# Patient Record
Sex: MALE | Race: WHITE | NOT HISPANIC OR LATINO | Employment: PART TIME | URBAN - METROPOLITAN AREA
[De-identification: names, ages, dates, MRNs, and addresses within clinical notes are randomized per-mention and may not be internally consistent; named-entity substitution may affect disease eponyms.]

---

## 2017-01-02 ENCOUNTER — TELEPHONE (OUTPATIENT)
Dept: FAMILY MEDICINE | Facility: CLINIC | Age: 67
End: 2017-01-02

## 2017-01-02 DIAGNOSIS — M10.079 ACUTE IDIOPATHIC GOUT OF FOOT, UNSPECIFIED LATERALITY: Primary | Chronic | ICD-10-CM

## 2017-01-02 NOTE — TELEPHONE ENCOUNTER
Spoke with Prashant and he is having a flare up of gout in his left big toe- achy, pain and swelling. He would like a script called in again, he said the last time he was on methylPREDNISolone (MEDROL DOSEPAK) 4 MG tablet  and that worked for him. Medication pended for your approval and completion.   Nathalia Simms RN

## 2017-01-02 NOTE — TELEPHONE ENCOUNTER
Patient calling states has a history of gout, is having a flare up in his toe, would like a script called in for this declined appt at this time.

## 2017-01-03 RX ORDER — METHYLPREDNISOLONE 4 MG
TABLET, DOSE PACK ORAL
Qty: 21 TABLET | Refills: 0 | Status: SHIPPED | OUTPATIENT
Start: 2017-01-03 | End: 2017-01-16

## 2017-01-03 NOTE — TELEPHONE ENCOUNTER
Spoke with pt and instructed that medication was sent to his preferred [facility] pharmacy. He verbalized understanding and appreciation for the call.   Nathalia Simms RN

## 2017-01-03 NOTE — TELEPHONE ENCOUNTER
Patient calling to check on status of message states is having more pain today would like a call back or script called in as soon as possible.

## 2017-01-16 PROBLEM — M10.9 ACUTE GOUTY ARTHRITIS: Status: ACTIVE | Noted: 2017-01-16

## 2017-03-07 ENCOUNTER — RADIANT APPOINTMENT (OUTPATIENT)
Dept: GENERAL RADIOLOGY | Facility: CLINIC | Age: 67
End: 2017-03-07
Attending: PHYSICIAN ASSISTANT
Payer: MEDICARE

## 2017-03-07 ENCOUNTER — OFFICE VISIT (OUTPATIENT)
Dept: FAMILY MEDICINE | Facility: CLINIC | Age: 67
End: 2017-03-07
Payer: MEDICARE

## 2017-03-07 VITALS
BODY MASS INDEX: 38.92 KG/M2 | WEIGHT: 278 LBS | HEART RATE: 69 BPM | OXYGEN SATURATION: 96 % | TEMPERATURE: 97.6 F | HEIGHT: 71 IN | DIASTOLIC BLOOD PRESSURE: 66 MMHG | SYSTOLIC BLOOD PRESSURE: 117 MMHG

## 2017-03-07 DIAGNOSIS — M54.6 ACUTE MIDLINE THORACIC BACK PAIN: Primary | ICD-10-CM

## 2017-03-07 DIAGNOSIS — R07.1 PAINFUL RESPIRATION: ICD-10-CM

## 2017-03-07 LAB
ALBUMIN UR-MCNC: NEGATIVE MG/DL
APPEARANCE UR: CLEAR
BILIRUB UR QL STRIP: NEGATIVE
COLOR UR AUTO: YELLOW
D DIMER PPP FEU-MCNC: NORMAL UG/ML FEU (ref 0–0.5)
GLUCOSE UR STRIP-MCNC: NEGATIVE MG/DL
HGB UR QL STRIP: NEGATIVE
KETONES UR STRIP-MCNC: NEGATIVE MG/DL
LEUKOCYTE ESTERASE UR QL STRIP: NEGATIVE
NITRATE UR QL: NEGATIVE
PH UR STRIP: 7.5 PH (ref 5–7)
SP GR UR STRIP: 1.02 (ref 1–1.03)
URN SPEC COLLECT METH UR: ABNORMAL
UROBILINOGEN UR STRIP-ACNC: 1 EU/DL (ref 0.2–1)

## 2017-03-07 PROCEDURE — 81003 URINALYSIS AUTO W/O SCOPE: CPT | Performed by: PHYSICIAN ASSISTANT

## 2017-03-07 PROCEDURE — 36415 COLL VENOUS BLD VENIPUNCTURE: CPT | Performed by: PHYSICIAN ASSISTANT

## 2017-03-07 PROCEDURE — 85379 FIBRIN DEGRADATION QUANT: CPT | Performed by: PHYSICIAN ASSISTANT

## 2017-03-07 PROCEDURE — 71020 XR CHEST 2 VW: CPT

## 2017-03-07 PROCEDURE — 99214 OFFICE O/P EST MOD 30 MIN: CPT | Performed by: PHYSICIAN ASSISTANT

## 2017-03-07 RX ORDER — PREDNISONE 20 MG/1
20 TABLET ORAL 2 TIMES DAILY
Qty: 14 TABLET | Refills: 0 | Status: SHIPPED | OUTPATIENT
Start: 2017-03-07 | End: 2017-03-14

## 2017-03-07 NOTE — PROGRESS NOTES
SUBJECTIVE:                                                    Prashant Keyes is a 66 year old male who presents to clinic today for the following health issues:        Back Pain      Duration: 1 week        Specific cause: none    Description:   Location of pain: upper back bilateral  Character of pain: dull ache  Pain radiation:radiates to upper sides  New numbness or weakness in legs, not attributed to pain:  no     Intensity: At its worst 5/10    History:   Pain interferes with job: No  History of back problems: no prior back problems  Any previous MRI or X-rays: Yes- at [place].  Sees a specialist for back pain:  No  Therapies tried without relief: acetaminophen (Tylenol)    Alleviating factors:   Improved by: acetaminophen (Tylenol) and rest      Precipitating factors:  Worsened by: Coughing and breathing    Functional and Psychosocial Screen (Harshil STarT Back):      Not performed today       Accompanying Signs & Symptoms:  Risk of Fracture:  None  Risk of Cauda Equina:  None  Risk of Infection:  None  Risk of Cancer:  None  Risk of Ankylosing Spondylitis:  Onset at age <35, male, AND morning back stiffness.no                    No rash. No fever. Mild fever, now resolved. No cold symptoms. No sore throat. No sob. No calf pain or swelling. Pain with moving around or with extreme deep breathing. No sob. No irritative/obst voiding symptoms.  Some occasional right lateral chest pain. Off and on.   Problem list and histories reviewed & adjusted, as indicated.  Additional history: as documented    Patient Active Problem List   Diagnosis     Hypertension goal BP (blood pressure) < 140/90     Convulsions (H)     GERD     Fibromyalgia syndrome     Hyperlipidemia LDL goal <130     Obesity, Class III, BMI 40-49.9 (morbid obesity) (H)     Eczema     KALEB (obstructive sleep apnea)     Advanced directives, counseling/discussion     Lichen planus     CKD (chronic kidney disease) stage 2, GFR 60-89 ml/min     Gout      Leonardo     Acute gouty arthritis     Past Surgical History   Procedure Laterality Date     Angiogram  2003     Coronary Angiogram- negative     Hc removal testis,simple  1978     Right undecended     Hernia repair, inguinal rt/lt  1963, 1978     Right Hernia     Herniorrhaphy umbilical  4/2013     [place]       Social History   Substance Use Topics     Smoking status: Never Smoker     Smokeless tobacco: Never Used     Alcohol use No      Comment: Quit since 2003.      Family History   Problem Relation Age of Onset     CEREBROVASCULAR DISEASE Mother      60's     C.A.D. Mother      CABG 75     Arthritis Mother      Eye Disorder Mother      HEART DISEASE Mother      Cardiovascular Father      Rheumatic Heart Disease     Hypertension Brother      Lipids Brother      C.A.D. Brother      CABG 46     Arthritis Brother      HEART DISEASE Brother      CABG x5     Obesity Brother      Hypertension Brother      Lipids Brother      Thyroid Disease Brother      Alcohol/Drug Brother      HEART DISEASE Brother      CABG     CANCER Sister      Thyroid CA     Hypertension Sister      Obesity Sister      Thyroid Disease Sister      Hemochromatosis Sister      Depression Daughter      Depression Daughter      Depression Son      DIABETES Son      Depression Son      CANCER Maternal Grandmother      Thyroid CA     CANCER Paternal Grandfather      Throat CA     Thyroid Disease Maternal Grandfather      GASTROINTESTINAL DISEASE Brother      Crohn's Disease-Ostomy     Arrhythmia Sister          Recent Labs   Lab Test  07/19/16   1159  05/02/16   1150  04/08/16   1417 02/11/16 12/19/14   0815   01/10/14   1433  06/03/13   0730   12/30/11   1419   07/20/10   1356   07/15/09   1445   A1C   --    --    --    --    --    --    --    --    --    --    --    --   5.9   --   5.7   LDL  114*   --    --    --    --   81   --   98   --    < >   --    < >   --    < >  133*   HDL  46   --    --    --    --   44   --   40   --    < >   --    --     --    --   61   TRIG  253*   --    --    --    --   195*   --   198*   --    < >   --    --    --    --    --    ALT   --   40  39  35   < >   --    --    --    --    < >   --    < >  37   --    --    CR   --   1.02  0.98   --    < >  1.24   < >  1.27*  1.11   < >   --    < >  1.19   < >   --    GFRESTIMATED   --   73  77   --    < >  59*   < >  57*  67   < >   --    < >  63   < >   --    GFRESTBLACK   --   89  >90   GFR Calc     --    < >  71   < >  69  81   < >   --    < >  76   < >   --    POTASSIUM   --   3.5  3.6   --    < >  3.4   < >  3.9   --    < >   --    < >  3.6   < >   --    TSH   --    --    --    --    --    --    --    --   1.04   --   0.65   < >   --    --   0.93    < > = values in this interval not displayed.      BP Readings from Last 3 Encounters:   03/07/17 117/66   12/15/16 138/76   12/13/16 154/82    Wt Readings from Last 3 Encounters:   03/07/17 278 lb (126.1 kg)   12/15/16 281 lb 6.4 oz (127.6 kg)   12/13/16 281 lb 6.4 oz (127.6 kg)                    Reviewed and updated as needed this visit by clinical staff  Tobacco  Allergies  Meds  Med Hx  Surg Hx  Fam Hx  Soc Hx      Reviewed and updated as needed this visit by Provider         All other systems negative except as outline above  OBJECTIVE:    BACK: thoracolumbar spine area reveals local tenderness.  Painful and reduced LS ROM noted. Straight leg raise is neg at .  DTR's, motor strength and sensation normal, including heel and toe gait.  Perifpheral pulses are palpable.  Hipes and knees have full range of motion without pain.  No abdominal tenderness, mass or organomegaly.  CHEST:chest clear to IPPA, no tachypnea, retractions or cyanosis and S1, S2 normal, no murmur, no gallop, rate regular.  No leg edema  The abdomen is soft without tenderness, guarding, mass, rebound or organomegaly. Bowel sounds are normal. No CVA tenderness or inguinal adenopathy noted.  Eye exam - right eye normal lid, conjunctiva, cornea,  pupil and fundus, left eye normal lid, conjunctiva, cornea, pupil and fundus.  ENT exam reveals - ENT exam normal, no neck nodes or sinus tenderness.        Prashant was seen today for musculoskeletal problem.    Diagnoses and all orders for this visit:    Acute midline thoracic back pain  -     *UA reflex to Microscopic and Culture ([facility] (except [place] and [place])  -     predniSONE (DELTASONE) 20 MG tablet; Take 1 tablet (20 mg) by mouth 2 times daily for 7 days    Painful respiration  -     D dimer quantitative  -     XR Chest 2 Views      work on lifestyle modification  Advised supportive and symptomatic treatment.  Follow up with Provider - if condition persists or worsens.

## 2017-03-07 NOTE — MR AVS SNAPSHOT
"              After Visit Summary   3/7/2017    Prashant Keyes    MRN: [id number]           Patient Information     Date Of Birth          1950        Visit Information        Provider Department      3/7/2017 1:40 PM Matt Swan PA-C [facility]        Today's Diagnoses     Acute midline thoracic back pain    -  1    Painful respiration           Follow-ups after your visit        Who to contact     Normal or non-critical lab and imaging results will be communicated to you by Negoramahart, letter or phone within 4 business days after the clinic has received the results. If you do not hear from us within 7 days, please contact the clinic through Negoramahart or phone. If you have a critical or abnormal lab result, we will notify you by phone as soon as possible.  Submit refill requests through [employer] or call your pharmacy and they will forward the refill request to us. Please allow 3 business days for your refill to be completed.          If you need to speak with a  for additional information , please call: [phone]             Additional Information About Your Visit        [facility] Information     Poetica gives you secure access to your electronic health record. If you see a primary care provider, you can also send messages to your care team and make appointments. If you have questions, please call your primary care clinic.  If you do not have a primary care provider, please call [phone] and they will assist you.        Care EveryWhere ID     This is your Care EveryWhere ID. This could be used by other organizations to access your [facility] medical records  [id number]        Your Vitals Were     Pulse Temperature Height Pulse Oximetry BMI (Body Mass Index)       69 97.6  F (36.4  C) (Oral) 5' 11\" (1.803 m) 96% 38.77 kg/m2        Blood Pressure from Last 3 Encounters:   03/07/17 117/66   12/15/16 138/76   12/13/16 154/82    Weight from Last 3 Encounters:   03/07/17 278 lb " (126.1 kg)   12/15/16 281 lb 6.4 oz (127.6 kg)   12/13/16 281 lb 6.4 oz (127.6 kg)              We Performed the Following     *UA reflex to Microscopic and Culture ([facility] [place] [facility] (except [place] and [place])     D dimer quantitative     XR Chest 2 Views          Today's Medication Changes          These changes are accurate as of: 3/7/17 11:59 PM.  If you have any questions, ask your nurse or doctor.               Start taking these medicines.        Dose/Directions    predniSONE 20 MG tablet   Commonly known as:  DELTASONE   Used for:  Acute midline thoracic back pain   Started by:  Matt Swan PA-C        Dose:  20 mg   Take 1 tablet (20 mg) by mouth 2 times daily for 7 days   Quantity:  14 tablet   Refills:  0         These medicines have changed or have updated prescriptions.        Dose/Directions    aspirin 81 MG tablet   This may have changed:  additional instructions   Used for:  Essential hypertension        Dose:  81 mg   Take 1 tablet (81 mg) by mouth daily   Quantity:  30 tablet   Refills:  0            Where to get your medicines      These medications were sent to [facility] [place]  [place]  [address] [address] [place] [address]     Phone:  [phone]     predniSONE 20 MG tablet                Primary Care Provider Office Phone # Fax #    Matt Swan PA-C [phone]       [facility] [address]  [place] [address]        Thank you!     Thank you for choosing [facility]  for your care. Our goal is always to provide you with excellent care. Hearing back from our patients is one way we can continue to improve our services. Please take a few minutes to complete the written survey that you may receive in the mail after your visit with us. Thank you!             Your Updated Medication List - Protect others around you: Learn how to safely use, store and throw away your medicines at  www.disposemymeds.org.          This list is accurate as of: 3/7/17 11:59 PM.  Always use your most recent med list.                   Brand Name Dispense Instructions for use    ABILIFY 2 MG tablet   Generic drug:  ARIPiprazole      Take 2 mg by mouth daily       allopurinol 100 MG tablet    ZYLOPRIM    90 tablet    Take 1 tablet (100 mg) by mouth daily       amLODIPine 10 MG tablet    NORVASC    90 tablet    Take 1 tablet (10 mg) by mouth daily (with dinner)       aspirin 81 MG tablet     30 tablet    Take 1 tablet (81 mg) by mouth daily       carvedilol 12.5 MG tablet    COREG    180 tablet    Take 1 tablet (12.5 mg) by mouth 2 times daily (with meals)       chlorthalidone 25 MG tablet    HYGROTON    135 tablet    Take 1.5 tablets (37.5 mg) by mouth daily       cyabnocobalamin 2500 MCG sublingual tablet    VITAMIN B-12    90 tablet    Place 2,500 mcg under the tongue daily       divalproex 500 MG EC tablet    DEPAKOTE    180 tablet    Take 1 tab po twice daily. Needs office follow-up before next refill.       levETIRAcetam 1000 MG Tabs    KEPPRA    180 tablet    1 tablet in morning and bed time.       losartan 100 MG tablet    COZAAR    90 tablet    Take 1 tablet (100 mg) by mouth daily       methylPREDNISolone 4 MG tablet    MEDROL DOSEPAK    21 tablet    Follow package instructions       MULTI VITAMIN MENS PO      Take  by mouth.       omeprazole 40 MG capsule    priLOSEC    30 capsule    Take 1 capsule (40 mg) by mouth daily Take 30-60 minutes before a meal.       order for DME      CPAP daily       predniSONE 20 MG tablet    DELTASONE    14 tablet    Take 1 tablet (20 mg) by mouth 2 times daily for 7 days       simvastatin 20 MG tablet    ZOCOR    180 tablet    Take 2 tablets (40 mg) by mouth At Bedtime Current dose 1/10/14       TEMAZEPAM PO      Take 15 mg by mouth At Bedtime       vitamin D 2000 UNITS tablet     90 tablet    Take 2,000 Units by mouth daily

## 2017-03-07 NOTE — NURSING NOTE
"Chief Complaint   Patient presents with     Musculoskeletal Problem     upper back pain       Initial /66 (BP Location: Left arm, Patient Position: Chair, Cuff Size: Adult Large)  Pulse 69  Temp 97.6  F (36.4  C) (Oral)  Ht 5' 11\" (1.803 m)  Wt 278 lb (126.1 kg)  SpO2 96%  BMI 38.77 kg/m2 Estimated body mass index is 38.77 kg/(m^2) as calculated from the following:    Height as of this encounter: 5' 11\" (1.803 m).    Weight as of this encounter: 278 lb (126.1 kg).  Medication Reconciliation: complete   Derick Winters MA      "

## 2017-03-13 DIAGNOSIS — M10.071 ACUTE IDIOPATHIC GOUT OF RIGHT ANKLE: Chronic | ICD-10-CM

## 2017-03-13 NOTE — TELEPHONE ENCOUNTER
Please send new dose, pt stated that dose increased to 2QD.      allopurinol 100mg      Last Written Prescription Date: 07/25/16  Last Fill Quantity: 90,  # refills: 3   Last Office Visit with [facility], [facility] or [facility] prescribing provider: 03/07/17                                             Thank You,  Juju Cunha, Pharmacy Tech  [place]/ [facility]

## 2017-03-14 RX ORDER — ALLOPURINOL 100 MG/1
100 TABLET ORAL DAILY
Qty: 90 TABLET | Refills: 3 | Status: SHIPPED | OUTPATIENT
Start: 2017-03-14 | End: 2017-03-17

## 2017-03-15 ENCOUNTER — TELEPHONE (OUTPATIENT)
Dept: FAMILY MEDICINE | Facility: CLINIC | Age: 67
End: 2017-03-15

## 2017-03-15 DIAGNOSIS — M10.071 ACUTE IDIOPATHIC GOUT OF RIGHT ANKLE: Chronic | ICD-10-CM

## 2017-03-15 NOTE — TELEPHONE ENCOUNTER
Javi Gutierrez    Pt came in today to  his allopurinol with direction of one tablet daily.  He mentioned that you have told him to take 2 tablets daily a few weeks ago.  If the dose has changed can you send a new prescription.  Thanks.    [place]

## 2017-03-17 RX ORDER — ALLOPURINOL 100 MG/1
200 TABLET ORAL DAILY
Qty: 180 TABLET | Refills: 3 | Status: SHIPPED | OUTPATIENT
Start: 2017-03-17 | End: 2017-05-05

## 2017-03-20 ENCOUNTER — MYC MEDICAL ADVICE (OUTPATIENT)
Dept: FAMILY MEDICINE | Facility: CLINIC | Age: 67
End: 2017-03-20

## 2017-04-02 DIAGNOSIS — M10.079 ACUTE IDIOPATHIC GOUT OF FOOT, UNSPECIFIED LATERALITY: Chronic | ICD-10-CM

## 2017-04-03 NOTE — TELEPHONE ENCOUNTER
methylprednisolone      Last Written Prescription Date:  01/03/17  Last Fill Quantity: 21,   # refills: 0  Last Office Visit with [facility] or  [facility] prescribing provider: 03/07/17  Future Office visit:       Routing refill request to provider for review/approval because:  Drug not on the [facility] or [email or web address] refill protocol or controlled substance

## 2017-04-04 RX ORDER — METHYLPREDNISOLONE 4 MG
TABLET, DOSE PACK ORAL
Qty: 21 TABLET | Refills: 0 | Status: SHIPPED | OUTPATIENT
Start: 2017-04-04 | End: 2017-05-05

## 2017-04-10 DIAGNOSIS — E78.00 PURE HYPERCHOLESTEROLEMIA: ICD-10-CM

## 2017-04-10 RX ORDER — SIMVASTATIN 20 MG
TABLET ORAL
Qty: 180 TABLET | Refills: 2 | Status: SHIPPED | OUTPATIENT
Start: 2017-04-10 | End: 2017-05-05

## 2017-04-10 NOTE — TELEPHONE ENCOUNTER
Simvastatin     Last Written Prescription Date: 1/6/17  Last Fill Quantity: 180, # refills: 2  Last Office Visit with [facility], [facility] or [facility] prescribing provider: 3/7/17       Lab Results   Component Value Date    CHOL 211 07/19/2016     Lab Results   Component Value Date    HDL 46 07/19/2016     Lab Results   Component Value Date     07/19/2016     Lab Results   Component Value Date    TRIG 253 07/19/2016     Lab Results   Component Value Date    CHOLHDLRATIO 3.7 12/19/2014

## 2017-04-12 DIAGNOSIS — I10 HYPERTENSION GOAL BP (BLOOD PRESSURE) < 140/90: Chronic | ICD-10-CM

## 2017-04-12 RX ORDER — CARVEDILOL 12.5 MG/1
TABLET ORAL
Qty: 180 TABLET | Refills: 1 | Status: SHIPPED | OUTPATIENT
Start: 2017-04-12 | End: 2017-05-05

## 2017-04-19 DIAGNOSIS — I10 ESSENTIAL HYPERTENSION: ICD-10-CM

## 2017-04-20 RX ORDER — LOSARTAN POTASSIUM 100 MG/1
TABLET ORAL
Qty: 90 TABLET | Refills: 0 | Status: SHIPPED | OUTPATIENT
Start: 2017-04-20 | End: 2017-05-05

## 2017-04-20 NOTE — TELEPHONE ENCOUNTER
Losartan      Last Written Prescription Date: 1/23/17  Last Fill Quantity: 90, # refills: 0  Last Office Visit with [facility], [facility] or [facility] prescribing provider: 3/7/17       Potassium   Date Value Ref Range Status   05/02/2016 3.5 3.4 - 5.3 mmol/L Final     Creatinine   Date Value Ref Range Status   05/02/2016 1.02 0.66 - 1.25 mg/dL Final     BP Readings from Last 3 Encounters:   03/07/17 117/66   12/15/16 138/76   12/13/16 154/82

## 2017-04-29 DIAGNOSIS — I10 HYPERTENSION GOAL BP (BLOOD PRESSURE) < 140/90: ICD-10-CM

## 2017-05-01 NOTE — TELEPHONE ENCOUNTER
AMLODIPINE      Last Written Prescription Date: 1-30-17  Last Fill Quantity: 90, # refills: 0    Last Office Visit with [facility], [facility] or [facility] prescribing provider:  3-7-17   Future Office Visit:        BP Readings from Last 3 Encounters:   03/07/17 117/66   12/15/16 138/76   12/13/16 154/82

## 2017-05-02 ENCOUNTER — ALLIED HEALTH/NURSE VISIT (OUTPATIENT)
Dept: NURSING | Facility: CLINIC | Age: 67
End: 2017-05-02
Payer: MEDICARE

## 2017-05-02 DIAGNOSIS — H61.23 BILATERAL IMPACTED CERUMEN: Primary | ICD-10-CM

## 2017-05-02 PROCEDURE — 99207 ZZC NO CHARGE NURSE ONLY: CPT

## 2017-05-02 RX ORDER — AMLODIPINE BESYLATE 10 MG/1
TABLET ORAL
Qty: 90 TABLET | Refills: 0 | Status: SHIPPED | OUTPATIENT
Start: 2017-05-02 | End: 2017-05-05

## 2017-05-02 NOTE — MR AVS SNAPSHOT
After Visit Summary   5/2/2017    Prashant Keyes    MRN: [id number]           Patient Information     Date Of Birth          1950        Visit Information        Provider Department      5/2/2017 2:30 PM [facility]        Today's Diagnoses     Bilateral impacted cerumen    -  1       Follow-ups after your visit        Who to contact     If you have questions or need follow up information about today's clinic visit or your schedule please contact [facility] directly at [phone].  Normal or non-critical lab and imaging results will be communicated to you by Sofeahart, letter or phone within 4 business days after the clinic has received the results. If you do not hear from us within 7 days, please contact the clinic through Sofeahart or phone. If you have a critical or abnormal lab result, we will notify you by phone as soon as possible.  Submit refill requests through [employer] or call your pharmacy and they will forward the refill request to us. Please allow 3 business days for your refill to be completed.          Additional Information About Your Visit        MyChart Information     [employer] gives you secure access to your electronic health record. If you see a primary care provider, you can also send messages to your care team and make appointments. If you have questions, please call your primary care clinic.  If you do not have a primary care provider, please call [phone] and they will assist you.        Care EveryWhere ID     This is your Care EveryWhere ID. This could be used by other organizations to access your [facility] medical records  [id number]         Blood Pressure from Last 3 Encounters:   03/07/17 117/66   12/15/16 138/76   12/13/16 154/82    Weight from Last 3 Encounters:   03/07/17 278 lb (126.1 kg)   12/15/16 281 lb 6.4 oz (127.6 kg)   12/13/16 281 lb 6.4 oz (127.6 kg)              Today, you had the following     No orders found for  display         Today's Medication Changes          These changes are accurate as of: 5/2/17  2:50 PM.  If you have any questions, ask your nurse or doctor.               These medicines have changed or have updated prescriptions.        Dose/Directions    aspirin 81 MG tablet   This may have changed:  additional instructions   Used for:  Essential hypertension        Dose:  81 mg   Take 1 tablet (81 mg) by mouth daily   Quantity:  30 tablet   Refills:  0                Primary Care Provider Office Phone # Fax #    Matt Swan PA-C [phone]       [facility] [address] [facility] [address]        Thank you!     Thank you for choosing [facility]  for your care. Our goal is always to provide you with excellent care. Hearing back from our patients is one way we can continue to improve our services. Please take a few minutes to complete the written survey that you may receive in the mail after your visit with us. Thank you!             Your Updated Medication List - Protect others around you: Learn how to safely use, store and throw away your medicines at www.disposemymeds.org.          This list is accurate as of: 5/2/17  2:50 PM.  Always use your most recent med list.                   Brand Name Dispense Instructions for use    ABILIFY 2 MG tablet   Generic drug:  ARIPiprazole      Take 2 mg by mouth daily       allopurinol 100 MG tablet    ZYLOPRIM    180 tablet    Take 2 tablets (200 mg) by mouth daily       amLODIPine 10 MG tablet    NORVASC    90 tablet    TAKE ONE TABLET BY MOUTH ONCE DAILY WITH  DINNER       aspirin 81 MG tablet     30 tablet    Take 1 tablet (81 mg) by mouth daily       carvedilol 12.5 MG tablet    COREG    180 tablet    TAKE ONE TABLET BY MOUTH TWICE DAILY WITH MEALS       chlorthalidone 25 MG tablet    HYGROTON    135 tablet    Take 1.5 tablets (37.5 mg) by mouth daily       cyabnocobalamin 2500 MCG sublingual tablet    VITAMIN B-12    90  tablet    Place 2,500 mcg under the tongue daily       divalproex 500 MG EC tablet    DEPAKOTE    180 tablet    Take 1 tab po twice daily. Needs office follow-up before next refill.       levETIRAcetam 1000 MG Tabs    KEPPRA    180 tablet    1 tablet in morning and bed time.       losartan 100 MG tablet    COZAAR    90 tablet    TAKE ONE TABLET BY MOUTH ONCE DAILY       * methylPREDNISolone 4 MG tablet    MEDROL DOSEPAK    21 tablet    Follow package instructions       * methylPREDNISolone 4 MG tablet    MEDROL DOSEPAK    21 tablet    TAKE AS DIRECTED ON INSIDE OF PACKAGE       MULTI VITAMIN MENS PO      Take  by mouth.       omeprazole 40 MG capsule    priLOSEC    30 capsule    Take 1 capsule (40 mg) by mouth daily Take 30-60 minutes before a meal.       order for DME      CPAP daily       simvastatin 20 MG tablet    ZOCOR    180 tablet    TAKE TWO TABLETS BY MOUTH EVERY NIGHT AT BEDTIME       TEMAZEPAM PO      Take 15 mg by mouth At Bedtime       vitamin D 2000 UNITS tablet     90 tablet    Take 2,000 Units by mouth daily       * Notice:  This list has 2 medication(s) that are the same as other medications prescribed for you. Read the directions carefully, and ask your doctor or other care provider to review them with you.

## 2017-05-02 NOTE — NURSING NOTE
Patient presented to clinic for Bilateral ear wash. Both ear(s) were inspected with an otoscope and found to have cerumen in the ear canal(s). The patient has not been using OTC debrox for 0 days prior to today. The patient's ear(s) were washed out with a hydrogen peroxide/water mix. Patient tolerated the procedure well.      Fernanda Ramirez CMA

## 2017-05-02 NOTE — TELEPHONE ENCOUNTER
Medication is being filled for 1 time refill only due to:  Patient needs to be seen because needs annual exam and fasting lipids.   Marjorie Mena RN

## 2017-05-05 ENCOUNTER — OFFICE VISIT (OUTPATIENT)
Dept: FAMILY MEDICINE | Facility: CLINIC | Age: 67
End: 2017-05-05
Payer: MEDICARE

## 2017-05-05 VITALS
BODY MASS INDEX: 37.94 KG/M2 | TEMPERATURE: 98.4 F | OXYGEN SATURATION: 96 % | WEIGHT: 271 LBS | HEART RATE: 74 BPM | SYSTOLIC BLOOD PRESSURE: 126 MMHG | HEIGHT: 71 IN | RESPIRATION RATE: 16 BRPM | DIASTOLIC BLOOD PRESSURE: 73 MMHG

## 2017-05-05 DIAGNOSIS — E78.00 PURE HYPERCHOLESTEROLEMIA: ICD-10-CM

## 2017-05-05 DIAGNOSIS — N18.2 CKD (CHRONIC KIDNEY DISEASE) STAGE 2, GFR 60-89 ML/MIN: Chronic | ICD-10-CM

## 2017-05-05 DIAGNOSIS — M79.7 FIBROMYALGIA SYNDROME: Chronic | ICD-10-CM

## 2017-05-05 DIAGNOSIS — I10 BENIGN ESSENTIAL HYPERTENSION: ICD-10-CM

## 2017-05-05 DIAGNOSIS — E78.5 HYPERLIPIDEMIA LDL GOAL <130: Chronic | ICD-10-CM

## 2017-05-05 DIAGNOSIS — M10.071 ACUTE IDIOPATHIC GOUT OF RIGHT ANKLE: Chronic | ICD-10-CM

## 2017-05-05 DIAGNOSIS — M81.0 AGE-RELATED OSTEOPOROSIS WITHOUT CURRENT PATHOLOGICAL FRACTURE: ICD-10-CM

## 2017-05-05 DIAGNOSIS — I10 ESSENTIAL HYPERTENSION: ICD-10-CM

## 2017-05-05 DIAGNOSIS — K21.9 GASTROESOPHAGEAL REFLUX DISEASE, ESOPHAGITIS PRESENCE NOT SPECIFIED: ICD-10-CM

## 2017-05-05 DIAGNOSIS — I10 HYPERTENSION GOAL BP (BLOOD PRESSURE) < 140/90: Primary | Chronic | ICD-10-CM

## 2017-05-05 DIAGNOSIS — Z12.11 COLON CANCER SCREENING: ICD-10-CM

## 2017-05-05 LAB
ALBUMIN SERPL-MCNC: 3.8 G/DL (ref 3.4–5)
ALP SERPL-CCNC: 60 U/L (ref 40–150)
ALT SERPL W P-5'-P-CCNC: 18 U/L (ref 0–70)
ANION GAP SERPL CALCULATED.3IONS-SCNC: 8 MMOL/L (ref 3–14)
AST SERPL W P-5'-P-CCNC: 8 U/L (ref 0–45)
BILIRUB SERPL-MCNC: 0.4 MG/DL (ref 0.2–1.3)
BUN SERPL-MCNC: 18 MG/DL (ref 7–30)
CALCIUM SERPL-MCNC: 9.6 MG/DL (ref 8.5–10.1)
CHLORIDE SERPL-SCNC: 96 MMOL/L (ref 94–109)
CHOLEST SERPL-MCNC: 159 MG/DL
CO2 SERPL-SCNC: 35 MMOL/L (ref 20–32)
CREAT SERPL-MCNC: 1.28 MG/DL (ref 0.66–1.25)
CREAT UR-MCNC: 34 MG/DL
GFR SERPL CREATININE-BSD FRML MDRD: 56 ML/MIN/1.7M2
GLUCOSE SERPL-MCNC: 95 MG/DL (ref 70–99)
HDLC SERPL-MCNC: 66 MG/DL
LDLC SERPL CALC-MCNC: 63 MG/DL
MICROALBUMIN UR-MCNC: 10 MG/L
MICROALBUMIN/CREAT UR: 30.95 MG/G CR (ref 0–17)
NONHDLC SERPL-MCNC: 93 MG/DL
POTASSIUM SERPL-SCNC: 3.3 MMOL/L (ref 3.4–5.3)
PROT SERPL-MCNC: 7.3 G/DL (ref 6.8–8.8)
SODIUM SERPL-SCNC: 139 MMOL/L (ref 133–144)
TRIGL SERPL-MCNC: 148 MG/DL

## 2017-05-05 PROCEDURE — 82043 UR ALBUMIN QUANTITATIVE: CPT | Performed by: PHYSICIAN ASSISTANT

## 2017-05-05 PROCEDURE — 80053 COMPREHEN METABOLIC PANEL: CPT | Performed by: PHYSICIAN ASSISTANT

## 2017-05-05 PROCEDURE — 36415 COLL VENOUS BLD VENIPUNCTURE: CPT | Performed by: PHYSICIAN ASSISTANT

## 2017-05-05 PROCEDURE — 80061 LIPID PANEL: CPT | Performed by: PHYSICIAN ASSISTANT

## 2017-05-05 PROCEDURE — 99214 OFFICE O/P EST MOD 30 MIN: CPT | Performed by: PHYSICIAN ASSISTANT

## 2017-05-05 RX ORDER — AMLODIPINE BESYLATE 10 MG/1
TABLET ORAL
Qty: 90 TABLET | Refills: 1 | Status: SHIPPED | OUTPATIENT
Start: 2017-05-05 | End: 2017-07-25

## 2017-05-05 RX ORDER — LEVETIRACETAM 1000 MG/1
TABLET ORAL
Qty: 180 TABLET | Refills: 3 | Status: SHIPPED | OUTPATIENT
Start: 2017-05-05 | End: 2018-01-29

## 2017-05-05 RX ORDER — CARVEDILOL 12.5 MG/1
TABLET ORAL
Qty: 180 TABLET | Refills: 1 | Status: SHIPPED | OUTPATIENT
Start: 2017-05-05 | End: 2017-07-25

## 2017-05-05 RX ORDER — DIVALPROEX SODIUM 500 MG/1
TABLET, DELAYED RELEASE ORAL
Qty: 180 TABLET | Refills: 1 | Status: SHIPPED | OUTPATIENT
Start: 2017-05-05 | End: 2017-07-25

## 2017-05-05 RX ORDER — LOSARTAN POTASSIUM 100 MG/1
100 TABLET ORAL DAILY
Qty: 90 TABLET | Refills: 1 | Status: SHIPPED | OUTPATIENT
Start: 2017-05-05 | End: 2017-07-25

## 2017-05-05 RX ORDER — SIMVASTATIN 20 MG
TABLET ORAL
Qty: 180 TABLET | Refills: 2 | Status: SHIPPED | OUTPATIENT
Start: 2017-05-05 | End: 2017-07-25

## 2017-05-05 RX ORDER — OMEPRAZOLE 40 MG/1
40 CAPSULE, DELAYED RELEASE ORAL DAILY
Qty: 30 CAPSULE | Refills: 11 | Status: SHIPPED | OUTPATIENT
Start: 2017-05-05 | End: 2018-01-29

## 2017-05-05 RX ORDER — ALLOPURINOL 100 MG/1
200 TABLET ORAL DAILY
Qty: 180 TABLET | Refills: 3 | Status: SHIPPED | OUTPATIENT
Start: 2017-05-05 | End: 2018-01-29

## 2017-05-05 RX ORDER — CHLORTHALIDONE 25 MG/1
37.5 TABLET ORAL DAILY
Qty: 135 TABLET | Refills: 1 | Status: SHIPPED | OUTPATIENT
Start: 2017-05-05 | End: 2017-07-25

## 2017-05-05 NOTE — PROGRESS NOTES
SUBJECTIVE:                                                    Prashant Keyes is a 66 year old male who presents to clinic today for the following health issues:      Hyperlipidemia Follow-Up      Rate your low fat/cholesterol diet?: good    Taking statin?  Yes, no muscle aches from statin    Other lipid medications/supplements?:  none     Hypertension Follow-up      Outpatient blood pressures are not being checked.    Low Salt Diet: low salt       Amount of exercise or physical activity: None    Problems taking medications regularly: No    Medication side effects: none    Diet: low salt and low fat/cholesterol    Annual wellness visit: all chronic conditions are currently stable.     Blood pressure looks good today.       Problem list and histories reviewed & adjusted, as indicated.  Additional history: as documented        Reviewed and updated as needed this visit by clinical staff  Meds       Reviewed and updated as needed this visit by Provider         All other systems negative except as outline above    OBJECTIVE:  Eye exam - right eye normal lid, conjunctiva, cornea, pupil and fundus, left eye normal lid, conjunctiva, cornea, pupil and fundus.  Thyroid not palpable, not enlarged, no nodules detected.  CHEST:chest clear to IPPA, no tachypnea, retractions or cyanosis and S1, S2 normal, no murmur, no gallop, rate regular.  No edema  The abdomen is soft without tenderness, guarding, mass, rebound or organomegaly. Bowel sounds are normal. No CVA tenderness or inguinal adenopathy noted.    Prashant was seen today for hypertension and lipids.    Diagnoses and all orders for this visit:    Hypertension goal BP (blood pressure) < 140/90  -     amLODIPine (NORVASC) 10 MG tablet; TAKE ONE TABLET BY MOUTH ONCE DAILY WITH  DINNER  -     carvedilol (COREG) 12.5 MG tablet; TAKE ONE TABLET BY MOUTH TWICE DAILY WITH MEALS  -     Comprehensive metabolic panel (BMP + Alb, Alk Phos, ALT, AST, Total. Bili,  TP)    Hyperlipidemia LDL goal <130  -     Lipid panel reflex to direct LDL    Fibromyalgia syndrome    CKD (chronic kidney disease) stage 2, GFR 60-89 ml/min  -     Albumin Random Urine Quantitative    Essential hypertension  -     losartan (COZAAR) 100 MG tablet; Take 1 tablet (100 mg) by mouth daily    Pure Hypercholesterolemia goal ldl <130  -     simvastatin (ZOCOR) 20 MG tablet; TAKE TWO TABLETS BY MOUTH EVERY NIGHT AT BEDTIME    Acute idiopathic gout of right ankle  -     allopurinol (ZYLOPRIM) 100 MG tablet; Take 2 tablets (200 mg) by mouth daily    Spells  -     levETIRAcetam (KEPPRA) 1000 MG TABS; 1 tablet in morning and bed time.  -     divalproex (DEPAKOTE) 500 MG EC tablet; Take 1 tab po twice daily. Needs office follow-up before next refill.    Gastroesophageal reflux disease, esophagitis presence not specified  -     omeprazole (PRILOSEC) 40 MG capsule; Take 1 capsule (40 mg) by mouth daily Take 30-60 minutes before a meal.    Benign essential hypertension  -     chlorthalidone (HYGROTON) 25 MG tablet; Take 1.5 tablets (37.5 mg) by mouth daily    Colon cancer screening  -     GASTROENTEROLOGY ADULT REF PROCEDURE ONLY    Age-related osteoporosis without current pathological fracture  -     DX Hip/Pelvis/Spine; Future    Other orders  -     Cancel: Basic metabolic panel  (Ca, Cl, CO2, Creat, Gluc, K, Na, BUN)      work on lifestyle modification  Recheck in 6 mos.

## 2017-05-05 NOTE — MR AVS SNAPSHOT
After Visit Summary   5/5/2017    Prashant Keyes    MRN: [id number]           Patient Information     Date Of Birth          1950        Visit Information        Provider Department      5/5/2017 7:40 AM Matt Swan PA-C [facility]        Today's Diagnoses     Hypertension goal BP (blood pressure) < 140/90    -  1    Hyperlipidemia LDL goal <130        Fibromyalgia syndrome        CKD (chronic kidney disease) stage 2, GFR 60-89 ml/min        Essential hypertension        Pure Hypercholesterolemia goal ldl <130        Acute idiopathic gout of right ankle        Spells        Gastroesophageal reflux disease, esophagitis presence not specified        Benign essential hypertension        Colon cancer screening        Age-related osteoporosis without current pathological fracture           Follow-ups after your visit        Additional Services     GASTROENTEROLOGY ADULT REF PROCEDURE ONLY       Last Lab Result: Creatinine (mg/dL)       Date                     Value                 05/02/2016               1.02             ----------  Body mass index is 38.06 kg/(m^2).      Patient will be contacted to schedule procedure.     Please be aware that coverage of these services is subject to the terms and limitations of your health insurance plan.  Call member services at your health plan with any benefit or coverage questions.  Any procedures must be performed at a [facility] facility OR coordinated by your clinic's referral office.    Please bring the following with you to your appointment:    (1) Any X-Rays, CTs or MRIs which have been performed.  Contact the facility where they were done to arrange for  prior to your scheduled appointment.    (2) List of current medications   (3) This referral request   (4) Any documents/labs given to you for this referral                  Your next 10 appointments already scheduled     May 09, 2017  2:40 PM CDT   Return Visit with Nico Noyola  "MD Yue   [facility]    [address] [place] [address] [phone]              Future tests that were ordered for you today     Open Future Orders        Priority Expected Expires Ordered    DX Hip/Pelvis/Spine Routine  5/5/2018 5/5/2017            Who to contact     Normal or non-critical lab and imaging results will be communicated to you by AirSense Wirelesshart, letter or phone within 4 business days after the clinic has received the results. If you do not hear from us within 7 days, please contact the clinic through AirSense Wirelesshart or phone. If you have a critical or abnormal lab result, we will notify you by phone as soon as possible.  Submit refill requests through Verold or call your pharmacy and they will forward the refill request to us. Please allow 3 business days for your refill to be completed.          If you need to speak with a  for additional information , please call: [phone]             Additional Information About Your Visit        Verold Information     Verold gives you secure access to your electronic health record. If you see a primary care provider, you can also send messages to your care team and make appointments. If you have questions, please call your primary care clinic.  If you do not have a primary care provider, please call [phone] and they will assist you.        Care EveryWhere ID     This is your Care EveryWhere ID. This could be used by other organizations to access your [facility] medical records  [id number]        Your Vitals Were     Pulse Temperature Respirations Height Pulse Oximetry BMI (Body Mass Index)    74 98.4  F (36.9  C) (Tympanic) 16 5' 10.75\" (1.797 m) 96% 38.06 kg/m2       Blood Pressure from Last 3 Encounters:   05/05/17 126/73   03/07/17 117/66   12/15/16 138/76    Weight from Last 3 Encounters:   05/05/17 271 lb (122.9 kg)   03/07/17 278 lb (126.1 kg)   12/15/16 281 lb 6.4 oz (127.6 kg)    "           We Performed the Following     Albumin Random Urine Quantitative     Comprehensive metabolic panel (BMP + Alb, Alk Phos, ALT, AST, Total. Bili, TP)     GASTROENTEROLOGY ADULT REF PROCEDURE ONLY     Lipid panel reflex to direct LDL          Today's Medication Changes          These changes are accurate as of: 5/5/17  8:14 AM.  If you have any questions, ask your nurse or doctor.               These medicines have changed or have updated prescriptions.        Dose/Directions    amLODIPine 10 MG tablet   Commonly known as:  NORVASC   This may have changed:  See the new instructions.   Used for:  Hypertension goal BP (blood pressure) < 140/90   Changed by:  Matt Swan PA-C        TAKE ONE TABLET BY MOUTH ONCE DAILY WITH  DINNER   Quantity:  90 tablet   Refills:  1       aspirin 81 MG tablet   This may have changed:  additional instructions   Used for:  Essential hypertension        Dose:  81 mg   Take 1 tablet (81 mg) by mouth daily   Quantity:  30 tablet   Refills:  0       carvedilol 12.5 MG tablet   Commonly known as:  COREG   This may have changed:  See the new instructions.   Used for:  Hypertension goal BP (blood pressure) < 140/90   Changed by:  Matt Swan PA-C        TAKE ONE TABLET BY MOUTH TWICE DAILY WITH MEALS   Quantity:  180 tablet   Refills:  1       losartan 100 MG tablet   Commonly known as:  COZAAR   This may have changed:  See the new instructions.   Used for:  Essential hypertension   Changed by:  Matt Swan PA-C        Dose:  100 mg   Take 1 tablet (100 mg) by mouth daily   Quantity:  90 tablet   Refills:  1       simvastatin 20 MG tablet   Commonly known as:  ZOCOR   This may have changed:  See the new instructions.   Used for:  Pure hypercholesterolemia   Changed by:  Matt Swan PA-C        TAKE TWO TABLETS BY MOUTH EVERY NIGHT AT BEDTIME   Quantity:  180 tablet   Refills:  2         Stop taking these medicines if you haven't already. Please contact your  care team if you have questions.     methylPREDNISolone 4 MG tablet   Commonly known as:  MEDROL DOSEPAK   Stopped by:  Matt Swan PA-C                Where to get your medicines      These medications were sent to [id number] - [place] - [address]  [address] [place] [address]     Phone:  [phone]     allopurinol 100 MG tablet    amLODIPine 10 MG tablet    carvedilol 12.5 MG tablet    chlorthalidone 25 MG tablet    divalproex 500 MG EC tablet    levETIRAcetam 1000 MG Tabs    losartan 100 MG tablet    omeprazole 40 MG capsule    simvastatin 20 MG tablet                Primary Care Provider Office Phone # Fax #    Matt Swan PA-C [phone]       [facility] [address] [facility] [address]        Thank you!     Thank you for choosing [facility]  for your care. Our goal is always to provide you with excellent care. Hearing back from our patients is one way we can continue to improve our services. Please take a few minutes to complete the written survey that you may receive in the mail after your visit with us. Thank you!             Your Updated Medication List - Protect others around you: Learn how to safely use, store and throw away your medicines at www.disposemymeds.org.          This list is accurate as of: 5/5/17  8:14 AM.  Always use your most recent med list.                   Brand Name Dispense Instructions for use    ABILIFY 2 MG tablet   Generic drug:  ARIPiprazole      Take 2 mg by mouth daily       allopurinol 100 MG tablet    ZYLOPRIM    180 tablet    Take 2 tablets (200 mg) by mouth daily       amLODIPine 10 MG tablet    NORVASC    90 tablet    TAKE ONE TABLET BY MOUTH ONCE DAILY WITH  DINNER       aspirin 81 MG tablet     30 tablet    Take 1 tablet (81 mg) by mouth daily       carvedilol 12.5 MG tablet    COREG    180 tablet    TAKE ONE TABLET BY MOUTH TWICE DAILY WITH MEALS       chlorthalidone 25  MG tablet    HYGROTON    135 tablet    Take 1.5 tablets (37.5 mg) by mouth daily       cyabnocobalamin 2500 MCG sublingual tablet    VITAMIN B-12    90 tablet    Place 2,500 mcg under the tongue daily       divalproex 500 MG EC tablet    DEPAKOTE    180 tablet    Take 1 tab po twice daily. Needs office follow-up before next refill.       levETIRAcetam 1000 MG Tabs    KEPPRA    180 tablet    1 tablet in morning and bed time.       losartan 100 MG tablet    COZAAR    90 tablet    Take 1 tablet (100 mg) by mouth daily       MULTI VITAMIN MENS PO      Take  by mouth.       omeprazole 40 MG capsule    priLOSEC    30 capsule    Take 1 capsule (40 mg) by mouth daily Take 30-60 minutes before a meal.       order for DME      CPAP daily       simvastatin 20 MG tablet    ZOCOR    180 tablet    TAKE TWO TABLETS BY MOUTH EVERY NIGHT AT BEDTIME       TEMAZEPAM PO      Take 15 mg by mouth At Bedtime       vitamin D 2000 UNITS tablet     90 tablet    Take 2,000 Units by mouth daily

## 2017-05-05 NOTE — LETTER
[facility]  [address]  [place] [address]  [phone]        May 17, 2017      Prashant Keyes  [address]  [place] [address]        Dear Prashant,    Overall your recent lab work came back pretty stable. Your potassium was low normal and your kidney function declined slightly. I want to recheck your kidney function in 1 month via a lab only appt. Your cholesterol numbers looked wonderful and  your liver function was nice and normal.      Results for orders placed or performed in visit on 05/05/17   Lipid panel reflex to direct LDL   Result Value Ref Range    Cholesterol 159 <200 mg/dL    Triglycerides 148 <150 mg/dL    HDL Cholesterol 66 >39 mg/dL    LDL Cholesterol Calculated 63 <100 mg/dL    Non HDL Cholesterol 93 <130 mg/dL   Albumin Random Urine Quantitative   Result Value Ref Range    Creatinine Urine 34 mg/dL    Albumin Urine mg/L 10 mg/L    Albumin Urine mg/g Cr 30.95 (H) 0 - 17 mg/g Cr   Comprehensive metabolic panel (BMP + Alb, Alk Phos, ALT, AST, Total. Bili, TP)   Result Value Ref Range    Sodium 139 133 - 144 mmol/L    Potassium 3.3 (L) 3.4 - 5.3 mmol/L    Chloride 96 94 - 109 mmol/L    Carbon Dioxide 35 (H) 20 - 32 mmol/L    Anion Gap 8 3 - 14 mmol/L    Glucose 95 70 - 99 mg/dL    Urea Nitrogen 18 7 - 30 mg/dL    Creatinine 1.28 (H) 0.66 - 1.25 mg/dL    GFR Estimate 56 (L) >60 mL/min/1.7m2    GFR Estimate If Black 68 >60 mL/min/1.7m2    Calcium 9.6 8.5 - 10.1 mg/dL    Bilirubin Total 0.4 0.2 - 1.3 mg/dL    Albumin 3.8 3.4 - 5.0 g/dL    Protein Total 7.3 6.8 - 8.8 g/dL    Alkaline Phosphatase 60 40 - 150 U/L    ALT 18 0 - 70 U/L    AST 8 0 - 45 U/L           If you have any questions or concerns, please call myself or my nurse at [phone].    Sincerely,    Matt Swan PA-C/alessia

## 2017-05-09 ENCOUNTER — OFFICE VISIT (OUTPATIENT)
Dept: NEUROLOGY | Facility: CLINIC | Age: 67
End: 2017-05-09
Payer: MEDICARE

## 2017-05-09 VITALS
WEIGHT: 270.6 LBS | DIASTOLIC BLOOD PRESSURE: 78 MMHG | HEIGHT: 70 IN | BODY MASS INDEX: 38.74 KG/M2 | SYSTOLIC BLOOD PRESSURE: 143 MMHG | HEART RATE: 71 BPM

## 2017-05-09 DIAGNOSIS — G25.0 ESSENTIAL TREMOR: Primary | ICD-10-CM

## 2017-05-09 PROCEDURE — 99213 OFFICE O/P EST LOW 20 MIN: CPT | Performed by: PSYCHIATRY & NEUROLOGY

## 2017-05-09 NOTE — MR AVS SNAPSHOT
After Visit Summary   5/9/2017    Prashant Keyes    MRN: [id number]           Patient Information     Date Of Birth          1950        Visit Information        Provider Department      5/9/2017 2:40 PM [facility]        Today's Diagnoses     Essential tremor    -  1      Care Instructions    AFTER VISIT SUMMARY (AVS)    No plans to start treatment for tremors. Will monitor.     Diagnostic possibilities reviewed    Preventive Neurology: Encouraged to keep physically and mentally active with particular emphasis on daily stretching exercises, walking, and healthy eating.    To call us for follow-up appointment in next 9 month(s) or earlier if needed.    Thanks               Follow-ups after your visit        Follow-up notes from your care team     Return in about 9 months (around 2/9/2018).      Who to contact     If you have questions or need follow up information about today's clinic visit or your schedule please contact [facility] directly at [phone].  Normal or non-critical lab and imaging results will be communicated to you by NEBOTRADEhart, letter or phone within 4 business days after the clinic has received the results. If you do not hear from us within 7 days, please contact the clinic through NEBOTRADEhart or phone. If you have a critical or abnormal lab result, we will notify you by phone as soon as possible.  Submit refill requests through Splore or call your pharmacy and they will forward the refill request to us. Please allow 3 business days for your refill to be completed.          Additional Information About Your Visit        MyChart Information     Splore gives you secure access to your electronic health record. If you see a primary care provider, you can also send messages to your care team and make appointments. If you have questions, please call your primary care clinic.  If you do not have a primary care provider, please call  "[phone] and they will assist you.        Care EveryWhere ID     This is your Care EveryWhere ID. This could be used by other organizations to access your [facility] medical records  [id number]        Your Vitals Were     Pulse Height BMI (Body Mass Index)             71 1.778 m (5' 10\") 38.83 kg/m2          Blood Pressure from Last 3 Encounters:   05/09/17 143/78   05/05/17 126/73   03/07/17 117/66    Weight from Last 3 Encounters:   05/09/17 122.7 kg (270 lb 9.6 oz)   05/05/17 122.9 kg (271 lb)   03/07/17 126.1 kg (278 lb)              Today, you had the following     No orders found for display         Today's Medication Changes          These changes are accurate as of: 5/9/17  3:18 PM.  If you have any questions, ask your nurse or doctor.               These medicines have changed or have updated prescriptions.        Dose/Directions    aspirin 81 MG tablet   This may have changed:  additional instructions   Used for:  Essential hypertension        Dose:  81 mg   Take 1 tablet (81 mg) by mouth daily   Quantity:  30 tablet   Refills:  0                Primary Care Provider Office Phone # Fax #    Matt Swan PA-C [phone]       [facility] [address] [facility] [address]        Thank you!     Thank you for choosing [facility]  for your care. Our goal is always to provide you with excellent care. Hearing back from our patients is one way we can continue to improve our services. Please take a few minutes to complete the written survey that you may receive in the mail after your visit with us. Thank you!             Your Updated Medication List - Protect others around you: Learn how to safely use, store and throw away your medicines at www.disposemymeds.org.          This list is accurate as of: 5/9/17  3:18 PM.  Always use your most recent med list.                   Brand Name Dispense Instructions for use    ABILIFY 2 MG tablet   Generic drug:  ARIPiprazole     "  Take 2 mg by mouth daily       allopurinol 100 MG tablet    ZYLOPRIM    180 tablet    Take 2 tablets (200 mg) by mouth daily       amLODIPine 10 MG tablet    NORVASC    90 tablet    TAKE ONE TABLET BY MOUTH ONCE DAILY WITH  DINNER       aspirin 81 MG tablet     30 tablet    Take 1 tablet (81 mg) by mouth daily       carvedilol 12.5 MG tablet    COREG    180 tablet    TAKE ONE TABLET BY MOUTH TWICE DAILY WITH MEALS       chlorthalidone 25 MG tablet    HYGROTON    135 tablet    Take 1.5 tablets (37.5 mg) by mouth daily       cyabnocobalamin 2500 MCG sublingual tablet    VITAMIN B-12    90 tablet    Place 2,500 mcg under the tongue daily       divalproex 500 MG EC tablet    DEPAKOTE    180 tablet    Take 1 tab po twice daily. Needs office follow-up before next refill.       levETIRAcetam 1000 MG Tabs    KEPPRA    180 tablet    1 tablet in morning and bed time.       losartan 100 MG tablet    COZAAR    90 tablet    Take 1 tablet (100 mg) by mouth daily       MULTI VITAMIN MENS PO      Take  by mouth.       omeprazole 40 MG capsule    priLOSEC    30 capsule    Take 1 capsule (40 mg) by mouth daily Take 30-60 minutes before a meal.       order for DME      CPAP daily       simvastatin 20 MG tablet    ZOCOR    180 tablet    TAKE TWO TABLETS BY MOUTH EVERY NIGHT AT BEDTIME       TEMAZEPAM PO      Take 15 mg by mouth At Bedtime       vitamin D 2000 UNITS tablet     90 tablet    Take 2,000 Units by mouth daily

## 2017-05-09 NOTE — NURSING NOTE
"Chief Complaint   Patient presents with     RECHECK     medication       Initial /78 (BP Location: Left arm, Patient Position: Chair, Cuff Size: Adult Large)  Pulse 71  Ht 5' 10\" (1.778 m)  Wt 270 lb 9.6 oz (122.7 kg)  BMI 38.83 kg/m2 Estimated body mass index is 38.83 kg/(m^2) as calculated from the following:    Height as of this encounter: 5' 10\" (1.778 m).    Weight as of this encounter: 270 lb 9.6 oz (122.7 kg).  Medication Reconciliation: complete   Kia Gregorio MA      "

## 2017-05-09 NOTE — PATIENT INSTRUCTIONS
AFTER VISIT SUMMARY (AVS)    No plans to start treatment for tremors. Will monitor.     Diagnostic possibilities reviewed    Preventive Neurology: Encouraged to keep physically and mentally active with particular emphasis on daily stretching exercises, walking, and healthy eating.    To call us for follow-up appointment in next 9 month(s) or earlier if needed.    Thanks

## 2017-05-09 NOTE — PROGRESS NOTES
ESTABLISHED PATIENT NEUROLOGY NOTE    LOCATION: [facility]   DATE OF VISIT: 2017  NAME: Mr.William MADELINE Keyes  : 1950 (66 year old)  MR #: [id number]    PRIMARY/REFERRING PROVIDER: Matt Swan PA-C    REASON FOR VISIT: Tremors    HISTORY OF PRESENT ILLNESS: Mr. Keyes is a 66-year-old man with multiple issues.  The following issues reviewed today:  Today's concern is if his tremors are related to his medications. His tremors are evaluated and treated in 2016. It was explained to him that he has essential tremor and this started before he was started on Depakote.   No Family history of tremors.   He relates that hand tremors do not affect any activities of daily living except at a time when he taking out the pill from the bottle.     No spells since his last visit.     CURRENT MEDICATIONS:   Current Outpatient Prescriptions on File Prior to Visit:  amLODIPine (NORVASC) 10 MG tablet TAKE ONE TABLET BY MOUTH ONCE DAILY WITH  DINNER   losartan (COZAAR) 100 MG tablet Take 1 tablet (100 mg) by mouth daily   carvedilol (COREG) 12.5 MG tablet TAKE ONE TABLET BY MOUTH TWICE DAILY WITH MEALS   simvastatin (ZOCOR) 20 MG tablet TAKE TWO TABLETS BY MOUTH EVERY NIGHT AT BEDTIME   allopurinol (ZYLOPRIM) 100 MG tablet Take 2 tablets (200 mg) by mouth daily   levETIRAcetam (KEPPRA) 1000 MG TABS 1 tablet in morning and bed time.   omeprazole (PRILOSEC) 40 MG capsule Take 1 capsule (40 mg) by mouth daily Take 30-60 minutes before a meal.   divalproex (DEPAKOTE) 500 MG EC tablet Take 1 tab po twice daily. Needs office follow-up before next refill.   chlorthalidone (HYGROTON) 25 MG tablet Take 1.5 tablets (37.5 mg) by mouth daily   TEMAZEPAM PO Take 15 mg by mouth At Bedtime   Cholecalciferol (VITAMIN D) 2000 UNITS tablet Take 2,000 Units by mouth daily   Cyanocobalamin (VITAMIN  B-12) 2500 MCG tablet Place 2,500 mcg under the tongue daily   aspirin 81 MG tablet Take  "1 tablet (81 mg) by mouth daily (Patient taking differently: Take 81 mg by mouth daily Taking 325 MG of Aspirin until he finishes current bottle)   ARIPiprazole (ABILIFY) 2 MG tablet Take 2 mg by mouth daily   ORDER FOR DME CPAP daily   Multiple Vitamin (MULTI VITAMIN MENS PO) Take  by mouth.     No current facility-administered medications on file prior to visit.   PAST MEDICAL HISTORY: Past Medical History:   Diagnosis Date     Calculus of kidney ~1975     Carpal tunnel syndrome 11/94     Concussion, unspecified 12/95     Eczema 11/16/2011     Fibromyalgia syndrome ~2000    per pt     Hypertension      Left testicle cyst      Photosensitive contact dermatitis      Prostatitis, unspecified      Umbilical hernia 11/26/2012     Unspecified asthma(493.90)      Past Surgical, Personal & Social history reviewed & documented in the [facility].  GENERAL EXAMINATION:  /78 (BP Location: Left arm, Patient Position: Chair, Cuff Size: Adult Large)  Pulse 71  Ht 1.778 m (5' 10\")  Wt 122.7 kg (270 lb 9.6 oz)  BMI 38.83 kg/m2    IMPRESSION:   Encounter Diagnoses   Name Primary?     Essential tremor Yes     COMMENTS: He is not particularly keen to starting a medication for his tremors as they are not affecting activities of daily living significantly. We will monitor his tremors.     PLANS:   Patient Instructions   AFTER VISIT SUMMARY (AVS)    No plans to start treatment for tremors. Will monitor.     Diagnostic possibilities reviewed    Preventive Neurology: Encouraged to keep physically and mentally active with particular emphasis on daily stretching exercises, walking, and healthy eating.    To call us for follow-up appointment in next 9 month(s) or earlier if needed.    Thanks to Matt Swan PA-C for allowing me to participate in Mr. Keyes's care. Please feel free to call me with any questions or concerns.       iNco Turner MD, MRCPI  Neurologist    Cc: Matt Swan PA-C          "

## 2017-06-02 ENCOUNTER — OFFICE VISIT (OUTPATIENT)
Dept: SLEEP MEDICINE | Facility: CLINIC | Age: 67
End: 2017-06-02
Payer: MEDICARE

## 2017-06-02 VITALS
BODY MASS INDEX: 38.08 KG/M2 | OXYGEN SATURATION: 97 % | HEART RATE: 66 BPM | WEIGHT: 266 LBS | DIASTOLIC BLOOD PRESSURE: 83 MMHG | SYSTOLIC BLOOD PRESSURE: 127 MMHG | HEIGHT: 70 IN

## 2017-06-02 DIAGNOSIS — G47.33 OSA (OBSTRUCTIVE SLEEP APNEA): Primary | ICD-10-CM

## 2017-06-02 PROCEDURE — 99213 OFFICE O/P EST LOW 20 MIN: CPT | Performed by: PHYSICIAN ASSISTANT

## 2017-06-02 NOTE — PATIENT INSTRUCTIONS

## 2017-06-02 NOTE — PROGRESS NOTES
Obstructive Sleep Apnea- PAP Follow-Up Visit:    Chief Complaint   Patient presents with     CPAP Follow Up       Prashant Keyes comes in today for follow-up of their moderate sleep apnea, managed with BPAP.     In review, patient underwent PSG on 1/2/12 and was diagnosed with severe apnea.    RDI: 71.3, REM RDI: 60 AHI: 54.2, Lowest O2: 78 %  Wt during sleep study: 285.3/current weight 293 lbs  BMI: 41.8  CPAP TITRATION: same night 1/2/12  CPAP: HYPOVENTILAION.  BI-LEVELPAP: 15/9 cm H2O. Significant improvement    Overall, the patient rates their experience with PAP as 10 (0 poor, 10 great). The mask is comfortable. The mask is not leaking, 7 nights per week. They are not snoring with the mask on. They are not having gasp arousals.  They are not having significant oral/nasal dryness. The pressure settings are comfortable.     Patient uses nasal pillows.    Bedtime is typically 10pm. Usually it takes about 60 minutes to fall asleep with the mask on. Wake time is typically 9am.  Patient is using PAP therapy 8 hours per night. The patient is usually getting 8 hours of sleep per night.    Patient does feel rested in the morning.    [place] Sleepiness Scale: 2/24      Respironics  BIPAP 15/9 cmH2O download:  30 total days of use. 0 nonuse days.  Average use 11 hours 25 minutes per day.  100% days with >4 hours use.  Large leak 36 sec. per day average.  AHI 2.2.     He reports CPAP is going well. He has no concerns today. Patient congratulated on significant weight loss.     Past medical/surgical history, family history, social history, medications and allergies were reviewed.      Problem List:  Patient Active Problem List    Diagnosis Date Noted     Hypertension goal BP (blood pressure) < 140/90 09/13/2002     Priority: High     Treatment since 1993       Acute gouty arthritis 01/16/2017     Priority: Medium     Spells 10/14/2015     Priority: Medium     Onset age 46. Spells uncertain etiology with left sided  numbness, speech arrest, amnesia, occasional collapse. Vague inconsistent historian. EEG Lt temporal slowing. Neuropsych w nl cognition; some depression. Presented on levetiracetam, previously Rx w VPA, PHT, lacosamide, eslicarbazine. EEG w left temporal slowing. Adding VPA to LEV appeared to stop spells.       Gout 01/11/2013     Priority: Medium     Uses colchicine (colcrys) for recurrent flares       CKD (chronic kidney disease) stage 2, GFR 60-89 ml/min 12/18/2012     Priority: Medium     Lichen planus 08/02/2012     Priority: Medium     KALEB (obstructive sleep apnea) 02/06/2012     Priority: Medium     PSG on 1/2/12, RDI: 71.3, REM RDI: 60 AHI: 54.2, Lowest O2: 78 %. Wt during sleep study: 285.3. BI-LEVELPAP: 15/9 cm H2O.        Obesity, Class III, BMI 40-49.9 (morbid obesity) (H) 11/16/2011     Priority: Medium     Eczema 11/16/2011     Priority: Medium     Hyperlipidemia LDL goal <130 03/15/2011     Priority: Medium     Treatment since 1998       Fibromyalgia syndrome      Priority: Medium     per patient  Scheduled med refill protocol  Last refill:8/23/2010  Last clinic visit:8/31/2010  Controlled substance aggreement on file from this date: 8/31/10  Documentation in problem list:  narcotic agreement not on file   #240 tabs of 500mg tabs methocarbamol filled 8/23/10.  No refills anticipated prior to 8/23/11, and needs discussion in clinic prior to refills.         GERD 07/29/2005     Priority: Medium     Treatment since 1998       Convulsions (H) 01/01/1997     Priority: Medium     Onset 45 yo. 1996 con w PHT GP VPA. Then god con w LEV. Worsening over last year. Outside MRI negative. EEG here w Lt T Slowing. Vague historian, previous psychosis. Refused VEEG on multiple occasions. Minor spells lt facial weakness, stare, speech arrest, sensation =Lt body weakness. Big spells with amnesia, stiffening, vibration, impaired respiration. Contradictory re spell related trauma.   Problem list name updated by automated  "process. Provider to review       Advanced directives, counseling/discussion 04/02/2012     Priority: Low     Patient states has Advance Directive and will bring in a copy to clinic. 4/2/2012             /83  Pulse 66  Ht 1.778 m (5' 10\")  Wt 120.7 kg (266 lb)  SpO2 97%  BMI 38.17 kg/m2        Impression/Plan:    1. Severe Sleep apnea.   Tolerating PAP well. Daytime symptoms are improved.  Continue current bilevel PAP 15/9 cm/H20    Prashant Keyes will follow up in about 1-2 year(s). Sooner if questions/concerns.    Fifteen minutes spent with patient, all of which were spent face-to-face counseling, consulting, coordinating plan of care regarding KALEB.      Stan Huerta PA-C      CC:  Matt Swan    "

## 2017-06-02 NOTE — MR AVS SNAPSHOT
After Visit Summary   6/2/2017    Prashant Keyes    MRN: [id number]           Patient Information     Date Of Birth          1950        Visit Information        Provider Department      6/2/2017 10:00 AM Stan Huerta PA [facility]        Today's Diagnoses     KALEB (obstructive sleep apnea)    -  1      Care Instructions      Your BMI is Body mass index is 38.17 kg/(m^2).  Weight management is a personal decision.  If you are interested in exploring weight loss strategies, the following discussion covers the approaches that may be successful. Body mass index (BMI) is one way to tell whether you are at a healthy weight, overweight, or obese. It measures your weight in relation to your height.  A BMI of 18.5 to 24.9 is in the healthy range. A person with a BMI of 25 to 29.9 is considered overweight, and someone with a BMI of 30 or greater is considered obese. More than two-thirds of American adults are considered overweight or obese.  Being overweight or obese increases the risk for further weight gain. Excess weight may lead to heart disease and diabetes.  Creating and following plans for healthy eating and physical activity may help you improve your health.  Weight control is part of healthy lifestyle and includes exercise, emotional health, and healthy eating habits. Careful eating habits lifelong are the mainstay of weight control. Though there are significant health benefits from weight loss, long-term weight loss with diet alone may be very difficult to achieve- studies show long-term success with dietary management in less than 10% of people. Attaining a healthy weight may be especially difficult to achieve in those with severe obesity. In some cases, medications, devices and surgical management might be considered.  What can you do?  If you are overweight or obese and are interested in methods for weight loss, you should discuss this with your provider.     Consider  reducing daily calorie intake by 500 calories.     Keep a food journal.     Avoiding skipping meals, consider cutting portions instead.    Diet combined with exercise helps maintain muscle while optimizing fat loss. Strength training is particularly important for building and maintaining muscle mass. Exercise helps reduce stress, increase energy, and improves fitness. Increasing exercise without diet control, however, may not burn enough calories to loose weight.       Start walking three days a week 10-20 minutes at a time    Work towards walking thirty minutes five days a week     Eventually, increase the speed of your walking for 1-2 minutes at time    In addition, we recommend that you review healthy lifestyles and methods for weight loss available through the National Institutes of Health patient information sites:  http://win.niddk.nih.gov/publications/index.htm    And look into health and wellness programs that may be available through your health insurance provider, employer, local community center, or leanna club.    Weight management plan: Patient was referred to their PCP to discuss a diet and exercise plan.        Your Body mass index is 38.17 kg/(m^2).  Weight management is a personal decision.  If you are interested in exploring weight loss strategies, the following discussion covers the approaches that may be successful. Body mass index (BMI) is one way to tell whether you are at a healthy weight, overweight, or obese. It measures your weight in relation to your height.  A BMI of 18.5 to 24.9 is in the healthy range. A person with a BMI of 25 to 29.9 is considered overweight, and someone with a BMI of 30 or greater is considered obese. More than two-thirds of American adults are considered overweight or obese.  Being overweight or obese increases the risk for further weight gain. Excess weight may lead to heart disease and diabetes.  Creating and following plans for healthy eating and physical activity  may help you improve your health.  Weight control is part of healthy lifestyle and includes exercise, emotional health, and healthy eating habits. Careful eating habits lifelong are the mainstay of weight control. Though there are significant health benefits from weight loss, long-term weight loss with diet alone may be very difficult to achieve- studies show long-term success with dietary management in less than 10% of people. Attaining a healthy weight may be especially difficult to achieve in those with severe obesity. In some cases, medications, devices and surgical management might be considered.  What can you do?  If you are overweight or obese and are interested in methods for weight loss, you should discuss this with your provider.     Consider reducing daily calorie intake by 500 calories.     Keep a food journal.     Avoiding skipping meals, consider cutting portions instead.    Diet combined with exercise helps maintain muscle while optimizing fat loss. Strength training is particularly important for building and maintaining muscle mass. Exercise helps reduce stress, increase energy, and improves fitness. Increasing exercise without diet control, however, may not burn enough calories to loose weight.       Start walking three days a week 10-20 minutes at a time    Work towards walking thirty minutes five days a week     Eventually, increase the speed of your walking for 1-2 minutes at time    In addition, we recommend that you review healthy lifestyles and methods for weight loss available through the National Institutes of Health patient information sites:  http://win.niddk.nih.gov/publications/index.htm    And look into health and wellness programs that may be available through your health insurance provider, employer, local community center, or leanna club.    Weight management plan: Patient was referred to their PCP to discuss a diet and exercise plan.            Follow-ups after your visit         Follow-up notes from your care team     Return in 2 years (on 6/2/2019) for PAP follow up.      Your next 10 appointments already scheduled     Jun 05, 2017  9:30 AM CDT   DX HIP/PELVIS/SPINE with FKDX1   [facility] [place] [facility] [place]    [address]  [place] [id number] [phone]           Please do not take any of the following 24 hours prior to the day of your exam: vitamins, calcium tablets, antacids.            Jun 06, 2017  8:00 AM CDT   LAB with BE LAB   [facility] [place] ([facility] [place])    [address]  [place] [address] [phone]           Patient must bring picture ID.  Patient should be prepared to give a urine specimen  Please do not eat 10-12 hours before your appointment if you are coming in fasting for labs on lipids, cholesterol, or glucose (sugar).  Pregnant women should follow their Care Team instructions. Water with medications is okay. Do not drink coffee or other fluids.   If you have concerns about taking  your medications, please ask at office or if scheduling via [employer], send a message by clicking on Secure Messaging, Message Your Care Team.            Aug 17, 2017   Procedure with Varun Bond MD   [facility] (--)    [address] [place] [address] [phone]              Who to contact     If you have questions or need follow up information about today's clinic visit or your schedule please contact [facility] directly at [phone].  Normal or non-critical lab and imaging results will be communicated to you by MyChart, letter or phone within 4 business days after the clinic has received the results. If you do not hear from us within 7 days, please contact the clinic through [employer] or phone. If you have a critical or abnormal lab result, we will notify you by phone as soon as possible.  Submit refill requests through [employer] or call your pharmacy  "and they will forward the refill request to us. Please allow 3 business days for your refill to be completed.          Additional Information About Your Visit        [employer] Information     [employer] gives you secure access to your electronic health record. If you see a primary care provider, you can also send messages to your care team and make appointments. If you have questions, please call your primary care clinic.  If you do not have a primary care provider, please call [phone] and they will assist you.        Care EveryWhere ID     This is your Care EveryWhere ID. This could be used by other organizations to access your [facility] medical records  [id number]        Your Vitals Were     Pulse Height Pulse Oximetry BMI (Body Mass Index)          66 1.778 m (5' 10\") 97% 38.17 kg/m2         Blood Pressure from Last 3 Encounters:   06/02/17 127/83   05/09/17 143/78   05/05/17 126/73    Weight from Last 3 Encounters:   06/02/17 120.7 kg (266 lb)   05/09/17 122.7 kg (270 lb 9.6 oz)   05/05/17 122.9 kg (271 lb)              We Performed the Following     [facility]          Today's Medication Changes          These changes are accurate as of: 6/2/17 10:07 AM.  If you have any questions, ask your nurse or doctor.               These medicines have changed or have updated prescriptions.        Dose/Directions    aspirin 81 MG tablet   This may have changed:  additional instructions   Used for:  Essential hypertension        Dose:  81 mg   Take 1 tablet (81 mg) by mouth daily   Quantity:  30 tablet   Refills:  0                Primary Care Provider Office Phone # Fax #    Matt Swan PA-C [phone]       [facility] [place] [address] [place] [address]        Thank you!     Thank you for choosing [facility]  for your care. Our goal is always to provide you with excellent care. Hearing back from our patients is one way we can continue to improve our services. " Please take a few minutes to complete the written survey that you may receive in the mail after your visit with us. Thank you!             Your Updated Medication List - Protect others around you: Learn how to safely use, store and throw away your medicines at www.disposemymeds.org.          This list is accurate as of: 6/2/17 10:07 AM.  Always use your most recent med list.                   Brand Name Dispense Instructions for use    ABILIFY 2 MG tablet   Generic drug:  ARIPiprazole      Take 2 mg by mouth daily       allopurinol 100 MG tablet    ZYLOPRIM    180 tablet    Take 2 tablets (200 mg) by mouth daily       amLODIPine 10 MG tablet    NORVASC    90 tablet    TAKE ONE TABLET BY MOUTH ONCE DAILY WITH  DINNER       aspirin 81 MG tablet     30 tablet    Take 1 tablet (81 mg) by mouth daily       carvedilol 12.5 MG tablet    COREG    180 tablet    TAKE ONE TABLET BY MOUTH TWICE DAILY WITH MEALS       chlorthalidone 25 MG tablet    HYGROTON    135 tablet    Take 1.5 tablets (37.5 mg) by mouth daily       cyabnocobalamin 2500 MCG sublingual tablet    VITAMIN B-12    90 tablet    Place 2,500 mcg under the tongue daily       divalproex 500 MG EC tablet    DEPAKOTE    180 tablet    Take 1 tab po twice daily. Needs office follow-up before next refill.       levETIRAcetam 1000 MG Tabs    KEPPRA    180 tablet    1 tablet in morning and bed time.       losartan 100 MG tablet    COZAAR    90 tablet    Take 1 tablet (100 mg) by mouth daily       MULTI VITAMIN MENS PO      Take  by mouth.       omeprazole 40 MG capsule    priLOSEC    30 capsule    Take 1 capsule (40 mg) by mouth daily Take 30-60 minutes before a meal.       order for DME      CPAP daily       simvastatin 20 MG tablet    ZOCOR    180 tablet    TAKE TWO TABLETS BY MOUTH EVERY NIGHT AT BEDTIME       TEMAZEPAM PO      Take 15 mg by mouth At Bedtime       vitamin D 2000 UNITS tablet     90 tablet    Take 2,000 Units by mouth daily

## 2017-06-05 ENCOUNTER — RADIANT APPOINTMENT (OUTPATIENT)
Dept: BONE DENSITY | Facility: CLINIC | Age: 67
End: 2017-06-05
Attending: PHYSICIAN ASSISTANT
Payer: MEDICARE

## 2017-06-05 DIAGNOSIS — M81.0 AGE-RELATED OSTEOPOROSIS WITHOUT CURRENT PATHOLOGICAL FRACTURE: ICD-10-CM

## 2017-06-05 PROCEDURE — 77080 DXA BONE DENSITY AXIAL: CPT | Performed by: INTERNAL MEDICINE

## 2017-06-06 DIAGNOSIS — I10 HYPERTENSION GOAL BP (BLOOD PRESSURE) < 140/90: Chronic | ICD-10-CM

## 2017-06-06 LAB
ANION GAP SERPL CALCULATED.3IONS-SCNC: 14 MMOL/L (ref 3–14)
BUN SERPL-MCNC: 14 MG/DL (ref 7–30)
CALCIUM SERPL-MCNC: 9.6 MG/DL (ref 8.5–10.1)
CHLORIDE SERPL-SCNC: 94 MMOL/L (ref 94–109)
CO2 SERPL-SCNC: 30 MMOL/L (ref 20–32)
CREAT SERPL-MCNC: 0.96 MG/DL (ref 0.66–1.25)
GFR SERPL CREATININE-BSD FRML MDRD: 78 ML/MIN/1.7M2
GLUCOSE SERPL-MCNC: 133 MG/DL (ref 70–99)
POTASSIUM SERPL-SCNC: 2.9 MMOL/L (ref 3.4–5.3)
SODIUM SERPL-SCNC: 138 MMOL/L (ref 133–144)

## 2017-06-06 PROCEDURE — 80048 BASIC METABOLIC PNL TOTAL CA: CPT | Performed by: PHYSICIAN ASSISTANT

## 2017-06-06 PROCEDURE — 36415 COLL VENOUS BLD VENIPUNCTURE: CPT | Performed by: PHYSICIAN ASSISTANT

## 2017-06-12 ENCOUNTER — OFFICE VISIT (OUTPATIENT)
Dept: FAMILY MEDICINE | Facility: CLINIC | Age: 67
End: 2017-06-12
Payer: MEDICARE

## 2017-06-12 VITALS
HEART RATE: 70 BPM | SYSTOLIC BLOOD PRESSURE: 126 MMHG | BODY MASS INDEX: 37.94 KG/M2 | RESPIRATION RATE: 18 BRPM | HEIGHT: 70 IN | WEIGHT: 265 LBS | TEMPERATURE: 98 F | DIASTOLIC BLOOD PRESSURE: 76 MMHG | OXYGEN SATURATION: 95 %

## 2017-06-12 DIAGNOSIS — E87.6 HYPOKALEMIA: ICD-10-CM

## 2017-06-12 DIAGNOSIS — L30.9 ECZEMA, UNSPECIFIED TYPE: Primary | ICD-10-CM

## 2017-06-12 PROCEDURE — 99212 OFFICE O/P EST SF 10 MIN: CPT | Performed by: PHYSICIAN ASSISTANT

## 2017-06-12 RX ORDER — POTASSIUM CHLORIDE 1500 MG/1
20 TABLET, EXTENDED RELEASE ORAL 2 TIMES DAILY
Qty: 60 TABLET | Refills: 1 | Status: SHIPPED | OUTPATIENT
Start: 2017-06-12 | End: 2017-07-25

## 2017-06-12 RX ORDER — TRIAMCINOLONE ACETONIDE 1 MG/G
CREAM TOPICAL
Qty: 80 G | Refills: 0 | Status: SHIPPED | OUTPATIENT
Start: 2017-06-12 | End: 2018-08-16

## 2017-06-12 NOTE — PROGRESS NOTES
SUBJECTIVE:                                                    Prashant Keyes is a 66 year old male who presents to clinic today for the following health issues:      Rash     Onset: chronic    Description:   Location: left foot and right forearm  Character: round, flakey  Itching (Pruritis): YES    Progression of Symptoms:  worsening    Accompanying Signs & Symptoms:  Fever: no   Body aches or joint pain: no   Sore throat symptoms: no   Recent cold symptoms: no    History:   Previous similar rash: YES    Precipitating factors:   Exposure to similar rash: no   New exposures: None   Recent travel: no     Alleviating factors:       Therapies Tried and outcome:           Problem list and histories reviewed & adjusted, as indicated.  Additional history: as documented        Reviewed and updated as needed this visit by clinical staff  Tobacco  Allergies  Meds  Med Hx  Surg Hx  Fam Hx  Soc Hx      Reviewed and updated as needed this visit by Provider         All other systems negative except as outline above  OBJECTIVE:  Small 5ghr5hi red scaly patch dorsum of left foot.    Prashant was seen today for derm problem.    Diagnoses and all orders for this visit:    Eczema, unspecified type  -     triamcinolone (KENALOG) 0.1 % cream; Apply sparingly to affected area three times daily as needed      Advised supportive and symptomatic treatment.  Follow up with Provider - if condition persists or worsens.

## 2017-06-12 NOTE — MR AVS SNAPSHOT
After Visit Summary   6/12/2017    Prashant Keyes    MRN: [id number]           Patient Information     Date Of Birth          1950        Visit Information        Provider Department      6/12/2017 9:40 AM Matt Swan PA-C [facility] [place]        Today's Diagnoses     Eczema, unspecified type    -  1    Hypokalemia           Follow-ups after your visit        Your next 10 appointments already scheduled     Aug 17, 2017   Procedure with Varun Bond MD   [facility] (--)    [address] [place] [address] [phone]              Future tests that were ordered for you today     Open Future Orders        Priority Expected Expires Ordered    Potassium Routine  6/12/2018 6/12/2017            Who to contact     Normal or non-critical lab and imaging results will be communicated to you by [email or web address], letter or phone within 4 business days after the clinic has received the results. If you do not hear from us within 7 days, please contact the clinic through MyChart or phone. If you have a critical or abnormal lab result, we will notify you by phone as soon as possible.  Submit refill requests through tripJane or call your pharmacy and they will forward the refill request to us. Please allow 3 business days for your refill to be completed.          If you need to speak with a  for additional information , please call: [phone]             Additional Information About Your Visit        [email or web address] Information     tripJane gives you secure access to your electronic health record. If you see a primary care provider, you can also send messages to your care team and make appointments. If you have questions, please call your primary care clinic.  If you do not have a primary care provider, please call [phone] and they will assist you.        Care EveryWhere ID     This is your Care EveryWhere ID. This could be used by other organizations  "to access your [facility] medical records  [id number]        Your Vitals Were     Pulse Temperature Respirations Height Pulse Oximetry BMI (Body Mass Index)    70 98  F (36.7  C) (Oral) 18 5' 10\" (1.778 m) 95% 38.02 kg/m2       Blood Pressure from Last 3 Encounters:   06/12/17 126/76   06/02/17 127/83   05/09/17 143/78    Weight from Last 3 Encounters:   06/12/17 265 lb (120.2 kg)   06/02/17 266 lb (120.7 kg)   05/09/17 270 lb 9.6 oz (122.7 kg)                 Today's Medication Changes          These changes are accurate as of: 6/12/17 10:11 AM.  If you have any questions, ask your nurse or doctor.               Start taking these medicines.        Dose/Directions    potassium chloride SA 20 MEQ CR tablet   Commonly known as:  potassium chloride   Used for:  Hypokalemia   Started by:  Matt Swan PA-C        Dose:  20 mEq   Take 1 tablet (20 mEq) by mouth 2 times daily   Quantity:  60 tablet   Refills:  1       triamcinolone 0.1 % cream   Commonly known as:  KENALOG   Used for:  Eczema, unspecified type   Started by:  Matt Swan PA-C        Apply sparingly to affected area three times daily as needed   Quantity:  80 g   Refills:  0         These medicines have changed or have updated prescriptions.        Dose/Directions    aspirin 81 MG tablet   This may have changed:  additional instructions   Used for:  Essential hypertension        Dose:  81 mg   Take 1 tablet (81 mg) by mouth daily   Quantity:  30 tablet   Refills:  0            Where to get your medicines      These medications were sent to [facility] [place] [place] [address] [address] [address]     Phone:  [phone]     potassium chloride SA 20 MEQ CR tablet    triamcinolone 0.1 % cream                Primary Care Provider Office Phone # Fax #    Matt Swan PA-C [phone]       [facility] [place] [address]  [place] [address]        Thank you!     Thank you for " choosing [facility]  for your care. Our goal is always to provide you with excellent care. Hearing back from our patients is one way we can continue to improve our services. Please take a few minutes to complete the written survey that you may receive in the mail after your visit with us. Thank you!             Your Updated Medication List - Protect others around you: Learn how to safely use, store and throw away your medicines at www.disposemymeds.org.          This list is accurate as of: 6/12/17 10:11 AM.  Always use your most recent med list.                   Brand Name Dispense Instructions for use    ABILIFY 2 MG tablet   Generic drug:  ARIPiprazole      Take 2 mg by mouth daily       allopurinol 100 MG tablet    ZYLOPRIM    180 tablet    Take 2 tablets (200 mg) by mouth daily       amLODIPine 10 MG tablet    NORVASC    90 tablet    TAKE ONE TABLET BY MOUTH ONCE DAILY WITH  DINNER       aspirin 81 MG tablet     30 tablet    Take 1 tablet (81 mg) by mouth daily       carvedilol 12.5 MG tablet    COREG    180 tablet    TAKE ONE TABLET BY MOUTH TWICE DAILY WITH MEALS       chlorthalidone 25 MG tablet    HYGROTON    135 tablet    Take 1.5 tablets (37.5 mg) by mouth daily       cyabnocobalamin 2500 MCG sublingual tablet    VITAMIN B-12    90 tablet    Place 2,500 mcg under the tongue daily       divalproex 500 MG EC tablet    DEPAKOTE    180 tablet    Take 1 tab po twice daily. Needs office follow-up before next refill.       levETIRAcetam 1000 MG Tabs    KEPPRA    180 tablet    1 tablet in morning and bed time.       losartan 100 MG tablet    COZAAR    90 tablet    Take 1 tablet (100 mg) by mouth daily       MULTI VITAMIN MENS PO      Take  by mouth.       omeprazole 40 MG capsule    priLOSEC    30 capsule    Take 1 capsule (40 mg) by mouth daily Take 30-60 minutes before a meal.       order for DME      CPAP daily       potassium chloride SA 20 MEQ CR tablet    potassium chloride    60 tablet     Take 1 tablet (20 mEq) by mouth 2 times daily       simvastatin 20 MG tablet    ZOCOR    180 tablet    TAKE TWO TABLETS BY MOUTH EVERY NIGHT AT BEDTIME       TEMAZEPAM PO      Take 15 mg by mouth At Bedtime       triamcinolone 0.1 % cream    KENALOG    80 g    Apply sparingly to affected area three times daily as needed       vitamin D 2000 UNITS tablet     90 tablet    Take 2,000 Units by mouth daily

## 2017-06-21 DIAGNOSIS — E87.6 HYPOKALEMIA: ICD-10-CM

## 2017-06-21 LAB — POTASSIUM SERPL-SCNC: 3.4 MMOL/L (ref 3.4–5.3)

## 2017-06-21 PROCEDURE — 84132 ASSAY OF SERUM POTASSIUM: CPT | Performed by: PHYSICIAN ASSISTANT

## 2017-06-21 PROCEDURE — 36415 COLL VENOUS BLD VENIPUNCTURE: CPT | Performed by: PHYSICIAN ASSISTANT

## 2017-06-24 ENCOUNTER — HEALTH MAINTENANCE LETTER (OUTPATIENT)
Age: 67
End: 2017-06-24

## 2017-07-21 PROBLEM — M10.079 ACUTE IDIOPATHIC GOUT OF FOOT, UNSPECIFIED LATERALITY: Chronic | Status: ACTIVE | Noted: 2017-07-21

## 2017-07-25 ENCOUNTER — OFFICE VISIT (OUTPATIENT)
Dept: FAMILY MEDICINE | Facility: CLINIC | Age: 67
End: 2017-07-25
Payer: MEDICARE

## 2017-07-25 VITALS
WEIGHT: 256 LBS | HEART RATE: 73 BPM | RESPIRATION RATE: 16 BRPM | SYSTOLIC BLOOD PRESSURE: 130 MMHG | DIASTOLIC BLOOD PRESSURE: 77 MMHG | TEMPERATURE: 98 F | OXYGEN SATURATION: 97 % | HEIGHT: 70 IN | BODY MASS INDEX: 36.65 KG/M2

## 2017-07-25 DIAGNOSIS — E78.00 PURE HYPERCHOLESTEROLEMIA: ICD-10-CM

## 2017-07-25 DIAGNOSIS — Z00.00 MEDICARE ANNUAL WELLNESS VISIT, SUBSEQUENT: Primary | ICD-10-CM

## 2017-07-25 DIAGNOSIS — I10 BENIGN ESSENTIAL HYPERTENSION: ICD-10-CM

## 2017-07-25 DIAGNOSIS — I10 ESSENTIAL HYPERTENSION: ICD-10-CM

## 2017-07-25 DIAGNOSIS — I10 HYPERTENSION GOAL BP (BLOOD PRESSURE) < 140/90: Chronic | ICD-10-CM

## 2017-07-25 DIAGNOSIS — E87.6 HYPOKALEMIA: ICD-10-CM

## 2017-07-25 LAB
ANION GAP SERPL CALCULATED.3IONS-SCNC: 8 MMOL/L (ref 3–14)
BUN SERPL-MCNC: 19 MG/DL (ref 7–30)
CALCIUM SERPL-MCNC: 9.4 MG/DL (ref 8.5–10.1)
CHLORIDE SERPL-SCNC: 97 MMOL/L (ref 94–109)
CO2 SERPL-SCNC: 32 MMOL/L (ref 20–32)
CREAT SERPL-MCNC: 1 MG/DL (ref 0.66–1.25)
GFR SERPL CREATININE-BSD FRML MDRD: 75 ML/MIN/1.7M2
GLUCOSE SERPL-MCNC: 101 MG/DL (ref 70–99)
POTASSIUM SERPL-SCNC: 3.2 MMOL/L (ref 3.4–5.3)
SODIUM SERPL-SCNC: 137 MMOL/L (ref 133–144)

## 2017-07-25 PROCEDURE — 80048 BASIC METABOLIC PNL TOTAL CA: CPT | Performed by: PHYSICIAN ASSISTANT

## 2017-07-25 PROCEDURE — 36415 COLL VENOUS BLD VENIPUNCTURE: CPT | Performed by: PHYSICIAN ASSISTANT

## 2017-07-25 PROCEDURE — G0439 PPPS, SUBSEQ VISIT: HCPCS | Performed by: PHYSICIAN ASSISTANT

## 2017-07-25 RX ORDER — CHLORTHALIDONE 25 MG/1
37.5 TABLET ORAL DAILY
Qty: 135 TABLET | Refills: 1 | Status: SHIPPED | OUTPATIENT
Start: 2017-07-25 | End: 2018-01-29

## 2017-07-25 RX ORDER — DIVALPROEX SODIUM 500 MG/1
500 TABLET, DELAYED RELEASE ORAL 2 TIMES DAILY
Qty: 180 TABLET | Refills: 1 | Status: SHIPPED | OUTPATIENT
Start: 2017-07-25 | End: 2017-10-03

## 2017-07-25 RX ORDER — AMLODIPINE BESYLATE 10 MG/1
TABLET ORAL
Qty: 90 TABLET | Refills: 1 | Status: SHIPPED | OUTPATIENT
Start: 2017-07-25 | End: 2018-01-29

## 2017-07-25 RX ORDER — POTASSIUM CHLORIDE 1500 MG/1
20 TABLET, EXTENDED RELEASE ORAL 2 TIMES DAILY
Qty: 180 TABLET | Refills: 1 | Status: SHIPPED | OUTPATIENT
Start: 2017-07-25 | End: 2017-08-07

## 2017-07-25 RX ORDER — LOSARTAN POTASSIUM 100 MG/1
100 TABLET ORAL DAILY
Qty: 90 TABLET | Refills: 1 | Status: SHIPPED | OUTPATIENT
Start: 2017-07-25 | End: 2018-01-29

## 2017-07-25 RX ORDER — SIMVASTATIN 20 MG
TABLET ORAL
Qty: 180 TABLET | Refills: 1 | Status: SHIPPED | OUTPATIENT
Start: 2017-07-25 | End: 2018-01-29

## 2017-07-25 RX ORDER — CARVEDILOL 12.5 MG/1
TABLET ORAL
Qty: 180 TABLET | Refills: 1 | Status: SHIPPED | OUTPATIENT
Start: 2017-07-25 | End: 2018-01-29

## 2017-07-25 NOTE — PROGRESS NOTES
SUBJECTIVE:   Prashant Keyes is a 66 year old male who presents for Preventive Visit.      Are you in the first 12 months of your Medicare Part B coverage?  No    Healthy Habits:    Do you get at least three servings of calcium containing foods daily (dairy, green leafy vegetables, etc.)? yes    Amount of exercise or daily activities, outside of work: 0 day(s) per week    Problems taking medications regularly No    Medication side effects: No    Have you had an eye exam in the past two years? yes    Do you see a dentist twice per year? yes    Do you have sleep apnea, excessive snoring or daytime drowsiness?yes    COGNITIVE SCREEN  1) Repeat 3 items (Banana, Sunrise, Chair)    2) Clock draw: NORMAL  3) 3 item recall: Recalls 3 objects  Results: 3 items recalled: COGNITIVE IMPAIRMENT LESS LIKELY    Mini-CogTM Copyright S Huyen. Licensed by the author for use in [facility]; reprinted with permission (lachelle@[facility]). All rights reserved.                    Reviewed and updated as needed this visit by clinical staffTobacco  Allergies  Meds         Reviewed and updated as needed this visit by Provider        Social History   Substance Use Topics     Smoking status: Never Smoker     Smokeless tobacco: Never Used     Alcohol use No      Comment: Quit since 2003.        The patient does not drink >3 drinks per day nor >7 drinks per week.    Today's PHQ-2 Score:   PHQ-2 ( 1999 Pfizer) 5/5/2017 5/2/2016   Q1: Little interest or pleasure in doing things 0 0   Q2: Feeling down, depressed or hopeless 0 0   PHQ-2 Score 0 0         Do you feel safe in your environment - Yes    Do you have a Health Care Directive?: Yes: Advance Directive has been received and scanned.    Current providers sharing in care for this patient include: Patient Care Team:  Matt Swan PA-C as PCP - General (Physician Assistant)  Dimas Qureshi MD as MD (Neurology)      Hearing impairment: no    Ability to successfully  "perform activities of daily living: Yes, no assistance needed     Fall risk:         Home safety:  none identified      The following health maintenance items are reviewed in Epic and correct as of today:Health Maintenance   Topic Date Due     MEDICARE ANNUAL WELLNESS VISIT  12/16/1968     COLONOSCOPY Q5 YR  03/19/2017     ADVANCE DIRECTIVE PLANNING Q5 YRS  04/02/2017     INFLUENZA VACCINE (SYSTEM ASSIGNED)  09/01/2017     FALL RISK ASSESSMENT  05/05/2018     LIPID MONITORING Q1 YEAR  05/05/2018     MICROALBUMIN Q1 YEAR  05/05/2018     BMP Q1 YR  06/06/2018     DEXA Q5 YR  06/05/2022     TETANUS IMMUNIZATION (SYSTEM ASSIGNED)  08/04/2025     MIGRAINE ACTION PLAN  Completed     PNEUMOCOCCAL  Completed     AORTIC ANEURYSM SCREENING (SYSTEM ASSIGNED)  Completed     HEPATITIS C SCREENING  Completed     BP Readings from Last 3 Encounters:   07/25/17 130/77   06/12/17 126/76   06/02/17 127/83    Wt Readings from Last 3 Encounters:   07/25/17 256 lb (116.1 kg)   06/12/17 265 lb (120.2 kg)   06/02/17 266 lb (120.7 kg)                          ROS:  Constitutional, HEENT, cardiovascular, pulmonary, GI, , musculoskeletal, neuro, skin, endocrine and psych systems are negative, except as otherwise noted.      OBJECTIVE:   /77  Pulse 73  Temp 98  F (36.7  C) (Oral)  Resp 16  Ht 5' 10\" (1.778 m)  Wt 256 lb (116.1 kg)  SpO2 97%  BMI 36.73 kg/m2 Estimated body mass index is 36.73 kg/(m^2) as calculated from the following:    Height as of this encounter: 5' 10\" (1.778 m).    Weight as of this encounter: 256 lb (116.1 kg).  EXAM:   GENERAL: healthy, alert and no distress  EYES: Eyes grossly normal to inspection, PERRL and conjunctivae and sclerae normal  HENT: ear canals and TM's normal, nose and mouth without ulcers or lesions  NECK: no adenopathy, no asymmetry, masses, or scars and thyroid normal to palpation  RESP: lungs clear to auscultation - no rales, rhonchi or wheezes  CV: regular rate and rhythm, normal S1 S2, " "no S3 or S4, no murmur, click or rub, no peripheral edema and peripheral pulses strong  ABDOMEN: soft, nontender, no hepatosplenomegaly, no masses and bowel sounds normal  MS: no gross musculoskeletal defects noted, no edema  SKIN: no suspicious lesions or rashes  NEURO: Normal strength and tone, mentation intact and speech normal  PSYCH: mentation appears normal, affect normal/bright    ASSESSMENT / PLAN:       ICD-10-CM    1. Medicare annual wellness visit, subsequent Z00.00    2. Hypokalemia E87.6 potassium chloride SA (POTASSIUM CHLORIDE) 20 MEQ CR tablet   3. Hypertension goal BP (blood pressure) < 140/90 I10 Basic metabolic panel  (Ca, Cl, CO2, Creat, Gluc, K, Na, BUN)     amLODIPine (NORVASC) 10 MG tablet     carvedilol (COREG) 12.5 MG tablet     divalproex (DEPAKOTE) 500 MG EC tablet   4. Essential hypertension I10 losartan (COZAAR) 100 MG tablet   5. Pure Hypercholesterolemia goal ldl <130 E78.00 simvastatin (ZOCOR) 20 MG tablet   6. Benign essential hypertension I10 chlorthalidone (HYGROTON) 25 MG tablet       End of Life Planning:  Patient currently has an advanced directive: Yes.  Practitioner is supportive of decision.    COUNSELING:  Reviewed preventive health counseling, as reflected in patient instructions       Regular exercise       Healthy diet/nutrition        Estimated body mass index is 36.73 kg/(m^2) as calculated from the following:    Height as of this encounter: 5' 10\" (1.778 m).    Weight as of this encounter: 256 lb (116.1 kg).  Weight management plan: Discussed healthy diet and exercise guidelines and patient will follow up in 12 months in clinic to re-evaluate.   reports that he has never smoked. He has never used smokeless tobacco.        Appropriate preventive services were discussed with this patient, including applicable screening as appropriate for cardiovascular disease, diabetes, osteopenia/osteoporosis, and glaucoma.  As appropriate for age/gender, discussed screening for " colorectal cancer, prostate cancer, breast cancer, and cervical cancer. Checklist reviewing preventive services available has been given to the patient.    Reviewed patients plan of care and provided an AVS.  requirement. This Care Plan has been established and reviewed with the Patient.    Counseling Resources:  ATP IV Guidelines  Pooled Cohorts Equation Calculator  Breast Cancer Risk Calculator  FRAX Risk Assessment  ICSI Preventive Guidelines  Dietary Guidelines for Americans, 2010  USDA's MyPlate  ASA Prophylaxis  Lung CA Screening    Matt Swan PA-C  [facility]

## 2017-07-25 NOTE — MR AVS SNAPSHOT
After Visit Summary   7/25/2017    Prashant Keyes    MRN: [id number]           Patient Information     Date Of Birth          1950        Visit Information        Provider Department      7/25/2017 8:20 AM Matt Swan PA-C [facility]        Today's Diagnoses     Medicare annual wellness visit, subsequent    -  1    Hypokalemia        Hypertension goal BP (blood pressure) < 140/90        Essential hypertension        Pure Hypercholesterolemia goal ldl <130        Benign essential hypertension          Care Instructions      Preventive Health Recommendations:       Male Ages 65 and over    Yearly exam:             See your health care provider every year in order to  o   Review health changes.   o   Discuss preventive care.    o   Review your medicines if your doctor has prescribed any.    Talk with your health care provider about whether you should have a test to screen for prostate cancer (PSA).    Every 3 years, have a diabetes test (fasting glucose). If you are at risk for diabetes, you should have this test more often.    Every 5 years, have a cholesterol test. Have this test more often if you are at risk for high cholesterol or heart disease.     Every 10 years, have a colonoscopy. Or, have a yearly FIT test (stool test). These exams will check for colon cancer.    Talk to with your health care provider about screening for Abdominal Aortic Aneurysm if you have a family history of AAA or have a history of smoking.  Shots:     Get a flu shot each year.     Get a tetanus shot every 10 years.     Talk to your doctor about your pneumonia vaccines. There are now two you should receive - Pneumovax (PPSV 23) and Prevnar (PCV 13).    Talk to your doctor about a shingles vaccine.     Talk to your doctor about the hepatitis B vaccine.  Nutrition:     Eat at least 5 servings of fruits and vegetables each day.     Eat whole-grain bread, whole-wheat pasta and brown rice instead of  white grains and rice.     Talk to your doctor about Calcium and Vitamin D.   Lifestyle    Exercise for at least 150 minutes a week (30 minutes a day, 5 days a week). This will help you control your weight and prevent disease.     Limit alcohol to one drink per day.     No smoking.     Wear sunscreen to prevent skin cancer.     See your dentist every six months for an exam and cleaning.     See your eye doctor every 1 to 2 years to screen for conditions such as glaucoma, macular degeneration and cataracts.          Follow-ups after your visit        Your next 10 appointments already scheduled     Aug 17, 2017   Procedure with Varun Bond MD   [facility] [place] (--)    [address] [place]  [facility] [address] [phone]              Who to contact     Normal or non-critical lab and imaging results will be communicated to you by MyChart, letter or phone within 4 business days after the clinic has received the results. If you do not hear from us within 7 days, please contact the clinic through MyChart or phone. If you have a critical or abnormal lab result, we will notify you by phone as soon as possible.  Submit refill requests through GIGA TRONICS or call your pharmacy and they will forward the refill request to us. Please allow 3 business days for your refill to be completed.          If you need to speak with a  for additional information , please call: [phone]             Additional Information About Your Visit        GIG[employer] TRONICS Information     G[employer]ON[employer] gives you secure access to your electronic health record. If you see a primary care provider, you can also send messages to your care team and make appointments. If you have questions, please call your primary care clinic.  If you do not have a primary care provider, please call [phone] and they will assist you.        Care EveryWhere ID     This is your Care EveryWhere ID. This could be used by other organizations to  "access your [facility] medical records  [id number]        Your Vitals Were     Pulse Temperature Respirations Height Pulse Oximetry BMI (Body Mass Index)    73 98  F (36.7  C) (Oral) 16 5' 10\" (1.778 m) 97% 36.73 kg/m2       Blood Pressure from Last 3 Encounters:   07/25/17 130/77   06/12/17 126/76   06/02/17 127/83    Weight from Last 3 Encounters:   07/25/17 256 lb (116.1 kg)   06/12/17 265 lb (120.2 kg)   06/02/17 266 lb (120.7 kg)              We Performed the Following     Basic metabolic panel  (Ca, Cl, CO2, Creat, Gluc, K, Na, BUN)          Today's Medication Changes          These changes are accurate as of: 7/25/17 11:59 PM.  If you have any questions, ask your nurse or doctor.               These medicines have changed or have updated prescriptions.        Dose/Directions    aspirin 81 MG tablet   This may have changed:  additional instructions   Used for:  Essential hypertension        Dose:  81 mg   Take 1 tablet (81 mg) by mouth daily   Quantity:  30 tablet   Refills:  0       divalproex 500 MG EC tablet   Commonly known as:  DEPAKOTE   This may have changed:    - how much to take  - how to take this  - when to take this  - additional instructions   Used for:  Hypertension goal BP (blood pressure) < 140/90   Changed by:  Matt Swan PA-C        Dose:  500 mg   Take 1 tablet (500 mg) by mouth 2 times daily   Quantity:  180 tablet   Refills:  1         Stop taking these medicines if you haven't already. Please contact your care team if you have questions.     methylPREDNISolone 4 MG tablet   Commonly known as:  MEDROL DOSEPAK   Stopped by:  Matt Swan PA-C                Where to get your medicines      These medications were sent to [facility] [place] - [address] [facility]  [address] [facility] [place] [address]     Phone:  [phone]     amLODIPine 10 MG tablet    carvedilol 12.5 MG tablet    chlorthalidone 25 MG tablet    divalproex 500 MG EC tablet    losartan " 100 MG tablet    potassium chloride SA 20 MEQ CR tablet    simvastatin 20 MG tablet                Primary Care Provider Office Phone # Fax #    Matt Cecy Swan PA-C [phone]       [facility] [address] [facility] [address]        Equal Access to Services     PRAVIN ARRINGTON : Hadii aad ku hadasho Soomaali, waaxda luqadaha, qaybta kaalmada adeegyada, waxay idiin hayaan adeeg kharash la'aan ah. So [facility] [phone].    ATENCIÓN: Si habla español, tiene a velasquez disposición servicios gratuitos de asistencia lingüística. Trisha al [phone].    We comply with applicable federal civil rights laws and [place] laws. We do not discriminate on the basis of race, color, national origin, age, disability sex, sexual orientation or gender identity.            Thank you!     Thank you for choosing [facility]  for your care. Our goal is always to provide you with excellent care. Hearing back from our patients is one way we can continue to improve our services. Please take a few minutes to complete the written survey that you may receive in the mail after your visit with us. Thank you!             Your Updated Medication List - Protect others around you: Learn how to safely use, store and throw away your medicines at www.disposemymeds.org.          This list is accurate as of: 7/25/17 11:59 PM.  Always use your most recent med list.                   Brand Name Dispense Instructions for use Diagnosis    ABILIFY 2 MG tablet   Generic drug:  ARIPiprazole      Take 2 mg by mouth daily        allopurinol 100 MG tablet    ZYLOPRIM    180 tablet    Take 2 tablets (200 mg) by mouth daily    Acute idiopathic gout of right ankle       amLODIPine 10 MG tablet    NORVASC    90 tablet    TAKE ONE TABLET BY MOUTH ONCE DAILY WITH  DINNER    Hypertension goal BP (blood pressure) < 140/90       aspirin 81 MG tablet     30 tablet    Take 1 tablet (81 mg) by mouth daily    Essential hypertension        carvedilol 12.5 MG tablet    COREG    180 tablet    TAKE ONE TABLET BY MOUTH TWICE DAILY WITH MEALS    Hypertension goal BP (blood pressure) < 140/90       chlorthalidone 25 MG tablet    HYGROTON    135 tablet    Take 1.5 tablets (37.5 mg) by mouth daily    Benign essential hypertension       cyabnocobalamin 2500 MCG sublingual tablet    VITAMIN B-12    90 tablet    Place 2,500 mcg under the tongue daily    Vitamin B 12 deficiency       divalproex 500 MG EC tablet    DEPAKOTE    180 tablet    Take 1 tablet (500 mg) by mouth 2 times daily    Hypertension goal BP (blood pressure) < 140/90       levETIRAcetam 1000 MG Tabs    KEPPRA    180 tablet    1 tablet in morning and bed time.    Spells       losartan 100 MG tablet    COZAAR    90 tablet    Take 1 tablet (100 mg) by mouth daily    Essential hypertension       MULTI VITAMIN MENS PO      Take  by mouth.        omeprazole 40 MG capsule    priLOSEC    30 capsule    Take 1 capsule (40 mg) by mouth daily Take 30-60 minutes before a meal.    Gastroesophageal reflux disease, esophagitis presence not specified       order for DME      CPAP daily        potassium chloride SA 20 MEQ CR tablet    potassium chloride    180 tablet    Take 1 tablet (20 mEq) by mouth 2 times daily    Hypokalemia       simvastatin 20 MG tablet    ZOCOR    180 tablet    TAKE TWO TABLETS BY MOUTH EVERY NIGHT AT BEDTIME    Pure hypercholesterolemia       TEMAZEPAM PO      Take 15 mg by mouth At Bedtime        triamcinolone 0.1 % cream    KENALOG    80 g    Apply sparingly to affected area three times daily as needed    Eczema, unspecified type       vitamin D 2000 UNITS tablet     90 tablet    Take 2,000 Units by mouth daily    Vitamin D deficiency

## 2017-08-07 RX ORDER — POTASSIUM CHLORIDE 1500 MG/1
20 TABLET, EXTENDED RELEASE ORAL 3 TIMES DAILY
Qty: 270 TABLET | Refills: 1 | Status: SHIPPED | OUTPATIENT
Start: 2017-08-07 | End: 2018-01-29

## 2017-08-17 ENCOUNTER — HOSPITAL ENCOUNTER (OUTPATIENT)
Facility: AMBULATORY SURGERY CENTER | Age: 67
Discharge: HOME | End: 2017-08-17
Attending: SURGERY | Admitting: SURGERY
Payer: MEDICARE

## 2017-08-17 ENCOUNTER — SURGERY (OUTPATIENT)
Age: 67
End: 2017-08-17
Payer: MEDICARE

## 2017-08-17 VITALS
DIASTOLIC BLOOD PRESSURE: 68 MMHG | TEMPERATURE: 97.1 F | OXYGEN SATURATION: 95 % | SYSTOLIC BLOOD PRESSURE: 116 MMHG | RESPIRATION RATE: 18 BRPM

## 2017-08-17 LAB — COLONOSCOPY: NORMAL

## 2017-08-17 PROCEDURE — 99153 MOD SED SAME PHYS/QHP EA: CPT | Mod: PT | Performed by: SURGERY

## 2017-08-17 PROCEDURE — 45385 COLONOSCOPY W/LESION REMOVAL: CPT | Mod: PT | Performed by: SURGERY

## 2017-08-17 PROCEDURE — G8907 PT DOC NO EVENTS ON DISCHARG: HCPCS

## 2017-08-17 PROCEDURE — 45385 COLONOSCOPY W/LESION REMOVAL: CPT | Mod: PT

## 2017-08-17 PROCEDURE — G0500 MOD SEDAT ENDO SERVICE >5YRS: HCPCS | Performed by: SURGERY

## 2017-08-17 PROCEDURE — G8918 PT W/O PREOP ORDER IV AB PRO: HCPCS

## 2017-08-17 RX ORDER — LIDOCAINE 40 MG/G
CREAM TOPICAL
Status: DISCONTINUED | OUTPATIENT
Start: 2017-08-17 | End: 2017-08-18 | Stop reason: HOSPADM

## 2017-08-17 RX ORDER — DIPHENHYDRAMINE HYDROCHLORIDE 50 MG/ML
INJECTION INTRAMUSCULAR; INTRAVENOUS PRN
Status: DISCONTINUED | OUTPATIENT
Start: 2017-08-17 | End: 2017-08-17 | Stop reason: HOSPADM

## 2017-08-17 RX ORDER — FENTANYL CITRATE 50 UG/ML
INJECTION, SOLUTION INTRAMUSCULAR; INTRAVENOUS PRN
Status: DISCONTINUED | OUTPATIENT
Start: 2017-08-17 | End: 2017-08-17 | Stop reason: HOSPADM

## 2017-08-17 RX ADMIN — FENTANYL CITRATE 50 MCG: 50 INJECTION, SOLUTION INTRAMUSCULAR; INTRAVENOUS at 13:51

## 2017-08-17 RX ADMIN — FENTANYL CITRATE 50 MCG: 50 INJECTION, SOLUTION INTRAMUSCULAR; INTRAVENOUS at 13:49

## 2017-08-17 RX ADMIN — FENTANYL CITRATE 50 MCG: 50 INJECTION, SOLUTION INTRAMUSCULAR; INTRAVENOUS at 13:57

## 2017-08-17 RX ADMIN — DIPHENHYDRAMINE HYDROCHLORIDE 12.5 MG: 50 INJECTION INTRAMUSCULAR; INTRAVENOUS at 13:50

## 2017-08-17 NOTE — DISCHARGE INSTRUCTIONS
Prep for colonoscopy for difficult to get prepped patients     Do same as instructions on hand out but take 1 bottle of magnesium citrate 3 days before the colonoscopy.  Then 1 day before taking the prep take another bottle of magnesium citrate.  You must stay on clear liquids the whole time.  Then take your regular colon prep.     I would recommend that you take magnesium citrate one bottle and drink it cold.  Take 1/2 the bottle and then 3 hours later drink the rest.  Drink plenty of water or juice with it as it will steal fluids from you.    There was a polyp that we could not retrieve.  I could not find it after removing it.  And due to the poor prep and not knowing what the biopsy would show as it was the larger of the polyps we need to redo your scope in a year.  But we need you to be cleaned out better.  So please do the above before taking the prep.  You may want to do the suprep as this may be easier for you to tolerate for the regular colon prep.

## 2017-08-17 NOTE — LETTER
[facility]           [address] [place] [address]           Tel [phone]  Prashant Keyes  [address]  [place] [address]      August 21, 2017    Dear Prashant,  This letter is to inform you of the results of your pathology report on your colonoscopy.  If you have questions please feel free to call my assistant  At [phone] .    Your pathology report was:  Showed an Adenomatous polyp. This is a benign (not cancerous) growth; however these can become cancer over time. This polyp is usually removed completely at the time of the biopsy. Because it is an Adenomatous polyp you do have a slight higher risk for colon cancer. This is why you will need a repeat colonoscopy in approximately 1 year.  And remember to start the prep earlier like we talked about with magnesium citrate.      If you do have further questions please don t hesitate to call my assistant at  .  We do not have someone answering the phone all the time at my assistants number so if leave a message may take a day or so to get back to you.  So if more urgent then call the below number.    To make an appointment call [phone]: .   Sincerely,   Varun Bond M.D.  ___

## 2017-08-21 LAB — COPATH REPORT: NORMAL

## 2017-10-02 ENCOUNTER — PRE VISIT (OUTPATIENT)
Dept: CARDIOLOGY | Facility: CLINIC | Age: 67
End: 2017-10-02

## 2017-10-02 ENCOUNTER — TELEPHONE (OUTPATIENT)
Dept: NEUROLOGY | Facility: CLINIC | Age: 67
End: 2017-10-02

## 2017-10-02 DIAGNOSIS — R56.9 CONVULSIONS, UNSPECIFIED CONVULSION TYPE (H): Primary | Chronic | ICD-10-CM

## 2017-10-02 NOTE — TELEPHONE ENCOUNTER
Former Dr. Izquierdo pt. Yearly follow up for HTN, exercise and healthy lifestyle.     Chart prep complete. All requested testing/labs completed.  Shivani Islas CMA.

## 2017-10-02 NOTE — TELEPHONE ENCOUNTER
Patient cannot get in to see you until 10-24 and would like a refill on his Depakote until then.    Thank you.

## 2017-10-02 NOTE — TELEPHONE ENCOUNTER
Called and spoke to pharmacy and they state they never got the original Rx Depakote that was sent on 7/25/17 for 180 tablets, 1 refill.  Therefore patient did not  this medication.    Please advise.     Katie Harrison RN

## 2017-10-02 NOTE — TELEPHONE ENCOUNTER
Please look into his prescriptions. He was prescribed  180 tablets of DEPAKOTE on 7.25.2017 with 1 refill. Based on this he does nor refill.     Please look into it and let us know today.  Thanks    FYI: Matt Swan PA-C

## 2017-10-03 RX ORDER — DIVALPROEX SODIUM 500 MG/1
500 TABLET, DELAYED RELEASE ORAL 2 TIMES DAILY
Qty: 180 TABLET | Refills: 0 | Status: SHIPPED | OUTPATIENT
Start: 2017-10-03 | End: 2017-10-24

## 2017-10-04 ENCOUNTER — OFFICE VISIT (OUTPATIENT)
Dept: CARDIOLOGY | Facility: CLINIC | Age: 67
End: 2017-10-04
Payer: MEDICARE

## 2017-10-04 VITALS — OXYGEN SATURATION: 100 % | HEART RATE: 67 BPM | DIASTOLIC BLOOD PRESSURE: 83 MMHG | SYSTOLIC BLOOD PRESSURE: 147 MMHG

## 2017-10-04 DIAGNOSIS — Z23 NEED FOR PROPHYLACTIC VACCINATION AND INOCULATION AGAINST INFLUENZA: Primary | ICD-10-CM

## 2017-10-04 PROCEDURE — 99214 OFFICE O/P EST MOD 30 MIN: CPT | Mod: 25 | Performed by: INTERNAL MEDICINE

## 2017-10-04 PROCEDURE — G0008 ADMIN INFLUENZA VIRUS VAC: HCPCS | Performed by: INTERNAL MEDICINE

## 2017-10-04 PROCEDURE — 90662 IIV NO PRSV INCREASED AG IM: CPT | Performed by: INTERNAL MEDICINE

## 2017-10-04 ASSESSMENT — PAIN SCALES - GENERAL: PAINLEVEL: NO PAIN (0)

## 2017-10-04 NOTE — PATIENT INSTRUCTIONS
1.  Dr. Mejia Can would like for you to come back for a heart check up in 2 years (October 2019). We will call you to schedule this appointment as time draws closer to the date.    2. High dose flu shot given today. Right arm.        [facility] Cardiology - [place] Location    If you have any questions regarding to your visit please contact your care team:     Cardiology  Telephone Number   Gladys Abiodun,  Randi Fernandez  Cardiology RN's.    Deb Islas CMA [phone]    After hours: [phone].  [phone]   For scheduling appts:     [phone] or  [phone]    After hours: [phone]   For the Device Clinic (Pacemakers and ICD's)  RN's :  Lesley Steiner   During business hours: [phone]  After business hours:  [phone]- select option 4.      If you need a medication refill please contact your pharmacy.  Please allow 3 business days for your refill to be completed.    As always, Thank you for trusting us with your health care needs!  _____________________________________________________________________

## 2017-10-04 NOTE — LETTER
10/4/2017      RE: Prashant Keyes  [address]  [place] [address]       Dear Colleague,    Thank you for the opportunity to participate in the care of your patient, Prashant Keyes, at the [facility] HEART AT [facility]. Please see a copy of my visit note below.    Chief complaint: Annual-follow up in cardiology clinic    HPI: Mr. Prashant Keyes is a 66 year old  male who presents today with pmh of seizures, HT, HL, KALEB on BIPAP, and morbid obesity. I am seeing him for the first time in my clinic.      He had issues in controlling his BP in the past. But by losing weight (lost 50 pounds, intentional) and medication adjustment his BP is under control now and he feels very well. He is walking every other day for 20 min.     The patient's risk factor profile is: (+) HTN, (-) diabetes, (+) hyperlipidemia, (-) tobacco use, (-) family Hx CAD. The patient denies a history of chest discomfort, dyspnea, PND, orthopnea, pedal edema, palpitations, lightheadedness, and syncope. His complete ROS is negative as well.       Medications, personal, family, and social history reviewed with patient and revised.    PAST MEDICAL HISTORY:  Past Medical History:   Diagnosis Date     Calculus of kidney ~1975     Carpal tunnel syndrome 11/94     Concussion, unspecified 12/95     Eczema 11/16/2011     Epileptic seizure (H) 1995    diagnosed 1995, controlled with Depakote and Keppra     Fibromyalgia syndrome ~2000    per pt     Hypertension      Left testicle cyst      Photosensitive contact dermatitis      Prostatitis, unspecified      Seizures (H)     Diagnosed 1995, controlled with Depakote and Keppra     Umbilical hernia 11/26/2012     Unspecified asthma(493.90)        CURRENT MEDICATIONS:  -ASA 81 mg  -Chlorthalidone 25 mg 1.5 tablet qd  -Amlodipine 10 mg  -Losartan 100 mg  -Carvedilol 12.5 mg bid  -Simvastatin 40 mg qd  -KCL 20 MEQ  "tid      PAST SURGICAL HISTORY:  Past Surgical History:   Procedure Laterality Date     ANGIOGRAM  2003    Coronary Angiogram- negative     COLONOSCOPY WITH CO2 INSUFFLATION N/A 8/17/2017    Procedure: COLONOSCOPY WITH CO2 INSUFFLATION;  COLON SCREEN/ GEE;  Surgeon: Varun Bond MD;  Location: [facility] OR     HC REMOVAL TESTIS,SIMPLE  1978    Right undecended     HERNIA REPAIR, INGUINAL RT/LT  1963, 1978    Right Hernia     HERNIORRHAPHY UMBILICAL  4/2013    [place]       ALLERGIES:     Allergies   Allergen Reactions     Accupril [Ace Inhibitors] Difficulty breathing     accupril causes SOB     Aptiom [Eslicarbazepine] Difficulty breathing     Atorvastatin Calcium      Myalgias from Lipitor     Contrast Dye Hives     Coreg [Carvedilol] Nausea and Fatigue     Depakote [Valproic Acid]      tremor     Lactose GI Disturbance     Lisinopril Difficulty breathing     SOB     Nitroglycerin      Headache     Paroxetine Hives     Tetracycline [Tetracyclines]      \"splitting headaches\"     Vimpat [Lacosamide] Difficulty breathing     Zoloft Rash     Zolpidem      Adhesive Tape Rash     Without itching- EKG pads       FAMILY HISTORY:  Family History   Problem Relation Age of Onset     CEREBROVASCULAR DISEASE Mother      60's     C.A.D. Mother      CABG 75     Arthritis Mother      Eye Disorder Mother      HEART DISEASE Mother      Cardiovascular Father      Rheumatic Heart Disease     Hypertension Brother      Lipids Brother      C.A.D. Brother      CABG 46     Arthritis Brother      HEART DISEASE Brother      CABG x5     Obesity Brother      Hypertension Brother      Lipids Brother      Thyroid Disease Brother      Alcohol/Drug Brother      HEART DISEASE Brother      CABG     CANCER Sister      Thyroid CA     Hypertension Sister      Obesity Sister      Thyroid Disease Sister      Hemochromatosis Sister      Depression Daughter      Depression Daughter      Depression Son      DIABETES Son      Depression Son      " GASTROINTESTINAL DISEASE Brother      Crohn's Disease-Ostomy     Arrhythmia Sister      CANCER Maternal Grandmother      Thyroid CA     CANCER Paternal Grandfather      Throat CA     Thyroid Disease Maternal Grandfather          SOCIAL HISTORY:  Social History   Substance Use Topics     Smoking status: Never Smoker     Smokeless tobacco: Never Used     Alcohol use No      Comment: Quit since 2003.        ROS:   Constitutional: No fever, chills, or sweats.    ENT: No visual disturbance, ear ache, epistaxis, sore throat.   Cardiovascular: As per HPI.   Respiratory: No cough, hemoptysis.    GI: No nausea, vomiting, hematemesis, melena, or hematochezia.   : No hematuria.   Integument: Negative.   Psychiatric: Negative.   Hematologic:  Easy bruising, no easy bleeding.  Neuro: Negative.   Endocrinology: No significant heat or cold intolerance   Musculoskeletal: No myalgia.    Exam:  /83 (BP Location: Right arm, Patient Position: Chair, Cuff Size: Adult Regular)  Pulse 67  SpO2 100%  GENERAL APPEARANCE: healthy, alert and no distress  HEENT: no icterus, no central cyanosis  LYMPH/NECK: no adenopathy, no asymmetry, JVP not elevated, no carotid bruits.  RESPIRATORY: lungs clear to auscultation - no rales, rhonchi or wheezes, no use of accessory muscles, no retractions, respirations are unlabored, normal respiratory rate  CARDIOVASCULAR: regular rhythm, normal S1, S2, no S3 or S4 and no murmur, click or rub, precordium quiet with normal PMI.  GI: soft, non tender  EXTREMITIES: peripheral pulses normal, no edema  NEURO: alert and oriented to person/place/time, normal speech,and affect  VASC: Radial, dorsalis pedis and posterior tibialis pulses are normal in volumes and symmetric bilaterally.   SKIN: no ecchymoses, no rashes     I have reviewed the labs and personally reviewed the imaging results from [facility] below and made my comment in the assessment and plan.    Labs:  CBC RESULTS:   Lab Results   Component Value Date     WBC 8.5 2015    RBC 4.27 (L) 2015    HGB 13.1 (L) 2015    HCT 39.1 (L) 2015    MCV 92 2015    MCH 30.7 2015    MCHC 33.5 2015    RDW 17.5 (H) 2015     (H) 2015       BMP RESULTS:  Lab Results   Component Value Date     2017    POTASSIUM 3.2 (L) 2017    CHLORIDE 97 2017    CO2 32 2017    ANIONGAP 8 2017     (H) 2017    BUN 19 2017    CR 1.00 2017    GFRESTIMATED 75 2017    GFRESTBLACK >90   GFR Calc   2017    SANGEETHA 9.4 2017      Lipid panel (12753):  LDL:63 mg/dl, HDL:66 mg/dl, T mg/dl  Procedures:  Echocardiogram (3/9/2015):  1.Normal biventricular systolic function. LVEF estimate 60-65%.   2. No significant valvular abnormalities.    3. Normal IVC with preserved respiratory   variability.    EKG dated 3/2/2015: Bradycardia 58 bpm, otherwise normal ECG      Assessment and Plan:    is a 66 year old gentleman with pmh of HT, HL, KALEB on BIPAP, and morbid obesity. He recently lost 50 pounds (intentional) and he feels very well. No symptoms.    1. Hypertension/Hyperlipidemia: Under control. Continue current medications  --ASA 81 mg  -Chlorthalidone 25 mg 1.5 tablet qd  -Amlodipine 10 mg  -Losartan 100 mg  -Carvedilol 12.5 mg bid  -Simvastatin 40 mg qd  -KCL 20 meq tid    2. KALEB: on CPAP     Overall he is doing very well. I will see him in clinic in 2 years time.    It was my absolute pleasure to meet  the office today. Please donot hesitate to contact me if you have any questions or concerns.    Jackie DOBBS MD  [facility] Division of Cardiology  Pager [phone]      Injectable Influenza Immunization Documentation    1.  Is the person to be vaccinated sick today?   No    2. Does the person to be vaccinated have an allergy to a component   of the vaccine?   No    3. Has the person to be vaccinated ever had a serious reaction   to  influenza vaccine in the past?   No    4. Has the person to be vaccinated ever had Guillain-Barré syndrome?   No    Patient was verbally asked questions prior to flu shot today.  Shivani Islas CMA.               Please do not hesitate to contact me if you have any questions/concerns.     Sincerely,     Jackie Quintanilla MD

## 2017-10-04 NOTE — MR AVS SNAPSHOT
After Visit Summary   10/4/2017    Prashant Keyes    MRN: [id number]           Patient Information     Date Of Birth          1950        Visit Information        Provider Department      10/4/2017 2:30 PM Jackie Quintanilla MD [facility] HEART AT [facility]        Today's Diagnoses     Need for prophylactic vaccination and inoculation against influenza    -  1      Care Instructions    1.  Dr. Jackie Quintanilla would like for you to come back for a heart check up in 2 years (October 2019). We will call you to schedule this appointment as time draws closer to the date.    2. High dose flu shot given today. Right arm.        [facility] Cardiology - [place] Location    If you have any questions regarding to your visit please contact your care team:     Cardiology  Telephone Number   Randi Edward  Cardiology RN's.    Deb Islas CMA [phone]    After hours: [phone].  [phone]   For scheduling appts:     [phone] or  [phone]    After hours: [phone]   For the Device Clinic (Pacemakers and ICD's)  RN's :  Lesley Steiner   During business hours: [phone]  After business hours:  [phone]- select option 4.      If you need a medication refill please contact your pharmacy.  Please allow 3 business days for your refill to be completed.    As always, Thank you for trusting us with your health care needs!  _____________________________________________________________________              Follow-ups after your visit        Your next 10 appointments already scheduled     Oct 24, 2017 10:20 AM CDT   Return Visit with Nico Turner MD   [facility] [place] ([facility] [place])    [address]  [place] [address] [phone]              Who to contact     If you have questions or need follow up information about today's clinic visit or your schedule please contact [facility] HEART AT  [facility] directly at [phone].  Normal or non-critical lab and imaging results will be communicated to you by MyChart, letter or phone within 4 business days after the clinic has received the results. If you do not hear from us within 7 days, please contact the clinic through [employer] or phone. If you have a critical or abnormal lab result, we will notify you by phone as soon as possible.  Submit refill requests through [employer] or call your pharmacy and they will forward the refill request to us. Please allow 3 business days for your refill to be completed.          Additional Information About Your Visit        [facility] Information     [employer] gives you secure access to your electronic health record. If you see a primary care provider, you can also send messages to your care team and make appointments. If you have questions, please call your primary care clinic.  If you do not have a primary care provider, please call [phone] and they will assist you.        Care EveryWhere ID     This is your Care EveryWhere ID. This could be used by other organizations to access your [place] medical records  [id number]        Your Vitals Were     Pulse Pulse Oximetry                67 100%           Blood Pressure from Last 3 Encounters:   10/04/17 147/83   08/17/17 116/68   07/25/17 130/77    Weight from Last 3 Encounters:   07/25/17 116.1 kg (256 lb)   06/12/17 120.2 kg (265 lb)   06/02/17 120.7 kg (266 lb)              We Performed the Following     ADMIN INFLUENZA (For MEDICARE Patients ONLY) []     FLU VACCINE, INCREASED ANTIGEN, PRESV FREE, AGE 65+ [12411]     Vaccine Administration, Initial [86857]          Today's Medication Changes          These changes are accurate as of: 10/4/17  3:16 PM.  If you have any questions, ask your nurse or doctor.               These medicines have changed or have updated prescriptions.        Dose/Directions    aspirin 81 MG tablet   This may have changed:  additional  instructions   Used for:  Essential hypertension        Dose:  81 mg   Take 1 tablet (81 mg) by mouth daily   Quantity:  30 tablet   Refills:  0                Primary Care Provider Office Phone # Fax #    Matt Swan PA-C [phone]       [address] [place] [address]        Equal Access to Services     [facility]: Hadii aad ku hadasho Soomaali, waaxda luqadaha, qaybta kaalmada adeegyada, waxay idiin hayaan adeeg kharash la'aan . So [facility] [phone].    ATENCIÓN: Si habla español, tiene a velasquez disposición servicios gratuitos de asistencia lingüística. [facility] [phone].    We comply with applicable federal civil rights laws and [place] laws. We do not discriminate on the basis of race, color, national origin, age, disability, sex, sexual orientation, or gender identity.            Thank you!     Thank you for choosing [facility] PHYSICIANS HEART AT [facility]  for your care. Our goal is always to provide you with excellent care. Hearing back from our patients is one way we can continue to improve our services. Please take a few minutes to complete the written survey that you may receive in the mail after your visit with us. Thank you!             Your Updated Medication List - Protect others around you: Learn how to safely use, store and throw away your medicines at www.disposemymeds.org.          This list is accurate as of: 10/4/17  3:16 PM.  Always use your most recent med list.                   Brand Name Dispense Instructions for use Diagnosis    ABILIFY 2 MG tablet   Generic drug:  ARIPiprazole      Take 2 mg by mouth daily        allopurinol 100 MG tablet    ZYLOPRIM    180 tablet    Take 2 tablets (200 mg) by mouth daily    Acute idiopathic gout of right ankle       amLODIPine 10 MG tablet    NORVASC    90 tablet    TAKE ONE TABLET BY MOUTH ONCE DAILY WITH  DINNER    Hypertension goal BP (blood pressure) < 140/90       aspirin 81 MG tablet     30  tablet    Take 1 tablet (81 mg) by mouth daily    Essential hypertension       carvedilol 12.5 MG tablet    COREG    180 tablet    TAKE ONE TABLET BY MOUTH TWICE DAILY WITH MEALS    Hypertension goal BP (blood pressure) < 140/90       chlorthalidone 25 MG tablet    HYGROTON    135 tablet    Take 1.5 tablets (37.5 mg) by mouth daily    Benign essential hypertension       cyabnocobalamin 2500 MCG sublingual tablet    VITAMIN B-12    90 tablet    Place 2,500 mcg under the tongue daily    Vitamin B 12 deficiency       divalproex 500 MG EC tablet    DEPAKOTE    180 tablet    Take 1 tablet (500 mg) by mouth 2 times daily    Convulsions, unspecified convulsion type (H)       levETIRAcetam 1000 MG Tabs    KEPPRA    180 tablet    1 tablet in morning and bed time.    Spells       losartan 100 MG tablet    COZAAR    90 tablet    Take 1 tablet (100 mg) by mouth daily    Essential hypertension       MULTI VITAMIN MENS PO      Take  by mouth.        omeprazole 40 MG capsule    priLOSEC    30 capsule    Take 1 capsule (40 mg) by mouth daily Take 30-60 minutes before a meal.    Gastroesophageal reflux disease, esophagitis presence not specified       order for DME      CPAP daily        potassium chloride SA 20 MEQ CR tablet    KLOR-CON    270 tablet    Take 1 tablet (20 mEq) by mouth 3 times daily    Hypokalemia       simvastatin 20 MG tablet    ZOCOR    180 tablet    TAKE TWO TABLETS BY MOUTH EVERY NIGHT AT BEDTIME    Pure hypercholesterolemia       TEMAZEPAM PO      Take 15 mg by mouth At Bedtime        triamcinolone 0.1 % cream    KENALOG    80 g    Apply sparingly to affected area three times daily as needed    Eczema, unspecified type       vitamin D 2000 UNITS tablet     90 tablet    Take 2,000 Units by mouth daily    Vitamin D deficiency

## 2017-10-04 NOTE — PROGRESS NOTES
Chief complaint: Annual-follow up in cardiology clinic    HPI: Mr. Prashant Keyes is a 66 year old  male who presents today with pmh of seizures, HT, HL, KALEB on BIPAP, and morbid obesity. I am seeing him for the first time in my clinic.      He had issues in controlling his BP in the past. But by losing weight (lost 50 pounds, intentional) and medication adjustment his BP is under control now and he feels very well. He is walking every other day for 20 min.     The patient's risk factor profile is: (+) HTN, (-) diabetes, (+) hyperlipidemia, (-) tobacco use, (-) family Hx CAD. The patient denies a history of chest discomfort, dyspnea, PND, orthopnea, pedal edema, palpitations, lightheadedness, and syncope. His complete ROS is negative as well.       Medications, personal, family, and social history reviewed with patient and revised.    PAST MEDICAL HISTORY:  Past Medical History:   Diagnosis Date     Calculus of kidney ~1975     Carpal tunnel syndrome 11/94     Concussion, unspecified 12/95     Eczema 11/16/2011     Epileptic seizure (H) 1995    diagnosed 1995, controlled with Depakote and Keppra     Fibromyalgia syndrome ~2000    per pt     Hypertension      Left testicle cyst      Photosensitive contact dermatitis      Prostatitis, unspecified      Seizures (H)     Diagnosed 1995, controlled with Depakote and Keppra     Umbilical hernia 11/26/2012     Unspecified asthma(493.90)        CURRENT MEDICATIONS:  -ASA 81 mg  -Chlorthalidone 25 mg 1.5 tablet qd  -Amlodipine 10 mg  -Losartan 100 mg  -Carvedilol 12.5 mg bid  -Simvastatin 40 mg qd  -KCL 20 MEQ tid      PAST SURGICAL HISTORY:  Past Surgical History:   Procedure Laterality Date     ANGIOGRAM  2003    Coronary Angiogram- negative     COLONOSCOPY WITH CO2 INSUFFLATION N/A 8/17/2017    Procedure: COLONOSCOPY WITH CO2 INSUFFLATION;  COLON SCREEN/ FLYNN;  Surgeon: Varun Bond MD;  Location: [facility] OR     HC REMOVAL TESTIS,SIMPLE  1978    Right undecended  "    HERNIA REPAIR, INGUINAL RT/LT  1963, 1978    Right Hernia     HERNIORRHAPHY UMBILICAL  4/2013    [place]       ALLERGIES:     Allergies   Allergen Reactions     Accupril [Ace Inhibitors] Difficulty breathing     accupril causes SOB     Aptiom [Eslicarbazepine] Difficulty breathing     Atorvastatin Calcium      Myalgias from Lipitor     Contrast Dye Hives     Coreg [Carvedilol] Nausea and Fatigue     Depakote [Valproic Acid]      tremor     Lactose GI Disturbance     Lisinopril Difficulty breathing     SOB     Nitroglycerin      Headache     Paroxetine Hives     Tetracycline [Tetracyclines]      \"splitting headaches\"     Vimpat [Lacosamide] Difficulty breathing     Zoloft Rash     Zolpidem      Adhesive Tape Rash     Without itching- EKG pads       FAMILY HISTORY:  Family History   Problem Relation Age of Onset     CEREBROVASCULAR DISEASE Mother      60's     C.A.D. Mother      CABG 75     Arthritis Mother      Eye Disorder Mother      HEART DISEASE Mother      Cardiovascular Father      Rheumatic Heart Disease     Hypertension Brother      Lipids Brother      C.A.D. Brother      CABG 46     Arthritis Brother      HEART DISEASE Brother      CABG x5     Obesity Brother      Hypertension Brother      Lipids Brother      Thyroid Disease Brother      Alcohol/Drug Brother      HEART DISEASE Brother      CABG     CANCER Sister      Thyroid CA     Hypertension Sister      Obesity Sister      Thyroid Disease Sister      Hemochromatosis Sister      Depression Daughter      Depression Daughter      Depression Son      DIABETES Son      Depression Son      GASTROINTESTINAL DISEASE Brother      Crohn's Disease-Ostomy     Arrhythmia Sister      CANCER Maternal Grandmother      Thyroid CA     CANCER Paternal Grandfather      Throat CA     Thyroid Disease Maternal Grandfather          SOCIAL HISTORY:  Social History   Substance Use Topics     Smoking status: Never Smoker     Smokeless tobacco: Never Used     Alcohol use No "      Comment: Quit since 2003.        ROS:   Constitutional: No fever, chills, or sweats.    ENT: No visual disturbance, ear ache, epistaxis, sore throat.   Cardiovascular: As per HPI.   Respiratory: No cough, hemoptysis.    GI: No nausea, vomiting, hematemesis, melena, or hematochezia.   : No hematuria.   Integument: Negative.   Psychiatric: Negative.   Hematologic:  Easy bruising, no easy bleeding.  Neuro: Negative.   Endocrinology: No significant heat or cold intolerance   Musculoskeletal: No myalgia.    Exam:  /83 (BP Location: Right arm, Patient Position: Chair, Cuff Size: Adult Regular)  Pulse 67  SpO2 100%  GENERAL APPEARANCE: healthy, alert and no distress  HEENT: no icterus, no central cyanosis  LYMPH/NECK: no adenopathy, no asymmetry, JVP not elevated, no carotid bruits.  RESPIRATORY: lungs clear to auscultation - no rales, rhonchi or wheezes, no use of accessory muscles, no retractions, respirations are unlabored, normal respiratory rate  CARDIOVASCULAR: regular rhythm, normal S1, S2, no S3 or S4 and no murmur, click or rub, precordium quiet with normal PMI.  GI: soft, non tender  EXTREMITIES: peripheral pulses normal, no edema  NEURO: alert and oriented to person/place/time, normal speech,and affect  VASC: Radial, dorsalis pedis and posterior tibialis pulses are normal in volumes and symmetric bilaterally.   SKIN: no ecchymoses, no rashes     I have reviewed the labs and personally reviewed the imaging results from [facility] below and made my comment in the assessment and plan.    Labs:  CBC RESULTS:   Lab Results   Component Value Date    WBC 8.5 12/18/2015    RBC 4.27 (L) 12/18/2015    HGB 13.1 (L) 12/18/2015    HCT 39.1 (L) 12/18/2015    MCV 92 12/18/2015    MCH 30.7 12/18/2015    MCHC 33.5 12/18/2015    RDW 17.5 (H) 12/18/2015     (H) 12/18/2015       BMP RESULTS:  Lab Results   Component Value Date     07/25/2017    POTASSIUM 3.2 (L) 07/25/2017    CHLORIDE 97 07/25/2017    CO2 32  2017    ANIONGAP 8 2017     (H) 2017    BUN 19 2017    CR 1.00 2017    GFRESTIMATED 75 2017    GFRESTBLACK >90   GFR Calc   2017    SANGEETHA 9.4 2017      Lipid panel (33238):  LDL:63 mg/dl, HDL:66 mg/dl, T mg/dl  Procedures:  Echocardiogram (3/9/2015):  1.Normal biventricular systolic function. LVEF estimate 60-65%.   2. No significant valvular abnormalities.    3. Normal IVC with preserved respiratory   variability.    EKG dated 3/2/2015: Bradycardia 58 bpm, otherwise normal ECG      Assessment and Plan:    is a 66 year old gentleman with pmh of HT, HL, KALEB on BIPAP, and morbid obesity. He recently lost 50 pounds (intentional) and he feels very well. No symptoms.    1. Hypertension/Hyperlipidemia: Under control. Continue current medications  --ASA 81 mg  -Chlorthalidone 25 mg 1.5 tablet qd  -Amlodipine 10 mg  -Losartan 100 mg  -Carvedilol 12.5 mg bid  -Simvastatin 40 mg qd  -KCL 20 meq tid    2. KALEB: on CPAP     Overall he is doing very well. I will see him in clinic in 2 years time.    It was my absolute pleasure to meet  the office today. Please donot hesitate to contact me if you have any questions or concerns.    Jackie DOBBS MD  [facility] Division of Cardiology  Pager [phone]

## 2017-10-04 NOTE — PROGRESS NOTES
Injectable Influenza Immunization Documentation    1.  Is the person to be vaccinated sick today?   No    2. Does the person to be vaccinated have an allergy to a component   of the vaccine?   No    3. Has the person to be vaccinated ever had a serious reaction   to influenza vaccine in the past?   No    4. Has the person to be vaccinated ever had Guillain-Barré syndrome?   No    Patient was verbally asked questions prior to flu shot today.  Shivani Islas CMA.

## 2017-10-04 NOTE — NURSING NOTE
"Chief Complaint   Patient presents with     RECHECK     Former Dr. Izquierdo pt. Yearly follow up for HTN, exercise and healthy lifestyle. Feeling well, heart wise. States will get a some rare lightheaded with standing . Has lost 50 pounds in the last year has been walking every other day.       Initial /83 (BP Location: Right arm, Patient Position: Chair, Cuff Size: Adult Regular)  Pulse 67  SpO2 100% Estimated body mass index is 36.73 kg/(m^2) as calculated from the following:    Height as of 7/25/17: 5' 10\" (1.778 m).    Weight as of 7/25/17: 256 lb (116.1 kg)..  BP completed using cuff size: regular    Shivani Islas CMA      "

## 2017-10-24 ENCOUNTER — OFFICE VISIT (OUTPATIENT)
Dept: NEUROLOGY | Facility: CLINIC | Age: 67
End: 2017-10-24
Payer: MEDICARE

## 2017-10-24 VITALS
DIASTOLIC BLOOD PRESSURE: 79 MMHG | HEIGHT: 70 IN | HEART RATE: 69 BPM | BODY MASS INDEX: 34.93 KG/M2 | WEIGHT: 244 LBS | SYSTOLIC BLOOD PRESSURE: 137 MMHG

## 2017-10-24 DIAGNOSIS — G25.0 ESSENTIAL TREMOR: ICD-10-CM

## 2017-10-24 DIAGNOSIS — Z87.898 H/O IDIOPATHIC SEIZURE: ICD-10-CM

## 2017-10-24 DIAGNOSIS — G20.C PARKINSONISM, UNSPECIFIED PARKINSONISM TYPE (H): Primary | ICD-10-CM

## 2017-10-24 PROCEDURE — 99215 OFFICE O/P EST HI 40 MIN: CPT | Performed by: PSYCHIATRY & NEUROLOGY

## 2017-10-24 RX ORDER — DIVALPROEX SODIUM 500 MG/1
500 TABLET, DELAYED RELEASE ORAL 2 TIMES DAILY
Qty: 180 TABLET | Refills: 1 | Status: SHIPPED | OUTPATIENT
Start: 2017-10-24 | End: 2018-01-29

## 2017-10-24 RX ORDER — CARBIDOPA AND LEVODOPA 25; 100 MG/1; MG/1
TABLET ORAL
Qty: 135 TABLET | Refills: 1 | Status: SHIPPED | OUTPATIENT
Start: 2017-10-24 | End: 2017-11-28 | Stop reason: DRUGHIGH

## 2017-10-24 ASSESSMENT — PAIN SCALES - GENERAL: PAINLEVEL: NO PAIN (0)

## 2017-10-24 NOTE — MR AVS SNAPSHOT
After Visit Summary   10/24/2017    Prashant Keyes    MRN: [id number]           Patient Information     Date Of Birth          1950        Visit Information        Provider Department      10/24/2017 10:20 AM Nico Turner MD [facility]        Today's Diagnoses     Parkinsonism, unspecified Parkinsonism type (H)    -  1    Essential tremor        H/O idiopathic seizure          Care Instructions    AFTER VISIT SUMMARY (AVS)  Orders Placed This Encounter   Procedures     DERMATOLOGY REFERRAL     Orders Placed This Encounter   Medications     carbidopa-levodopa (SINEMET)  MG per tablet     Sig: Week 1: 1/2 evening. Week 2: 1/2 two times/day. Week 3 and afterwards: 1/2 three times/day.     Dispense:  135 tablet     Refill:  1     Needs office visit before next refill.     divalproex (DEPAKOTE) 500 MG EC tablet     Sig: Take 1 tablet (500 mg) by mouth 2 times daily     Dispense:  180 tablet     Refill:  1           Diagnostic possibilities reviewed and reasons for work-up explained    Preventive Neurology: Encouraged to keep physically and mentally active with particular emphasis on daily stretching exercises, walking, and healthy eating.     Educational material on Parkinson's disease and essential tremor provided to him    Not keen for [facility] therapy at present.     To call us for follow-up appointment in next 6 week(s) or earlier if needed.                      Follow-ups after your visit        Additional Services     DERMATOLOGY REFERRAL       REASON: Request for skin cancer screening in view of him being started on Sinemet. He has combination of Parkinson's Disease and essential tremor.     Your provider has referred you to: [facility]: [facility] [phone]   [email or web address]    Please be aware that coverage of these services is subject to the terms and limitations of your  health insurance plan.  Call member services at your health plan with any benefit or coverage questions.      Please bring the following with you to your appointment:    (1) Any X-Rays, CTs or MRIs which have been performed.  Contact the facility where they were done to arrange for  prior to your scheduled appointment.    (2) List of current medications  (3) This referral request   (4) Any documents/labs given to you for this referral                  Follow-up notes from your care team     Return in about 6 weeks (around 12/5/2017) for Follow-up, Parkinson's disease follow-up.      Who to contact     If you have questions or need follow up information about today's clinic visit or your schedule please contact [facility] directly at [phone].  Normal or non-critical lab and imaging results will be communicated to you by MyChart, letter or phone within 4 business days after the clinic has received the results. If you do not hear from us within 7 days, please contact the clinic through [facility] or phone. If you have a critical or abnormal lab result, we will notify you by phone as soon as possible.  Submit refill requests through [facility] or call your pharmacy and they will forward the refill request to us. Please allow 3 business days for your refill to be completed.          Additional Information About Your Visit        [facility] Information     [employer] gives you secure access to your electronic health record. If you see a primary care provider, you can also send messages to your care team and make appointments. If you have questions, please call your primary care clinic.  If you do not have a primary care provider, please call [phone] and they will assist you.        Care EveryWhere ID     This is your Care EveryWhere ID. This could be used by other organizations to access your [place] medical records  [id number]        Your Vitals Were     Pulse Height BMI (Body Mass Index)              "69 1.778 m (5' 10\") 35.01 kg/m2          Blood Pressure from Last 3 Encounters:   10/24/17 137/79   10/04/17 147/83   08/17/17 116/68    Weight from Last 3 Encounters:   10/24/17 110.7 kg (244 lb)   07/25/17 116.1 kg (256 lb)   06/12/17 120.2 kg (265 lb)              We Performed the Following     DERMATOLOGY REFERRAL          Today's Medication Changes          These changes are accurate as of: 10/24/17 12:04 PM.  If you have any questions, ask your nurse or doctor.               Start taking these medicines.        Dose/Directions    carbidopa-levodopa  MG per tablet   Commonly known as:  SINEMET   Used for:  Parkinsonism, unspecified Parkinsonism type (H)   Started by:  Nico Turner MD        Week 1: 1/2 evening. Week 2: 1/2 two times/day. Week 3 and afterwards: 1/2 three times/day.   Quantity:  135 tablet   Refills:  1            Where to get your medicines      These medications were sent to [facility] [address] [place] [address]  [address] [facility] [address]     Phone:  [phone]     carbidopa-levodopa  MG per tablet    divalproex 500 MG EC tablet                Primary Care Provider Office Phone # Fax #    Matt Swan PA-C [phone] [address] [facility] [address]        Equal Access to Services     [facility] MURPHY AH: Hadii herlinda lee hadasho Soomaali, waaxda luqadaha, qaybta kaalmada adeegyada, waxsantosh jones hartmann. So [facility] [phone].    ATENCIÓN: Si habla español, tiene a velasquez disposición servicios gratuitos de asistencia lingüística. Trisha al [phone].    We comply with applicable federal civil rights laws and [place] laws. We do not discriminate on the basis of race, color, national origin, age, disability, sex, sexual orientation, or gender identity.            Thank you!     Thank you for choosing [facility]  for your care. Our goal is always to provide you with excellent " care. Hearing back from our patients is one way we can continue to improve our services. Please take a few minutes to complete the written survey that you may receive in the mail after your visit with us. Thank you!             Your Updated Medication List - Protect others around you: Learn how to safely use, store and throw away your medicines at www.disposemymeds.org.          This list is accurate as of: 10/24/17 12:04 PM.  Always use your most recent med list.                   Brand Name Dispense Instructions for use Diagnosis    ABILIFY 2 MG tablet   Generic drug:  ARIPiprazole      Take 2 mg by mouth daily        allopurinol 100 MG tablet    ZYLOPRIM    180 tablet    Take 2 tablets (200 mg) by mouth daily    Acute idiopathic gout of right ankle       amLODIPine 10 MG tablet    NORVASC    90 tablet    TAKE ONE TABLET BY MOUTH ONCE DAILY WITH  DINNER    Hypertension goal BP (blood pressure) < 140/90       aspirin 81 MG tablet     30 tablet    Take 1 tablet (81 mg) by mouth daily    Essential hypertension       carbidopa-levodopa  MG per tablet    SINEMET    135 tablet    Week 1: 1/2 evening. Week 2: 1/2 two times/day. Week 3 and afterwards: 1/2 three times/day.    Parkinsonism, unspecified Parkinsonism type (H)       carvedilol 12.5 MG tablet    COREG    180 tablet    TAKE ONE TABLET BY MOUTH TWICE DAILY WITH MEALS    Hypertension goal BP (blood pressure) < 140/90       chlorthalidone 25 MG tablet    HYGROTON    135 tablet    Take 1.5 tablets (37.5 mg) by mouth daily    Benign essential hypertension       cyabnocobalamin 2500 MCG sublingual tablet    VITAMIN B-12    90 tablet    Place 2,500 mcg under the tongue daily    Vitamin B 12 deficiency       divalproex 500 MG EC tablet    DEPAKOTE    180 tablet    Take 1 tablet (500 mg) by mouth 2 times daily    H/O idiopathic seizure       levETIRAcetam 1000 MG Tabs    KEPPRA    180 tablet    1 tablet in morning and bed time.    Spells       losartan 100 MG  tablet    COZAAR    90 tablet    Take 1 tablet (100 mg) by mouth daily    Essential hypertension       MULTI VITAMIN MENS PO      Take  by mouth.        omeprazole 40 MG capsule    priLOSEC    30 capsule    Take 1 capsule (40 mg) by mouth daily Take 30-60 minutes before a meal.    Gastroesophageal reflux disease, esophagitis presence not specified       order for DME      CPAP daily        potassium chloride SA 20 MEQ CR tablet    KLOR-CON    270 tablet    Take 1 tablet (20 mEq) by mouth 3 times daily    Hypokalemia       simvastatin 20 MG tablet    ZOCOR    180 tablet    TAKE TWO TABLETS BY MOUTH EVERY NIGHT AT BEDTIME    Pure hypercholesterolemia       TEMAZEPAM PO      Take 15 mg by mouth At Bedtime        triamcinolone 0.1 % cream    KENALOG    80 g    Apply sparingly to affected area three times daily as needed    Eczema, unspecified type       vitamin D 2000 UNITS tablet     90 tablet    Take 2,000 Units by mouth daily    Vitamin D deficiency

## 2017-10-24 NOTE — PATIENT INSTRUCTIONS
AFTER VISIT SUMMARY (AVS)  Orders Placed This Encounter   Procedures     DERMATOLOGY REFERRAL     Orders Placed This Encounter   Medications     carbidopa-levodopa (SINEMET)  MG per tablet     Sig: Week 1: 1/2 evening. Week 2: 1/2 two times/day. Week 3 and afterwards: 1/2 three times/day.     Dispense:  135 tablet     Refill:  1     Needs office visit before next refill.     divalproex (DEPAKOTE) 500 MG EC tablet     Sig: Take 1 tablet (500 mg) by mouth 2 times daily     Dispense:  180 tablet     Refill:  1           Diagnostic possibilities reviewed and reasons for work-up explained    Preventive Neurology: Encouraged to keep physically and mentally active with particular emphasis on daily stretching exercises, walking, and healthy eating.     Educational material on Parkinson's disease and essential tremor provided to him    Not keen for [facility] therapy at present.     To call us for follow-up appointment in next 6 week(s) or earlier if needed.

## 2017-10-24 NOTE — NURSING NOTE
"Chief Complaint   Patient presents with     RECHECK     medication check       Initial /79 (BP Location: Left arm, Patient Position: Chair, Cuff Size: Adult Large)  Pulse 69  Ht 1.778 m (5' 10\")  Wt 110.7 kg (244 lb)  BMI 35.01 kg/m2 Estimated body mass index is 35.01 kg/(m^2) as calculated from the following:    Height as of this encounter: 1.778 m (5' 10\").    Weight as of this encounter: 110.7 kg (244 lb).  Medication Reconciliation: complete   Nicolette Osorio MA        "

## 2017-10-25 DIAGNOSIS — I10 HYPERTENSION GOAL BP (BLOOD PRESSURE) < 140/90: Chronic | ICD-10-CM

## 2017-10-25 RX ORDER — AMLODIPINE BESYLATE 10 MG/1
TABLET ORAL
Qty: 90 TABLET | Refills: 1 | Status: SHIPPED | OUTPATIENT
Start: 2017-10-25 | End: 2017-11-28

## 2017-10-25 NOTE — TELEPHONE ENCOUNTER
Prescription approved per [facility] Refill Protocol.  Serenity Sher RN

## 2017-10-29 NOTE — PROGRESS NOTES
"                           ESTABLISHED PATIENT NEUROLOGY NOTE    LOCATION: [facility]  DATE OF VISIT: 10/24/2017    NAME: Mr.William MADELINE Keyes  : 1950 (66 year old)  MR #: [id number]    PRIMARY/REFERRING PROVIDER: Matt Swan PA-C    REASON FOR VISIT: Increased hand tremors    HISTORY OF PRESENT ILLNESS: 66-year-old man with hand tremors for more than 2 years.  He relates that hand tremors are present especially at night.  He'll also adds that there is difficulty writing a check.  No increase in tremors with brushing teeth.  Some difficulty with buttoning.  Using knife, fork and spoon is not a problem in the morning but noticeable during dinnertime.  He does not use shoes with laces and therefore would not be able to indicate difficulties if any. No problem with the dressing.    He has been using Depakote for suspected seizures.  Reviewing his old notes in indicates that he had tremors before he was started on Depakote.  Previously it was felt that his tremors are most likely essential tremor and also possible the Depakote may have increased tremors because this is one of the side effect of Depakote. He has been taking Abilify.    FAMILY HISTORY: No family history of tremors or Parkinson disease.    Allergies   Allergen Reactions     Accupril [Ace Inhibitors] Difficulty breathing     accupril causes SOB     Aptiom [Eslicarbazepine] Difficulty breathing     Atorvastatin Calcium      Myalgias from Lipitor     Contrast Dye Hives     Coreg [Carvedilol] Nausea and Fatigue     Depakote [Valproic Acid]      He denies being allergic to Depakote. Tremors can be side effect.        Nico Turner MD, MR[facility]  Neurologist       Lactose GI Disturbance     Lisinopril Difficulty breathing     SOB     Nitroglycerin      Headache     Paroxetine Hives     Tetracycline [Tetracyclines]      \"splitting headaches\"     Vimpat [Lacosamide] Difficulty breathing     Zoloft Rash     Zolpidem      Adhesive Tape Rash     Without " itching- EKG pads     Current Outpatient Prescriptions   Medication Sig     carbidopa-levodopa (SINEMET)  MG per tablet Week 1: 1/2 evening. Week 2: 1/2 two times/day. Week 3 and afterwards: 1/2 three times/day.     divalproex (DEPAKOTE) 500 MG EC tablet Take 1 tablet (500 mg) by mouth 2 times daily     potassium chloride SA (POTASSIUM CHLORIDE) 20 MEQ CR tablet Take 1 tablet (20 mEq) by mouth 3 times daily     amLODIPine (NORVASC) 10 MG tablet TAKE ONE TABLET BY MOUTH ONCE DAILY WITH  DINNER     losartan (COZAAR) 100 MG tablet Take 1 tablet (100 mg) by mouth daily     carvedilol (COREG) 12.5 MG tablet TAKE ONE TABLET BY MOUTH TWICE DAILY WITH MEALS     simvastatin (ZOCOR) 20 MG tablet TAKE TWO TABLETS BY MOUTH EVERY NIGHT AT BEDTIME     chlorthalidone (HYGROTON) 25 MG tablet Take 1.5 tablets (37.5 mg) by mouth daily     triamcinolone (KENALOG) 0.1 % cream Apply sparingly to affected area three times daily as needed     allopurinol (ZYLOPRIM) 100 MG tablet Take 2 tablets (200 mg) by mouth daily     levETIRAcetam (KEPPRA) 1000 MG TABS 1 tablet in morning and bed time.     omeprazole (PRILOSEC) 40 MG capsule Take 1 capsule (40 mg) by mouth daily Take 30-60 minutes before a meal.     TEMAZEPAM PO Take 15 mg by mouth At Bedtime     Cholecalciferol (VITAMIN D) 2000 UNITS tablet Take 2,000 Units by mouth daily     Cyanocobalamin (VITAMIN  B-12) 2500 MCG tablet Place 2,500 mcg under the tongue daily     ARIPiprazole (ABILIFY) 2 MG tablet Take 2 mg by mouth daily     ORDER FOR DME CPAP daily     Multiple Vitamin (MULTI VITAMIN MENS PO) Take  by mouth.     amLODIPine (NORVASC) 10 MG tablet TAKE ONE TABLET BY MOUTH ONCE DAILY WITH  DINNER.     aspirin 81 MG tablet Take 1 tablet (81 mg) by mouth daily (Patient not taking: Reported on 10/24/2017)     No current facility-administered medications for this visit.        Current Outpatient Prescriptions on File Prior to Visit:  potassium chloride SA (POTASSIUM CHLORIDE) 20  MEQ CR tablet Take 1 tablet (20 mEq) by mouth 3 times daily   amLODIPine (NORVASC) 10 MG tablet TAKE ONE TABLET BY MOUTH ONCE DAILY WITH  DINNER   losartan (COZAAR) 100 MG tablet Take 1 tablet (100 mg) by mouth daily   carvedilol (COREG) 12.5 MG tablet TAKE ONE TABLET BY MOUTH TWICE DAILY WITH MEALS   simvastatin (ZOCOR) 20 MG tablet TAKE TWO TABLETS BY MOUTH EVERY NIGHT AT BEDTIME   chlorthalidone (HYGROTON) 25 MG tablet Take 1.5 tablets (37.5 mg) by mouth daily   triamcinolone (KENALOG) 0.1 % cream Apply sparingly to affected area three times daily as needed   allopurinol (ZYLOPRIM) 100 MG tablet Take 2 tablets (200 mg) by mouth daily   levETIRAcetam (KEPPRA) 1000 MG TABS 1 tablet in morning and bed time.   omeprazole (PRILOSEC) 40 MG capsule Take 1 capsule (40 mg) by mouth daily Take 30-60 minutes before a meal.   TEMAZEPAM PO Take 15 mg by mouth At Bedtime   Cholecalciferol (VITAMIN D) 2000 UNITS tablet Take 2,000 Units by mouth daily   Cyanocobalamin (VITAMIN  B-12) 2500 MCG tablet Place 2,500 mcg under the tongue daily   ARIPiprazole (ABILIFY) 2 MG tablet Take 2 mg by mouth daily   ORDER FOR DME CPAP daily   Multiple Vitamin (MULTI VITAMIN MENS PO) Take  by mouth.   aspirin 81 MG tablet Take 1 tablet (81 mg) by mouth daily (Patient not taking: Reported on 10/24/2017)     No current facility-administered medications on file prior to visit.   Past Medical History:   Diagnosis Date     Calculus of kidney ~1975     Carpal tunnel syndrome 11/94     Concussion, unspecified 12/95     Eczema 11/16/2011     Epileptic seizure (H) 1995    diagnosed 1995, controlled with Depakote and Keppra     Fibromyalgia syndrome ~2000    per pt     Hypertension      Left testicle cyst      Photosensitive contact dermatitis      Prostatitis, unspecified      Seizures (H)     Diagnosed 1995, controlled with Depakote and Keppra     Umbilical hernia 11/26/2012     Unspecified asthma(493.90)      Past Surgical History:   Procedure  "Laterality Date     ANGIOGRAM  2003    Coronary Angiogram- negative     COLONOSCOPY WITH CO2 INSUFFLATION N/A 8/17/2017    Procedure: COLONOSCOPY WITH CO2 INSUFFLATION;  COLON SCREEN/ GEE;  Surgeon: Varun Bond MD;  Location: [facility] OR     HC REMOVAL TESTIS,SIMPLE  1978    Right undecended     HERNIA REPAIR, INGUINAL RT/LT  1963, 1978    Right Hernia     HERNIORRHAPHY UMBILICAL  4/2013    [place]     PERSONAL & SOCIAL HISTORY Reviewed and documented in [facility]    GENERAL EXAMINATION: /79 (BP Location: Left arm, Patient Position: Chair, Cuff Size: Adult Large)  Pulse 69  Ht 1.778 m (5' 10\")  Wt 110.7 kg (244 lb)  BMI 35.01 kg/m2    Limited Neurological Examination:  (Movement disorder examination):  Normal facial expression  No definite resting hand tremors  Tremors particularly affecting the left and while arms are stretched.  Finger tapping slower, more with the left than with the right.  Tremor assessment form scanned in Epic  Drawing of a spiral shows that he has more tremors on the left side than on the right.  Could not drawcompletely straight line with either hand. Handwriting shows that the letters are smaller in size.  Further review indicates that even in the previous tremor assessment he had smaller size letters and noted to have tremulousness while drawing the wavy lines.  Heel tapping fair bilaterally  Cogwheel rigidity at left wrist   Gait: Normal size steps while walking, noted to have decreased left arm swing.    IMPRESSION:   Encounter Diagnoses   Name Primary?     Parkinsonism, unspecified Parkinsonism type (H) Yes     Essential tremor      H/O idiopathic seizure      COMMENTS: My current feeling is that apart from essential tremor he has parkinsonian features.  As you know minority of patients can have features in combination for these 2 conditions.  It would be reasonable to start him on a trial dose of regular Sinemet 25/100, in smaller increments.  Dermatology referral " arranged because of the risk of melanoma in individuals with Parkinson disease and while on Sinemet.  Apart from Depakote he is on Abilify also for more than one year.  As you know that Abilify is also known to have tremors and extrapyramidal side effects.  I do not think we should interfere with any of the medications prescribed by mental health's specialist as is doing well from that point of view.     PLANS:   Patient Instructions     AFTER VISIT SUMMARY (AVS)  Orders Placed This Encounter   Procedures     DERMATOLOGY REFERRAL     Orders Placed This Encounter   Medications     carbidopa-levodopa (SINEMET)  MG per tablet     Sig: Week 1: 1/2 evening. Week 2: 1/2 two times/day. Week 3 and afterwards: 1/2 three times/day.     Dispense:  135 tablet     Refill:  1     Needs office visit before next refill.     divalproex (DEPAKOTE) 500 MG EC tablet     Sig: Take 1 tablet (500 mg) by mouth 2 times daily     Dispense:  180 tablet     Refill:  1     Diagnostic possibilities reviewed and reasons for work-up explained    Preventive Neurology: Encouraged to keep physically and mentally active with particular emphasis on daily stretching exercises, walking, and healthy eating.     Educational material on Parkinson's disease and essential tremor provided to him    Not keen for [facility] at present.     To call us for follow-up appointment in next 6 week(s) or earlier if needed.    Thanks to Matt Swan PA-C for allowing me to participate in Mr. Keyes's care. Please feel free to call me with any questions or concerns.     Total Time: Time with patient 40 minutes, greater than 50% of which was counseling and coordination of care.  *Chart documentation was completed in part with Dragon voice-recognition software. Even though reviewed, some grammatical, spelling, and word errors may remain.         Nico Turner MD, MRCPI  Neurologist    Cc: Matt Swan PA-C

## 2017-11-21 DIAGNOSIS — I10 ESSENTIAL HYPERTENSION: Primary | ICD-10-CM

## 2017-11-21 NOTE — TELEPHONE ENCOUNTER
Pt called into clinic regarding his lasix prescription. States he last had it refilled over a year ago and when he went to have it refilled at his pharmacy, they said they do not have an order.    Reviewed chart and found that PCP d/c'ed it for no particular reason back in 2015. Pt recently saw Dr. Quintanilla so reviewed chart and pt's request with her.     Per Dr. Quintanilla, pt needs a BMP prior to her giving a refill as his potassium runs on the low end. Pt verbalized understanding of request and scheduled for lab draw on 11-28 at 3:45 in [place].     Will f/u with pt after results are posted to see if refill can be given.    Randi Fernandez RN

## 2017-11-28 ENCOUNTER — OFFICE VISIT (OUTPATIENT)
Dept: NEUROLOGY | Facility: CLINIC | Age: 67
End: 2017-11-28
Payer: MEDICARE

## 2017-11-28 VITALS
SYSTOLIC BLOOD PRESSURE: 134 MMHG | HEIGHT: 70 IN | HEART RATE: 88 BPM | BODY MASS INDEX: 34.84 KG/M2 | WEIGHT: 243.4 LBS | DIASTOLIC BLOOD PRESSURE: 74 MMHG

## 2017-11-28 DIAGNOSIS — I10 ESSENTIAL HYPERTENSION: ICD-10-CM

## 2017-11-28 DIAGNOSIS — G20.C PARKINSONISM, UNSPECIFIED PARKINSONISM TYPE (H): Primary | ICD-10-CM

## 2017-11-28 DIAGNOSIS — G25.0 ESSENTIAL TREMOR: ICD-10-CM

## 2017-11-28 LAB
ANION GAP SERPL CALCULATED.3IONS-SCNC: 6 MMOL/L (ref 3–14)
BUN SERPL-MCNC: 16 MG/DL (ref 7–30)
CALCIUM SERPL-MCNC: 9.3 MG/DL (ref 8.5–10.1)
CHLORIDE SERPL-SCNC: 99 MMOL/L (ref 94–109)
CO2 SERPL-SCNC: 33 MMOL/L (ref 20–32)
CREAT SERPL-MCNC: 1.08 MG/DL (ref 0.66–1.25)
GFR SERPL CREATININE-BSD FRML MDRD: 68 ML/MIN/1.7M2
GLUCOSE SERPL-MCNC: 84 MG/DL (ref 70–99)
POTASSIUM SERPL-SCNC: 3.2 MMOL/L (ref 3.4–5.3)
SODIUM SERPL-SCNC: 138 MMOL/L (ref 133–144)

## 2017-11-28 PROCEDURE — 99213 OFFICE O/P EST LOW 20 MIN: CPT | Performed by: PSYCHIATRY & NEUROLOGY

## 2017-11-28 PROCEDURE — 80048 BASIC METABOLIC PNL TOTAL CA: CPT | Performed by: INTERNAL MEDICINE

## 2017-11-28 PROCEDURE — 36415 COLL VENOUS BLD VENIPUNCTURE: CPT | Performed by: INTERNAL MEDICINE

## 2017-11-28 RX ORDER — CARBIDOPA AND LEVODOPA 25; 100 MG/1; MG/1
1 TABLET ORAL 3 TIMES DAILY
Qty: 270 TABLET | Refills: 1 | Status: SHIPPED | OUTPATIENT
Start: 2017-12-27 | End: 2018-01-29

## 2017-11-28 NOTE — PATIENT INSTRUCTIONS
AFTER VISIT SUMMARY (AVS)    Signed Prescriptions:                        Disp   Refills    carbidopa-levodopa (SINEMET)  MG per*270 ta*1        Sig: Take 1 tablet by mouth 3 times daily  Authorizing Provider: MICHELLE WANG    Reminded to see skin specialist for cancer screening    Diagnostic possibilities reviewed and reasons for work-up explained    Preventive Neurology: Encouraged to keep physically and mentally active with particular emphasis on daily stretching exercises, walking, and healthy eating.    Explained to *him that I am leaving [facility] and therefore advised to coordinate neurological care with Matt Swan PA-C. Reluctant to see  [facility] movement disorder specialist at present.

## 2017-11-28 NOTE — LETTER
"    2017         RE: Prashant Keyes  [address]  [place] [address]        Dear Colleague,    Thank you for referring your patient, Prashant Keyes, to the [facility]. Please see a copy of my visit note below.                               ESTABLISHED PATIENT NEUROLOGY NOTE    LOCATION: [facility]  DATE OF VISIT: 2017    NAME: Mr.William MADELINE Keyes  : 1950 (66 year old)  MR #: [id number]    PRIMARY/REFERRING PROVIDER: Matt Swan PA-C    REASON FOR VISIT: Sinemet medication review    HISTORY OF PRESENT ILLNESS: 66-year-old man with the Parkinsonism and essential tremor. Last time he was started on regular Sinemet 25/100 to a target dose of one half tablet 3 times a day. He relates that this small dose has not made any impact on his tremors. No side effects reported with Sinemet. He has been compliant with medication.  He has not seen skin specialist for cancer screening as recommended previously.    Allergies   Allergen Reactions     Accupril [Ace Inhibitors] Difficulty breathing     accupril causes SOB     Aptiom [Eslicarbazepine] Difficulty breathing     Atorvastatin Calcium      Myalgias from Lipitor     Contrast Dye Hives     Coreg [Carvedilol] Nausea and Fatigue     Depakote [Valproic Acid]      He denies being allergic to Depakote. Tremors can be side effect.        Nico Turner MD, [facility]  Neurologist       Lactose GI Disturbance     Lisinopril Difficulty breathing     SOB     Nitroglycerin      Headache     Paroxetine Hives     Tetracycline [Tetracyclines]      \"splitting headaches\"     Vimpat [Lacosamide] Difficulty breathing     Zoloft Rash     Zolpidem      Adhesive Tape Rash     Without itching- EKG pads     Current Outpatient Prescriptions   Medication Sig     [START ON 2017] carbidopa-levodopa (SINEMET)  MG per tablet Take 1 tablet by mouth 3 times daily     divalproex (DEPAKOTE) 500 MG EC tablet Take 1 tablet (500 mg) by mouth 2 times " daily     potassium chloride SA (POTASSIUM CHLORIDE) 20 MEQ CR tablet Take 1 tablet (20 mEq) by mouth 3 times daily     amLODIPine (NORVASC) 10 MG tablet TAKE ONE TABLET BY MOUTH ONCE DAILY WITH  DINNER     losartan (COZAAR) 100 MG tablet Take 1 tablet (100 mg) by mouth daily     carvedilol (COREG) 12.5 MG tablet TAKE ONE TABLET BY MOUTH TWICE DAILY WITH MEALS     simvastatin (ZOCOR) 20 MG tablet TAKE TWO TABLETS BY MOUTH EVERY NIGHT AT BEDTIME     chlorthalidone (HYGROTON) 25 MG tablet Take 1.5 tablets (37.5 mg) by mouth daily     triamcinolone (KENALOG) 0.1 % cream Apply sparingly to affected area three times daily as needed     allopurinol (ZYLOPRIM) 100 MG tablet Take 2 tablets (200 mg) by mouth daily     levETIRAcetam (KEPPRA) 1000 MG TABS 1 tablet in morning and bed time.     omeprazole (PRILOSEC) 40 MG capsule Take 1 capsule (40 mg) by mouth daily Take 30-60 minutes before a meal.     TEMAZEPAM PO Take 15 mg by mouth At Bedtime     Cyanocobalamin (VITAMIN  B-12) 2500 MCG tablet Place 2,500 mcg under the tongue daily     aspirin 81 MG tablet Take 1 tablet (81 mg) by mouth daily     ARIPiprazole (ABILIFY) 2 MG tablet Take 2 mg by mouth daily     ORDER FOR DME CPAP daily     Multiple Vitamin (MULTI VITAMIN MENS PO) Take  by mouth.     [DISCONTINUED] amLODIPine (NORVASC) 10 MG tablet TAKE ONE TABLET BY MOUTH ONCE DAILY WITH  DINNER.     [DISCONTINUED] carbidopa-levodopa (SINEMET)  MG per tablet Week 1: 1/2 evening. Week 2: 1/2 two times/day. Week 3 and afterwards: 1/2 three times/day.     Cholecalciferol (VITAMIN D) 2000 UNITS tablet Take 2,000 Units by mouth daily     No current facility-administered medications for this visit.        Current Outpatient Prescriptions on File Prior to Visit:  divalproex (DEPAKOTE) 500 MG EC tablet Take 1 tablet (500 mg) by mouth 2 times daily   potassium chloride SA (POTASSIUM CHLORIDE) 20 MEQ CR tablet Take 1 tablet (20 mEq) by mouth 3 times daily   amLODIPine (NORVASC) 10  MG tablet TAKE ONE TABLET BY MOUTH ONCE DAILY WITH  DINNER   losartan (COZAAR) 100 MG tablet Take 1 tablet (100 mg) by mouth daily   carvedilol (COREG) 12.5 MG tablet TAKE ONE TABLET BY MOUTH TWICE DAILY WITH MEALS   simvastatin (ZOCOR) 20 MG tablet TAKE TWO TABLETS BY MOUTH EVERY NIGHT AT BEDTIME   chlorthalidone (HYGROTON) 25 MG tablet Take 1.5 tablets (37.5 mg) by mouth daily   triamcinolone (KENALOG) 0.1 % cream Apply sparingly to affected area three times daily as needed   allopurinol (ZYLOPRIM) 100 MG tablet Take 2 tablets (200 mg) by mouth daily   levETIRAcetam (KEPPRA) 1000 MG TABS 1 tablet in morning and bed time.   omeprazole (PRILOSEC) 40 MG capsule Take 1 capsule (40 mg) by mouth daily Take 30-60 minutes before a meal.   TEMAZEPAM PO Take 15 mg by mouth At Bedtime   Cyanocobalamin (VITAMIN  B-12) 2500 MCG tablet Place 2,500 mcg under the tongue daily   aspirin 81 MG tablet Take 1 tablet (81 mg) by mouth daily   ARIPiprazole (ABILIFY) 2 MG tablet Take 2 mg by mouth daily   ORDER FOR DME CPAP daily   Multiple Vitamin (MULTI VITAMIN MENS PO) Take  by mouth.   [DISCONTINUED] amLODIPine (NORVASC) 10 MG tablet TAKE ONE TABLET BY MOUTH ONCE DAILY WITH  DINNER.   [DISCONTINUED] carbidopa-levodopa (SINEMET)  MG per tablet Week 1: 1/2 evening. Week 2: 1/2 two times/day. Week 3 and afterwards: 1/2 three times/day.   Cholecalciferol (VITAMIN D) 2000 UNITS tablet Take 2,000 Units by mouth daily     No current facility-administered medications on file prior to visit.   Past Medical History:   Diagnosis Date     Calculus of kidney ~1975     Carpal tunnel syndrome 11/94     Concussion, unspecified 12/95     Eczema 11/16/2011     Epileptic seizure (H) 1995    diagnosed 1995, controlled with Depakote and Keppra     Fibromyalgia syndrome ~2000    per pt     Hypertension      Left testicle cyst      Photosensitive contact dermatitis      Prostatitis, unspecified      Seizures (H)     Diagnosed 1995, controlled with  "Depakote and Keppra     Umbilical hernia 11/26/2012     Unspecified asthma(493.90)      Past Surgical History:   Procedure Laterality Date     ANGIOGRAM  2003    Coronary Angiogram- negative     COLONOSCOPY WITH CO2 INSUFFLATION N/A 8/17/2017    Procedure: COLONOSCOPY WITH CO2 INSUFFLATION;  COLON SCREEN/ GEE;  Surgeon: Varun Bond MD;  Location: [facility] OR     HC REMOVAL TESTIS,SIMPLE  1978    Right undecended     HERNIA REPAIR, INGUINAL RT/LT  1963, 1978    Right Hernia     HERNIORRHAPHY UMBILICAL  4/2013    [place]     PERSONAL & SOCIAL HISTORY Reviewed and documented in [facility]    GENERAL EXAMINATION: /74 (BP Location: Left arm, Patient Position: Chair, Cuff Size: Adult Large)  Pulse 88  Ht 1.778 m (5' 10\")  Wt 110.4 kg (243 lb 6.4 oz)  BMI 34.92 kg/m2    IMPRESSION:   Encounter Diagnoses   Name Primary?     Parkinsonism, unspecified Parkinsonism type (H) Yes     Essential tremor      COMMENTS: Hopefully he will benefit with increased dose of Sinemet. One could increase his Sinemet up to 600 mg per day in smaller increments if needed.  PLANS:   Patient Instructions   AFTER VISIT SUMMARY (AVS)    Signed Prescriptions:                        Disp   Refills    carbidopa-levodopa (SINEMET)  MG per*270 ta*1        Sig: Take 1 tablet by mouth 3 times daily  Authorizing Provider: MICHELLE WANG    Reminded to see skin specialist for cancer screening    Diagnostic possibilities reviewed and reasons for work-up explained    Preventive Neurology: Encouraged to keep physically and mentally active with particular emphasis on daily stretching exercises, walking, and healthy eating.    Explained to *him that I am leaving [facility] and therefore advised to coordinate neurological care with Matt Swan PA-C. Reluctant to see  [facility] movement disorder specialist at present.       Thanks to Matt Swan PA-C for allowing me to participate in Mr. Keyes's care. Please feel free to call " me with any questions or concerns.     *Chart documentation was completed in part with Dragon voice-recognition software. Even though reviewed, some grammatical, spelling, and word errors may remain.     Nico Turenr MD, [facility]  Neurologist    Cc: Matt Swan PA-C          Again, thank you for allowing me to participate in the care of your patient.        Sincerely,        Nico Turner MD

## 2017-11-28 NOTE — MR AVS SNAPSHOT
After Visit Summary   11/28/2017    Prashant Keyes    MRN: [id number]           Patient Information     Date Of Birth          1950        Visit Information        Provider Department      11/28/2017 4:00 PM Nico Wang MD [facility]        Today's Diagnoses     Parkinsonism, unspecified Parkinsonism type (H)    -  1    Essential tremor          Care Instructions    AFTER VISIT SUMMARY (AVS)    Signed Prescriptions:                        Disp   Refills    carbidopa-levodopa (SINEMET)  MG per*270 ta*1        Sig: Take 1 tablet by mouth 3 times daily  Authorizing Provider: NICO WANG    Reminded to see skin specialist for cancer screening    Diagnostic possibilities reviewed and reasons for work-up explained    Preventive Neurology: Encouraged to keep physically and mentally active with particular emphasis on daily stretching exercises, walking, and healthy eating.    Explained to *him that I am leaving [facility] and therefore advised to coordinate neurological care with Matt Swan PA-C. Reluctant to see  [facility] movement disorder specialist at present.                             Follow-ups after your visit        Follow-up notes from your care team     Return for Mhqcie-ak-AYL.      Who to contact     If you have questions or need follow up information about today's clinic visit or your schedule please contact [facility] directly at [phone].  Normal or non-critical lab and imaging results will be communicated to you by MyChart, letter or phone within 4 business days after the clinic has received the results. If you do not hear from us within 7 days, please contact the clinic through MyChart or phone. If you have a critical or abnormal lab result, we will notify you by phone as soon as possible.  Submit refill requests through [employer] or call your pharmacy and they will forward the refill request to us. Please allow 3 business days  "for your refill to be completed.          Additional Information About Your Visit        MyChart Information     [employer] gives you secure access to your electronic health record. If you see a primary care provider, you can also send messages to your care team and make appointments. If you have questions, please call your primary care clinic.  If you do not have a primary care provider, please call [phone] and they will assist you.        Care EveryWhere ID     This is your Care EveryWhere ID. This could be used by other organizations to access your [place] medical records  [id number]        Your Vitals Were     Pulse Height BMI (Body Mass Index)             88 1.778 m (5' 10\") 34.92 kg/m2          Blood Pressure from Last 3 Encounters:   11/28/17 134/74   10/24/17 137/79   10/04/17 147/83    Weight from Last 3 Encounters:   11/28/17 110.4 kg (243 lb 6.4 oz)   10/24/17 110.7 kg (244 lb)   07/25/17 116.1 kg (256 lb)              Today, you had the following     No orders found for display         Today's Medication Changes          These changes are accurate as of: 11/28/17  5:21 PM.  If you have any questions, ask your nurse or doctor.               These medicines have changed or have updated prescriptions.        Dose/Directions    carbidopa-levodopa  MG per tablet   Commonly known as:  SINEMET   This may have changed:    - how much to take  - how to take this  - when to take this  - additional instructions   Used for:  Parkinsonism, unspecified Parkinsonism type (H)   Changed by:  Nico Turner MD        Dose:  1 tablet   Start taking on:  12/27/2017   Take 1 tablet by mouth 3 times daily   Quantity:  270 tablet   Refills:  1            Where to get your medicines      These medications were sent to [facility]  [place] - [address]  [address] [address]     Phone:  [phone]     carbidopa-levodopa  MG per tablet                " Primary Care Provider Office Phone # Fax #    Matt Swan PA-C [phone]       [address] [facility] [address]        Equal Access to Services     NETTIE ARRINGTON : Hadii aad ku hadasiao Soomaali, waaxda luqadaha, qaybta kaalmada adeegyada, waxsantosh idiin aiden alverto jang laClementsilvestre hartmann. So [facility] [phone].    ATENCIÓN: Si habla español, tiene a velasquez disposición servicios gratuitos de asistencia lingüística. [facility] [phone].    We comply with applicable federal civil rights laws and [place] laws. We do not discriminate on the basis of race, color, national origin, age, disability, sex, sexual orientation, or gender identity.            Thank you!     Thank you for choosing [facility]  for your care. Our goal is always to provide you with excellent care. Hearing back from our patients is one way we can continue to improve our services. Please take a few minutes to complete the written survey that you may receive in the mail after your visit with us. Thank you!             Your Updated Medication List - Protect others around you: Learn how to safely use, store and throw away your medicines at www.disposemymeds.org.          This list is accurate as of: 11/28/17  5:21 PM.  Always use your most recent med list.                   Brand Name Dispense Instructions for use Diagnosis    ABILIFY 2 MG tablet   Generic drug:  ARIPiprazole      Take 2 mg by mouth daily        allopurinol 100 MG tablet    ZYLOPRIM    180 tablet    Take 2 tablets (200 mg) by mouth daily    Acute idiopathic gout of right ankle       amLODIPine 10 MG tablet    NORVASC    90 tablet    TAKE ONE TABLET BY MOUTH ONCE DAILY WITH  DINNER    Hypertension goal BP (blood pressure) < 140/90       aspirin 81 MG tablet     30 tablet    Take 1 tablet (81 mg) by mouth daily    Essential hypertension       carbidopa-levodopa  MG per tablet   Start taking on:  12/27/2017    SINEMET    270 tablet    Take 1 tablet by  mouth 3 times daily    Parkinsonism, unspecified Parkinsonism type (H)       carvedilol 12.5 MG tablet    COREG    180 tablet    TAKE ONE TABLET BY MOUTH TWICE DAILY WITH MEALS    Hypertension goal BP (blood pressure) < 140/90       chlorthalidone 25 MG tablet    HYGROTON    135 tablet    Take 1.5 tablets (37.5 mg) by mouth daily    Benign essential hypertension       cyabnocobalamin 2500 MCG sublingual tablet    VITAMIN B-12    90 tablet    Place 2,500 mcg under the tongue daily    Vitamin B 12 deficiency       divalproex 500 MG EC tablet    DEPAKOTE    180 tablet    Take 1 tablet (500 mg) by mouth 2 times daily    H/O idiopathic seizure       levETIRAcetam 1000 MG Tabs    KEPPRA    180 tablet    1 tablet in morning and bed time.    Spells       losartan 100 MG tablet    COZAAR    90 tablet    Take 1 tablet (100 mg) by mouth daily    Essential hypertension       MULTI VITAMIN MENS PO      Take  by mouth.        omeprazole 40 MG capsule    priLOSEC    30 capsule    Take 1 capsule (40 mg) by mouth daily Take 30-60 minutes before a meal.    Gastroesophageal reflux disease, esophagitis presence not specified       order for DME      CPAP daily        potassium chloride SA 20 MEQ CR tablet    KLOR-CON    270 tablet    Take 1 tablet (20 mEq) by mouth 3 times daily    Hypokalemia       simvastatin 20 MG tablet    ZOCOR    180 tablet    TAKE TWO TABLETS BY MOUTH EVERY NIGHT AT BEDTIME    Pure hypercholesterolemia       TEMAZEPAM PO      Take 15 mg by mouth At Bedtime        triamcinolone 0.1 % cream    KENALOG    80 g    Apply sparingly to affected area three times daily as needed    Eczema, unspecified type       vitamin D 2000 UNITS tablet     90 tablet    Take 2,000 Units by mouth daily    Vitamin D deficiency

## 2017-11-28 NOTE — NURSING NOTE
"Chief Complaint   Patient presents with     RECHECK     Medication       Initial /74 (BP Location: Left arm, Patient Position: Chair, Cuff Size: Adult Large)  Pulse 88  Ht 1.778 m (5' 10\")  Wt 110.4 kg (243 lb 6.4 oz)  BMI 34.92 kg/m2 Estimated body mass index is 34.92 kg/(m^2) as calculated from the following:    Height as of this encounter: 1.778 m (5' 10\").    Weight as of this encounter: 110.4 kg (243 lb 6.4 oz).  Medication Reconciliation: complete   Kia Gregorio MA      "

## 2017-11-29 NOTE — PROGRESS NOTES
"                           ESTABLISHED PATIENT NEUROLOGY NOTE    LOCATION: [facility]  DATE OF VISIT: 2017    NAME: Mr.William MADELINE Keyes  : 1950 (66 year old)  MR #: [id number]    PRIMARY/REFERRING PROVIDER: Matt Swan PA-C    REASON FOR VISIT: Sinemet medication review    HISTORY OF PRESENT ILLNESS: 66-year-old man with the Parkinsonism and essential tremor. Last time he was started on regular Sinemet 25/100 to a target dose of one half tablet 3 times a day. He relates that this small dose has not made any impact on his tremors. No side effects reported with Sinemet. He has been compliant with medication.  He has not seen skin specialist for cancer screening as recommended previously.    Allergies   Allergen Reactions     Accupril [Ace Inhibitors] Difficulty breathing     accupril causes SOB     Aptiom [Eslicarbazepine] Difficulty breathing     Atorvastatin Calcium      Myalgias from Lipitor     Contrast Dye Hives     Coreg [Carvedilol] Nausea and Fatigue     Depakote [Valproic Acid]      He denies being allergic to Depakote. Tremors can be side effect.        Nico Turner MD, MRCPI  Neurologist       Lactose GI Disturbance     Lisinopril Difficulty breathing     SOB     Nitroglycerin      Headache     Paroxetine Hives     Tetracycline [Tetracyclines]      \"splitting headaches\"     Vimpat [Lacosamide] Difficulty breathing     Zoloft Rash     Zolpidem      Adhesive Tape Rash     Without itching- EKG pads     Current Outpatient Prescriptions   Medication Sig     [START ON 2017] carbidopa-levodopa (SINEMET)  MG per tablet Take 1 tablet by mouth 3 times daily     divalproex (DEPAKOTE) 500 MG EC tablet Take 1 tablet (500 mg) by mouth 2 times daily     potassium chloride SA (POTASSIUM CHLORIDE) 20 MEQ CR tablet Take 1 tablet (20 mEq) by mouth 3 times daily     amLODIPine (NORVASC) 10 MG tablet TAKE ONE TABLET BY MOUTH ONCE DAILY WITH  DINNER     losartan (COZAAR) 100 MG tablet Take 1 " tablet (100 mg) by mouth daily     carvedilol (COREG) 12.5 MG tablet TAKE ONE TABLET BY MOUTH TWICE DAILY WITH MEALS     simvastatin (ZOCOR) 20 MG tablet TAKE TWO TABLETS BY MOUTH EVERY NIGHT AT BEDTIME     chlorthalidone (HYGROTON) 25 MG tablet Take 1.5 tablets (37.5 mg) by mouth daily     triamcinolone (KENALOG) 0.1 % cream Apply sparingly to affected area three times daily as needed     allopurinol (ZYLOPRIM) 100 MG tablet Take 2 tablets (200 mg) by mouth daily     levETIRAcetam (KEPPRA) 1000 MG TABS 1 tablet in morning and bed time.     omeprazole (PRILOSEC) 40 MG capsule Take 1 capsule (40 mg) by mouth daily Take 30-60 minutes before a meal.     TEMAZEPAM PO Take 15 mg by mouth At Bedtime     Cyanocobalamin (VITAMIN  B-12) 2500 MCG tablet Place 2,500 mcg under the tongue daily     aspirin 81 MG tablet Take 1 tablet (81 mg) by mouth daily     ARIPiprazole (ABILIFY) 2 MG tablet Take 2 mg by mouth daily     ORDER FOR DME CPAP daily     Multiple Vitamin (MULTI VITAMIN MENS PO) Take  by mouth.     [DISCONTINUED] amLODIPine (NORVASC) 10 MG tablet TAKE ONE TABLET BY MOUTH ONCE DAILY WITH  DINNER.     [DISCONTINUED] carbidopa-levodopa (SINEMET)  MG per tablet Week 1: 1/2 evening. Week 2: 1/2 two times/day. Week 3 and afterwards: 1/2 three times/day.     Cholecalciferol (VITAMIN D) 2000 UNITS tablet Take 2,000 Units by mouth daily     No current facility-administered medications for this visit.        Current Outpatient Prescriptions on File Prior to Visit:  divalproex (DEPAKOTE) 500 MG EC tablet Take 1 tablet (500 mg) by mouth 2 times daily   potassium chloride SA (POTASSIUM CHLORIDE) 20 MEQ CR tablet Take 1 tablet (20 mEq) by mouth 3 times daily   amLODIPine (NORVASC) 10 MG tablet TAKE ONE TABLET BY MOUTH ONCE DAILY WITH  DINNER   losartan (COZAAR) 100 MG tablet Take 1 tablet (100 mg) by mouth daily   carvedilol (COREG) 12.5 MG tablet TAKE ONE TABLET BY MOUTH TWICE DAILY WITH MEALS   simvastatin (ZOCOR) 20 MG  tablet TAKE TWO TABLETS BY MOUTH EVERY NIGHT AT BEDTIME   chlorthalidone (HYGROTON) 25 MG tablet Take 1.5 tablets (37.5 mg) by mouth daily   triamcinolone (KENALOG) 0.1 % cream Apply sparingly to affected area three times daily as needed   allopurinol (ZYLOPRIM) 100 MG tablet Take 2 tablets (200 mg) by mouth daily   levETIRAcetam (KEPPRA) 1000 MG TABS 1 tablet in morning and bed time.   omeprazole (PRILOSEC) 40 MG capsule Take 1 capsule (40 mg) by mouth daily Take 30-60 minutes before a meal.   TEMAZEPAM PO Take 15 mg by mouth At Bedtime   Cyanocobalamin (VITAMIN  B-12) 2500 MCG tablet Place 2,500 mcg under the tongue daily   aspirin 81 MG tablet Take 1 tablet (81 mg) by mouth daily   ARIPiprazole (ABILIFY) 2 MG tablet Take 2 mg by mouth daily   ORDER FOR DME CPAP daily   Multiple Vitamin (MULTI VITAMIN MENS PO) Take  by mouth.   [DISCONTINUED] amLODIPine (NORVASC) 10 MG tablet TAKE ONE TABLET BY MOUTH ONCE DAILY WITH  DINNER.   [DISCONTINUED] carbidopa-levodopa (SINEMET)  MG per tablet Week 1: 1/2 evening. Week 2: 1/2 two times/day. Week 3 and afterwards: 1/2 three times/day.   Cholecalciferol (VITAMIN D) 2000 UNITS tablet Take 2,000 Units by mouth daily     No current facility-administered medications on file prior to visit.   Past Medical History:   Diagnosis Date     Calculus of kidney ~1975     Carpal tunnel syndrome 11/94     Concussion, unspecified 12/95     Eczema 11/16/2011     Epileptic seizure (H) 1995    diagnosed 1995, controlled with Depakote and Keppra     Fibromyalgia syndrome ~2000    per pt     Hypertension      Left testicle cyst      Photosensitive contact dermatitis      Prostatitis, unspecified      Seizures (H)     Diagnosed 1995, controlled with Depakote and Keppra     Umbilical hernia 11/26/2012     Unspecified asthma(493.90)      Past Surgical History:   Procedure Laterality Date     ANGIOGRAM  2003    Coronary Angiogram- negative     COLONOSCOPY WITH CO2 INSUFFLATION N/A 8/17/2017     "Procedure: COLONOSCOPY WITH CO2 INSUFFLATION;  COLON SCREEN/ GEE;  Surgeon: Varun Bond MD;  Location: [facility] OR     HC REMOVAL TESTIS,SIMPLE  1978    Right undecended     HERNIA REPAIR, INGUINAL RT/LT  1963, 1978    Right Hernia     HERNIORRHAPHY UMBILICAL  4/2013    [place]     PERSONAL & SOCIAL HISTORY Reviewed and documented in [facility]    GENERAL EXAMINATION: /74 (BP Location: Left arm, Patient Position: Chair, Cuff Size: Adult Large)  Pulse 88  Ht 1.778 m (5' 10\")  Wt 110.4 kg (243 lb 6.4 oz)  BMI 34.92 kg/m2    IMPRESSION:   Encounter Diagnoses   Name Primary?     Parkinsonism, unspecified Parkinsonism type (H) Yes     Essential tremor      COMMENTS: Hopefully he will benefit with increased dose of Sinemet. One could increase his Sinemet up to 600 mg per day in smaller increments if needed.  PLANS:   Patient Instructions   AFTER VISIT SUMMARY (AVS)    Signed Prescriptions:                        Disp   Refills    carbidopa-levodopa (SINEMET)  MG per*270 ta*1        Sig: Take 1 tablet by mouth 3 times daily  Authorizing Provider: NICO WANG    Reminded to see skin specialist for cancer screening    Diagnostic possibilities reviewed and reasons for work-up explained    Preventive Neurology: Encouraged to keep physically and mentally active with particular emphasis on daily stretching exercises, walking, and healthy eating.    Explained to *him that I am leaving [facility] and therefore advised to coordinate neurological care with Matt Swan PA-C. Reluctant to see  [facility] movement disorder specialist at present.       Thanks to Matt Swan PA-C for allowing me to participate in Mr. Keyes's care. Please feel free to call me with any questions or concerns.     *Chart documentation was completed in part with Dragon voice-recognition software. Even though reviewed, some grammatical, spelling, and word errors may remain.     Nico Wang MD, " MRCPI  Neurologist    Cc: Matt Swan PA-C

## 2017-11-30 DIAGNOSIS — I10 HYPERTENSION GOAL BP (BLOOD PRESSURE) < 140/90: Primary | ICD-10-CM

## 2017-11-30 RX ORDER — FUROSEMIDE 20 MG
40 TABLET ORAL PRN
Qty: 30 TABLET | Refills: 3 | Status: SHIPPED | OUTPATIENT
Start: 2017-11-30 | End: 2018-01-29

## 2017-11-30 NOTE — PATIENT INSTRUCTIONS
Spoke to pt about Dr. Quintanilla's new order to decrease Chlorithaldone to 1 tablet per day and take BP's for 2 weeks and then touch base with clinic to see how things are doing. Pt verbalized understanding of new orders. Lab draw for K needed in 1 month as well. Pt wants to wait until 2 weeks from now to schedule this due to not being by his calendar currently.     Randi Fernandez RN

## 2017-12-14 ENCOUNTER — TELEPHONE (OUTPATIENT)
Dept: CARDIOLOGY | Facility: CLINIC | Age: 67
End: 2017-12-14

## 2017-12-21 DIAGNOSIS — I10 HYPERTENSION GOAL BP (BLOOD PRESSURE) < 140/90: ICD-10-CM

## 2017-12-21 LAB — POTASSIUM SERPL-SCNC: 3.9 MMOL/L (ref 3.4–5.3)

## 2017-12-21 PROCEDURE — 36415 COLL VENOUS BLD VENIPUNCTURE: CPT | Performed by: INTERNAL MEDICINE

## 2017-12-21 PROCEDURE — 84132 ASSAY OF SERUM POTASSIUM: CPT | Performed by: INTERNAL MEDICINE

## 2017-12-24 DIAGNOSIS — E78.00 PURE HYPERCHOLESTEROLEMIA: ICD-10-CM

## 2017-12-26 RX ORDER — SIMVASTATIN 20 MG
TABLET ORAL
Qty: 180 TABLET | Refills: 1 | Status: SHIPPED | OUTPATIENT
Start: 2017-12-26 | End: 2018-01-29

## 2018-01-07 DIAGNOSIS — I10 HYPERTENSION GOAL BP (BLOOD PRESSURE) < 140/90: Chronic | ICD-10-CM

## 2018-01-08 NOTE — TELEPHONE ENCOUNTER
Last Written Prescription Date:  10-11-17  Last Fill Quantity: 180,  # refills: 1   Last Office Visit with G, P or [facility] prescribing provider:  7-25-17   Future Office Visit:

## 2018-01-09 RX ORDER — CARVEDILOL 12.5 MG/1
TABLET ORAL
Qty: 180 TABLET | Refills: 1 | Status: SHIPPED | OUTPATIENT
Start: 2018-01-09 | End: 2018-01-29

## 2018-01-09 NOTE — TELEPHONE ENCOUNTER
Prescription approved per [facility] Refill Protocol.  Serenity Sher RN

## 2018-01-18 DIAGNOSIS — I10 ESSENTIAL HYPERTENSION: ICD-10-CM

## 2018-01-19 RX ORDER — LOSARTAN POTASSIUM 100 MG/1
TABLET ORAL
Qty: 90 TABLET | Refills: 1 | Status: SHIPPED | OUTPATIENT
Start: 2018-01-19 | End: 2018-01-29

## 2018-01-19 NOTE — TELEPHONE ENCOUNTER
"Requested Prescriptions   Pending Prescriptions Disp Refills     losartan (COZAAR) 100 MG tablet [Pharmacy Med Name: LOSARTAN 100MG   TAB] 90 tablet 1    Last Written Prescription Date:  10/21/17  Last Fill Quantity: 90,  # refills: 1   Last Office Visit:  07/25/17  Future Office Visit: none    Sig: TAKE ONE TABLET BY MOUTH ONCE DAILY    Angiotensin-II Receptors Passed    1/18/2018  8:26 PM       Passed - Blood pressure under 140/90 in past 12 months.    BP Readings from Last 3 Encounters:   11/28/17 134/74   10/24/17 137/79   10/04/17 147/83                Passed - Recent or future visit with authorizing provider's specialty    Patient had office visit in the last year or has a visit in the next 30 days with authorizing provider.  See \"Patient Info\" tab in inbasket, or \"Choose Columns\" in Meds & Orders section of the refill encounter.            Passed - Patient is age 18 or older       Passed - Normal serum creatinine on file in past 12 months    Recent Labs   Lab Test  11/28/17   1545   CR  1.08            Passed - Normal serum potassium on file in past 12 months    Recent Labs   Lab Test  12/21/17   0725   POTASSIUM  3.9                      "

## 2018-01-29 ENCOUNTER — OFFICE VISIT (OUTPATIENT)
Dept: FAMILY MEDICINE | Facility: CLINIC | Age: 68
End: 2018-01-29
Payer: MEDICARE

## 2018-01-29 VITALS
OXYGEN SATURATION: 97 % | SYSTOLIC BLOOD PRESSURE: 118 MMHG | DIASTOLIC BLOOD PRESSURE: 64 MMHG | TEMPERATURE: 97.9 F | RESPIRATION RATE: 16 BRPM | HEIGHT: 70 IN | WEIGHT: 221 LBS | BODY MASS INDEX: 31.64 KG/M2 | HEART RATE: 64 BPM

## 2018-01-29 DIAGNOSIS — K21.9 GASTROESOPHAGEAL REFLUX DISEASE, ESOPHAGITIS PRESENCE NOT SPECIFIED: ICD-10-CM

## 2018-01-29 DIAGNOSIS — E66.811 CLASS 1 OBESITY WITH SERIOUS COMORBIDITY AND BODY MASS INDEX (BMI) OF 31.0 TO 31.9 IN ADULT, UNSPECIFIED OBESITY TYPE: ICD-10-CM

## 2018-01-29 DIAGNOSIS — E78.00 PURE HYPERCHOLESTEROLEMIA: ICD-10-CM

## 2018-01-29 DIAGNOSIS — G20.C PARKINSONISM, UNSPECIFIED PARKINSONISM TYPE (H): ICD-10-CM

## 2018-01-29 DIAGNOSIS — Z87.898 H/O IDIOPATHIC SEIZURE: ICD-10-CM

## 2018-01-29 DIAGNOSIS — M10.071 ACUTE IDIOPATHIC GOUT OF RIGHT ANKLE: Chronic | ICD-10-CM

## 2018-01-29 DIAGNOSIS — Z12.5 SCREENING FOR PROSTATE CANCER: ICD-10-CM

## 2018-01-29 DIAGNOSIS — I10 BENIGN ESSENTIAL HYPERTENSION: ICD-10-CM

## 2018-01-29 DIAGNOSIS — E87.6 HYPOKALEMIA: ICD-10-CM

## 2018-01-29 DIAGNOSIS — G40.A09 NONINTRACTABLE ABSENCE EPILEPSY WITHOUT STATUS EPILEPTICUS (H): Primary | ICD-10-CM

## 2018-01-29 LAB
ALBUMIN SERPL-MCNC: 3.8 G/DL (ref 3.4–5)
ALP SERPL-CCNC: 79 U/L (ref 40–150)
ALT SERPL W P-5'-P-CCNC: 9 U/L (ref 0–70)
ANION GAP SERPL CALCULATED.3IONS-SCNC: 7 MMOL/L (ref 3–14)
AST SERPL W P-5'-P-CCNC: 10 U/L (ref 0–45)
BILIRUB SERPL-MCNC: 0.3 MG/DL (ref 0.2–1.3)
BUN SERPL-MCNC: 30 MG/DL (ref 7–30)
CALCIUM SERPL-MCNC: 9.5 MG/DL (ref 8.5–10.1)
CHLORIDE SERPL-SCNC: 98 MMOL/L (ref 94–109)
CO2 SERPL-SCNC: 33 MMOL/L (ref 20–32)
CREAT SERPL-MCNC: 1.2 MG/DL (ref 0.66–1.25)
GFR SERPL CREATININE-BSD FRML MDRD: 60 ML/MIN/1.7M2
GLUCOSE SERPL-MCNC: 105 MG/DL (ref 70–99)
POTASSIUM SERPL-SCNC: 3.4 MMOL/L (ref 3.4–5.3)
PROT SERPL-MCNC: 7.1 G/DL (ref 6.8–8.8)
PSA SERPL-ACNC: 0.66 UG/L (ref 0–4)
SODIUM SERPL-SCNC: 138 MMOL/L (ref 133–144)
TSH SERPL DL<=0.005 MIU/L-ACNC: 0.52 MU/L (ref 0.4–4)

## 2018-01-29 PROCEDURE — 84443 ASSAY THYROID STIM HORMONE: CPT | Performed by: PHYSICIAN ASSISTANT

## 2018-01-29 PROCEDURE — 80053 COMPREHEN METABOLIC PANEL: CPT | Performed by: PHYSICIAN ASSISTANT

## 2018-01-29 PROCEDURE — G0103 PSA SCREENING: HCPCS | Performed by: PHYSICIAN ASSISTANT

## 2018-01-29 PROCEDURE — 36415 COLL VENOUS BLD VENIPUNCTURE: CPT | Performed by: PHYSICIAN ASSISTANT

## 2018-01-29 PROCEDURE — 99214 OFFICE O/P EST MOD 30 MIN: CPT | Performed by: PHYSICIAN ASSISTANT

## 2018-01-29 RX ORDER — LOSARTAN POTASSIUM 100 MG/1
100 TABLET ORAL DAILY
Qty: 90 TABLET | Refills: 1 | Status: SHIPPED | OUTPATIENT
Start: 2018-01-29 | End: 2018-02-14

## 2018-01-29 RX ORDER — FUROSEMIDE 20 MG
40 TABLET ORAL PRN
Qty: 30 TABLET | Refills: 3 | Status: SHIPPED | OUTPATIENT
Start: 2018-01-29 | End: 2019-07-14

## 2018-01-29 RX ORDER — POTASSIUM CHLORIDE 1500 MG/1
20 TABLET, EXTENDED RELEASE ORAL 3 TIMES DAILY
Qty: 270 TABLET | Refills: 1 | Status: SHIPPED | OUTPATIENT
Start: 2018-01-29 | End: 2018-08-26

## 2018-01-29 RX ORDER — CARBIDOPA AND LEVODOPA 25; 100 MG/1; MG/1
1 TABLET ORAL 3 TIMES DAILY
Qty: 270 TABLET | Refills: 1 | Status: SHIPPED | OUTPATIENT
Start: 2018-01-29 | End: 2018-11-02

## 2018-01-29 RX ORDER — LEVETIRACETAM 1000 MG/1
TABLET ORAL
Qty: 180 TABLET | Refills: 3 | Status: SHIPPED | OUTPATIENT
Start: 2018-01-29 | End: 2018-11-02

## 2018-01-29 RX ORDER — CARVEDILOL 12.5 MG/1
TABLET ORAL
Qty: 180 TABLET | Refills: 1 | Status: SHIPPED | OUTPATIENT
Start: 2018-01-29 | End: 2018-11-02

## 2018-01-29 RX ORDER — SIMVASTATIN 20 MG
TABLET ORAL
Qty: 180 TABLET | Refills: 1 | Status: SHIPPED | OUTPATIENT
Start: 2018-01-29 | End: 2018-09-21

## 2018-01-29 RX ORDER — DIVALPROEX SODIUM 500 MG/1
500 TABLET, DELAYED RELEASE ORAL 2 TIMES DAILY
Qty: 180 TABLET | Refills: 1 | Status: SHIPPED | OUTPATIENT
Start: 2018-01-29 | End: 2018-11-02

## 2018-01-29 RX ORDER — AMLODIPINE BESYLATE 10 MG/1
TABLET ORAL
Qty: 90 TABLET | Refills: 1 | Status: SHIPPED | OUTPATIENT
Start: 2018-01-29 | End: 2018-10-21

## 2018-01-29 RX ORDER — ALLOPURINOL 100 MG/1
200 TABLET ORAL DAILY
Qty: 180 TABLET | Refills: 3 | Status: SHIPPED | OUTPATIENT
Start: 2018-01-29 | End: 2018-08-16

## 2018-01-29 RX ORDER — OMEPRAZOLE 40 MG/1
40 CAPSULE, DELAYED RELEASE ORAL DAILY
Qty: 30 CAPSULE | Refills: 11 | Status: SHIPPED | OUTPATIENT
Start: 2018-01-29 | End: 2019-02-14

## 2018-01-29 RX ORDER — CHLORTHALIDONE 25 MG/1
25 TABLET ORAL DAILY
Qty: 90 TABLET | Refills: 1 | Status: SHIPPED | OUTPATIENT
Start: 2018-01-29 | End: 2018-08-19

## 2018-01-29 RX ORDER — LOSARTAN POTASSIUM 100 MG/1
100 TABLET ORAL DAILY
Qty: 90 TABLET | Refills: 1 | Status: SHIPPED | OUTPATIENT
Start: 2018-01-29 | End: 2018-11-02

## 2018-01-29 NOTE — MR AVS SNAPSHOT
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)    -  1    Benign essential hypertension        Pure Hypercholesterolemia goal ldl <130        Parkinsonism, unspecified Parkinsonism type (H)        H/O idiopathic seizure        Hypokalemia        Acute idiopathic gout of right ankle        Gastroesophageal reflux disease, esophagitis presence not specified        Screening for prostate cancer        Class 1 obesity with serious comorbidity and body mass index (BMI) of 31.0 to 31.9 in adult, unspecified obesity type           Follow-ups after your visit        Your next 10 appointments already scheduled     Feb 14, 2018  9:30 AM CST   [facility] Visit with Bisi Croft [facility]   [facility]    [address] [address]   [phone]              Who to contact     Normal or non-critical lab and imaging results will be communicated to you by [employer], letter or phone within 4 business days after the clinic has received the results. If you do not hear from us within 7 days, please contact the clinic through [employer] or phone. If you have a critical or abnormal lab result, we will notify you by phone as soon as possible.  Submit refill requests through [employer] or call your pharmacy and they will forward the refill request to us. Please allow 3 business days for your refill to be completed.          If you need to speak with a  for additional information , please call: [phone]             Additional Information About Your Visit        [employer] Information     [employer] gives you secure access to your electronic health record. If you see  "a primary care provider, you can also send messages to your care team and make appointments. If you have questions, please call your primary care clinic.  If you do not have a primary care provider, please call [phone] and they will assist you.        Care EveryWhere ID     This is your Care EveryWhere ID. This could be used by other organizations to access your [place] medical records  [id number]        Your Vitals Were     Pulse Temperature Respirations Height Pulse Oximetry BMI (Body Mass Index)    64 97.9  F (36.6  C) (Tympanic) 16 5' 10\" (1.778 m) 97% 31.71 kg/m2       Blood Pressure from Last 3 Encounters:   01/29/18 118/64   11/28/17 134/74   10/24/17 137/79    Weight from Last 3 Encounters:   01/29/18 221 lb (100.2 kg)   11/28/17 243 lb 6.4 oz (110.4 kg)   10/24/17 244 lb (110.7 kg)              We Performed the Following     Comprehensive metabolic panel     PSA, screen     TSH with free T4 reflex          Today's Medication Changes          These changes are accurate as of 1/29/18  4:35 PM.  If you have any questions, ask your nurse or doctor.               These medicines have changed or have updated prescriptions.        Dose/Directions    carvedilol 12.5 MG tablet   Commonly known as:  COREG   This may have changed:  See the new instructions.   Used for:  Benign essential hypertension   Changed by:  Matt Swna PA-C        TAKE ONE TABLET BY MOUTH TWICE DAILY WITH MEALS   Quantity:  180 tablet   Refills:  1       chlorthalidone 25 MG tablet   Commonly known as:  HYGROTON   This may have changed:  Another medication with the same name was removed. Continue taking this medication, and follow the directions you see here.   Used for:  Benign essential hypertension   Changed by:  Matt Swan PA-C        Dose:  25 mg   Take 1 tablet (25 mg) by mouth daily   Quantity:  90 tablet   Refills:  1       * losartan 100 MG tablet   Commonly known as:  COZAAR   This may have changed:  See the new " instructions.   Used for:  Benign essential hypertension   Changed by:  Matt Swan PA-C        Dose:  100 mg   Take 1 tablet (100 mg) by mouth daily   Quantity:  90 tablet   Refills:  1       * losartan 100 MG tablet   Commonly known as:  COZAAR   This may have changed:  Another medication with the same name was changed. Make sure you understand how and when to take each.   Used for:  Benign essential hypertension   Changed by:  Matt Swan PA-C        Dose:  100 mg   Take 1 tablet (100 mg) by mouth daily   Quantity:  90 tablet   Refills:  1       simvastatin 20 MG tablet   Commonly known as:  ZOCOR   This may have changed:  See the new instructions.   Used for:  Pure hypercholesterolemia   Changed by:  Matt Swan PA-C        TAKE TWO TABLETS BY MOUTH EVERY NIGHT AT BEDTIME   Quantity:  180 tablet   Refills:  1       * Notice:  This list has 2 medication(s) that are the same as other medications prescribed for you. Read the directions carefully, and ask your doctor or other care provider to review them with you.         Where to get your medicines      These medications were sent to [facility] [address] [place] [address] [address] [facility]  [address] [address]     Phone:  [phone]     allopurinol 100 MG tablet    amLODIPine 10 MG tablet    carbidopa-levodopa  MG per tablet    carvedilol 12.5 MG tablet    chlorthalidone 25 MG tablet    divalproex sodium delayed-release 500 MG DR tablet    furosemide 20 MG tablet    levETIRAcetam 1000 MG Tabs    losartan 100 MG tablet    losartan 100 MG tablet    omeprazole 40 MG capsule    potassium chloride SA 20 MEQ CR tablet    simvastatin 20 MG tablet                Primary Care Provider Office Phone # Fax #    Matt Swan PA-C [phone]       [address] [facility] [address]        Equal Access to Services     NETTIE ARRINGTON AH: Jenna Kumar, flory escoto, merary agudelo  rowan lujanfreda khadijahpopeye garciaaan ah. So [facility] [phone].    ATENCIÓN: Si ferdinand jacob, tiene a velasquez disposición servicios gratuitos de asistencia lingüística. Trisha al [phone].    We comply with applicable federal civil rights laws and [place] laws. We do not discriminate on the basis of race, color, national origin, age, disability, sex, sexual orientation, or gender identity.            Thank you!     Thank you for choosing [facility]  for your care. Our goal is always to provide you with excellent care. Hearing back from our patients is one way we can continue to improve our services. Please take a few minutes to complete the written survey that you may receive in the mail after your visit with us. Thank you!             Your Updated Medication List - Protect others around you: Learn how to safely use, store and throw away your medicines at www.disposemymeds.org.          This list is accurate as of 1/29/18  4:35 PM.  Always use your most recent med list.                   Brand Name Dispense Instructions for use Diagnosis    ABILIFY 2 MG tablet   Generic drug:  ARIPiprazole      Take 2 mg by mouth daily        allopurinol 100 MG tablet    ZYLOPRIM    180 tablet    Take 2 tablets (200 mg) by mouth daily    Acute idiopathic gout of right ankle       amLODIPine 10 MG tablet    NORVASC    90 tablet    TAKE ONE TABLET BY MOUTH ONCE DAILY WITH  DINNER    Benign essential hypertension       aspirin 81 MG tablet     30 tablet    Take 1 tablet (81 mg) by mouth daily    Essential hypertension       carbidopa-levodopa  MG per tablet    SINEMET    270 tablet    Take 1 tablet by mouth 3 times daily    Parkinsonism, unspecified Parkinsonism type (H)       carvedilol 12.5 MG tablet    COREG    180 tablet    TAKE ONE TABLET BY MOUTH TWICE DAILY WITH MEALS    Benign essential hypertension       chlorthalidone 25 MG tablet    HYGROTON    90 tablet    Take 1 tablet (25 mg) by mouth daily     Benign essential hypertension       cyabnocobalamin 2500 MCG sublingual tablet    VITAMIN B-12    90 tablet    Place 2,500 mcg under the tongue daily    Vitamin B 12 deficiency       divalproex sodium delayed-release 500 MG DR tablet    DEPAKOTE    180 tablet    Take 1 tablet (500 mg) by mouth 2 times daily    H/O idiopathic seizure       furosemide 20 MG tablet    LASIX    30 tablet    Take 2 tablets (40 mg) by mouth as needed    Benign essential hypertension       levETIRAcetam 1000 MG Tabs    KEPPRA    180 tablet    1 tablet in morning and bed time.    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        omeprazole 40 MG capsule    priLOSEC    30 capsule    Take 1 capsule (40 mg) by mouth daily Take 30-60 minutes before a meal.    Gastroesophageal reflux disease, esophagitis presence not specified       order for DME      CPAP daily        potassium chloride SA 20 MEQ CR tablet    KLOR-CON    270 tablet    Take 1 tablet (20 mEq) by mouth 3 times daily    Hypokalemia       simvastatin 20 MG tablet    ZOCOR    180 tablet    TAKE TWO TABLETS BY MOUTH EVERY NIGHT AT BEDTIME    Pure hypercholesterolemia       TEMAZEPAM PO      Take 15 mg by mouth At Bedtime        triamcinolone 0.1 % cream    KENALOG    80 g    Apply sparingly to affected area three times daily as needed    Eczema, unspecified type       vitamin D 2000 UNITS tablet     90 tablet    Take 2,000 Units by mouth daily    Vitamin D deficiency       * Notice:  This list has 2 medication(s) that are the same as other medications prescribed for you. Read the directions carefully, and ask your doctor or other care provider to review them with  you.

## 2018-01-29 NOTE — PROGRESS NOTES
SUBJECTIVE:   Prashant Keyes is a 67 year old male who presents to clinic today for the following health issues:      Hyperlipidemia Follow-Up      Rate your low fat/cholesterol diet?: good    Taking statin?  Yes, no muscle aches from statin    Other lipid medications/supplements?:  none    Hypertension Follow-up      Outpatient blood pressures are not being checked.    Low Salt Diet: low salt      Amount of exercise or physical activity: 4-5 days/week for an average of 15-30 minutes    Problems taking medications regularly: No    Medication side effects: none    Diet: low salt and low fat/cholesterol    Recheck of obesity..  Loosing weight voluntarily with diet and increased physical activity.    Problem list and histories reviewed & adjusted, as indicated.  Additional history: as documented    BP Readings from Last 3 Encounters:   01/29/18 113/68   11/28/17 134/74   10/24/17 137/79    Wt Readings from Last 3 Encounters:   01/29/18 221 lb (100.2 kg)   11/28/17 243 lb 6.4 oz (110.4 kg)   10/24/17 244 lb (110.7 kg)                    Reviewed and updated as needed this visit by clinical staff  Tobacco  Allergies  Meds       Reviewed and updated as needed this visit by Provider         All other systems negative except as outline above  OBJECTIVE:  Eye exam - right eye normal lid, conjunctiva, cornea, pupil and fundus, left eye normal lid, conjunctiva, cornea, pupil and fundus.  ENT exam reveals - ENT exam normal, no neck nodes or sinus tenderness.  Thyroid not palpable, not enlarged, no nodules detected.  CHEST:chest clear to IPPA, no tachypnea, retractions or cyanosis and S1, S2 normal, no murmur, no gallop, rate regular.  Prashant was seen today for hypertension.    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    Benign essential hypertension  -     losartan (COZAAR) 100  MG tablet; Take 1 tablet (100 mg) by mouth daily  -     chlorthalidone (HYGROTON) 25 MG tablet; Take 1 tablet (25 mg) by mouth daily  -     carvedilol (COREG) 12.5 MG tablet; TAKE ONE TABLET BY MOUTH TWICE DAILY WITH MEALS  -     furosemide (LASIX) 20 MG tablet; Take 2 tablets (40 mg) by mouth as needed  -     amLODIPine (NORVASC) 10 MG tablet; TAKE ONE TABLET BY MOUTH ONCE DAILY WITH  DINNER  -     losartan (COZAAR) 100 MG tablet; Take 1 tablet (100 mg) by mouth daily  -     Comprehensive metabolic panel    Pure Hypercholesterolemia goal ldl <130  -     simvastatin (ZOCOR) 20 MG tablet; TAKE TWO TABLETS BY MOUTH EVERY NIGHT AT BEDTIME  -     Comprehensive metabolic panel    Parkinsonism, unspecified Parkinsonism type (H)  -     carbidopa-levodopa (SINEMET)  MG per tablet; Take 1 tablet by mouth 3 times daily    H/O idiopathic seizure  -     divalproex sodium delayed-release (DEPAKOTE) 500 MG DR tablet; Take 1 tablet (500 mg) by mouth 2 times daily    Hypokalemia  -     potassium chloride SA (KLOR-CON) 20 MEQ CR tablet; Take 1 tablet (20 mEq) by mouth 3 times daily    Acute idiopathic gout of right ankle  -     allopurinol (ZYLOPRIM) 100 MG tablet; Take 2 tablets (200 mg) by mouth daily    Gastroesophageal reflux disease, esophagitis presence not specified  -     omeprazole (PRILOSEC) 40 MG capsule; Take 1 capsule (40 mg) by mouth daily Take 30-60 minutes before a meal.    Screening for prostate cancer  -     PSA, screen    Class 1 obesity with serious comorbidity and body mass index (BMI) of 31.0 to 31.9 in adult, unspecified obesity type      work on lifestyle modification  Recheck in 6 mos

## 2018-01-29 NOTE — LETTER
February 20, 2018      Prashant Keyes  [address]   [place] [address]      Prashant,     Your recent PSA and thyroid function came back normal. Also, your kidney function remains nice and stable.       Resulted Orders   PSA, screen   Result Value Ref Range    PSA 0.66 0 - 4 ug/L      Comment:      Assay Method:  Chemiluminescence using Siemens Vista analyzer   Comprehensive metabolic panel   Result Value Ref Range    Sodium 138 133 - 144 mmol/L    Potassium 3.4 3.4 - 5.3 mmol/L    Chloride 98 94 - 109 mmol/L    Carbon Dioxide 33 (H) 20 - 32 mmol/L    Anion Gap 7 3 - 14 mmol/L    Glucose 105 (H) 70 - 99 mg/dL      Comment:      Non Fasting    Urea Nitrogen 30 7 - 30 mg/dL    Creatinine 1.20 0.66 - 1.25 mg/dL    GFR Estimate 60 (L) >60 mL/min/1.7m2      Comment:      Non  GFR Calc    GFR Estimate If Black 73 >60 mL/min/1.7m2      Comment:       GFR Calc    Calcium 9.5 8.5 - 10.1 mg/dL    Bilirubin Total 0.3 0.2 - 1.3 mg/dL    Albumin 3.8 3.4 - 5.0 g/dL    Protein Total 7.1 6.8 - 8.8 g/dL    Alkaline Phosphatase 79 40 - 150 U/L    ALT 9 0 - 70 U/L    AST 10 0 - 45 U/L   TSH with free T4 reflex   Result Value Ref Range    TSH 0.52 0.40 - 4.00 mU/L       If you have any questions or concerns, please call the clinic at the number listed above.       Sincerely,    Matt Swan PA-C/alessia

## 2018-02-14 ENCOUNTER — OFFICE VISIT (OUTPATIENT)
Dept: NEUROLOGY | Facility: CLINIC | Age: 68
End: 2018-02-14
Payer: MEDICARE

## 2018-02-14 VITALS
WEIGHT: 223.2 LBS | TEMPERATURE: 97.6 F | HEART RATE: 65 BPM | RESPIRATION RATE: 12 BRPM | SYSTOLIC BLOOD PRESSURE: 129 MMHG | DIASTOLIC BLOOD PRESSURE: 63 MMHG | BODY MASS INDEX: 32.03 KG/M2

## 2018-02-14 DIAGNOSIS — G40.909 SEIZURE DISORDER (H): Primary | ICD-10-CM

## 2018-02-14 DIAGNOSIS — Z79.899 ENCOUNTER FOR LONG-TERM (CURRENT) USE OF MEDICATIONS: ICD-10-CM

## 2018-02-14 DIAGNOSIS — R25.1 TREMOR: ICD-10-CM

## 2018-02-14 LAB
ERYTHROCYTE [DISTWIDTH] IN BLOOD BY AUTOMATED COUNT: 13.4 % (ref 10–15)
HCT VFR BLD AUTO: 35 % (ref 40–53)
HGB BLD-MCNC: 11.8 G/DL (ref 13.3–17.7)
MCH RBC QN AUTO: 32.1 PG (ref 26.5–33)
MCHC RBC AUTO-ENTMCNC: 33.7 G/DL (ref 31.5–36.5)
MCV RBC AUTO: 95 FL (ref 78–100)
PLATELET # BLD AUTO: 206 10E9/L (ref 150–450)
RBC # BLD AUTO: 3.68 10E12/L (ref 4.4–5.9)
VALPROATE SERPL-MCNC: 33 MG/L (ref 50–100)
WBC # BLD AUTO: 9 10E9/L (ref 4–11)

## 2018-02-14 PROCEDURE — 99215 OFFICE O/P EST HI 40 MIN: CPT | Performed by: PSYCHIATRY & NEUROLOGY

## 2018-02-14 PROCEDURE — 85027 COMPLETE CBC AUTOMATED: CPT | Performed by: PSYCHIATRY & NEUROLOGY

## 2018-02-14 PROCEDURE — 80164 ASSAY DIPROPYLACETIC ACD TOT: CPT | Performed by: PSYCHIATRY & NEUROLOGY

## 2018-02-14 PROCEDURE — 36415 COLL VENOUS BLD VENIPUNCTURE: CPT | Performed by: PSYCHIATRY & NEUROLOGY
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
## 2018-02-14 NOTE — MR AVS SNAPSHOT
After Visit Summary   2/14/2018    Prashant Keyes    MRN: [id number]           Patient Information     Date Of Birth          1950        Visit Information        Provider Department      2/14/2018 9:30 AM Bisi Croft MD [facility]        Care Instructions    Plan:    Labs today: medication levels and CBC. We will notify you of the results.   Continue the Keppra 1000mg twice daily.  Continue the Depakote 500mg twice daily.   Continue the Sinemet 25/100mg, three times daily.   I agree that potentially a change in your psychiatric medications could be helpful with the tremor.   Follow-up in 6 months, or sooner if new concerns arise.           Follow-ups after your visit        Who to contact     If you have questions or need follow up information about today's clinic visit or your schedule please contact [facility] directly at [phone].  Normal or non-critical lab and imaging results will be communicated to you by MyChart, letter or phone within 4 business days after the clinic has received the results. If you do not hear from us within 7 days, please contact the clinic through [employer] or phone. If you have a critical or abnormal lab result, we will notify you by phone as soon as possible.  Submit refill requests through [employer] or call your pharmacy and they will forward the refill request to us. Please allow 3 business days for your refill to be completed.          Additional Information About Your Visit        MyChart Information     [employer] gives you secure access to your electronic health record. If you see a primary care provider, you can also send messages to your care team and make appointments. If you have questions, please call your primary care clinic.  If you do not have a primary care provider, please call [phone] and they will assist you.        Care EveryWhere ID     This is your Care EveryWhere ID. This could be used by other  organizations to access your [facility] medical records  [id number]        Your Vitals Were     Pulse Temperature Respirations BMI (Body Mass Index)          65 97.6  F (36.4  C) (Oral) 12 32.03 kg/m2         Blood Pressure from Last 3 Encounters:   02/14/18 129/63   01/29/18 118/64   11/28/17 134/74    Weight from Last 3 Encounters:   02/14/18 101.2 kg (223 lb 3.2 oz)   01/29/18 100.2 kg (221 lb)   11/28/17 110.4 kg (243 lb 6.4 oz)              Today, you had the following     No orders found for display         Today's Medication Changes          These changes are accurate as of 2/14/18 10:16 AM.  If you have any questions, ask your nurse or doctor.               These medicines have changed or have updated prescriptions.        Dose/Directions    losartan 100 MG tablet   Commonly known as:  COZAAR   This may have changed:  Another medication with the same name was removed. Continue taking this medication, and follow the directions you see here.   Used for:  Benign essential hypertension   Changed by:  Bisi Croft MD        Dose:  100 mg   Take 1 tablet (100 mg) by mouth daily   Quantity:  90 tablet   Refills:  1                Primary Care Provider Office Phone # Fax #    Matt Swan PA-C [phone] [id number]  [place] [address]        Equal Access to Services     [facility]: Hadii herlinda ku hadasho Soomaali, waaxda luqadaha, qaybta kaalmada adeotfda, rowan hartmann. So [facility] [phone].    ATENCIÓN: Si habla español, tiene a velasquez disposición servicios gratuitos de asistencia lingüística. Trisha al [phone].    We comply with applicable federal civil rights laws and [place] laws. We do not discriminate on the basis of race, color, national origin, age, disability, sex, sexual orientation, or gender identity.            Thank you!     Thank you for choosing [facility]  for your care. Our goal is always to provide you with  excellent care. Hearing back from our patients is one way we can continue to improve our services. Please take a few minutes to complete the written survey that you may receive in the mail after your visit with us. Thank you!             Your Updated Medication List - Protect others around you: Learn how to safely use, store and throw away your medicines at www.disposemymeds.org.          This list is accurate as of 2/14/18 10:16 AM.  Always use your most recent med list.                   Brand Name Dispense Instructions for use Diagnosis    ABILIFY 2 MG tablet   Generic drug:  ARIPiprazole      Take 2 mg by mouth daily        allopurinol 100 MG tablet    ZYLOPRIM    180 tablet    Take 2 tablets (200 mg) by mouth daily    Acute idiopathic gout of right ankle       amLODIPine 10 MG tablet    NORVASC    90 tablet    TAKE ONE TABLET BY MOUTH ONCE DAILY WITH  DINNER    Benign essential hypertension       aspirin 81 MG tablet     30 tablet    Take 1 tablet (81 mg) by mouth daily    Essential hypertension       carbidopa-levodopa  MG per tablet    SINEMET    270 tablet    Take 1 tablet by mouth 3 times daily    Parkinsonism, unspecified Parkinsonism type (H)       carvedilol 12.5 MG tablet    COREG    180 tablet    TAKE ONE TABLET BY MOUTH TWICE DAILY WITH MEALS    Benign essential hypertension       chlorthalidone 25 MG tablet    HYGROTON    90 tablet    Take 1 tablet (25 mg) by mouth daily    Benign essential hypertension       cyanocobalamin 2500 MCG sublingual tablet    VITAMIN B-12    90 tablet    Place 2,500 mcg under the tongue daily    Vitamin B 12 deficiency       divalproex sodium delayed-release 500 MG DR tablet    DEPAKOTE    180 tablet    Take 1 tablet (500 mg) by mouth 2 times daily    H/O idiopathic seizure       furosemide 20 MG tablet    LASIX    30 tablet    Take 2 tablets (40 mg) by mouth as needed    Benign essential hypertension       levETIRAcetam 1000 MG Tabs    KEPPRA    180 tablet    1  tablet in morning and bed time.    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        omeprazole 40 MG capsule    priLOSEC    30 capsule    Take 1 capsule (40 mg) by mouth daily Take 30-60 minutes before a meal.    Gastroesophageal reflux disease, esophagitis presence not specified       order for DME      CPAP daily        potassium chloride SA 20 MEQ CR tablet    KLOR-CON    270 tablet    Take 1 tablet (20 mEq) by mouth 3 times daily    Hypokalemia       simvastatin 20 MG tablet    ZOCOR    180 tablet    TAKE TWO TABLETS BY MOUTH EVERY NIGHT AT BEDTIME    Pure hypercholesterolemia       TEMAZEPAM PO      Take 15 mg by mouth At Bedtime        triamcinolone 0.1 % cream    KENALOG    80 g    Apply sparingly to affected area three times daily as needed    Eczema, unspecified type       vitamin D 2000 UNITS tablet     90 tablet    Take 2,000 Units by mouth daily    Vitamin D deficiency
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
## 2018-02-14 NOTE — NURSING NOTE
"Chief Complaint   Patient presents with     New Patient     Parkinson's and tremor.  Previously followed by Dr. Turner.       Initial /63 (BP Location: Right arm, Patient Position: Chair, Cuff Size: Adult Regular)  Pulse 65  Temp 97.6  F (36.4  C) (Oral)  Resp 12  Wt 101.2 kg (223 lb 3.2 oz)  BMI 32.03 kg/m2 Estimated body mass index is 32.03 kg/(m^2) as calculated from the following:    Height as of 1/29/18: 1.778 m (5' 10\").    Weight as of this encounter: 101.2 kg (223 lb 3.2 oz).  Medication Reconciliation: complete    Patient prefers to be contacted: Grover Casarezay to leave detailed message on voicemail: n/a  Is there anyone with whom we can share your information? yes  If yes, have patient sign Authorization to Discuss.    Yohana Myers NR-CMA    "

## 2018-02-15 LAB — LEVETIRACETAM SERPL-MCNC: 34 UG/ML (ref 12–46)

## 2018-02-16 NOTE — PROGRESS NOTES
Please advise Prashant Keyes,  1950, that his lab results reveal worsening of his anemia. I recommend he check in with his primary care provider about this.the Keppra level is therapeutic. The Depakote level is a little low, but he tends to run low and with his tremor, I think we should continue his current dose for now.   [phone] (home)     Thank you,  Bisi Croft

## 2018-02-20 ENCOUNTER — OFFICE VISIT (OUTPATIENT)
Dept: FAMILY MEDICINE | Facility: CLINIC | Age: 68
End: 2018-02-20
Payer: MEDICARE

## 2018-02-20 VITALS
WEIGHT: 224 LBS | SYSTOLIC BLOOD PRESSURE: 126 MMHG | BODY MASS INDEX: 32.14 KG/M2 | OXYGEN SATURATION: 96 % | HEART RATE: 66 BPM | DIASTOLIC BLOOD PRESSURE: 73 MMHG

## 2018-02-20 DIAGNOSIS — D64.9 ANEMIA, UNSPECIFIED TYPE: Primary | ICD-10-CM

## 2018-02-20 LAB
ERYTHROCYTE [DISTWIDTH] IN BLOOD BY AUTOMATED COUNT: 13.5 % (ref 10–15)
FERRITIN SERPL-MCNC: 24 NG/ML (ref 26–388)
FOLATE SERPL-MCNC: 29.1 NG/ML
HCT VFR BLD AUTO: 36.5 % (ref 40–53)
HGB BLD-MCNC: 12.2 G/DL (ref 13.3–17.7)
IRON SATN MFR SERPL: 15 % (ref 15–46)
IRON SERPL-MCNC: 52 UG/DL (ref 35–180)
MCH RBC QN AUTO: 31.6 PG (ref 26.5–33)
MCHC RBC AUTO-ENTMCNC: 33.4 G/DL (ref 31.5–36.5)
MCV RBC AUTO: 95 FL (ref 78–100)
PLATELET # BLD AUTO: 221 10E9/L (ref 150–450)
RBC # BLD AUTO: 3.86 10E12/L (ref 4.4–5.9)
TIBC SERPL-MCNC: 346 UG/DL (ref 240–430)
VIT B12 SERPL-MCNC: 2464 PG/ML (ref 193–986)
WBC # BLD AUTO: 9 10E9/L (ref 4–11)

## 2018-02-20 PROCEDURE — 82728 ASSAY OF FERRITIN: CPT | Performed by: PHYSICIAN ASSISTANT

## 2018-02-20 PROCEDURE — 85027 COMPLETE CBC AUTOMATED: CPT | Performed by: PHYSICIAN ASSISTANT

## 2018-02-20 PROCEDURE — 82746 ASSAY OF FOLIC ACID SERUM: CPT | Performed by: PHYSICIAN ASSISTANT

## 2018-02-20 PROCEDURE — 83550 IRON BINDING TEST: CPT | Performed by: PHYSICIAN ASSISTANT

## 2018-02-20 PROCEDURE — 99214 OFFICE O/P EST MOD 30 MIN: CPT | Performed by: PHYSICIAN ASSISTANT

## 2018-02-20 PROCEDURE — 82607 VITAMIN B-12: CPT | Performed by: PHYSICIAN ASSISTANT

## 2018-02-20 PROCEDURE — 83540 ASSAY OF IRON: CPT | Performed by: PHYSICIAN ASSISTANT

## 2018-02-20 PROCEDURE — 36415 COLL VENOUS BLD VENIPUNCTURE: CPT | Performed by: PHYSICIAN ASSISTANT

## 2018-02-20 NOTE — PROGRESS NOTES
SUBJECTIVE:   Prashant Keyes is a 67 year old male who presents to clinic today for the following health issues:      Anemia follow up-discuss cbc results today  He denies dizziness or palpitations. No fatigue. No blood in his stools or urine. No abd pain. Diet stable and fairly balanced.     Recent colonoscopy was reviewed.       Problem list and histories reviewed & adjusted, as indicated.  Additional history: as documented    BP Readings from Last 3 Encounters:   02/20/18 126/73   02/14/18 129/63   01/29/18 118/64    Wt Readings from Last 3 Encounters:   02/20/18 224 lb (101.6 kg)   02/14/18 223 lb 3.2 oz (101.2 kg)   01/29/18 221 lb (100.2 kg)                    Reviewed and updated as needed this visit by clinical staff  Tobacco  Allergies  Meds       Reviewed and updated as needed this visit by Provider         All other systems negative except as outline above  OBJECTIVE:  Eye exam - right eye normal lid, conjunctiva, cornea, pupil and fundus, left eye normal lid, conjunctiva, cornea, pupil and fundus.  Thyroid not palpable, not enlarged, no nodules detected.  CHEST:chest clear to IPPA, no tachypnea, retractions or cyanosis and S1, S2 normal, no murmur, no gallop, rate regular.  The abdomen is soft without tenderness, guarding, mass, rebound or organomegaly. Bowel sounds are normal. No CVA tenderness or inguinal adenopathy noted.    Prashant was seen today for anemia.    Diagnoses and all orders for this visit:    Anemia, unspecified type  -     CBC with platelets  -     Iron and iron binding capacity  -     Ferritin  -     Vitamin B12  -     Folate        Advised supportive and symptomatic treatment.  Follow up with Provider - if condition persists or worsens.   work on lifestyle modification

## 2018-02-20 NOTE — MR AVS SNAPSHOT
After Visit Summary   2/20/2018    Prashant Keyes    MRN: [id number]           Patient Information     Date Of Birth          1950        Visit Information        Provider Department      2/20/2018 3:00 PM Matt Swan PA-C [facility]        Today's Diagnoses     Anemia, unspecified type    -  1       Follow-ups after your visit        Who to contact     Normal or non-critical lab and imaging results will be communicated to you by [employer], letter or phone within 4 business days after the clinic has received the results. If you do not hear from us within 7 days, please contact the clinic through [employer] or phone. If you have a critical or abnormal lab result, we will notify you by phone as soon as possible.  Submit refill requests through [employer] or call your pharmacy and they will forward the refill request to us. Please allow 3 business days for your refill to be completed.          If you need to speak with a  for additional information , please call: [phone]             Additional Information About Your Visit        [employer] Information     [facility] [employer] gives you secure access to your electronic health record. If you see a primary care provider, you can also send messages to your care team and make appointments. If you have questions, please call your primary care clinic.  If you do not have a primary care provider, please call [phone] and they will assist you.        Care EveryWhere ID     This is your Care EveryWhere ID. This could be used by other organizations to access your [place] medical records  [id number]        Your Vitals Were     Pulse Pulse Oximetry BMI (Body Mass Index)             66 96% 32.14 kg/m2          Blood Pressure from Last 3 Encounters:   02/20/18 126/73   02/14/18 129/63   01/29/18 118/64    Weight from Last 3 Encounters:   02/20/18 224 lb (101.6 kg)   02/14/18 223 lb 3.2 oz (101.2 kg)   01/29/18 221 lb (100.2 kg)               We Performed the Following     CBC with platelets     Ferritin     Folate     Iron and iron binding capacity     Vitamin B12        Primary Care Provider Office Phone # Fax #    Matt Swan PA-C [phone]       [address] [place] [address]        Equal Access to Services     [facility]: Hadii aad ku hadasho Soomaali, waaxda luqadaha, qaybta kaalmada adeegyada, waxay idiin hayaan adeeg khpopeye la'nahumn . So [facility] [phone].    ATENCIÓN: Si habla español, tiene a velasquez disposición servicios gratuitos de asistencia lingüística. Trisha al [phone].    We comply with applicable federal civil rights laws and [place] laws. We do not discriminate on the basis of race, color, national origin, age, disability, sex, sexual orientation, or gender identity.            Thank you!     Thank you for choosing [facility]  for your care. Our goal is always to provide you with excellent care. Hearing back from our patients is one way we can continue to improve our services. Please take a few minutes to complete the written survey that you may receive in the mail after your visit with us. Thank you!             Your Updated Medication List - Protect others around you: Learn how to safely use, store and throw away your medicines at www.disposemymeds.org.          This list is accurate as of 2/20/18  3:51 PM.  Always use your most recent med list.                   Brand Name Dispense Instructions for use Diagnosis    ABILIFY 2 MG tablet   Generic drug:  ARIPiprazole      Take 2 mg by mouth daily        allopurinol 100 MG tablet    ZYLOPRIM    180 tablet    Take 2 tablets (200 mg) by mouth daily    Acute idiopathic gout of right ankle       amLODIPine 10 MG tablet    NORVASC    90 tablet    TAKE ONE TABLET BY MOUTH ONCE DAILY WITH  DINNER    Benign essential hypertension       aspirin 81 MG tablet     30 tablet    Take 1 tablet (81 mg) by mouth daily    Essential hypertension        carbidopa-levodopa  MG per tablet    SINEMET    270 tablet    Take 1 tablet by mouth 3 times daily    Parkinsonism, unspecified Parkinsonism type (H)       carvedilol 12.5 MG tablet    COREG    180 tablet    TAKE ONE TABLET BY MOUTH TWICE DAILY WITH MEALS    Benign essential hypertension       chlorthalidone 25 MG tablet    HYGROTON    90 tablet    Take 1 tablet (25 mg) by mouth daily    Benign essential hypertension       cyanocobalamin 2500 MCG sublingual tablet    VITAMIN B-12    90 tablet    Place 2,500 mcg under the tongue daily    Vitamin B 12 deficiency       divalproex sodium delayed-release 500 MG DR tablet    DEPAKOTE    180 tablet    Take 1 tablet (500 mg) by mouth 2 times daily    H/O idiopathic seizure       furosemide 20 MG tablet    LASIX    30 tablet    Take 2 tablets (40 mg) by mouth as needed    Benign essential hypertension       levETIRAcetam 1000 MG Tabs    KEPPRA    180 tablet    1 tablet in morning and bed time.    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        omeprazole 40 MG capsule    priLOSEC    30 capsule    Take 1 capsule (40 mg) by mouth daily Take 30-60 minutes before a meal.    Gastroesophageal reflux disease, esophagitis presence not specified       order for DME      CPAP daily        potassium chloride SA 20 MEQ CR tablet    KLOR-CON    270 tablet    Take 1 tablet (20 mEq) by mouth 3 times daily    Hypokalemia       simvastatin 20 MG tablet    ZOCOR    180 tablet    TAKE TWO TABLETS BY MOUTH EVERY NIGHT AT BEDTIME    Pure hypercholesterolemia       TEMAZEPAM PO      Take 15 mg by mouth At Bedtime        triamcinolone 0.1 % cream    KENALOG    80 g    Apply sparingly to affected area three times daily as needed    Eczema, unspecified type       vitamin D 2000 UNITS tablet     90  tablet    Take 2,000 Units by mouth daily    Vitamin D deficiency

## 2018-03-04 RX ORDER — FERROUS SULFATE 325(65) MG
325 TABLET ORAL 2 TIMES DAILY
Qty: 60 TABLET | Refills: 2 | Status: SHIPPED | OUTPATIENT
Start: 2018-03-04

## 2018-03-31 ENCOUNTER — OFFICE VISIT (OUTPATIENT)
Dept: URGENT CARE | Facility: URGENT CARE | Age: 68
End: 2018-03-31
Payer: MEDICARE

## 2018-03-31 VITALS
BODY MASS INDEX: 32.14 KG/M2 | RESPIRATION RATE: 16 BRPM | HEART RATE: 74 BPM | SYSTOLIC BLOOD PRESSURE: 123 MMHG | OXYGEN SATURATION: 100 % | WEIGHT: 224 LBS | DIASTOLIC BLOOD PRESSURE: 61 MMHG | TEMPERATURE: 97.8 F

## 2018-03-31 DIAGNOSIS — J06.9 VIRAL URI: ICD-10-CM

## 2018-03-31 DIAGNOSIS — R07.0 THROAT PAIN: Primary | ICD-10-CM

## 2018-03-31 LAB
DEPRECATED S PYO AG THROAT QL EIA: NORMAL
SPECIMEN SOURCE: NORMAL

## 2018-03-31 PROCEDURE — 87880 STREP A ASSAY W/OPTIC: CPT | Performed by: NURSE PRACTITIONER

## 2018-03-31 PROCEDURE — 99213 OFFICE O/P EST LOW 20 MIN: CPT | Performed by: NURSE PRACTITIONER

## 2018-03-31 PROCEDURE — 87081 CULTURE SCREEN ONLY: CPT | Performed by: NURSE PRACTITIONER

## 2018-03-31 RX ORDER — FLUTICASONE PROPIONATE 50 MCG
1-2 SPRAY, SUSPENSION (ML) NASAL DAILY
Qty: 1 BOTTLE | Refills: 0 | Status: SHIPPED | OUTPATIENT
Start: 2018-03-31 | End: 2018-04-07

## 2018-03-31 ASSESSMENT — ENCOUNTER SYMPTOMS
RHINORRHEA: 0
DIARRHEA: 0
SORE THROAT: 1
DIAPHORESIS: 0
VOMITING: 0
SHORTNESS OF BREATH: 0
CHILLS: 0
COUGH: 1
NAUSEA: 0
FEVER: 0

## 2018-03-31 NOTE — MR AVS SNAPSHOT
After Visit Summary   3/31/2018    Prashant Keyes    MRN: [id number]           Patient Information     Date Of Birth          1950        Visit Information        Provider Department      3/31/2018 9:15 AM Loren Dobbins NP [facility]        Today's Diagnoses     Throat pain    -  1    Viral URI          Care Instructions      When You Have a Sore Throat    A sore throat can be painful. There are many reasons why you may have a sore throat. Your healthcare provider will work with you to find the cause of your sore throat. He or she will also find the best treatment for you.  What causes a sore throat?  Sore throats can be caused or worsened by:    Cold or flu viruses    Bacteria    Irritants such as tobacco smoke or air pollution    Acid reflux  A healthy throat  The tonsils are on the sides of the throat near the base of the tongue. They collect viruses and bacteria and help fight infection. The throat (pharynx) is the passage for air. Mucus from the nasal cavity also moves down the passage.  An inflamed throat  The tonsils and pharynx can become inflamed due to a cold or flu virus. Postnasal drip (excess mucus draining from the nasal cavity) can irritate the throat. It can also make the throat or tonsils more likely to be infected by bacteria. Severe, untreated tonsillitis in children or adults can cause a pocket of pus (abscess) to form near the tonsil.  Your evaluation  A medical evaluation can help find the cause of your sore throat. It can also help your healthcare provider choose the best treatment for you. The evaluation may include a health history, physical exam, and diagnostic tests.  Health history  Your healthcare provider may ask you the following:    How long has the sore throat lasted and how have you been treating it?    Do you have any other symptoms, such as body aches, fever, or cough?    Does your sore throat recur? If so, how often? How many days of  "school or work have you missed because of a sore throat?    Do you have trouble eating or swallowing?    Have you been told that you snore or have other sleep problems?    Do you have bad breath?    Do you cough up bad-tasting mucus?  Physical exam  During the exam, your healthcare provider checks your ears, nose, and throat for problems. He or she also checks for swelling in the neck, and may listen to your chest.  Possible tests  Other tests your healthcare provider may perform include:    A throat swab to check for bacteria such as streptococcus (the bacteria that causes strep throat)    A blood test to check for mononucleosis (a viral infection)    A chest X-ray to rule out pneumonia, especially if you have a cough  Treating a sore throat  Treatment depends on many factors. What is the likely cause? Is the problem recent? Does it keep coming back? In many cases, the best thing to do is to treat the symptoms, rest, and let the problem heal itself. Antibiotics may help clear up some bacterial infections. For cases of severe or recurring tonsillitis, the tonsils may need to be removed.  Relieving your symptoms    Don t smoke, and avoid secondhand smoke.    For children, try throat sprays or Popsicles. Adults and older children may try lozenges.    Drink warm liquids to soothe the throat and help thin mucus. Avoid alcohol, spicy foods, and acidic drinks such as orange juice. These can irritate the throat.    Gargle with warm saltwater (1 teaspoon of salt to 8 ounces of warm water).    Use a humidifier to keep air moist and relieve throat dryness.    Try over-the-counter pain relievers such as acetaminophen or ibuprofen. Use as directed, and don t exceed the recommended dose. Don t give aspirin to children.   Are antibiotics needed?  If your sore throat is due to a bacterial infection, antibiotics may speed healing and prevent complications. Although group A streptococcus (\"strep throat\" or GAS) is the major " treatable infection for a sore throat, GAS causes only 5% to 15% of sore throats in adults who seek medical care. Most sore throats are caused by cold or flu viruses. And antibiotics don t treat viral illness. In fact, using antibiotics when they re not needed may produce bacteria that are harder to kill. Your healthcare provider will prescribe antibiotics only if he or she thinks they are likely to help.  If antibiotics are prescribed  Take the medicine exactly as directed. Be sure to finish your prescription even if you re feeling better. And be sure to ask your healthcare provider or pharmacist what side effects are common and what to do about them.  Is surgery needed?  In some cases, tonsils need to be removed. This is often done as outpatient (same-day) surgery. Your healthcare provider may advise removing the tonsils in cases of:    Several severe bouts of tonsillitis in a year.  Severe  episodes include those that lead to missed days of school or work, or that need to be treated with antibiotics.    Tonsillitis that causes breathing problems during sleep    Tonsillitis caused by food particles collecting in pouches in the tonsils (cryptic tonsillitis)  Call your healthcare provider if any of the following occur:    Symptoms worsen, or new symptoms develop.    Swollen tonsils make breathing difficult.    The pain is severe enough to keep you from drinking liquids.    A skin rash, hives, or wheezing develops. Any of these could signal an allergic reaction to antibiotics.    Symptoms don t improve within a week.    Symptoms don t improve within 2 to 3 days of starting antibiotics.   Date Last Reviewed: 10/1/2016    0104-4702 [employer]. [address] [place] [address], [place] [address]. All rights reserved. This information is not intended as a substitute for professional medical care. Always follow your healthcare professional's instructions.                Follow-ups after your visit        Who to  contact     If you have questions or need follow up information about today's clinic visit or your schedule please contact [facility] directly at [phone].  Normal or non-critical lab and imaging results will be communicated to you by Satmexhart, letter or phone within 4 business days after the clinic has received the results. If you do not hear from us within 7 days, please contact the clinic through Satmexhart or phone. If you have a critical or abnormal lab result, we will notify you by phone as soon as possible.  Submit refill requests through PaletteApp or call your pharmacy and they will forward the refill request to us. Please allow 3 business days for your refill to be completed.          Additional Information About Your Visit        PaletteApp Information     [employer]App gives you secure access to your electronic health record. If you see a primary care provider, you can also send messages to your care team and make appointments. If you have questions, please call your primary care clinic.  If you do not have a primary care provider, please call [phone] and they will assist you.        Care EveryWhere ID     This is your Care EveryWhere ID. This could be used by other organizations to access your [place] medical records  [id number]        Your Vitals Were     Pulse Temperature Respirations Pulse Oximetry BMI (Body Mass Index)       74 97.8  F (36.6  C) 16 100% 32.14 kg/m2        Blood Pressure from Last 3 Encounters:   03/31/18 123/61   02/20/18 126/73   02/14/18 129/63    Weight from Last 3 Encounters:   03/31/18 224 lb (101.6 kg)   02/20/18 224 lb (101.6 kg)   02/14/18 223 lb 3.2 oz (101.2 kg)              We Performed the Following     Beta strep group A culture     Strep, Rapid Screen          Today's Medication Changes          These changes are accurate as of 3/31/18  9:55 AM.  If you have any questions, ask your nurse or doctor.               Start taking these medicines.         Dose/Directions    fluticasone 50 MCG/ACT spray   Commonly known as:  FLONASE   Used for:  Viral URI        Dose:  1-2 spray   Spray 1-2 sprays into both nostrils daily for 7 days   Quantity:  1 Bottle   Refills:  0            Where to get your medicines      These [place] [facility] [place] [address]  [address], [facility] [address]     Phone:  [phone]     fluticasone 50 MCG/ACT spray                Primary Care Provider Office Phone # Fax #    Matt Swan PA-C [phone]       [address] [place] [address]        Equal Access to Services     [facility] MURPHY : Hadii aad ku hadasho Soomaali, waaxda luqadaha, qaybta kaalmada adeegyada, waxay idiin haysilvestre gaines . So [facility] [phone].    ATENCIÓN: Si habla español, tiene a velasquez disposición servicios gratuitos de asistencia lingüística. [facility] [phone].    We comply with applicable federal civil rights laws and [place] laws. We do not discriminate on the basis of race, color, national origin, age, disability, sex, sexual orientation, or gender identity.            Thank you!     Thank you for choosing [facility]  for your care. Our goal is always to provide you with excellent care. Hearing back from our patients is one way we can continue to improve our services. Please take a few minutes to complete the written survey that you may receive in the mail after your visit with us. Thank you!             Your Updated Medication List - Protect others around you: Learn how to safely use, store and throw away your medicines at www.disposemymeds.org.          This list is accurate as of 3/31/18  9:55 AM.  Always use your most recent med list.                   Brand Name Dispense Instructions for use Diagnosis    ABILIFY 2 MG tablet   Generic drug:  ARIPiprazole      Take 2 mg by mouth daily        allopurinol 100 MG tablet    ZYLOPRIM     180 tablet    Take 2 tablets (200 mg) by mouth daily    Acute idiopathic gout of right ankle       amLODIPine 10 MG tablet    NORVASC    90 tablet    TAKE ONE TABLET BY MOUTH ONCE DAILY WITH  DINNER    Benign essential hypertension       aspirin 81 MG tablet     30 tablet    Take 1 tablet (81 mg) by mouth daily    Essential hypertension       carbidopa-levodopa  MG per tablet    SINEMET    270 tablet    Take 1 tablet by mouth 3 times daily    Parkinsonism, unspecified Parkinsonism type (H)       carvedilol 12.5 MG tablet    COREG    180 tablet    TAKE ONE TABLET BY MOUTH TWICE DAILY WITH MEALS    Benign essential hypertension       chlorthalidone 25 MG tablet    HYGROTON    90 tablet    Take 1 tablet (25 mg) by mouth daily    Benign essential hypertension       cyanocobalamin 2500 MCG sublingual tablet    VITAMIN B-12    90 tablet    Place 2,500 mcg under the tongue daily    Vitamin B 12 deficiency       divalproex sodium delayed-release 500 MG DR tablet    DEPAKOTE    180 tablet    Take 1 tablet (500 mg) by mouth 2 times daily    H/O idiopathic seizure       ferrous sulfate 325 (65 FE) MG tablet    IRON    60 tablet    Take 1 tablet (325 mg) by mouth 2 times daily    Anemia, unspecified type       fluticasone 50 MCG/ACT spray    FLONASE    1 Bottle    Spray 1-2 sprays into both nostrils daily for 7 days    Viral URI       furosemide 20 MG tablet    LASIX    30 tablet    Take 2 tablets (40 mg) by mouth as needed    Benign essential hypertension       levETIRAcetam 1000 MG Tabs    KEPPRA    180 tablet    1 tablet in morning and bed time.    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        omeprazole 40 MG capsule    priLOSEC    30 capsule    Take 1 capsule (40 mg) by mouth daily Take 30-60 minutes before a meal.     Gastroesophageal reflux disease, esophagitis presence not specified       order for DME      CPAP daily        potassium chloride SA 20 MEQ CR tablet    KLOR-CON    270 tablet    Take 1 tablet (20 mEq) by mouth 3 times daily    Hypokalemia       simvastatin 20 MG tablet    ZOCOR    180 tablet    TAKE TWO TABLETS BY MOUTH EVERY NIGHT AT BEDTIME    Pure hypercholesterolemia       TEMAZEPAM PO      Take 15 mg by mouth At Bedtime        triamcinolone 0.1 % cream    KENALOG    80 g    Apply sparingly to affected area three times daily as needed    Eczema, unspecified type       vitamin D 2000 UNITS tablet     90 tablet    Take 2,000 Units by mouth daily    Vitamin D deficiency

## 2018-03-31 NOTE — PATIENT INSTRUCTIONS
When You Have a Sore Throat    A sore throat can be painful. There are many reasons why you may have a sore throat. Your healthcare provider will work with you to find the cause of your sore throat. He or she will also find the best treatment for you.  What causes a sore throat?  Sore throats can be caused or worsened by:    Cold or flu viruses    Bacteria    Irritants such as tobacco smoke or air pollution    Acid reflux  A healthy throat  The tonsils are on the sides of the throat near the base of the tongue. They collect viruses and bacteria and help fight infection. The throat (pharynx) is the passage for air. Mucus from the nasal cavity also moves down the passage.  An inflamed throat  The tonsils and pharynx can become inflamed due to a cold or flu virus. Postnasal drip (excess mucus draining from the nasal cavity) can irritate the throat. It can also make the throat or tonsils more likely to be infected by bacteria. Severe, untreated tonsillitis in children or adults can cause a pocket of pus (abscess) to form near the tonsil.  Your evaluation  A medical evaluation can help find the cause of your sore throat. It can also help your healthcare provider choose the best treatment for you. The evaluation may include a health history, physical exam, and diagnostic tests.  Health history  Your healthcare provider may ask you the following:    How long has the sore throat lasted and how have you been treating it?    Do you have any other symptoms, such as body aches, fever, or cough?    Does your sore throat recur? If so, how often? How many days of school or work have you missed because of a sore throat?    Do you have trouble eating or swallowing?    Have you been told that you snore or have other sleep problems?    Do you have bad breath?    Do you cough up bad-tasting mucus?  Physical exam  During the exam, your healthcare provider checks your ears, nose, and throat for problems. He or she also checks for  "swelling in the neck, and may listen to your chest.  Possible tests  Other tests your healthcare provider may perform include:    A throat swab to check for bacteria such as streptococcus (the bacteria that causes strep throat)    A blood test to check for mononucleosis (a viral infection)    A chest X-ray to rule out pneumonia, especially if you have a cough  Treating a sore throat  Treatment depends on many factors. What is the likely cause? Is the problem recent? Does it keep coming back? In many cases, the best thing to do is to treat the symptoms, rest, and let the problem heal itself. Antibiotics may help clear up some bacterial infections. For cases of severe or recurring tonsillitis, the tonsils may need to be removed.  Relieving your symptoms    Don t smoke, and avoid secondhand smoke.    For children, try throat sprays or Popsicles. Adults and older children may try lozenges.    Drink warm liquids to soothe the throat and help thin mucus. Avoid alcohol, spicy foods, and acidic drinks such as orange juice. These can irritate the throat.    Gargle with warm saltwater (1 teaspoon of salt to 8 ounces of warm water).    Use a humidifier to keep air moist and relieve throat dryness.    Try over-the-counter pain relievers such as acetaminophen or ibuprofen. Use as directed, and don t exceed the recommended dose. Don t give aspirin to children.   Are antibiotics needed?  If your sore throat is due to a bacterial infection, antibiotics may speed healing and prevent complications. Although group A streptococcus (\"strep throat\" or GAS) is the major treatable infection for a sore throat, GAS causes only 5% to 15% of sore throats in adults who seek medical care. Most sore throats are caused by cold or flu viruses. And antibiotics don t treat viral illness. In fact, using antibiotics when they re not needed may produce bacteria that are harder to kill. Your healthcare provider will prescribe antibiotics only if he or " she thinks they are likely to help.  If antibiotics are prescribed  Take the medicine exactly as directed. Be sure to finish your prescription even if you re feeling better. And be sure to ask your healthcare provider or pharmacist what side effects are common and what to do about them.  Is surgery needed?  In some cases, tonsils need to be removed. This is often done as outpatient (same-day) surgery. Your healthcare provider may advise removing the tonsils in cases of:    Several severe bouts of tonsillitis in a year.  Severe  episodes include those that lead to missed days of school or work, or that need to be treated with antibiotics.    Tonsillitis that causes breathing problems during sleep    Tonsillitis caused by food particles collecting in pouches in the tonsils (cryptic tonsillitis)  Call your healthcare provider if any of the following occur:    Symptoms worsen, or new symptoms develop.    Swollen tonsils make breathing difficult.    The pain is severe enough to keep you from drinking liquids.    A skin rash, hives, or wheezing develops. Any of these could signal an allergic reaction to antibiotics.    Symptoms don t improve within a week.    Symptoms don t improve within 2 to 3 days of starting antibiotics.   Date Last Reviewed: 10/1/2016    2430-1413 [employer]. [address] [place] [address], [place] [address]. All rights reserved. This information is not intended as a substitute for professional medical care. Always follow your healthcare professional's instructions.

## 2018-03-31 NOTE — PROGRESS NOTES
SUBJECTIVE:   Prashant Keyes is a 67 year old male presenting with a chief complaint of   Chief Complaint   Patient presents with     Pharyngitis     Fever       He is an established patient of [facility].    Onset of symptoms was 3 day(s) ago.  Course of illness is worsening.    Severity moderate  Current and Associated symptoms: runny nose, cough - non-productive and sore throat  Treatment measures tried include None tried.  Predisposing factors include None.      Review of Systems   Constitutional: Negative for chills, diaphoresis and fever.   HENT: Positive for congestion and sore throat. Negative for ear pain and rhinorrhea.    Respiratory: Positive for cough. Negative for shortness of breath.    Gastrointestinal: Negative for diarrhea, nausea and vomiting.       Past Medical History:   Diagnosis Date     Calculus of kidney ~1975     Carpal tunnel syndrome 11/94     Concussion, unspecified 12/95     Eczema 11/16/2011     Epileptic seizure (H) 1995    diagnosed 1995, controlled with Depakote and Keppra     Fibromyalgia syndrome ~2000    per pt     Hypertension      Left testicle cyst      Photosensitive contact dermatitis      Prostatitis, unspecified      Seizures (H)     Diagnosed 1995, controlled with Depakote and Keppra     Umbilical hernia 11/26/2012     Unspecified asthma(493.90)      Family History   Problem Relation Age of Onset     CEREBROVASCULAR DISEASE Mother      60's     C.A.D. Mother      CABG 75     Arthritis Mother      Eye Disorder Mother      HEART DISEASE Mother      Cardiovascular Father      Rheumatic Heart Disease     Hypertension Brother      Lipids Brother      C.A.D. Brother      CABG 46     Arthritis Brother      HEART DISEASE Brother      CABG x5     Obesity Brother      Hypertension Brother      Lipids Brother      Thyroid Disease Brother      Alcohol/Drug Brother      HEART DISEASE Brother      CABG     CANCER Sister      Thyroid CA     Hypertension Sister      Obesity Sister       Thyroid Disease Sister      Hemochromatosis Sister      Depression Daughter      Depression Daughter      Depression Son      DIABETES Son      Depression Son      GASTROINTESTINAL DISEASE Brother      Crohn's Disease-Ostomy     Arrhythmia Sister      CANCER Maternal Grandmother      Thyroid CA     CANCER Paternal Grandfather      Throat CA     Thyroid Disease Maternal Grandfather      Current Outpatient Prescriptions   Medication Sig Dispense Refill     fluticasone (FLONASE) 50 MCG/ACT spray Spray 1-2 sprays into both nostrils daily for 7 days 1 Bottle 0     ferrous sulfate (IRON) 325 (65 FE) MG tablet Take 1 tablet (325 mg) by mouth 2 times daily 60 tablet 2     losartan (COZAAR) 100 MG tablet Take 1 tablet (100 mg) by mouth daily 90 tablet 1     chlorthalidone (HYGROTON) 25 MG tablet Take 1 tablet (25 mg) by mouth daily 90 tablet 1     carvedilol (COREG) 12.5 MG tablet TAKE ONE TABLET BY MOUTH TWICE DAILY WITH MEALS 180 tablet 1     simvastatin (ZOCOR) 20 MG tablet TAKE TWO TABLETS BY MOUTH EVERY NIGHT AT BEDTIME 180 tablet 1     furosemide (LASIX) 20 MG tablet Take 2 tablets (40 mg) by mouth as needed 30 tablet 3     carbidopa-levodopa (SINEMET)  MG per tablet Take 1 tablet by mouth 3 times daily 270 tablet 1     divalproex sodium delayed-release (DEPAKOTE) 500 MG DR tablet Take 1 tablet (500 mg) by mouth 2 times daily 180 tablet 1     potassium chloride SA (KLOR-CON) 20 MEQ CR tablet Take 1 tablet (20 mEq) by mouth 3 times daily 270 tablet 1     amLODIPine (NORVASC) 10 MG tablet TAKE ONE TABLET BY MOUTH ONCE DAILY WITH  DINNER 90 tablet 1     allopurinol (ZYLOPRIM) 100 MG tablet Take 2 tablets (200 mg) by mouth daily 180 tablet 3     levETIRAcetam (KEPPRA) 1000 MG TABS 1 tablet in morning and bed time. 180 tablet 3     omeprazole (PRILOSEC) 40 MG capsule Take 1 capsule (40 mg) by mouth daily Take 30-60 minutes before a meal. 30 capsule 11     triamcinolone (KENALOG) 0.1 % cream Apply sparingly to affected  area three times daily as needed 80 g 0     TEMAZEPAM PO Take 15 mg by mouth At Bedtime       Cholecalciferol (VITAMIN D) 2000 UNITS tablet Take 2,000 Units by mouth daily 90 tablet 3     Cyanocobalamin (VITAMIN  B-12) 2500 MCG tablet Place 2,500 mcg under the tongue daily 90 tablet 3     aspirin 81 MG tablet Take 1 tablet (81 mg) by mouth daily 30 tablet      ARIPiprazole (ABILIFY) 2 MG tablet Take 2 mg by mouth daily       ORDER FOR DME CPAP daily       Multiple Vitamin (MULTI VITAMIN MENS PO) Take  by mouth.       Social History   Substance Use Topics     Smoking status: Never Smoker     Smokeless tobacco: Never Used     Alcohol use No      Comment: Quit since 2003.        OBJECTIVE  /61  Pulse 74  Temp 97.8  F (36.6  C)  Resp 16  Wt 224 lb (101.6 kg)  SpO2 100%  BMI 32.14 kg/m2    Physical Exam   Constitutional: He appears well-developed and well-nourished. No distress.   HENT:   Head: Normocephalic and atraumatic.   Right Ear: Tympanic membrane and external ear normal.   Left Ear: Tympanic membrane and external ear normal.   Nose: Mucosal edema and rhinorrhea present.   Mouth/Throat: Posterior oropharyngeal erythema present. Tonsils are 0 on the right. Tonsils are 0 on the left.   Eyes: EOM are normal. Pupils are equal, round, and reactive to light.   Neck: Normal range of motion. Neck supple.   Pulmonary/Chest: Effort normal and breath sounds normal. No respiratory distress.   Lymphadenopathy:     He has no cervical adenopathy.   Neurological: He is alert. No cranial nerve deficit.   Skin: Skin is warm and dry. He is not diaphoretic.   Psychiatric: He has a normal mood and affect.   Nursing note and vitals reviewed.      Labs:  Results for orders placed or performed in visit on 03/31/18 (from the past 24 hour(s))   Strep, Rapid Screen   Result Value Ref Range    Specimen Description Throat     Rapid Strep A Screen       NEGATIVE: No Group A streptococcal antigen detected by immunoassay, await  culture report.         ASSESSMENT:      ICD-10-CM    1. Throat pain R07.0 Strep, Rapid Screen     Beta strep group A culture   2. Viral URI J06.9 fluticasone (FLONASE) 50 MCG/ACT spray    B97.89         PLAN:  I discussed lab results with the patient.  Likely a viral upper respiratory infection causing sore throat. Will wait for throat cultures.   Advised to push fluids, saline gargles, rest, symptomatic treatment as needed.       Patient Instructions       When You Have a Sore Throat    A sore throat can be painful. There are many reasons why you may have a sore throat. Your healthcare provider will work with you to find the cause of your sore throat. He or she will also find the best treatment for you.  What causes a sore throat?  Sore throats can be caused or worsened by:    Cold or flu viruses    Bacteria    Irritants such as tobacco smoke or air pollution    Acid reflux  A healthy throat  The tonsils are on the sides of the throat near the base of the tongue. They collect viruses and bacteria and help fight infection. The throat (pharynx) is the passage for air. Mucus from the nasal cavity also moves down the passage.  An inflamed throat  The tonsils and pharynx can become inflamed due to a cold or flu virus. Postnasal drip (excess mucus draining from the nasal cavity) can irritate the throat. It can also make the throat or tonsils more likely to be infected by bacteria. Severe, untreated tonsillitis in children or adults can cause a pocket of pus (abscess) to form near the tonsil.  Your evaluation  A medical evaluation can help find the cause of your sore throat. It can also help your healthcare provider choose the best treatment for you. The evaluation may include a health history, physical exam, and diagnostic tests.  Health history  Your healthcare provider may ask you the following:    How long has the sore throat lasted and how have you been treating it?    Do you have any other symptoms, such as body  aches, fever, or cough?    Does your sore throat recur? If so, how often? How many days of school or work have you missed because of a sore throat?    Do you have trouble eating or swallowing?    Have you been told that you snore or have other sleep problems?    Do you have bad breath?    Do you cough up bad-tasting mucus?  Physical exam  During the exam, your healthcare provider checks your ears, nose, and throat for problems. He or she also checks for swelling in the neck, and may listen to your chest.  Possible tests  Other tests your healthcare provider may perform include:    A throat swab to check for bacteria such as streptococcus (the bacteria that causes strep throat)    A blood test to check for mononucleosis (a viral infection)    A chest X-ray to rule out pneumonia, especially if you have a cough  Treating a sore throat  Treatment depends on many factors. What is the likely cause? Is the problem recent? Does it keep coming back? In many cases, the best thing to do is to treat the symptoms, rest, and let the problem heal itself. Antibiotics may help clear up some bacterial infections. For cases of severe or recurring tonsillitis, the tonsils may need to be removed.  Relieving your symptoms    Don t smoke, and avoid secondhand smoke.    For children, try throat sprays or Popsicles. Adults and older children may try lozenges.    Drink warm liquids to soothe the throat and help thin mucus. Avoid alcohol, spicy foods, and acidic drinks such as orange juice. These can irritate the throat.    Gargle with warm saltwater (1 teaspoon of salt to 8 ounces of warm water).    Use a humidifier to keep air moist and relieve throat dryness.    Try over-the-counter pain relievers such as acetaminophen or ibuprofen. Use as directed, and don t exceed the recommended dose. Don t give aspirin to children.   Are antibiotics needed?  If your sore throat is due to a bacterial infection, antibiotics may speed healing and prevent  "complications. Although group A streptococcus (\"strep throat\" or GAS) is the major treatable infection for a sore throat, GAS causes only 5% to 15% of sore throats in adults who seek medical care. Most sore throats are caused by cold or flu viruses. And antibiotics don t treat viral illness. In fact, using antibiotics when they re not needed may produce bacteria that are harder to kill. Your healthcare provider will prescribe antibiotics only if he or she thinks they are likely to help.  If antibiotics are prescribed  Take the medicine exactly as directed. Be sure to finish your prescription even if you re feeling better. And be sure to ask your healthcare provider or pharmacist what side effects are common and what to do about them.  Is surgery needed?  In some cases, tonsils need to be removed. This is often done as outpatient (same-day) surgery. Your healthcare provider may advise removing the tonsils in cases of:    Several severe bouts of tonsillitis in a year.  Severe  episodes include those that lead to missed days of school or work, or that need to be treated with antibiotics.    Tonsillitis that causes breathing problems during sleep    Tonsillitis caused by food particles collecting in pouches in the tonsils (cryptic tonsillitis)  Call your healthcare provider if any of the following occur:    Symptoms worsen, or new symptoms develop.    Swollen tonsils make breathing difficult.    The pain is severe enough to keep you from drinking liquids.    A skin rash, hives, or wheezing develops. Any of these could signal an allergic reaction to antibiotics.    Symptoms don t improve within a week.    Symptoms don t improve within 2 to 3 days of starting antibiotics.   Date Last Reviewed: 10/1/2016    8412-9427 [employer]. [address] [place] [address], [place] [address]. All rights reserved. This information is not intended as a substitute for professional medical care. Always follow your healthcare " professional's instructions.

## 2018-04-01 LAB
BACTERIA SPEC CULT: NORMAL
SPECIMEN SOURCE: NORMAL

## 2018-05-15 ENCOUNTER — OFFICE VISIT (OUTPATIENT)
Dept: FAMILY MEDICINE | Facility: CLINIC | Age: 68
End: 2018-05-15
Payer: MEDICARE

## 2018-05-15 ENCOUNTER — RADIANT APPOINTMENT (OUTPATIENT)
Dept: GENERAL RADIOLOGY | Facility: CLINIC | Age: 68
End: 2018-05-15
Attending: PHYSICIAN ASSISTANT
Payer: MEDICARE

## 2018-05-15 VITALS
SYSTOLIC BLOOD PRESSURE: 130 MMHG | BODY MASS INDEX: 31.85 KG/M2 | OXYGEN SATURATION: 99 % | RESPIRATION RATE: 18 BRPM | HEART RATE: 66 BPM | DIASTOLIC BLOOD PRESSURE: 73 MMHG | WEIGHT: 222 LBS | TEMPERATURE: 98 F

## 2018-05-15 DIAGNOSIS — M54.6 THORACOLUMBAR BACK PAIN: Primary | ICD-10-CM

## 2018-05-15 DIAGNOSIS — M54.50 THORACOLUMBAR BACK PAIN: Primary | ICD-10-CM

## 2018-05-15 DIAGNOSIS — M54.50 THORACOLUMBAR BACK PAIN: ICD-10-CM

## 2018-05-15 DIAGNOSIS — M54.6 THORACOLUMBAR BACK PAIN: ICD-10-CM

## 2018-05-15 PROCEDURE — 72100 X-RAY EXAM L-S SPINE 2/3 VWS: CPT | Mod: FY

## 2018-05-15 PROCEDURE — 99213 OFFICE O/P EST LOW 20 MIN: CPT | Performed by: PHYSICIAN ASSISTANT

## 2018-05-15 RX ORDER — DICLOFENAC SODIUM 75 MG/1
75 TABLET, DELAYED RELEASE ORAL 2 TIMES DAILY PRN
Qty: 30 TABLET | Refills: 1 | Status: SHIPPED | OUTPATIENT
Start: 2018-05-15 | End: 2018-08-16

## 2018-05-15 RX ORDER — METHOCARBAMOL 500 MG/1
500-1000 TABLET, FILM COATED ORAL
Qty: 30 TABLET | Refills: 0 | Status: SHIPPED | OUTPATIENT
Start: 2018-05-15 | End: 2018-08-16

## 2018-05-15 NOTE — PROGRESS NOTES
SUBJECTIVE:   Prashant Keyes is a 67 year old male who presents to clinic today for the following health issues:      Back Pain       Duration: 1 week        Specific cause: pushing line of carts    Description:   Location of pain: low back bilateral and upper back bilateral  Character of pain: sharp  Pain radiation:none  New numbness or weakness in legs, not attributed to pain:  no     Intensity: moderate    History:   Pain interferes with job: YES  History of back problems: no prior back problems  Any previous MRI or X-rays: None  Sees a specialist for back pain:  No  Therapies tried without relief: none    Alleviating factors:   Improved by: tylenol      Precipitating factors:  Worsened by: Lifting, Bending and Standing    Functional and Psychosocial Screen (Harshil STarT Back):      Not performed today          Accompanying Signs & Symptoms:  Risk of Fracture:  None  Risk of Cauda Equina:  None  Risk of Infection:  None  Risk of Cancer:  None  Risk of Ankylosing Spondylitis:  Onset at age <35, male, AND morning back stiffness. no                      Problem list and histories reviewed & adjusted, as indicated.  Additional history: as documented    BP Readings from Last 3 Encounters:   05/15/18 130/73   03/31/18 123/61   02/20/18 126/73    Wt Readings from Last 3 Encounters:   05/15/18 222 lb (100.7 kg)   03/31/18 224 lb (101.6 kg)   02/20/18 224 lb (101.6 kg)                    Reviewed and updated as needed this visit by clinical staff  Tobacco       Reviewed and updated as needed this visit by Provider         All other systems negative except as outline above  OBJECTIVE:  BACK: Lumbosacral spine area reveals local tenderness.  Painful and reduced LS ROM noted. Straight leg raise is neg .  DTR's, motor strength and sensation normal, including heel and toe gait.  Perifpheral pulses are palpable.  Hipes and knees have full range of motion without pain.  No abdominal tenderness, mass or  organomegaly.  CHEST:chest clear to IPPA, no tachypnea, retractions or cyanosis and S1, S2 normal, no murmur, no gallop, rate regular.  No rash  Upper and lower extremity strength rom and dtr's normal     Prashant was seen today for back pain.    Diagnoses and all orders for this visit:    Thoracolumbar back pain  -     XR Lumbar Spine 2/3 Views; Future  -     diclofenac (VOLTAREN) 75 MG EC tablet; Take 1 tablet (75 mg) by mouth 2 times daily as needed for moderate pain  -     methocarbamol (ROBAXIN) 500 MG tablet; Take 1-2 tablets (500-1,000 mg) by mouth nightly as needed for muscle spasms      Advised supportive and symptomatic treatment.  Follow up with Provider - if condition persists or worsens.

## 2018-05-15 NOTE — MR AVS SNAPSHOT
After Visit Summary   5/15/2018    Prashant Keyes    MRN: [id number]           Patient Information     Date Of Birth          1950        Visit Information        Provider Department      5/15/2018 11:00 AM Matt Swan PA-C [facility]        Today's Diagnoses     Thoracolumbar back pain    -  1       Follow-ups after your visit        Who to contact     Normal or non-critical lab and imaging results will be communicated to you by [employer], letter or phone within 4 business days after the clinic has received the results. If you do not hear from us within 7 days, please contact the clinic through [employer] or phone. If you have a critical or abnormal lab result, we will notify you by phone as soon as possible.  Submit refill requests through [employer] or call your pharmacy and they will forward the refill request to us. Please allow 3 business days for your refill to be completed.          If you need to speak with a  for additional information , please call: [phone]             Additional Information About Your Visit        [employer] Information     [employer] gives you secure access to your electronic health record. If you see a primary care provider, you can also send messages to your care team and make appointments. If you have questions, please call your primary care clinic.  If you do not have a primary care provider, please call [phone] and they will assist you.        Care EveryWhere ID     This is your Care EveryWhere ID. This could be used by other organizations to access your [place] medical records  [id number]        Your Vitals Were     Pulse Temperature Respirations Pulse Oximetry BMI (Body Mass Index)       66 98  F (36.7  C) (Tympanic) 18 99% 31.85 kg/m2        Blood Pressure from Last 3 Encounters:   05/15/18 130/73   03/31/18 123/61   02/20/18 126/73    Weight from Last 3 Encounters:   05/15/18 222 lb (100.7 kg)   03/31/18 224 lb (101.6  kg)   02/20/18 224 lb (101.6 kg)                 Today's Medication Changes          These changes are accurate as of 5/15/18 12:03 PM.  If you have any questions, ask your nurse or doctor.               Start taking these medicines.        Dose/Directions    diclofenac 75 MG EC tablet   Commonly known as:  VOLTAREN   Used for:  Thoracolumbar back pain   Started by:  Matt Swan PA-C        Dose:  75 mg   Take 1 tablet (75 mg) by mouth 2 times daily as needed for moderate pain   Quantity:  30 tablet   Refills:  1       methocarbamol 500 MG tablet   Commonly known as:  ROBAXIN   Used for:  Thoracolumbar back pain   Started by:  Matt Swan PA-C        Dose:  500-1000 mg   Take 1-2 tablets (500-1,000 mg) by mouth nightly as needed for muscle spasms   Quantity:  30 tablet   Refills:  0            Where to get your medicines      These medications were sent to [facility] - [address] [address] [address]     Phone:  [phone]     diclofenac 75 MG EC tablet    methocarbamol 500 MG tablet                Primary Care Provider Office Phone # Fax #    Matt Swan PA-C [phone]       [address]  [place] [address]        Equal Access to Services     [facility] AH: Hadii aad ku hadasho Soomaali, waaxda luqadaha, qaybta kaalmada adeegyada, waxay idiin hayaan adefreda kharabjorn gaines . So [facility] [phone].    ATENCIÓN: Si habla español, tiene a velasquez disposición servicios gratuitos de asistencia lingüística. Ariannaame al [phone].    We comply with applicable federal civil rights laws and [place] laws. We do not discriminate on the basis of race, color, national origin, age, disability, sex, sexual orientation, or gender identity.            Thank you!     Thank you for choosing [facility]  for your care. Our goal is always to provide you with excellent care. Hearing back from our patients is one way we can  continue to improve our services. Please take a few minutes to complete the written survey that you may receive in the mail after your visit with us. Thank you!             Your Updated Medication List - Protect others around you: Learn how to safely use, store and throw away your medicines at www.disposemymeds.org.          This list is accurate as of 5/15/18 12:03 PM.  Always use your most recent med list.                   Brand Name Dispense Instructions for use Diagnosis    ABILIFY 2 MG tablet   Generic drug:  ARIPiprazole      Take 2 mg by mouth daily        allopurinol 100 MG tablet    ZYLOPRIM    180 tablet    Take 2 tablets (200 mg) by mouth daily    Acute idiopathic gout of right ankle       amLODIPine 10 MG tablet    NORVASC    90 tablet    TAKE ONE TABLET BY MOUTH ONCE DAILY WITH  DINNER    Benign essential hypertension       aspirin 81 MG tablet     30 tablet    Take 1 tablet (81 mg) by mouth daily    Essential hypertension       carbidopa-levodopa  MG per tablet    SINEMET    270 tablet    Take 1 tablet by mouth 3 times daily    Parkinsonism, unspecified Parkinsonism type (H)       carvedilol 12.5 MG tablet    COREG    180 tablet    TAKE ONE TABLET BY MOUTH TWICE DAILY WITH MEALS    Benign essential hypertension       chlorthalidone 25 MG tablet    HYGROTON    90 tablet    Take 1 tablet (25 mg) by mouth daily    Benign essential hypertension       cyanocobalamin 2500 MCG sublingual tablet    VITAMIN B-12    90 tablet    Place 2,500 mcg under the tongue daily    Vitamin B 12 deficiency       diclofenac 75 MG EC tablet    VOLTAREN    30 tablet    Take 1 tablet (75 mg) by mouth 2 times daily as needed for moderate pain    Thoracolumbar back pain       divalproex sodium delayed-release 500 MG DR tablet    DEPAKOTE    180 tablet    Take 1 tablet (500 mg) by mouth 2 times daily    H/O idiopathic seizure       ferrous sulfate 325 (65 Fe) MG tablet    IRON    60 tablet    Take 1 tablet (325 mg) by mouth  2 times daily    Anemia, unspecified type       furosemide 20 MG tablet    LASIX    30 tablet    Take 2 tablets (40 mg) by mouth as needed    Benign essential hypertension       levETIRAcetam 1000 MG Tabs    KEPPRA    180 tablet    1 tablet in morning and bed time.    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        omeprazole 40 MG capsule    priLOSEC    30 capsule    Take 1 capsule (40 mg) by mouth daily Take 30-60 minutes before a meal.    Gastroesophageal reflux disease, esophagitis presence not specified       order for DME      CPAP daily        potassium chloride SA 20 MEQ CR tablet    KLOR-CON    270 tablet    Take 1 tablet (20 mEq) by mouth 3 times daily    Hypokalemia       simvastatin 20 MG tablet    ZOCOR    180 tablet    TAKE TWO TABLETS BY MOUTH EVERY NIGHT AT BEDTIME    Pure hypercholesterolemia       TEMAZEPAM PO      Take 15 mg by mouth At Bedtime        triamcinolone 0.1 % cream    KENALOG    80 g    Apply sparingly to affected area three times daily as needed    Eczema, unspecified type       vitamin D 2000 units tablet     90 tablet    Take 2,000 Units by mouth daily    Vitamin D deficiency

## 2018-07-17 DIAGNOSIS — M10.071 ACUTE IDIOPATHIC GOUT OF RIGHT ANKLE: Chronic | ICD-10-CM

## 2018-07-17 NOTE — TELEPHONE ENCOUNTER
"Requested Prescriptions   Pending Prescriptions Disp Refills     allopurinol (ZYLOPRIM) 100 MG tablet [Pharmacy Med Name: ALLOPURINOL 100MG TAB] 180 tablet 3    Last Written Prescription Date:  4/20/18  Last Fill Quantity: 180,  # refills: 3   Last office visit: 5/15/2018 with prescribing provider:  5/15/18 TOR Swan   Future Office Visit:   Sig: TAKE TWO TABLETS BY MOUTH ONCE DAILY    Gout Agents Protocol Failed    7/17/2018  6:02 PM       Failed - Has Uric Acid on file in past 12 months and value is less than 6    Recent Labs   Lab Test  07/19/16   1159   URIC  9.0*     If level is 6mg/dL or greater, ok to refill one time and refer to provider.          Passed - CBC on file in past 12 months    Recent Labs   Lab Test  02/20/18   1602   WBC  9.0   RBC  3.86*   HGB  12.2*   HCT  36.5*   PLT  221       For [facility] ONLY: OOGH856 = WBC, PPXZ133 = RBC         Passed - ALT on file in past 12 months    Recent Labs   Lab Test  01/29/18   1643   ALT  9            Passed - Recent (12 mo) or future (30 days) visit within the authorizing provider's specialty    Patient had office visit in the last 12 months or has a visit in the next 30 days with authorizing provider or within the authorizing provider's specialty.  See \"Patient Info\" tab in inbasket, or \"Choose Columns\" in Meds & Orders section of the refill encounter.           Passed - Patient is age 18 or older       Passed - Normal serum creatinine on file in the past 12 months    Recent Labs   Lab Test  01/29/18   1643   CR  1.20             "

## 2018-07-18 RX ORDER — ALLOPURINOL 100 MG/1
TABLET ORAL
Qty: 180 TABLET | Refills: 3 | Status: SHIPPED | OUTPATIENT
Start: 2018-07-18 | End: 2019-07-01

## 2018-07-19 DIAGNOSIS — G47.33 OSA (OBSTRUCTIVE SLEEP APNEA): Primary | ICD-10-CM

## 2018-08-13 ENCOUNTER — TELEPHONE (OUTPATIENT)
Dept: FAMILY MEDICINE | Facility: CLINIC | Age: 68
End: 2018-08-13

## 2018-08-13 NOTE — TELEPHONE ENCOUNTER
Patient states he would like to make an appointment for an ear wash.  Please call to schedule.    Thank you.

## 2018-08-16 ENCOUNTER — OFFICE VISIT (OUTPATIENT)
Dept: FAMILY MEDICINE | Facility: CLINIC | Age: 68
End: 2018-08-16
Payer: MEDICARE

## 2018-08-16 VITALS
SYSTOLIC BLOOD PRESSURE: 134 MMHG | TEMPERATURE: 97 F | HEART RATE: 75 BPM | DIASTOLIC BLOOD PRESSURE: 74 MMHG | BODY MASS INDEX: 32.35 KG/M2 | HEIGHT: 70 IN | WEIGHT: 226 LBS

## 2018-08-16 DIAGNOSIS — I10 HYPERTENSION GOAL BP (BLOOD PRESSURE) < 140/90: Chronic | ICD-10-CM

## 2018-08-16 DIAGNOSIS — H61.23 BILATERAL IMPACTED CERUMEN: Primary | ICD-10-CM

## 2018-08-16 DIAGNOSIS — E78.5 HYPERLIPIDEMIA LDL GOAL <130: Chronic | ICD-10-CM

## 2018-08-16 LAB
CREAT UR-MCNC: 92 MG/DL
MICROALBUMIN UR-MCNC: <5 MG/L
MICROALBUMIN/CREAT UR: NORMAL MG/G CR (ref 0–17)

## 2018-08-16 PROCEDURE — 99213 OFFICE O/P EST LOW 20 MIN: CPT | Performed by: PHYSICIAN ASSISTANT

## 2018-08-16 PROCEDURE — 82043 UR ALBUMIN QUANTITATIVE: CPT | Performed by: PHYSICIAN ASSISTANT

## 2018-08-16 NOTE — MR AVS SNAPSHOT
"              After Visit Summary   8/16/2018    Prashant Keyes    MRN: [id number]           Patient Information     Date Of Birth          1950        Visit Information        Provider Department      8/16/2018 7:20 AM Matt Swan PA-C [facility]        Today's Diagnoses     Bilateral impacted cerumen    -  1    Hypertension goal BP (blood pressure) < 140/90        Hyperlipidemia LDL goal <130           Follow-ups after your visit        Who to contact     Normal or non-critical lab and imaging results will be communicated to you by Lalinahart, letter or phone within 4 business days after the clinic has received the results. If you do not hear from us within 7 days, please contact the clinic through [employer] or phone. If you have a critical or abnormal lab result, we will notify you by phone as soon as possible.  Submit refill requests through Brickflow or call your pharmacy and they will forward the refill request to us. Please allow 3 business days for your refill to be completed.          If you need to speak with a  for additional information , please call: [phone]             Additional Information About Your Visit        [email or web address] Information     Brickflow gives you secure access to your electronic health record. If you see a primary care provider, you can also send messages to your care team and make appointments. If you have questions, please call your primary care clinic.  If you do not have a primary care provider, please call [phone] and they will assist you.        Care EveryWhere ID     This is your Care EveryWhere ID. This could be used by other organizations to access your [place] medical records  [id number]        Your Vitals Were     Pulse Temperature Height BMI (Body Mass Index)          75 97  F (36.1  C) (Tympanic) 5' 10\" (1.778 m) 32.43 kg/m2         Blood Pressure from Last 3 Encounters:   08/16/18 134/74   05/15/18 130/73   03/31/18 123/61    " Weight from Last 3 Encounters:   08/16/18 226 lb (102.5 kg)   05/15/18 222 lb (100.7 kg)   03/31/18 224 lb (101.6 kg)              We Performed the Following     Albumin Random Urine Quantitative with Creat Ratio     Lipid panel reflex to direct LDL Fasting        Primary Care Provider Office Phone # Fax #    Matt Swan PA-C [phone]       [address] [facility] [address]        Equal Access to Services     PRAVIN ARRINGTON : Hadii aad ku hadasho Soomaali, waaxda luqadaha, qaybta kaalmada adeegyada, waxay idiin hayaan adeeg kharash lagaldino . So [facility] [phone].    ATENCIÓN: Si habla español, tiene a velasquez disposición servicios gratuitos de asistencia lingüística. [facility] [phone].    We comply with applicable federal civil rights laws and [place] laws. We do not discriminate on the basis of race, color, national origin, age, disability, sex, sexual orientation, or gender identity.            Thank you!     Thank you for choosing [facility]  for your care. Our goal is always to provide you with excellent care. Hearing back from our patients is one way we can continue to improve our services. Please take a few minutes to complete the written survey that you may receive in the mail after your visit with us. Thank you!             Your Updated Medication List - Protect others around you: Learn how to safely use, store and throw away your medicines at www.disposemymeds.org.          This list is accurate as of 8/16/18  8:17 AM.  Always use your most recent med list.                   Brand Name Dispense Instructions for use Diagnosis    ABILIFY 2 MG tablet   Generic drug:  ARIPiprazole      Take 2 mg by mouth daily        allopurinol 100 MG tablet    ZYLOPRIM    180 tablet    TAKE TWO TABLETS BY MOUTH ONCE DAILY    Acute idiopathic gout of right ankle       amLODIPine 10 MG tablet    NORVASC    90 tablet    TAKE ONE TABLET BY MOUTH ONCE DAILY WITH  DINNER    Benign essential  hypertension       aspirin 81 MG tablet     30 tablet    Take 1 tablet (81 mg) by mouth daily    Essential hypertension       carbidopa-levodopa  MG per tablet    SINEMET    270 tablet    Take 1 tablet by mouth 3 times daily    Parkinsonism, unspecified Parkinsonism type (H)       carvedilol 12.5 MG tablet    COREG    180 tablet    TAKE ONE TABLET BY MOUTH TWICE DAILY WITH MEALS    Benign essential hypertension       chlorthalidone 25 MG tablet    HYGROTON    90 tablet    Take 1 tablet (25 mg) by mouth daily    Benign essential hypertension       cyanocobalamin 2500 MCG sublingual tablet    VITAMIN B-12    90 tablet    Place 2,500 mcg under the tongue daily    Vitamin B 12 deficiency       divalproex sodium delayed-release 500 MG DR tablet    DEPAKOTE    180 tablet    Take 1 tablet (500 mg) by mouth 2 times daily    H/O idiopathic seizure       ferrous sulfate 325 (65 Fe) MG tablet    IRON    60 tablet    Take 1 tablet (325 mg) by mouth 2 times daily    Anemia, unspecified type       furosemide 20 MG tablet    LASIX    30 tablet    Take 2 tablets (40 mg) by mouth as needed    Benign essential hypertension       levETIRAcetam 1000 MG Tabs    KEPPRA    180 tablet    1 tablet in morning and bed time.    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        omeprazole 40 MG capsule    priLOSEC    30 capsule    Take 1 capsule (40 mg) by mouth daily Take 30-60 minutes before a meal.    Gastroesophageal reflux disease, esophagitis presence not specified       order for DME      CPAP daily        potassium chloride SA 20 MEQ CR tablet    KLOR-CON    270 tablet    Take 1 tablet (20 mEq) by mouth 3 times daily    Hypokalemia       simvastatin 20 MG tablet    ZOCOR    180 tablet    TAKE TWO TABLETS BY MOUTH EVERY NIGHT AT BEDTIME     Pure hypercholesterolemia       TEMAZEPAM PO      Take 15 mg by mouth At Bedtime        vitamin D 2000 units tablet     90 tablet    Take 2,000 Units by mouth daily    Vitamin D deficiency

## 2018-08-19 DIAGNOSIS — I10 BENIGN ESSENTIAL HYPERTENSION: ICD-10-CM

## 2018-08-20 NOTE — TELEPHONE ENCOUNTER
"Requested Prescriptions   Pending Prescriptions Disp Refills     chlorthalidone (HYGROTON) 25 MG tablet [Pharmacy Med Name: CHLORTHALIDONE 25MG    TAB] 90 tablet 1    Last Written Prescription Date:  05/23/18  Last Fill Quantity: 90,  # refills: 1   Last office visit: 8/16/2018 with prescribing provider:  THONG Swan   Future Office Visit:     Sig: TAKE ONE TABLET BY MOUTH ONCE DAILY    Diuretics (Including Combos) Protocol Passed    8/19/2018  5:30 PM       Passed - Blood pressure under 140/90 in past 12 months    BP Readings from Last 3 Encounters:   08/16/18 134/74   05/15/18 130/73   03/31/18 123/61                Passed - Recent (12 mo) or future (30 days) visit within the authorizing provider's specialty    Patient had office visit in the last 12 months or has a visit in the next 30 days with authorizing provider or within the authorizing provider's specialty.  See \"Patient Info\" tab in inbasket, or \"Choose Columns\" in Meds & Orders section of the refill encounter.           Passed - Patient is age 18 or older       Passed - Normal serum creatinine on file in past 12 months    Recent Labs   Lab Test  01/29/18   1643   CR  1.20             Passed - Normal serum potassium on file in past 12 months    Recent Labs   Lab Test  01/29/18   1643   POTASSIUM  3.4                   Passed - Normal serum sodium on file in past 12 months    Recent Labs   Lab Test  01/29/18   1643   NA  138                "

## 2018-08-20 NOTE — TELEPHONE ENCOUNTER
Routing refill request to provider for review/approval because:  Patient was seen 8/16/18. Will have provider address

## 2018-08-21 RX ORDER — CHLORTHALIDONE 25 MG/1
TABLET ORAL
Qty: 90 TABLET | Refills: 1 | Status: SHIPPED | OUTPATIENT
Start: 2018-08-21 | End: 2018-11-02

## 2018-08-26 DIAGNOSIS — E87.6 HYPOKALEMIA: ICD-10-CM

## 2018-08-27 RX ORDER — POTASSIUM CHLORIDE 1500 MG/1
TABLET, EXTENDED RELEASE ORAL
Qty: 270 TABLET | Refills: 0 | Status: SHIPPED | OUTPATIENT
Start: 2018-08-27 | End: 2018-11-02

## 2018-08-27 NOTE — TELEPHONE ENCOUNTER
Prescription approved per [facility].  Marjorie Mena RN

## 2018-08-27 NOTE — TELEPHONE ENCOUNTER
"Requested Prescriptions   Pending Prescriptions Disp Refills     KLOR-CON 20 MEQ CR tablet [Pharmacy Med Name: KLOR-CON M20 ER 20MEQ TAB] 270 tablet 1    Last Written Prescription Date:  05/25/18  Last Fill Quantity: 270,  # refills: 1   Last office visit: 8/16/2018 with prescribing provider:  THONG Swan Future Office Visit:     Sig: TAKE ONE TABLET BY MOUTH THREE TIMES DAILY    Potassium Supplements Protocol Passed    8/26/2018 10:49 AM       Passed - Recent (12 mo) or future (30 days) visit within the authorizing provider's specialty    Patient had office visit in the last 12 months or has a visit in the next 30 days with authorizing provider or within the authorizing provider's specialty.  See \"Patient Info\" tab in inbasket, or \"Choose Columns\" in Meds & Orders section of the refill encounter.           Passed - Patient is age 18 or older       Passed - Normal serum potassium in past 12 months    Recent Labs   Lab Test  01/29/18   1643   POTASSIUM  3.4                      "

## 2018-09-10 DIAGNOSIS — E78.5 HYPERLIPIDEMIA LDL GOAL <130: Chronic | ICD-10-CM

## 2018-09-10 LAB
CHOLEST SERPL-MCNC: 156 MG/DL
HDLC SERPL-MCNC: 72 MG/DL
LDLC SERPL CALC-MCNC: 65 MG/DL
NONHDLC SERPL-MCNC: 84 MG/DL
TRIGL SERPL-MCNC: 93 MG/DL

## 2018-09-10 PROCEDURE — 80061 LIPID PANEL: CPT | Performed by: PHYSICIAN ASSISTANT

## 2018-09-10 PROCEDURE — 36415 COLL VENOUS BLD VENIPUNCTURE: CPT | Performed by: PHYSICIAN ASSISTANT

## 2018-09-21 DIAGNOSIS — E78.00 PURE HYPERCHOLESTEROLEMIA: ICD-10-CM

## 2018-09-24 NOTE — TELEPHONE ENCOUNTER
"Requested Prescriptions   Pending Prescriptions Disp Refills     simvastatin (ZOCOR) 20 MG tablet [Pharmacy Med Name: SIMVASTATIN 20MG  TAB] 180 tablet 1     Sig: TAKE TWO TABLETS BY MOUTH EVERY NIGHT AT BEDTIME    Statins Protocol Passed    9/21/2018  6:04 PM       Passed - LDL on file in past 12 months    Recent Labs   Lab Test  09/10/18   0759   LDL  65            Passed - No abnormal creatine kinase in past 12 months    No lab results found.            Passed - Recent (12 mo) or future (30 days) visit within the authorizing provider's specialty    Patient had office visit in the last 12 months or has a visit in the next 30 days with authorizing provider or within the authorizing provider's specialty.  See \"Patient Info\" tab in inbasket, or \"Choose Columns\" in Meds & Orders section of the refill encounter.           Passed - Patient is age 18 or older        Last Written Prescription Date:  1/29/18  Last Fill Quantity: 180,  # refills: 1   Last office visit: 8/16/2018 with prescribing provider:  Sosa   Future Office Visit:      "

## 2018-09-25 RX ORDER — SIMVASTATIN 20 MG
TABLET ORAL
Qty: 180 TABLET | Refills: 1 | Status: SHIPPED | OUTPATIENT
Start: 2018-09-25 | End: 2019-03-14

## 2018-09-30 DIAGNOSIS — M10.079 ACUTE IDIOPATHIC GOUT OF FOOT, UNSPECIFIED LATERALITY: Chronic | ICD-10-CM

## 2018-10-01 NOTE — TELEPHONE ENCOUNTER
Requested Prescriptions   Pending Prescriptions Disp Refills     methylPREDNISolone (MEDROL DOSEPAK) 4 MG tablet [Pharmacy Med Name: METHYLPREDNISOLONE 4MG TBPK]  Last Written Prescription Date:  7/21/17  Last Fill Quantity: 21,  # refills: 0   Last office visit: 8/16/2018 with prescribing provider:  Sosa   Future Office Visit:     21 tablet 0     Sig: FOLLOW PACKAGE INSTRUCTIONS    There is no refill protocol information for this order

## 2018-10-01 NOTE — TELEPHONE ENCOUNTER
Routing refill request to provider for review/approval because:  Drug not active on patient's medication list    Mague Barron RN, BSN, PHN

## 2018-10-02 RX ORDER — METHYLPREDNISOLONE 4 MG
TABLET, DOSE PACK ORAL
Qty: 21 TABLET | Refills: 0 | Status: SHIPPED | OUTPATIENT
Start: 2018-10-02 | End: 2018-11-02

## 2018-10-04 DIAGNOSIS — M10.9 ACUTE GOUTY ARTHRITIS: Primary | ICD-10-CM

## 2018-10-04 NOTE — TELEPHONE ENCOUNTER
[place]      Last Written Prescription Date:  10/17/13  Last Fill Quantity: 30,   # refills: 0  Last Office Visit: 08/16/18  Future Office visit:    Next 5 appointments (look out 90 days)     Dec 18, 2018  8:45 AM CST   [facility] Bisi Temple MD   [facility]    [address] [address]   [phone]                   Routing refill request to provider for review/approval because:  Drug not active on patient's medication list  Medication is reported/historical    Discontinued due to side effects

## 2018-10-04 NOTE — TELEPHONE ENCOUNTER
Routing refill request to provider for review/approval because:  Labs not current:  Uric Acid  Medication is reported/historical  Medication was discontinued due to side effects.    Diann Barry RN on 10/4/2018 at 4:58 PM

## 2018-10-05 ENCOUNTER — TRANSFERRED RECORDS (OUTPATIENT)
Dept: HEALTH INFORMATION MANAGEMENT | Facility: CLINIC | Age: 68
End: 2018-10-05

## 2018-10-05 RX ORDER — COLCHICINE 0.6 MG/1
0.6 TABLET ORAL DAILY PRN
Qty: 30 TABLET | Refills: 0 | Status: SHIPPED | OUTPATIENT
Start: 2018-10-05 | End: 2022-01-19

## 2018-10-05 NOTE — TELEPHONE ENCOUNTER
Hard copy of script for Colcyrs 0.6 mg tablet faxed to [facility] [place]

## 2018-10-09 RX ORDER — COLCHICINE 0.6 MG/1
0.6 TABLET ORAL DAILY PRN
Qty: 30 TABLET | Refills: 0 | Status: CANCELLED | OUTPATIENT
Start: 2018-10-09

## 2018-10-10 ENCOUNTER — OFFICE VISIT (OUTPATIENT)
Dept: FAMILY MEDICINE | Facility: CLINIC | Age: 68
End: 2018-10-10
Payer: OTHER MISCELLANEOUS

## 2018-10-10 VITALS
HEART RATE: 76 BPM | WEIGHT: 231 LBS | DIASTOLIC BLOOD PRESSURE: 72 MMHG | SYSTOLIC BLOOD PRESSURE: 133 MMHG | BODY MASS INDEX: 33.07 KG/M2 | OXYGEN SATURATION: 98 % | HEIGHT: 70 IN

## 2018-10-10 DIAGNOSIS — S01.01XD LACERATION OF SCALP, SUBSEQUENT ENCOUNTER: ICD-10-CM

## 2018-10-10 DIAGNOSIS — Z48.02 ENCOUNTER FOR STAPLE REMOVAL: Primary | ICD-10-CM

## 2018-10-10 PROCEDURE — 99213 OFFICE O/P EST LOW 20 MIN: CPT | Performed by: PHYSICIAN ASSISTANT

## 2018-10-10 NOTE — MR AVS SNAPSHOT
After Visit Summary   10/10/2018    Prashant Keyes    MRN: [id number]           Patient Information     Date Of Birth          1950        Visit Information        Provider Department      10/10/2018 9:20 AM Mabel Todd PA-C [facility]        Today's Diagnoses     Encounter for staple removal    -  1    Laceration of scalp, subsequent encounter           Follow-ups after your visit        Your next 10 appointments already scheduled     Dec 18, 2018  8:45 AM CST   [facility]   [facility]    [address] [address]   [phone]              Who to contact     Normal or non-critical lab and imaging results will be communicated to you by [employer]hart, letter or phone within 4 business days after the clinic has received the results. If you do not hear from us within 7 days, please contact the clinic through [employer] or phone. If you have a critical or abnormal lab result, we will notify you by phone as soon as possible.  Submit refill requests through Rekoo or call your pharmacy and they will forward the refill request to us. Please allow 3 business days for your refill to be completed.          If you need to speak with a  for additional information , please call: [phone]             Additional Information About Your Visit        Mine[employer]ofMe Information     Rekoo gives you secure access to your electronic health record. If you see a primary care provider, you can also send messages to your care team and make appointments. If you have questions, please call your primary care clinic.  If you do not have a primary care provider, please call [phone] and they will assist you.        Care EveryWhere ID     This is your Care EveryWhere ID. This could be used by other organizations to access your [place] medical records  [id number]        Your Vitals Were   "   Pulse Height Pulse Oximetry BMI (Body Mass Index)          76 5' 10\" (1.778 m) 98% 33.15 kg/m2         Blood Pressure from Last 3 Encounters:   10/10/18 133/72   08/16/18 134/74   05/15/18 130/73    Weight from Last 3 Encounters:   10/10/18 231 lb (104.8 kg)   08/16/18 226 lb (102.5 kg)   05/15/18 222 lb (100.7 kg)              Today, you had the following     No orders found for display       Primary Care Provider Office Phone # Fax #    Matt Swan PA-C [phone]       [address] [place] [address]        Equal Access to Services     [facility]: Hadii aad ku hadasho Soomaali, waaxda luqadaha, qaybta kaalmada adeegyada, waxay idiin haynahumn alverto gaines . So [facility] [phone].    ATENCIÓN: Si habla español, tiene a velasquez disposición servicios gratuitos de asistencia lingüística. [facility] [phone].    We comply with applicable federal civil rights laws and [place] laws. We do not discriminate on the basis of race, color, national origin, age, disability, sex, sexual orientation, or gender identity.            Thank you!     Thank you for choosing [facility]  for your care. Our goal is always to provide you with excellent care. Hearing back from our patients is one way we can continue to improve our services. Please take a few minutes to complete the written survey that you may receive in the mail after your visit with us. Thank you!             Your Updated Medication List - Protect others around you: Learn how to safely use, store and throw away your medicines at www.disposemymeds.org.          This list is accurate as of 10/10/18  9:55 AM.  Always use your most recent med list.                   Brand Name Dispense Instructions for use Diagnosis    ABILIFY 2 MG tablet   Generic drug:  ARIPiprazole      Take 2 mg by mouth daily        allopurinol 100 MG tablet    ZYLOPRIM    180 tablet    TAKE TWO TABLETS BY MOUTH ONCE DAILY    Acute idiopathic gout of " right ankle       amLODIPine 10 MG tablet    NORVASC    90 tablet    TAKE ONE TABLET BY MOUTH ONCE DAILY WITH  DINNER    Benign essential hypertension       aspirin 81 MG tablet     30 tablet    Take 1 tablet (81 mg) by mouth daily    Essential hypertension       carbidopa-levodopa  MG per tablet    SINEMET    270 tablet    Take 1 tablet by mouth 3 times daily    Parkinsonism, unspecified Parkinsonism type (H)       carvedilol 12.5 MG tablet    COREG    180 tablet    TAKE ONE TABLET BY MOUTH TWICE DAILY WITH MEALS    Benign essential hypertension       chlorthalidone 25 MG tablet    HYGROTON    90 tablet    TAKE ONE TABLET BY MOUTH ONCE DAILY    Benign essential hypertension       colchicine 0.6 MG tablet    COLCYRS    30 tablet    Take 1 tablet (0.6 mg) by mouth daily as needed Take 2 tabs on first day.  Take at first sign of gout flair.  Take for max of 1 week per flair    Acute gouty arthritis       cyanocobalamin 2500 MCG sublingual tablet    VITAMIN B-12    90 tablet    Place 2,500 mcg under the tongue daily    Vitamin B 12 deficiency       divalproex sodium delayed-release 500 MG DR tablet    DEPAKOTE    180 tablet    Take 1 tablet (500 mg) by mouth 2 times daily    H/O idiopathic seizure       ferrous sulfate 325 (65 Fe) MG tablet    IRON    60 tablet    Take 1 tablet (325 mg) by mouth 2 times daily    Anemia, unspecified type       furosemide 20 MG tablet    LASIX    30 tablet    Take 2 tablets (40 mg) by mouth as needed    Benign essential hypertension       KLOR-CON 20 MEQ CR tablet   Generic drug:  potassium chloride SA     270 tablet    TAKE ONE TABLET BY MOUTH THREE TIMES DAILY    Hypokalemia       levETIRAcetam 1000 MG Tabs    KEPPRA    180 tablet    1 tablet in morning and bed time.    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        omeprazole 40 MG capsule    priLOSEC    30 capsule    Take 1 capsule (40 mg) by mouth daily Take 30-60 minutes before a meal.    Gastroesophageal reflux disease, esophagitis presence not specified       order for DME      CPAP daily        simvastatin 20 MG tablet    ZOCOR    180 tablet    TAKE TWO TABLETS BY MOUTH EVERY NIGHT AT BEDTIME    Pure hypercholesterolemia       TEMAZEPAM PO      Take 15 mg by mouth At Bedtime        vitamin D 2000 units tablet     90 tablet    Take 2,000 Units by mouth daily    Vitamin D deficiency

## 2018-10-10 NOTE — PROGRESS NOTES
SUBJECTIVE:   Prashant Keyes is a 67 year old male who presents to clinic today for the following health issues:    Staple Removal--placed at [facility] on 10/5/18  PROCEDURE: Laceration Repair  LACERATION: A subcutaneous minimally contaminated 4.5 cm and 5 cm laceration that meet in the middle of wound.  LOCATION: Occipital scalp  FUNCTION: Distally sensation and circulation are intact.  ANESTHESIA: Local using Lidocaine 1% with epinephrine, total of 7 mLs  PREPARATION: Scrubbing with normal saline  DEBRIDEMENT: wound explored, no foreign body found, no debridement  STAPLES: Staples x18 to be removed in 4-6 days      Problem list and histories reviewed & adjusted, as indicated.  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)     Class 1 obesity with serious comorbidity and body mass index (BMI) of 31.0 to 31.9 in adult, unspecified obesity type     Past Surgical History:   Procedure Laterality Date     ANGIOGRAM  2003    Coronary Angiogram- negative     COLONOSCOPY WITH CO2 INSUFFLATION N/A 8/17/2017    Procedure: COLONOSCOPY WITH CO2 INSUFFLATION;  COLON SCREEN/ GEE;  Surgeon: Varun Bond MD;  Location: [facility] OR     HC REMOVAL TESTIS,SIMPLE  1978    Right undecended     HERNIA REPAIR, INGUINAL RT/LT  1963, 1978    Right Hernia     HERNIORRHAPHY UMBILICAL  4/2013    [facility]       Social History   Substance Use Topics     Smoking status: Never Smoker     Smokeless tobacco: Never Used     Alcohol use No      Comment: Quit since  "2003.      Family History   Problem Relation Age of Onset     Cerebrovascular Disease Mother      60's     C.A.D. Mother      CABG 75     Arthritis Mother      Eye Disorder Mother      HEART DISEASE Mother      Cardiovascular Father      Rheumatic Heart Disease     Hypertension Brother      Lipids Brother      C.A.D. Brother      CABG 46     Arthritis Brother      HEART DISEASE Brother      CABG x5     Obesity Brother      Hypertension Brother      Lipids Brother      Thyroid Disease Brother      Alcohol/Drug Brother      HEART DISEASE Brother      CABG     Cancer Sister      Thyroid CA     Hypertension Sister      Obesity Sister      Thyroid Disease Sister      Hemochromatosis Sister      Depression Daughter      Depression Daughter      Depression Son      Diabetes Son      Depression Son      GASTROINTESTINAL DISEASE Brother      Crohn's Disease-Ostomy     Arrhythmia Sister      Cancer Maternal Grandmother      Thyroid CA     Cancer Paternal Grandfather      Throat CA     Thyroid Disease Maternal Grandfather            Reviewed and updated as needed this visit by clinical staff  Tobacco  Allergies  Meds  Med Hx  Surg Hx  Fam Hx  Soc Hx      Reviewed and updated as needed this visit by Provider         ROS:  Constitutional, skin systems are negative, except as otherwise noted.    OBJECTIVE:                                                    /72  Pulse 76  Ht 5' 10\" (1.778 m)  Wt 231 lb (104.8 kg)  SpO2 98%  BMI 33.15 kg/m2  Body mass index is 33.15 kg/(m^2).  GENERAL APPEARANCE: healthy, alert and no distress  SKIN: 2 lacerations that meet in the middle of occiput, 18 stapes present  PSYCH: mentation appears normal and affect normal/bright       ASSESSMENT:                                                      1. Encounter for staple removal    2. Laceration of scalp, subsequent encounter         PLAN:                                                    Wound edges cleaned with alcohol. 18 staples " removed. Wound bandaged with Bacitracin. Wound care discussed.     The patient was in agreement with the plan today and had no questions or concerns prior to leaving the clinic.     Mabel Todd PA-C  [facility]

## 2018-10-21 DIAGNOSIS — I10 BENIGN ESSENTIAL HYPERTENSION: ICD-10-CM

## 2018-10-22 RX ORDER — AMLODIPINE BESYLATE 10 MG/1
10 TABLET ORAL
Qty: 90 TABLET | Refills: 1 | Status: SHIPPED | OUTPATIENT
Start: 2018-10-22 | End: 2018-11-02

## 2018-10-22 NOTE — TELEPHONE ENCOUNTER
"Requested Prescriptions   Pending Prescriptions Disp Refills     amLODIPine (NORVASC) 10 MG tablet [Pharmacy Med Name: AMLODIPINE 10MG TAB] 90 tablet 1    Last Written Prescription Date:  7-21-18  Last Fill Quantity: 90,  # refills: 1   Last office visit: 10/10/2018 with prescribing provider:  10-10-18   Future Office Visit:   Next 5 appointments (look out 90 days)     Dec 18, 2018  8:45 AM CST   [facility] Visit with Bisi Temple MD   [facility]    [address] [address]   [phone]                Sig: TAKE ONE TABLET BY MOUTH ONCE DAILY WITH SUPPER    Calcium Channel Blockers Protocol  Passed    10/21/2018  4:40 PM       Passed - Blood pressure under 140/90 in past 12 months    BP Readings from Last 3 Encounters:   10/10/18 133/72   08/16/18 134/74   05/15/18 130/73                Passed - Recent (12 mo) or future (30 days) visit within the authorizing provider's specialty    Patient had office visit in the last 12 months or has a visit in the next 30 days with authorizing provider or within the authorizing provider's specialty.  See \"Patient Info\" tab in inbasket, or \"Choose Columns\" in Meds & Orders section of the refill encounter.             Passed - Patient is age 18 or older       Passed - Normal serum creatinine on file in past 12 months    Recent Labs   Lab Test  01/29/18   1643   CR  1.20             "

## 2018-10-22 NOTE — TELEPHONE ENCOUNTER
Prescription approved per [facility] Refill Protocol.  Diann Barry RN on 10/22/2018 at 6:40 PM

## 2018-11-01 DIAGNOSIS — R56.9 CONVULSIONS, UNSPECIFIED CONVULSION TYPE (H): Chronic | ICD-10-CM

## 2018-11-02 ENCOUNTER — OFFICE VISIT (OUTPATIENT)
Dept: FAMILY MEDICINE | Facility: CLINIC | Age: 68
End: 2018-11-02
Payer: MEDICARE

## 2018-11-02 VITALS
WEIGHT: 232 LBS | BODY MASS INDEX: 33.21 KG/M2 | TEMPERATURE: 97.8 F | HEIGHT: 70 IN | SYSTOLIC BLOOD PRESSURE: 114 MMHG | OXYGEN SATURATION: 98 % | RESPIRATION RATE: 16 BRPM | HEART RATE: 65 BPM | DIASTOLIC BLOOD PRESSURE: 69 MMHG

## 2018-11-02 DIAGNOSIS — Z87.898 H/O IDIOPATHIC SEIZURE: ICD-10-CM

## 2018-11-02 DIAGNOSIS — G40.A09 NONINTRACTABLE ABSENCE EPILEPSY WITHOUT STATUS EPILEPTICUS (H): ICD-10-CM

## 2018-11-02 DIAGNOSIS — E87.6 HYPOKALEMIA: ICD-10-CM

## 2018-11-02 DIAGNOSIS — I10 BENIGN ESSENTIAL HYPERTENSION: ICD-10-CM

## 2018-11-02 DIAGNOSIS — G20.C PARKINSONISM, UNSPECIFIED PARKINSONISM TYPE (H): ICD-10-CM

## 2018-11-02 PROCEDURE — 99213 OFFICE O/P EST LOW 20 MIN: CPT | Performed by: PHYSICIAN ASSISTANT

## 2018-11-02 RX ORDER — POTASSIUM CHLORIDE 1500 MG/1
20 TABLET, EXTENDED RELEASE ORAL 3 TIMES DAILY
Qty: 270 TABLET | Refills: 1 | Status: SHIPPED | OUTPATIENT
Start: 2018-11-02 | End: 2019-04-12

## 2018-11-02 RX ORDER — AMLODIPINE BESYLATE 10 MG/1
10 TABLET ORAL
Qty: 90 TABLET | Refills: 1 | Status: SHIPPED | OUTPATIENT
Start: 2018-11-02 | End: 2019-02-11

## 2018-11-02 RX ORDER — LEVETIRACETAM 1000 MG/1
TABLET ORAL
Qty: 180 TABLET | Refills: 3 | Status: SHIPPED | OUTPATIENT
Start: 2018-11-02 | End: 2019-12-25

## 2018-11-02 RX ORDER — CHLORTHALIDONE 25 MG/1
25 TABLET ORAL DAILY
Qty: 90 TABLET | Refills: 1 | Status: SHIPPED | OUTPATIENT
Start: 2018-11-02 | End: 2019-02-11

## 2018-11-02 RX ORDER — CARBIDOPA AND LEVODOPA 25; 100 MG/1; MG/1
1 TABLET ORAL 3 TIMES DAILY
Qty: 270 TABLET | Refills: 1 | Status: SHIPPED | OUTPATIENT
Start: 2018-11-02 | End: 2019-02-11

## 2018-11-02 RX ORDER — LOSARTAN POTASSIUM 100 MG/1
100 TABLET ORAL DAILY
Qty: 90 TABLET | Refills: 1 | Status: SHIPPED | OUTPATIENT
Start: 2018-11-02 | End: 2019-06-07

## 2018-11-02 RX ORDER — CARVEDILOL 12.5 MG/1
TABLET ORAL
Qty: 180 TABLET | Refills: 1 | Status: SHIPPED | OUTPATIENT
Start: 2018-11-02 | End: 2019-02-11

## 2018-11-02 RX ORDER — DIVALPROEX SODIUM 500 MG/1
500 TABLET, DELAYED RELEASE ORAL 2 TIMES DAILY
Qty: 180 TABLET | Refills: 1 | Status: SHIPPED | OUTPATIENT
Start: 2018-11-02 | End: 2018-12-18

## 2018-11-02 NOTE — PROGRESS NOTES
SUBJECTIVE:   Prashant Keyes is a 67 year old male who presents to clinic today for the following health issues:      Hypertension Follow-up      Outpatient blood pressures are not being checked.    Low Salt Diet: not monitoring salt      Amount of exercise or physical activity: None outside of work    Problems taking medications regularly: No    Medication side effects: none    Diet:low calorie    Overall, he's been feeling great.    Problem list and histories reviewed & adjusted, as indicated.  Additional history: as documented    BP Readings from Last 3 Encounters:   11/02/18 114/69   10/10/18 133/72   08/16/18 134/74    Wt Readings from Last 3 Encounters:   11/02/18 232 lb (105.2 kg)   10/10/18 231 lb (104.8 kg)   08/16/18 226 lb (102.5 kg)                    Reviewed and updated as needed this visit by clinical staff  Tobacco  Allergies  Meds       Reviewed and updated as needed this visit by Provider         All other systems negative except as outline above  OBJECTIVE:  Eye exam - right eye normal lid, conjunctiva, cornea, pupil and fundus, left eye normal lid, conjunctiva, cornea, pupil and fundus.  Thyroid not palpable, not enlarged, no nodules detected.  CHEST:chest clear to IPPA, no tachypnea, retractions or cyanosis and S1, S2 normal, no murmur, no gallop, rate regular.  No edema      Prashant was seen today for hypertension.    Diagnoses and all orders for this visit:    Benign essential hypertension  -     amLODIPine (NORVASC) 10 MG tablet; Take 1 tablet (10 mg) by mouth daily (with dinner)  -     carvedilol (COREG) 12.5 MG tablet; TAKE ONE TABLET BY MOUTH TWICE DAILY WITH MEALS  -     chlorthalidone (HYGROTON) 25 MG tablet; Take 1 tablet (25 mg) by mouth daily  -     losartan (COZAAR) 100 MG tablet; Take 1 tablet (100 mg) by mouth daily    Parkinsonism, unspecified Parkinsonism type (H)  -     carbidopa-levodopa (SINEMET)  MG per tablet; Take 1 tablet by mouth 3 times daily    H/O  idiopathic seizure  -     divalproex sodium delayed-release (DEPAKOTE) 500 MG DR tablet; Take 1 tablet (500 mg) by mouth 2 times daily    Hypokalemia  -     potassium chloride SA (KLOR-CON) 20 MEQ CR tablet; Take 1 tablet (20 mEq) by mouth 3 times daily    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      work on lifestyle modification  Recheck in 6 mos

## 2018-11-02 NOTE — MR AVS SNAPSHOT
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 If you do not hear from us within 7 days, please contact the clinic through restOpolishart or phone. If you have a critical or abnormal lab result, we will notify you by phone as soon as possible.  Submit refill requests through [email or web address] or call your pharmacy and they will forward the refill request to us. Please allow 3 business days for your refill to be completed.          If you need to speak with a  for additional information , please call: [phone]             Additional Information About Your Visit        [employer] Information     [email or web address] gives you secure access to your electronic health record. If you see a primary care provider, you can also send messages to your care team and make appointments. If you have questions, please call your primary care clinic.  If you do not have a primary care provider, please call [phone] and they will assist you.        Care EveryWhere ID     This is your Care EveryWhere ID.  "This could be used by other organizations to access your [facility] medical records  [id number]        Your Vitals Were     Pulse Temperature Respirations Height Pulse Oximetry BMI (Body Mass Index)    65 97.8  F (36.6  C) (Oral) 16 5' 10\" (1.778 m) 98% 33.29 kg/m2       Blood Pressure from Last 3 Encounters:   11/02/18 114/69   10/10/18 133/72   08/16/18 134/74    Weight from Last 3 Encounters:   11/02/18 232 lb (105.2 kg)   10/10/18 231 lb (104.8 kg)   08/16/18 226 lb (102.5 kg)              Today, you had the following     No orders found for display         Today's Medication Changes          These changes are accurate as of 11/2/18 11:29 AM.  If you have any questions, ask your nurse or doctor.               These medicines have changed or have updated prescriptions.        Dose/Directions    amLODIPine 10 MG tablet   Commonly known as:  NORVASC   This may have changed:  additional instructions   Used for:  Benign essential hypertension   Changed by:  Matt Swan PA-C        Dose:  10 mg   Take 1 tablet (10 mg) by mouth daily (with dinner)   Quantity:  90 tablet   Refills:  1       chlorthalidone 25 MG tablet   Commonly known as:  HYGROTON   This may have changed:  See the new instructions.   Used for:  Benign essential hypertension   Changed by:  Matt Swan PA-C        Dose:  25 mg   Take 1 tablet (25 mg) by mouth daily   Quantity:  90 tablet   Refills:  1       potassium chloride SA 20 MEQ CR tablet   Commonly known as:  KLOR-CON   This may have changed:  See the new instructions.   Used for:  Hypokalemia   Changed by:  Matt Swan PA-C        Dose:  20 mEq   Take 1 tablet (20 mEq) by mouth 3 times daily   Quantity:  270 tablet   Refills:  1         Stop taking these medicines if you haven't already. Please contact your care team if you have questions.     methylPREDNISolone 4 MG tablet   Commonly known as:  MEDROL DOSEPAK   Stopped by:  Matt Swan PA-C                Where to " get your medicines      These medications were sent to [facility] - [place], [place] - [address]  [address], [place] [address]     Phone:  [phone]     amLODIPine 10 MG tablet    carbidopa-levodopa  MG per tablet    carvedilol 12.5 MG tablet    chlorthalidone 25 MG tablet    divalproex sodium delayed-release 500 MG DR tablet    levETIRAcetam 1000 MG Tabs    losartan 100 MG tablet    potassium chloride SA 20 MEQ CR tablet                Primary Care Provider Office Phone # Fax #    Matt Swan PA-C [phone]       [address] [facility] [address]        Equal Access to Services     [facility] Hospital[facility] : Hadii aad ku hadasho Soomaali, waaxda luqadaha, qaybta kaalmada adeegyada, waxay idiin haynahumn alverto gaines . So [facility] [phone].    ATENCIÓN: Si habla español, tiene a velasquez disposición servicios gratuitos de asistencia lingüística. [facility] [phone].    We comply with applicable federal civil rights laws and [place] laws. We do not discriminate on the basis of race, color, national origin, age, disability, sex, sexual orientation, or gender identity.            Thank you!     Thank you for choosing [facility]  for your care. Our goal is always to provide you with excellent care. Hearing back from our patients is one way we can continue to improve our services. Please take a few minutes to complete the written survey that you may receive in the mail after your visit with us. Thank you!             Your Updated Medication List - Protect others around you: Learn how to safely use, store and throw away your medicines at www.disposemymeds.org.          This list is accurate as of 11/2/18 11:29 AM.  Always use your most recent med list.                   Brand Name Dispense Instructions for use Diagnosis    ABILIFY 2 MG tablet   Generic drug:  ARIPiprazole      Take 2 mg by mouth daily        allopurinol 100 MG tablet    ZYLOPRIM     180 tablet    TAKE TWO TABLETS BY MOUTH ONCE DAILY    Acute idiopathic gout of right ankle       amLODIPine 10 MG tablet    NORVASC    90 tablet    Take 1 tablet (10 mg) by mouth daily (with dinner)    Benign essential hypertension       aspirin 81 MG tablet     30 tablet    Take 1 tablet (81 mg) by mouth daily    Essential hypertension       carbidopa-levodopa  MG per tablet    SINEMET    270 tablet    Take 1 tablet by mouth 3 times daily    Parkinsonism, unspecified Parkinsonism type (H)       carvedilol 12.5 MG tablet    COREG    180 tablet    TAKE ONE TABLET BY MOUTH TWICE DAILY WITH MEALS    Benign essential hypertension       chlorthalidone 25 MG tablet    HYGROTON    90 tablet    Take 1 tablet (25 mg) by mouth daily    Benign essential hypertension       colchicine 0.6 MG tablet    COLCYRS    30 tablet    Take 1 tablet (0.6 mg) by mouth daily as needed Take 2 tabs on first day.  Take at first sign of gout flair.  Take for max of 1 week per flair    Acute gouty arthritis       cyanocobalamin 2500 MCG sublingual tablet    VITAMIN B-12    90 tablet    Place 2,500 mcg under the tongue daily    Vitamin B 12 deficiency       divalproex sodium delayed-release 500 MG DR tablet    DEPAKOTE    180 tablet    Take 1 tablet (500 mg) by mouth 2 times daily    H/O idiopathic seizure       ferrous sulfate 325 (65 Fe) MG tablet    IRON    60 tablet    Take 1 tablet (325 mg) by mouth 2 times daily    Anemia, unspecified type       furosemide 20 MG tablet    LASIX    30 tablet    Take 2 tablets (40 mg) by mouth as needed    Benign essential hypertension       levETIRAcetam 1000 MG Tabs    KEPPRA    180 tablet    1 tablet in morning and bed time.    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        omeprazole 40 MG capsule     priLOSEC    30 capsule    Take 1 capsule (40 mg) by mouth daily Take 30-60 minutes before a meal.    Gastroesophageal reflux disease, esophagitis presence not specified       order for DME      CPAP daily        potassium chloride SA 20 MEQ CR tablet    KLOR-CON    270 tablet    Take 1 tablet (20 mEq) by mouth 3 times daily    Hypokalemia       simvastatin 20 MG tablet    ZOCOR    180 tablet    TAKE TWO TABLETS BY MOUTH EVERY NIGHT AT BEDTIME    Pure hypercholesterolemia       TEMAZEPAM PO      Take 15 mg by mouth At Bedtime        vitamin D 2000 units tablet     90 tablet    Take 2,000 Units by mouth daily    Vitamin D deficiency

## 2018-11-02 NOTE — TELEPHONE ENCOUNTER
"Requested Prescriptions   Pending Prescriptions Disp Refills     divalproex sodium delayed-release (DEPAKOTE) 500 MG DR tablet [Pharmacy Med Name: DIVALPROEX EC 500MG  TAB] 180 tablet 0    Last Written Prescription Date:  8/4/18  Last Fill Quantity: 180,  # refills: 0   Last office visit: 2/14/2018 with prescribing provider:  10/10/18 Knaeble   Future Office Visit:   Next 5 appointments (look out 90 days)     Nov 02, 2018 11:00 AM CDT   Office Visit with Matt Swan PA-C   [facility]    [address]  [place] [address]   [phone]            Dec 18, 2018  8:45 AM CST   Return Visit with Bisi Temple MD   [facility]    [address] [address]   [phone]                Sig: TAKE ONE TABLET BY MOUTH TWICE DAILY    Anti-Seizure Meds Protocol  Failed    11/1/2018  6:00 PM       Failed - Review Authorizing provider's last note.     Refer to last progress notes: confirm request is for original authorizing provider (cannot be through other providers).         Passed - Recent (12 mo) or future (30 days) visit within the authorizing provider's specialty    Patient had office visit in the last 12 months or has a visit in the next 30 days with authorizing provider or within the authorizing provider's specialty.  See \"Patient Info\" tab in inbasket, or \"Choose Columns\" in Meds & Orders section of the refill encounter.             Passed - Normal CBC on file in past 26 months    Recent Labs   Lab Test  02/20/18   1602   WBC  9.0   RBC  3.86*   HGB  12.2*   HCT  36.5*   PLT  221                Passed - Normal ALT or AST on file in past 26 months    Recent Labs   Lab Test  01/29/18   1643   ALT  9     Recent Labs   Lab Test  01/29/18   1643   AST  10            Passed - Normal platelet count on file in past 26 months    Recent Labs   Lab Test  02/20/18   1602   PLT  221              " Passed - Depakote level within therapeutic range in past 26 months    Lab Results   Component Value Date    WILFREDO 33 02/14/2018     Depakote level must be checked 2-4 weeks after dosage change.

## 2018-11-05 RX ORDER — DIVALPROEX SODIUM 500 MG/1
TABLET, DELAYED RELEASE ORAL
Qty: 180 TABLET | Refills: 0 | Status: SHIPPED | OUTPATIENT
Start: 2018-11-05 | End: 2018-12-18

## 2018-12-18 ENCOUNTER — OFFICE VISIT (OUTPATIENT)
Dept: NEUROLOGY | Facility: CLINIC | Age: 68
End: 2018-12-18
Payer: MEDICARE

## 2018-12-18 VITALS
TEMPERATURE: 96.9 F | BODY MASS INDEX: 33.66 KG/M2 | WEIGHT: 234.6 LBS | HEART RATE: 63 BPM | DIASTOLIC BLOOD PRESSURE: 60 MMHG | SYSTOLIC BLOOD PRESSURE: 115 MMHG | RESPIRATION RATE: 12 BRPM

## 2018-12-18 DIAGNOSIS — G40.909 SEIZURE DISORDER (H): Primary | ICD-10-CM

## 2018-12-18 DIAGNOSIS — R56.9 CONVULSIONS, UNSPECIFIED CONVULSION TYPE (H): Chronic | ICD-10-CM

## 2018-12-18 DIAGNOSIS — R25.1 TREMOR: ICD-10-CM

## 2018-12-18 DIAGNOSIS — Z79.899 ENCOUNTER FOR LONG-TERM (CURRENT) USE OF MEDICATIONS: ICD-10-CM

## 2018-12-18 LAB
ALBUMIN SERPL-MCNC: 4 G/DL (ref 3.4–5)
ALP SERPL-CCNC: 66 U/L (ref 40–150)
ALT SERPL W P-5'-P-CCNC: 17 U/L (ref 0–70)
ANION GAP SERPL CALCULATED.3IONS-SCNC: 3 MMOL/L (ref 3–14)
AST SERPL W P-5'-P-CCNC: 12 U/L (ref 0–45)
BILIRUB SERPL-MCNC: 0.4 MG/DL (ref 0.2–1.3)
BUN SERPL-MCNC: 21 MG/DL (ref 7–30)
CALCIUM SERPL-MCNC: 9.2 MG/DL (ref 8.5–10.1)
CHLORIDE SERPL-SCNC: 103 MMOL/L (ref 94–109)
CO2 SERPL-SCNC: 34 MMOL/L (ref 20–32)
CREAT SERPL-MCNC: 1.03 MG/DL (ref 0.66–1.25)
ERYTHROCYTE [DISTWIDTH] IN BLOOD BY AUTOMATED COUNT: 12.5 % (ref 10–15)
GFR SERPL CREATININE-BSD FRML MDRD: 72 ML/MIN/1.7M2
GLUCOSE SERPL-MCNC: 92 MG/DL (ref 70–99)
HCT VFR BLD AUTO: 36.8 % (ref 40–53)
HGB BLD-MCNC: 12.2 G/DL (ref 13.3–17.7)
MCH RBC QN AUTO: 32.2 PG (ref 26.5–33)
MCHC RBC AUTO-ENTMCNC: 33.2 G/DL (ref 31.5–36.5)
MCV RBC AUTO: 97 FL (ref 78–100)
PLATELET # BLD AUTO: 230 10E9/L (ref 150–450)
POTASSIUM SERPL-SCNC: 3.7 MMOL/L (ref 3.4–5.3)
PROT SERPL-MCNC: 7.2 G/DL (ref 6.8–8.8)
RBC # BLD AUTO: 3.79 10E12/L (ref 4.4–5.9)
SODIUM SERPL-SCNC: 140 MMOL/L (ref 133–144)
T4 FREE SERPL-MCNC: 0.95 NG/DL (ref 0.76–1.46)
TSH SERPL DL<=0.005 MIU/L-ACNC: 0.29 MU/L (ref 0.4–4)
VALPROATE SERPL-MCNC: 39 MG/L (ref 50–100)
WBC # BLD AUTO: 8.1 10E9/L (ref 4–11)

## 2018-12-18 PROCEDURE — 80053 COMPREHEN METABOLIC PANEL: CPT | Performed by: PSYCHIATRY & NEUROLOGY

## 2018-12-18 PROCEDURE — 80164 ASSAY DIPROPYLACETIC ACD TOT: CPT | Performed by: PSYCHIATRY & NEUROLOGY

## 2018-12-18 PROCEDURE — 84443 ASSAY THYROID STIM HORMONE: CPT | Performed by: PSYCHIATRY & NEUROLOGY

## 2018-12-18 PROCEDURE — 36415 COLL VENOUS BLD VENIPUNCTURE: CPT | Performed by: PSYCHIATRY & NEUROLOGY

## 2018-12-18 PROCEDURE — 99215 OFFICE O/P EST HI 40 MIN: CPT | Performed by: PSYCHIATRY & NEUROLOGY

## 2018-12-18 PROCEDURE — 85027 COMPLETE CBC AUTOMATED: CPT | Performed by: PSYCHIATRY & NEUROLOGY

## 2018-12-18 PROCEDURE — 84439 ASSAY OF FREE THYROXINE: CPT | Performed by: PSYCHIATRY & NEUROLOGY

## 2018-12-18 RX ORDER — DIVALPROEX SODIUM 500 MG/1
500 TABLET, DELAYED RELEASE ORAL 2 TIMES DAILY
Qty: 180 TABLET | Refills: 1 | Status: SHIPPED | OUTPATIENT
Start: 2018-12-18 | End: 2019-04-30

## 2018-12-18 NOTE — NURSING NOTE
Patient prefers to be contacted: Mariela Casarezay to leave detailed message on voicemail: n/a     Yohana CALDERONA

## 2018-12-18 NOTE — PROGRESS NOTES
ESTABLISHED PATIENT NEUROLOGY NOTE    DATE OF VISIT: 12/18/2018  MRN: [id number]  PATIENT NAME: Prashant Keyes  YOB: 1950    Chief Complaint   Patient presents with     RECHECK     Seizure and tremor     SUBJECTIVE:                                                      HISTORY OF PRESENT ILLNESS:  Prashant is here for follow up regarding Parkinsonism/tremor and seizures disorder. I met the patient for the same about 10 months ago. He was previously followed by Dr. Turner. 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 He has had spells of Left face/arm weakness with amnesia which resolved with the addition of Depakote to his regimen. He was previously having GTCs and tried many AEDs through multiple neurologists. When I met the patient he was on Keppra (1000mg BID) and the Depakote (500mg BID).     He reported the most recent seizure being years prior to our meeting in February. Both medications had been long-standing for several years. Other known AEDs tried: Dilantin, Vimpat. There is additional history of KALEB, HLD, HTN, CKD and mood disorder. His Psychiatrist had been concerned about Abilify playing a role in his tremor, for which he was on Sinemet through Dr. Turner. Current dose is on 25/100mg - 1 tab TID. I did not see any definitive signs of Parkinson's on my prior exam, but he had been on the Sinemet at the time. We did not change any of his medications then. Depakote level was a little low at 33 in 2.2018. Keppra level was therapeutic at 34. We did not make any dose changes given the clinical stability.     Today the patient tells me that he has not had any seizures since his prior visit. No LOC or new abnormal movements. He did have a minor head injury which required stiches. He says he has not had any problems with the Depakote or Keppra. No side effects.     He is having some breakthrough tremor, he mainly notices this when tired  in the evening. His partner says that he sees it a little more during the day in general now too. He takes the Sinemet at 8am/12noon/8pm. He has not noticed any definite wearing off of the medication otherwise.    No changes in gait or balance. He denies stiffness. He does have some foot pain.     Sleep is really good. He does use his CPAP. Appetite is good. He has not noticed any problems with taste/smell. He says that he went from 5mg to 2mg of the Abilify. This is managed by Dr. Posadas. The patient endorse ood mood and says he would like to be off of the Abilify altogether.     CURRENT MEDICATIONS:     Current Outpatient Medications on File Prior to Visit:  allopurinol (ZYLOPRIM) 100 MG tablet TAKE TWO TABLETS BY MOUTH ONCE DAILY   amLODIPine (NORVASC) 10 MG tablet Take 1 tablet (10 mg) by mouth daily (with dinner)   ARIPiprazole (ABILIFY) 2 MG tablet Take 2 mg by mouth daily   aspirin 81 MG tablet Take 1 tablet (81 mg) by mouth daily   carbidopa-levodopa (SINEMET)  MG per tablet Take 1 tablet by mouth 3 times daily   carvedilol (COREG) 12.5 MG tablet TAKE ONE TABLET BY MOUTH TWICE DAILY WITH MEALS   chlorthalidone (HYGROTON) 25 MG tablet Take 1 tablet (25 mg) by mouth daily   Cholecalciferol (VITAMIN D) 2000 UNITS tablet Take 2,000 Units by mouth daily   colchicine (COLCYRS) 0.6 MG tablet Take 1 tablet (0.6 mg) by mouth daily as needed Take 2 tabs on first day.  Take at first sign of gout flair.  Take for max of 1 week per flair   Cyanocobalamin (VITAMIN  B-12) 2500 MCG tablet Place 2,500 mcg under the tongue daily   ferrous sulfate (IRON) 325 (65 FE) MG tablet Take 1 tablet (325 mg) by mouth 2 times daily   furosemide (LASIX) 20 MG tablet Take 2 tablets (40 mg) by mouth as needed   levETIRAcetam (KEPPRA) 1000 MG TABS 1 tablet in morning and bed time.   losartan (COZAAR) 100 MG tablet Take 1 tablet (100 mg) by mouth daily   Multiple Vitamin (MULTI VITAMIN MENS PO) Take  by mouth.   omeprazole (PRILOSEC) 40  MG capsule Take 1 capsule (40 mg) by mouth daily Take 30-60 minutes before a meal.   potassium chloride SA (KLOR-CON) 20 MEQ CR tablet Take 1 tablet (20 mEq) by mouth 3 times daily   simvastatin (ZOCOR) 20 MG tablet TAKE TWO TABLETS BY MOUTH EVERY NIGHT AT BEDTIME   TEMAZEPAM PO Take 15 mg by mouth At Bedtime   ORDER FOR DME CPAP daily   [DISCONTINUED] divalproex sodium delayed-release (DEPAKOTE) 500 MG DR tablet TAKE ONE TABLET BY MOUTH TWICE DAILY   [DISCONTINUED] divalproex sodium delayed-release (DEPAKOTE) 500 MG DR tablet Take 1 tablet (500 mg) by mouth 2 times daily     No current facility-administered medications on file prior to visit.     RECENT DIAGNOSTIC STUDIES:   Labs:   Results for orders placed or performed in visit on 09/10/18   Lipid panel reflex to direct LDL Fasting   Result Value Ref Range    Cholesterol 156 <200 mg/dL    Triglycerides 93 <150 mg/dL    HDL Cholesterol 72 >39 mg/dL    LDL Cholesterol Calculated 65 <100 mg/dL    Non HDL Cholesterol 84 <130 mg/dL       REVIEW OF SYSTEMS:                                                      10-point review of systems is negative except as mentioned above in HPI.     EXAM:                                                      Physical Exam:   Vitals: /60 (BP Location: Right arm, Patient Position: Sitting, Cuff Size: Adult Large)   Pulse 63   Temp 96.9  F (36.1  C) (Tympanic)   Resp 12   Wt 106.4 kg (234 lb 9.6 oz)   BMI 33.66 kg/m    BMI= Body mass index is 33.66 kg/m .  GENERAL: NAD.   Neurologic:  MENTAL STATUS: Alert, attentive. Speech is fluent. Normal comprehension. Normal concentration. Adequate fund of knowledge. MoCA: 4/5 (missed one word on recall).   CRANIAL NERVES: Discs flat. Visual fields intact to confrontation. Pupils equally, round and reactive to light. Facial sensation and movement normal. EOM full. Exophthalmos. Hearing intact to conversations with hearing aids in place. Trapezius strength intact. Palate moves  symmetrically. Tongue midline.  MOTOR: Slight weakness (effort? - again) with elbow extension. Otherwise strength is 5/5 in proximal and distal muscle groups of upper and lower extremities. Tone and bulk normal.   DTRs: Intact and symmetric. Unable to elicit ankle jerks. Babinski down-going bilaterally.   SENSATION: Normal light touch and pinprick (except decreased in Rt plantar). Intact proprioception. Vibration: Decreased at both ankles.  COORDINATION: Normal finger nose finger. Normal hand opening/closing speed and amplitude. Knee heel shin normal.  STATION AND GAIT: Romberg negative. Good postural reflexes. Tandem- patient refuses. Gait appears normal except for perhaps decreased arm swing.   CV: RRR. S1, S2.   NECK: No bruits.  Minimal high frequency postural and action tremor in the hands. Minimal tremor at rest.   Right hand-dominant.     ASSESSMENT and PLAN:                                                      Assessment and Plan:    ICD-10-CM    1. Seizure disorder (H) G40.909 CBC with platelets     Comprehensive metabolic panel     Valproic Acid level     Keppra (Levetiracetam) Level   2. Encounter for long-term (current) use of medications Z79.899 CBC with platelets     Comprehensive metabolic panel     Valproic Acid level     Keppra (Levetiracetam) Level   3. Tremor R25.1 TSH with free T4 reflex   4. Convulsions, unspecified convulsion type (H) R56.9 divalproex sodium delayed-release (DEPAKOTE) 500 MG DR tablet        Mr. Keyes is a pleasant 69 yo man with history of seizures following a head injury and tremor. The latter has been felt to be Parkinsonian by his previous neurologist. The patient does seem to note some benefit from the levodopa, so we will try increasing the dose some and see. The seizure continue to be well-controlled on the Keppra and Depakote. We will check the basic labs today and plan to continue the current doses. I am fine if the Valproic acid continue to run a little low as long  as Bill is clinically stable, given the tremor issue. We will follow-up again in 6 months. The patient understands and agrees with the plan.     Patient Instructions:  -- Labs today, for medication monitoring. We will notify you of the results.  -- Continue the current doses of Keppra (1000mg twice daily) and Depakote (500mg twice daily).  -- For tremor: Increase the noontime Sinemet (carbidopa/levodopa) to 2 tabs. Continue the 1 tab in the morning and evening. Let us know if this is not helpful for the tremor - we can adjust further.   -- Return to neurology in 6 months, or sooner if concerns arise.     Total Time: 40 minutes were spent with the patient. More than 50% of the time spent on counseling (as described above in Assessment and Plan/Instructions) /coordinating the care.    Bisi Temple MD  Neurology

## 2018-12-18 NOTE — LETTER
12/18/2018         RE: Prashant Keyes  [address]  [place] [address]        Dear Colleague,    Thank you for referring your patient, Prashant Keyes, to the [facility]. Please see a copy of my visit note below.    ESTABLISHED PATIENT NEUROLOGY NOTE    DATE OF VISIT: 12/18/2018  MRN: [id number]  PATIENT NAME: Prashant Keyes  YOB: 1950    Chief Complaint   Patient presents with     RECHECK     Seizure and tremor     SUBJECTIVE:                                                      HISTORY OF PRESENT ILLNESS:  Prashant is here for follow up regarding Parkinsonism/tremor and seizures disorder. I met the patient for the same about 10 months ago. He was previously followed by Dr. Turner. 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 He has had spells of Left face/arm weakness with amnesia which resolved with the addition of Depakote to his regimen. He was previously having GTCs and tried many AEDs through multiple neurologists. When I met the patient he was on Keppra (1000mg BID) and the Depakote (500mg BID).     He reported the most recent seizure being years prior to our meeting in February. Both medications had been long-standing for several years. Other known AEDs tried: Dilantin, Vimpat. There is additional history of KALEB, HLD, HTN, CKD and mood disorder. His Psychiatrist had been concerned about Abilify playing a role in his tremor, for which he was on Sinemet through Dr. Turner. Current dose is on 25/100mg - 1 tab TID. I did not see any definitive signs of Parkinson's on my prior exam, but he had been on the Sinemet at the time. We did not change any of his medications then. Depakote level was a little low at 33 in 2.2018. Keppra level was therapeutic at 34. We did not make any dose changes given the clinical stability.     Today the patient tells me that he has not had any seizures since his prior visit.  No LOC or new abnormal movements. He did have a minor head injury which required stiches. He says he has not had any problems with the Depakote or Keppra. No side effects.     He is having some breakthrough tremor, he mainly notices this when tired in the evening. His partner says that he sees it a little more during the day in general now too. He takes the Sinemet at 8am/12noon/8pm. He has not noticed any definite wearing off of the medication otherwise.    No changes in gait or balance. He denies stiffness. He does have some foot pain.     Sleep is really good. He does use his CPAP. Appetite is good. He has not noticed any problems with taste/smell. He says that he went from 5mg to 2mg of the Abilify. This is managed by Dr. Posadas. The patient endorse sgood mood and says he would like to be off of the Abilify altogether.     CURRENT MEDICATIONS:     Current Outpatient Medications on File Prior to Visit:  allopurinol (ZYLOPRIM) 100 MG tablet TAKE TWO TABLETS BY MOUTH ONCE DAILY   amLODIPine (NORVASC) 10 MG tablet Take 1 tablet (10 mg) by mouth daily (with dinner)   ARIPiprazole (ABILIFY) 2 MG tablet Take 2 mg by mouth daily   aspirin 81 MG tablet Take 1 tablet (81 mg) by mouth daily   carbidopa-levodopa (SINEMET)  MG per tablet Take 1 tablet by mouth 3 times daily   carvedilol (COREG) 12.5 MG tablet TAKE ONE TABLET BY MOUTH TWICE DAILY WITH MEALS   chlorthalidone (HYGROTON) 25 MG tablet Take 1 tablet (25 mg) by mouth daily   Cholecalciferol (VITAMIN D) 2000 UNITS tablet Take 2,000 Units by mouth daily   colchicine (COLCYRS) 0.6 MG tablet Take 1 tablet (0.6 mg) by mouth daily as needed Take 2 tabs on first day.  Take at first sign of gout flair.  Take for max of 1 week per flair   Cyanocobalamin (VITAMIN  B-12) 2500 MCG tablet Place 2,500 mcg under the tongue daily   ferrous sulfate (IRON) 325 (65 FE) MG tablet Take 1 tablet (325 mg) by mouth 2 times daily   furosemide (LASIX) 20 MG tablet Take 2 tablets (40 mg)  by mouth as needed   levETIRAcetam (KEPPRA) 1000 MG TABS 1 tablet in morning and bed time.   losartan (COZAAR) 100 MG tablet Take 1 tablet (100 mg) by mouth daily   Multiple Vitamin (MULTI VITAMIN MENS PO) Take  by mouth.   omeprazole (PRILOSEC) 40 MG capsule Take 1 capsule (40 mg) by mouth daily Take 30-60 minutes before a meal.   potassium chloride SA (KLOR-CON) 20 MEQ CR tablet Take 1 tablet (20 mEq) by mouth 3 times daily   simvastatin (ZOCOR) 20 MG tablet TAKE TWO TABLETS BY MOUTH EVERY NIGHT AT BEDTIME   TEMAZEPAM PO Take 15 mg by mouth At Bedtime   ORDER FOR DME CPAP daily   [DISCONTINUED] divalproex sodium delayed-release (DEPAKOTE) 500 MG DR tablet TAKE ONE TABLET BY MOUTH TWICE DAILY   [DISCONTINUED] divalproex sodium delayed-release (DEPAKOTE) 500 MG DR tablet Take 1 tablet (500 mg) by mouth 2 times daily     No current facility-administered medications on file prior to visit.     RECENT DIAGNOSTIC STUDIES:   Labs:   Results for orders placed or performed in visit on 09/10/18   Lipid panel reflex to direct LDL Fasting   Result Value Ref Range    Cholesterol 156 <200 mg/dL    Triglycerides 93 <150 mg/dL    HDL Cholesterol 72 >39 mg/dL    LDL Cholesterol Calculated 65 <100 mg/dL    Non HDL Cholesterol 84 <130 mg/dL       REVIEW OF SYSTEMS:                                                      10-point review of systems is negative except as mentioned above in HPI.     EXAM:                                                      Physical Exam:   Vitals: /60 (BP Location: Right arm, Patient Position: Sitting, Cuff Size: Adult Large)   Pulse 63   Temp 96.9  F (36.1  C) (Tympanic)   Resp 12   Wt 106.4 kg (234 lb 9.6 oz)   BMI 33.66 kg/m     BMI= Body mass index is 33.66 kg/m .  GENERAL: NAD.   Neurologic:  MENTAL STATUS: Alert, attentive. Speech is fluent. Normal comprehension. Normal concentration. Adequate fund of knowledge. MoCA: 4/5 (missed one word on recall).   CRANIAL NERVES: Discs flat. Visual  guerrero intact to confrontation. Pupils equally, round and reactive to light. Facial sensation and movement normal. EOM full. Exophthalmos. Hearing intact to conversations with hearing aids in place. Trapezius strength intact. Palate moves symmetrically. Tongue midline.  MOTOR: Slight weakness (effort? - again) with elbow extension. Otherwise strength is 5/5 in proximal and distal muscle groups of upper and lower extremities. Tone and bulk normal.   DTRs: Intact and symmetric. Unable to elicit ankle jerks. Babinski down-going bilaterally.   SENSATION: Normal light touch and pinprick (except decreased in Rt plantar). Intact proprioception. Vibration: Decreased at both ankles.  COORDINATION: Normal finger nose finger. Normal hand opening/closing speed and amplitude. Knee heel shin normal.  STATION AND GAIT: Romberg negative.  Good postural reflexes. Tandem- patient refuses. Gait appears normal except for perhaps decreased arm swing.   CV: RRR. S1, S2.   NECK: No bruits.  Minimal high frequency postural and action tremor in the hands. Minimal tremor at rest.   Right hand-dominant.     ASSESSMENT and PLAN:                                                      Assessment and Plan:    ICD-10-CM    1. Seizure disorder (H) G40.909 CBC with platelets     Comprehensive metabolic panel     Valproic Acid level     Keppra (Levetiracetam) Level   2. Encounter for long-term (current) use of medications Z79.899 CBC with platelets     Comprehensive metabolic panel     Valproic Acid level     Keppra (Levetiracetam) Level   3. Tremor R25.1 TSH with free T4 reflex   4. Convulsions, unspecified convulsion type (H) R56.9 divalproex sodium delayed-release (DEPAKOTE) 500 MG DR tablet        Mr. Keyes is a pleasant 69 yo man with history of seizures following a head injury and tremor. The latter has been felt to be Parkinsonian by his previous neurologist. The patient does seem to note some benefit from the levodopa, so we will try  increasing the dose some and see. The seizure continue to be well-controlled on the Keppra and Depakote. We will check the basic labs today and plan to continue the current doses. I am fine if the Valproic acid continue to run a little low as long as Bill is clinically stable, given the tremor issue. We will follow-up again in 6 months. The patient understands and agrees with the plan.     Patient Instructions:  -- Labs today, for medication monitoring. We will notify you of the results.  -- Continue the current doses of Keppra (1000mg twice daily) and Depakote (500mg twice daily).  -- For tremor: Increase the noontime Sinemet (carbidopa/levodopa) to 2 tabs. Continue the 1 tab in the morning and evening. Let us know if this is not helpful for the tremor - we can adjust further.   -- Return to neurology in 6 months, or sooner if concerns arise.     Total Time: 40 minutes were spent with the patient. More than 50% of the time spent on counseling (as described above in Assessment and Plan/Instructions) /coordinating the care.    Bisi Temple MD  Neurology                    Again, thank you for allowing me to participate in the care of your patient.        Sincerely,        Bisi Temple MD

## 2018-12-18 NOTE — PATIENT INSTRUCTIONS
Plan:    -- Labs today, for medication monitoring. We will notify you of the results.  -- Continue the current doses of Keppra (1000mg twice daily) and Depakote (500mg twice daily).  -- For tremor: Increase the noontime Sinemet (carbidopa/levodopa) to 2 tabs. Continue the 1 tab in the morning and evening. Let us know if this is not helpful for the tremor - we can adjust further.   -- Return to neurology in 6 months, or sooner if concerns arise.

## 2018-12-19 LAB — LEVETIRACETAM SERPL-MCNC: 28 UG/ML (ref 12–46)

## 2019-01-01 NOTE — RESULT ENCOUNTER NOTE
Please advise Prashant Keyes,  1950, that his lab results are stable. The one thyroid function marker (TSH) was a little abnormal but this is usually not significant in the setting of the normal T4.  [phone] (home)   Bisi Temple

## 2019-01-31 DIAGNOSIS — M10.079 ACUTE IDIOPATHIC GOUT OF FOOT, UNSPECIFIED LATERALITY: Chronic | ICD-10-CM

## 2019-02-01 RX ORDER — METHYLPREDNISOLONE 4 MG
TABLET, DOSE PACK ORAL
Qty: 21 TABLET | Refills: 0 | Status: SHIPPED | OUTPATIENT
Start: 2019-02-01 | End: 2019-02-11

## 2019-02-01 NOTE — TELEPHONE ENCOUNTER
Requested Prescriptions   Pending Prescriptions Disp Refills     methylPREDNISolone (MEDROL DOSEPAK) 4 MG tablet therapy pack [Pharmacy Med Name: METHYLPREDNISOLONE 4MG TBPK]  Last Written Prescription Date:  10/2/18  Last Fill Quantity: 21,  # refills: 0   Last office visit: 11/2/2018 with prescribing provider:  THONG Swan   Future Office Visit:     21 tablet 0     Sig: FOLLOW PACKAGE INSTRUCTIONS    There is no refill protocol information for this order

## 2019-02-01 NOTE — TELEPHONE ENCOUNTER
Spoke with patient and he reports gout flare starting last night in foot.  He is taking his colcyrs and he had an old medrol dose pack and took the day 1 tabs. (6) (this is helping)  Since this is an old  pack he is asking to refill a new Medrol dose pack to pharmacy.  Pended for approval.  Serenity Sher RN

## 2019-02-11 ENCOUNTER — OFFICE VISIT (OUTPATIENT)
Dept: FAMILY MEDICINE | Facility: CLINIC | Age: 69
End: 2019-02-11
Payer: MEDICARE

## 2019-02-11 VITALS
BODY MASS INDEX: 34.07 KG/M2 | SYSTOLIC BLOOD PRESSURE: 120 MMHG | HEIGHT: 70 IN | RESPIRATION RATE: 16 BRPM | TEMPERATURE: 98.3 F | HEART RATE: 75 BPM | WEIGHT: 238 LBS | OXYGEN SATURATION: 96 % | DIASTOLIC BLOOD PRESSURE: 69 MMHG

## 2019-02-11 DIAGNOSIS — G20.C PARKINSONISM, UNSPECIFIED PARKINSONISM TYPE (H): ICD-10-CM

## 2019-02-11 DIAGNOSIS — I10 BENIGN ESSENTIAL HYPERTENSION: ICD-10-CM

## 2019-02-11 PROCEDURE — 99213 OFFICE O/P EST LOW 20 MIN: CPT | Performed by: PHYSICIAN ASSISTANT

## 2019-02-11 RX ORDER — AMLODIPINE BESYLATE 10 MG/1
10 TABLET ORAL
Qty: 90 TABLET | Refills: 1 | Status: SHIPPED | OUTPATIENT
Start: 2019-02-11 | End: 2019-07-01

## 2019-02-11 RX ORDER — CARBIDOPA AND LEVODOPA 25; 100 MG/1; MG/1
1 TABLET ORAL 4 TIMES DAILY
Qty: 270 TABLET | Refills: 1 | Status: SHIPPED | OUTPATIENT
Start: 2019-02-11 | End: 2019-07-01

## 2019-02-11 RX ORDER — CARVEDILOL 12.5 MG/1
TABLET ORAL
Qty: 180 TABLET | Refills: 1 | Status: SHIPPED | OUTPATIENT
Start: 2019-02-11 | End: 2019-07-01

## 2019-02-11 RX ORDER — CHLORTHALIDONE 25 MG/1
25 TABLET ORAL DAILY
Qty: 90 TABLET | Refills: 1 | Status: SHIPPED | OUTPATIENT
Start: 2019-02-11 | End: 2019-07-01

## 2019-02-11 ASSESSMENT — MIFFLIN-ST. JEOR: SCORE: 1855.81

## 2019-02-11 NOTE — PROGRESS NOTES
SUBJECTIVE:   Prashant Keyes is a 68 year old male who presents to clinic today for the following health issues:      Hypertension Follow-up      Outpatient blood pressures are not being checked.    Low Salt Diet: not monitoring salt      Amount of exercise or physical activity: None    Problems taking medications regularly: No    Medication side effects: none    Diet: regular (no restrictions)    No ha's/chest pain/sob/palps. Rare light headedness.         Problem list and histories reviewed & adjusted, as indicated.  Additional history: as documented    BP Readings from Last 3 Encounters:   02/11/19 120/69   12/18/18 115/60   11/02/18 114/69    Wt Readings from Last 3 Encounters:   02/11/19 108 kg (238 lb)   12/18/18 106.4 kg (234 lb 9.6 oz)   11/02/18 105.2 kg (232 lb)                    Reviewed and updated as needed this visit by clinical staff  Tobacco  Allergies  Meds       Reviewed and updated as needed this visit by Provider         All other systems negative except as outline above  OBJECTIVE:  Eye exam - right eye normal lid, conjunctiva, cornea, pupil and fundus, left eye normal lid, conjunctiva, cornea, pupil and fundus.  Thyroid not palpable, not enlarged, no nodules detected.  CHEST:chest clear to IPPA, no tachypnea, retractions or cyanosis and S1, S2 normal, no murmur, no gallop, rate regular.  Minimal leg edema    Prashant was seen today for hypertension.    Diagnoses and all orders for this visit:    Benign essential hypertension  -     amLODIPine (NORVASC) 10 MG tablet; Take 1 tablet (10 mg) by mouth daily (with dinner)  -     chlorthalidone (HYGROTON) 25 MG tablet; Take 1 tablet (25 mg) by mouth daily  -     carvedilol (COREG) 12.5 MG tablet; TAKE ONE TABLET BY MOUTH TWICE DAILY WITH MEALS    Parkinsonism, unspecified Parkinsonism type (H)  -     carbidopa-levodopa (SINEMET)  MG tablet; Take 1 tablet by mouth 4 times daily      work on lifestyle modification  Recheck in 6 mos.

## 2019-02-14 DIAGNOSIS — K21.9 GASTROESOPHAGEAL REFLUX DISEASE, ESOPHAGITIS PRESENCE NOT SPECIFIED: ICD-10-CM

## 2019-02-15 RX ORDER — OMEPRAZOLE 40 MG/1
CAPSULE, DELAYED RELEASE ORAL
Qty: 30 CAPSULE | Refills: 5 | Status: SHIPPED | OUTPATIENT
Start: 2019-02-15 | End: 2019-07-01

## 2019-02-15 NOTE — TELEPHONE ENCOUNTER
Prescription approved per [facility] Refill Protocol.  Diann Barry, RN, BSN

## 2019-02-15 NOTE — TELEPHONE ENCOUNTER
"Requested Prescriptions   Pending Prescriptions Disp Refills     omeprazole (PRILOSEC) 40 MG DR capsule [Pharmacy Med Name: OMEPRAZOLE DR 40MG  CAP] 30 capsule 11    Last Written Prescription Date:  01/16/19  Last Fill Quantity: 30,  # refills: 11   Last office visit: 2/11/2019 with prescribing provider:  THONG Swan Future Office Visit:     Sig: TAKE ONE CAPSULE BY MOUTH ONCE DAILY (TAKE  30  TO  60  MINUTES  BEFORE  A  MEAL)    PPI Protocol Passed - 2/14/2019  5:39 PM       Passed - Not on Clopidogrel (unless Pantoprazole ordered)       Passed - No diagnosis of osteoporosis on record       Passed - Recent (12 mo) or future (30 days) visit within the authorizing provider's specialty    Patient had office visit in the last 12 months or has a visit in the next 30 days with authorizing provider or within the authorizing provider's specialty.  See \"Patient Info\" tab in inbasket, or \"Choose Columns\" in Meds & Orders section of the refill encounter.             Passed - Medication is active on med list       Passed - Patient is age 18 or older          "

## 2019-03-18 ENCOUNTER — E-VISIT (OUTPATIENT)
Dept: FAMILY MEDICINE | Facility: CLINIC | Age: 69
End: 2019-03-18
Payer: MEDICARE

## 2019-03-18 DIAGNOSIS — Z53.9 ERRONEOUS ENCOUNTER--DISREGARD: Primary | ICD-10-CM

## 2019-05-20 ENCOUNTER — TELEPHONE (OUTPATIENT)
Dept: FAMILY MEDICINE | Facility: CLINIC | Age: 69
End: 2019-05-20

## 2019-05-20 NOTE — TELEPHONE ENCOUNTER
NEED FOR CLARIFICATION, CARB/LEVO 25-100MG TAB  TAKE 1 TABLET BY MOUTH 4 TIMES DAILY.  AKI -[phone]

## 2019-05-20 NOTE — TELEPHONE ENCOUNTER
Pt called would like to know status of med refill: carbidopa-levodopa (SINEMET)  MG tablet.  Please call pt with status.   Caller informed that calls received after 3pm may not be returned same day.  Thank you!

## 2019-06-14 DIAGNOSIS — E78.00 PURE HYPERCHOLESTEROLEMIA: ICD-10-CM

## 2019-06-14 NOTE — TELEPHONE ENCOUNTER
Routing refill request to provider for review/approval because:  Drug interaction warning- allergy

## 2019-06-14 NOTE — TELEPHONE ENCOUNTER
"Requested Prescriptions   Pending Prescriptions Disp Refills     simvastatin (ZOCOR) 20 MG tablet [Pharmacy Med Name: SIMVASTATIN 20MG  TAB] 180 tablet 0     Sig: TAKE 2 TABLETS BY MOUTH IN THE EVENING AT BEDTIME  Last Written Prescription Date:  3/15/19  Last Fill Quantity: 180,  # refills: 0   Last office visit: 2/11/2019 with prescribing provider:  TOR Swan   Future Office Visit:         Statins Protocol Passed - 6/14/2019 10:57 AM        Passed - LDL on file in past 12 months     Recent Labs   Lab Test 09/10/18  0759   LDL 65             Passed - No abnormal creatine kinase in past 12 months     No lab results found.             Passed - Recent (12 mo) or future (30 days) visit within the authorizing provider's specialty     Patient had office visit in the last 12 months or has a visit in the next 30 days with authorizing provider or within the authorizing provider's specialty.  See \"Patient Info\" tab in inbasket, or \"Choose Columns\" in Meds & Orders section of the refill encounter.              Passed - Medication is active on med list        Passed - Patient is age 18 or older        "

## 2019-06-15 RX ORDER — SIMVASTATIN 20 MG
TABLET ORAL
Qty: 180 TABLET | Refills: 0 | Status: SHIPPED | OUTPATIENT
Start: 2019-06-15 | End: 2019-07-01

## 2019-06-21 ENCOUNTER — DOCUMENTATION ONLY (OUTPATIENT)
Dept: FAMILY MEDICINE | Facility: CLINIC | Age: 69
End: 2019-06-21

## 2019-06-21 DIAGNOSIS — I10 BENIGN ESSENTIAL HYPERTENSION: Primary | ICD-10-CM

## 2019-06-21 DIAGNOSIS — E78.5 HYPERLIPIDEMIA LDL GOAL <130: ICD-10-CM

## 2019-06-25 DIAGNOSIS — E78.5 HYPERLIPIDEMIA LDL GOAL <130: ICD-10-CM

## 2019-06-25 DIAGNOSIS — I10 BENIGN ESSENTIAL HYPERTENSION: ICD-10-CM

## 2019-06-25 LAB
CHOLEST SERPL-MCNC: 159 MG/DL
CREAT UR-MCNC: 63 MG/DL
HDLC SERPL-MCNC: 70 MG/DL
LDLC SERPL CALC-MCNC: 69 MG/DL
MICROALBUMIN UR-MCNC: <5 MG/L
MICROALBUMIN/CREAT UR: NORMAL MG/G CR (ref 0–17)
NONHDLC SERPL-MCNC: 89 MG/DL
TRIGL SERPL-MCNC: 100 MG/DL

## 2019-06-25 PROCEDURE — 36415 COLL VENOUS BLD VENIPUNCTURE: CPT | Performed by: PHYSICIAN ASSISTANT

## 2019-06-25 PROCEDURE — 80061 LIPID PANEL: CPT | Performed by: PHYSICIAN ASSISTANT

## 2019-06-25 PROCEDURE — 82043 UR ALBUMIN QUANTITATIVE: CPT | Performed by: PHYSICIAN ASSISTANT

## 2019-07-01 ENCOUNTER — OFFICE VISIT (OUTPATIENT)
Dept: FAMILY MEDICINE | Facility: CLINIC | Age: 69
End: 2019-07-01
Payer: MEDICARE

## 2019-07-01 VITALS
SYSTOLIC BLOOD PRESSURE: 135 MMHG | HEIGHT: 70 IN | WEIGHT: 244 LBS | BODY MASS INDEX: 34.93 KG/M2 | HEART RATE: 70 BPM | DIASTOLIC BLOOD PRESSURE: 67 MMHG | OXYGEN SATURATION: 99 % | RESPIRATION RATE: 16 BRPM

## 2019-07-01 DIAGNOSIS — M25.562 ACUTE PAIN OF LEFT KNEE: ICD-10-CM

## 2019-07-01 DIAGNOSIS — E87.6 HYPOKALEMIA: ICD-10-CM

## 2019-07-01 DIAGNOSIS — I10 BENIGN ESSENTIAL HYPERTENSION: ICD-10-CM

## 2019-07-01 DIAGNOSIS — Z00.00 MEDICARE ANNUAL WELLNESS VISIT, SUBSEQUENT: Primary | ICD-10-CM

## 2019-07-01 DIAGNOSIS — K21.9 GASTROESOPHAGEAL REFLUX DISEASE, ESOPHAGITIS PRESENCE NOT SPECIFIED: ICD-10-CM

## 2019-07-01 DIAGNOSIS — E66.01 MORBID OBESITY (H): ICD-10-CM

## 2019-07-01 DIAGNOSIS — M10.071 ACUTE IDIOPATHIC GOUT OF RIGHT ANKLE: Chronic | ICD-10-CM

## 2019-07-01 DIAGNOSIS — E78.00 PURE HYPERCHOLESTEROLEMIA: ICD-10-CM

## 2019-07-01 PROCEDURE — G0439 PPPS, SUBSEQ VISIT: HCPCS | Performed by: PHYSICIAN ASSISTANT

## 2019-07-01 PROCEDURE — 99213 OFFICE O/P EST LOW 20 MIN: CPT | Mod: 25 | Performed by: PHYSICIAN ASSISTANT

## 2019-07-01 RX ORDER — POTASSIUM CHLORIDE 1500 MG/1
TABLET, EXTENDED RELEASE ORAL
Qty: 270 TABLET | Refills: 1 | Status: SHIPPED | OUTPATIENT
Start: 2019-07-01 | End: 2020-06-09

## 2019-07-01 RX ORDER — ALLOPURINOL 100 MG/1
TABLET ORAL
Qty: 180 TABLET | Refills: 3 | Status: SHIPPED | OUTPATIENT
Start: 2019-07-01 | End: 2020-07-10

## 2019-07-01 RX ORDER — CHLORTHALIDONE 25 MG/1
25 TABLET ORAL DAILY
Qty: 90 TABLET | Refills: 1 | Status: SHIPPED | OUTPATIENT
Start: 2019-07-01 | End: 2020-05-05

## 2019-07-01 RX ORDER — LOSARTAN POTASSIUM 100 MG/1
100 TABLET ORAL DAILY
Qty: 90 TABLET | Refills: 1 | Status: SHIPPED | OUTPATIENT
Start: 2019-07-01 | End: 2020-03-04

## 2019-07-01 RX ORDER — CARVEDILOL 12.5 MG/1
TABLET ORAL
Qty: 180 TABLET | Refills: 1 | Status: SHIPPED | OUTPATIENT
Start: 2019-07-01 | End: 2020-06-29

## 2019-07-01 RX ORDER — AMLODIPINE BESYLATE 10 MG/1
10 TABLET ORAL
Qty: 90 TABLET | Refills: 1 | Status: SHIPPED | OUTPATIENT
Start: 2019-07-01 | End: 2020-07-19

## 2019-07-01 RX ORDER — OMEPRAZOLE 40 MG/1
CAPSULE, DELAYED RELEASE ORAL
Qty: 30 CAPSULE | Refills: 6 | Status: SHIPPED | OUTPATIENT
Start: 2019-07-01 | End: 2020-03-18

## 2019-07-01 RX ORDER — SIMVASTATIN 20 MG
TABLET ORAL
Qty: 180 TABLET | Refills: 1 | Status: SHIPPED | OUTPATIENT
Start: 2019-07-01 | End: 2020-03-19

## 2019-07-01 ASSESSMENT — MIFFLIN-ST. JEOR: SCORE: 1883.03

## 2019-07-01 NOTE — PROGRESS NOTES
Subjective     Prashant Keyes is a 68 year old male who presents to clinic today for the following health issues:    HPI   Hyperlipidemia Follow-Up      Are you having any of the following symptoms? (Select all that apply)  No complaints of shortness of breath, chest pain or pressure.  No increased sweating or nausea with activity.  No left-sided neck or arm pain.  No complaints of pain in calves when walking 1-2 blocks.    Are you regularly taking any medication or supplement to lower your cholesterol?   Yes- simvastatin    Are you having muscle aches or other side effects that you think could be caused by your cholesterol lowering medication?  No      Hypertension Follow-up      Do you check your blood pressure regularly outside of the clinic? No     Are you following a low salt diet? No    Are your blood pressures ever more than 140 on the top number (systolic) OR more   than 90 on the bottom number (diastolic), for example 140/90? No    Amount of exercise or physical activity: 1 day/week for an average of 30-45 minutes    Problems taking medications regularly: No    Medication side effects: none    Diet: regular (no restrictions)      Gout/ single inflamed joint   Onset: chronic    Description:   Location: big toe - bilateral  Joint Swelling: no   Redness: no   Pain: no     Intensity: stable currently    Progression of Symptoms:  improving    Accompanying Signs & Symptoms:  Fevers: no     History:   Trauma to the area: no   Previous history of gout: YES   Recent illness:  no     Precipitating factors:   Diet-rich in purine: red meat  Alcohol use: no   Diuretic use: no     Alleviating factors:      Therapies Tried and outcome:  allopurinol      Left knee pain again. No swelling. Worse the past week.  No locking or catching. Morning stiffness.     Allergies   Allergen Reactions     Accupril [Ace Inhibitors] Difficulty breathing     accupril causes SOB     Aptiom [Eslicarbazepine] Difficulty breathing      "Atorvastatin Calcium      Myalgias from Lipitor     Contrast Dye Hives     Coreg [Carvedilol] Nausea and Fatigue     Depakote [Valproic Acid]      He denies being allergic to Depakote. Tremors can be side effect.        Nico Turner MD, [facility]  Neurologist       Lactose GI Disturbance     Lisinopril Difficulty breathing     SOB     Nitroglycerin      Headache     Paroxetine Hives     Tetracycline [Tetracyclines]      \"splitting headaches\"     Vimpat [Lacosamide] Difficulty breathing     Zoloft Rash     Zolpidem      Adhesive Tape Rash     Without itching- EKG pads     Recent Labs   Lab Test 06/25/19  0936 12/18/18  0926 09/10/18  0759 01/29/18  1643  05/05/17  0825   LDL 69  --  65  --   --  63   HDL 70  --  72  --   --  66   TRIG 100  --  93  --   --  148   ALT  --  17  --  9  --  18   CR  --  1.03  --  1.20   < > 1.28*   GFRESTIMATED  --  72  --  60*   < > 56*   GFRESTBLACK  --  87  --  73   < > 68   POTASSIUM  --  3.7  --  3.4   < > 3.3*   TSH  --  0.29*  --  0.52  --   --     < > = values in this interval not displayed.      BP Readings from Last 3 Encounters:   07/01/19 135/67   02/11/19 120/69   12/18/18 115/60    Wt Readings from Last 3 Encounters:   07/01/19 110.7 kg (244 lb)   02/11/19 108 kg (238 lb)   12/18/18 106.4 kg (234 lb 9.6 oz)                      Reviewed and updated as needed this visit by Provider         Review of Systems   ROS COMP: Constitutional, HEENT, cardiovascular, pulmonary, GI, , musculoskeletal, neuro, skin, endocrine and psych systems are negative, except as otherwise noted.      Objective    /67   Pulse 70   Resp 16   Ht 1.778 m (5' 10\")   Wt 110.7 kg (244 lb)   SpO2 99%   BMI 35.01 kg/m    Body mass index is 35.01 kg/m .  Physical Exam     Eye exam - right eye normal lid, conjunctiva, cornea, pupil and fundus, left eye normal lid, conjunctiva, cornea, pupil and fundus.  Thyroid not palpable, not enlarged, no nodules detected.  CHEST:chest clear to IPPA, no tachypnea, " retractions or cyanosis and S1, S2 normal, no murmur, no gallop, rate regular.  Mild leg edema. Pulses stable  KNEE: The injured knee reveals antalgic gait, soft tissue tenderness over medial joint line. No effusion. , negative drawer sign, collateral ligaments intact, negative Kenji sign.

## 2019-07-03 ENCOUNTER — TELEPHONE (OUTPATIENT)
Dept: FAMILY MEDICINE | Facility: CLINIC | Age: 69
End: 2019-07-03

## 2019-07-03 NOTE — TELEPHONE ENCOUNTER
Patient went to work last night and knee really started to bug him- would like to look into cortisone shot.   RN advised PCP out of office.    RN will send request to PCP to advise on cortisone injection/scheduling.     Mague Barron RN, BSN, PHN

## 2019-07-03 NOTE — TELEPHONE ENCOUNTER
Patient is calling stating knees are still in pain and would like to discuss cortisone shots. Please call to advise. Thank you.

## 2019-07-05 NOTE — TELEPHONE ENCOUNTER
Spoke with patient, he went to ER on 7/3/19 and received cortisone injection is left knee. He has scheduled follow up appt with Matt Swan for 7/18/19

## 2019-07-14 DIAGNOSIS — I10 BENIGN ESSENTIAL HYPERTENSION: ICD-10-CM

## 2019-07-15 NOTE — TELEPHONE ENCOUNTER
"Requested Prescriptions   Pending Prescriptions Disp Refills     furosemide (LASIX) 20 MG tablet [Pharmacy Med Name: FUROSEMIDE 20MG     TAB]  Last Written Prescription Date:  01/24/19  Last Fill Quantity: 30,  # refills: 3   Last office visit: 7/1/2019 with prescribing provider:  THONG swan   Future Office Visit:   Next 5 appointments (look out 90 days)    Jul 18, 2019 11:20 AM CDT  Office Visit with Matt Swan PA-C  [facility] ([facility] [address] [facility] [address]  [phone]          30 tablet 3     Sig: TAKE TWO TABLETS BY MOUTH AS NEEDED       Diuretics (Including Combos) Protocol Passed - 7/14/2019  4:32 PM        Passed - Blood pressure under 140/90 in past 12 months     BP Readings from Last 3 Encounters:   07/01/19 135/67   02/11/19 120/69   12/18/18 115/60                 Passed - Recent (12 mo) or future (30 days) visit within the authorizing provider's specialty     Patient had office visit in the last 12 months or has a visit in the next 30 days with authorizing provider or within the authorizing provider's specialty.  See \"Patient Info\" tab in inbasket, or \"Choose Columns\" in Meds & Orders section of the refill encounter.              Passed - Medication is active on med list        Passed - Patient is age 18 or older        Passed - Normal serum creatinine on file in past 12 months     Recent Labs   Lab Test 12/18/18  0926   CR 1.03              Passed - Normal serum potassium on file in past 12 months     Recent Labs   Lab Test 12/18/18  0926   POTASSIUM 3.7                    Passed - Normal serum sodium on file in past 12 months     Recent Labs   Lab Test 12/18/18  0926                   "

## 2019-07-16 RX ORDER — FUROSEMIDE 20 MG
TABLET ORAL
Qty: 30 TABLET | Refills: 3 | Status: SHIPPED | OUTPATIENT
Start: 2019-07-16 | End: 2019-11-05

## 2019-07-16 NOTE — TELEPHONE ENCOUNTER
Prescription approved per [facility] Refill Protocol.    Mague Barron RN, BSN, PHN

## 2019-07-18 ENCOUNTER — OFFICE VISIT (OUTPATIENT)
Dept: FAMILY MEDICINE | Facility: CLINIC | Age: 69
End: 2019-07-18
Payer: MEDICARE

## 2019-07-18 VITALS
HEART RATE: 75 BPM | RESPIRATION RATE: 18 BRPM | HEIGHT: 70 IN | BODY MASS INDEX: 34.93 KG/M2 | TEMPERATURE: 98 F | DIASTOLIC BLOOD PRESSURE: 70 MMHG | SYSTOLIC BLOOD PRESSURE: 114 MMHG | OXYGEN SATURATION: 98 % | WEIGHT: 244 LBS

## 2019-07-18 DIAGNOSIS — M25.562 ACUTE PAIN OF LEFT KNEE: Primary | ICD-10-CM

## 2019-07-18 PROCEDURE — 99213 OFFICE O/P EST LOW 20 MIN: CPT | Performed by: PHYSICIAN ASSISTANT

## 2019-07-18 ASSESSMENT — MIFFLIN-ST. JEOR: SCORE: 1883.03

## 2019-07-18 NOTE — PROGRESS NOTES
"Subjective     Prashant Keyes is a 68 year old male who presents to clinic today for the following health issues:    HPI   Musculoskeletal problem/pain--had cortisone injection July 3 helped for about 2 days.      Duration: ongoing    Description  Location: left knee    Intensity:  moderate    Accompanying signs and symptoms: none    History  Previous similar problem: YES  Previous evaluation:  x-ray    Precipitating or alleviating factors:  Trauma or overuse: no   Aggravating factors include: sitting, walking, climbing stairs and exercise    Therapies tried and outcome: rest/inactivity and injection with no relief        Allergies   Allergen Reactions     Accupril [Ace Inhibitors] Difficulty breathing     accupril causes SOB     Aptiom [Eslicarbazepine] Difficulty breathing     Atorvastatin Calcium      Myalgias from Lipitor     Contrast Dye Hives     Coreg [Carvedilol] Nausea and Fatigue     Depakote [Valproic Acid]      He denies being allergic to Depakote. Tremors can be side effect.        Nico Turner MD, MRCPI  Neurologist       Lactose GI Disturbance     Lisinopril Difficulty breathing     SOB     Nitroglycerin      Headache     Paroxetine Hives     Tetracycline [Tetracyclines]      \"splitting headaches\"     Vimpat [Lacosamide] Difficulty breathing     Zoloft Rash     Zolpidem      Adhesive Tape Rash     Without itching- EKG pads     BP Readings from Last 3 Encounters:   07/18/19 114/70   07/01/19 135/67   02/11/19 120/69    Wt Readings from Last 3 Encounters:   07/18/19 110.7 kg (244 lb)   07/01/19 110.7 kg (244 lb)   02/11/19 108 kg (238 lb)                      Reviewed and updated as needed this visit by Provider         Review of Systems   ROS COMP: Constitutional, HEENT, cardiovascular, pulmonary, GI, , musculoskeletal, neuro, skin, endocrine and psych systems are negative, except as otherwise noted.      Objective    /70   Pulse 75   Temp 98  F (36.7  C) (Oral)   Resp 18   Ht 1.778 m " "(5' 10\")   Wt 110.7 kg (244 lb)   SpO2 98%   BMI 35.01 kg/m    Body mass index is 35.01 kg/m .  Physical Exam     KNEE: The injured knee reveals antalgic gait, soft tissue tenderness over medial joint line , mild effusion, reduced range of motion, negative drawer sign, collateral ligaments intact, positive Kenji sign, normal ipsilateral hip exam, normal ipsilateral foot and ankle exam, normal contralateral knee exam.       Prashant was seen today for knee pain.    Diagnoses and all orders for this visit:    Acute pain of left knee  -     MR Knee Left w/o Contrast; Future      Advised supportive and symptomatic treatment.  Follow up with Provider - if condition persists or worsens.       "

## 2019-07-22 ENCOUNTER — ANCILLARY PROCEDURE (OUTPATIENT)
Dept: MRI IMAGING | Facility: CLINIC | Age: 69
End: 2019-07-22
Attending: PHYSICIAN ASSISTANT
Payer: MEDICARE

## 2019-07-22 DIAGNOSIS — M25.562 ACUTE PAIN OF LEFT KNEE: ICD-10-CM

## 2019-07-22 PROCEDURE — 73721 MRI JNT OF LWR EXTRE W/O DYE: CPT | Mod: TC

## 2019-07-24 ENCOUNTER — TELEPHONE (OUTPATIENT)
Dept: FAMILY MEDICINE | Facility: CLINIC | Age: 69
End: 2019-07-24

## 2019-07-24 DIAGNOSIS — M25.562 ACUTE PAIN OF LEFT KNEE: Primary | ICD-10-CM

## 2019-07-24 DIAGNOSIS — S83.249D TEAR OF MEDIAL MENISCUS OF KNEE, CURRENT, UNSPECIFIED LATERALITY, UNSPECIFIED TEAR TYPE, SUBSEQUENT ENCOUNTER: ICD-10-CM

## 2019-07-24 NOTE — TELEPHONE ENCOUNTER
Patient states he would like to know the results of his MRI today or he will end up back in the ER.  Please call.    Thank you.

## 2019-07-25 NOTE — TELEPHONE ENCOUNTER
His mri revealed a small medial meniscus tear and some mild degenerative changes. i'd recommend he come in for a cortisone shot initially.

## 2019-07-25 NOTE — TELEPHONE ENCOUNTER
Spoke with patient, he did receive an injection at the ER on 07/03/19:   Procedure: Knee joint injection  Indication: pain  Procedure: Skin cleansed with chloraprep. Using sterile procedure 3cc Bupivicaine, 40mg triamcinolone injected to left knee. Procedure tolerated well.     Patient states the injection from 07/03/19 only lasted a couple of days, he is asking what can be done for the pain? OTC or Rx -pharmacy pended if needed.

## 2019-07-29 ENCOUNTER — ANCILLARY PROCEDURE (OUTPATIENT)
Dept: GENERAL RADIOLOGY | Facility: CLINIC | Age: 69
End: 2019-07-29
Attending: PHYSICIAN ASSISTANT
Payer: MEDICARE

## 2019-07-29 ENCOUNTER — OFFICE VISIT (OUTPATIENT)
Dept: ORTHOPEDICS | Facility: CLINIC | Age: 69
End: 2019-07-29
Payer: MEDICARE

## 2019-07-29 VITALS
DIASTOLIC BLOOD PRESSURE: 76 MMHG | RESPIRATION RATE: 16 BRPM | WEIGHT: 250.4 LBS | HEIGHT: 70 IN | SYSTOLIC BLOOD PRESSURE: 148 MMHG | BODY MASS INDEX: 35.85 KG/M2

## 2019-07-29 DIAGNOSIS — M25.562 CHRONIC PAIN OF LEFT KNEE: Primary | ICD-10-CM

## 2019-07-29 DIAGNOSIS — G89.29 CHRONIC PAIN OF LEFT KNEE: Primary | ICD-10-CM

## 2019-07-29 DIAGNOSIS — S83.232A COMPLEX TEAR OF MEDIAL MENISCUS OF LEFT KNEE, UNSPECIFIED WHETHER OLD OR CURRENT TEAR, INITIAL ENCOUNTER: ICD-10-CM

## 2019-07-29 DIAGNOSIS — M25.562 ACUTE PAIN OF LEFT KNEE: ICD-10-CM

## 2019-07-29 DIAGNOSIS — M94.262 CHONDROMALACIA OF LEFT KNEE: ICD-10-CM

## 2019-07-29 PROCEDURE — 73562 X-RAY EXAM OF KNEE 3: CPT | Mod: LT

## 2019-07-29 PROCEDURE — 99203 OFFICE O/P NEW LOW 30 MIN: CPT | Performed by: ORTHOPAEDIC SURGERY

## 2019-07-29 ASSESSMENT — PAIN SCALES - GENERAL: PAINLEVEL: NO PAIN (1)

## 2019-07-29 ASSESSMENT — MIFFLIN-ST. JEOR: SCORE: 1912.06

## 2019-07-29 NOTE — PROGRESS NOTES
CHIEF COMPLAINT:   Chief Complaint   Patient presents with     Left Knee - Pain     Let knee pain. Onset: 7/1/19 with NKI. Pain is mainly superomedial. Had a cortisone injection at [facility]. Lasted for 2 days. He's had prior injections that have helped much better. He takes ASA and aspercreme patches.      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    .    Prashant Keyes is seen today in the [facility] for evaluation of left knee pain at the request of Matt Swan    HISTORY OF PRESENT ILLNESS    Prashant Keyes is a 68 year old male seen for evaluation of ongoing left knee pain with no known injury.   Pain has been increased for almost 4 weeks, 7/1/2019. Locates pain front/inner aspect of the knee. Had injection with cortisone at [facility] on 7/3/2019, lasted about 2 days. He's had previous injections in the past that have helped longer. He's had pain in left knee for a number of years. Lately the knee pain has improved, 0.5-1/10. Was 8/10 pain when he went to the emergency room. He had injection about 2 years ago with good relief until recently.    Denies low back or hip pain. Denies numbness and tingling.    Present symptoms: pain medially , pain dull/achy , mild pain, mild swelling.    Pain severity: 1/10  Frequency of symptoms: frequently  Exacerbating Factors: weight bearing, lifting leg in/out of car/tub, too low of toilet seats getting up  Relieving Factors: aspirin 4x/day  Night Pain: Yes  Pain while at rest: Yes   Numbness or tingling: No   Patient has tried:     NSAIDS: Yes      Physical Therapy: No      Activity modification: No      Bracing: No      Injections: Yes 7/3/2019 [facility], otherwise previous injections with Matt Swan, last was about 2 years ago     Ice: No      Assistive device:  No     Other: occasional acetaminophen. Lidocaine patch      Other PMH:  has a past medical history of Calculus of kidney (~1975), Carpal tunnel  syndrome (11/94), Concussion, unspecified (12/95), Eczema (11/16/2011), Epileptic seizure (H) (1995), Fibromyalgia syndrome (~2000), Hypertension, Left testicle cyst, Photosensitive contact dermatitis, Prostatitis, unspecified, Seizures (H), Umbilical hernia (11/26/2012), and Unspecified asthma(493.90). He also has no past medical history of Acne vulgaris, Actinic keratosis, Basal cell carcinoma, Heart valve disorder, Malignant melanoma nos, Pacemaker, Skin cancer, Squamous cell carcinoma, Type II or unspecified type diabetes mellitus without mention of complication, not stated as uncontrolled, or Urticaria.  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    Medications:   Current Outpatient Medications:      allopurinol (ZYLOPRIM) 100 MG tablet, TAKE TWO TABLETS BY MOUTH ONCE DAILY, Disp: 180 tablet, Rfl: 3     amLODIPine (NORVASC) 10 MG tablet, Take 1 tablet (10 mg) by mouth daily (with dinner), Disp: 90 tablet, Rfl: 1     ARIPiprazole (ABILIFY) 2 MG tablet, Take 2 mg by mouth daily, Disp: , Rfl:      aspirin 81 MG tablet, Take 1 tablet (81 mg) by mouth daily, Disp: 30  tablet, Rfl:      carbidopa-levodopa (SINEMET)  MG tablet, Take 1 tablet by mouth 4 times daily, Disp: 360 tablet, Rfl: 3     carvedilol (COREG) 12.5 MG tablet, TAKE ONE TABLET BY MOUTH TWICE DAILY WITH MEALS, Disp: 180 tablet, Rfl: 1     chlorthalidone (HYGROTON) 25 MG tablet, Take 1 tablet (25 mg) by mouth daily, Disp: 90 tablet, Rfl: 1     Cholecalciferol (VITAMIN D) 2000 UNITS tablet, Take 2,000 Units by mouth daily, Disp: 90 tablet, Rfl: 3     colchicine (COLCYRS) 0.6 MG tablet, Take 1 tablet (0.6 mg) by mouth daily as needed Take 2 tabs on first day.  Take at first sign of gout flair.  Take for max of 1 week per flair, Disp: 30 tablet, Rfl: 0     Cyanocobalamin (VITAMIN  B-12) 2500 MCG tablet, Place 2,500 mcg under the tongue daily, Disp: 90 tablet, Rfl: 3     divalproex sodium delayed-release (DEPAKOTE) 500 MG DR tablet, Take 1 tablet (500 mg) by mouth 2 times daily NEED APPT BEFORE FURTHER REFILLS, Disp: 180 tablet, Rfl: 0     ferrous sulfate (IRON) 325 (65 FE) MG tablet, Take 1 tablet (325 mg) by mouth 2 times daily, Disp: 60 tablet, Rfl: 2     furosemide (LASIX) 20 MG tablet, TAKE TWO TABLETS BY MOUTH AS NEEDED, Disp: 30 tablet, Rfl: 3     levETIRAcetam (KEPPRA) 1000 MG TABS, 1 tablet in morning and bed time., Disp: 180 tablet, Rfl: 3     losartan (COZAAR) 100 MG tablet, Take 1 tablet (100 mg) by mouth daily, Disp: 90 tablet, Rfl: 1     Multiple Vitamin (MULTI VITAMIN MENS PO), Take  by mouth., Disp: , Rfl:      omeprazole (PRILOSEC) 40 MG DR capsule, TAKE ONE CAPSULE BY MOUTH ONCE DAILY (TAKE  30  TO  60  MINUTES  BEFORE  A  MEAL), Disp: 30 capsule, Rfl: 6     ORDER FOR DME, CPAP daily, Disp: , Rfl:      potassium chloride ER (KLOR-CON) 20 MEQ CR tablet, TAKE 1 TABLET BY MOUTH THREE TIMES DAILY, Disp: 270 tablet, Rfl: 1     simvastatin (ZOCOR) 20 MG tablet, TAKE 2 TABLETS BY MOUTH IN THE EVENING AT BEDTIME, Disp: 180 tablet, Rfl: 1     TEMAZEPAM PO, Take 15 mg by mouth At Bedtime, Disp: , Rfl:  "    Allergies:   Allergies   Allergen Reactions     Accupril [Ace Inhibitors] Difficulty breathing     accupril causes SOB     Aptiom [Eslicarbazepine] Difficulty breathing     Atorvastatin Calcium      Myalgias from Lipitor     Contrast Dye Hives     Coreg [Carvedilol] Nausea and Fatigue     Depakote [Valproic Acid]      He denies being allergic to Depakote. Tremors can be side effect.        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   reports that he has never smoked. He has never used smokeless tobacco. He reports that he does not drink alcohol or use drugs.    Family Hx: family history includes Alcohol/Drug in his brother; Arrhythmia in his sister; Arthritis in his brother and mother; C.A.D. in his brother and mother; Cancer in his maternal grandmother, paternal grandfather, and sister; Cardiovascular in his father; Cerebrovascular Disease in his mother; Depression in his daughter, daughter, son, and son; Diabetes in his son; Eye Disorder in his mother; Gastrointestinal Disease in his brother; Heart Disease in his brother, brother, and mother; Hemochromatosis in his sister; Hypertension in his brother, brother, and sister; Lipids in his brother and brother; Obesity in his brother and sister; Thyroid Disease in his brother, maternal grandfather, and sister.    REVIEW OF SYSTEMS: 10 point ROS neg other than the symptoms noted above in the HPI and PMH. Notables include  CONSTITUTIONAL:NEGATIVE for fever, chills, change in weight  INTEGUMENTARY/SKIN: NEGATIVE for worrisome rashes, moles or lesions  MUSCULOSKELETAL:See HPI above  NEURO: NEGATIVE " "for weakness, dizziness or paresthesias    PHYSICAL EXAM:  /76   Resp 16   Ht 1.778 m (5' 10\")   Wt 113.6 kg (250 lb 6.4 oz)   BMI 35.93 kg/m     GENERAL APPEARANCE: healthy, alert, no distress  SKIN: no suspicious lesions or rashes  NEURO: Normal strength and tone, mentation intact and speech normal  PSYCH:  mentation appears normal and affect normal, not anxious  RESPIRATORY: No increased work of breathing.  HANDS: no clubbing, nail pitting  LYMPH: no palpable popliteal lymphadenopathy.    BILATERAL LOWER EXTREMITIES:  Gait: slight favors left.  Alignment: varus  No gross deformities or masses.  Bilateral Quad atrophy, strength normal.  Intact sensation deep peroneal nerve, superficial peroneal nerve, med/lat tibial nerve, sural nerve, saphenous nerve  Intact EHL, EDL, TA, FHL, GS, quadriceps hamstrings and hip flexors  Toes warm and well perfused, brisk capillary refill. Palpable 2+ dp pulses.  Bilateral calf soft and nttp or squeeze.  DTRs: achilles 2+, patella 2+.  Edema: 1+  Bilateral pes planus, left more than right.   Poor toenail/foot hygiene.    LEFT KNEE EXAM:    Skin: intact, no ecchymosis or erythema  Squat: 100 %, not limited by pain.     ROM: full extension to 115 flexion, limited byanterior discomfort.  Tight hamstrings on straight leg raise.  Effusion: small-moderate   Tender: medial > lateral joint line, pes  McMurrays: negative    MCL: stable, and non-painful at both 0 and 30 degrees knee flexion  Varus stress: stable, and non-painful at both 0 and 30 degrees knee flexion  Lachmans: neg, firm endpoint  Posterior Drawer stable  Patellofemoral joint:                Apprehension: negative              Crepitations: mild   Grind: positive.    RIGHT KNEE EXAM:    Skin: intact, no ecchymosis or erythema  Squat: 100 %, not limited by pain.     ROM: full extension to 125+ flexion  Tight hamstrings on straight leg raise.  Effusion: none  Tender: NTTP med/lat joint line, anterior or posterior " knee  McMurrays: negative    MCL: stable, and non-painful at both 0 and 30 degrees knee flexion  Varus stress: stable, and non-painful at both 0 and 30 degrees knee flexion  Lachmans: neg, firm endpoint  Posterior Drawer stable  Patellofemoral joint:                Apprehension: negative              Crepitations: mild   Grind: negative.    X-RAY:  3 views left knee from 7/29/2019 were reviewed in clinic today. On my review, no obvious fractures or dislocations. Mild medial narrowing.    MRI:  MRI left knee from 7/22/2019 was reviewed in clinic today.     1. Small tear along the free edge of the junction of the posterior  horn and body of the medial meniscus.  2. Increased signal throughout the anterior cruciate ligament.  However, no definite tear is currently seen. This most likely  represents an old healed injury. A more recent sprain is considered  unlikely given the absence of secondary findings of a recent injury.  3. Mild chondromalacia of the medial compartment.             ASSESSMENT/PLAN: Prashant Keyes is a 68 year old male with chronic left knee pain, complex medial meniscus tear, mild chondromalacia.     * discussed injury with patient, what appears to be a medial meniscal tear on MRI, as well as some underlying arthritic changes, which is consistent with symptoms and physical examination findings.     * Discussed treatment options including nonoperative treatment with continued rest, ice, elevation, activity modification, NSAIDS and Physical Therapy, bracing and potential injections versus surgical treatment with arthroscopy and meniscal repair versus debridement, possible chondral debridement. Risks and benefits of each discussed in detail.  * in the setting of underlying chondrosis, predictability of arthroscopy is uncertain, unless mechanical symptoms present due to the meniscus tear.    * surgical risks discussed: bleeding, infection, pain, scar, damage to adjacent structures (nerve, vessels,  cartilage), stiffness, post-traumatic arthritis, failure to relieve symptoms, recurrence of symptoms, blood clots (DVT), pulmonary emolism, risks of anesthesia and death. This surgery is not intended nor expected to alleviate arthritic pain symptoms, nor will it treat or correct underlying arthritic changes. Arthritis and symptoms related to arthritis could worsen with arthroscopy and meniscal and/or chondral debridement. Patient understands.    * understanding the risks , patient elects to monitor at this time as he's feeling better.  * return to clinic as needed.    * all questions addressed and answered prior to discharge from clinic today.  * patient to call if any questions or concerns in the meantime.      Bill to follow up with Primary Care provider regarding elevated blood pressure.      Damon Rosas M.D., M.S.  Dept. of Orthopaedic Surgery  [facility]

## 2019-07-29 NOTE — LETTER
7/29/2019         RE: Prashant Keyes  [address]  [place] [address]        Dear Colleague,    Thank you for referring your patient, Prashant Keyes, to the [facility]. Please see a copy of my visit note below.    CHIEF COMPLAINT:   Chief Complaint   Patient presents with     Left Knee - Pain     Let knee pain. Onset: 7/1/19 with NKI. Pain is mainly superomedial. Had a cortisone injection at [facility]. Lasted for 2 days. He's had prior injections that have helped much better. He takes ASA and aspercreme patches.      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    .    Prashant Keyes is seen today in the [facility] for evaluation of left knee pain at the request of Matt Swan    HISTORY OF PRESENT ILLNESS    Prashant Keyes is a 68 year old male seen for evaluation of ongoing left knee pain with no known injury.   Pain has been increased for almost 4 weeks, 7/1/2019. Locates pain front/inner aspect of the knee. Had injection with cortisone at [facility] on 7/3/2019, lasted about 2 days. He's had previous injections in the past that have helped longer. He's had pain in left knee for a number of years. Lately the knee pain has improved, 0.5-1/10. Was 8/10 pain when he went to the emergency room. He had injection about 2 years ago with good relief until recently.    Denies low back or hip pain. Denies numbness and tingling.    Present symptoms: pain medially , pain dull/achy , mild pain, mild swelling.    Pain severity: 1/10  Frequency of symptoms: frequently  Exacerbating Factors: weight bearing, lifting leg in/out of car/tub, too low of toilet seats getting up  Relieving Factors: aspirin 4x/day  Night Pain: Yes  Pain while at rest: Yes   Numbness or tingling: No   Patient has tried:     NSAIDS: Yes      Physical Therapy: No      Activity modification: No      Bracing: No      Injections: Yes 7/3/2019 [facility], otherwise previous injections with Matt Swan, last was about 2 years ago     Ice: No      Assistive device:  No     Other: occasional acetaminophen. Lidocaine patch      Other PMH:  has a past medical history of Calculus of kidney (~1975), Carpal tunnel syndrome (11/94), Concussion, unspecified (12/95), Eczema (11/16/2011), Epileptic seizure (H) (1995), Fibromyalgia syndrome (~2000), Hypertension, Left testicle cyst, Photosensitive contact dermatitis, Prostatitis, unspecified, Seizures (H), Umbilical hernia (11/26/2012), and Unspecified asthma(493.90). He also has no past medical history of Acne vulgaris, Actinic keratosis, Basal cell carcinoma, Heart valve disorder, Malignant melanoma nos, Pacemaker, Skin cancer, Squamous cell carcinoma, Type II or unspecified type diabetes mellitus without mention of complication, not stated as uncontrolled, or Urticaria.  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    Medications:   Current Outpatient Medications:      allopurinol (ZYLOPRIM) 100 MG tablet, TAKE TWO TABLETS BY MOUTH ONCE DAILY, Disp: 180 tablet, Rfl: 3      amLODIPine (NORVASC) 10 MG tablet, Take 1 tablet (10 mg) by mouth daily (with dinner), Disp: 90 tablet, Rfl: 1     ARIPiprazole (ABILIFY) 2 MG tablet, Take 2 mg by mouth daily, Disp: , Rfl:      aspirin 81 MG tablet, Take 1 tablet (81 mg) by mouth daily, Disp: 30 tablet, Rfl:      carbidopa-levodopa (SINEMET)  MG tablet, Take 1 tablet by mouth 4 times daily, Disp: 360 tablet, Rfl: 3     carvedilol (COREG) 12.5 MG tablet, TAKE ONE TABLET BY MOUTH TWICE DAILY WITH MEALS, Disp: 180 tablet, Rfl: 1     chlorthalidone (HYGROTON) 25 MG tablet, Take 1 tablet (25 mg) by mouth daily, Disp: 90 tablet, Rfl: 1     Cholecalciferol (VITAMIN D) 2000 UNITS tablet, Take 2,000 Units by mouth daily, Disp: 90 tablet, Rfl: 3     colchicine (COLCYRS) 0.6 MG tablet, Take 1 tablet (0.6 mg) by mouth daily as needed Take 2 tabs on first day.  Take at first sign of gout flair.  Take for max of 1 week per flair, Disp: 30 tablet, Rfl: 0     Cyanocobalamin (VITAMIN  B-12) 2500 MCG tablet, Place 2,500 mcg under the tongue daily, Disp: 90 tablet, Rfl: 3     divalproex sodium delayed-release (DEPAKOTE) 500 MG DR tablet, Take 1 tablet (500 mg) by mouth 2 times daily NEED APPT BEFORE FURTHER REFILLS, Disp: 180 tablet, Rfl: 0     ferrous sulfate (IRON) 325 (65 FE) MG tablet, Take 1 tablet (325 mg) by mouth 2 times daily, Disp: 60 tablet, Rfl: 2     furosemide (LASIX) 20 MG tablet, TAKE TWO TABLETS BY MOUTH AS NEEDED, Disp: 30 tablet, Rfl: 3     levETIRAcetam (KEPPRA) 1000 MG TABS, 1 tablet in morning and bed time., Disp: 180 tablet, Rfl: 3     losartan (COZAAR) 100 MG tablet, Take 1 tablet (100 mg) by mouth daily, Disp: 90 tablet, Rfl: 1     Multiple Vitamin (MULTI VITAMIN MENS PO), Take  by mouth., Disp: , Rfl:      omeprazole (PRILOSEC) 40 MG DR capsule, TAKE ONE CAPSULE BY MOUTH ONCE DAILY (TAKE  30  TO  60  MINUTES  BEFORE  A  MEAL), Disp: 30 capsule, Rfl: 6     ORDER FOR DME, CPAP daily, Disp: , Rfl:      potassium chloride ER (KLOR-CON) 20  "MEQ CR tablet, TAKE 1 TABLET BY MOUTH THREE TIMES DAILY, Disp: 270 tablet, Rfl: 1     simvastatin (ZOCOR) 20 MG tablet, TAKE 2 TABLETS BY MOUTH IN THE EVENING AT BEDTIME, Disp: 180 tablet, Rfl: 1     TEMAZEPAM PO, Take 15 mg by mouth At Bedtime, Disp: , Rfl:     Allergies:   Allergies   Allergen Reactions     Accupril [Ace Inhibitors] Difficulty breathing     accupril causes SOB     Aptiom [Eslicarbazepine] Difficulty breathing     Atorvastatin Calcium      Myalgias from Lipitor     Contrast Dye Hives     Coreg [Carvedilol] Nausea and Fatigue     Depakote [Valproic Acid]      He denies being allergic to Depakote. Tremors can be side effect.        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   reports that he has never smoked. He has never used smokeless tobacco. He reports that he does not drink alcohol or use drugs.    Family Hx: family history includes Alcohol/Drug in his brother; Arrhythmia in his sister; Arthritis in his brother and mother; C.A.D. in his brother and mother; Cancer in his maternal grandmother, paternal grandfather, and sister; Cardiovascular in his father; Cerebrovascular Disease in his mother; Depression in his daughter, daughter, son, and son; Diabetes in his son; Eye Disorder in his mother; Gastrointestinal Disease in his brother; Heart Disease in his brother, brother, and mother; Hemochromatosis in his sister; Hypertension in his brother, brother, and sister; Lipids in his brother and brother; Obesity in his brother and sister; Thyroid Disease in his brother, maternal grandfather, and sister.    REVIEW OF SYSTEMS: 10 point " "ROS neg other than the symptoms noted above in the HPI and PMH. Notables include  CONSTITUTIONAL:NEGATIVE for fever, chills, change in weight  INTEGUMENTARY/SKIN: NEGATIVE for worrisome rashes, moles or lesions  MUSCULOSKELETAL:See HPI above  NEURO: NEGATIVE for weakness, dizziness or paresthesias    PHYSICAL EXAM:  /76   Resp 16   Ht 1.778 m (5' 10\")   Wt 113.6 kg (250 lb 6.4 oz)   BMI 35.93 kg/m      GENERAL APPEARANCE: healthy, alert, no distress  SKIN: no suspicious lesions or rashes  NEURO: Normal strength and tone, mentation intact and speech normal  PSYCH:  mentation appears normal and affect normal, not anxious  RESPIRATORY: No increased work of breathing.  HANDS: no clubbing, nail pitting  LYMPH: no palpable popliteal lymphadenopathy.    BILATERAL LOWER EXTREMITIES:  Gait: slight favors left.  Alignment: varus  No gross deformities or masses.  Bilateral Quad atrophy, strength normal.  Intact sensation deep peroneal nerve, superficial peroneal nerve, med/lat tibial nerve, sural nerve, saphenous nerve  Intact EHL, EDL, TA, FHL, GS, quadriceps hamstrings and hip flexors  Toes warm and well perfused, brisk capillary refill. Palpable 2+ dp pulses.  Bilateral calf soft and nttp or squeeze.  DTRs: achilles 2+, patella 2+.  Edema: 1+  Bilateral pes planus, left more than right.   Poor toenail/foot hygiene.    LEFT KNEE EXAM:    Skin: intact, no ecchymosis or erythema  Squat: 100 %, not limited by pain.     ROM: full extension to 115 flexion, limited byanterior discomfort.  Tight hamstrings on straight leg raise.  Effusion: small-moderate   Tender: medial > lateral joint line, pes  McMurrays: negative    MCL: stable, and non-painful at both 0 and 30 degrees knee flexion  Varus stress: stable, and non-painful at both 0 and 30 degrees knee flexion  Lachmans: neg, firm endpoint  Posterior Drawer stable  Patellofemoral joint:                Apprehension: negative              Crepitations: mild   Grind: " positive.    RIGHT KNEE EXAM:    Skin: intact, no ecchymosis or erythema  Squat: 100 %, not limited by pain.     ROM: full extension to 125+ flexion  Tight hamstrings on straight leg raise.  Effusion: none  Tender: NTTP med/lat joint line, anterior or posterior knee  McMurrays: negative    MCL: stable, and non-painful at both 0 and 30 degrees knee flexion  Varus stress: stable, and non-painful at both 0 and 30 degrees knee flexion  Lachmans: neg, firm endpoint  Posterior Drawer stable  Patellofemoral joint:                Apprehension: negative              Crepitations: mild   Grind: negative.    X-RAY:  3 views left knee from 7/29/2019 were reviewed in clinic today. On my review, no obvious fractures or dislocations. Mild medial narrowing.    MRI:  MRI left knee from 7/22/2019 was reviewed in clinic today.     1. Small tear along the free edge of the junction of the posterior  horn and body of the medial meniscus.  2. Increased signal throughout the anterior cruciate ligament.  However, no definite tear is currently seen. This most likely  represents an old healed injury. A more recent sprain is considered  unlikely given the absence of secondary findings of a recent injury.  3. Mild chondromalacia of the medial compartment.             ASSESSMENT/PLAN: Prashant Keyes is a 68 year old male with chronic left knee pain, complex medial meniscus tear, mild chondromalacia.     * discussed injury with patient, what appears to be a medial meniscal tear on MRI, as well as some underlying arthritic changes, which is consistent with symptoms and physical examination findings.     * Discussed treatment options including nonoperative treatment with continued rest, ice, elevation, activity modification, NSAIDS and Physical Therapy, bracing and potential injections versus surgical treatment with arthroscopy and meniscal repair versus debridement, possible chondral debridement. Risks and benefits of each discussed in  detail.  * in the setting of underlying chondrosis, predictability of arthroscopy is uncertain, unless mechanical symptoms present due to the meniscus tear.    * surgical risks discussed: bleeding, infection, pain, scar, damage to adjacent structures (nerve, vessels, cartilage), stiffness, post-traumatic arthritis, failure to relieve symptoms, recurrence of symptoms, blood clots (DVT), pulmonary emolism, risks of anesthesia and death. This surgery is not intended nor expected to alleviate arthritic pain symptoms, nor will it treat or correct underlying arthritic changes. Arthritis and symptoms related to arthritis could worsen with arthroscopy and meniscal and/or chondral debridement. Patient understands.    * understanding the risks , patient elects to monitor at this time as he's feeling better.  * return to clinic as needed.    * all questions addressed and answered prior to discharge from clinic today.  * patient to call if any questions or concerns in the meantime.      Bill to follow up with Primary Care provider regarding elevated blood pressure.      Damon Rosas M.D., M.S.  Dept. of Orthopaedic Surgery  [facility]        Again, thank you for allowing me to participate in the care of your patient.        Sincerely,        Damon Rosas MD

## 2019-08-05 ENCOUNTER — OFFICE VISIT (OUTPATIENT)
Dept: ORTHOPEDICS | Facility: CLINIC | Age: 69
End: 2019-08-05
Payer: MEDICARE

## 2019-08-05 VITALS
SYSTOLIC BLOOD PRESSURE: 129 MMHG | DIASTOLIC BLOOD PRESSURE: 76 MMHG | HEART RATE: 72 BPM | WEIGHT: 250 LBS | HEIGHT: 70 IN | BODY MASS INDEX: 35.79 KG/M2

## 2019-08-05 DIAGNOSIS — M23.204 OLD COMPLEX TEAR OF MEDIAL MENISCUS OF LEFT KNEE: Primary | ICD-10-CM

## 2019-08-05 DIAGNOSIS — M94.262 CHONDROMALACIA, KNEE, LEFT: ICD-10-CM

## 2019-08-05 DIAGNOSIS — M25.562 CHRONIC PAIN OF LEFT KNEE: ICD-10-CM

## 2019-08-05 DIAGNOSIS — G89.29 CHRONIC PAIN OF LEFT KNEE: ICD-10-CM

## 2019-08-05 PROCEDURE — 99213 OFFICE O/P EST LOW 20 MIN: CPT | Performed by: ORTHOPAEDIC SURGERY

## 2019-08-05 ASSESSMENT — PAIN SCALES - GENERAL: PAINLEVEL: MILD PAIN (2)

## 2019-08-05 ASSESSMENT — MIFFLIN-ST. JEOR: SCORE: 1910.24

## 2019-08-05 NOTE — LETTER
8/5/2019         RE: Prashant Keyes  [address]  [place] [address]        Dear Colleague,    Thank you for referring your patient, Prashant eKyes, to the [facility] [place]. Please see a copy of my visit note below.    CHIEF COMPLAINT:   Chief Complaint   Patient presents with     Left Knee - Pain     Onset: 7/1/19, 1 mon s/p. Patient notes his knee has not gotten any better. He is on his feet all night long. If anything his knee is getting worse. He would like to try an injection today.    .    HISTORY OF PRESENT ILLNESS    Prashant Keyes is a 68 year old male seen for followup evaluation of ongoing left knee pain with no known injury.   Pain has been increased for almost 5 weeks, 7/1/2019. Locates pain front/inner aspect of the knee. Had injection with cortisone at [facility] on 7/3/2019 (Kenalog 40mg), lasted about 2 days. He's had previous injections in the past that have helped longer. He's had pain in left knee for a number of years. Was seen last week and at that time his pain had improved, rating it 0.5-1/10. Was 8/10 pain when he went to the emergency room. He had injection about 2 years ago with good relief until recently. He returns today to discuss drainage. Not getting any better, if anything worse. Pain 2/10. He's on his feet all night. He wants to try another injection today.    Denies low back or hip pain. Denies numbness and tingling.    Present symptoms: pain medially , pain dull/achy , mild pain, mild swelling.    Pain severity: 2/10  Frequency of symptoms: frequently  Exacerbating Factors: weight bearing, lifting leg in/out of car/tub, too low of toilet seats getting up  Relieving Factors: aspirin 4x/day  Night Pain: Yes  Pain while at rest: Yes   Numbness or tingling: No   Patient has tried:     NSAIDS: Yes , aspirin q6h     Physical Therapy: No      Activity modification: No      Bracing: yes, knee sleeve, not helping much     Injections: Yes  7/3/2019 [facility], otherwise previous injections with Mattdidier Lopezon, last was about 2 years ago     Ice: No      Assistive device:  No     Other: occasional acetaminophen. Lidocaine patch      Other PMH:  has a past medical history of Calculus of kidney (~1975), Carpal tunnel syndrome (11/94), Concussion, unspecified (12/95), Eczema (11/16/2011), Epileptic seizure (H) (1995), Fibromyalgia syndrome (~2000), Hypertension, Left testicle cyst, Photosensitive contact dermatitis, Prostatitis, unspecified, Seizures (H), Umbilical hernia (11/26/2012), and Unspecified asthma(493.90). He also has no past medical history of Acne vulgaris, Actinic keratosis, Basal cell carcinoma, Heart valve disorder, Malignant melanoma nos, Pacemaker, Skin cancer, Squamous cell carcinoma, Type II or unspecified type diabetes mellitus without mention of complication, not stated as uncontrolled, or Urticaria.  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    Medications:   Current Outpatient Medications:      allopurinol (ZYLOPRIM) 100 MG tablet, TAKE TWO TABLETS BY MOUTH ONCE DAILY, Disp: 180  tablet, Rfl: 3     amLODIPine (NORVASC) 10 MG tablet, Take 1 tablet (10 mg) by mouth daily (with dinner), Disp: 90 tablet, Rfl: 1     ARIPiprazole (ABILIFY) 2 MG tablet, Take 2 mg by mouth daily, Disp: , Rfl:      aspirin 81 MG tablet, Take 1 tablet (81 mg) by mouth daily, Disp: 30 tablet, Rfl:      carbidopa-levodopa (SINEMET)  MG tablet, Take 1 tablet by mouth 4 times daily, Disp: 360 tablet, Rfl: 3     carvedilol (COREG) 12.5 MG tablet, TAKE ONE TABLET BY MOUTH TWICE DAILY WITH MEALS, Disp: 180 tablet, Rfl: 1     chlorthalidone (HYGROTON) 25 MG tablet, Take 1 tablet (25 mg) by mouth daily, Disp: 90 tablet, Rfl: 1     Cholecalciferol (VITAMIN D) 2000 UNITS tablet, Take 2,000 Units by mouth daily, Disp: 90 tablet, Rfl: 3     colchicine (COLCYRS) 0.6 MG tablet, Take 1 tablet (0.6 mg) by mouth daily as needed Take 2 tabs on first day.  Take at first sign of gout flair.  Take for max of 1 week per flair, Disp: 30 tablet, Rfl: 0     Cyanocobalamin (VITAMIN  B-12) 2500 MCG tablet, Place 2,500 mcg under the tongue daily, Disp: 90 tablet, Rfl: 3     divalproex sodium delayed-release (DEPAKOTE) 500 MG DR tablet, Take 1 tablet (500 mg) by mouth 2 times daily NEED APPT BEFORE FURTHER REFILLS, Disp: 180 tablet, Rfl: 0     ferrous sulfate (IRON) 325 (65 FE) MG tablet, Take 1 tablet (325 mg) by mouth 2 times daily, Disp: 60 tablet, Rfl: 2     furosemide (LASIX) 20 MG tablet, TAKE TWO TABLETS BY MOUTH AS NEEDED, Disp: 30 tablet, Rfl: 3     levETIRAcetam (KEPPRA) 1000 MG TABS, 1 tablet in morning and bed time., Disp: 180 tablet, Rfl: 3     losartan (COZAAR) 100 MG tablet, Take 1 tablet (100 mg) by mouth daily, Disp: 90 tablet, Rfl: 1     Multiple Vitamin (MULTI VITAMIN MENS PO), Take  by mouth., Disp: , Rfl:      omeprazole (PRILOSEC) 40 MG DR capsule, TAKE ONE CAPSULE BY MOUTH ONCE DAILY (TAKE  30  TO  60  MINUTES  BEFORE  A  MEAL), Disp: 30 capsule, Rfl: 6     ORDER FOR DME, CPAP daily, Disp: , Rfl:      potassium  "chloride ER (KLOR-CON) 20 MEQ CR tablet, TAKE 1 TABLET BY MOUTH THREE TIMES DAILY, Disp: 270 tablet, Rfl: 1     simvastatin (ZOCOR) 20 MG tablet, TAKE 2 TABLETS BY MOUTH IN THE EVENING AT BEDTIME, Disp: 180 tablet, Rfl: 1     TEMAZEPAM PO, Take 15 mg by mouth At Bedtime, Disp: , Rfl:     Allergies:   Allergies   Allergen Reactions     Accupril [Ace Inhibitors] Difficulty breathing     accupril causes SOB     Aptiom [Eslicarbazepine] Difficulty breathing     Atorvastatin Calcium      Myalgias from Lipitor     Contrast Dye Hives     Coreg [Carvedilol] Nausea and Fatigue     Depakote [Valproic Acid]      He denies being allergic to Depakote. Tremors can be side effect.        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   reports that he has never smoked. He has never used smokeless tobacco. He reports that he does not drink alcohol or use drugs.    Family Hx: family history includes Alcohol/Drug in his brother; Arrhythmia in his sister; Arthritis in his brother and mother; C.A.D. in his brother and mother; Cancer in his maternal grandmother, paternal grandfather, and sister; Cardiovascular in his father; Cerebrovascular Disease in his mother; Depression in his daughter, daughter, son, and son; Diabetes in his son; Eye Disorder in his mother; Gastrointestinal Disease in his brother; Heart Disease in his brother, brother, and mother; Hemochromatosis in his sister; Hypertension in his brother, brother, and sister; Lipids in his brother and brother; Obesity in his brother and sister; Thyroid Disease in his brother, maternal grandfather, and " "sister.    REVIEW OF SYSTEMS:  CONSTITUTIONAL:NEGATIVE for fever, chills, change in weight  INTEGUMENTARY/SKIN: NEGATIVE for worrisome rashes, moles or lesions  MUSCULOSKELETAL:See HPI above  NEURO: NEGATIVE for weakness, dizziness or paresthesias    PHYSICAL EXAM:  /76   Pulse 72   Ht 1.778 m (5' 10\")   Wt 113.4 kg (250 lb)   BMI 35.87 kg/m      GENERAL APPEARANCE: healthy, alert, no distress  SKIN: no suspicious lesions or rashes  NEURO: Normal strength and tone, mentation intact and speech normal  PSYCH:  mentation appears normal and affect normal, not anxious  RESPIRATORY: No increased work of breathing.      BILATERAL LOWER EXTREMITIES:  Gait: slight favors left.  Alignment: varus  No gross deformities or masses.  Bilateral Quad atrophy, strength normal.  Intact sensation deep peroneal nerve, superficial peroneal nerve, med/lat tibial nerve, sural nerve, saphenous nerve  Intact EHL, EDL, TA, FHL, GS, quadriceps hamstrings and hip flexors  Toes warm and well perfused, brisk capillary refill. Palpable 2+ dp pulses.  Bilateral calf soft and nttp or squeeze.  Edema: 1+  Bilateral pes planus, left more than right.   Poor toenail/foot hygiene.    LEFT KNEE EXAM:    Skin: intact, no ecchymosis or erythema  Squat: 100 %, not limited by pain.     ROM: full extension to 115 flexion, limited by anterior discomfort.  Tight hamstrings on straight leg raise.  Effusion: small  Tender: medial > lateral joint line, pes  McMurrays: negative    MCL: stable, and non-painful at both 0 and 30 degrees knee flexion  Varus stress: stable, and non-painful at both 0 and 30 degrees knee flexion  Lachmans: neg, firm endpoint  Posterior Drawer stable  Patellofemoral joint:                Apprehension: negative              Crepitations: mild   Grind: positive.    RIGHT KNEE EXAM:    Skin: intact, no ecchymosis or erythema  Squat: 100 %, not limited by pain.     ROM: full extension to 125+ flexion  Tight hamstrings on straight leg " raise.  Effusion: none  Tender: NTTP med/lat joint line, anterior or posterior knee  McMurrays: negative    MCL: stable, and non-painful at both 0 and 30 degrees knee flexion  Varus stress: stable, and non-painful at both 0 and 30 degrees knee flexion  Lachmans: neg, firm endpoint  Posterior Drawer stable  Patellofemoral joint:                Apprehension: negative              Crepitations: mild   Grind: negative.    X-RAY:no new images today   3 views left knee from 7/29/2019 were again reviewed in clinic today. On my review, no obvious fractures or dislocations. Mild medial narrowing.    MRI:  MRI left knee from 7/22/2019:    1. Small tear along the free edge of the junction of the posterior  horn and body of the medial meniscus.  2. Increased signal throughout the anterior cruciate ligament.  However, no definite tear is currently seen. This most likely  represents an old healed injury. A more recent sprain is considered  unlikely given the absence of secondary findings of a recent injury.  3. Mild chondromalacia of the medial compartment.           ASSESSMENT/PLAN: Prashant Keyes is a 68 year old male with chronic left knee pain, complex medial meniscus tear, mild chondromalacia.     * again discussed findings with patient, what appears to be a medial meniscal tear on MRI, as well as some underlying arthritic changes and swelling, which is consistent with symptoms and physical examination findings.   * advised that cannot be injecting with cortisone every month. Discussed he should look into taking care of the underlying medial meniscus problem with arthroscopy to hopefully try and alleviate his symptoms more long-term.    * Discussed treatment options including nonoperative treatment with continued rest, ice, elevation, activity modification, NSAIDS and Physical Therapy, bracing and potential injections (3 months at least between injections) versus surgical treatment with arthroscopy and meniscal repair  versus debridement, possible chondral debridement. Risks and benefits of each discussed in detail.  * in the setting of underlying chondrosis, predictability of arthroscopy is uncertain, unless mechanical symptoms present due to the meniscus tear.    * surgical risks discussed: bleeding, infection, pain, scar, damage to adjacent structures (nerve, vessels, cartilage), stiffness, post-traumatic arthritis, failure to relieve symptoms, recurrence of symptoms, blood clots (DVT), pulmonary emolism, risks of anesthesia and death. This surgery is not intended nor expected to alleviate arthritic pain symptoms, nor will it treat or correct underlying arthritic changes. Arthritis and symptoms related to arthritis could worsen with arthroscopy and meniscal and/or chondral debridement. Patient understands.    * understanding the risks , patient would like to talk to his employer to see what options he has for time off work if he'd like to have surgery.    * all questions addressed and answered prior to discharge from clinic today.  * patient to call if any questions or concerns in the meantime.  * he will let us know if he wants to pursue surgery. If so, plan would be left knee arthroscopy with meniscal/chondral debridement. Outpatient.   * he would need preop H+P from primary care provider prior to surgery.  * he'd be seen back 2 weeks postoperative for wound check, suture removal.  * advised taking it easy for the first few weeks postoperative, not on his feet 8h/day at work.        Damon Rosas M.D., M.S.  Dept. of Orthopaedic Surgery  [facility]        Again, thank you for allowing me to participate in the care of your patient.        Sincerely,        Damon Rosas MD

## 2019-08-05 NOTE — PROGRESS NOTES
CHIEF COMPLAINT:   Chief Complaint   Patient presents with     Left Knee - Pain     Onset: 7/1/19, 1 mon s/p. Patient notes his knee has not gotten any better. He is on his feet all night long. If anything his knee is getting worse. He would like to try an injection today.    .    HISTORY OF PRESENT ILLNESS    Prashant Keyes is a 68 year old male seen for followup evaluation of ongoing left knee pain with no known injury.   Pain has been increased for almost 5 weeks, 7/1/2019. Locates pain front/inner aspect of the knee. Had injection with cortisone at [facility] on 7/3/2019 (Kenalog 40mg), lasted about 2 days. He's had previous injections in the past that have helped longer. He's had pain in left knee for a number of years. Was seen last week and at that time his pain had improved, rating it 0.5-1/10. Was 8/10 pain when he went to the emergency room. He had injection about 2 years ago with good relief until recently. He returns today to discuss drainage. Not getting any better, if anything worse. Pain 2/10. He's on his feet all night. He wants to try another injection today.    Denies low back or hip pain. Denies numbness and tingling.    Present symptoms: pain medially , pain dull/achy , mild pain, mild swelling.    Pain severity: 2/10  Frequency of symptoms: frequently  Exacerbating Factors: weight bearing, lifting leg in/out of car/tub, too low of toilet seats getting up  Relieving Factors: aspirin 4x/day  Night Pain: Yes  Pain while at rest: Yes   Numbness or tingling: No   Patient has tried:     NSAIDS: Yes , aspirin q6h     Physical Therapy: No      Activity modification: No      Bracing: yes, knee sleeve, not helping much     Injections: Yes 7/3/2019 [facility], otherwise previous injections with Matt Lopezon, last was about 2 years ago     Ice: No      Assistive device:  No     Other: occasional acetaminophen. Lidocaine patch      Other PMH:  has a past medical history of Calculus of kidney (~1975),  Carpal tunnel syndrome (11/94), Concussion, unspecified (12/95), Eczema (11/16/2011), Epileptic seizure (H) (1995), Fibromyalgia syndrome (~2000), Hypertension, Left testicle cyst, Photosensitive contact dermatitis, Prostatitis, unspecified, Seizures (H), Umbilical hernia (11/26/2012), and Unspecified asthma(493.90). He also has no past medical history of Acne vulgaris, Actinic keratosis, Basal cell carcinoma, Heart valve disorder, Malignant melanoma nos, Pacemaker, Skin cancer, Squamous cell carcinoma, Type II or unspecified type diabetes mellitus without mention of complication, not stated as uncontrolled, or Urticaria.  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    Medications:   Current Outpatient Medications:      allopurinol (ZYLOPRIM) 100 MG tablet, TAKE TWO TABLETS BY MOUTH ONCE DAILY, Disp: 180 tablet, Rfl: 3     amLODIPine (NORVASC) 10 MG tablet, Take 1 tablet (10 mg) by mouth daily (with dinner), Disp: 90 tablet, Rfl: 1     ARIPiprazole (ABILIFY) 2 MG tablet, Take 2 mg by mouth daily, Disp: , Rfl:      aspirin 81 MG tablet, Take 1 tablet (81 mg) by mouth  daily, Disp: 30 tablet, Rfl:      carbidopa-levodopa (SINEMET)  MG tablet, Take 1 tablet by mouth 4 times daily, Disp: 360 tablet, Rfl: 3     carvedilol (COREG) 12.5 MG tablet, TAKE ONE TABLET BY MOUTH TWICE DAILY WITH MEALS, Disp: 180 tablet, Rfl: 1     chlorthalidone (HYGROTON) 25 MG tablet, Take 1 tablet (25 mg) by mouth daily, Disp: 90 tablet, Rfl: 1     Cholecalciferol (VITAMIN D) 2000 UNITS tablet, Take 2,000 Units by mouth daily, Disp: 90 tablet, Rfl: 3     colchicine (COLCYRS) 0.6 MG tablet, Take 1 tablet (0.6 mg) by mouth daily as needed Take 2 tabs on first day.  Take at first sign of gout flair.  Take for max of 1 week per flair, Disp: 30 tablet, Rfl: 0     Cyanocobalamin (VITAMIN  B-12) 2500 MCG tablet, Place 2,500 mcg under the tongue daily, Disp: 90 tablet, Rfl: 3     divalproex sodium delayed-release (DEPAKOTE) 500 MG DR tablet, Take 1 tablet (500 mg) by mouth 2 times daily NEED APPT BEFORE FURTHER REFILLS, Disp: 180 tablet, Rfl: 0     ferrous sulfate (IRON) 325 (65 FE) MG tablet, Take 1 tablet (325 mg) by mouth 2 times daily, Disp: 60 tablet, Rfl: 2     furosemide (LASIX) 20 MG tablet, TAKE TWO TABLETS BY MOUTH AS NEEDED, Disp: 30 tablet, Rfl: 3     levETIRAcetam (KEPPRA) 1000 MG TABS, 1 tablet in morning and bed time., Disp: 180 tablet, Rfl: 3     losartan (COZAAR) 100 MG tablet, Take 1 tablet (100 mg) by mouth daily, Disp: 90 tablet, Rfl: 1     Multiple Vitamin (MULTI VITAMIN MENS PO), Take  by mouth., Disp: , Rfl:      omeprazole (PRILOSEC) 40 MG DR capsule, TAKE ONE CAPSULE BY MOUTH ONCE DAILY (TAKE  30  TO  60  MINUTES  BEFORE  A  MEAL), Disp: 30 capsule, Rfl: 6     ORDER FOR DME, CPAP daily, Disp: , Rfl:      potassium chloride ER (KLOR-CON) 20 MEQ CR tablet, TAKE 1 TABLET BY MOUTH THREE TIMES DAILY, Disp: 270 tablet, Rfl: 1     simvastatin (ZOCOR) 20 MG tablet, TAKE 2 TABLETS BY MOUTH IN THE EVENING AT BEDTIME, Disp: 180 tablet, Rfl: 1     TEMAZEPAM PO, Take 15 mg by mouth At Bedtime,  "Disp: , Rfl:     Allergies:   Allergies   Allergen Reactions     Accupril [Ace Inhibitors] Difficulty breathing     accupril causes SOB     Aptiom [Eslicarbazepine] Difficulty breathing     Atorvastatin Calcium      Myalgias from Lipitor     Contrast Dye Hives     Coreg [Carvedilol] Nausea and Fatigue     Depakote [Valproic Acid]      He denies being allergic to Depakote. Tremors can be side effect.        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   reports that he has never smoked. He has never used smokeless tobacco. He reports that he does not drink alcohol or use drugs.    Family Hx: family history includes Alcohol/Drug in his brother; Arrhythmia in his sister; Arthritis in his brother and mother; C.A.D. in his brother and mother; Cancer in his maternal grandmother, paternal grandfather, and sister; Cardiovascular in his father; Cerebrovascular Disease in his mother; Depression in his daughter, daughter, son, and son; Diabetes in his son; Eye Disorder in his mother; Gastrointestinal Disease in his brother; Heart Disease in his brother, brother, and mother; Hemochromatosis in his sister; Hypertension in his brother, brother, and sister; Lipids in his brother and brother; Obesity in his brother and sister; Thyroid Disease in his brother, maternal grandfather, and sister.    REVIEW OF SYSTEMS:  CONSTITUTIONAL:NEGATIVE for fever, chills, change in weight  INTEGUMENTARY/SKIN: NEGATIVE for worrisome rashes, moles or lesions  MUSCULOSKELETAL:See HPI above  NEURO: NEGATIVE for weakness, dizziness or paresthesias    PHYSICAL EXAM:  /76   Pulse " "72   Ht 1.778 m (5' 10\")   Wt 113.4 kg (250 lb)   BMI 35.87 kg/m     GENERAL APPEARANCE: healthy, alert, no distress  SKIN: no suspicious lesions or rashes  NEURO: Normal strength and tone, mentation intact and speech normal  PSYCH:  mentation appears normal and affect normal, not anxious  RESPIRATORY: No increased work of breathing.      BILATERAL LOWER EXTREMITIES:  Gait: slight favors left.  Alignment: varus  No gross deformities or masses.  Bilateral Quad atrophy, strength normal.  Intact sensation deep peroneal nerve, superficial peroneal nerve, med/lat tibial nerve, sural nerve, saphenous nerve  Intact EHL, EDL, TA, FHL, GS, quadriceps hamstrings and hip flexors  Toes warm and well perfused, brisk capillary refill. Palpable 2+ dp pulses.  Bilateral calf soft and nttp or squeeze.  Edema: 1+  Bilateral pes planus, left more than right.   Poor toenail/foot hygiene.    LEFT KNEE EXAM:    Skin: intact, no ecchymosis or erythema  Squat: 100 %, not limited by pain.     ROM: full extension to 115 flexion, limited by anterior discomfort.  Tight hamstrings on straight leg raise.  Effusion: small  Tender: medial > lateral joint line, pes  McMurrays: negative    MCL: stable, and non-painful at both 0 and 30 degrees knee flexion  Varus stress: stable, and non-painful at both 0 and 30 degrees knee flexion  Lachmans: neg, firm endpoint  Posterior Drawer stable  Patellofemoral joint:                Apprehension: negative              Crepitations: mild   Grind: positive.    RIGHT KNEE EXAM:    Skin: intact, no ecchymosis or erythema  Squat: 100 %, not limited by pain.     ROM: full extension to 125+ flexion  Tight hamstrings on straight leg raise.  Effusion: none  Tender: NTTP med/lat joint line, anterior or posterior knee  McMurrays: negative    MCL: stable, and non-painful at both 0 and 30 degrees knee flexion  Varus stress: stable, and non-painful at both 0 and 30 degrees knee flexion  Lachmans: neg, firm " endpoint  Posterior Drawer stable  Patellofemoral joint:                Apprehension: negative              Crepitations: mild   Grind: negative.    X-RAY:no new images today   3 views left knee from 7/29/2019 were again reviewed in clinic today. On my review, no obvious fractures or dislocations. Mild medial narrowing.    MRI:  MRI left knee from 7/22/2019:    1. Small tear along the free edge of the junction of the posterior  horn and body of the medial meniscus.  2. Increased signal throughout the anterior cruciate ligament.  However, no definite tear is currently seen. This most likely  represents an old healed injury. A more recent sprain is considered  unlikely given the absence of secondary findings of a recent injury.  3. Mild chondromalacia of the medial compartment.           ASSESSMENT/PLAN: Prashant Keyes is a 68 year old male with chronic left knee pain, complex medial meniscus tear, mild chondromalacia.     * again discussed findings with patient, what appears to be a medial meniscal tear on MRI, as well as some underlying arthritic changes and swelling, which is consistent with symptoms and physical examination findings.   * advised that cannot be injecting with cortisone every month. Discussed he should look into taking care of the underlying medial meniscus problem with arthroscopy to hopefully try and alleviate his symptoms more long-term.    * Discussed treatment options including nonoperative treatment with continued rest, ice, elevation, activity modification, NSAIDS and Physical Therapy, bracing and potential injections (3 months at least between injections) versus surgical treatment with arthroscopy and meniscal repair versus debridement, possible chondral debridement. Risks and benefits of each discussed in detail.  * in the setting of underlying chondrosis, predictability of arthroscopy is uncertain, unless mechanical symptoms present due to the meniscus tear.    * surgical risks  discussed: bleeding, infection, pain, scar, damage to adjacent structures (nerve, vessels, cartilage), stiffness, post-traumatic arthritis, failure to relieve symptoms, recurrence of symptoms, blood clots (DVT), pulmonary emolism, risks of anesthesia and death. This surgery is not intended nor expected to alleviate arthritic pain symptoms, nor will it treat or correct underlying arthritic changes. Arthritis and symptoms related to arthritis could worsen with arthroscopy and meniscal and/or chondral debridement. Patient understands.    * understanding the risks , patient would like to talk to his employer to see what options he has for time off work if he'd like to have surgery.    * all questions addressed and answered prior to discharge from clinic today.  * patient to call if any questions or concerns in the meantime.  * he will let us know if he wants to pursue surgery. If so, plan would be left knee arthroscopy with meniscal/chondral debridement. Outpatient.   * he would need preop H+P from primary care provider prior to surgery.  * he'd be seen back 2 weeks postoperative for wound check, suture removal.  * advised taking it easy for the first few weeks postoperative, not on his feet 8h/day at work.        Damon Rosas M.D., M.S.  Dept. of Orthopaedic Surgery  [facility]

## 2019-08-07 ENCOUNTER — TELEPHONE (OUTPATIENT)
Dept: ORTHOPEDICS | Facility: CLINIC | Age: 69
End: 2019-08-07

## 2019-08-07 NOTE — TELEPHONE ENCOUNTER
Reason for call: Estimated return to work date needed after the knee surgery.  Patient called regarding (reason for call): needs for employer  Additional comments: Needs for NATHEN from work, please call     Phone number to reach patient:  Home number on file [phone] (home)    Best Time:  any    Can we leave a detailed message on this number?  YES

## 2019-08-07 NOTE — TELEPHONE ENCOUNTER
Called and spoke to patient answering his questions to his satisfaction. He thanked me for calling.  Nancy Mcintyre Certified Medical Assistant

## 2019-08-07 NOTE — TELEPHONE ENCOUNTER
Reason for Call:  Other call back    Detailed comments: Patient is calling to schedule surgery orders are needed. Thank you     Phone Number Patient can be reached at: Home number on file [phone] (home)    Best Time: any    Can we leave a detailed message on this number? YES    Call taken on 8/7/2019 at 3:27 PM by Liza Landa

## 2019-08-08 ENCOUNTER — TELEPHONE (OUTPATIENT)
Dept: ORTHOPEDICS | Facility: CLINIC | Age: 69
End: 2019-08-08

## 2019-08-08 NOTE — TELEPHONE ENCOUNTER
Type of surgery: left knee arthroscopy with meniscal and chondral debridement CPT code 13216  Old complex tear of medial meniscus of left knee [M23.204]  - Primary       Chronic pain of left knee [M25.562, G89.29]       Location of surgery: [facility]  Date and time of surgery: 9-5-19  Surgeon: Dr. Rosas  Pre-Op Appt Date: 8-26-19  Post-Op Appt Date: 9-19-19   Packet sent out: Yes  Pre-cert/Authorization completed, No prior auth per Medicare, [employer] follows Medicare guidelines.    [employer] beneficiaries have Medicare as their primary insurance and are required to follow all Medicare rules.    Date: 08/08/2019    Insurance valid 09/03/2019

## 2019-08-23 ENCOUNTER — TELEPHONE (OUTPATIENT)
Dept: ORTHOPEDICS | Facility: CLINIC | Age: 69
End: 2019-08-23

## 2019-08-23 NOTE — TELEPHONE ENCOUNTER
Patient calling. He dropped of STD forms to [employer] on 8-15-19. Please call and advise status of forms.

## 2019-08-26 NOTE — TELEPHONE ENCOUNTER
Patient LVM stating he had a missed call from [employer] and is requesting a call back to discuss the paperwork that is being filled out.    Please call # [phone]        LEONA Stroud)

## 2019-08-26 NOTE — TELEPHONE ENCOUNTER
I spoke to Bill. 8/25/19 was the date that Elena gave him as his official first day off. I let him know I would get the forms completed and faxed out today. He appreciated the call.    Mc Suarez PA-C, [facility]ATOR[facility] (Ortho)  Supervising Physician: Damno Rosas M.D., M.S.  Dept. of Orthopaedic Surgery  [facility]

## 2019-08-26 NOTE — TELEPHONE ENCOUNTER
I left a  for Bill. I received his forms that he dropped off on 8/20/19. Upon filling them our I noticed that he put 8/25/19 as a start date. I was curious as to where that came from. Surgery is scheduled for 9/5/19.    Mc Suarez PA-C, CAQ (Ortho)  Supervising Physician: Damon Rosas M.D., M.S.  Dept. of Orthopaedic Surgery  [facility]

## 2019-08-27 ENCOUNTER — OFFICE VISIT (OUTPATIENT)
Dept: FAMILY MEDICINE | Facility: CLINIC | Age: 69
End: 2019-08-27
Payer: MEDICARE

## 2019-08-27 VITALS
WEIGHT: 249 LBS | BODY MASS INDEX: 35.65 KG/M2 | SYSTOLIC BLOOD PRESSURE: 129 MMHG | RESPIRATION RATE: 18 BRPM | DIASTOLIC BLOOD PRESSURE: 74 MMHG | HEART RATE: 65 BPM | TEMPERATURE: 98 F | OXYGEN SATURATION: 98 % | HEIGHT: 70 IN

## 2019-08-27 DIAGNOSIS — Z01.818 PREOP GENERAL PHYSICAL EXAM: Primary | ICD-10-CM

## 2019-08-27 DIAGNOSIS — I10 HYPERTENSION GOAL BP (BLOOD PRESSURE) < 140/90: Chronic | ICD-10-CM

## 2019-08-27 DIAGNOSIS — S83.242D TEAR OF MEDIAL MENISCUS OF LEFT KNEE, CURRENT, UNSPECIFIED TEAR TYPE, SUBSEQUENT ENCOUNTER: ICD-10-CM

## 2019-08-27 LAB
ANION GAP SERPL CALCULATED.3IONS-SCNC: 10 MMOL/L (ref 3–14)
BASOPHILS # BLD AUTO: 0 10E9/L (ref 0–0.2)
BASOPHILS NFR BLD AUTO: 0.4 %
BUN SERPL-MCNC: 23 MG/DL (ref 7–30)
CALCIUM SERPL-MCNC: 9.2 MG/DL (ref 8.5–10.1)
CHLORIDE SERPL-SCNC: 107 MMOL/L (ref 94–109)
CO2 SERPL-SCNC: 29 MMOL/L (ref 20–32)
CREAT SERPL-MCNC: 1.32 MG/DL (ref 0.66–1.25)
DIFFERENTIAL METHOD BLD: ABNORMAL
EOSINOPHIL # BLD AUTO: 0.2 10E9/L (ref 0–0.7)
EOSINOPHIL NFR BLD AUTO: 2.5 %
ERYTHROCYTE [DISTWIDTH] IN BLOOD BY AUTOMATED COUNT: 14.4 % (ref 10–15)
GFR SERPL CREATININE-BSD FRML MDRD: 55 ML/MIN/{1.73_M2}
GLUCOSE SERPL-MCNC: 86 MG/DL (ref 70–99)
HCT VFR BLD AUTO: 39.1 % (ref 40–53)
HGB BLD-MCNC: 12.8 G/DL (ref 13.3–17.7)
LYMPHOCYTES # BLD AUTO: 2.5 10E9/L (ref 0.8–5.3)
LYMPHOCYTES NFR BLD AUTO: 34.1 %
MCH RBC QN AUTO: 32.7 PG (ref 26.5–33)
MCHC RBC AUTO-ENTMCNC: 32.7 G/DL (ref 31.5–36.5)
MCV RBC AUTO: 100 FL (ref 78–100)
MONOCYTES # BLD AUTO: 0.8 10E9/L (ref 0–1.3)
MONOCYTES NFR BLD AUTO: 11 %
NEUTROPHILS # BLD AUTO: 3.8 10E9/L (ref 1.6–8.3)
NEUTROPHILS NFR BLD AUTO: 52 %
PLATELET # BLD AUTO: 178 10E9/L (ref 150–450)
POTASSIUM SERPL-SCNC: 3.6 MMOL/L (ref 3.4–5.3)
RBC # BLD AUTO: 3.91 10E12/L (ref 4.4–5.9)
SODIUM SERPL-SCNC: 146 MMOL/L (ref 133–144)
WBC # BLD AUTO: 7.2 10E9/L (ref 4–11)

## 2019-08-27 PROCEDURE — 99214 OFFICE O/P EST MOD 30 MIN: CPT | Performed by: PHYSICIAN ASSISTANT

## 2019-08-27 PROCEDURE — 80048 BASIC METABOLIC PNL TOTAL CA: CPT | Performed by: PHYSICIAN ASSISTANT

## 2019-08-27 PROCEDURE — 93000 ELECTROCARDIOGRAM COMPLETE: CPT | Performed by: PHYSICIAN ASSISTANT

## 2019-08-27 PROCEDURE — 36415 COLL VENOUS BLD VENIPUNCTURE: CPT | Performed by: PHYSICIAN ASSISTANT

## 2019-08-27 PROCEDURE — 85025 COMPLETE CBC W/AUTO DIFF WBC: CPT | Performed by: PHYSICIAN ASSISTANT

## 2019-08-27 ASSESSMENT — MIFFLIN-ST. JEOR: SCORE: 1905.71

## 2019-08-27 NOTE — H&P (VIEW-ONLY)
[facility]  [address]  [place] [address]  [phone]  Dept: [phone]    PRE-OP EVALUATION:  Today's date: 2019    Prashant Keyes (: 1950) presents for pre-operative evaluation assessment as requested by Dr. Rosas.  He requires evaluation and anesthesia risk assessment prior to undergoing surgery/procedure for treatment of left knee .    Proposed Surgery/ Procedure: LEFT KNEE ARTHROSCOPY WITH MENISCAL AND CHONDRAL DEBRIDEMENT  Date of Surgery/ Procedure: 19  Time of Surgery/ Procedure: 1015am  Hospital/Surgical Facility: [facility]    Fax number for surgical facility:   Primary Physician: Matt Swan  Type of Anesthesia Anticipated: to be determined    Patient has a Health Care Directive or Living Will:  YES     1. NO - Do you have a history of heart attack, stroke, stent, bypass or surgery on an artery in the head, neck, heart or legs?  2. NO - Do you ever have any pain or discomfort in your chest?  3. NO - Do you have a history of  Heart Failure?  4. NO - Are you troubled by shortness of breath when: walking on the level, up a slight hill or at night?  5. NO - Do you currently have a cold, bronchitis or other respiratory infection?  6. NO - Do you have a cough, shortness of breath or wheezing?  7. NO - Do you sometimes get pains in the calves of your legs when you walk?  8. NO - Do you or anyone in your family have previous history of blood clots?  9. NO - Do you or does anyone in your family have a serious bleeding problem such as prolonged bleeding following surgeries or cuts?  10. NO - Have you ever had problems with anemia or been told to take iron pills?  11. NO - Have you had any abnormal blood loss such as black, tarry or bloody stools, or abnormal vaginal bleeding?  12. NO - Have you ever had a blood transfusion?  13. NO - Have you or any of your relatives ever had problems with anesthesia?  14. YES - Do you have sleep apnea, excessive  snoring or daytime drowsiness?  15. NO - Do you have any prosthetic heart valves?  16. NO - Do you have prosthetic joints?  17. NO - Is there any chance that you may be pregnant?      HPI:     HPI related to upcoming procedure: left knee medial meniscus tear.      See problem list for active medical problems.  Problems all longstanding and stable, except as noted/documented.  See ROS for pertinent symptoms related to these conditions.      MEDICAL HISTORY:     Patient Active Problem List    Diagnosis Date Noted     Hypertension goal BP (blood pressure) < 140/90 09/13/2002     Priority: High     Treatment since 1993       Obesity (BMI 35.0-39.9) with comorbidity (H) 07/01/2019     Priority: Medium     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 Spells uncertain etiology with left sided numbness, speech arrest, amnesia, occasional collapse. Vague inconsistent historian. EEG Lt temporal slowing. Neuropsych w nl cognition; some depression. Presented on levetiracetam, previously Rx w VPA, PHT, lacosamide, eslicarbazine. EEG w left temporal slowing. Adding VPA to LEV appeared to stop spells.       CKD (chronic kidney disease) stage 2, GFR 60-89 ml/min 12/18/2012     Priority: Medium     Lichen planus 08/02/2012     Priority: Medium     KALEB (obstructive sleep apnea) 02/06/2012     Priority: Medium     PSG on 1/2/12, RDI: 71.3, REM RDI: 60 AHI: 54.2, Lowest O2: 78 %. Wt during sleep study: 285.3. BI-LEVELPAP: 15/9 cm H2O.        Eczema 11/16/2011     Priority: Medium     Hyperlipidemia LDL goal <130 03/15/2011      Priority: Medium     Treatment since 1998       Fibromyalgia syndrome      Priority: Medium     per patient  Scheduled med refill protocol  Last refill:8/23/2010  Last clinic visit:8/31/2010  Controlled substance aggreement on file from this date: 8/31/10  Documentation in problem list:  narcotic agreement not on file   #240 tabs of 500mg tabs methocarbamol filled 8/23/10.  No refills anticipated prior to 8/23/11, and needs discussion in clinic prior to refills.         GERD 07/29/2005     Priority: Medium     Treatment since 1998       Advanced directives, counseling/discussion 04/02/2012     Priority: Low     Patient states has Advance Directive and will bring in a copy to clinic. 4/2/2012           Past Medical History:   Diagnosis Date     Calculus of kidney ~1975     Carpal tunnel syndrome 11/94     Concussion, unspecified 12/95     Eczema 11/16/2011     Epileptic seizure (H) 1995    diagnosed 1995, controlled with Depakote and Keppra     Fibromyalgia syndrome ~2000    per pt     Hypertension      Left testicle cyst      Photosensitive contact dermatitis      Prostatitis, unspecified      Seizures (H)     Diagnosed 1995, controlled with Depakote and Keppra     Umbilical hernia 11/26/2012     Unspecified asthma(493.90)      Past Surgical History:   Procedure Laterality Date     ANGIOGRAM  2003    Coronary Angiogram- negative     COLONOSCOPY WITH CO2 INSUFFLATION N/A 8/17/2017    Procedure: COLONOSCOPY WITH CO2 INSUFFLATION;  COLON SCREEN/ FLYNN;  Surgeon: Varun Bond MD;  Location: [facility] OR     HC REMOVAL TESTIS,SIMPLE  1978    Right undecended     HERNIA REPAIR, INGUINAL RT/LT  1963, 1978    Right Hernia     HERNIORRHAPHY UMBILICAL  4/2013    [place]     Current Outpatient Medications   Medication Sig Dispense Refill     allopurinol (ZYLOPRIM) 100 MG tablet TAKE TWO TABLETS BY MOUTH ONCE DAILY 180 tablet 3     amLODIPine (NORVASC) 10 MG tablet Take 1 tablet (10 mg) by mouth daily (with dinner) 90  tablet 1     ARIPiprazole (ABILIFY) 2 MG tablet Take 2 mg by mouth daily       aspirin 81 MG tablet Take 1 tablet (81 mg) by mouth daily 30 tablet      carbidopa-levodopa (SINEMET)  MG tablet Take 1 tablet by mouth 4 times daily 360 tablet 3     carvedilol (COREG) 12.5 MG tablet TAKE ONE TABLET BY MOUTH TWICE DAILY WITH MEALS 180 tablet 1     chlorthalidone (HYGROTON) 25 MG tablet Take 1 tablet (25 mg) by mouth daily 90 tablet 1     Cholecalciferol (VITAMIN D) 2000 UNITS tablet Take 2,000 Units by mouth daily 90 tablet 3     colchicine (COLCYRS) 0.6 MG tablet Take 1 tablet (0.6 mg) by mouth daily as needed Take 2 tabs on first day.  Take at first sign of gout flair.  Take for max of 1 week per flair 30 tablet 0     Cyanocobalamin (VITAMIN  B-12) 2500 MCG tablet Place 2,500 mcg under the tongue daily 90 tablet 3     divalproex sodium delayed-release (DEPAKOTE) 500 MG DR tablet Take 1 tablet (500 mg) by mouth 2 times daily NEED APPT BEFORE FURTHER REFILLS 180 tablet 0     ferrous sulfate (IRON) 325 (65 FE) MG tablet Take 1 tablet (325 mg) by mouth 2 times daily 60 tablet 2     furosemide (LASIX) 20 MG tablet TAKE TWO TABLETS BY MOUTH AS NEEDED 30 tablet 3     levETIRAcetam (KEPPRA) 1000 MG TABS 1 tablet in morning and bed time. 180 tablet 3     losartan (COZAAR) 100 MG tablet Take 1 tablet (100 mg) by mouth daily 90 tablet 1     Multiple Vitamin (MULTI VITAMIN MENS PO) Take  by mouth.       omeprazole (PRILOSEC) 40 MG DR capsule TAKE ONE CAPSULE BY MOUTH ONCE DAILY (TAKE  30  TO  60  MINUTES  BEFORE  A  MEAL) 30 capsule 6     ORDER FOR DME CPAP daily       potassium chloride ER (KLOR-CON) 20 MEQ CR tablet TAKE 1 TABLET BY MOUTH THREE TIMES DAILY 270 tablet 1     simvastatin (ZOCOR) 20 MG tablet TAKE 2 TABLETS BY MOUTH IN THE EVENING AT BEDTIME 180 tablet 1     TEMAZEPAM PO Take 15 mg by mouth At Bedtime       UNABLE TO FIND daily MEDICATION NAME: sawpalmetto - 2 capsules once a day       OTC products: None,  "except as noted above    Allergies   Allergen Reactions     Accupril [Ace Inhibitors] Difficulty breathing     accupril causes SOB     Aptiom [Eslicarbazepine] Difficulty breathing     Atorvastatin Calcium      Myalgias from Lipitor     Contrast Dye Hives     Coreg [Carvedilol] Nausea and Fatigue     Depakote [Valproic Acid]      He denies being allergic to Depakote. Tremors can be side effect.        Nico Turner MD, [facility]  Neurologist       Lactose GI Disturbance     Lisinopril Difficulty breathing     SOB     Nitroglycerin      Headache     Paroxetine Hives     Tetracycline [Tetracyclines]      \"splitting headaches\"     Vimpat [Lacosamide] Difficulty breathing     Zoloft Rash     Zolpidem      Adhesive Tape Rash     Without itching- EKG pads      Latex Allergy: NO    Social History     Tobacco Use     Smoking status: Never Smoker     Smokeless tobacco: Never Used   Substance Use Topics     Alcohol use: No     Alcohol/week: 0.0 oz     Comment: Quit since 2003.      History   Drug Use No       REVIEW OF SYSTEMS:   Constitutional, neuro, ENT, endocrine, pulmonary, cardiac, gastrointestinal, genitourinary, musculoskeletal, integument and psychiatric systems are negative, except as otherwise noted.    EXAM:   /74   Pulse 65   Temp 98  F (36.7  C)   Resp 18   Ht 1.778 m (5' 10\")   Wt 112.9 kg (249 lb)   SpO2 98%   BMI 35.73 kg/m      GENERAL APPEARANCE: healthy, alert and no distress     EYES: EOMI,  PERRL     HENT: ear canals and TM's normal and nose and mouth without ulcers or lesions     NECK: no adenopathy, no asymmetry, masses, or scars and thyroid normal to palpation     RESP: lungs clear to auscultation - no rales, rhonchi or wheezes     CV: regular rates and rhythm, normal S1 S2, no S3 or S4 and no murmur, click or rub     ABDOMEN:  soft, nontender, no HSM or masses and bowel sounds normal     SKIN: no suspicious lesions or rashes     NEURO: Normal strength and tone, sensory exam grossly normal, " mentation intact and speech normal     PSYCH: mentation appears normal. and affect normal/bright     LYMPHATICS: No cervical adenopathy    DIAGNOSTICS:   EKG: sinus bradycardia, normal axis, normal intervals, no acute ST/T changes c/w ischemia, no LVH by voltage criteria, unchanged from previous tracings    Recent Labs   Lab Test 12/18/18  0926 02/20/18  1602  01/29/18  1643   HGB 12.2* 12.2*   < >  --     221   < >  --      --   --  138   POTASSIUM 3.7  --   --  3.4   CR 1.03  --   --  1.20    < > = values in this interval not displayed.        IMPRESSION:       The proposed surgical procedure is considered INTERMEDIATE risk.    REVISED CARDIAC RISK INDEX  The patient has the following serious cardiovascular risks for perioperative complications such as (MI, PE, VFib and 3  AV Block):  No serious cardiac risks  INTERPRETATION: 2 risks: Class III (moderate risk - 6.6% complication rate)    The patient has the following additional risks for perioperative complications:  The 10-year ASCVD risk score (Louisvillelyndsey TALBOT Jr., et al., 2013) is: 13.8%    Values used to calculate the score:      Age: 68 years      Sex: Male      Is Non- : No      Diabetic: No      Tobacco smoker: No      Systolic Blood Pressure: 129 mmHg      Is BP treated: Yes      HDL Cholesterol: 70 mg/dL      Total Cholesterol: 159 mg/dL      ICD-10-CM    1. Preop general physical exam Z01.818 EKG 12-lead complete w/read - Clinics     CBC with platelets and differential   2. Hypertension goal BP (blood pressure) < 140/90 I10 EKG 12-lead complete w/read - Clinics     Basic metabolic panel  (Ca, Cl, CO2, Creat, Gluc, K, Na, BUN)       RECOMMENDATIONS:     Hold aspirin/nsaids 7 days prior to surgery    --Patient is to take all scheduled medications on the day of surgery EXCEPT for modifications listed below:  Hold you lasix and chlorthalidone on the morning of your surgery    APPROVAL GIVEN to proceed with proposed procedure,  without further diagnostic evaluation       Signed Electronically by: Matt Swan PA-C    Copy of this evaluation report is provided to requesting physician.    [place] Preop Guidelines    Revised Cardiac Risk Index

## 2019-08-27 NOTE — PATIENT INSTRUCTIONS
Before Your Surgery      Call your surgeon if there is any change in your health. This includes signs of a cold or flu (such as a sore throat, runny nose, cough, rash or fever).    Do not smoke, drink alcohol or take over the counter medicine (unless your surgeon or primary care doctor tells you to) for the 24 hours before and after surgery.    If you take prescribed drugs: Follow your doctor s orders about which medicines to take and which to stop until after surgery.    Eating and drinking prior to surgery: follow the instructions from your surgeon    Take a shower or bath the night before surgery. Use the soap your surgeon gave you to gently clean your skin. If you do not have soap from your surgeon, use your regular soap. Do not shave or scrub the surgery site.  Wear clean pajamas and have clean sheets on your bed.         Hold aspirin/nsaids 7 days prior to surgery    --Patient is to take all scheduled medications on the day of surgery EXCEPT for modifications listed below:  Hold you lasix and chlorthalidone on the morning of your surgery

## 2019-08-27 NOTE — PROGRESS NOTES
[facility]  [id number] [address] [facility]  [place] [address]  [phone]  Dept: [phone]    PRE-OP EVALUATION:  Today's date: 2019    Prashant Keyes (: 1950) presents for pre-operative evaluation assessment as requested by Dr. Rosas.  He requires evaluation and anesthesia risk assessment prior to undergoing surgery/procedure for treatment of left knee .    Proposed Surgery/ Procedure: LEFT KNEE ARTHROSCOPY WITH MENISCAL AND CHONDRAL DEBRIDEMENT  Date of Surgery/ Procedure: 19  Time of Surgery/ Procedure: 1015am  Hospital/Surgical Facility: [place]    Fax number for surgical facility:   Primary Physician: Matt Swan  Type of Anesthesia Anticipated: to be determined    Patient has a Health Care Directive or Living Will:  YES     1. NO - Do you have a history of heart attack, stroke, stent, bypass or surgery on an artery in the head, neck, heart or legs?  2. NO - Do you ever have any pain or discomfort in your chest?  3. NO - Do you have a history of  Heart Failure?  4. NO - Are you troubled by shortness of breath when: walking on the level, up a slight hill or at night?  5. NO - Do you currently have a cold, bronchitis or other respiratory infection?  6. NO - Do you have a cough, shortness of breath or wheezing?  7. NO - Do you sometimes get pains in the calves of your legs when you walk?  8. NO - Do you or anyone in your family have previous history of blood clots?  9. NO - Do you or does anyone in your family have a serious bleeding problem such as prolonged bleeding following surgeries or cuts?  10. NO - Have you ever had problems with anemia or been told to take iron pills?  11. NO - Have you had any abnormal blood loss such as black, tarry or bloody stools, or abnormal vaginal bleeding?  12. NO - Have you ever had a blood transfusion?  13. NO - Have you or any of your relatives ever had problems with anesthesia?  14. YES - Do you have sleep apnea, excessive  snoring or daytime drowsiness?  15. NO - Do you have any prosthetic heart valves?  16. NO - Do you have prosthetic joints?  17. NO - Is there any chance that you may be pregnant?      HPI:     HPI related to upcoming procedure: left knee medial meniscus tear.      See problem list for active medical problems.  Problems all longstanding and stable, except as noted/documented.  See ROS for pertinent symptoms related to these conditions.      MEDICAL HISTORY:     Patient Active Problem List    Diagnosis Date Noted     Hypertension goal BP (blood pressure) < 140/90 09/13/2002     Priority: High     Treatment since 1993       Obesity (BMI 35.0-39.9) with comorbidity (H) 07/01/2019     Priority: Medium     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 Spells uncertain etiology with left sided numbness, speech arrest, amnesia, occasional collapse. Vague inconsistent historian. EEG Lt temporal slowing. Neuropsych w nl cognition; some depression. Presented on levetiracetam, previously Rx w VPA, PHT, lacosamide, eslicarbazine. EEG w left temporal slowing. Adding VPA to LEV appeared to stop spells.       CKD (chronic kidney disease) stage 2, GFR 60-89 ml/min 12/18/2012     Priority: Medium     Lichen planus 08/02/2012     Priority: Medium     KALEB (obstructive sleep apnea) 02/06/2012     Priority: Medium     PSG on 1/2/12, RDI: 71.3, REM RDI: 60 AHI: 54.2, Lowest O2: 78 %. Wt during sleep study: 285.3. BI-LEVELPAP: 15/9 cm H2O.        Eczema 11/16/2011     Priority: Medium     Hyperlipidemia LDL goal <130 03/15/2011      Priority: Medium     Treatment since 1998       Fibromyalgia syndrome      Priority: Medium     per patient  Scheduled med refill protocol  Last refill:8/23/2010  Last clinic visit:8/31/2010  Controlled substance aggreement on file from this date: 8/31/10  Documentation in problem list:  narcotic agreement not on file   #240 tabs of 500mg tabs methocarbamol filled 8/23/10.  No refills anticipated prior to 8/23/11, and needs discussion in clinic prior to refills.         GERD 07/29/2005     Priority: Medium     Treatment since 1998       Advanced directives, counseling/discussion 04/02/2012     Priority: Low     Patient states has Advance Directive and will bring in a copy to clinic. 4/2/2012           Past Medical History:   Diagnosis Date     Calculus of kidney ~1975     Carpal tunnel syndrome 11/94     Concussion, unspecified 12/95     Eczema 11/16/2011     Epileptic seizure (H) 1995    diagnosed 1995, controlled with Depakote and Keppra     Fibromyalgia syndrome ~2000    per pt     Hypertension      Left testicle cyst      Photosensitive contact dermatitis      Prostatitis, unspecified      Seizures (H)     Diagnosed 1995, controlled with Depakote and Keppra     Umbilical hernia 11/26/2012     Unspecified asthma(493.90)      Past Surgical History:   Procedure Laterality Date     ANGIOGRAM  2003    Coronary Angiogram- negative     COLONOSCOPY WITH CO2 INSUFFLATION N/A 8/17/2017    Procedure: COLONOSCOPY WITH CO2 INSUFFLATION;  COLON SCREEN/ FLYNN;  Surgeon: Varun Bond MD;  Location: [facility] OR     HC REMOVAL TESTIS,SIMPLE  1978    Right undecended     HERNIA REPAIR, INGUINAL RT/LT  1963, 1978    Right Hernia     HERNIORRHAPHY UMBILICAL  4/2013    [place]     Current Outpatient Medications   Medication Sig Dispense Refill     allopurinol (ZYLOPRIM) 100 MG tablet TAKE TWO TABLETS BY MOUTH ONCE DAILY 180 tablet 3     amLODIPine (NORVASC) 10 MG tablet Take 1 tablet (10 mg) by mouth daily (with dinner) 90  tablet 1     ARIPiprazole (ABILIFY) 2 MG tablet Take 2 mg by mouth daily       aspirin 81 MG tablet Take 1 tablet (81 mg) by mouth daily 30 tablet      carbidopa-levodopa (SINEMET)  MG tablet Take 1 tablet by mouth 4 times daily 360 tablet 3     carvedilol (COREG) 12.5 MG tablet TAKE ONE TABLET BY MOUTH TWICE DAILY WITH MEALS 180 tablet 1     chlorthalidone (HYGROTON) 25 MG tablet Take 1 tablet (25 mg) by mouth daily 90 tablet 1     Cholecalciferol (VITAMIN D) 2000 UNITS tablet Take 2,000 Units by mouth daily 90 tablet 3     colchicine (COLCYRS) 0.6 MG tablet Take 1 tablet (0.6 mg) by mouth daily as needed Take 2 tabs on first day.  Take at first sign of gout flair.  Take for max of 1 week per flair 30 tablet 0     Cyanocobalamin (VITAMIN  B-12) 2500 MCG tablet Place 2,500 mcg under the tongue daily 90 tablet 3     divalproex sodium delayed-release (DEPAKOTE) 500 MG DR tablet Take 1 tablet (500 mg) by mouth 2 times daily NEED APPT BEFORE FURTHER REFILLS 180 tablet 0     ferrous sulfate (IRON) 325 (65 FE) MG tablet Take 1 tablet (325 mg) by mouth 2 times daily 60 tablet 2     furosemide (LASIX) 20 MG tablet TAKE TWO TABLETS BY MOUTH AS NEEDED 30 tablet 3     levETIRAcetam (KEPPRA) 1000 MG TABS 1 tablet in morning and bed time. 180 tablet 3     losartan (COZAAR) 100 MG tablet Take 1 tablet (100 mg) by mouth daily 90 tablet 1     Multiple Vitamin (MULTI VITAMIN MENS PO) Take  by mouth.       omeprazole (PRILOSEC) 40 MG DR capsule TAKE ONE CAPSULE BY MOUTH ONCE DAILY (TAKE  30  TO  60  MINUTES  BEFORE  A  MEAL) 30 capsule 6     ORDER FOR DME CPAP daily       potassium chloride ER (KLOR-CON) 20 MEQ CR tablet TAKE 1 TABLET BY MOUTH THREE TIMES DAILY 270 tablet 1     simvastatin (ZOCOR) 20 MG tablet TAKE 2 TABLETS BY MOUTH IN THE EVENING AT BEDTIME 180 tablet 1     TEMAZEPAM PO Take 15 mg by mouth At Bedtime       UNABLE TO FIND daily MEDICATION NAME: sawpalmetto - 2 capsules once a day       OTC products: None,  "except as noted above    Allergies   Allergen Reactions     Accupril [Ace Inhibitors] Difficulty breathing     accupril causes SOB     Aptiom [Eslicarbazepine] Difficulty breathing     Atorvastatin Calcium      Myalgias from Lipitor     Contrast Dye Hives     Coreg [Carvedilol] Nausea and Fatigue     Depakote [Valproic Acid]      He denies being allergic to Depakote. Tremors can be side effect.        Nico Turner MD, [facility]  Neurologist       Lactose GI Disturbance     Lisinopril Difficulty breathing     SOB     Nitroglycerin      Headache     Paroxetine Hives     Tetracycline [Tetracyclines]      \"splitting headaches\"     Vimpat [Lacosamide] Difficulty breathing     Zoloft Rash     Zolpidem      Adhesive Tape Rash     Without itching- EKG pads      Latex Allergy: NO    Social History     Tobacco Use     Smoking status: Never Smoker     Smokeless tobacco: Never Used   Substance Use Topics     Alcohol use: No     Alcohol/week: 0.0 oz     Comment: Quit since 2003.      History   Drug Use No       REVIEW OF SYSTEMS:   Constitutional, neuro, ENT, endocrine, pulmonary, cardiac, gastrointestinal, genitourinary, musculoskeletal, integument and psychiatric systems are negative, except as otherwise noted.    EXAM:   /74   Pulse 65   Temp 98  F (36.7  C)   Resp 18   Ht 1.778 m (5' 10\")   Wt 112.9 kg (249 lb)   SpO2 98%   BMI 35.73 kg/m      GENERAL APPEARANCE: healthy, alert and no distress     EYES: EOMI,  PERRL     HENT: ear canals and TM's normal and nose and mouth without ulcers or lesions     NECK: no adenopathy, no asymmetry, masses, or scars and thyroid normal to palpation     RESP: lungs clear to auscultation - no rales, rhonchi or wheezes     CV: regular rates and rhythm, normal S1 S2, no S3 or S4 and no murmur, click or rub     ABDOMEN:  soft, nontender, no HSM or masses and bowel sounds normal     SKIN: no suspicious lesions or rashes     NEURO: Normal strength and tone, sensory exam grossly normal, " mentation intact and speech normal     PSYCH: mentation appears normal. and affect normal/bright     LYMPHATICS: No cervical adenopathy    DIAGNOSTICS:   EKG: sinus bradycardia, normal axis, normal intervals, no acute ST/T changes c/w ischemia, no LVH by voltage criteria, unchanged from previous tracings    Recent Labs   Lab Test 12/18/18  0926 02/20/18  1602  01/29/18  1643   HGB 12.2* 12.2*   < >  --     221   < >  --      --   --  138   POTASSIUM 3.7  --   --  3.4   CR 1.03  --   --  1.20    < > = values in this interval not displayed.        IMPRESSION:       The proposed surgical procedure is considered INTERMEDIATE risk.    REVISED CARDIAC RISK INDEX  The patient has the following serious cardiovascular risks for perioperative complications such as (MI, PE, VFib and 3  AV Block):  No serious cardiac risks  INTERPRETATION: 2 risks: Class III (moderate risk - 6.6% complication rate)    The patient has the following additional risks for perioperative complications:  The 10-year ASCVD risk score (Westsidelyndsey TALBOT Jr., et al., 2013) is: 13.8%    Values used to calculate the score:      Age: 68 years      Sex: Male      Is Non- : No      Diabetic: No      Tobacco smoker: No      Systolic Blood Pressure: 129 mmHg      Is BP treated: Yes      HDL Cholesterol: 70 mg/dL      Total Cholesterol: 159 mg/dL      ICD-10-CM    1. Preop general physical exam Z01.818 EKG 12-lead complete w/read - Clinics     CBC with platelets and differential   2. Hypertension goal BP (blood pressure) < 140/90 I10 EKG 12-lead complete w/read - Clinics     Basic metabolic panel  (Ca, Cl, CO2, Creat, Gluc, K, Na, BUN)       RECOMMENDATIONS:     Hold aspirin/nsaids 7 days prior to surgery    --Patient is to take all scheduled medications on the day of surgery EXCEPT for modifications listed below:  Hold you lasix and chlorthalidone on the morning of your surgery    APPROVAL GIVEN to proceed with proposed procedure,  without further diagnostic evaluation       Signed Electronically by: Matt Swan PA-C    Copy of this evaluation report is provided to requesting physician.    [facility] Preop Guidelines    Revised Cardiac Risk Index

## 2019-09-03 RX ORDER — METHYLPREDNISOLONE 4 MG
4 TABLET, DOSE PACK ORAL SEE ADMIN INSTRUCTIONS
COMMUNITY
End: 2019-10-10

## 2019-09-04 ENCOUNTER — ANESTHESIA EVENT (OUTPATIENT)
Dept: SURGERY | Facility: AMBULATORY SURGERY CENTER | Age: 69
End: 2019-09-04

## 2019-09-05 ENCOUNTER — ANESTHESIA (OUTPATIENT)
Dept: SURGERY | Facility: AMBULATORY SURGERY CENTER | Age: 69
End: 2019-09-05
Payer: MEDICARE

## 2019-09-05 ENCOUNTER — HOSPITAL ENCOUNTER (OUTPATIENT)
Facility: AMBULATORY SURGERY CENTER | Age: 69
Discharge: HOME | End: 2019-09-05
Attending: ORTHOPAEDIC SURGERY | Admitting: ORTHOPAEDIC SURGERY
Payer: MEDICARE

## 2019-09-05 VITALS
RESPIRATION RATE: 16 BRPM | OXYGEN SATURATION: 100 % | HEART RATE: 76 BPM | SYSTOLIC BLOOD PRESSURE: 129 MMHG | TEMPERATURE: 97.5 F | DIASTOLIC BLOOD PRESSURE: 57 MMHG

## 2019-09-05 DIAGNOSIS — M23.204 OLD COMPLEX TEAR OF MEDIAL MENISCUS OF LEFT KNEE: Primary | ICD-10-CM

## 2019-09-05 PROCEDURE — 29881 ARTHRS KNE SRG MNISECTMY M/L: CPT | Mod: LT

## 2019-09-05 PROCEDURE — G8907 PT DOC NO EVENTS ON DISCHARG: HCPCS

## 2019-09-05 PROCEDURE — 29881 ARTHRS KNE SRG MNISECTMY M/L: CPT | Mod: LT | Performed by: ORTHOPAEDIC SURGERY

## 2019-09-05 PROCEDURE — G8916 PT W IV AB GIVEN ON TIME: HCPCS

## 2019-09-05 RX ORDER — AMOXICILLIN 250 MG
1-2 CAPSULE ORAL 2 TIMES DAILY
Qty: 30 TABLET | Refills: 0 | Status: SHIPPED | OUTPATIENT
Start: 2019-09-05 | End: 2020-01-07

## 2019-09-05 RX ORDER — DEXAMETHASONE SODIUM PHOSPHATE 4 MG/ML
4 INJECTION, SOLUTION INTRA-ARTICULAR; INTRALESIONAL; INTRAMUSCULAR; INTRAVENOUS; SOFT TISSUE EVERY 10 MIN PRN
Status: DISCONTINUED | OUTPATIENT
Start: 2019-09-05 | End: 2019-09-06 | Stop reason: HOSPADM

## 2019-09-05 RX ORDER — ASPIRIN 325 MG
325 TABLET ORAL DAILY
Qty: 14 TABLET | Refills: 0 | Status: SHIPPED | OUTPATIENT
Start: 2019-09-05 | End: 2019-10-10

## 2019-09-05 RX ORDER — PHYSOSTIGMINE SALICYLATE 1 MG/ML
1.2 INJECTION INTRAVENOUS
Status: DISCONTINUED | OUTPATIENT
Start: 2019-09-05 | End: 2019-09-06 | Stop reason: HOSPADM

## 2019-09-05 RX ORDER — SODIUM CHLORIDE, SODIUM LACTATE, POTASSIUM CHLORIDE, CALCIUM CHLORIDE 600; 310; 30; 20 MG/100ML; MG/100ML; MG/100ML; MG/100ML
INJECTION, SOLUTION INTRAVENOUS CONTINUOUS
Status: DISCONTINUED | OUTPATIENT
Start: 2019-09-05 | End: 2019-09-06 | Stop reason: HOSPADM

## 2019-09-05 RX ORDER — LIDOCAINE HYDROCHLORIDE 20 MG/ML
INJECTION, SOLUTION INFILTRATION; PERINEURAL PRN
Status: DISCONTINUED | OUTPATIENT
Start: 2019-09-05 | End: 2019-09-05

## 2019-09-05 RX ORDER — FENTANYL CITRATE 50 UG/ML
25-50 INJECTION, SOLUTION INTRAMUSCULAR; INTRAVENOUS
Status: DISCONTINUED | OUTPATIENT
Start: 2019-09-05 | End: 2019-09-06 | Stop reason: HOSPADM

## 2019-09-05 RX ORDER — NALOXONE HYDROCHLORIDE 0.4 MG/ML
.1-.4 INJECTION, SOLUTION INTRAMUSCULAR; INTRAVENOUS; SUBCUTANEOUS
Status: DISCONTINUED | OUTPATIENT
Start: 2019-09-05 | End: 2019-09-06 | Stop reason: HOSPADM

## 2019-09-05 RX ORDER — GLYCOPYRROLATE 0.2 MG/ML
INJECTION, SOLUTION INTRAMUSCULAR; INTRAVENOUS PRN
Status: DISCONTINUED | OUTPATIENT
Start: 2019-09-05 | End: 2019-09-05

## 2019-09-05 RX ORDER — PROPOFOL 10 MG/ML
INJECTION, EMULSION INTRAVENOUS PRN
Status: DISCONTINUED | OUTPATIENT
Start: 2019-09-05 | End: 2019-09-05

## 2019-09-05 RX ORDER — LIDOCAINE 40 MG/G
CREAM TOPICAL
Status: DISCONTINUED | OUTPATIENT
Start: 2019-09-05 | End: 2019-09-06 | Stop reason: HOSPADM

## 2019-09-05 RX ORDER — CEFAZOLIN SODIUM 2 G/100ML
2 INJECTION, SOLUTION INTRAVENOUS
Status: COMPLETED | OUTPATIENT
Start: 2019-09-05 | End: 2019-09-05

## 2019-09-05 RX ORDER — HYDROXYZINE HYDROCHLORIDE 10 MG/1
10 TABLET, FILM COATED ORAL
Status: DISCONTINUED | OUTPATIENT
Start: 2019-09-05 | End: 2019-09-06 | Stop reason: HOSPADM

## 2019-09-05 RX ORDER — ONDANSETRON 2 MG/ML
4 INJECTION INTRAMUSCULAR; INTRAVENOUS EVERY 30 MIN PRN
Status: DISCONTINUED | OUTPATIENT
Start: 2019-09-05 | End: 2019-09-06 | Stop reason: HOSPADM

## 2019-09-05 RX ORDER — ACETAMINOPHEN 325 MG/1
975 TABLET ORAL ONCE
Status: COMPLETED | OUTPATIENT
Start: 2019-09-05 | End: 2019-09-05

## 2019-09-05 RX ORDER — HYDROCODONE BITARTRATE AND ACETAMINOPHEN 5; 325 MG/1; MG/1
1-2 TABLET ORAL EVERY 6 HOURS PRN
Qty: 12 TABLET | Refills: 0 | Status: SHIPPED | OUTPATIENT
Start: 2019-09-05 | End: 2019-10-10

## 2019-09-05 RX ORDER — METOPROLOL TARTRATE 1 MG/ML
1-2 INJECTION, SOLUTION INTRAVENOUS EVERY 5 MIN PRN
Status: DISCONTINUED | OUTPATIENT
Start: 2019-09-05 | End: 2019-09-06 | Stop reason: HOSPADM

## 2019-09-05 RX ORDER — GABAPENTIN 300 MG/1
300 CAPSULE ORAL ONCE
Status: COMPLETED | OUTPATIENT
Start: 2019-09-05 | End: 2019-09-05

## 2019-09-05 RX ORDER — ONDANSETRON 4 MG/1
4 TABLET, ORALLY DISINTEGRATING ORAL EVERY 30 MIN PRN
Status: DISCONTINUED | OUTPATIENT
Start: 2019-09-05 | End: 2019-09-06 | Stop reason: HOSPADM

## 2019-09-05 RX ORDER — CEFAZOLIN SODIUM 1 G/3ML
1 INJECTION, POWDER, FOR SOLUTION INTRAMUSCULAR; INTRAVENOUS SEE ADMIN INSTRUCTIONS
Status: DISCONTINUED | OUTPATIENT
Start: 2019-09-05 | End: 2019-09-06 | Stop reason: HOSPADM

## 2019-09-05 RX ORDER — METHOCARBAMOL 750 MG/1
750 TABLET, FILM COATED ORAL
Status: DISCONTINUED | OUTPATIENT
Start: 2019-09-05 | End: 2019-09-06 | Stop reason: HOSPADM

## 2019-09-05 RX ORDER — BUPIVACAINE HYDROCHLORIDE 2.5 MG/ML
INJECTION, SOLUTION INFILTRATION; PERINEURAL PRN
Status: DISCONTINUED | OUTPATIENT
Start: 2019-09-05 | End: 2019-09-05 | Stop reason: HOSPADM

## 2019-09-05 RX ORDER — ALBUTEROL SULFATE 0.83 MG/ML
2.5 SOLUTION RESPIRATORY (INHALATION) EVERY 4 HOURS PRN
Status: DISCONTINUED | OUTPATIENT
Start: 2019-09-05 | End: 2019-09-06 | Stop reason: HOSPADM

## 2019-09-05 RX ORDER — HYDRALAZINE HYDROCHLORIDE 20 MG/ML
2.5-5 INJECTION INTRAMUSCULAR; INTRAVENOUS EVERY 10 MIN PRN
Status: DISCONTINUED | OUTPATIENT
Start: 2019-09-05 | End: 2019-09-06 | Stop reason: HOSPADM

## 2019-09-05 RX ORDER — ONDANSETRON 4 MG/1
4 TABLET, ORALLY DISINTEGRATING ORAL
Status: DISCONTINUED | OUTPATIENT
Start: 2019-09-05 | End: 2019-09-06 | Stop reason: HOSPADM

## 2019-09-05 RX ORDER — HYDROMORPHONE HYDROCHLORIDE 1 MG/ML
.3-.5 INJECTION, SOLUTION INTRAMUSCULAR; INTRAVENOUS; SUBCUTANEOUS EVERY 10 MIN PRN
Status: DISCONTINUED | OUTPATIENT
Start: 2019-09-05 | End: 2019-09-06 | Stop reason: HOSPADM

## 2019-09-05 RX ORDER — ONDANSETRON 2 MG/ML
INJECTION INTRAMUSCULAR; INTRAVENOUS PRN
Status: DISCONTINUED | OUTPATIENT
Start: 2019-09-05 | End: 2019-09-05

## 2019-09-05 RX ORDER — OXYCODONE HYDROCHLORIDE 5 MG/1
10 TABLET ORAL EVERY 4 HOURS PRN
Status: DISCONTINUED | OUTPATIENT
Start: 2019-09-05 | End: 2019-09-06 | Stop reason: HOSPADM

## 2019-09-05 RX ORDER — MEPERIDINE HYDROCHLORIDE 25 MG/ML
12.5 INJECTION INTRAMUSCULAR; INTRAVENOUS; SUBCUTANEOUS
Status: DISCONTINUED | OUTPATIENT
Start: 2019-09-05 | End: 2019-09-06 | Stop reason: HOSPADM

## 2019-09-05 RX ORDER — HYDROCODONE BITARTRATE AND ACETAMINOPHEN 5; 325 MG/1; MG/1
1 TABLET ORAL
Status: DISCONTINUED | OUTPATIENT
Start: 2019-09-05 | End: 2019-09-06 | Stop reason: HOSPADM

## 2019-09-05 RX ADMIN — ONDANSETRON 4 MG: 2 INJECTION INTRAMUSCULAR; INTRAVENOUS at 10:05

## 2019-09-05 RX ADMIN — Medication 1 MG: at 10:06

## 2019-09-05 RX ADMIN — PROPOFOL 200 MG: 10 INJECTION, EMULSION INTRAVENOUS at 09:48

## 2019-09-05 RX ADMIN — SODIUM CHLORIDE, SODIUM LACTATE, POTASSIUM CHLORIDE, CALCIUM CHLORIDE: 600; 310; 30; 20 INJECTION, SOLUTION INTRAVENOUS at 08:59

## 2019-09-05 RX ADMIN — GABAPENTIN 300 MG: 300 CAPSULE ORAL at 08:54

## 2019-09-05 RX ADMIN — CEFAZOLIN SODIUM 2 G: 2 INJECTION, SOLUTION INTRAVENOUS at 09:53

## 2019-09-05 RX ADMIN — ACETAMINOPHEN 975 MG: 325 TABLET ORAL at 08:54

## 2019-09-05 RX ADMIN — GLYCOPYRROLATE 0.2 MG: 0.2 INJECTION, SOLUTION INTRAMUSCULAR; INTRAVENOUS at 10:12

## 2019-09-05 RX ADMIN — Medication 1 MG: at 09:55

## 2019-09-05 RX ADMIN — LIDOCAINE HYDROCHLORIDE 80 MG: 20 INJECTION, SOLUTION INFILTRATION; PERINEURAL at 09:48

## 2019-09-05 ASSESSMENT — ENCOUNTER SYMPTOMS: SEIZURES: 1

## 2019-09-05 NOTE — ANESTHESIA CARE TRANSFER NOTE
Patient: Prashant Keyes    Procedure(s):  LEFT KNEE ARTHROSCOPY WITH MENISCAL AND CHONDRAL DEBRIDEMENT    Diagnosis: Left complex medial meniscus tear  Diagnosis Additional Information: No value filed.    Anesthesia Type:   General     Note:  Airway :Nasal Cannula  Patient transferred to:PACU  Handoff Report: Identifed the Patient, Identified the Reponsible Provider, Reviewed the pertinent medical history, Discussed the surgical course, Reviewed Intra-OP anesthesia mangement and issues during anesthesia, Set expectations for post-procedure period and Allowed opportunity for questions and acknowledgement of understanding      Vitals: (Last set prior to Anesthesia Care Transfer)    CRNA VITALS  9/5/2019 1002 - 9/5/2019 1050      9/5/2019             Pulse:  80    SpO2:  99 %                Electronically Signed By: GENESIS Thomas CRNA  September 5, 2019  10:50 AM

## 2019-09-05 NOTE — INTERVAL H&P NOTE
The History and Physical on patient's chart was personally reviewed today with the patient. there have been no interval changes in patient's history since H+P performed.    History:  Prashant Keyes is a 68 year old male seen for followup evaluation of ongoing left knee pain with no known injury.   Pain has been increased since 7/1/2019. Locates pain front/inner aspect of the knee. Had injection with cortisone at [facility] on 7/3/2019 (Kenalog 40mg), lasted about 2 days. He's had previous injections in the past that have helped longer. He's had pain in left knee for a number of years. Was 8/10 pain when he went to the emergency room. He had injection about 2 years ago with good relief until recently.  Not getting any better, if anything worse. He's on his feet all night for work.     Denies low back or hip pain. Denies numbness and tingling.    X-RAY:no new images today   3 views left knee from 7/29/2019 : no obvious fractures or dislocations. Mild medial narrowing.     MRI:  MRI left knee from 7/22/2019:    1. Small tear along the free edge of the junction of the posterior  horn and body of the medial meniscus.  2. Increased signal throughout the anterior cruciate ligament.  However, no definite tear is currently seen. This most likely  represents an old healed injury. A more recent sprain is considered  unlikely given the absence of secondary findings of a recent injury.  3. Mild chondromalacia of the medial compartment.              ASSESSMENT/PLAN: Prashant Keyes is a 68 year old male with chronic left knee pain, complex medial meniscus tear, mild chondromalacia.      * again discussed findings with patient, what appears to be a medial meniscal tear on MRI, as well as some underlying arthritic changes and swelling, which is consistent with symptoms and physical examination findings.   * advised that cannot be injecting with cortisone every month. Discussed he should look into taking care of the  underlying medial meniscus problem with arthroscopy to hopefully try and alleviate his symptoms more long-term.     * Discussed treatment options including nonoperative treatment with continued rest, ice, elevation, activity modification, NSAIDS and Physical Therapy, bracing and potential injections (3 months at least between injections) versus surgical treatment with arthroscopy and meniscal repair versus debridement, possible chondral debridement. Risks and benefits of each discussed in detail.  * in the setting of underlying chondrosis, predictability of arthroscopy is uncertain, unless mechanical symptoms present due to the meniscus tear.     * surgical risks discussed: bleeding, infection, pain, scar, damage to adjacent structures (nerve, vessels, cartilage), stiffness, post-traumatic arthritis, failure to relieve symptoms, recurrence of symptoms, blood clots (DVT), pulmonary emolism, risks of anesthesia and death. This surgery is not intended nor expected to alleviate arthritic pain symptoms, nor will it treat or correct underlying arthritic changes. Arthritis and symptoms related to arthritis could worsen with arthroscopy and meniscal and/or chondral debridement. Patient understands.       * understanding the risks , patient would like to proceed with surgery.    Plan: left knee arthroscopy, meniscal and chondral debridement. Outpatient.      Risks and perceived benefits of surgery again discussed with patient. Patient's questions addressed and answered. Written informed consent obtained and reviewed. Surgical site marked with indelible marker with patient's participation after confirming site with patient.      Damon Rosas M.D., M.S.  Dept. of Orthopaedic Surgery  [facility]

## 2019-09-05 NOTE — ANESTHESIA POSTPROCEDURE EVALUATION
Anesthesia POST Procedure Evaluation    Patient: Prashant Keyes   MRN:     [id number] Gender:   male   Age:    68 year old :      1950        Preoperative Diagnosis: Left complex medial meniscus tear   Procedure(s):  LEFT KNEE ARTHROSCOPY WITH MENISCAL AND CHONDRAL DEBRIDEMENT   Postop Comments: No value filed.       Anesthesia Type:  Not documented  General    Reportable Event: NO     PAIN: Uncomplicated   Sign Out status: Comfortable, Well controlled pain     PONV: No PONV   Sign Out status:  No Nausea or Vomiting     Neuro/Psych: Uneventful perioperative course   Sign Out Status: Preoperative baseline; Age appropriate mentation     Airway/Resp.: Uneventful perioperative course   Sign Out Status: Non labored breathing, age appropriate RR; Resp. Status within EXPECTED Parameters     CV: Uneventful perioperative course   Sign Out status: Appropriate BP and perfusion indices; Appropriate HR/Rhythm     Disposition:   Sign Out in:  PACU  Disposition:  Phase II; Home  Recovery Course: Uneventful  Follow-Up: Not required           Last Anesthesia Record Vitals:  CRNA VITALS  2019 1002 - 2019 1102      2019             Pulse:  80    SpO2:  99 %          Last PACU Vitals:  Vitals Value Taken Time   /58 2019 10:50 AM   Temp 36.2  C (97.2  F) 2019 10:34 AM   Pulse 70 2019 10:50 AM   Resp 16 2019 10:50 AM   SpO2 97 % 2019 10:50 AM   Temp src     NIBP     Pulse 80 2019 10:32 AM   SpO2 99 % 2019 10:32 AM   Resp     Temp     Ht Rate     Temp 2           Electronically Signed By: Vic Simpson MD, 2019, 3:54 PM

## 2019-09-05 NOTE — BRIEF OP NOTE
POST OPERATIVE NOTE-IMMEDIATE :    Date of surgery: 9/5/2019    Preoperative Diagnosis:  Left complex medial meniscus tear    Postoperative Diagnosis:  Left knee medial meniscus tear    Procedures:  Procedure(s):  LEFT KNEE ARTHROSCOPY WITH MENISCAL AND CHONDRAL DEBRIDEMENT  Plica excision    Prosthetic Devices: See Op Note    Surgeon(s) and Assistants (if any):  Attending Surgeon: Damon Rosas MD, MS  Assistant: Mc Suarez PA-C    Anesthesia:  General    Antibiotics: 2g Ancef    IV Fluids: 400cc LR    UOP: 0, no ferreira    Drains: none    Specimens: none    Complications: None apparent.    Tourniquet Time: 14 minutes @ 250mmHg    Findings/Conclusions: Radial-flap type tear of medial meniscus. Medial femoral condyle chondrosis. See Op Note for further detail.    Estimated Blood Loss: 1ml    Post Op Plan:  *Rest   *Ice   *Elevation   *Weight bearing as tolerated, crutches as needed   *oral pain medications  *Daily asa x2 weeks  *Home exercise program   *Return to clinic 2 weeks for wound check, suture removal, sooner if needed      Mc Suarez PA-C, CAQ (Ortho)  Supervising Physician: Damon Rosas M.D., M.S.  Dept. of Orthopaedic Surgery  [facility]

## 2019-09-05 NOTE — PROGRESS NOTES
O2 sats drop down to 90% when falling asleep--uses CPAP at home.  Dr Simpson updated and OK to discharge . PT instructed to use CPAP at home when napping today.  Instructed to deep breath and cough

## 2019-09-05 NOTE — DISCHARGE INSTRUCTIONS
1. Name: Prashant Keyes MRN #: [id number]  2. Date: 9/5/2019  Procedure: left knee arthroscopy, meniscal and chondral debridement.  3. Discharge to home when stable, tolerating clear liquids, and patient has urinated  4. Call for follow-up appointment, [phone], with Dr. Rosas in:  2 weeks.   WOUND CARE    The bandage may be slightly bloody. This is normal.  5. Ice:  Keep an ice bag on your knee for 20 minutes at a time.  6. Keep incisions clean and dry following surgery for:  72 hours   7. Change all bandages in:  72 hours       8. If bandages are changed before follow-up, cover all incisions with fresh bandages or bandaids.  9. O.K. to shower (may get incision wet) in:  72 hours  10. No tub baths, swimming pools, hot tubs, etc. for a minimum of 2 weeks following surgery  ACTIVITY  11. Keep leg elevated on a pillow placed under ankle. Do not keep pillow under your knee.  12. Weight-bearing ([place]):  Weight-bear as tolerated       May discontinue crutches in 2-3 days if able to walk without a limp.  13. Bracing: no brace needed.  14. Range of motion limits: no limit. Work on regaining full range of motion.  15. Exercises:  Perform exercises 3 times a day for a minimum of 25 reps each time (start today or tomorrow):             Quadriceps sets  Calf Pumps Straight leg raises  Heels Slides   16. Start Physical Therapy: will discuss upon return to clinic.  17. OK to drive:  Not for 24 hours    When going back to driving, be sure to test braking/acceleration maneuvers in an empty parking lot before entry into any traffic areas.      ABSOLUTELY NO DRIVING WHILE TAKING NARCOTICS!    DISCHARGE MEDICATIONS:   Aspirin 325 mg, 1 tablet, take once a day for 14 days then stop (to prevent blood clots) (over the counter)  Norco (5/325), 1 to 2 tablets, take every 6 hours as needed for pain  Other: stool softeners while on pain medications  Ok to take over the counter medications such as ibuprofen, acetaminophen as an  alternative.    Strong pain medication has been prescribed. Use as directed. Do not combine with alcohol. Be careful as you walk or climb stairs.   DIET:  If no nausea, clear liquids should be taken initially.  Then progress to solid foods when clear liquids are tolerated.   RESPONSE TO SURGERY: It is normal to have pain and swelling in your knee after surgery. It may take 4 weeks or longer for the swelling to go away. It is also common to notice some bruising around the knee, thigh, and calf as the swelling resolves.  EMERGENCY: Call or return for any fevers (temperature greater than 101.5   or sustained fevers greater than 100.5   that haven t resolved within 3 to 4 days following surgery) or chills, increasing pain, swelling, redness, calf pain, drainage (especially if yellow, green, or foul smelling), excessive bleeding), chest pain, shortness of breath:  Phone #: [phone]; If emergency, go to local ER or dial 911.    Damon Rosas M.D., M.S.  Dept. of Orthopaedic Surgery  [facility]    9/5/2019        KNEE SURGERY - HOME EXERCISE PROGRAM    All exercises to be performed at least 3 times per day.     Quad Sets    Sit with leg extended    Tighten quad muscles in front of leg, trying to  push back of knee downward    Hold exercise for 10 seconds    Rest 10 seconds between reps    Perform 1 set of 20 reps, 3 times a day     Heel Slides     Lie on back with legs straight    Slide heel to buttocks     Return to start position    Repeat with other leg    Perform 1 rep every 4 seconds    Perform 3 sets of 20 reps, 3 times a day    Rest 1 minute between sets     Ankle Pumps     Lie on back with foot elevated on pillow    Move foot up and down, pumping ankle    Perform 3 sets of 20 reps, 3 times a day    Perform 1 rep every 4 seconds    Rest 1 minute between sets     Straight Leg Raise    Lie on back with uninvolved knee bent    Raise straight leg to thigh level of bend leg    Return to starting  position    Perform 3 sets of 20 reps, 3 times a day    Perform 1 rep every 4 seconds    Rest 1 minute between sets              [facility]  Same-Day Surgery   Adult Discharge Orders & Instructions   For 24 hours after surgery  1. Get plenty of rest.  A responsible adult must stay with you for at least 24 hours after you leave the hospital.   2. Do not drive or use heavy equipment.  If you have weakness or tingling, don't drive or use heavy equipment until this feeling goes away.  3. Do not drink alcohol.  4. Avoid strenuous or risky activities.  Ask for help when climbing stairs.   5. You may feel lightheaded.  IF so, sit for a few minutes before standing.  Have someone help you get up.   6. If you have nausea (feel sick to your stomach): Drink only clear liquids such as apple juice, ginger ale, broth or 7-Up.  Rest may also help.  Be sure to drink enough fluids.  Move to a regular diet as you feel able.  7. You may have a slight fever. Call the doctor if your fever is over 100 F (37.7 C) (taken under the tongue) or lasts longer than 24 hours.  8. You may have a dry mouth, a sore throat, muscle aches or trouble sleeping.  These should go away after 24 hours.  9. Do not make important or legal decisions.   Call your doctor for any of the followin.  Signs of infection (fever, growing tenderness at the surgery site, a large amount of drainage or bleeding, severe pain, foul-smelling drainage, redness, swelling).    2. It has been over 8 to 10 hours since surgery and you are still not able to urinate (pass water).    3.  Headache for over 24 hours.    4.  Numbness, tingling or weakness the day after surgery (if you had spinal anesthesia).  To contact a doctor, call ___________________________     To contact Dr Rosas call:  [phone]    Managing Your Pain   Pain management is an important part of your care. When you are in pain or uncomfortable, it can affect the way you feel both  physically and emotionally.   The longer pain goes untreated, the harder it is to relieve. Effective pain management can break the pain cycle.   When you take care of your pain before it becomes a problem you will:     Heal faster     Be more comfortable when walking and doing breathing exercises     Regain your strength faster     Other Ways to Manage Pain   There are many ways besides medication to treat your pain. Ask your nurse or doctor for more information about:     Relaxation techniques     Guided imagery     Breathing exercises     Hot or cold packs     Massage     Changing position (elevation or support)     Using pillows or splints to protect incisions when coughing, laughing, etc.     Music     The goal is for you to be able to complete activities such as turning in bed, walking and doing deep breathing exercises with only mild to moderate pain.   Possible Side Effects of Pain Medications     Constipation     Sleepiness     Dry mouth     Nausea and/or vomiting   It is important for you to let your nurse or doctor know if you have any of these side effects.     What You Can Do to Help with the Side Effects     Drink as much fluid as possible     Eat foods high in fiber (beans, lentils, fruits)     Ask for medication if you continue to have problems with constipation     Suck on sugarless hard candy, or ice     Take pain medications with food     Peppermint can be helpful to decrease nausea     Managing Your Pain at Home   Your doctor may give you a prescription for pain medicine to take at home. Most pain medications to be taken at home are in pill form.   Your nurse will review the instructions for taking your pain medications. When taken by mouth, medication can take up to 30 minutes to be effective. Remember to take pain medication when your pain first begins      Remember, same day surgery does not mean same day recovery.  Healing is a gradual process.  It is normal to be impatient and feel  discouraged while waiting for swelling, bruising, discomfort and numbness to diminish.  Allow yourself to be a patient!  Extra rest, a nutritious diet, and avoidance of stress are important aids to recovery.      ankle pump exercises: This particular exercise is important because it helps decrease the swelling in the knee and lower leg.  It s also very important in helping you avoid developing blood clots in your lower leg(s) after surgery.   To do an ankle pump you point and flex your foot back and forth.  You should do 10 repetitions several times during the day. You really can t over do these.      Deep breathing and coughing:  It's important to learn deep breathing and coughing exercises as these will help to lower your risk of lung complications after your surgery.  Breathing deeply:  Moves air down to the bottom areas of the lungs   Opens air passages and moves mucous out (coughing is also easier)   Helps the blood and oxygen supply to your lungs, boosting circulation   Lowers the risk of lung complications such as pneumonia and infections  Breathe in deeply and slowly through your nose, expanding your lower rib cage, and letting your abdomen move forward. Hold for a count of 3 to 5. Breathe out slowly and completely.  Don't force your breath out. On the third breath, cough deeply from the lungs, not the throat.  Rest and repeat every hour while you are awake.      Tylenol was given at 9:00 AM

## 2019-09-05 NOTE — ANESTHESIA PREPROCEDURE EVALUATION
Anesthesia Pre-Procedure Evaluation    Patient: Prashant Keyes   MRN:     [id number] Gender:   male   Age:    68 year old :      1950        Preoperative Diagnosis: Left complex medial meniscus tear   Procedure(s):  LEFT KNEE ARTHROSCOPY WITH MENISCAL AND CHONDRAL DEBRIDEMENT     Past Medical History:   Diagnosis Date     Calculus of kidney ~     Carpal tunnel syndrome      Concussion, unspecified      Eczema 2011     Epileptic seizure (H)     diagnosed , controlled with Depakote and Keppra     Fibromyalgia syndrome ~2000    per pt     Hypertension      Left testicle cyst      Photosensitive contact dermatitis      Prostatitis, unspecified      Seizures (H)     Diagnosed , controlled with Depakote and Keppra     Umbilical hernia 2012     Unspecified asthma(493.90)       Past Surgical History:   Procedure Laterality Date     ANGIOGRAM      Coronary Angiogram- negative     COLONOSCOPY WITH CO2 INSUFFLATION N/A 2017    Procedure: COLONOSCOPY WITH CO2 INSUFFLATION;  COLON SCREEN/ FLYNN;  Surgeon: Varun Bond MD;  Location: [facility] OR     HC REMOVAL TESTIS,SIMPLE      Right undecended     HERNIA REPAIR, INGUINAL RT/LT  1978    Right Hernia     HERNIORRHAPHY UMBILICAL  2013    [place]          Anesthesia Evaluation     .             ROS/MED HX    ENT/Pulmonary:  - neg pulmonary ROS   (+)sleep apnea, asthma , . .    Neurologic:  - neg neurologic ROS   (+)seizures last seizure: 5 years ago features: petite mal,     Cardiovascular:  - neg cardiovascular ROS   (+) hypertension----. : . . . :. .       METS/Exercise Tolerance:     Hematologic:  - neg hematologic  ROS       Musculoskeletal:  - neg musculoskeletal ROS       GI/Hepatic:  - neg GI/hepatic ROS   (+) GERD       Renal/Genitourinary:  - ROS Renal section negative       Endo:  - neg endo ROS   (+) Obesity, .      Psychiatric:  - neg psychiatric ROS       Infectious Disease:  - neg  infectious disease ROS       Malignancy:      - no malignancy   Other:    (+) H/O Chronic Pain,  - neg other ROS                     PHYSICAL EXAM:   Mental Status/Neuro: A/A/O   Airway: Facies: Feasible  Mallampati: I  Mouth/Opening: Full  TM distance: > 6 cm  Neck ROM: Full   Respiratory: Auscultation: CTAB     Resp. Rate: Normal     Resp. Effort: Normal      CV: Rhythm: Regular  Rate: Age appropriate  Heart: Normal Sounds  Edema: None   Comments:      Dental: Normal Dentition                LABS:  CBC:   Lab Results   Component Value Date    WBC 7.2 08/27/2019    WBC 8.1 12/18/2018    HGB 12.8 (L) 08/27/2019    HGB 12.2 (L) 12/18/2018    HCT 39.1 (L) 08/27/2019    HCT 36.8 (L) 12/18/2018     08/27/2019     12/18/2018     BMP:   Lab Results   Component Value Date     (H) 08/27/2019     12/18/2018    POTASSIUM 3.6 08/27/2019    POTASSIUM 3.7 12/18/2018    CHLORIDE 107 08/27/2019    CHLORIDE 103 12/18/2018    CO2 29 08/27/2019    CO2 34 (H) 12/18/2018    BUN 23 08/27/2019    BUN 21 12/18/2018    CR 1.32 (H) 08/27/2019    CR 1.03 12/18/2018    GLC 86 08/27/2019    GLC 92 12/18/2018     COAGS: No results found for: PTT, INR, FIBR  POC: No results found for: BGM, HCG, HCGS  OTHER:   Lab Results   Component Value Date    A1C 5.9 07/20/2010    SANGEETHA 9.2 08/27/2019    PHOS 2.5 07/27/2010    MAG 2.0 03/31/2008    ALBUMIN 4.0 12/18/2018    PROTTOTAL 7.2 12/18/2018    ALT 17 12/18/2018    AST 12 12/18/2018    ALKPHOS 66 12/18/2018    BILITOTAL 0.4 12/18/2018    LIPASE 119 05/02/2016    AMYLASE 57 02/03/2003    TSH 0.29 (L) 12/18/2018    T4 0.95 12/18/2018    SED 16 03/02/2015        Preop Vitals    BP Readings from Last 3 Encounters:   09/05/19 (!) 155/64   08/27/19 129/74   08/05/19 129/76    Pulse Readings from Last 3 Encounters:   08/27/19 65   08/05/19 72   07/18/19 75      Resp Readings from Last 3 Encounters:   09/05/19 18   08/27/19 18   07/29/19 16    SpO2 Readings from Last 3 Encounters:  "  09/05/19 100%   08/27/19 98%   07/18/19 98%      Temp Readings from Last 1 Encounters:   09/05/19 36.1  C (97  F) (Temporal)    Ht Readings from Last 1 Encounters:   08/27/19 1.778 m (5' 10\")      Wt Readings from Last 1 Encounters:   08/27/19 112.9 kg (249 lb)    Estimated body mass index is 35.73 kg/m  as calculated from the following:    Height as of 8/27/19: 1.778 m (5' 10\").    Weight as of 8/27/19: 112.9 kg (249 lb).     LDA:  Peripheral IV 09/05/19 Left Hand (Active)   Site Assessment WDL 9/5/2019  8:56 AM   Line Status Infusing 9/5/2019  8:56 AM   Phlebitis Scale 0-->no symptoms 9/5/2019  8:56 AM   Dressing Intervention New dressing  9/5/2019  8:56 AM   Number of days: 0       Airway - Adult/Peds laryngeal mask airway (Active)   Number of days: 0        Assessment:   ASA SCORE: 3    H&P: History and physical reviewed and following examination; no interval change.   Smoking Status:  Non-Smoker/Unknown   NPO Status: NPO Appropriate     Plan:   Anes. Type:  General   Pre-Medication: None   Induction:  IV (Standard)   Airway: LMA   Access/Monitoring: PIV   Maintenance: Balanced     Postop Plan:   Postop Pain: Opioids  Postop Sedation/Airway: Not planned  Disposition: Outpatient     PONV Management:   Adult Risk Factors:, Non-Smoker, Postop Opioids   Prevention: Ondansetron, Propofol     CONSENT: Direct conversation   Plan and risks discussed with: Patient   Blood Products: Consent Deferred (Minimal Blood Loss)                   Vic Simpson MD  "

## 2019-09-06 ENCOUNTER — TELEPHONE (OUTPATIENT)
Dept: ORTHOPEDICS | Facility: CLINIC | Age: 69
End: 2019-09-06

## 2019-09-11 ENCOUNTER — DOCUMENTATION ONLY (OUTPATIENT)
Dept: OTHER | Facility: CLINIC | Age: 69
End: 2019-09-11

## 2019-09-16 ENCOUNTER — DOCUMENTATION ONLY (OUTPATIENT)
Dept: SLEEP MEDICINE | Facility: CLINIC | Age: 69
End: 2019-09-16

## 2019-09-16 NOTE — PROGRESS NOTES
PATIENT STATE THAT HIS MACHINE IS GOING BAD. NOT SURE IF WE CAN FIX HIS MACHINE AND WOULD LIKE A CALL.  CALLED PT BACK TO DISCUSS HIS MACHINE. ASKED IF PT IS USING ANOTHER DME COMPANY AS WE HAVE NOT GIVEN HIM ANYTHING SINCE 2014. PT STATED HE HAS BEEN USING AN ONLINE COMPANY FROM [place]. I TOLD HIM WE CAN HELP HIM BUT WOULD NEED TO [place]. PT STATED HE HAS AN APPOINTMENT TO SEE FOSTER VIDALES IN OCT. PT ALSO STATED HE HAD HIS PSG DONE AT  SLEEP YEARS AGO. TOLD HIM I WILL GO IN AND FIND THE PSG WHILE WE WAIT FOR HIM TO SEE THE DR. ONCE HE IS TRANSFERRED TO [place] HE IS ELIGIBLE FOR A NEW MACHINE AND WE CAN DO THAT SETUP. PT THANKED ME.

## 2019-09-19 ENCOUNTER — OFFICE VISIT (OUTPATIENT)
Dept: ORTHOPEDICS | Facility: CLINIC | Age: 69
End: 2019-09-19
Payer: MEDICARE

## 2019-09-19 VITALS
RESPIRATION RATE: 16 BRPM | HEIGHT: 70 IN | BODY MASS INDEX: 35.5 KG/M2 | DIASTOLIC BLOOD PRESSURE: 77 MMHG | WEIGHT: 248 LBS | SYSTOLIC BLOOD PRESSURE: 149 MMHG

## 2019-09-19 DIAGNOSIS — Z98.890 S/P ARTHROSCOPY OF LEFT KNEE: Primary | ICD-10-CM

## 2019-09-19 PROCEDURE — 99024 POSTOP FOLLOW-UP VISIT: CPT | Performed by: ORTHOPAEDIC SURGERY

## 2019-09-19 ASSESSMENT — PAIN SCALES - GENERAL: PAINLEVEL: NO PAIN (1)

## 2019-09-19 ASSESSMENT — MIFFLIN-ST. JEOR: SCORE: 1901.17

## 2019-09-19 NOTE — PROGRESS NOTES
Chief Complaint   Patient presents with     Left Knee - Surgical Followup     Left knee arthroscopy, partial medial meniscus debridement. DOS: 9/5/19. 2 weeks.       SURGERY:  ( [facility] [place] )  1.  Left knee arthroscopic partial medial meniscectomy.   2.  Left knee arthroscopic shaving chondroplasty of the medial femoral condyle.   3.  Left knee arthroscopic medial plica resection.   DATE OF SURGERY: 9/5/2019.      HISTORY OF PRESENT ILLNESS:  Prashant Keyes is a 68 year old male seen for postoperative evaluation of a left knee arthroscopy and medial meniscus debridement for medial meniscus tear. Surgery occurred 2 weeks ago. Returns today stating doing well. Pain has been improving. No problems with the surgical wounds. Denies fevers chills or night sweats. No associated numbness or tingling. Has been doing home exercise program  since surgery as recommended. Taking aspirin daily. Pain 1/10. Feels much better than prior to surgery.    OR FINDINGS:  A small effusion.  Mild suprapatellar synovitis.  Grade 2 and 3 chondrosis of the patellofemoral joint.  Small medial plica.  Intact ACL.  Lateral compartment with free edge fraying of the meniscus without full tear.  Grade 2 chondrosis.  Medial compartment with a radial tear at the posterior horn-body junction.  Some grade 2 and 3 delamination of the medial femoral condyle.  Grade 1 and 2 chondrosis of the medial tibia.     Past Medical History:   Diagnosis Date     Calculus of kidney ~1975     Carpal tunnel syndrome 11/94     Concussion, unspecified 12/95     Eczema 11/16/2011     Epileptic seizure (H) 1995    diagnosed 1995, controlled with Depakote and Keppra     Fibromyalgia syndrome ~2000    per pt     Hypertension      Left testicle cyst      Photosensitive contact dermatitis      Prostatitis, unspecified      Seizures (H)     Diagnosed 1995, controlled with Depakote and Keppra     Umbilical hernia 11/26/2012     Unspecified  asthma(493.90)        Past Surgical History:   Procedure Laterality Date     ANGIOGRAM  2003    Coronary Angiogram- negative     ARTHROSCOPY KNEE Left 9/5/2019    Procedure: LEFT KNEE ARTHROSCOPY WITH MENISCAL AND CHONDRAL DEBRIDEMENT;  Surgeon: Damon Rosas MD;  Location: [facility] OR     COLONOSCOPY WITH CO2 INSUFFLATION N/A 8/17/2017    Procedure: COLONOSCOPY WITH CO2 INSUFFLATION;  COLON SCREEN/ FLYNN;  Surgeon: Varun Bond MD;  Location: [facility] OR     HC REMOVAL TESTIS,SIMPLE  1978    Right undecended     HERNIA REPAIR, INGUINAL RT/LT  1963, 1978    Right Hernia     HERNIORRHAPHY UMBILICAL  4/2013    [place]       Medications:   Current Outpatient Medications:      allopurinol (ZYLOPRIM) 100 MG tablet, TAKE TWO TABLETS BY MOUTH ONCE DAILY, Disp: 180 tablet, Rfl: 3     amLODIPine (NORVASC) 10 MG tablet, Take 1 tablet (10 mg) by mouth daily (with dinner), Disp: 90 tablet, Rfl: 1     ARIPiprazole (ABILIFY) 2 MG tablet, Take 2 mg by mouth daily, Disp: , Rfl:      aspirin (ASA) 325 MG tablet, Take 1 tablet (325 mg) by mouth daily for 14 days, Disp: 14 tablet, Rfl: 0     aspirin 81 MG tablet, Take 1 tablet (81 mg) by mouth daily, Disp: 30 tablet, Rfl:      carbidopa-levodopa (SINEMET)  MG tablet, Take 1 tablet by mouth 4 times daily, Disp: 360 tablet, Rfl: 3     carvedilol (COREG) 12.5 MG tablet, TAKE ONE TABLET BY MOUTH TWICE DAILY WITH MEALS, Disp: 180 tablet, Rfl: 1     chlorthalidone (HYGROTON) 25 MG tablet, Take 1 tablet (25 mg) by mouth daily, Disp: 90 tablet, Rfl: 1     Cholecalciferol (VITAMIN D) 2000 UNITS tablet, Take 2,000 Units by mouth daily, Disp: 90 tablet, Rfl: 3     colchicine (COLCYRS) 0.6 MG tablet, Take 1 tablet (0.6 mg) by mouth daily as needed Take 2 tabs on first day.  Take at first sign of gout flair.  Take for max of 1 week per flair, Disp: 30 tablet, Rfl: 0     Cyanocobalamin (VITAMIN  B-12) 2500 MCG tablet, Place 2,500 mcg under the tongue daily, Disp: 90 tablet, Rfl: 3      divalproex sodium delayed-release (DEPAKOTE) 500 MG DR tablet, Take 1 tablet (500 mg) by mouth 2 times daily NEED APPT BEFORE FURTHER REFILLS, Disp: 180 tablet, Rfl: 0     ferrous sulfate (IRON) 325 (65 FE) MG tablet, Take 1 tablet (325 mg) by mouth 2 times daily, Disp: 60 tablet, Rfl: 2     furosemide (LASIX) 20 MG tablet, TAKE TWO TABLETS BY MOUTH AS NEEDED, Disp: 30 tablet, Rfl: 3     HYDROcodone-acetaminophen (NORCO) 5-325 MG tablet, Take 1-2 tablets by mouth every 6 hours as needed for moderate to severe pain, Disp: 12 tablet, Rfl: 0     levETIRAcetam (KEPPRA) 1000 MG TABS, 1 tablet in morning and bed time., Disp: 180 tablet, Rfl: 3     losartan (COZAAR) 100 MG tablet, Take 1 tablet (100 mg) by mouth daily, Disp: 90 tablet, Rfl: 1     methylPREDNISolone (MEDROL DOSEPAK) 4 MG tablet therapy pack, Take 4 mg by mouth See Admin Instructions Follow Package Directions, Disp: , Rfl:      Multiple Vitamin (MULTI VITAMIN MENS PO), Take  by mouth., Disp: , Rfl:      omeprazole (PRILOSEC) 40 MG DR capsule, TAKE ONE CAPSULE BY MOUTH ONCE DAILY (TAKE  30  TO  60  MINUTES  BEFORE  A  MEAL), Disp: 30 capsule, Rfl: 6     ORDER FOR DME, CPAP daily, Disp: , Rfl:      potassium chloride ER (KLOR-CON) 20 MEQ CR tablet, TAKE 1 TABLET BY MOUTH THREE TIMES DAILY, Disp: 270 tablet, Rfl: 1     senna-docusate (SENOKOT-S/PERICOLACE) 8.6-50 MG tablet, Take 1-2 tablets by mouth 2 times daily, Disp: 30 tablet, Rfl: 0     simvastatin (ZOCOR) 20 MG tablet, TAKE 2 TABLETS BY MOUTH IN THE EVENING AT BEDTIME, Disp: 180 tablet, Rfl: 1     TEMAZEPAM PO, Take 15 mg by mouth At Bedtime, Disp: , Rfl:      UNABLE TO FIND, daily MEDICATION NAME: sawpalmetto - 2 capsules once a day, Disp: , Rfl:     Allergies:   Allergies   Allergen Reactions     Accupril [Ace Inhibitors] Difficulty breathing     accupril causes SOB     Aptiom [Eslicarbazepine] Difficulty breathing     Atorvastatin Calcium      Myalgias from Lipitor     Contrast Dye Hives     Coreg  "[Carvedilol] Nausea and Fatigue     Lactose GI Disturbance     Lisinopril Difficulty breathing     SOB     Nitroglycerin      Headache     Paroxetine Hives     Tetracycline [Tetracyclines]      \"splitting headaches\"     Vimpat [Lacosamide] Difficulty breathing     Zoloft Rash     Zolpidem      Adhesive Tape Rash     Without itching- EKG pads       REVIEW OF SYSTEMS:   CONSTITUTIONAL:NEGATIVE for fever, chills, night sweats  INTEGUMENTARY/SKIN: NEGATIVE for worrisome wound problems or redness.  MUSCULOSKELETAL:See HPI above  NEURO: NEGATIVE for weakness, dizziness or paresthesias    PHYSICAL EXAM:  BP (!) 149/77   Resp 16   Ht 1.778 m (5' 10\")   Wt 112.5 kg (248 lb)   BMI 35.58 kg/m     GENERAL APPEARANCE: healthy, alert, no distress  SKIN: no suspicious lesions or rashes  NEURO: Normal strength and tone, mentation intact and speech normal  PSYCH:  mentation appears normal and affect normal, not anxious  RESPIRATORY: No increased work of breathing.    left  LOWER EXTREMITY:  Gait: slight favors the left.  No Quad atrophy, strength normal.  Intact sensation deep peroneal nerve, superficial peroneal nerve, med/lat tibial nerve, sural nerve, saphenous nerve  Intact EHL, EDL, TA, FHL, GS, quadriceps hamstrings and hip flexors  Toes warm and well perfused, brisk capillary refill. Palpable 2+ dp pulses.  calf soft and nttp or squeeze.  Edema: trace  Varicose veins    left  KNEE EXAM:    Skin: intact, no ecchymosis or erythema  Incisions: skin edges well approximated, sutures in place. Dry. No erythema.  ROM: full extension to 120 flexion  Effusion: small  Tender: incision, medial joint line          X-RAY: none indicated.      Impression: Prashant Keyes is a 68 year old male 2 weeks status post left knee arthroscopy and medial meniscus debridement, doing well.       Plan: routine postoperative knee arthroscopy  * suture removal  * arthroscopy images reviewed with patient.    * WB status: weight bearing as tolerated . " Cautioned not to overdo it too quickly. Increased activities too soon will lead to more swelling of the knee and leg, more pain, slower recovery. Expect 6-8 weeks.    * Rest  * Activity modification - avoid activities that aggravate symptoms.  * NSAIDS - regular use for inflammation, with food, as long as no contra-indications. Please discuss with pcp if needed.  * Ice twice daily to three times daily as needed.  * Compression wrap as needed.  * Elevation of extremity to reduce swelling as needed.  * discussed Physical Therapy, patient declines. He will work on his own and let us know if he needs assistance.  * Tylenol as needed for pain  * return to clinic as needed.      * We did also discuss that based on the amount of arthritic changes seen at the time of surgery, may have continued knee pain due to the arthritis. Once recovered from the knee arthroscopy, and arthritic symptoms persist, consider full treatment of arthritis starting with Physical Therapy and injections, NSAIDS, activity modification, bracing.    Bill to follow up with Primary Care provider regarding elevated blood pressure.      Damon Rosas M.D., M.S.  Dept. of Orthopaedic Surgery  [facility]

## 2019-09-19 NOTE — LETTER
9/19/2019         RE: Prashant Keyes  [address]  [place] [address]        Dear Colleague,    Thank you for referring your patient, Prashant Keyes, to the [facility]. Please see a copy of my visit note below.    Chief Complaint   Patient presents with     Left Knee - Surgical Followup     Left knee arthroscopy, partial medial meniscus debridement. DOS: 9/5/19. 2 weeks.       SURGERY:  ( [facility] [place] )  1.  Left knee arthroscopic partial medial meniscectomy.   2.  Left knee arthroscopic shaving chondroplasty of the medial femoral condyle.   3.  Left knee arthroscopic medial plica resection.   DATE OF SURGERY: 9/5/2019.      HISTORY OF PRESENT ILLNESS:  Prashant Keyes is a 68 year old male seen for postoperative evaluation of a left knee arthroscopy and medial meniscus debridement for medial meniscus tear. Surgery occurred 2 weeks ago. Returns today stating doing well. Pain has been improving. No problems with the surgical wounds. Denies fevers chills or night sweats. No associated numbness or tingling. Has been doing home exercise program  since surgery as recommended. Taking aspirin daily. Pain 1/10. Feels much better than prior to surgery.    OR FINDINGS:  A small effusion.  Mild suprapatellar synovitis.  Grade 2 and 3 chondrosis of the patellofemoral joint.  Small medial plica.  Intact ACL.  Lateral compartment with free edge fraying of the meniscus without full tear.  Grade 2 chondrosis.  Medial compartment with a radial tear at the posterior horn-body junction.  Some grade 2 and 3 delamination of the medial femoral condyle.  Grade 1 and 2 chondrosis of the medial tibia.     Past Medical History:   Diagnosis Date     Calculus of kidney ~1975     Carpal tunnel syndrome 11/94     Concussion, unspecified 12/95     Eczema 11/16/2011     Epileptic seizure (H) 1995    diagnosed 1995, controlled with Depakote and Keppra      Fibromyalgia syndrome ~2000    per pt     Hypertension      Left testicle cyst      Photosensitive contact dermatitis      Prostatitis, unspecified      Seizures (H)     Diagnosed 1995, controlled with Depakote and Keppra     Umbilical hernia 11/26/2012     Unspecified asthma(493.90)        Past Surgical History:   Procedure Laterality Date     ANGIOGRAM  2003    Coronary Angiogram- negative     ARTHROSCOPY KNEE Left 9/5/2019    Procedure: LEFT KNEE ARTHROSCOPY WITH MENISCAL AND CHONDRAL DEBRIDEMENT;  Surgeon: Damon Rosas MD;  Location: [facility] OR     COLONOSCOPY WITH CO2 INSUFFLATION N/A 8/17/2017    Procedure: COLONOSCOPY WITH CO2 INSUFFLATION;  COLON SCREEN/ FLYNN;  Surgeon: Varun Bond MD;  Location: [facility] OR     HC REMOVAL TESTIS,SIMPLE  1978    Right undecended     HERNIA REPAIR, INGUINAL RT/LT  1963, 1978    Right Hernia     HERNIORRHAPHY UMBILICAL  4/2013    [place]       Medications:   Current Outpatient Medications:      allopurinol (ZYLOPRIM) 100 MG tablet, TAKE TWO TABLETS BY MOUTH ONCE DAILY, Disp: 180 tablet, Rfl: 3     amLODIPine (NORVASC) 10 MG tablet, Take 1 tablet (10 mg) by mouth daily (with dinner), Disp: 90 tablet, Rfl: 1     ARIPiprazole (ABILIFY) 2 MG tablet, Take 2 mg by mouth daily, Disp: , Rfl:      aspirin (ASA) 325 MG tablet, Take 1 tablet (325 mg) by mouth daily for 14 days, Disp: 14 tablet, Rfl: 0     aspirin 81 MG tablet, Take 1 tablet (81 mg) by mouth daily, Disp: 30 tablet, Rfl:      carbidopa-levodopa (SINEMET)  MG tablet, Take 1 tablet by mouth 4 times daily, Disp: 360 tablet, Rfl: 3     carvedilol (COREG) 12.5 MG tablet, TAKE ONE TABLET BY MOUTH TWICE DAILY WITH MEALS, Disp: 180 tablet, Rfl: 1     chlorthalidone (HYGROTON) 25 MG tablet, Take 1 tablet (25 mg) by mouth daily, Disp: 90 tablet, Rfl: 1     Cholecalciferol (VITAMIN D) 2000 UNITS tablet, Take 2,000 Units by mouth daily, Disp: 90 tablet, Rfl: 3     colchicine (COLCYRS) 0.6 MG tablet, Take 1 tablet  (0.6 mg) by mouth daily as needed Take 2 tabs on first day.  Take at first sign of gout flair.  Take for max of 1 week per flair, Disp: 30 tablet, Rfl: 0     Cyanocobalamin (VITAMIN  B-12) 2500 MCG tablet, Place 2,500 mcg under the tongue daily, Disp: 90 tablet, Rfl: 3     divalproex sodium delayed-release (DEPAKOTE) 500 MG DR tablet, Take 1 tablet (500 mg) by mouth 2 times daily NEED APPT BEFORE FURTHER REFILLS, Disp: 180 tablet, Rfl: 0     ferrous sulfate (IRON) 325 (65 FE) MG tablet, Take 1 tablet (325 mg) by mouth 2 times daily, Disp: 60 tablet, Rfl: 2     furosemide (LASIX) 20 MG tablet, TAKE TWO TABLETS BY MOUTH AS NEEDED, Disp: 30 tablet, Rfl: 3     HYDROcodone-acetaminophen (NORCO) 5-325 MG tablet, Take 1-2 tablets by mouth every 6 hours as needed for moderate to severe pain, Disp: 12 tablet, Rfl: 0     levETIRAcetam (KEPPRA) 1000 MG TABS, 1 tablet in morning and bed time., Disp: 180 tablet, Rfl: 3     losartan (COZAAR) 100 MG tablet, Take 1 tablet (100 mg) by mouth daily, Disp: 90 tablet, Rfl: 1     methylPREDNISolone (MEDROL DOSEPAK) 4 MG tablet therapy pack, Take 4 mg by mouth See Admin Instructions Follow Package Directions, Disp: , Rfl:      Multiple Vitamin (MULTI VITAMIN MENS PO), Take  by mouth., Disp: , Rfl:      omeprazole (PRILOSEC) 40 MG DR capsule, TAKE ONE CAPSULE BY MOUTH ONCE DAILY (TAKE  30  TO  60  MINUTES  BEFORE  A  MEAL), Disp: 30 capsule, Rfl: 6     ORDER FOR DME, CPAP daily, Disp: , Rfl:      potassium chloride ER (KLOR-CON) 20 MEQ CR tablet, TAKE 1 TABLET BY MOUTH THREE TIMES DAILY, Disp: 270 tablet, Rfl: 1     senna-docusate (SENOKOT-S/PERICOLACE) 8.6-50 MG tablet, Take 1-2 tablets by mouth 2 times daily, Disp: 30 tablet, Rfl: 0     simvastatin (ZOCOR) 20 MG tablet, TAKE 2 TABLETS BY MOUTH IN THE EVENING AT BEDTIME, Disp: 180 tablet, Rfl: 1     TEMAZEPAM PO, Take 15 mg by mouth At Bedtime, Disp: , Rfl:      UNABLE TO FIND, daily MEDICATION NAME: sawpalmetto - 2 capsules once a day,  "Disp: , Rfl:     Allergies:   Allergies   Allergen Reactions     Accupril [Ace Inhibitors] Difficulty breathing     accupril causes SOB     Aptiom [Eslicarbazepine] Difficulty breathing     Atorvastatin Calcium      Myalgias from Lipitor     Contrast Dye Hives     Coreg [Carvedilol] Nausea and Fatigue     Lactose GI Disturbance     Lisinopril Difficulty breathing     SOB     Nitroglycerin      Headache     Paroxetine Hives     Tetracycline [Tetracyclines]      \"splitting headaches\"     Vimpat [Lacosamide] Difficulty breathing     Zoloft Rash     Zolpidem      Adhesive Tape Rash     Without itching- EKG pads       REVIEW OF SYSTEMS:   CONSTITUTIONAL:NEGATIVE for fever, chills, night sweats  INTEGUMENTARY/SKIN: NEGATIVE for worrisome wound problems or redness.  MUSCULOSKELETAL:See HPI above  NEURO: NEGATIVE for weakness, dizziness or paresthesias    PHYSICAL EXAM:  BP (!) 149/77   Resp 16   Ht 1.778 m (5' 10\")   Wt 112.5 kg (248 lb)   BMI 35.58 kg/m      GENERAL APPEARANCE: healthy, alert, no distress  SKIN: no suspicious lesions or rashes  NEURO: Normal strength and tone, mentation intact and speech normal  PSYCH:  mentation appears normal and affect normal, not anxious  RESPIRATORY: No increased work of breathing.    left  LOWER EXTREMITY:  Gait: slight favors the left.  No Quad atrophy, strength normal.  Intact sensation deep peroneal nerve, superficial peroneal nerve, med/lat tibial nerve, sural nerve, saphenous nerve  Intact EHL, EDL, TA, FHL, GS, quadriceps hamstrings and hip flexors  Toes warm and well perfused, brisk capillary refill. Palpable 2+ dp pulses.  calf soft and nttp or squeeze.  Edema: trace  Varicose veins    left  KNEE EXAM:    Skin: intact, no ecchymosis or erythema  Incisions: skin edges well approximated, sutures in place. Dry. No erythema.  ROM: full extension to 120 flexion  Effusion: small  Tender: incision, medial joint line          X-RAY: none indicated.      Impression: Prashant CORREA" Wali is a 68 year old male 2 weeks status post left knee arthroscopy and medial meniscus debridement, doing well.       Plan: routine postoperative knee arthroscopy  * suture removal  * arthroscopy images reviewed with patient.    * WB status: weight bearing as tolerated . Cautioned not to overdo it too quickly. Increased activities too soon will lead to more swelling of the knee and leg, more pain, slower recovery. Expect 6-8 weeks.    * Rest  * Activity modification - avoid activities that aggravate symptoms.  * NSAIDS - regular use for inflammation, with food, as long as no contra-indications. Please discuss with pcp if needed.  * Ice twice daily to three times daily as needed.  * Compression wrap as needed.  * Elevation of extremity to reduce swelling as needed.  * discussed Physical Therapy, patient declines. He will work on his own and let us know if he needs assistance.  * Tylenol as needed for pain  * return to clinic as needed.      * We did also discuss that based on the amount of arthritic changes seen at the time of surgery, may have continued knee pain due to the arthritis. Once recovered from the knee arthroscopy, and arthritic symptoms persist, consider full treatment of arthritis starting with Physical Therapy and injections, NSAIDS, activity modification, bracing.    Bill to follow up with Primary Care provider regarding elevated blood pressure.      Damon Rosas M.D., M.S.  Dept. of Orthopaedic Surgery  [facility]    Again, thank you for allowing me to participate in the care of your patient.        Sincerely,        Damon Rosas MD

## 2019-09-30 ENCOUNTER — OFFICE VISIT (OUTPATIENT)
Dept: SLEEP MEDICINE | Facility: CLINIC | Age: 69
End: 2019-09-30
Payer: MEDICARE

## 2019-09-30 VITALS
OXYGEN SATURATION: 96 % | HEIGHT: 70 IN | BODY MASS INDEX: 35.5 KG/M2 | WEIGHT: 248 LBS | HEART RATE: 69 BPM | DIASTOLIC BLOOD PRESSURE: 89 MMHG | SYSTOLIC BLOOD PRESSURE: 156 MMHG

## 2019-09-30 DIAGNOSIS — G47.33 OSA (OBSTRUCTIVE SLEEP APNEA): Primary | ICD-10-CM

## 2019-09-30 PROCEDURE — 99213 OFFICE O/P EST LOW 20 MIN: CPT | Performed by: PHYSICIAN ASSISTANT

## 2019-09-30 ASSESSMENT — MIFFLIN-ST. JEOR: SCORE: 1901.17

## 2019-09-30 NOTE — PATIENT INSTRUCTIONS
Your BMI is Body mass index is 35.58 kg/m .  Weight management is a personal decision.  If you are interested in exploring weight loss strategies, the following discussion covers the approaches that may be successful. Body mass index (BMI) is one way to tell whether you are at a healthy weight, overweight, or obese. It measures your weight in relation to your height.  A BMI of 18.5 to 24.9 is in the healthy range. A person with a BMI of 25 to 29.9 is considered overweight, and someone with a BMI of 30 or greater is considered obese. More than two-thirds of American adults are considered overweight or obese.  Being overweight or obese increases the risk for further weight gain. Excess weight may lead to heart disease and diabetes.  Creating and following plans for healthy eating and physical activity may help you improve your health.  Weight control is part of healthy lifestyle and includes exercise, emotional health, and healthy eating habits. Careful eating habits lifelong are the mainstay of weight control. Though there are significant health benefits from weight loss, long-term weight loss with diet alone may be very difficult to achieve- studies show long-term success with dietary management in less than 10% of people. Attaining a healthy weight may be especially difficult to achieve in those with severe obesity. In some cases, medications, devices and surgical management might be considered.  What can you do?  If you are overweight or obese and are interested in methods for weight loss, you should discuss this with your provider.     Consider reducing daily calorie intake by 500 calories.     Keep a food journal.     Avoiding skipping meals, consider cutting portions instead.    Diet combined with exercise helps maintain muscle while optimizing fat loss. Strength training is particularly important for building and maintaining muscle mass. Exercise helps reduce stress, increase energy, and improves fitness.  Increasing exercise without diet control, however, may not burn enough calories to loose weight.       Start walking three days a week 10-20 minutes at a time    Work towards walking thirty minutes five days a week     Eventually, increase the speed of your walking for 1-2 minutes at time    In addition, we recommend that you review healthy lifestyles and methods for weight loss available through the National [employer] of [employer] patient information sites:  http://win.niddk.nih.gov/publications/index.htm    And look into health and wellness programs that may be available through your health insurance provider, employer, local community center, or [employer]anna [employer].    Weight management plan: Patient was referred to their PCP to discuss a diet and exercise plan.

## 2019-09-30 NOTE — NURSING NOTE
"Chief Complaint   Patient presents with     CPAP Follow Up     needs a new machine       Initial BP (!) 156/85   Pulse 69   Ht 1.778 m (5' 10\")   Wt 112.5 kg (248 lb)   SpO2 96%   BMI 35.58 kg/m   Estimated body mass index is 35.58 kg/m  as calculated from the following:    Height as of this encounter: 1.778 m (5' 10\").    Weight as of this encounter: 112.5 kg (248 lb).    Medication Reconciliation: complete      "

## 2019-09-30 NOTE — PROGRESS NOTES
Obstructive Sleep Apnea - PAP Follow-Up Visit:    Chief Complaint   Patient presents with     CPAP Follow Up     needs a new machine       Prashant Keyes comes in today for follow-up of their severe sleep apnea, managed with BPAP.     He wants to obtain a new bilevel. His is about 7 years old. The humidifier does not work and makes loud noise.    In review, patient underwent PSG on 1/2/12 and was diagnosed with severe apnea.    RDI: 71.3, REM RDI: 60 AHI: 54.2, Lowest O2: 78 %  Wt during sleep study: 285.3/current weight 293 lbs  BMI: 41.8  CPAP TITRATION: same night 1/2/12  CPAP: HYPOVENTILAION.  BI-LEVELPAP: 15/9 cm H2O. Significant improvement    Overall, he rates the experience with PAP as 10 (0 poor, 10 great). The mask is comfortable.  The mask is not leaking .  He is not snoring with the mask on. He is not having gasp arousals.  He is not having significant oral/nasal dryness. The pressure is comfortable.     His PAP interface is Nasal Pillows.    Bedtime is typically 2200. Usually it takes about 30 min minutes to fall asleep with the mask on. Wake time is typically 0800.  Patient is using PAP therapy 8 hours per night. The patient is usually getting 8 hours of sleep per night.    He does feel rested in the morning.    Total score - Hardy: 3 (9/30/2019  1:00 PM)      JOCELYN Total Score: 1    Respironics  Bilevel PAP 15/9 cmH2O 30 day usage data:    100% of days with > 4 hours of use. 0/30 days with no use.   Average use 12 hours 3 minutes per day.   AHI 2.5 events per hour.    Past medical/surgical history, family history, social history, medications and allergies were reviewed.      Problem List:  Patient Active Problem List    Diagnosis Date Noted     Hypertension goal BP (blood pressure) < 140/90 09/13/2002     Priority: High     Treatment since 1993       Obesity (BMI 35.0-39.9) with comorbidity (H) 07/01/2019     Priority: Medium     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)  "01/29/2018     Priority: Medium     Class 1 obesity with serious comorbidity and body mass index (BMI) of 31.0 to 31.9 in adult, unspecified obesity type 01/29/2018     Priority: Medium     Acute idiopathic gout of foot, unspecified laterality 07/21/2017     Priority: Medium     Acute gouty arthritis 01/16/2017     Priority: Medium     Spells 10/14/2015     Priority: Medium     Onset age 46. Spells uncertain etiology with left sided numbness, speech arrest, amnesia, occasional collapse. Vague inconsistent historian. EEG Lt temporal slowing. Neuropsych w nl cognition; some depression. Presented on levetiracetam, previously Rx w VPA, PHT, lacosamide, eslicarbazine. EEG w left temporal slowing. Adding VPA to LEV appeared to stop spells.       CKD (chronic kidney disease) stage 2, GFR 60-89 ml/min 12/18/2012     Priority: Medium     Lichen planus 08/02/2012     Priority: Medium     KALEB (obstructive sleep apnea) 02/06/2012     Priority: Medium     PSG on 1/2/12, RDI: 71.3, REM RDI: 60 AHI: 54.2, Lowest O2: 78 %. Wt during sleep study: 285.3. BI-LEVELPAP: 15/9 cm H2O.        Eczema 11/16/2011     Priority: Medium     Hyperlipidemia LDL goal <130 03/15/2011     Priority: Medium     Treatment since 1998       Fibromyalgia syndrome      Priority: Medium     per patient  Scheduled med refill protocol  Last refill:8/23/2010  Last clinic visit:8/31/2010  Controlled substance aggreement on file from this date: 8/31/10  Documentation in problem list:  narcotic agreement not on file   #240 tabs of 500mg tabs methocarbamol filled 8/23/10.  No refills anticipated prior to 8/23/11, and needs discussion in clinic prior to refills.         GERD 07/29/2005     Priority: Medium     Treatment since 1998          BP (!) 156/85   Pulse 69   Ht 1.778 m (5' 10\")   Wt 112.5 kg (248 lb)   SpO2 96%   BMI 35.58 kg/m      Impression/Plan:    Severe obstructive Sleep apnea-  Excellent PAP compliance and AHI appears well controlled on bilevel " PAP 15/9 cm/H20.  He needs a replacement machine. Order placed for comprehensive DME.    Bill to follow up with Primary Care provider regarding elevated blood pressure.    Prashant Keyes will follow up in about 6 week(s).     Twenty minutes spent with patient, all of which were spent face-to-face counseling, consulting, coordinating plan of care regarding KALEB.      Stan Huerta PA-C

## 2019-10-07 ENCOUNTER — TELEPHONE (OUTPATIENT)
Dept: SLEEP MEDICINE | Facility: CLINIC | Age: 69
End: 2019-10-07

## 2019-10-07 NOTE — TELEPHONE ENCOUNTER
Patient called [facility] to schedule his Bipap replacement appointment. I printed and reviewed patients paperwork from [facility] and completed his transfer to [facility] for his Bipap care. He is scheduled for a replacement  at [facility] on 10/11/19 at 8:30 am with Jessie MCNEIL

## 2019-10-10 ENCOUNTER — OFFICE VISIT (OUTPATIENT)
Dept: FAMILY MEDICINE | Facility: CLINIC | Age: 69
End: 2019-10-10
Payer: MEDICARE

## 2019-10-10 VITALS
TEMPERATURE: 97 F | OXYGEN SATURATION: 96 % | SYSTOLIC BLOOD PRESSURE: 152 MMHG | BODY MASS INDEX: 35.5 KG/M2 | HEIGHT: 70 IN | HEART RATE: 74 BPM | DIASTOLIC BLOOD PRESSURE: 68 MMHG | WEIGHT: 248 LBS | RESPIRATION RATE: 16 BRPM

## 2019-10-10 DIAGNOSIS — Z12.5 SCREENING FOR PROSTATE CANCER: ICD-10-CM

## 2019-10-10 DIAGNOSIS — N32.81 OVERACTIVE BLADDER: ICD-10-CM

## 2019-10-10 DIAGNOSIS — R35.0 URINARY FREQUENCY: ICD-10-CM

## 2019-10-10 DIAGNOSIS — I10 HYPERTENSION GOAL BP (BLOOD PRESSURE) < 140/90: Primary | ICD-10-CM

## 2019-10-10 LAB
ALBUMIN UR-MCNC: NEGATIVE MG/DL
ANION GAP SERPL CALCULATED.3IONS-SCNC: 7 MMOL/L (ref 3–14)
APPEARANCE UR: CLEAR
BILIRUB UR QL STRIP: NEGATIVE
BUN SERPL-MCNC: 24 MG/DL (ref 7–30)
CALCIUM SERPL-MCNC: 9.7 MG/DL (ref 8.5–10.1)
CHLORIDE SERPL-SCNC: 98 MMOL/L (ref 94–109)
CO2 SERPL-SCNC: 31 MMOL/L (ref 20–32)
COLOR UR AUTO: YELLOW
CREAT SERPL-MCNC: 0.98 MG/DL (ref 0.66–1.25)
GFR SERPL CREATININE-BSD FRML MDRD: 79 ML/MIN/{1.73_M2}
GLUCOSE SERPL-MCNC: 95 MG/DL (ref 70–99)
GLUCOSE UR STRIP-MCNC: NEGATIVE MG/DL
HGB UR QL STRIP: NEGATIVE
KETONES UR STRIP-MCNC: NEGATIVE MG/DL
LEUKOCYTE ESTERASE UR QL STRIP: NEGATIVE
NITRATE UR QL: NEGATIVE
PH UR STRIP: 7 PH (ref 5–7)
POTASSIUM SERPL-SCNC: 3.6 MMOL/L (ref 3.4–5.3)
SODIUM SERPL-SCNC: 136 MMOL/L (ref 133–144)
SOURCE: NORMAL
SP GR UR STRIP: 1.02 (ref 1–1.03)
UROBILINOGEN UR STRIP-ACNC: 0.2 EU/DL (ref 0.2–1)

## 2019-10-10 PROCEDURE — 99214 OFFICE O/P EST MOD 30 MIN: CPT | Performed by: PHYSICIAN ASSISTANT

## 2019-10-10 PROCEDURE — 80048 BASIC METABOLIC PNL TOTAL CA: CPT | Performed by: PHYSICIAN ASSISTANT

## 2019-10-10 PROCEDURE — 36415 COLL VENOUS BLD VENIPUNCTURE: CPT | Performed by: PHYSICIAN ASSISTANT

## 2019-10-10 PROCEDURE — G0103 PSA SCREENING: HCPCS | Performed by: PHYSICIAN ASSISTANT

## 2019-10-10 PROCEDURE — 81003 URINALYSIS AUTO W/O SCOPE: CPT | Performed by: PHYSICIAN ASSISTANT

## 2019-10-10 RX ORDER — OXYBUTYNIN CHLORIDE 5 MG/1
5 TABLET ORAL 2 TIMES DAILY
Qty: 60 TABLET | Refills: 1 | Status: SHIPPED | OUTPATIENT
Start: 2019-10-10 | End: 2019-12-05

## 2019-10-10 RX ORDER — HYDRALAZINE HYDROCHLORIDE 25 MG/1
25 TABLET, FILM COATED ORAL 2 TIMES DAILY
Qty: 60 TABLET | Refills: 1 | Status: SHIPPED | OUTPATIENT
Start: 2019-10-10 | End: 2019-12-05

## 2019-10-10 ASSESSMENT — MIFFLIN-ST. JEOR: SCORE: 1901.17

## 2019-10-10 NOTE — PROGRESS NOTES
Subjective     Prashant Keyes is a 68 year old male who presents to clinic today for the following health issues:    HPI   Genitourinary symptoms      Duration: ongoing    Description:  frequency, retention and incontinence    Intensity:  moderate    Accompanying signs and symptoms (fever/discharge/nausea/vomiting/back or abdominal pain):  None    History (frequent UTI's/kidney stones/prostate problems): prostate problems  Sexually active: YES    Precipitating or alleviating factors: None    Therapies tried and outcome: saw palmetto   Outcome: no improvement    Postvoid dribbling. Urinary frequency. No dysuria. Nocturia x 1. No edema.. no urinary hesitancy. Violent urges to go. Suspect overactive bladder.     Current Outpatient Medications   Medication Sig Dispense Refill     allopurinol (ZYLOPRIM) 100 MG tablet TAKE TWO TABLETS BY MOUTH ONCE DAILY 180 tablet 3     amLODIPine (NORVASC) 10 MG tablet Take 1 tablet (10 mg) by mouth daily (with dinner) 90 tablet 1     ARIPiprazole (ABILIFY) 2 MG tablet Take 2 mg by mouth daily       aspirin 81 MG tablet Take 1 tablet (81 mg) by mouth daily 30 tablet      carbidopa-levodopa (SINEMET)  MG tablet Take 1 tablet by mouth 4 times daily 360 tablet 3     carvedilol (COREG) 12.5 MG tablet TAKE ONE TABLET BY MOUTH TWICE DAILY WITH MEALS 180 tablet 1     chlorthalidone (HYGROTON) 25 MG tablet Take 1 tablet (25 mg) by mouth daily 90 tablet 1     Cholecalciferol (VITAMIN D) 2000 UNITS tablet Take 2,000 Units by mouth daily 90 tablet 3     colchicine (COLCYRS) 0.6 MG tablet Take 1 tablet (0.6 mg) by mouth daily as needed Take 2 tabs on first day.  Take at first sign of gout flair.  Take for max of 1 week per flair 30 tablet 0     Cyanocobalamin (VITAMIN  B-12) 2500 MCG tablet Place 2,500 mcg under the tongue daily 90 tablet 3     divalproex sodium delayed-release (DEPAKOTE) 500 MG DR tablet Take 1 tablet (500 mg) by mouth 2 times daily NEED APPT BEFORE FURTHER REFILLS 180  "tablet 0     ferrous sulfate (IRON) 325 (65 FE) MG tablet Take 1 tablet (325 mg) by mouth 2 times daily 60 tablet 2     furosemide (LASIX) 20 MG tablet TAKE TWO TABLETS BY MOUTH AS NEEDED 30 tablet 3     hydrALAZINE (APRESOLINE) 25 MG tablet Take 1 tablet (25 mg) by mouth 2 times daily 60 tablet 1     levETIRAcetam (KEPPRA) 1000 MG TABS 1 tablet in morning and bed time. 180 tablet 3     losartan (COZAAR) 100 MG tablet Take 1 tablet (100 mg) by mouth daily 90 tablet 1     Multiple Vitamin (MULTI VITAMIN MENS PO) Take  by mouth.       omeprazole (PRILOSEC) 40 MG DR capsule TAKE ONE CAPSULE BY MOUTH ONCE DAILY (TAKE  30  TO  60  MINUTES  BEFORE  A  MEAL) 30 capsule 6     ORDER FOR DME CPAP daily       oxybutynin (DITROPAN) 5 MG tablet Take 1 tablet (5 mg) by mouth 2 times daily 60 tablet 1     potassium chloride ER (KLOR-CON) 20 MEQ CR tablet TAKE 1 TABLET BY MOUTH THREE TIMES DAILY 270 tablet 1     senna-docusate (SENOKOT-S/PERICOLACE) 8.6-50 MG tablet Take 1-2 tablets by mouth 2 times daily 30 tablet 0     simvastatin (ZOCOR) 20 MG tablet TAKE 2 TABLETS BY MOUTH IN THE EVENING AT BEDTIME 180 tablet 1     TEMAZEPAM PO Take 15 mg by mouth At Bedtime       UNABLE TO FIND daily MEDICATION NAME: sawpalmetto - 2 capsules once a day       Allergies   Allergen Reactions     Accupril [Ace Inhibitors] Difficulty breathing     accupril causes SOB     Aptiom [Eslicarbazepine] Difficulty breathing     Atorvastatin Calcium      Myalgias from Lipitor     Contrast Dye Hives     Coreg [Carvedilol] Nausea and Fatigue     Lactose GI Disturbance     Lisinopril Difficulty breathing     SOB     Nitroglycerin      Headache     Paroxetine Hives     Tetracycline [Tetracyclines]      \"splitting headaches\"     Vimpat [Lacosamide] Difficulty breathing     Zoloft Rash     Zolpidem      Adhesive Tape Rash     Without itching- EKG pads     BP Readings from Last 3 Encounters:   10/10/19 (!) 152/68   09/30/19 (!) 156/89   09/19/19 (!) 149/77    Wt " "Readings from Last 3 Encounters:   10/10/19 112.5 kg (248 lb)   09/30/19 112.5 kg (248 lb)   09/19/19 112.5 kg (248 lb)                      Reviewed and updated as needed this visit by Provider         Review of Systems   ROS COMP: Constitutional, HEENT, cardiovascular, pulmonary, GI, , musculoskeletal, neuro, skin, endocrine and psych systems are negative, except as otherwise noted.      Objective    BP (!) 152/68   Pulse 74   Temp 97  F (36.1  C) (Tympanic)   Resp 16   Ht 1.778 m (5' 10\")   Wt 112.5 kg (248 lb)   SpO2 96%   BMI 35.58 kg/m    Body mass index is 35.58 kg/m .  Physical Exam   Eye exam - right eye normal lid, conjunctiva, cornea, pupil and fundus, left eye normal lid, conjunctiva, cornea, pupil and fundus.  Thyroid not palpable, not enlarged, no nodules detected.  CHEST:chest clear to IPPA, no tachypnea, retractions or cyanosis and S1, S2 normal, no murmur, no gallop, rate regular.  The abdomen is soft without tenderness, guarding, mass, rebound or organomegaly. Bowel sounds are normal. No CVA tenderness or inguinal adenopathy noted.    Prashant was seen today for prostate problem.    Diagnoses and all orders for this visit:    Hypertension goal BP (blood pressure) < 140/90  -     Basic metabolic panel  (Ca, Cl, CO2, Creat, Gluc, K, Na, BUN)  -     hydrALAZINE (APRESOLINE) 25 MG tablet; Take 1 tablet (25 mg) by mouth 2 times daily    Urinary frequency  -     *UA reflex to Microscopic and Culture ([place] [facility] (except [place] and [place])  -     UROLOGY ADULT REFERRAL    Screening for prostate cancer  -     PSA, screen    Overactive bladder  -     oxybutynin (DITROPAN) 5 MG tablet; Take 1 tablet (5 mg) by mouth 2 times daily    Advised supportive and symptomatic treatment.  Follow up with Provider - if condition persists or worsens.   work on lifestyle modification  Recheck blood pressure with cardiology in 1 mos       "

## 2019-10-11 ENCOUNTER — DOCUMENTATION ONLY (OUTPATIENT)
Dept: SLEEP MEDICINE | Facility: CLINIC | Age: 69
End: 2019-10-11
Payer: MEDICARE

## 2019-10-11 LAB — PSA SERPL-ACNC: 0.89 UG/L (ref 0–4)

## 2019-10-14 ENCOUNTER — DOCUMENTATION ONLY (OUTPATIENT)
Dept: SLEEP MEDICINE | Facility: CLINIC | Age: 69
End: 2019-10-14

## 2019-10-14 NOTE — PROGRESS NOTES
3 DAY STM VISIT    Diagnostic AHI: 54.2 PSG    Patient contacted for 3 day STM visit  Subjective measures:  Pt states that things are going ok.  He is using non-heated tubing because he wasn't eligible for new tubing yet and his tubing is slipping out.  He will be getting new tubing in November.    Replacement device: Yes     Device type: Bilevel  PAP settings from order:: 15/9 cm  H20  Mask type:    Nasal Pillows     Device settings from machine: 15/9cm  H20  Assessment: Nightly usage over four hours.  Action plan: Pt to have f/u 14 day STM visit.  Patient has a follow up visit scheduled:   yes within 31-90 days of set up.    Total time spent on remote patient monitoring data analysis and patient contact today:   13 minutes

## 2019-10-17 NOTE — PROGRESS NOTES
Patient was offered choice of vendor and chose [facility].  Prashant Keyes was set up with a replacement device at [place] on October 11, 2019 Patient received a Resmed AirCurve 10 Bilevel. Pressures were set at IPAP 15, EPAP 9 cm H2O.   Patient s ramp is 5 cm H2O for Off and FLEX/EPR is EPR, 2.  Patient is not eligible for mask, tubing or filters at this time.  Patient received pillow mask and all supplies from prior DME provider on 08/20/19.  Patient received heated humidifier.  Patient is not enrolled in the STM Program and does need to meet compliance. Patient has a follow up on 11/25/19 with LUANA Phillips.    Jessie Atkins

## 2019-10-28 ENCOUNTER — DOCUMENTATION ONLY (OUTPATIENT)
Dept: SLEEP MEDICINE | Facility: CLINIC | Age: 69
End: 2019-10-28

## 2019-10-29 NOTE — PROGRESS NOTES
14  DAY STM VISIT    Diagnostic AHI: 54.2 PSG    Subjective measures:   Pt states things are going well and has no issues or complaints.  Pt is benefiting from therapy.    Assessment: Pt meeting objective benchmarks.  Patient meeting subjective benchmarks.     Action plan: pt to have 30 day STM visit.      Device type: Bilevel    PAP settings: 15/9 cm  H20     Mask type:  Nasal Pillows    Objective measures: 14 day rolling measures      Compliance  92 %      Leak  32.23 lpm  last  upload      AHI 3.86   last  upload      Average number of minutes 557      Objective measure goal  Compliance   Goal >70%  Leak   Goal < 24 lpm  AHI  Goal < 5  Usage  Goal >240      Total time spent on remote patient monitoring data analysis and patient contact today:   13 minutes

## 2019-11-05 ENCOUNTER — OFFICE VISIT (OUTPATIENT)
Dept: CARDIOLOGY | Facility: CLINIC | Age: 69
End: 2019-11-05
Payer: MEDICARE

## 2019-11-05 VITALS
DIASTOLIC BLOOD PRESSURE: 75 MMHG | OXYGEN SATURATION: 95 % | SYSTOLIC BLOOD PRESSURE: 139 MMHG | WEIGHT: 251 LBS | BODY MASS INDEX: 36.01 KG/M2 | HEART RATE: 67 BPM

## 2019-11-05 DIAGNOSIS — Z86.69 HISTORY OF PARKINSON'S DISEASE: ICD-10-CM

## 2019-11-05 DIAGNOSIS — I10 ESSENTIAL HYPERTENSION: Primary | ICD-10-CM

## 2019-11-05 DIAGNOSIS — G47.33 OBSTRUCTIVE SLEEP APNEA SYNDROME: ICD-10-CM

## 2019-11-05 DIAGNOSIS — E66.01 MORBID OBESITY (H): ICD-10-CM

## 2019-11-05 PROCEDURE — 99214 OFFICE O/P EST MOD 30 MIN: CPT | Performed by: INTERNAL MEDICINE

## 2019-11-05 NOTE — LETTER
11/5/2019      RE: Prashant Keyes  [address]  [place] [address]       Dear Colleague,    Thank you for the opportunity to participate in the care of your patient, Prashant Keyes, at the [facility] AT [facility] at [facility]. Please see a copy of my visit note below.    Chief complaint: Annual-follow up in cardiology clinic    HPI: Mr. Prashant Keyes is a 66 year old  male who presents today with pmh of seizures, HT, HL, KALEB on BIPAP, and morbid obesity. I am seeing him for the first time in my clinic.      He had issues in controlling his BP in the past. But by losing weight (lost 50 pounds, intentional) and medication adjustment his BP is under control now and he feels very well. He is walking every other day for 20 min.     The patient's risk factor profile is: (+) HTN, (-) diabetes, (+) hyperlipidemia, (-) tobacco use, (-) family Hx CAD. The patient denies a history of chest discomfort, dyspnea, PND, orthopnea, pedal edema, palpitations, lightheadedness, and syncope. His complete ROS is negative as well.       Medications, personal, family, and social history reviewed with patient and revised.    Interval history 11/5/2019:  I have seen patient 2 years ago for hypertension.  Over the last 2 years the patient reports developing Parkinson's disease and he is on carbidopa levodopa now.  He underwent left knee surgery 2 months ago and he reports more swelling on the left side than the right side.  He works as a  at [employer] at night.  He is on his feet for 8 hours making thousands of steps every day.  He has no cardiac symptoms like chest pain, shortness of breath, palpitations dizziness or syncope. Patient was started on furosemide for lower extremity edema as needed.  However recently he started taking 2 tablets a day.  Patient has seen his PCP 2 weeks ago and he was recommended to start hydralazine 25 mg twice daily.   "He is on multiple other blood pressure medications.    PAST MEDICAL HISTORY:  Past Medical History:   Diagnosis Date     Calculus of kidney ~1975     Carpal tunnel syndrome 11/94     Concussion, unspecified 12/95     Eczema 11/16/2011     Epileptic seizure (H) 1995    diagnosed 1995, controlled with Depakote and Keppra     Fibromyalgia syndrome ~2000    per pt     Hypertension      Left testicle cyst      Photosensitive contact dermatitis      Prostatitis, unspecified      Seizures (H)     Diagnosed 1995, controlled with Depakote and Keppra     Umbilical hernia 11/26/2012     Unspecified asthma(493.90)        CURRENT MEDICATIONS:  -ASA 81 mg  -Chlorthalidone 25 mg 1.5 tablet qd  -Amlodipine 10 mg  -Losartan 100 mg  -Carvedilol 12.5 mg bid  -Simvastatin 40 mg qd  -KCL 20 MEQ tid      PAST SURGICAL HISTORY:  Past Surgical History:   Procedure Laterality Date     ANGIOGRAM  2003    Coronary Angiogram- negative     ARTHROSCOPY KNEE Left 9/5/2019    Procedure: LEFT KNEE ARTHROSCOPY WITH MENISCAL AND CHONDRAL DEBRIDEMENT;  Surgeon: Damon Rosas MD;  Location: [facility] OR     COLONOSCOPY WITH CO2 INSUFFLATION N/A 8/17/2017    Procedure: COLONOSCOPY WITH CO2 INSUFFLATION;  COLON SCREEN/ FLYNN;  Surgeon: Varun Bond MD;  Location: [facility] OR     HC REMOVAL TESTIS,SIMPLE  1978    Right undecended     HERNIA REPAIR, INGUINAL RT/LT  1963, 1978    Right Hernia     HERNIORRHAPHY UMBILICAL  4/2013    [place]       ALLERGIES:     Allergies   Allergen Reactions     Accupril [Ace Inhibitors] Difficulty breathing     accupril causes SOB     Aptiom [Eslicarbazepine] Difficulty breathing     Atorvastatin Calcium      Myalgias from Lipitor     Contrast Dye Hives     Coreg [Carvedilol] Nausea and Fatigue     Lactose GI Disturbance     Lisinopril Difficulty breathing     SOB     Nitroglycerin      Headache     Paroxetine Hives     Tetracycline [Tetracyclines]      \"splitting headaches\"     Vimpat [Lacosamide] Difficulty " breathing     Zoloft Rash     Zolpidem      Adhesive Tape Rash     Without itching- EKG pads       FAMILY HISTORY:  Family History   Problem Relation Age of Onset     Cerebrovascular Disease Mother         60's     C.A.D. Mother         CABG 75     Arthritis Mother      Eye Disorder Mother      Heart Disease Mother      Cardiovascular Father         Rheumatic Heart Disease     Hypertension Brother      Lipids Brother      C.A.D. Brother         CABG 46     Arthritis Brother      Heart Disease Brother         CABG x5     Obesity Brother      Hypertension Brother      Lipids Brother      Thyroid Disease Brother      Alcohol/Drug Brother      Heart Disease Brother         CABG     Cancer Sister         Thyroid CA     Hypertension Sister      Obesity Sister      Thyroid Disease Sister      Hemochromatosis Sister      Depression Daughter      Depression Daughter      Depression Son      Diabetes Son      Depression Son      Gastrointestinal Disease Brother         Crohn's Disease-Ostomy     Arrhythmia Sister      Cancer Maternal Grandmother         Thyroid CA     Cancer Paternal Grandfather         Throat CA     Thyroid Disease Maternal Grandfather          SOCIAL HISTORY:  Social History     Tobacco Use     Smoking status: Never Smoker     Smokeless tobacco: Never Used   Substance Use Topics     Alcohol use: No     Alcohol/week: 0.0 standard drinks     Comment: Quit since 2003.      Drug use: No       ROS:   Constitutional: No fever, chills, or sweats.    ENT: No visual disturbance, ear ache, epistaxis, sore throat.   Cardiovascular: As per HPI.   Respiratory: No cough, hemoptysis.    GI: No nausea, vomiting, hematemesis, melena, or hematochezia.   : No hematuria.   Integument: Negative.   Hematologic: No easy bruising, no easy bleeding.  Neuro: Left hand resting tremor+  Musculoskeletal: No myalgia.    Exam:  /75 (BP Location: Right arm, Patient Position: Sitting, Cuff Size: Adult Large)   Pulse 67   Wt 113.9  kg (251 lb)   SpO2 95%   BMI 36.01 kg/m     GENERAL APPEARANCE: healthy, alert and no distress  HEENT: no icterus, no central cyanosis  LYMPH/NECK: no adenopathy, no asymmetry, JVP not elevated, no carotid bruits.  RESPIRATORY: lungs clear to auscultation - no rales, rhonchi or wheezes, no use of accessory muscles, no retractions, respirations are unlabored, normal respiratory rate  CARDIOVASCULAR: regular rhythm, normal S1, S2, no S3 or S4 and no murmur, click or rub, precordium quiet with normal PMI.  GI: soft, non tender  EXTREMITIES: Left leg 2+, right leg 1+ edema  NEURO: alert, normal speech,and affect   SKIN: no ecchymoses, no rashes     I have reviewed the labs and made my comment in the assessment and plan.    Labs:  CBC RESULTS:   Lab Results   Component Value Date    WBC 7.2 2019    RBC 3.91 (L) 2019    HGB 12.8 (L) 2019    HCT 39.1 (L) 2019     2019    MCH 32.7 2019    MCHC 32.7 2019    RDW 14.4 2019     2019       BMP RESULTS:  Lab Results   Component Value Date     10/10/2019    POTASSIUM 3.6 10/10/2019    CHLORIDE 98 10/10/2019    CO2 31 10/10/2019    ANIONGAP 7 10/10/2019    GLC 95 10/10/2019    BUN 24 10/10/2019    CR 0.98 10/10/2019    GFRESTIMATED 79 10/10/2019    GFRESTBLACK >90 10/10/2019    SANGEETHA 9.7 10/10/2019      Lipid panel (5/15562):  LDL:63 mg/dl, HDL:66 mg/dl, T mg/dl  Procedures:  Echocardiogram (3/9/2015):  1.Normal biventricular systolic function. LVEF estimate 60-65%.   2. No significant valvular abnormalities.    3. Normal IVC with preserved respiratory   variability.    EKG dated 3/2/2015: Bradycardia 58 bpm, otherwise normal ECG    Assessment and Plan:    is a 66 year old gentleman with pmh of HT, HL, KALEB on BIPAP, seizure and morbid obesity.  Over the last 2 years he has been diagnosed with Parkinson's disease.  Patient is currently asymptomatic from cardiac standpoint.    1. Hypertension:  Patient is on multiple antihypertensive medications including chlorthalidone 25, losartan 100 mg, amlodipine 10 mg and carvedilol 12.5 mg twice daily.  Despite multiple medications blood pressure was still high and he was started on hydralazine 25 mg twice daily few weeks ago.  Today in clinic blood pressure is lower than his previous clinic visits.  Recommended home monitoring and to discuss with PCP in a month.    2. KALEB: on CPAP.    3.  Lower extremity edema: Unlikely cardiac since the patient does not have any cardiac symptoms like dyspnea on exertion, PND orthopnea. No JVD on exam. It appears dependent edema likely exacerbated by amlodipine. Left leg is more edematous than right leg, this is likely postsurgical.  Recommend to discontinue furosemide.  I will recheck his BMP in 2 weeks since he is on potassium replacement as well.    Medication change today: Discontinue furosemide.  BMP in 2 weeks.      Return to clinic as needed.    It was my absolute pleasure to meet  the office today. Please donot hesitate to contact me if you have any questions or concerns.    Jackie DOBBS MD  [facility] Division of Cardiology  Pager [phone]

## 2019-11-05 NOTE — PATIENT INSTRUCTIONS
Thank you for coming to the [facility] @ [facility]; please note the following instructions:    1. MEDICATION CHANGES TODAY:      * STOP Lasix  2. You have been scheduled for a lab appointment (non-fasting) in two weeks ; please see following pages for appointment detail information.    3.Dr. Jackie Quintanilla is recommending to see you on an as needed basis regarding your heart; please follow up with your primary care provider.  It was a pleasuring seeing you today at the [facility] Care @ the [facility].                        If you have any questions regarding your visit please contact your care team:     Cardiology  Telephone Number   Gladys LENNON, RN  Payton LOPEZ,RN  Mary PERRIN, VIRAJ OROSCO, MA  Santiago VACA, MALCOLMN   [phone]   (select option 1)    *After hours: [phone]     For scheduling appts:     [phone] or    [phone] (select option 1)    *After hours: [phone]     For the Device Clinic (Pacemakers and ICD's)  RN's :  Lesley Steiner   During business hours: [phone]    *After business hours:  [phone] (select option 4)      Normal test result notifications will be released via [employer] or mailed within 7 business days.  All other test results, will be communicated via telephone once reviewed by your cardiologist.    If you need a medication refill please contact your pharmacy.  Please allow 3 business days for your refill to be completed.    As always, thank you for trusting us with your health care needs!

## 2019-11-05 NOTE — PROGRESS NOTES
Chief complaint: Annual-follow up in cardiology clinic    HPI: Mr. Prashant Keyes is a 66 year old  male who presents today with pmh of seizures, HT, HL, KALEB on BIPAP, and morbid obesity. I am seeing him for the first time in my clinic.      He had issues in controlling his BP in the past. But by losing weight (lost 50 pounds, intentional) and medication adjustment his BP is under control now and he feels very well. He is walking every other day for 20 min.     The patient's risk factor profile is: (+) HTN, (-) diabetes, (+) hyperlipidemia, (-) tobacco use, (-) family Hx CAD. The patient denies a history of chest discomfort, dyspnea, PND, orthopnea, pedal edema, palpitations, lightheadedness, and syncope. His complete ROS is negative as well.       Medications, personal, family, and social history reviewed with patient and revised.    Interval history 11/5/2019:  I have seen patient 2 years ago for hypertension.  Over the last 2 years the patient reports developing Parkinson's disease and he is on carbidopa levodopa now.  He underwent left knee surgery 2 months ago and he reports more swelling on the left side than the right side.  He works as a  at [employer] at night.  He is on his feet for 8 hours making thousands of steps every day.  He has no cardiac symptoms like chest pain, shortness of breath, palpitations dizziness or syncope. Patient was started on furosemide for lower extremity edema as needed.  However recently he started taking 2 tablets a day.  Patient has seen his PCP 2 weeks ago and he was recommended to start hydralazine 25 mg twice daily.  He is on multiple other blood pressure medications.    PAST MEDICAL HISTORY:  Past Medical History:   Diagnosis Date     Calculus of kidney ~1975     Carpal tunnel syndrome 11/94     Concussion, unspecified 12/95     Eczema 11/16/2011     Epileptic seizure (H) 1995    diagnosed 1995, controlled with Depakote and Keppra     Fibromyalgia syndrome ~2000    per  "pt     Hypertension      Left testicle cyst      Photosensitive contact dermatitis      Prostatitis, unspecified      Seizures (H)     Diagnosed 1995, controlled with Depakote and Keppra     Umbilical hernia 11/26/2012     Unspecified asthma(493.90)        CURRENT MEDICATIONS:  -ASA 81 mg  -Chlorthalidone 25 mg 1.5 tablet qd  -Amlodipine 10 mg  -Losartan 100 mg  -Carvedilol 12.5 mg bid  -Simvastatin 40 mg qd  -KCL 20 MEQ tid      PAST SURGICAL HISTORY:  Past Surgical History:   Procedure Laterality Date     ANGIOGRAM  2003    Coronary Angiogram- negative     ARTHROSCOPY KNEE Left 9/5/2019    Procedure: LEFT KNEE ARTHROSCOPY WITH MENISCAL AND CHONDRAL DEBRIDEMENT;  Surgeon: Damon Rosas MD;  Location: [facility] OR     COLONOSCOPY WITH CO2 INSUFFLATION N/A 8/17/2017    Procedure: COLONOSCOPY WITH CO2 INSUFFLATION;  COLON SCREEN/ FLYNN;  Surgeon: Varun Bond MD;  Location: [facility] OR     HC REMOVAL TESTIS,SIMPLE  1978    Right undecended     HERNIA REPAIR, INGUINAL RT/LT  1963, 1978    Right Hernia     HERNIORRHAPHY UMBILICAL  4/2013    [place]       ALLERGIES:     Allergies   Allergen Reactions     Accupril [Ace Inhibitors] Difficulty breathing     accupril causes SOB     Aptiom [Eslicarbazepine] Difficulty breathing     Atorvastatin Calcium      Myalgias from Lipitor     Contrast Dye Hives     Coreg [Carvedilol] Nausea and Fatigue     Lactose GI Disturbance     Lisinopril Difficulty breathing     SOB     Nitroglycerin      Headache     Paroxetine Hives     Tetracycline [Tetracyclines]      \"splitting headaches\"     Vimpat [Lacosamide] Difficulty breathing     Zoloft Rash     Zolpidem      Adhesive Tape Rash     Without itching- EKG pads       FAMILY HISTORY:  Family History   Problem Relation Age of Onset     Cerebrovascular Disease Mother         60's     C.A.D. Mother         CABG 75     Arthritis Mother      Eye Disorder Mother      Heart Disease Mother      Cardiovascular Father         Rheumatic Heart " Disease     Hypertension Brother      Lipids Brother      C.A.D. Brother         CABG 46     Arthritis Brother      Heart Disease Brother         CABG x5     Obesity Brother      Hypertension Brother      Lipids Brother      Thyroid Disease Brother      Alcohol/Drug Brother      Heart Disease Brother         CABG     Cancer Sister         Thyroid CA     Hypertension Sister      Obesity Sister      Thyroid Disease Sister      Hemochromatosis Sister      Depression Daughter      Depression Daughter      Depression Son      Diabetes Son      Depression Son      Gastrointestinal Disease Brother         Crohn's Disease-Ostomy     Arrhythmia Sister      Cancer Maternal Grandmother         Thyroid CA     Cancer Paternal Grandfather         Throat CA     Thyroid Disease Maternal Grandfather          SOCIAL HISTORY:  Social History     Tobacco Use     Smoking status: Never Smoker     Smokeless tobacco: Never Used   Substance Use Topics     Alcohol use: No     Alcohol/week: 0.0 standard drinks     Comment: Quit since 2003.      Drug use: No       ROS:   Constitutional: No fever, chills, or sweats.    ENT: No visual disturbance, ear ache, epistaxis, sore throat.   Cardiovascular: As per HPI.   Respiratory: No cough, hemoptysis.    GI: No nausea, vomiting, hematemesis, melena, or hematochezia.   : No hematuria.   Integument: Negative.   Hematologic: No easy bruising, no easy bleeding.  Neuro: Left hand resting tremor+  Musculoskeletal: No myalgia.    Exam:  /75 (BP Location: Right arm, Patient Position: Sitting, Cuff Size: Adult Large)   Pulse 67   Wt 113.9 kg (251 lb)   SpO2 95%   BMI 36.01 kg/m    GENERAL APPEARANCE: healthy, alert and no distress  HEENT: no icterus, no central cyanosis  LYMPH/NECK: no adenopathy, no asymmetry, JVP not elevated, no carotid bruits.  RESPIRATORY: lungs clear to auscultation - no rales, rhonchi or wheezes, no use of accessory muscles, no retractions, respirations are unlabored,  normal respiratory rate  CARDIOVASCULAR: regular rhythm, normal S1, S2, no S3 or S4 and no murmur, click or rub, precordium quiet with normal PMI.  GI: soft, non tender  EXTREMITIES: Left leg 2+, right leg 1+ edema  NEURO: alert, normal speech,and affect   SKIN: no ecchymoses, no rashes     I have reviewed the labs and made my comment in the assessment and plan.    Labs:  CBC RESULTS:   Lab Results   Component Value Date    WBC 7.2 2019    RBC 3.91 (L) 2019    HGB 12.8 (L) 2019    HCT 39.1 (L) 2019     2019    MCH 32.7 2019    MCHC 32.7 2019    RDW 14.4 2019     2019       BMP RESULTS:  Lab Results   Component Value Date     10/10/2019    POTASSIUM 3.6 10/10/2019    CHLORIDE 98 10/10/2019    CO2 31 10/10/2019    ANIONGAP 7 10/10/2019    GLC 95 10/10/2019    BUN 24 10/10/2019    CR 0.98 10/10/2019    GFRESTIMATED 79 10/10/2019    GFRESTBLACK >90 10/10/2019    SANGEETHA 9.7 10/10/2019      Lipid panel (5/32337):  LDL:63 mg/dl, HDL:66 mg/dl, T mg/dl  Procedures:  Echocardiogram (3/9/2015):  1.Normal biventricular systolic function. LVEF estimate 60-65%.   2. No significant valvular abnormalities.    3. Normal IVC with preserved respiratory   variability.    EKG dated 3/2/2015: Bradycardia 58 bpm, otherwise normal ECG    Assessment and Plan:    is a 66 year old gentleman with pmh of HT, HL, KALEB on BIPAP, seizure and morbid obesity.  Over the last 2 years he has been diagnosed with Parkinson's disease.  Patient is currently asymptomatic from cardiac standpoint.    1. Hypertension: Patient is on multiple antihypertensive medications including chlorthalidone 25, losartan 100 mg, amlodipine 10 mg and carvedilol 12.5 mg twice daily.  Despite multiple medications blood pressure was still high and he was started on hydralazine 25 mg twice daily few weeks ago.  Today in clinic blood pressure is lower than his previous clinic visits.  Recommended  home monitoring and to discuss with PCP in a month.    2. KALEB: on CPAP.    3.  Lower extremity edema: Unlikely cardiac since the patient does not have any cardiac symptoms like dyspnea on exertion, PND orthopnea. No JVD on exam. It appears dependent edema likely exacerbated by amlodipine. Left leg is more edematous than right leg, this is likely postsurgical.  Recommend to discontinue furosemide.  I will recheck his BMP in 2 weeks since he is on potassium replacement as well.    Medication change today: Discontinue furosemide.  BMP in 2 weeks.      Return to clinic as needed.    It was my absolute pleasure to meet  the office today. Please donot hesitate to contact me if you have any questions or concerns.    Jackie DOBBS MD  [facility] Division of Cardiology  Pager [phone]

## 2019-11-05 NOTE — NURSING NOTE
"Chief Complaint   Patient presents with     Hypertension     2 year follow up for Essential hypertension . -  Per patient no heart symptom concern, and l. knee to ankle edema  since knee surgery then lasix started        Initial /75 (BP Location: Right arm, Patient Position: Sitting, Cuff Size: Adult Large)   Pulse 67   Wt 113.9 kg (251 lb)   SpO2 95%   BMI 36.01 kg/m   Estimated body mass index is 36.01 kg/m  as calculated from the following:    Height as of 10/10/19: 1.778 m (5' 10\").    Weight as of this encounter: 113.9 kg (251 lb)..  BP completed using cuff size: large    Santiago Flor L.P.N.    "

## 2019-11-11 ENCOUNTER — DOCUMENTATION ONLY (OUTPATIENT)
Dept: SLEEP MEDICINE | Facility: CLINIC | Age: 69
End: 2019-11-11
Payer: MEDICARE

## 2019-11-11 NOTE — PROGRESS NOTES
30 DAY STM VISIT    Diagnostic AHI: 54.2 PSG    Subjective measures:   Pt states things are going well and has no issues or complaints.  Pt is benefiting from therapy.    Assessment: Pt meeting objective benchmarks.  Patient meeting subjective benchmarks.   Action plan: pt to have 6 month STM visit  Patient has scheduled a follow up visit with LUANA Phillips on 11/25/19.   Device type: Bilevel  PAP settings: 15/9 cm  H20   Mask type:  Nasal Pillows  Objective measures: 14 day rolling measures      Compliance  92 %      Leak  4 lpm  last  upload      AHI 4.15   last  upload      Average number of minutes 440      Objective measure goal  Compliance   Goal >70%  Leak   Goal < 24 lpm  AHI  Goal < 5  Usage  Goal >240      Total time spent on remote patient monitoring data analysis and patient contact today:   12 minutes      Total contact/remote patient monitoring for last 30 days:   38 min

## 2019-11-19 DIAGNOSIS — I10 ESSENTIAL HYPERTENSION: ICD-10-CM

## 2019-11-19 LAB
ANION GAP SERPL CALCULATED.3IONS-SCNC: 6 MMOL/L (ref 3–14)
BUN SERPL-MCNC: 10 MG/DL (ref 7–30)
CALCIUM SERPL-MCNC: 9.4 MG/DL (ref 8.5–10.1)
CHLORIDE SERPL-SCNC: 99 MMOL/L (ref 94–109)
CO2 SERPL-SCNC: 31 MMOL/L (ref 20–32)
CREAT SERPL-MCNC: 1.01 MG/DL (ref 0.66–1.25)
GFR SERPL CREATININE-BSD FRML MDRD: 76 ML/MIN/{1.73_M2}
GLUCOSE SERPL-MCNC: 104 MG/DL (ref 70–99)
POTASSIUM SERPL-SCNC: 4.2 MMOL/L (ref 3.4–5.3)
SODIUM SERPL-SCNC: 136 MMOL/L (ref 133–144)

## 2019-11-19 PROCEDURE — 80048 BASIC METABOLIC PNL TOTAL CA: CPT | Performed by: INTERNAL MEDICINE

## 2019-11-19 PROCEDURE — 36415 COLL VENOUS BLD VENIPUNCTURE: CPT | Performed by: INTERNAL MEDICINE

## 2019-11-24 DIAGNOSIS — I10 BENIGN ESSENTIAL HYPERTENSION: ICD-10-CM

## 2019-11-25 NOTE — TELEPHONE ENCOUNTER
"Requested Prescriptions   Pending Prescriptions Disp Refills     furosemide (LASIX) 20 MG tablet [Pharmacy Med Name: FUROSEMIDE 20MG     TAB]  Last Written Prescription Date:  10/24/19  Last Fill Quantity: 30,  # refills: 3   Last office visit: 10/10/2019 with prescribing provider:  THONG Swan   Future Office Visit:   Next 5 appointments (look out 90 days)    Jan 07, 2020  8:00 AM CST  Return Visit with Bisi Temple MD  [facility] [address] [address]  [phone]          30 tablet 3     Sig: TAKE 2 TABLETS BY MOUTH AS NEEDED       Diuretics (Including Combos) Protocol Failed - 11/24/2019  4:58 PM        Failed - Medication is active on med list        Passed - Blood pressure under 140/90 in past 12 months     BP Readings from Last 3 Encounters:   11/05/19 139/75   10/10/19 (!) 152/68   09/30/19 (!) 156/89                 Passed - Recent (12 mo) or future (30 days) visit within the authorizing provider's specialty     Patient has had an office visit with the authorizing provider or a provider within the authorizing providers department within the previous 12 mos or has a future within next 30 days. See \"Patient Info\" tab in inbasket, or \"Choose Columns\" in Meds & Orders section of the refill encounter.              Passed - Patient is age 18 or older        Passed - Normal serum creatinine on file in past 12 months     Recent Labs   Lab Test 11/19/19  1100   CR 1.01              Passed - Normal serum potassium on file in past 12 months     Recent Labs   Lab Test 11/19/19  1100   POTASSIUM 4.2                    Passed - Normal serum sodium on file in past 12 months     Recent Labs   Lab Test 11/19/19  1100                   "

## 2019-11-26 NOTE — TELEPHONE ENCOUNTER
Routing refill request to provider for review/approval because:  Drug not active on patient's medication list?

## 2019-11-29 RX ORDER — FUROSEMIDE 20 MG
TABLET ORAL
Qty: 60 TABLET | Refills: 2 | Status: SHIPPED | OUTPATIENT
Start: 2019-11-29 | End: 2020-03-13

## 2019-12-05 DIAGNOSIS — N32.81 OVERACTIVE BLADDER: ICD-10-CM

## 2019-12-05 DIAGNOSIS — I10 HYPERTENSION GOAL BP (BLOOD PRESSURE) < 140/90: ICD-10-CM

## 2019-12-05 NOTE — TELEPHONE ENCOUNTER
"Requested Prescriptions   Pending Prescriptions Disp Refills     oxybutynin (DITROPAN) 5 MG tablet [Pharmacy Med Name: OXYBUTYNIN 5MG TAB] 60 tablet 1     Sig: TAKE 1 TABLET BY MOUTH TWICE DAILY   Last Written Prescription Date:  11-6-19  Last Fill Quantity: 60,  # refills: 1   Last office visit: 10/10/2019 with prescribing provider:  10-10-19   Future Office Visit:   Next 5 appointments (look out 90 days)    Jan 07, 2020  8:00 AM CST  [facility] Visit with Bisi [facility] MD  [facility] [address] [address]  [phone]           Muscarinic Antagonists (Urinary Incontinence Agents) Passed - 12/5/2019  6:39 AM        Passed - Recent (12 mo) or future (30 days) visit within the authorizing provider's specialty     Patient has had an office visit with the authorizing provider or a provider within the authorizing providers department within the previous 12 mos or has a future within next 30 days. See \"Patient Info\" tab in inbasket, or \"Choose Columns\" in Meds & Orders section of the refill encounter.              Passed - Medication is Oxybutynin and patient is 5 years of age or older        Passed - Patient does not have a diagnosis of glaucoma on the problem list     If glaucoma diagnosis is new, refer refill to physician.          Passed - Medication is active on med list        Passed - Patient is 18 years of age or older        hydrALAZINE (APRESOLINE) 25 MG tablet [Pharmacy Med Name: HydrALAZINE 25MG    TAB] 60 tablet 1     Sig: TAKE 1 TABLET BY MOUTH TWICE DAILY   Last Written Prescription Date:  11-6-19  Last Fill Quantity: 60,  # refills: 1   Last office visit: 10/10/2019 with prescribing provider:  10-10-19   Future Office Visit:   Next 5 appointments (look out 90 days)    Jan 07, 2020  8:00 AM CST  Return Visit with Bisi Temple MD  [facility] ([facility]) [address]" "[place] [address]  [phone]           Vasodilators Passed - 12/5/2019  6:39 AM        Passed - Most recent BP less than 140/90 on record     BP Readings from Last 3 Encounters:   11/05/19 139/75   10/10/19 (!) 152/68   09/30/19 (!) 156/89                 Passed - Most recent encounter is not a hospital encounter. Patient has recent (12 mos) or future (1 mos) visit with authorizing provider's specialty     Patient's most recent encounter is NOT a hospital encounter and has had an office visit in the last 12 months or has a visit in the next 30 days with authorizing provider or within the authorizing provider's specialty.      See \"Patient Info\" tab in inbasket, or \"Choose Columns\" in Meds & Orders section of the refill encounter.      If most recent encounter is a hospital encounter AND the patient does NOT have an appointment scheduled with the authorizing provider or authorizing provider's specialty within the next 30 days, forward refill to authorizing provider for medication review.          Passed - Medication is active on med list        Passed - Patient is of age 18 years or older        "

## 2019-12-06 RX ORDER — HYDRALAZINE HYDROCHLORIDE 25 MG/1
TABLET, FILM COATED ORAL
Qty: 60 TABLET | Refills: 2 | Status: SHIPPED | OUTPATIENT
Start: 2019-12-06 | End: 2020-03-04

## 2019-12-06 RX ORDER — OXYBUTYNIN CHLORIDE 5 MG/1
TABLET ORAL
Qty: 60 TABLET | Refills: 3 | Status: SHIPPED | OUTPATIENT
Start: 2019-12-06 | End: 2020-04-05

## 2019-12-23 DIAGNOSIS — G40.A09 NONINTRACTABLE ABSENCE EPILEPSY WITHOUT STATUS EPILEPTICUS (H): ICD-10-CM

## 2019-12-23 NOTE — TELEPHONE ENCOUNTER
"Requested Prescriptions   Pending Prescriptions Disp Refills     levETIRAcetam (KEPPRA) 1000 MG tablet [Pharmacy Med Name: levETIRAcetam 1000 MG Oral Tablet]  0     Sig: TAKE 1 TABLET BY MOUTH TWICE DAILY IN THE MORNING AND AT BEDTIME  Last Written Prescription Date:  12/23/16  Last Fill Quantity: 120,  # refills: 0   Last office visit: 10/10/2019 with prescribing provider:  TOR Swan   Future Office Visit:   Next 5 appointments (look out 90 days)    Jan 07, 2020  8:00 AM CST  Return Visit with Bisi Temple MD  [facility] [address] [address]  [phone]              Anti-Seizure Meds Protocol  Failed - 12/23/2019  1:46 PM        Failed - Review Authorizing provider's last note.      Refer to last progress notes: confirm request is for original authorizing provider (cannot be through other providers).          Failed - Normal CBC on file in past 26 months     Recent Labs   Lab Test 08/27/19  1106   WBC 7.2   RBC 3.91*   HGB 12.8*   HCT 39.1*                    Passed - Recent (12 mo) or future (30 days) visit within the authorizing provider's specialty     Patient has had an office visit with the authorizing provider or a provider within the authorizing providers department within the previous 12 mos or has a future within next 30 days. See \"Patient Info\" tab in inbasket, or \"Choose Columns\" in Meds & Orders section of the refill encounter.              Passed - Normal serum creatinine on file in past 26 months     Recent Labs   Lab Test 11/19/19  1100   CR 1.01             Passed - Normal ALT or AST on file in past 26 months     Recent Labs   Lab Test 12/18/18  0926   ALT 17     Recent Labs   Lab Test 12/18/18  0926   AST 12             Passed - Normal platelet count on file in past 26 months     Recent Labs   Lab Test 08/27/19  1106                  Passed - Medication is active on med list        "

## 2019-12-25 RX ORDER — LEVETIRACETAM 1000 MG/1
TABLET ORAL
Qty: 180 TABLET | Refills: 1 | Status: SHIPPED | OUTPATIENT
Start: 2019-12-25 | End: 2020-01-07

## 2020-01-07 ENCOUNTER — OFFICE VISIT (OUTPATIENT)
Dept: NEUROLOGY | Facility: CLINIC | Age: 70
End: 2020-01-07
Payer: MEDICARE

## 2020-01-07 VITALS
WEIGHT: 255 LBS | TEMPERATURE: 97.1 F | RESPIRATION RATE: 12 BRPM | DIASTOLIC BLOOD PRESSURE: 57 MMHG | BODY MASS INDEX: 36.59 KG/M2 | HEART RATE: 68 BPM | SYSTOLIC BLOOD PRESSURE: 101 MMHG

## 2020-01-07 DIAGNOSIS — G40.A09 NONINTRACTABLE ABSENCE EPILEPSY WITHOUT STATUS EPILEPTICUS (H): ICD-10-CM

## 2020-01-07 DIAGNOSIS — Z79.899 ENCOUNTER FOR LONG-TERM (CURRENT) USE OF MEDICATIONS: ICD-10-CM

## 2020-01-07 DIAGNOSIS — G20.C PARKINSONISM, UNSPECIFIED PARKINSONISM TYPE (H): Primary | ICD-10-CM

## 2020-01-07 DIAGNOSIS — R56.9 CONVULSIONS, UNSPECIFIED CONVULSION TYPE (H): Chronic | ICD-10-CM

## 2020-01-07 LAB — VALPROATE SERPL-MCNC: 24 MG/L (ref 50–100)

## 2020-01-07 PROCEDURE — 36415 COLL VENOUS BLD VENIPUNCTURE: CPT | Performed by: PSYCHIATRY & NEUROLOGY

## 2020-01-07 PROCEDURE — 99214 OFFICE O/P EST MOD 30 MIN: CPT | Performed by: PSYCHIATRY & NEUROLOGY

## 2020-01-07 PROCEDURE — 80164 ASSAY DIPROPYLACETIC ACD TOT: CPT | Performed by: PSYCHIATRY & NEUROLOGY

## 2020-01-07 RX ORDER — LEVETIRACETAM 1000 MG/1
TABLET ORAL
Qty: 180 TABLET | Refills: 2 | Status: SHIPPED | OUTPATIENT
Start: 2020-01-07 | End: 2020-07-07

## 2020-01-07 RX ORDER — CARBIDOPA AND LEVODOPA 25; 100 MG/1; MG/1
TABLET ORAL
Qty: 450 TABLET | Refills: 2 | Status: SHIPPED | OUTPATIENT
Start: 2020-01-07 | End: 2020-07-07

## 2020-01-07 RX ORDER — TRAZODONE HYDROCHLORIDE 100 MG/1
100 TABLET ORAL AT BEDTIME
COMMUNITY

## 2020-01-07 RX ORDER — DIVALPROEX SODIUM 500 MG/1
500 TABLET, DELAYED RELEASE ORAL 2 TIMES DAILY
Qty: 180 TABLET | Refills: 2 | Status: SHIPPED | OUTPATIENT
Start: 2020-01-07 | End: 2020-07-07

## 2020-01-07 NOTE — LETTER
1/7/2020         RE: Prashant Keyes  [address]  [place] [address]        Dear Colleague,    Thank you for referring your patient, Prashant Keyes, to the [facility]. Please see a copy of my visit note below.    ESTABLISHED PATIENT NEUROLOGY NOTE    DATE OF VISIT: 1/7/2020  MRN: [id number]  PATIENT NAME: Prashant Keyes  YOB: 1950    Chief Complaint   Patient presents with     RECHECK     Tremor and seizure     SUBJECTIVE:                                                      HISTORY OF PRESENT ILLNESS:  Prashant is here for follow up regarding Parkinsonism/tremor and seizure disorder. His seizures have been thought to be related to a closed head injury. electroencephalogram showed a Left temporal focus. He has spells of Left-face/arm weakness and amnesia which resolved with the addition of Depakote to his regimen. He has had previous GTCs as well. He has followed with several Neurologist, with our first encounter being in early 2018. When I met the patient, he was on Keppra 1000mg BID and Depakote 500mg BID. He reported his most recent seizure being years prior. He was tolerating the medications well when we met again in 12.2018. He is on Sinemet for tremor and reported a little bit of breakthrough tremor when he was tired in the evenings and more during the day recently. We decided to increase the Sinemet to 2 tabs (25/100mg) at noontime and continue the 1 tab in the morning and evening. Depakote level was a little low at 39 after that visit. Keppra was therapeutic. We did not change the AED dosing, due to clinical stability.     Today the patient tells me that he has some breakthrough tremor after work in the evening. He has noticed this when trying to prepare food. He takes his Sinemet at 6am, noon and at 6pm. He has not noticed any definite wearing off and he is not sure how long it takes to kick in. His work schedule has changed in that he is working later into  the evenings now. He did notice improvement of the daytime tremor with the dose change we made after his previous visit.    He feels that he is also having some trouble with his memory. He forgot the password to his checking account. He remembered it later. He has short term and long term memory problems that have been present for a long time. He says his partner notices this more than he does The memory is not getting in the way of his work or home life. He does not think additional testing is needed at this time. He is on oxybutynin now for OAB.     He has not had any seizures in the interim. No new head injuries or episodes of LOC. He denies side effects from the AEDs.    Sleep is good, restful with his CPAP. He has had some Left knee pain, and underwent surgery about 4 months ago. Otherwise, no gait changes. Appetite, taste and smell are endorsed as good. He notices some numbness in the the thighs. No major changes in weight recently.     CURRENT MEDICATIONS:   allopurinol (ZYLOPRIM) 100 MG tablet, TAKE TWO TABLETS BY MOUTH ONCE DAILY  amLODIPine (NORVASC) 10 MG tablet, Take 1 tablet (10 mg) by mouth daily (with dinner)  ARIPiprazole (ABILIFY) 2 MG tablet, Take 2 mg by mouth daily  aspirin 81 MG tablet, Take 1 tablet (81 mg) by mouth daily  carvedilol (COREG) 12.5 MG tablet, TAKE ONE TABLET BY MOUTH TWICE DAILY WITH MEALS  chlorthalidone (HYGROTON) 25 MG tablet, Take 1 tablet (25 mg) by mouth daily  Cholecalciferol (VITAMIN D) 2000 UNITS tablet, Take 2,000 Units by mouth daily  Cyanocobalamin (VITAMIN  B-12) 2500 MCG tablet, Place 2,500 mcg under the tongue daily  ferrous sulfate (IRON) 325 (65 FE) MG tablet, Take 1 tablet (325 mg) by mouth 2 times daily  furosemide (LASIX) 20 MG tablet, TAKE 2 TABLETS BY MOUTH AS NEEDED (Patient taking differently: Take 40 mg by mouth daily )  hydrALAZINE (APRESOLINE) 25 MG tablet, TAKE 1 TABLET BY MOUTH TWICE DAILY  losartan (COZAAR) 100 MG tablet, Take 1 tablet (100 mg) by  mouth daily  Multiple Vitamin (MULTI VITAMIN MENS PO), Take  by mouth.  omeprazole (PRILOSEC) 40 MG DR capsule, TAKE ONE CAPSULE BY MOUTH ONCE DAILY (TAKE  30  TO  60  MINUTES  BEFORE  A  MEAL)  oxybutynin (DITROPAN) 5 MG tablet, TAKE 1 TABLET BY MOUTH TWICE DAILY  potassium chloride ER (KLOR-CON) 20 MEQ CR tablet, TAKE 1 TABLET BY MOUTH THREE TIMES DAILY  simvastatin (ZOCOR) 20 MG tablet, TAKE 2 TABLETS BY MOUTH IN THE EVENING AT BEDTIME  traZODone (DESYREL) 100 MG tablet, Take 100 mg by mouth At Bedtime  UNABLE TO FIND, daily MEDICATION NAME: sawpalmetto - 2 capsules once a day  colchicine (COLCYRS) 0.6 MG tablet, Take 1 tablet (0.6 mg) by mouth daily as needed Take 2 tabs on first day.  Take at first sign of gout flair.  Take for max of 1 week per flair (Patient not taking: Reported on 11/5/2019)  ORDER FOR DME, CPAP daily    No current facility-administered medications on file prior to visit.       RECENT DIAGNOSTIC STUDIES:   Labs: No results found for any visits on 01/07/20.  Recent CBC and BMP unremarkable (10.2019)    REVIEW OF SYSTEMS:                                                      10-point review of systems is negative except as mentioned above in HPI.     EXAM:                                                      Physical Exam:   Vitals: /57 (BP Location: Right arm, Patient Position: Sitting, Cuff Size: Adult Large)   Pulse 68   Temp 97.1  F (36.2  C) (Tympanic)   Resp 12   Wt 115.7 kg (255 lb)   BMI 36.59 kg/m     BMI= Body mass index is 36.59 kg/m .  GENERAL: NAD.   CV: RRR. S1, S2. Distant heart sounds.  NECK: No bruits.  Neurologic:  MENTAL STATUS: Alert, attentive. Speech is fluent. Normal comprehension. Normal concentration. Adequate fund of knowledge. MoCA: 4/5 (missed one word on recall).   CRANIAL NERVES: Discs flat. Visual fields intact to confrontation. Pupils equally, round and reactive to light. Facial sensation and movement normal. EOM full. Exophthalmos. Hearing intact to  conversations with hearing aids in place. Trapezius strength intact. Palate moves symmetrically. Tongue midline.  MOTOR: Strength is 5/5 in proximal and distal muscle groups of upper and lower extremities. Tone and bulk normal.   DTRs: Intact and symmetric in UEs. Right patella 1-, cannot elicit Left. Unable to elicit ankle jerks.   SENSATION: Normal light touch throughout. Vibration: Decreased at both ankles.  COORDINATION: Normal finger nose finger. Normal hand opening/closing speed and amplitude. STATION AND GAIT: Romberg negative. Good postural reflexes. Gait appears normal except for perhaps decreased arm swing.   Minimal high frequency postural tremor in the hands.  No tremor at rest on today's exam.   Right hand-dominant.     ASSESSMENT and PLAN:                                                      Assessment and Plan:    ICD-10-CM    1. Parkinsonism, unspecified Parkinsonism type (H) G20 carbidopa-levodopa (SINEMET)  MG tablet   2. Convulsions, unspecified convulsion type (H) R56.9 divalproex sodium delayed-release (DEPAKOTE) 500 MG DR tablet     Valproic acid     Keppra (Levetiracetam) Level   3. 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 Encounter for long-term (current) use of medications Z79.899 Valproic acid     Keppra (Levetiracetam) Level        Mr. Keyes is a pleasant 70 yo man here for follow-up regarding Parkinsonism and post-traumatic seizures here for follow-up. Seizures continue to be well-controlled on the Keppra and Depakote and he denies side effects with these medications. We will recheck levels today.     He continues to find the Sinemet beneficial for his tremor so we will try an increase in the evening dose for his breakthrough tremor that occurs later in the night. I would like to see Demian back in about 6 months. He understands and agrees with the plan. I  offered to do additional memory screening for him in the future if he becomes interested.     The numbness he describes is consistent with meralgia paresthetica. He was advised to avoid tight clothing, weight gain.     Patient Instructions:  -- Labs today: Keppra and Depakote levels. We will notify you of the results.  -- Continue the Depakote 500mg twice daily and Keppra 1000mg twice daily.   -- Increase the evening dose of carbidopa/levodopa to 2 tablets. Continue 1 tablet in the morning and 2 tablets at noontime.   -- Let us know how the tremor responds after a few weeks on the higher evening dose. We may need to adjust further for effectiveness.   -- Return to Neurology in 6 months.     Total Time: 25 minutes were spent with the patient. More than 50% of the time spent on counseling (as described above in Assessment and Plan/Instructions) /coordinating the care.    Bisi Temple MD  Neurology                    Again, thank you for allowing me to participate in the care of your patient.        Sincerely,        Bisi Temple MD

## 2020-01-07 NOTE — PATIENT INSTRUCTIONS
Plan:    -- Labs today: Keppra and Depakote levels. We will notify you of the results.  -- Continue the Depakote 500mg twice daily and Keppra 1000mg twice daily.   -- Increase the evening dose of carbidopa/levodopa to 2 tablets. Continue 1 tablet in the morning and 2 tablets at noontime.   -- Let us know how the tremor responds after a few weeks on the higher evening dose. We may need to adjust further for effectiveness.   -- Return to Neurology in 6 months.

## 2020-01-07 NOTE — NURSING NOTE
"Initial /57 (BP Location: Right arm, Patient Position: Sitting, Cuff Size: Adult Large)   Pulse 68   Temp 97.1  F (36.2  C) (Tympanic)   Resp 12   Wt 115.7 kg (255 lb)   BMI 36.59 kg/m   Estimated body mass index is 36.59 kg/m  as calculated from the following:    Height as of 10/10/19: 1.778 m (5' 10\").    Weight as of this encounter: 115.7 kg (255 lb). .    For communications regarding this visit patient prefers to be contacted by: Grover Barcenas to leave detailed message on voicemail:   n/a    Yohana GUERRERO      "

## 2020-01-07 NOTE — PROGRESS NOTES
ESTABLISHED PATIENT NEUROLOGY NOTE    DATE OF VISIT: 1/7/2020  MRN: [id number]  PATIENT NAME: Prashant Keyes  YOB: 1950    Chief Complaint   Patient presents with     RECHECK     Tremor and seizure     SUBJECTIVE:                                                      HISTORY OF PRESENT ILLNESS:  Prashant is here for follow up regarding Parkinsonism/tremor and seizure disorder. His seizures have been thought to be related to a closed head injury. electroencephalogram showed a Left temporal focus. He has spells of Left-face/arm weakness and amnesia which resolved with the addition of Depakote to his regimen. He has had previous GTCs as well. He has followed with several Neurologist, with our first encounter being in early 2018. When I met the patient, he was on Keppra 1000mg BID and Depakote 500mg BID. He reported his most recent seizure being years prior. He was tolerating the medications well when we met again in 12.2018. He is on Sinemet for tremor and reported a little bit of breakthrough tremor when he was tired in the evenings and more during the day recently. We decided to increase the Sinemet to 2 tabs (25/100mg) at noontime and continue the 1 tab in the morning and evening. Depakote level was a little low at 39 after that visit. Keppra was therapeutic. We did not change the AED dosing, due to clinical stability.     Today the patient tells me that he has some breakthrough tremor after work in the evening. He has noticed this when trying to prepare food. He takes his Sinemet at 6am, noon and at 6pm. He has not noticed any definite wearing off and he is not sure how long it takes to kick in. His work schedule has changed in that he is working later into the evenings now. He did notice improvement of the daytime tremor with the dose change we made after his previous visit.    He feels that he is also having some trouble with his memory. He forgot the password to his checking account. He  remembered it later. He has short term and long term memory problems that have been present for a long time. He says his partner notices this more than he does The memory is not getting in the way of his work or home life. He does not think additional testing is needed at this time. He is on oxybutynin now for OAB.     He has not had any seizures in the interim. No new head injuries or episodes of LOC. He denies side effects from the AEDs.    Sleep is good, restful with his CPAP. He has had some Left knee pain, and underwent surgery about 4 months ago. Otherwise, no gait changes. Appetite, taste and smell are endorsed as good. He notices some numbness in the the thighs. No major changes in weight recently.     CURRENT MEDICATIONS:   allopurinol (ZYLOPRIM) 100 MG tablet, TAKE TWO TABLETS BY MOUTH ONCE DAILY  amLODIPine (NORVASC) 10 MG tablet, Take 1 tablet (10 mg) by mouth daily (with dinner)  ARIPiprazole (ABILIFY) 2 MG tablet, Take 2 mg by mouth daily  aspirin 81 MG tablet, Take 1 tablet (81 mg) by mouth daily  carvedilol (COREG) 12.5 MG tablet, TAKE ONE TABLET BY MOUTH TWICE DAILY WITH MEALS  chlorthalidone (HYGROTON) 25 MG tablet, Take 1 tablet (25 mg) by mouth daily  Cholecalciferol (VITAMIN D) 2000 UNITS tablet, Take 2,000 Units by mouth daily  Cyanocobalamin (VITAMIN  B-12) 2500 MCG tablet, Place 2,500 mcg under the tongue daily  ferrous sulfate (IRON) 325 (65 FE) MG tablet, Take 1 tablet (325 mg) by mouth 2 times daily  furosemide (LASIX) 20 MG tablet, TAKE 2 TABLETS BY MOUTH AS NEEDED (Patient taking differently: Take 40 mg by mouth daily )  hydrALAZINE (APRESOLINE) 25 MG tablet, TAKE 1 TABLET BY MOUTH TWICE DAILY  losartan (COZAAR) 100 MG tablet, Take 1 tablet (100 mg) by mouth daily  Multiple Vitamin (MULTI VITAMIN MENS PO), Take  by mouth.  omeprazole (PRILOSEC) 40 MG DR capsule, TAKE ONE CAPSULE BY MOUTH ONCE DAILY (TAKE  30  TO  60  MINUTES  BEFORE  A  MEAL)  oxybutynin (DITROPAN) 5 MG tablet, TAKE 1  TABLET BY MOUTH TWICE DAILY  potassium chloride ER (KLOR-CON) 20 MEQ CR tablet, TAKE 1 TABLET BY MOUTH THREE TIMES DAILY  simvastatin (ZOCOR) 20 MG tablet, TAKE 2 TABLETS BY MOUTH IN THE EVENING AT BEDTIME  traZODone (DESYREL) 100 MG tablet, Take 100 mg by mouth At Bedtime  UNABLE TO FIND, daily MEDICATION NAME: sawpalmetto - 2 capsules once a day  colchicine (COLCYRS) 0.6 MG tablet, Take 1 tablet (0.6 mg) by mouth daily as needed Take 2 tabs on first day.  Take at first sign of gout flair.  Take for max of 1 week per flair (Patient not taking: Reported on 11/5/2019)  ORDER FOR DME, CPAP daily    No current facility-administered medications on file prior to visit.       RECENT DIAGNOSTIC STUDIES:   Labs: No results found for any visits on 01/07/20.  Recent CBC and BMP unremarkable (10.2019)    REVIEW OF SYSTEMS:                                                      10-point review of systems is negative except as mentioned above in HPI.     EXAM:                                                      Physical Exam:   Vitals: /57 (BP Location: Right arm, Patient Position: Sitting, Cuff Size: Adult Large)   Pulse 68   Temp 97.1  F (36.2  C) (Tympanic)   Resp 12   Wt 115.7 kg (255 lb)   BMI 36.59 kg/m    BMI= Body mass index is 36.59 kg/m .  GENERAL: NAD.   CV: RRR. S1, S2. Distant heart sounds.  NECK: No bruits.  Neurologic:  MENTAL STATUS: Alert, attentive. Speech is fluent. Normal comprehension. Normal concentration. Adequate fund of knowledge. MoCA: 4/5 (missed one word on recall).   CRANIAL NERVES: Discs flat. Visual fields intact to confrontation. Pupils equally, round and reactive to light. Facial sensation and movement normal. EOM full. Exophthalmos. Hearing intact to conversations with hearing aids in place. Trapezius strength intact. Palate moves symmetrically. Tongue midline.  MOTOR: Strength is 5/5 in proximal and distal muscle groups of upper and lower extremities. Tone and bulk normal.   DTRs:  Intact and symmetric in UEs. Right patella 1-, cannot elicit Left. Unable to elicit ankle jerks.   SENSATION: Normal light touch throughout. Vibration: Decreased at both ankles.  COORDINATION: Normal finger nose finger. Normal hand opening/closing speed and amplitude. STATION AND GAIT: Romberg negative. Good postural reflexes. Gait appears normal except for perhaps decreased arm swing.   Minimal high frequency postural tremor in the hands. No tremor at rest on today's exam.   Right hand-dominant.     ASSESSMENT and PLAN:                                                      Assessment and Plan:    ICD-10-CM    1. Parkinsonism, unspecified Parkinsonism type (H) G20 carbidopa-levodopa (SINEMET)  MG tablet   2. Convulsions, unspecified convulsion type (H) R56.9 divalproex sodium delayed-release (DEPAKOTE) 500 MG DR tablet     Valproic acid     Keppra (Levetiracetam) Level   3. 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 Encounter for long-term (current) use of medications Z79.899 Valproic acid     Keppra (Levetiracetam) Level        Mr. Keyes is a pleasant 70 yo man here for follow-up regarding Parkinsonism and post-traumatic seizures here for follow-up. Seizures continue to be well-controlled on the Keppra and Depakote and he denies side effects with these medications. We will recheck levels today.     He continues to find the Sinemet beneficial for his tremor so we will try an increase in the evening dose for his breakthrough tremor that occurs later in the night. I would like to see Demian back in about 6 months. He understands and agrees with the plan. I offered to do additional memory screening for him in the future if he becomes interested.     The numbness he describes is consistent with meralgia paresthetica. He was advised to avoid tight clothing, weight gain.     Patient  Instructions:  -- Labs today: Keppra and Depakote levels. We will notify you of the results.  -- Continue the Depakote 500mg twice daily and Keppra 1000mg twice daily.   -- Increase the evening dose of carbidopa/levodopa to 2 tablets. Continue 1 tablet in the morning and 2 tablets at noontime.   -- Let us know how the tremor responds after a few weeks on the higher evening dose. We may need to adjust further for effectiveness.   -- Return to Neurology in 6 months.     Total Time: 25 minutes were spent with the patient. More than 50% of the time spent on counseling (as described above in Assessment and Plan/Instructions) /coordinating the care.    Bisi Temple MD  Neurology

## 2020-01-08 LAB — LEVETIRACETAM SERPL-MCNC: 40 UG/ML (ref 12–46)

## 2020-01-08 NOTE — RESULT ENCOUNTER NOTE
Please advise Prashant Keyes,  1950, that his Keppra level is in therapeutic range. The Depakote level is low again, but given that he has been stable and seizure free, I do not think we need to change the dosing at this time.   [phone] (home)   Bisi Temple MD

## 2020-01-15 ENCOUNTER — ANCILLARY PROCEDURE (OUTPATIENT)
Dept: GENERAL RADIOLOGY | Facility: CLINIC | Age: 70
End: 2020-01-15
Attending: PHYSICIAN ASSISTANT
Payer: MEDICARE

## 2020-01-15 ENCOUNTER — OFFICE VISIT (OUTPATIENT)
Dept: FAMILY MEDICINE | Facility: CLINIC | Age: 70
End: 2020-01-15
Payer: MEDICARE

## 2020-01-15 VITALS
TEMPERATURE: 96.9 F | SYSTOLIC BLOOD PRESSURE: 137 MMHG | HEART RATE: 81 BPM | OXYGEN SATURATION: 99 % | HEIGHT: 70 IN | RESPIRATION RATE: 20 BRPM | WEIGHT: 256.6 LBS | BODY MASS INDEX: 36.73 KG/M2 | DIASTOLIC BLOOD PRESSURE: 76 MMHG

## 2020-01-15 DIAGNOSIS — G57.13 MERALGIA PARESTHETICA OF BOTH LOWER EXTREMITIES: ICD-10-CM

## 2020-01-15 DIAGNOSIS — R07.0 THROAT PAIN: Primary | ICD-10-CM

## 2020-01-15 DIAGNOSIS — R13.12 OROPHARYNGEAL DYSPHAGIA: ICD-10-CM

## 2020-01-15 LAB
BASOPHILS # BLD AUTO: 0 10E9/L (ref 0–0.2)
BASOPHILS NFR BLD AUTO: 0.3 %
DEPRECATED S PYO AG THROAT QL EIA: NORMAL
DIFFERENTIAL METHOD BLD: ABNORMAL
EOSINOPHIL # BLD AUTO: 0.2 10E9/L (ref 0–0.7)
EOSINOPHIL NFR BLD AUTO: 2.7 %
ERYTHROCYTE [DISTWIDTH] IN BLOOD BY AUTOMATED COUNT: 12.5 % (ref 10–15)
HCT VFR BLD AUTO: 35 % (ref 40–53)
HGB BLD-MCNC: 11.8 G/DL (ref 13.3–17.7)
LYMPHOCYTES # BLD AUTO: 2.1 10E9/L (ref 0.8–5.3)
LYMPHOCYTES NFR BLD AUTO: 26.9 %
MCH RBC QN AUTO: 32.8 PG (ref 26.5–33)
MCHC RBC AUTO-ENTMCNC: 33.7 G/DL (ref 31.5–36.5)
MCV RBC AUTO: 97 FL (ref 78–100)
MONOCYTES # BLD AUTO: 0.8 10E9/L (ref 0–1.3)
MONOCYTES NFR BLD AUTO: 9.7 %
NEUTROPHILS # BLD AUTO: 4.7 10E9/L (ref 1.6–8.3)
NEUTROPHILS NFR BLD AUTO: 60.4 %
PLATELET # BLD AUTO: 265 10E9/L (ref 150–450)
RBC # BLD AUTO: 3.6 10E12/L (ref 4.4–5.9)
SPECIMEN SOURCE: NORMAL
VIT B12 SERPL-MCNC: >2000 PG/ML (ref 193–986)
WBC # BLD AUTO: 7.7 10E9/L (ref 4–11)

## 2020-01-15 PROCEDURE — 82607 VITAMIN B-12: CPT | Performed by: PHYSICIAN ASSISTANT

## 2020-01-15 PROCEDURE — 87880 STREP A ASSAY W/OPTIC: CPT | Performed by: PHYSICIAN ASSISTANT

## 2020-01-15 PROCEDURE — 85025 COMPLETE CBC W/AUTO DIFF WBC: CPT | Performed by: PHYSICIAN ASSISTANT

## 2020-01-15 PROCEDURE — 36415 COLL VENOUS BLD VENIPUNCTURE: CPT | Performed by: PHYSICIAN ASSISTANT

## 2020-01-15 PROCEDURE — 72100 X-RAY EXAM L-S SPINE 2/3 VWS: CPT

## 2020-01-15 PROCEDURE — 87081 CULTURE SCREEN ONLY: CPT | Performed by: PHYSICIAN ASSISTANT

## 2020-01-15 PROCEDURE — 99214 OFFICE O/P EST MOD 30 MIN: CPT | Performed by: PHYSICIAN ASSISTANT

## 2020-01-15 RX ORDER — PREDNISONE 20 MG/1
20 TABLET ORAL 2 TIMES DAILY
Qty: 14 TABLET | Refills: 0 | Status: SHIPPED | OUTPATIENT
Start: 2020-01-15 | End: 2020-01-22

## 2020-01-15 RX ORDER — GABAPENTIN 100 MG/1
100 CAPSULE ORAL 3 TIMES DAILY
Qty: 90 CAPSULE | Refills: 5 | Status: SHIPPED | OUTPATIENT
Start: 2020-01-15 | End: 2020-07-14

## 2020-01-15 ASSESSMENT — MIFFLIN-ST. JEOR: SCORE: 1935.18

## 2020-01-15 NOTE — PROGRESS NOTES
SUBJECTIVE:  Prashant Keeys is a 69 year old male who presents with the following concerns;              Symptoms: cc Present Absent Comment   Fever/Chills   x 4 wks ago   Fatigue   x    Muscle Aches   x    Eye Irritation   x    Sneezing   x    Nasal Riki/Drg  x     Sinus Pressure/Pain   x    Loss of smell  x     Dental pain   x    Sore Throat  x     Swollen Glands  x     Ear Pain/Fullness  x  Wax build-up   Cough   x 4 weeks ago   Wheeze   x 4 weeks ago   Chest Pain   x 4 weeks ago   Shortness of breath   x 4 weeks ago   Rash   x    Other         Symptom duration:  flu - 4 weeks ago, sore throat ongoing   Sympom severity:  moderate   Treatments tried:  Asprin / Tylenol   Contacts:  works at [employer] - exposed to the public   Sore throat . No fever. Food and liquids feel like they are getting stuck on the left side of his throat. No gerd symptoms   Subjective     Prashant Keyes is a 69 year old male who presents to clinic today for the following health issues:    HPI     Joint or Musculoskeletal Pain- burning in bilateral thighs  Duration of complaint: x 1 month   no tool pouches. No groin or hip pain or noc sweats. No fevers.  Anterior thigh paresthesias.   Left evolved 1 mos ago, the right within the past week. No rash. No profound low back pain. No irritative/obst voiding symptoms.     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)     Class 1 obesity with serious comorbidity and body mass index (BMI) of 31.0 to 31.9 in adult, unspecified obesity type     Obesity (BMI 35.0-39.9) with comorbidity (H)     Past Surgical  History:   Procedure Laterality Date     ANGIOGRAM  2003    Coronary Angiogram- negative     ARTHROSCOPY KNEE Left 9/5/2019    Procedure: LEFT KNEE ARTHROSCOPY WITH MENISCAL AND CHONDRAL DEBRIDEMENT;  Surgeon: Damon Rosas MD;  Location: [facility]     COLONOSCOPY WITH CO2 INSUFFLATION N/A 8/17/2017    Procedure: COLONOSCOPY WITH CO2 INSUFFLATION;  COLON SCREEN/ FLYNN;  Surgeon: Varun Bond MD;  Location: [facility] OR     HC REMOVAL TESTIS,SIMPLE  1978    Right undecended     HERNIA REPAIR, INGUINAL RT/LT  1963, 1978    Right Hernia     HERNIORRHAPHY UMBILICAL  4/2013    [place]       Social History     Tobacco Use     Smoking status: Never Smoker     Smokeless tobacco: Never Used   Substance Use Topics     Alcohol use: No     Alcohol/week: 0.0 standard drinks     Comment: Quit since 2003.      Family History   Problem Relation Age of Onset     Cerebrovascular Disease Mother         60's     C.A.D. Mother         CABG 75     Arthritis Mother      Eye Disorder Mother      Heart Disease Mother      Cardiovascular Father         Rheumatic Heart Disease     Hypertension Brother      Lipids Brother      C.A.D. Brother         CABG 46     Arthritis Brother      Heart Disease Brother         CABG x5     Obesity Brother      Hypertension Brother      Lipids Brother      Thyroid Disease Brother      Alcohol/Drug Brother      Heart Disease Brother         CABG     Cancer Sister         Thyroid CA     Hypertension Sister      Obesity Sister      Thyroid Disease Sister      Hemochromatosis Sister      Depression Daughter      Depression Daughter      Depression Son      Diabetes Son      Depression Son      Gastrointestinal Disease Brother         Crohn's Disease-Ostomy     Arrhythmia Sister      Cancer Maternal Grandmother         Thyroid CA     Cancer Paternal Grandfather         Throat CA     Thyroid Disease Maternal Grandfather          Current Outpatient Medications   Medication Sig Dispense Refill      allopurinol (ZYLOPRIM) 100 MG tablet TAKE TWO TABLETS BY MOUTH ONCE DAILY 180 tablet 3     amLODIPine (NORVASC) 10 MG tablet Take 1 tablet (10 mg) by mouth daily (with dinner) 90 tablet 1     ARIPiprazole (ABILIFY) 2 MG tablet Take 2 mg by mouth daily       aspirin 81 MG tablet Take 1 tablet (81 mg) by mouth daily 30 tablet      carbidopa-levodopa (SINEMET)  MG tablet 1 tab in the morning, 2 tabs at noontime and 2 tabs at 6pm 450 tablet 2     carvedilol (COREG) 12.5 MG tablet TAKE ONE TABLET BY MOUTH TWICE DAILY WITH MEALS 180 tablet 1     chlorthalidone (HYGROTON) 25 MG tablet Take 1 tablet (25 mg) by mouth daily 90 tablet 1     Cholecalciferol (VITAMIN D) 2000 UNITS tablet Take 2,000 Units by mouth daily 90 tablet 3     Cyanocobalamin (VITAMIN  B-12) 2500 MCG tablet Place 2,500 mcg under the tongue daily 90 tablet 3     divalproex sodium delayed-release (DEPAKOTE) 500 MG DR tablet Take 1 tablet (500 mg) by mouth 2 times daily 180 tablet 2     ferrous sulfate (IRON) 325 (65 FE) MG tablet Take 1 tablet (325 mg) by mouth 2 times daily 60 tablet 2     gabapentin (NEURONTIN) 100 MG capsule Take 1 capsule (100 mg) by mouth 3 times daily 90 capsule 5     hydrALAZINE (APRESOLINE) 25 MG tablet TAKE 1 TABLET BY MOUTH TWICE DAILY 60 tablet 2     levETIRAcetam (KEPPRA) 1000 MG tablet TAKE 1 TABLET BY MOUTH TWICE DAILY IN THE MORNING AND AT BEDTIME 180 tablet 2     losartan (COZAAR) 100 MG tablet Take 1 tablet (100 mg) by mouth daily 90 tablet 1     Multiple Vitamin (MULTI VITAMIN MENS PO) Take  by mouth.       omeprazole (PRILOSEC) 40 MG DR capsule TAKE ONE CAPSULE BY MOUTH ONCE DAILY (TAKE  30  TO  60  MINUTES  BEFORE  A  MEAL) 30 capsule 6     oxybutynin (DITROPAN) 5 MG tablet TAKE 1 TABLET BY MOUTH TWICE DAILY 60 tablet 3     potassium chloride ER (KLOR-CON) 20 MEQ CR tablet TAKE 1 TABLET BY MOUTH THREE TIMES DAILY 270 tablet 1     predniSONE (DELTASONE) 20 MG tablet Take 1 tablet (20 mg) by mouth 2 times daily for 7  "days 14 tablet 0     simvastatin (ZOCOR) 20 MG tablet TAKE 2 TABLETS BY MOUTH IN THE EVENING AT BEDTIME 180 tablet 1     traZODone (DESYREL) 100 MG tablet Take 100 mg by mouth At Bedtime       UNABLE TO FIND daily MEDICATION NAME: sawpalmetto - 2 capsules once a day       colchicine (COLCYRS) 0.6 MG tablet Take 1 tablet (0.6 mg) by mouth daily as needed Take 2 tabs on first day.  Take at first sign of gout flair.  Take for max of 1 week per flair (Patient not taking: Reported on 11/5/2019) 30 tablet 0     furosemide (LASIX) 20 MG tablet TAKE 2 TABLETS BY MOUTH AS NEEDED (Patient not taking: No sig reported) 60 tablet 2     ORDER FOR DME CPAP daily       Allergies   Allergen Reactions     Accupril [Ace Inhibitors] Difficulty breathing     accupril causes SOB     Aptiom [Eslicarbazepine] Difficulty breathing     Atorvastatin Calcium      Myalgias from Lipitor     Contrast Dye Hives     Coreg [Carvedilol] Nausea and Fatigue     Lactose GI Disturbance     Lisinopril Difficulty breathing     SOB     Nitroglycerin      Headache     Paroxetine Hives     Tetracycline [Tetracyclines]      \"splitting headaches\"     Vimpat [Lacosamide] Difficulty breathing     Zoloft Rash     Zolpidem      Adhesive Tape Rash     Without itching- EKG pads     Recent Labs   Lab Test 11/19/19  1100 10/10/19  1402  06/25/19  0936 12/18/18  0926 09/10/18  0759 01/29/18  1643  05/05/17  0825   LDL  --   --   --  69  --  65  --   --  63   HDL  --   --   --  70  --  72  --   --  66   TRIG  --   --   --  100  --  93  --   --  148   ALT  --   --   --   --  17  --  9  --  18   CR 1.01 0.98   < >  --  1.03  --  1.20   < > 1.28*   GFRESTIMATED 76 79   < >  --  72  --  60*   < > 56*   GFRESTBLACK 88 >90   < >  --  87  --  73   < > 68   POTASSIUM 4.2 3.6   < >  --  3.7  --  3.4   < > 3.3*   TSH  --   --   --   --  0.29*  --  0.52  --   --     < > = values in this interval not displayed.      BP Readings from Last 3 Encounters:   01/15/20 137/76   01/07/20 " "101/57   11/05/19 139/75    Wt Readings from Last 3 Encounters:   01/15/20 116.4 kg (256 lb 9.6 oz)   01/07/20 115.7 kg (255 lb)   11/05/19 113.9 kg (251 lb)                      Reviewed and updated as needed this visit by Provider         Review of Systems   ROS COMP: Constitutional, HEENT, cardiovascular, pulmonary, GI, , musculoskeletal, neuro, skin, endocrine and psych systems are negative, except as otherwise noted.      Objective    /76   Pulse 81   Temp 96.9  F (36.1  C) (Tympanic)   Resp 20   Ht 1.778 m (5' 10\")   Wt 116.4 kg (256 lb 9.6 oz)   SpO2 99%   BMI 36.82 kg/m    Body mass index is 36.82 kg/m .  Physical Exam   Eye exam - right eye normal lid, conjunctiva, cornea, pupil and fundus, left eye normal lid, conjunctiva, cornea, pupil and fundus.  Thyroid not palpable, not enlarged, no nodules detected.  CHEST:chest clear to IPPA, no tachypnea, retractions or cyanosis and S1, S2 normal, no murmur, no gallop, rate regular.  The abdomen is soft without tenderness, guarding, mass, rebound or organomegaly. Bowel sounds are normal. No CVA tenderness or inguinal adenopathy noted.  Lumbosacral spine area reveals no local tenderness or mass.  Full and painless lumbosacral range of motion is noted. Straight leg raise is negative at 90 degrees on both sides. DTR's, motor strength and sensation normal, including heel and toe gait.  Peripheral pulses are palpable. Hips and knees have full range of motion without pain. No abdominal tenderness, mass or organomegaly.  Bilateral thigh paresthesias and numbness     Prashant was seen today for throat pain, ear problem and musculoskeletal problem.    Diagnoses and all orders for this visit:    Throat pain  -     Strep, Rapid Screen  -     Beta strep group A culture    Oropharyngeal dysphagia  -     XR Esophagram w Upper GI; Future    Meralgia paresthetica of both lower extremities  -     XR Lumbar Spine 2/3 Views; Future  -     gabapentin (NEURONTIN) 100 MG " capsule; Take 1 capsule (100 mg) by mouth 3 times daily  -     predniSONE (DELTASONE) 20 MG tablet; Take 1 tablet (20 mg) by mouth 2 times daily for 7 days  -     Vitamin B12  -     CBC with platelets differential      work on lifestyle modification  Advised supportive and symptomatic treatment.  Follow up with Provider - if condition persists or worsens.

## 2020-01-16 ENCOUNTER — OFFICE VISIT (OUTPATIENT)
Dept: SLEEP MEDICINE | Facility: CLINIC | Age: 70
End: 2020-01-16
Payer: MEDICARE

## 2020-01-16 VITALS
DIASTOLIC BLOOD PRESSURE: 76 MMHG | OXYGEN SATURATION: 96 % | BODY MASS INDEX: 36.22 KG/M2 | WEIGHT: 253 LBS | HEIGHT: 70 IN | SYSTOLIC BLOOD PRESSURE: 138 MMHG | HEART RATE: 88 BPM

## 2020-01-16 DIAGNOSIS — G47.33 OSA (OBSTRUCTIVE SLEEP APNEA): Primary | ICD-10-CM

## 2020-01-16 PROBLEM — E66.01 MORBID OBESITY (H): Chronic | Status: ACTIVE | Noted: 2019-07-01

## 2020-01-16 PROBLEM — G20.C PARKINSONISM (H): Chronic | Status: ACTIVE | Noted: 2020-01-16

## 2020-01-16 PROBLEM — G40.A09 NONINTRACTABLE ABSENCE EPILEPSY WITHOUT STATUS EPILEPTICUS (H): Chronic | Status: ACTIVE | Noted: 2018-01-29

## 2020-01-16 PROBLEM — G20.C PARKINSONISM (H): Status: ACTIVE | Noted: 2020-01-16

## 2020-01-16 LAB
BACTERIA SPEC CULT: NORMAL
SPECIMEN SOURCE: NORMAL

## 2020-01-16 PROCEDURE — 99213 OFFICE O/P EST LOW 20 MIN: CPT | Performed by: PHYSICIAN ASSISTANT

## 2020-01-16 ASSESSMENT — MIFFLIN-ST. JEOR: SCORE: 1918.85

## 2020-01-16 NOTE — NURSING NOTE
"Chief Complaint   Patient presents with     CPAP Follow Up       Initial /76   Pulse 88   Ht 1.778 m (5' 10\")   Wt 114.8 kg (253 lb)   SpO2 96%   BMI 36.30 kg/m   Estimated body mass index is 36.3 kg/m  as calculated from the following:    Height as of this encounter: 1.778 m (5' 10\").    Weight as of this encounter: 114.8 kg (253 lb).    Medication Reconciliation: complete      "

## 2020-01-16 NOTE — PATIENT INSTRUCTIONS
Your BMI is Body mass index is 36.3 kg/m .  Weight management is a personal decision.  If you are interested in exploring weight loss strategies, the following discussion covers the approaches that may be successful. Body mass index (BMI) is one way to tell whether you are at a healthy weight, overweight, or obese. It measures your weight in relation to your height.  A BMI of 18.5 to 24.9 is in the healthy range. A person with a BMI of 25 to 29.9 is considered overweight, and someone with a BMI of 30 or greater is considered obese. More than two-thirds of American adults are considered overweight or obese.  Being overweight or obese increases the risk for further weight gain. Excess weight may lead to heart disease and diabetes.  Creating and following plans for healthy eating and physical activity may help you improve your health.  Weight control is part of healthy lifestyle and includes exercise, emotional health, and healthy eating habits. Careful eating habits lifelong are the mainstay of weight control. Though there are significant health benefits from weight loss, long-term weight loss with diet alone may be very difficult to achieve- studies show long-term success with dietary management in less than 10% of people. Attaining a healthy weight may be especially difficult to achieve in those with severe obesity. In some cases, medications, devices and surgical management might be considered.  What can you do?  If you are overweight or obese and are interested in methods for weight loss, you should discuss this with your provider.     Consider reducing daily calorie intake by 500 calories.     Keep a food journal.     Avoiding skipping meals, consider cutting portions instead.    Diet combined with exercise helps maintain muscle while optimizing fat loss. Strength training is particularly important for building and maintaining muscle mass. Exercise helps reduce stress, increase energy, and improves fitness.  Increasing exercise without diet control, however, may not burn enough calories to loose weight.       Start walking three days a week 10-20 minutes at a time    Work towards walking thirty minutes five days a week     Eventually, increase the speed of your walking for 1-2 minutes at time    In addition, we recommend that you review healthy lifestyles and methods for weight loss available through the National Institutes of Health patient information sites:  http://win.niddk.nih.gov/publications/index.htm    And look into health and wellness programs that may be available through your health insurance provider, employer, local community center, or leanna club.    Weight management plan: Patient was referred to their PCP to discuss a diet and exercise plan.

## 2020-01-16 NOTE — PROGRESS NOTES
Obstructive Sleep Apnea - PAP Follow-Up Visit:    Chief Complaint   Patient presents with     CPAP Follow Up       Prashant Keyes comes in today for follow-up of their severe sleep apnea, managed with BPAP.     In review, patient underwent PSG on 1/2/12 and was diagnosed with severe apnea.    RDI: 71.3, REM RDI: 60 AHI: 54.2, Lowest O2: 78 %  Wt during sleep study: 285.3#  BMI: 41.8  CPAP TITRATION: same night 1/2/12  CPAP: HYPOVENTILAION.  BI-LEVELPAP: 15/9 cm H2O. Significant improvement    Prashant was set up with a replacement device at [facility] on October 11, 2019 Patient received a Resmed AirCurve 10 Bilevel. Pressures were set at IPAP 15, EPAP 9 cm H2O.   Patient's ramp is 5 cm H2O for Off and FLEX/EPR is EPR, 2.    Overall, he rates the experience with PAP as 10 (0 poor, 10 great). The mask is comfortable.    The mask is not leaking .  He is not snoring with the mask on. He is not having gasp arousals.  He is not having significant oral/nasal dryness. The pressure is comfortable.     His PAP interface is Nasal Pillows.    Bedtime is typically 1200. Usually it takes about 10 min minutes to fall asleep with the mask on. Wake time is typically 0800.  Patient is using PAP therapy 7 hours per night. The patient is usually getting 7 hours of sleep per night.    He does feel rested in the morning.    Total score - [place]: 1 (1/16/2020  1:00 PM)      JOCELYN Total Score: 0    ResMed   Bilevel-PAP 9 - 15 cmH2O 30 day usage data:    100% of days with > 4 hours of use. 0/30 days with no use.   Average use 500 minutes per day.   95%ile Leak 2.63 L/min.   AHI 1.96 events per hour.     He reports the replacement PAP is working very well. No sleep concerns today.     Past medical/surgical history, family history, social history, medications and allergies were reviewed.      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 Spells uncertain etiology with left sided numbness, speech arrest, amnesia, occasional collapse. Vague inconsistent historian. EEG Lt temporal slowing. Neuropsych w nl cognition; some depression. Presented on levetiracetam, previously Rx w VPA, PHT, lacosamide, eslicarbazine. EEG w left temporal slowing. Adding VPA to LEV appeared to stop spells.       CKD (chronic kidney disease) stage 2, GFR 60-89 ml/min 12/18/2012     Priority: Medium     Lichen planus 08/02/2012     Priority: Medium     KALEB (obstructive sleep apnea) 02/06/2012     Priority: Medium     PSG on 1/2/12, RDI: 71.3, REM RDI: 60 AHI: 54.2, Lowest O2: 78 %. Wt during sleep study: 285.3. BI-LEVELPAP: 15/9 cm H2O.        Eczema 11/16/2011     Priority: Medium     Hyperlipidemia LDL goal <130 03/15/2011     Priority: Medium     Treatment since 1998       Fibromyalgia syndrome      Priority: Medium     per patient  Scheduled med refill protocol  Last refill:8/23/2010  Last clinic visit:8/31/2010  Controlled substance aggreement on file from this date: 8/31/10  Documentation in problem list:  narcotic agreement not on file   #240 tabs of 500mg tabs methocarbamol filled 8/23/10.  No refills anticipated prior to 8/23/11, and needs discussion in clinic prior to refills.         GERD 07/29/2005     Priority: Medium     Treatment since 1998       "    /76   Pulse 88   Ht 1.778 m (5' 10\")   Wt 114.8 kg (253 lb)   SpO2 96%   BMI 36.30 kg/m      Impression/Plan:  Severe obstructive sleep apnea-  Tolerating PAP well. Daytime symptoms are improved.   Continue current treatment  Comprehensive DME.    Prashant Keyes will follow up in about 2 years or sooner if any concerns.    Stan Huerta PA-C  "

## 2020-02-05 ENCOUNTER — MYC MEDICAL ADVICE (OUTPATIENT)
Dept: FAMILY MEDICINE | Facility: CLINIC | Age: 70
End: 2020-02-05

## 2020-02-05 DIAGNOSIS — M79.89 LEG SWELLING: Primary | ICD-10-CM

## 2020-02-05 NOTE — TELEPHONE ENCOUNTER
An order for a right leg US has been placed. Help patient to schedule this to rule out a blood clot.

## 2020-02-05 NOTE — TELEPHONE ENCOUNTER
Spoke to Usama (consent on file) and number given for scheduling. Patient has already left for work, Usama is going to contact patient to give him this information, advised there are still openings for US today.

## 2020-02-06 ENCOUNTER — ANCILLARY PROCEDURE (OUTPATIENT)
Dept: ULTRASOUND IMAGING | Facility: CLINIC | Age: 70
End: 2020-02-06
Attending: PHYSICIAN ASSISTANT
Payer: MEDICARE

## 2020-02-06 DIAGNOSIS — M79.89 LEG SWELLING: ICD-10-CM

## 2020-02-06 PROCEDURE — 93971 EXTREMITY STUDY: CPT | Mod: RT

## 2020-02-10 ENCOUNTER — MYC MEDICAL ADVICE (OUTPATIENT)
Dept: FAMILY MEDICINE | Facility: CLINIC | Age: 70
End: 2020-02-10

## 2020-02-10 NOTE — LETTER
February 13, 2020      Prashant Keyes  [address] [facility] [address]        To Whom it may concern;    Demian Keyes is a patient of mine in good standing and I recommend that he be allowed an additional break up to 15 minutes in between his regularly scheduled breaks and lunch hour.   He suffers from chronic medical conditions and the additional break will help alleviate his symptoms.    We are writing to inform you of your test results.      If you have any questions or concerns, please call the clinic at the number listed above.       Sincerely,      Matt Swan PA-C/alessia

## 2020-02-13 NOTE — TELEPHONE ENCOUNTER
Spoke to patient, he works an 8 hour day and gets (2) 15 minute breaks and 1 hour lunch. He is asking for an additional break in between his breaks up to 15 minutes as needed due to his back and ankle pain. Letter saved for review.

## 2020-02-26 ENCOUNTER — TELEPHONE (OUTPATIENT)
Dept: FAMILY MEDICINE | Facility: CLINIC | Age: 70
End: 2020-02-26

## 2020-02-26 NOTE — TELEPHONE ENCOUNTER
Reason for Call:  Other call back    Detailed comments: patient is calling stating has been taking multiple combinations of medication, but noticing that when taking RX: carbidopa-levodopa (SINEMET)  MG tablet experiencing really dry mouth, would like to discuss. Thank you.    Phone Number Patient can be reached at: Home number on file [phone] (home)    Best Time:     Can we leave a detailed message on this number? YES    Call taken on 2/26/2020 at 9:16 AM by Oxana Batista

## 2020-02-26 NOTE — TELEPHONE ENCOUNTER
Left message on voice mail for patient to call clinic. [phone]    Patient is due for a 6 month BP Recheck with Matt (on or around 2/27/20). He may want to discuss his medication concern with Matt at the same time.     Lesley Aaron RN BSN

## 2020-02-27 NOTE — TELEPHONE ENCOUNTER
Patient scheduled to see Matt Swan PA-C on 3/5/20 for 6 month BP follow up and dry mouth. Bill in agreement with talking to Matt about his concern during his office visit next week.     Lesley Aaron RN BSN

## 2020-03-02 DIAGNOSIS — I10 HYPERTENSION GOAL BP (BLOOD PRESSURE) < 140/90: ICD-10-CM

## 2020-03-02 DIAGNOSIS — I10 BENIGN ESSENTIAL HYPERTENSION: ICD-10-CM

## 2020-03-02 NOTE — TELEPHONE ENCOUNTER
"Requested Prescriptions   Pending Prescriptions Disp Refills     hydrALAZINE (APRESOLINE) 25 MG tablet [Pharmacy Med Name: hydrALAZINE HCl 25 MG Oral Tablet]  0     Sig: Take 1 tablet by mouth twice daily  Last Written Prescription Date:  3/2/20  Last Fill Quantity: 60,  # refills: 0   Last office visit: 1/15/2020 with prescribing provider:  TOR Swan  Future Office Visit:   Next 5 appointments (look out 90 days)    Mar 05, 2020 10:20 AM CST  Office Visit with Matt Swan PA-C  [facility] [address] [facility] [address]  [phone]              Vasodilators Passed - 3/2/2020 12:42 PM        Passed - Most recent BP less than 140/90 on record     BP Readings from Last 3 Encounters:   01/16/20 138/76   01/15/20 137/76   01/07/20 101/57                 Passed - Most recent encounter is not a hospital encounter. Patient has recent (12 mos) or future (1 mos) visit with authorizing provider's specialty     Patient's most recent encounter is NOT a hospital encounter and has had an office visit in the last 12 months or has a visit in the next 30 days with authorizing provider or within the authorizing provider's specialty.      See \"Patient Info\" tab in inbasket, or \"Choose Columns\" in Meds & Orders section of the refill encounter.      If most recent encounter is a hospital encounter AND the patient does NOT have an appointment scheduled with the authorizing provider or authorizing provider's specialty within the next 30 days, forward refill to authorizing provider for medication review.          Passed - Medication is active on med list        Passed - Patient is of age 18 years or older        losartan (COZAAR) 100 MG tablet [Pharmacy Med Name: Losartan Potassium 100 MG Oral Tablet]  0     Sig: Take 1 tablet by mouth once daily  Last Written Prescription Date:  3/2/20  Last Fill Quantity: 90,  # refills: 0   Last office visit: 1/15/2020 with prescribing " "provider:  TOR Swan   Future Office Visit:   Next 5 appointments (look out 90 days)    Mar 05, 2020 10:20 AM CST  Office Visit with Matt Swan PA-C  [facility] [address] [facility]  [place] [id number] [phone]              Angiotensin-II Receptors Passed - 3/2/2020 12:42 PM        Passed - Last blood pressure under 140/90 in past 12 months     BP Readings from Last 3 Encounters:   01/16/20 138/76   01/15/20 137/76   01/07/20 101/57                 Passed - Recent (12 mo) or future (30 days) visit within the authorizing provider's specialty     Patient has had an office visit with the authorizing provider or a provider within the authorizing providers department within the previous 12 mos or has a future within next 30 days. See \"Patient Info\" tab in inbasket, or \"Choose Columns\" in Meds & Orders section of the refill encounter.              Passed - Medication is active on med list        Passed - Patient is age 18 or older        Passed - Normal serum creatinine on file in past 12 months     Recent Labs   Lab Test 11/19/19  1100   CR 1.01             Passed - Normal serum potassium on file in past 12 months     Recent Labs   Lab Test 11/19/19  1100   POTASSIUM 4.2                    "

## 2020-03-04 ENCOUNTER — MYC MEDICAL ADVICE (OUTPATIENT)
Dept: NEUROLOGY | Facility: CLINIC | Age: 70
End: 2020-03-04

## 2020-03-04 NOTE — TELEPHONE ENCOUNTER
Routing refill request to provider for review/approval because:  Drug interaction warning    Pt has appt with pcp on 3/5/20  Meds pended for 6 month supply

## 2020-03-05 ENCOUNTER — OFFICE VISIT (OUTPATIENT)
Dept: FAMILY MEDICINE | Facility: CLINIC | Age: 70
End: 2020-03-05
Payer: MEDICARE

## 2020-03-05 VITALS
OXYGEN SATURATION: 96 % | SYSTOLIC BLOOD PRESSURE: 125 MMHG | WEIGHT: 262.2 LBS | BODY MASS INDEX: 37.62 KG/M2 | TEMPERATURE: 96.5 F | HEART RATE: 70 BPM | DIASTOLIC BLOOD PRESSURE: 74 MMHG | RESPIRATION RATE: 20 BRPM

## 2020-03-05 DIAGNOSIS — N18.2 CKD (CHRONIC KIDNEY DISEASE) STAGE 2, GFR 60-89 ML/MIN: ICD-10-CM

## 2020-03-05 DIAGNOSIS — I10 HYPERTENSION GOAL BP (BLOOD PRESSURE) < 140/90: Primary | ICD-10-CM

## 2020-03-05 DIAGNOSIS — E66.01 MORBID OBESITY (H): ICD-10-CM

## 2020-03-05 DIAGNOSIS — K11.7 XEROSTOMIA: ICD-10-CM

## 2020-03-05 LAB
ANION GAP SERPL CALCULATED.3IONS-SCNC: 9 MMOL/L (ref 3–14)
BUN SERPL-MCNC: 21 MG/DL (ref 7–30)
CALCIUM SERPL-MCNC: 9.8 MG/DL (ref 8.5–10.1)
CHLORIDE SERPL-SCNC: 94 MMOL/L (ref 94–109)
CO2 SERPL-SCNC: 33 MMOL/L (ref 20–32)
CREAT SERPL-MCNC: 1.12 MG/DL (ref 0.66–1.25)
GFR SERPL CREATININE-BSD FRML MDRD: 67 ML/MIN/{1.73_M2}
GLUCOSE SERPL-MCNC: 97 MG/DL (ref 70–99)
POTASSIUM SERPL-SCNC: 3.5 MMOL/L (ref 3.4–5.3)
SODIUM SERPL-SCNC: 136 MMOL/L (ref 133–144)

## 2020-03-05 PROCEDURE — 80048 BASIC METABOLIC PNL TOTAL CA: CPT | Performed by: PHYSICIAN ASSISTANT

## 2020-03-05 PROCEDURE — 36415 COLL VENOUS BLD VENIPUNCTURE: CPT | Performed by: PHYSICIAN ASSISTANT

## 2020-03-05 PROCEDURE — 99214 OFFICE O/P EST MOD 30 MIN: CPT | Performed by: PHYSICIAN ASSISTANT

## 2020-03-05 RX ORDER — LOSARTAN POTASSIUM 100 MG/1
TABLET ORAL
Qty: 90 TABLET | Refills: 1 | Status: SHIPPED | OUTPATIENT
Start: 2020-03-05 | End: 2020-07-28

## 2020-03-05 RX ORDER — HYDRALAZINE HYDROCHLORIDE 25 MG/1
TABLET, FILM COATED ORAL
Qty: 180 TABLET | Refills: 1 | Status: SHIPPED | OUTPATIENT
Start: 2020-03-05 | End: 2020-07-28

## 2020-03-05 NOTE — PROGRESS NOTES
Subjective     Prashant Keyes is a 69 year old male who presents to clinic today for the following health issues:    HPI   Hypertension Follow-up      Do you check your blood pressure regularly outside of the clinic? No     Are you following a low salt diet? No    Are your blood pressures ever more than 140 on the top number (systolic) OR more   than 90 on the bottom number (diastolic), for example 140/90? No, unsure, great today      How many servings of fruits and vegetables do you eat daily?  0-1    On average, how many sweetened beverages do you drink each day (Examples: soda, juice, sweet tea, etc.  Do NOT count diet or artificially sweetened beverages)?   1    How many days per week do you exercise enough to make your heart beat faster? 3 or less    How many minutes a day do you exercise enough to make your heart beat faster? 9 or less    How many days per week do you miss taking your medication? 0    Dry Mouth - from meds  Suspect this is due to his ditropan , possibly linked to the sinemet dose increase as of late.  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)     Class 1 obesity with serious comorbidity and body mass index (BMI) of 31.0 to 31.9 in adult, unspecified obesity type     Obesity (BMI 35.0-39.9) with comorbidity (H)     Parkinsonism (H)     Past Surgical History:   Procedure Laterality Date     ANGIOGRAM  2003    Coronary Angiogram- negative     ARTHROSCOPY KNEE Left 9/5/2019    Procedure: LEFT KNEE ARTHROSCOPY WITH MENISCAL AND  CHONDRAL DEBRIDEMENT;  Surgeon: Damon Rosas MD;  Location: [facility]     COLONOSCOPY WITH CO2 INSUFFLATION N/A 8/17/2017    Procedure: COLONOSCOPY WITH CO2 INSUFFLATION;  COLON SCREEN/ FLYNN;  Surgeon: Varun Bond MD;  Location: [facility] OR     HC REMOVAL TESTIS,SIMPLE  1978    Right undecended     HERNIA REPAIR, INGUINAL RT/LT  1963, 1978    Right Hernia     HERNIORRHAPHY UMBILICAL  4/2013    Likely       Social History     Tobacco Use     Smoking status: Never Smoker     Smokeless tobacco: Never Used   Substance Use Topics     Alcohol use: No     Alcohol/week: 0.0 standard drinks     Comment: Quit since 2003.      Family History   Problem Relation Age of Onset     Cerebrovascular Disease Mother         60's     C.A.D. Mother         CABG 75     Arthritis Mother      Eye Disorder Mother      Heart Disease Mother      Cardiovascular Father         Rheumatic Heart Disease     Hypertension Brother      Lipids Brother      C.A.D. Brother         CABG 46     Arthritis Brother      Heart Disease Brother         CABG x5     Obesity Brother      Hypertension Brother      Lipids Brother      Thyroid Disease Brother      Alcohol/Drug Brother      Heart Disease Brother         CABG     Cancer Sister         Thyroid CA     Hypertension Sister      Obesity Sister      Thyroid Disease Sister      Hemochromatosis Sister      Depression Daughter      Depression Daughter      Depression Son      Diabetes Son      Depression Son      Gastrointestinal Disease Brother         Crohn's Disease-Ostomy     Arrhythmia Sister      Cancer Maternal Grandmother         Thyroid CA     Cancer Paternal Grandfather         Throat CA     Thyroid Disease Maternal Grandfather          Current Outpatient Medications   Medication Sig Dispense Refill     allopurinol (ZYLOPRIM) 100 MG tablet TAKE TWO TABLETS BY MOUTH ONCE DAILY 180 tablet 3     amLODIPine (NORVASC) 10 MG tablet Take 1 tablet (10 mg) by mouth daily (with dinner) 90 tablet 1      aspirin 81 MG tablet Take 1 tablet (81 mg) by mouth daily 30 tablet      carbidopa-levodopa (SINEMET)  MG tablet 1 tab in the morning, 2 tabs at noontime and 2 tabs at 6pm 450 tablet 2     carvedilol (COREG) 12.5 MG tablet TAKE ONE TABLET BY MOUTH TWICE DAILY WITH MEALS 180 tablet 1     chlorthalidone (HYGROTON) 25 MG tablet Take 1 tablet (25 mg) by mouth daily 90 tablet 1     Cholecalciferol (VITAMIN D) 2000 UNITS tablet Take 2,000 Units by mouth daily 90 tablet 3     Cyanocobalamin (VITAMIN  B-12) 2500 MCG tablet Place 2,500 mcg under the tongue daily 90 tablet 3     divalproex sodium delayed-release (DEPAKOTE) 500 MG DR tablet Take 1 tablet (500 mg) by mouth 2 times daily 180 tablet 2     ferrous sulfate (IRON) 325 (65 FE) MG tablet Take 1 tablet (325 mg) by mouth 2 times daily 60 tablet 2     furosemide (LASIX) 20 MG tablet TAKE 2 TABLETS BY MOUTH AS NEEDED 60 tablet 2     gabapentin (NEURONTIN) 100 MG capsule Take 1 capsule (100 mg) by mouth 3 times daily 90 capsule 5     hydrALAZINE (APRESOLINE) 25 MG tablet Take 1 tablet by mouth twice daily 180 tablet 1     levETIRAcetam (KEPPRA) 1000 MG tablet TAKE 1 TABLET BY MOUTH TWICE DAILY IN THE MORNING AND AT BEDTIME 180 tablet 2     losartan (COZAAR) 100 MG tablet Take 1 tablet by mouth once daily 90 tablet 1     Multiple Vitamin (MULTI VITAMIN MENS PO) Take  by mouth.       omeprazole (PRILOSEC) 40 MG DR capsule TAKE ONE CAPSULE BY MOUTH ONCE DAILY (TAKE  30  TO  60  MINUTES  BEFORE  A  MEAL) 30 capsule 6     oxybutynin (DITROPAN) 5 MG tablet TAKE 1 TABLET BY MOUTH TWICE DAILY 60 tablet 3     potassium chloride ER (KLOR-CON) 20 MEQ CR tablet TAKE 1 TABLET BY MOUTH THREE TIMES DAILY 270 tablet 1     sertraline (ZOLOFT) 50 MG tablet Take 1 tablet (50 mg) by mouth daily 90 tablet 3     simvastatin (ZOCOR) 20 MG tablet TAKE 2 TABLETS BY MOUTH IN THE EVENING AT BEDTIME 180 tablet 1     traZODone (DESYREL) 100 MG tablet Take 100 mg by mouth At Bedtime        "colchicine (COLCYRS) 0.6 MG tablet Take 1 tablet (0.6 mg) by mouth daily as needed Take 2 tabs on first day.  Take at first sign of gout flair.  Take for max of 1 week per flair (Patient not taking: Reported on 11/5/2019) 30 tablet 0     ORDER FOR DME CPAP daily       UNABLE TO FIND daily MEDICATION NAME: sawpalmetto - 2 capsules once a day       Allergies   Allergen Reactions     Accupril [Ace Inhibitors] Difficulty breathing     accupril causes SOB     Aptiom [Eslicarbazepine] Difficulty breathing     Atorvastatin Calcium      Myalgias from Lipitor     Contrast Dye Hives     Coreg [Carvedilol] Nausea and Fatigue     Lactose GI Disturbance     Lisinopril Difficulty breathing     SOB     Nitroglycerin      Headache     Paroxetine Hives     Tetracycline [Tetracyclines]      \"splitting headaches\"     Vimpat [Lacosamide] Difficulty breathing     Zoloft Rash     Zolpidem      Adhesive Tape Rash     Without itching- EKG pads     Recent Labs   Lab Test 11/19/19  1100 10/10/19  1402  06/25/19  0936 12/18/18  0926 09/10/18  0759 01/29/18  1643  05/05/17  0825   LDL  --   --   --  69  --  65  --   --  63   HDL  --   --   --  70  --  72  --   --  66   TRIG  --   --   --  100  --  93  --   --  148   ALT  --   --   --   --  17  --  9  --  18   CR 1.01 0.98   < >  --  1.03  --  1.20   < > 1.28*   GFRESTIMATED 76 79   < >  --  72  --  60*   < > 56*   GFRESTBLACK 88 >90   < >  --  87  --  73   < > 68   POTASSIUM 4.2 3.6   < >  --  3.7  --  3.4   < > 3.3*   TSH  --   --   --   --  0.29*  --  0.52  --   --     < > = values in this interval not displayed.      BP Readings from Last 3 Encounters:   03/05/20 125/74   01/16/20 138/76   01/15/20 137/76    Wt Readings from Last 3 Encounters:   03/05/20 118.9 kg (262 lb 3.2 oz)   01/16/20 114.8 kg (253 lb)   01/15/20 116.4 kg (256 lb 9.6 oz)                      Reviewed and updated as needed this visit by Provider         Review of Systems   ROS COMP: Constitutional, HEENT, " cardiovascular, pulmonary, GI, , musculoskeletal, neuro, skin, endocrine and psych systems are negative, except as otherwise noted.      Objective    /74   Pulse 70   Temp 96.5  F (35.8  C) (Tympanic)   Resp 20   Wt 118.9 kg (262 lb 3.2 oz)   SpO2 96%   BMI 37.62 kg/m    Body mass index is 37.62 kg/m .  Physical Exam   Eye exam - right eye normal lid, conjunctiva, cornea, pupil and fundus, left eye normal lid, conjunctiva, cornea, pupil and fundus.  Thyroid not palpable, not enlarged, no nodules detected.  CHEST:chest clear to IPPA, no tachypnea, retractions or cyanosis and S1, S2 normal, no murmur, no gallop, rate regular.    Prashant was seen today for hypertension and mouth problem.    Diagnoses and all orders for this visit:    Hypertension goal BP (blood pressure) < 140/90    Morbid obesity (H)  work on lifestyle modification    CKD (chronic kidney disease) stage 2, GFR 60-89 ml/min    Xerostomia- biotene. Sugar free candy lozenges   Blood pressure nice and stable.  work on lifestyle modification

## 2020-03-12 DIAGNOSIS — I10 BENIGN ESSENTIAL HYPERTENSION: ICD-10-CM

## 2020-03-12 NOTE — TELEPHONE ENCOUNTER
"Requested Prescriptions   Pending Prescriptions Disp Refills     furosemide (LASIX) 20 MG tablet [Pharmacy Med Name: Furosemide 20 MG Oral Tablet] 60 tablet 0     Sig: TAKE 2 TABLETS BY MOUTH AS NEEDED   Last Written Prescription Date:  2-8-20  Last Fill Quantity: 60,  # refills: 0   Last office visit: 3/5/2020 with prescribing provider:  3-5-20   Future Office Visit:      Diuretics (Including Combos) Protocol Passed - 3/12/2020  5:30 AM        Passed - Blood pressure under 140/90 in past 12 months     BP Readings from Last 3 Encounters:   03/05/20 125/74   01/16/20 138/76   01/15/20 137/76                 Passed - Recent (12 mo) or future (30 days) visit within the authorizing provider's specialty     Patient has had an office visit with the authorizing provider or a provider within the authorizing providers department within the previous 12 mos or has a future within next 30 days. See \"Patient Info\" tab in inbasket, or \"Choose Columns\" in Meds & Orders section of the refill encounter.              Passed - Medication is active on med list        Passed - Patient is age 18 or older        Passed - Normal serum creatinine on file in past 12 months     Recent Labs   Lab Test 03/05/20  1116   CR 1.12              Passed - Normal serum potassium on file in past 12 months     Recent Labs   Lab Test 03/05/20  1116   POTASSIUM 3.5                    Passed - Normal serum sodium on file in past 12 months     Recent Labs   Lab Test 03/05/20  1116                    "

## 2020-03-13 RX ORDER — FUROSEMIDE 20 MG
TABLET ORAL
Qty: 180 TABLET | Refills: 1 | Status: SHIPPED | OUTPATIENT
Start: 2020-03-13 | End: 2020-09-03

## 2020-03-16 DIAGNOSIS — K21.9 GASTROESOPHAGEAL REFLUX DISEASE, ESOPHAGITIS PRESENCE NOT SPECIFIED: ICD-10-CM

## 2020-03-16 NOTE — TELEPHONE ENCOUNTER
"Requested Prescriptions   Pending Prescriptions Disp Refills     omeprazole (PRILOSEC) 40 MG DR capsule [Pharmacy Med Name: Omeprazole 40 MG Oral Capsule Delayed Release] 30 capsule 0     Sig: TAKE 1 CAPSULE BY MOUTH ONCE DAILY (TAKE 30 TO 60 MINUTES BEFORE A MEAL)   Last Written Prescription Date:  2-8-20  Last Fill Quantity: 30,  # refills: 0   Last office visit: 3/5/2020 with prescribing provider:  3-5-20   Future Office Visit:      PPI Protocol Passed - 3/16/2020  5:30 AM        Passed - Not on Clopidogrel (unless Pantoprazole ordered)        Passed - No diagnosis of osteoporosis on record        Passed - Recent (12 mo) or future (30 days) visit within the authorizing provider's specialty     Patient has had an office visit with the authorizing provider or a provider within the authorizing providers department within the previous 12 mos or has a future within next 30 days. See \"Patient Info\" tab in inbasket, or \"Choose Columns\" in Meds & Orders section of the refill encounter.              Passed - Medication is active on med list        Passed - Patient is age 18 or older           "

## 2020-03-17 ENCOUNTER — MYC MEDICAL ADVICE (OUTPATIENT)
Dept: FAMILY MEDICINE | Facility: CLINIC | Age: 70
End: 2020-03-17

## 2020-03-17 ENCOUNTER — MYC REFILL (OUTPATIENT)
Dept: FAMILY MEDICINE | Facility: CLINIC | Age: 70
End: 2020-03-17

## 2020-03-17 DIAGNOSIS — K21.9 GASTROESOPHAGEAL REFLUX DISEASE, ESOPHAGITIS PRESENCE NOT SPECIFIED: ICD-10-CM

## 2020-03-17 RX ORDER — OMEPRAZOLE 40 MG/1
CAPSULE, DELAYED RELEASE ORAL
Qty: 30 CAPSULE | Refills: 6 | Status: CANCELLED | OUTPATIENT
Start: 2020-03-17

## 2020-03-18 RX ORDER — OMEPRAZOLE 40 MG/1
CAPSULE, DELAYED RELEASE ORAL
Qty: 90 CAPSULE | Refills: 0 | Status: SHIPPED | OUTPATIENT
Start: 2020-03-18 | End: 2020-06-16

## 2020-04-03 DIAGNOSIS — N32.81 OVERACTIVE BLADDER: ICD-10-CM

## 2020-04-03 NOTE — TELEPHONE ENCOUNTER
"Requested Prescriptions   Pending Prescriptions Disp Refills     oxybutynin (DITROPAN) 5 MG tablet [Pharmacy Med Name: Oxybutynin Chloride 5 MG Oral Tablet] 60 tablet 0     Sig: Take 1 tablet by mouth twice daily  Last Written Prescription Date:  3/3/20  Last Fill Quantity: 60,  # refills: 0   Last office visit: 3/5/2020 with prescribing provider:  TOR Swan  Future Office Visit:         Muscarinic Antagonists (Urinary Incontinence Agents) Passed - 4/3/2020  6:19 AM        Passed - Recent (12 mo) or future (30 days) visit within the authorizing provider's specialty     Patient has had an office visit with the authorizing provider or a provider within the authorizing providers department within the previous 12 mos or has a future within next 30 days. See \"Patient Info\" tab in inbasket, or \"Choose Columns\" in Meds & Orders section of the refill encounter.              Passed - Medication is Oxybutynin and patient is 5 years of age or older        Passed - Patient does not have a diagnosis of glaucoma on the problem list     If glaucoma diagnosis is new, refer refill to physician.          Passed - Medication is active on med list        Passed - Patient is 18 years of age or older           "

## 2020-04-05 RX ORDER — OXYBUTYNIN CHLORIDE 5 MG/1
TABLET ORAL
Qty: 180 TABLET | Refills: 0 | Status: SHIPPED | OUTPATIENT
Start: 2020-04-05 | End: 2020-07-06

## 2020-04-06 ENCOUNTER — TELEPHONE (OUTPATIENT)
Dept: FAMILY MEDICINE | Facility: CLINIC | Age: 70
End: 2020-04-06

## 2020-04-06 DIAGNOSIS — N32.81 OVERACTIVE BLADDER: ICD-10-CM

## 2020-04-06 RX ORDER — OXYBUTYNIN CHLORIDE 5 MG/1
5 TABLET ORAL 2 TIMES DAILY
Qty: 180 TABLET | Status: CANCELLED | OUTPATIENT
Start: 2020-04-06

## 2020-04-06 NOTE — TELEPHONE ENCOUNTER
Med already sent to [facility].  Patient requesting more refills.  Will send to PCP to advise        oxybutynin (DITROPAN) 5 MG tablet  180 tablet  0  4/5/2020   No    Sig: Take 1 tablet by mouth twice daily    Sent to pharmacy as: oxybutynin (DITROPAN) 5 MG tablet    Class: E-Prescribe    Order: [id number]    Cosign for Ordering: Accepted by Matt Swan PA-C on 4/5/2020  9:58 PM    E-Prescribing Status: Receipt confirmed by pharmacy (4/5/2020  5:20 PM CDT)

## 2020-04-06 NOTE — TELEPHONE ENCOUNTER
Called and spoke with pharmacy. Patient does not need refills. Per pharmacy they do not have enough on hand for full prescription. They will fill once they have more stock.Sheryl Dyer MA

## 2020-05-04 DIAGNOSIS — I10 BENIGN ESSENTIAL HYPERTENSION: ICD-10-CM

## 2020-05-05 RX ORDER — CHLORTHALIDONE 25 MG/1
TABLET ORAL
Qty: 90 TABLET | Refills: 0 | Status: SHIPPED | OUTPATIENT
Start: 2020-05-05 | End: 2020-07-28

## 2020-06-06 DIAGNOSIS — E87.6 HYPOKALEMIA: ICD-10-CM

## 2020-06-09 NOTE — TELEPHONE ENCOUNTER
Routing refill request to provider for review/approval because:  Needs provider approval, possible break in medication.  LOV 3/2020.  Pended for approval

## 2020-06-10 RX ORDER — POTASSIUM CHLORIDE 1500 MG/1
TABLET, EXTENDED RELEASE ORAL
Qty: 270 TABLET | Refills: 1 | Status: SHIPPED | OUTPATIENT
Start: 2020-06-10 | End: 2021-01-04

## 2020-06-15 DIAGNOSIS — K21.9 GASTROESOPHAGEAL REFLUX DISEASE, ESOPHAGITIS PRESENCE NOT SPECIFIED: ICD-10-CM

## 2020-06-16 RX ORDER — OMEPRAZOLE 40 MG/1
CAPSULE, DELAYED RELEASE ORAL
Qty: 90 CAPSULE | Refills: 0 | Status: SHIPPED | OUTPATIENT
Start: 2020-06-16 | End: 2020-09-14

## 2020-06-16 NOTE — TELEPHONE ENCOUNTER
"Prescription approved per [facility] [place] [place] Refill Protocol.        Requested Prescriptions   Pending Prescriptions Disp Refills     omeprazole (PRILOSEC) 40 MG DR capsule [Pharmacy Med Name: Omeprazole 40 MG Oral Capsule Delayed Release] 90 capsule 0     Sig: TAKE 1 CAPSULE BY MOUTH ONCE DAILY (TAKE  30  TO  60  MINUTES  BEFORE  A  MEAL)       PPI Protocol Passed - 6/15/2020  9:05 AM        Passed - Not on Clopidogrel (unless Pantoprazole ordered)        Passed - No diagnosis of osteoporosis on record        Passed - Recent (12 mo) or future (30 days) visit within the authorizing provider's specialty     Patient has had an office visit with the authorizing provider or a provider within the authorizing providers department within the previous 12 mos or has a future within next 30 days. See \"Patient Info\" tab in inbasket, or \"Choose Columns\" in Meds & Orders section of the refill encounter.              Passed - Medication is active on med list        Passed - Patient is age 18 or older             "

## 2020-06-29 ENCOUNTER — MYC REFILL (OUTPATIENT)
Dept: FAMILY MEDICINE | Facility: CLINIC | Age: 70
End: 2020-06-29

## 2020-06-29 DIAGNOSIS — I10 BENIGN ESSENTIAL HYPERTENSION: ICD-10-CM

## 2020-06-29 DIAGNOSIS — E78.00 PURE HYPERCHOLESTEROLEMIA: ICD-10-CM

## 2020-06-29 RX ORDER — SIMVASTATIN 20 MG
TABLET ORAL
Qty: 180 TABLET | Refills: 0 | Status: CANCELLED | OUTPATIENT
Start: 2020-06-29

## 2020-07-01 ENCOUNTER — MYC MEDICAL ADVICE (OUTPATIENT)
Dept: FAMILY MEDICINE | Facility: CLINIC | Age: 70
End: 2020-07-01

## 2020-07-01 NOTE — TELEPHONE ENCOUNTER
Routing refill request to provider for review/approval because:  Drug interaction warning. pended for approval.  Serenity Sher RN

## 2020-07-01 NOTE — LETTER
[facility]  [address]  [place] [address]  Phone: [phone]    July 2, 2020        Prashant Keyes  [address]  [place] [address]          To whom it may concern:    RE: Prashant Keyes is a patient of mine in good standing and I recommend that he     1) be restricted from lifting over 25 lbs.   2) be allowed to sit as required.    He suffers from chronic medical conditions and the additional break will help alleviate his symptoms.     If you have any questions or concerns, please call the clinic at the number listed above.           Sincerely,        Matt Swan PA-C/alessia

## 2020-07-02 DIAGNOSIS — N32.81 OVERACTIVE BLADDER: ICD-10-CM

## 2020-07-02 RX ORDER — CARVEDILOL 12.5 MG/1
TABLET ORAL
Qty: 180 TABLET | Refills: 0 | Status: SHIPPED | OUTPATIENT
Start: 2020-07-02 | End: 2020-09-29

## 2020-07-03 DIAGNOSIS — I10 BENIGN ESSENTIAL HYPERTENSION: ICD-10-CM

## 2020-07-03 DIAGNOSIS — E78.00 PURE HYPERCHOLESTEROLEMIA: ICD-10-CM

## 2020-07-03 LAB
ANION GAP SERPL CALCULATED.3IONS-SCNC: 8 MMOL/L (ref 3–14)
BUN SERPL-MCNC: 20 MG/DL (ref 7–30)
CALCIUM SERPL-MCNC: 8.8 MG/DL (ref 8.5–10.1)
CHLORIDE SERPL-SCNC: 95 MMOL/L (ref 94–109)
CHOLEST SERPL-MCNC: 147 MG/DL
CO2 SERPL-SCNC: 30 MMOL/L (ref 20–32)
CREAT SERPL-MCNC: 1.07 MG/DL (ref 0.66–1.25)
GFR SERPL CREATININE-BSD FRML MDRD: 70 ML/MIN/{1.73_M2}
GLUCOSE SERPL-MCNC: 89 MG/DL (ref 70–99)
HDLC SERPL-MCNC: 53 MG/DL
LDLC SERPL CALC-MCNC: 69 MG/DL
NONHDLC SERPL-MCNC: 94 MG/DL
POTASSIUM SERPL-SCNC: 3.7 MMOL/L (ref 3.4–5.3)
SODIUM SERPL-SCNC: 133 MMOL/L (ref 133–144)
TRIGL SERPL-MCNC: 124 MG/DL

## 2020-07-03 PROCEDURE — 80048 BASIC METABOLIC PNL TOTAL CA: CPT | Performed by: PHYSICIAN ASSISTANT

## 2020-07-03 PROCEDURE — 36415 COLL VENOUS BLD VENIPUNCTURE: CPT | Performed by: PHYSICIAN ASSISTANT

## 2020-07-03 PROCEDURE — 80061 LIPID PANEL: CPT | Performed by: PHYSICIAN ASSISTANT

## 2020-07-05 ENCOUNTER — MYC REFILL (OUTPATIENT)
Dept: FAMILY MEDICINE | Facility: CLINIC | Age: 70
End: 2020-07-05

## 2020-07-05 DIAGNOSIS — N32.81 OVERACTIVE BLADDER: ICD-10-CM

## 2020-07-06 RX ORDER — OXYBUTYNIN CHLORIDE 5 MG/1
TABLET ORAL
Qty: 180 TABLET | Refills: 0 | Status: SHIPPED | OUTPATIENT
Start: 2020-07-06 | End: 2020-12-28

## 2020-07-06 NOTE — TELEPHONE ENCOUNTER
Prescription approved per [facility] Refill Protocol.  Marjorie Mena RN

## 2020-07-07 ENCOUNTER — VIRTUAL VISIT (OUTPATIENT)
Dept: NEUROLOGY | Facility: CLINIC | Age: 70
End: 2020-07-07
Payer: MEDICARE

## 2020-07-07 DIAGNOSIS — R56.9 CONVULSIONS, UNSPECIFIED CONVULSION TYPE (H): Chronic | ICD-10-CM

## 2020-07-07 DIAGNOSIS — Z79.899 ENCOUNTER FOR LONG-TERM (CURRENT) USE OF MEDICATIONS: Primary | ICD-10-CM

## 2020-07-07 DIAGNOSIS — G40.A09 NONINTRACTABLE ABSENCE EPILEPSY WITHOUT STATUS EPILEPTICUS (H): ICD-10-CM

## 2020-07-07 DIAGNOSIS — G20.C PARKINSONISM, UNSPECIFIED PARKINSONISM TYPE (H): ICD-10-CM

## 2020-07-07 PROCEDURE — 99441 ZZC PHYSICIAN TELEPHONE EVALUATION 5-10 MIN: CPT | Performed by: PSYCHIATRY & NEUROLOGY

## 2020-07-07 RX ORDER — DIVALPROEX SODIUM 500 MG/1
500 TABLET, DELAYED RELEASE ORAL 2 TIMES DAILY
Qty: 180 TABLET | Refills: 2 | Status: SHIPPED | OUTPATIENT
Start: 2020-07-07 | End: 2020-09-21

## 2020-07-07 RX ORDER — CARBIDOPA AND LEVODOPA 25; 100 MG/1; MG/1
TABLET ORAL
Qty: 450 TABLET | Refills: 2 | Status: SHIPPED | OUTPATIENT
Start: 2020-07-07 | End: 2020-09-21

## 2020-07-07 RX ORDER — LEVETIRACETAM 1000 MG/1
TABLET ORAL
Qty: 180 TABLET | Refills: 2 | Status: SHIPPED | OUTPATIENT
Start: 2020-07-07 | End: 2020-09-21

## 2020-07-07 NOTE — LETTER
"    7/7/2020         RE: Prashant Keyes  [address]  [place] [address]        Dear Colleague,    Thank you for referring your patient, Prashant Keyes, to the [facility]. Please see a copy of my visit note below.    Prashant Keyes is a 69 year old male who is being evaluated via a billable telephone visit.      The patient has been notified of following:     \"This telephone visit will be conducted via a call between you and your physician/provider. We have found that certain health care needs can be provided without the need for a physical exam.  This service lets us provide the care you need with a short phone conversation.  If a prescription is necessary we can send it directly to your pharmacy.  If lab work is needed we can place an order for that and you can then stop by our lab to have the test done at a later time.    Telephone visits are billed at different rates depending on your insurance coverage. During this emergency period, for some insurers they may be billed the same as an in-person visit.  Please reach out to your insurance provider with any questions.    If during the course of the call the physician/provider feels a telephone visit is not appropriate, you will not be charged for this service.\"    Patient has given verbal consent for Telephone visit?  Yes    What phone number would you like to be contacted at? [phone]    How would you like to obtain your AV[facility]? [facility]    Phone call duration: 7 minutes    Bisi Temple MD        Physician Note:    Mr. 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 His seizures have been described as episodes of Left face and arm weakness with amnesia. He has history of more remote GTCs. The seizures have been controlled on Keppra and Depakote. 1.2020 Keppra level was therapeutic, Depakote level was low (typical for him), but because he was " stable clinically, we did not make any dose changes.     AED regimen: Depakote 500mg BID and Keppra 1000mg BID.     He is on Sinemet for Parkinsonian tremor. When I met with him in January, he reported some breakthrough tremor in the evenings, after work. We increased his evening dose of Sinemet from 1 tablet to 2. He also takes 1 tablet in the morning and 2 tablets at noon. He did report some increased problems with dry mouth in the interim. He met with his PCP who felt the dry mouth was more likely related to his Ditropan.     I spoke with Demian on the telephone today. He has not had any seizure recurrence since our previous visit.     He has noticed that he had more Right hand tremor one day in the setting of stress. His Left hand is maybe slightly more tremulous again, when he is stressed in particular. Overall though, he feels that the increase we made in his Sinemet last visit was beneficial and that the tremor is under adequate control right now. No new motor complaints.     He has not noticed any definite side effects on his medications, though he mentions that one day when he was having trouble with his computer he noticed a little bit of memory lapse. He does not feel that his cognition or memory has changed overall and does not think that memory problems get in the way of his day to day function.     Assessment and Plan:  Encounter Diagnoses   Name Primary?     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 The seizures remain under good control on the Keppra and Depakote by his report. He feels that for the most part his tremor is under adequate control as well. We will not make any medication changes today. I  have ordered labs to check medication levels next time he is in clinic to see his PCP. I would like to see Bill back in about 6 months for an updated exam. He understands and agrees with the plan.     Patient Instructions:   -- Continue the Keppra 1000mg twice daily and Depakote 500mg twice daily.  -- Continue the Sinemet: 1 tablet in the morning, 2 tablets at noon and 2 tablets in the evening.  -- Labs to check medication levels. Have these drawn when you are in to see your primary care provider later this month. We will notify you of the results.   -- Return to Neurology clinic in 6 months.   -- Let me know if any concerns arise in the meantime.     Phone call duration: 7 minutes. Additional 15 minutes spent in chart review and documentation by me.     Bisi Temple MD  Neurology    Again, thank you for allowing me to participate in the care of your patient.        Sincerely,        Bisi Temple MD

## 2020-07-07 NOTE — PROGRESS NOTES
Physician Note:    Mr. 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 His seizures have been described as episodes of Left face and arm weakness with amnesia. He has history of more remote GTCs. The seizures have been controlled on Keppra and Depakote. 1.2020 Keppra level was therapeutic, Depakote level was low (typical for him), but because he was stable clinically, we did not make any dose changes.     AED regimen: Depakote 500mg BID and Keppra 1000mg BID.     He is on Sinemet for Parkinsonian tremor. When I met with him in January, he reported some breakthrough tremor in the evenings, after work. We increased his evening dose of Sinemet from 1 tablet to 2. He also takes 1 tablet in the morning and 2 tablets at noon. He did report some increased problems with dry mouth in the interim. He met with his PCP who felt the dry mouth was more likely related to his Ditropan.     I spoke with Demian on the telephone today. He has not had any seizure recurrence since our previous visit.     He has noticed that he had more Right hand tremor one day in the setting of stress. His Left hand is maybe slightly more tremulous again, when he is stressed in particular. Overall though, he feels that the increase we made in his Sinemet last visit was beneficial and that the tremor is under adequate control right now. No new motor complaints.     He has not noticed any definite side effects on his medications, though he mentions that one day when he was having trouble with his computer he noticed a little bit of memory lapse. He does not feel that his cognition or memory has changed overall and does not think that memory problems get in the way of his day to day function.     Assessment and Plan:  Encounter Diagnoses   Name Primary?     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 The seizures remain under good control on the Keppra and Depakote by his report. He feels that for the most part his tremor is under adequate control as well. We will not make any medication changes today. I have ordered labs to check medication levels next time he is in clinic to see his PCP. I would like to see Demian back in about 6 months for an updated exam. He understands and agrees with the plan.     Patient Instructions:   -- Continue the Keppra 1000mg twice daily and Depakote 500mg twice daily.  -- Continue the Sinemet: 1 tablet in the morning, 2 tablets at noon and 2 tablets in the evening.  -- Labs to check medication levels. Have these drawn when you are in to see your primary care provider later this month. We will notify you of the results.   -- Return to Neurology clinic in 6 months.   -- Let me know if any concerns arise in the meantime.     Phone call duration: 7 minutes. Additional 15 minutes spent in chart review and documentation by me.     Bisi Temple MD  Neurology

## 2020-07-07 NOTE — PATIENT INSTRUCTIONS
Patient Instructions:   -- Continue the Keppra 1000mg twice daily and Depakote 500mg twice daily.  -- Continue the Sinemet: 1 tablet in the morning, 2 tablets at noon and 2 tablets in the evening.  -- Labs to check medication levels. Have these drawn when you are in to see your primary care provider later this month. We will notify you of the results.   -- Return to Neurology clinic in 6 months.   -- Let me know if any concerns arise in the meantime.

## 2020-07-07 NOTE — PROGRESS NOTES
"Prashant Keyes is a 69 year old male who is being evaluated via a billable telephone visit.      The patient has been notified of following:     \"This telephone visit will be conducted via a call between you and your physician/provider. We have found that certain health care needs can be provided without the need for a physical exam.  This service lets us provide the care you need with a short phone conversation.  If a prescription is necessary we can send it directly to your pharmacy.  If lab work is needed we can place an order for that and you can then stop by our lab to have the test done at a later time.    Telephone visits are billed at different rates depending on your insurance coverage. During this emergency period, for some insurers they may be billed the same as an in-person visit.  Please reach out to your insurance provider with any questions.    If during the course of the call the physician/provider feels a telephone visit is not appropriate, you will not be charged for this service.\"    Patient has given verbal consent for Telephone visit?  Yes    What phone number would you like to be contacted at? [phone]    How would you like to obtain your AVS? Hayliehart    Phone call duration: 7 minutes    Bisi Temple MD      "

## 2020-07-08 RX ORDER — OXYBUTYNIN CHLORIDE 5 MG/1
5 TABLET ORAL 2 TIMES DAILY
Qty: 180 TABLET | Refills: 0 | OUTPATIENT
Start: 2020-07-08

## 2020-07-09 DIAGNOSIS — M10.071 ACUTE IDIOPATHIC GOUT OF RIGHT ANKLE: Chronic | ICD-10-CM

## 2020-07-10 RX ORDER — ALLOPURINOL 100 MG/1
TABLET ORAL
Qty: 180 TABLET | Refills: 0 | Status: SHIPPED | OUTPATIENT
Start: 2020-07-10 | End: 2020-10-05

## 2020-07-10 NOTE — TELEPHONE ENCOUNTER
Medication is being filled for 1 time refill only due to:  Patient needs labs and recheck.   Marjorie Mena RN

## 2020-07-17 DIAGNOSIS — I10 BENIGN ESSENTIAL HYPERTENSION: ICD-10-CM

## 2020-07-19 RX ORDER — AMLODIPINE BESYLATE 10 MG/1
TABLET ORAL
Qty: 90 TABLET | Refills: 0 | Status: SHIPPED | OUTPATIENT
Start: 2020-07-19 | End: 2020-10-15

## 2020-07-19 NOTE — TELEPHONE ENCOUNTER
"Routing refill request to provider for review/approval because:  Drug interaction warning    Med pended for 90 day supply            Requested Prescriptions   Pending Prescriptions Disp Refills     amLODIPine (NORVASC) 10 MG tablet [Pharmacy Med Name: amLODIPine Besylate 10 MG Oral Tablet] 90 tablet 0     Sig: TAKE 1 TABLET BY MOUTH ONCE DAILY WITH  DINNER       Calcium Channel Blockers Protocol  Passed - 7/17/2020  7:24 AM        Passed - Blood pressure under 140/90 in past 12 months     BP Readings from Last 3 Encounters:   03/05/20 125/74   01/16/20 138/76   01/15/20 137/76                 Passed - Recent (12 mo) or future (30 days) visit within the authorizing provider's specialty     Patient has had an office visit with the authorizing provider or a provider within the authorizing providers department within the previous 12 mos or has a future within next 30 days. See \"Patient Info\" tab in inbasket, or \"Choose Columns\" in Meds & Orders section of the refill encounter.              Passed - Medication is active on med list        Passed - Patient is age 18 or older        Passed - Normal serum creatinine on file in past 12 months     Recent Labs   Lab Test 07/03/20  0802   CR 1.07       Ok to refill medication if creatinine is low               "

## 2020-07-20 ENCOUNTER — MYC MEDICAL ADVICE (OUTPATIENT)
Dept: NEUROLOGY | Facility: CLINIC | Age: 70
End: 2020-07-20

## 2020-07-21 NOTE — TELEPHONE ENCOUNTER
No, the medications he is on for seizures and tremor should not cause one-sided symptoms. This may need to be evaluated by a provider in clinic or UC. This is a new problem? Sudden onset?    J

## 2020-07-21 NOTE — TELEPHONE ENCOUNTER
"Pt was advised of Dr. Bourgeois's recommendation.  He reports that this was a sudden onset that started last week and says, \"I just noticed it as my partner also noticed this\".  He reports he is not shaking like from his Parkinson's.  He has a future appt for a physical on 07-28-20 and will discuss this concern at that time.  Pt does not feel this is not urgent or emergent therefore declines advice of Dr. Bourgeois's.    Lea Yates  [facility] RN  "

## 2020-07-27 ENCOUNTER — TELEPHONE (OUTPATIENT)
Dept: NEUROLOGY | Facility: CLINIC | Age: 70
End: 2020-07-27

## 2020-07-27 ASSESSMENT — ACTIVITIES OF DAILY LIVING (ADL): CURRENT_FUNCTION: NO ASSISTANCE NEEDED

## 2020-07-27 ASSESSMENT — ENCOUNTER SYMPTOMS
SORE THROAT: 0
JOINT SWELLING: 0
DIARRHEA: 0
PARESTHESIAS: 0
MYALGIAS: 0
EYE PAIN: 0
ARTHRALGIAS: 0
NAUSEA: 0
HEMATURIA: 0
HEMATOCHEZIA: 0
HEARTBURN: 0
CHILLS: 0
SHORTNESS OF BREATH: 0
DYSURIA: 0
DIZZINESS: 0
COUGH: 0
ABDOMINAL PAIN: 0
WEAKNESS: 0
CONSTIPATION: 0
PALPITATIONS: 0

## 2020-07-27 NOTE — TELEPHONE ENCOUNTER
Pt calling again with same symptoms as he did last week on 07-21-20 (see telephone encounter).  He mentions that this occurs usually in the morning up until about lunch time.  He has noticed this when he is tired.  His left hand is numb no tingling or weakness.  He denies ever having a neck injury, he is not awakened in the middle of the night with numbness or tingling of his left hand.      I questioned pt if anything is different today than from when he called on 07-21-20, he says no but just wants to give Dr. Bourgeois's an update.    Pt has a physical scheduled with his pcp tomorrow he will discuss at this visit.  He was encouraged to be seen in the ER for further evaluation where he could get the necessary tests like a CT of his head done immediately to r/o intercranial changes.    Pt agrees with this plan.  He would like that Dr. Bourgeois's be updated.    Lea Yates  [facility] RN

## 2020-07-27 NOTE — TELEPHONE ENCOUNTER
Called patient.  Sudden onset 2 weeks ago.  Advised unable to diagnose Over the Phone and needs to see his PCP.  Pt agrees.    Cheri LENNON   [facility]

## 2020-07-27 NOTE — TELEPHONE ENCOUNTER
Reason for call:  Patient reporting a symptom    Symptom or request: Pt states he is a pt of Dr. Temple - states left side of body is weak and numb - tingling.    Duration (how long have symptoms been present): about 2 weeks ago.    Have you been treated for this before? No    Additional comments: warm transferred to RN    Phone Number patient can be reached at:  Home number on file [phone] (home)    Best Time:      Can we leave a detailed message on this number:  YES    Call taken on 7/27/2020 at 9:59 AM by Mari Zee

## 2020-07-28 ENCOUNTER — OFFICE VISIT (OUTPATIENT)
Dept: FAMILY MEDICINE | Facility: CLINIC | Age: 70
End: 2020-07-28
Payer: MEDICARE

## 2020-07-28 VITALS
HEIGHT: 70 IN | DIASTOLIC BLOOD PRESSURE: 66 MMHG | RESPIRATION RATE: 20 BRPM | WEIGHT: 263 LBS | HEART RATE: 60 BPM | BODY MASS INDEX: 37.65 KG/M2 | OXYGEN SATURATION: 94 % | SYSTOLIC BLOOD PRESSURE: 129 MMHG | TEMPERATURE: 97.1 F

## 2020-07-28 DIAGNOSIS — Z79.899 ENCOUNTER FOR LONG-TERM (CURRENT) USE OF MEDICATIONS: ICD-10-CM

## 2020-07-28 DIAGNOSIS — R21 RASH ON SCROTUM: ICD-10-CM

## 2020-07-28 DIAGNOSIS — G40.A09 NONINTRACTABLE ABSENCE EPILEPSY WITHOUT STATUS EPILEPTICUS (H): ICD-10-CM

## 2020-07-28 DIAGNOSIS — R56.9 CONVULSIONS, UNSPECIFIED CONVULSION TYPE (H): Chronic | ICD-10-CM

## 2020-07-28 DIAGNOSIS — Z00.00 ENCOUNTER FOR MEDICARE ANNUAL WELLNESS EXAM: Primary | ICD-10-CM

## 2020-07-28 DIAGNOSIS — N18.2 CKD (CHRONIC KIDNEY DISEASE) STAGE 2, GFR 60-89 ML/MIN: ICD-10-CM

## 2020-07-28 DIAGNOSIS — I10 BENIGN ESSENTIAL HYPERTENSION: ICD-10-CM

## 2020-07-28 DIAGNOSIS — I10 HYPERTENSION GOAL BP (BLOOD PRESSURE) < 140/90: ICD-10-CM

## 2020-07-28 DIAGNOSIS — G20.C PARKINSONISM, UNSPECIFIED PARKINSONISM TYPE (H): ICD-10-CM

## 2020-07-28 DIAGNOSIS — G44.209 TENSION HEADACHE: ICD-10-CM

## 2020-07-28 LAB — VALPROATE SERPL-MCNC: 35 MG/L (ref 50–100)

## 2020-07-28 PROCEDURE — 99000 SPECIMEN HANDLING OFFICE-LAB: CPT | Performed by: PSYCHIATRY & NEUROLOGY

## 2020-07-28 PROCEDURE — 80164 ASSAY DIPROPYLACETIC ACD TOT: CPT | Performed by: PSYCHIATRY & NEUROLOGY

## 2020-07-28 PROCEDURE — 82043 UR ALBUMIN QUANTITATIVE: CPT | Performed by: PHYSICIAN ASSISTANT

## 2020-07-28 PROCEDURE — 80165 DIPROPYLACETIC ACID FREE: CPT | Mod: 90 | Performed by: PSYCHIATRY & NEUROLOGY

## 2020-07-28 PROCEDURE — G0439 PPPS, SUBSEQ VISIT: HCPCS | Performed by: PHYSICIAN ASSISTANT

## 2020-07-28 PROCEDURE — 99213 OFFICE O/P EST LOW 20 MIN: CPT | Mod: 25 | Performed by: PHYSICIAN ASSISTANT

## 2020-07-28 PROCEDURE — 80177 DRUG SCRN QUAN LEVETIRACETAM: CPT | Mod: 90 | Performed by: PSYCHIATRY & NEUROLOGY

## 2020-07-28 PROCEDURE — 36415 COLL VENOUS BLD VENIPUNCTURE: CPT | Performed by: PSYCHIATRY & NEUROLOGY

## 2020-07-28 RX ORDER — CLOTRIMAZOLE AND BETAMETHASONE DIPROPIONATE 10; .64 MG/G; MG/G
CREAM TOPICAL 2 TIMES DAILY
Qty: 45 G | Refills: 1 | Status: SHIPPED | OUTPATIENT
Start: 2020-07-28 | End: 2020-12-01

## 2020-07-28 RX ORDER — CHLORTHALIDONE 25 MG/1
25 TABLET ORAL DAILY
Qty: 90 TABLET | Refills: 1 | Status: SHIPPED | OUTPATIENT
Start: 2020-07-28 | End: 2021-01-23

## 2020-07-28 RX ORDER — HYDRALAZINE HYDROCHLORIDE 25 MG/1
25 TABLET, FILM COATED ORAL 2 TIMES DAILY
Qty: 180 TABLET | Refills: 1 | Status: SHIPPED | OUTPATIENT
Start: 2020-07-28 | End: 2021-02-23

## 2020-07-28 RX ORDER — LOSARTAN POTASSIUM 100 MG/1
100 TABLET ORAL DAILY
Qty: 90 TABLET | Refills: 1 | Status: SHIPPED | OUTPATIENT
Start: 2020-07-28 | End: 2021-02-26

## 2020-07-28 ASSESSMENT — MIFFLIN-ST. JEOR: SCORE: 1964.21

## 2020-07-28 NOTE — PROGRESS NOTES
"  SUBJECTIVE:   Prashant Keyes is a 69 year old male who presents for Preventive Visit.  Are you in the first 12 months of your Medicare Part B coverage?  No    Physical Health:    In general, how would you rate your overall physical health? good    Outside of work, how many days during the week do you exercise? none    Outside of work, approximately how many minutes a day do you exercise?less than 15 minutes    If you drink alcohol do you typically have >3 drinks per day or >7 drinks per week? Not Applicable    Do you usually eat at least 4 servings of fruit and vegetables a day, include whole grains & fiber and avoid regularly eating high fat or \"junk\" foods? NO    Do you have any problems taking medications regularly?  No    Do you have any side effects from medications? none    Needs assistance for the following daily activities: no assistance needed    Which of the following safety concerns are present in your home?  none identified     Hearing impairment: Yes, wears hearing aides    In the past 6 months, have you been bothered by leaking of urine? no    Mental Health:    In general, how would you rate your overall mental or emotional health? good  PHQ-2 Score: (P) 0    Do you feel safe in your environment? Yes    Have you ever done Advance Care Planning? (For example, a Health Directive, POLST, or a discussion with a medical provider or your loved ones about your wishes): Yes, patient states has an Advance Care Planning document and will bring a copy to the clinic.    Additional concerns to address?  YES  - for the last two months  When tired or early in the morning, notes some left sided finger numbness and jittery/tremulousness. Episodic   Some occasional  headaches (bill feels its sinus related). No dizziness.         Fall risk: 0    Fallen 2 or more times in the past year?: No  Any fall with injury in the past year?: NoCognitive Screenin) Repeat 3 items (Leader, Season, Table)    2) Clock draw: " normal   3) 3 item recall: Recalls 3 objects  Results: NORMAL clock, 3 items recalled: COGNITIVE IMPAIRMENT LESS LIKELY    Mini-CogTM Copyright JAROCHO Matson. Licensed by the author for use in [facility]; reprinted with permission (lachelle@[facility]). All rights reserved.      Do you have sleep apnea, excessive snoring or daytime drowsiness?: yes - uses CPAP            Reviewed and updated as needed this visit by clinical staff  Tobacco  Allergies  Meds  Med Hx  Surg Hx  Fam Hx  Soc Hx        Reviewed and updated as needed this visit by Provider        Social History     Tobacco Use     Smoking status: Never Smoker     Smokeless tobacco: Never Used   Substance Use Topics     Alcohol use: No     Alcohol/week: 0.0 standard drinks     Comment: Quit since 2003.                            Current providers sharing in care for this patient include:   Patient Care Team:  Matt Swan PA-C as PCP - General (Physician Assistant)  Dimas Qureshi MD as MD (Neurology)  Matt Swan PA-C as Assigned PCP    The following health maintenance items are reviewed in Epic and correct as of today:  Health Maintenance   Topic Date Due     MICROALBUMIN  06/25/2020     FALL RISK ASSESSMENT  07/01/2020     MEDICARE ANNUAL WELLNESS VISIT  07/01/2020     INFLUENZA VACCINE (1) 09/01/2020     BMP  07/03/2021     LIPID  07/03/2021     DEXA  06/05/2022     COLORECTAL CANCER SCREENING  08/17/2022     ADVANCE CARE PLANNING  09/11/2024     DTAP/TDAP/TD IMMUNIZATION (5 - Td) 10/05/2028     HEPATITIS C SCREENING  Completed     MIGRAINE ACTION PLAN  Completed     PHQ-2  Completed     PNEUMOCOCCAL IMMUNIZATION 65+ LOW/MEDIUM RISK  Completed     ZOSTER IMMUNIZATION  Completed     AORTIC ANEURYSM SCREENING (SYSTEM ASSIGNED)  Completed     IPV IMMUNIZATION  Aged Out     MENINGITIS IMMUNIZATION  Aged Out     HEPATITIS B IMMUNIZATION  Aged Out     Lab work is in process  Labs reviewed in EPIC  BP Readings from Last 3  Encounters:   07/28/20 129/66   03/05/20 125/74   01/16/20 138/76    Wt Readings from Last 3 Encounters:   07/28/20 119.3 kg (263 lb)   03/05/20 118.9 kg (262 lb 3.2 oz)   01/16/20 114.8 kg (253 lb)                  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)     Class 1 obesity with serious comorbidity and body mass index (BMI) of 31.0 to 31.9 in adult, unspecified obesity type     Obesity (BMI 35.0-39.9) with comorbidity (H)     Parkinsonism (H)     Past Surgical History:   Procedure Laterality Date     ANGIOGRAM  2003    Coronary Angiogram- negative     ARTHROSCOPY KNEE Left 9/5/2019    Procedure: LEFT KNEE ARTHROSCOPY WITH MENISCAL AND CHONDRAL DEBRIDEMENT;  Surgeon: Damon Rosas MD;  Location: [facility] OR     COLONOSCOPY WITH CO2 INSUFFLATION N/A 8/17/2017    Procedure: COLONOSCOPY WITH CO2 INSUFFLATION;  COLON SCREEN/ FLYNN;  Surgeon: Varun Bond MD;  Location: [facility] OR     HC REMOVAL TESTIS,SIMPLE  1978    Right undecended     HERNIA REPAIR, INGUINAL RT/LT  1963, 1978    Right Hernia     HERNIORRHAPHY UMBILICAL  4/2013    [place]       Social History     Tobacco Use     Smoking status: Never Smoker     Smokeless tobacco: Never Used   Substance Use Topics     Alcohol use: No     Alcohol/week: 0.0 standard drinks     Comment: Quit since 2003.      Family History   Problem Relation Age of Onset     Cerebrovascular Disease Mother         60's     C.A.D. Mother         CABG 75     Arthritis Mother      Eye Disorder Mother      Heart Disease Mother      Cardiovascular Father         Rheumatic Heart Disease      Hypertension Brother      Lipids Brother      C.A.D. Brother         CABG 46     Arthritis Brother      Heart Disease Brother         CABG x5     Obesity Brother      Hypertension Brother      Lipids Brother      Thyroid Disease Brother      Alcohol/Drug Brother      Heart Disease Brother         CABG     Cancer Sister         Thyroid CA     Hypertension Sister      Obesity Sister      Thyroid Disease Sister      Hemochromatosis Sister      Cerebrovascular Disease Sister         birth defect     Depression Daughter      Depression Daughter      Depression Son      Diabetes Son      Depression Son      Gastrointestinal Disease Brother         Crohn's Disease-Ostomy     Cerebrovascular Disease Brother         lipids     Arrhythmia Sister      Cancer Maternal Grandmother         Thyroid CA     Cancer Paternal Grandfather         Throat CA     Thyroid Disease Maternal Grandfather          Current Outpatient Medications   Medication Sig Dispense Refill     allopurinol (ZYLOPRIM) 100 MG tablet Take 2 tablets by mouth once daily 180 tablet 0     amLODIPine (NORVASC) 10 MG tablet TAKE 1 TABLET BY MOUTH ONCE DAILY WITH  DINNER 90 tablet 0     aspirin 81 MG tablet Take 1 tablet (81 mg) by mouth daily 30 tablet      carbidopa-levodopa (SINEMET)  MG tablet 1 tab in the morning, 2 tabs at noontime and 2 tabs at 6pm 450 tablet 2     carvedilol (COREG) 12.5 MG tablet TAKE ONE TABLET BY MOUTH TWICE DAILY WITH MEALS 180 tablet 0     chlorthalidone (HYGROTON) 25 MG tablet Take 1 tablet (25 mg) by mouth daily 90 tablet 1     Cholecalciferol (VITAMIN D) 2000 UNITS tablet Take 2,000 Units by mouth daily 90 tablet 3     clotrimazole-betamethasone (LOTRISONE) 1-0.05 % external cream Apply topically 2 times daily 45 g 1     colchicine (COLCYRS) 0.6 MG tablet Take 1 tablet (0.6 mg) by mouth daily as needed Take 2 tabs on first day.  Take at first sign of gout flair.  Take for max of 1 week per flair 30 tablet 0     Cyanocobalamin  (VITAMIN  B-12) 2500 MCG tablet Place 2,500 mcg under the tongue daily 90 tablet 3     divalproex sodium delayed-release (DEPAKOTE) 500 MG DR tablet Take 1 tablet (500 mg) by mouth 2 times daily 180 tablet 2     ferrous sulfate (IRON) 325 (65 FE) MG tablet Take 1 tablet (325 mg) by mouth 2 times daily 60 tablet 2     furosemide (LASIX) 20 MG tablet TAKE 2 TABLETS BY MOUTH AS NEEDED (Patient taking differently: Take 40 mg by mouth daily ) 180 tablet 1     gabapentin (NEURONTIN) 100 MG capsule TAKE ONE CAPSULE BY MOUTH THREE TIMES A DAY 90 capsule 2     hydrALAZINE (APRESOLINE) 25 MG tablet Take 1 tablet (25 mg) by mouth 2 times daily 180 tablet 1     levETIRAcetam (KEPPRA) 1000 MG tablet TAKE 1 TABLET BY MOUTH TWICE DAILY IN THE MORNING AND AT BEDTIME 180 tablet 2     losartan (COZAAR) 100 MG tablet Take 1 tablet (100 mg) by mouth daily 90 tablet 1     Multiple Vitamin (MULTI VITAMIN MENS PO) Take  by mouth.       omeprazole (PRILOSEC) 40 MG DR capsule TAKE 1 CAPSULE BY MOUTH ONCE DAILY (TAKE  30  TO  60  MINUTES  BEFORE  A  MEAL) 90 capsule 0     oxybutynin (DITROPAN) 5 MG tablet Take 1 tablet by mouth twice daily 180 tablet 0     potassium chloride ER (KLOR-CON M) 20 MEQ CR tablet TAKE 1 TABLET BY MOUTH THREE TIMES DAILY 270 tablet 1     sertraline (ZOLOFT) 50 MG tablet Take 1 tablet (50 mg) by mouth daily 90 tablet 3     simvastatin (ZOCOR) 20 MG tablet TAKE 2 TABLETS BY MOUTH IN THE EVENING AT BEDTIME 90 tablet 0     traZODone (DESYREL) 100 MG tablet Take 100 mg by mouth At Bedtime       UNABLE TO FIND daily MEDICATION NAME: sawpalmetto - 2 capsules once a day       ORDER FOR DME CPAP daily       Allergies   Allergen Reactions     Accupril [Ace Inhibitors] Difficulty breathing     accupril causes SOB     Aptiom [Eslicarbazepine] Difficulty breathing     Atorvastatin Calcium      Myalgias from Lipitor     Contrast Dye Hives     Coreg [Carvedilol] Nausea and Fatigue     Lactose GI Disturbance     Lisinopril  "Difficulty breathing     SOB     Nitroglycerin      Headache     Paroxetine Hives     Tetracycline [Tetracyclines]      \"splitting headaches\"     Vimpat [Lacosamide] Difficulty breathing     Zoloft Rash     Zolpidem      Adhesive Tape Rash     Without itching- EKG pads     Recent Labs   Lab Test 07/03/20  0802 03/05/20  1116  06/25/19  0936 12/18/18  0926 09/10/18  0759 01/29/18  1643  05/05/17  0825   LDL 69  --   --  69  --  65  --   --  63   HDL 53  --   --  70  --  72  --   --  66   TRIG 124  --   --  100  --  93  --   --  148   ALT  --   --   --   --  17  --  9  --  18   CR 1.07 1.12   < >  --  1.03  --  1.20   < > 1.28*   GFRESTIMATED 70 67   < >  --  72  --  60*   < > 56*   GFRESTBLACK 81 77   < >  --  87  --  73   < > 68   POTASSIUM 3.7 3.5   < >  --  3.7  --  3.4   < > 3.3*   TSH  --   --   --   --  0.29*  --  0.52  --   --     < > = values in this interval not displayed.          ROS:  Constitutional, HEENT, cardiovascular, pulmonary, GI, , musculoskeletal, neuro, skin, endocrine and psych systems are negative, except as otherwise noted.    OBJECTIVE:   /66   Pulse 60   Temp 97.1  F (36.2  C) (Tympanic)   Resp 20   Ht 1.778 m (5' 10\")   Wt 119.3 kg (263 lb)   SpO2 94%   BMI 37.74 kg/m   Estimated body mass index is 37.74 kg/m  as calculated from the following:    Height as of this encounter: 1.778 m (5' 10\").    Weight as of this encounter: 119.3 kg (263 lb).  EXAM:   GENERAL: healthy, alert and no distress  EYES: Eyes grossly normal to inspection, PERRL and conjunctivae and sclerae normal  HENT: ear canals and TM's normal, nose and mouth without ulcers or lesions  NECK: no adenopathy, no asymmetry, masses, or scars and thyroid normal to palpation  RESP: lungs clear to auscultation - no rales, rhonchi or wheezes  CV: regular rate and rhythm, normal S1 S2, no S3 or S4, no murmur, click or rub, no peripheral edema and peripheral pulses strong  ABDOMEN: soft, nontender, no hepatosplenomegaly, " no masses and bowel sounds normal  MS: no gross musculoskeletal defects noted, no edema  SKIN: no suspicious lesions or rashes  NEURO: Normal strength and tone, mentation intact and speech normal  PSYCH: mentation appears normal, affect normal/bright  His disks are flat. Pupils equal, round, reactive to light. Extraocular movements full. Visual fields full. Face moves symmetrically. Tongue midline. Hearing mildly decreased to finger-rubbing at approximately 6-8 inches. Neck without bruits. CV: S1, S2. Motor strength 5/5. Reflexes were 2/4. Toe signs were downgoing. Normal position sense. Good finger-nose-finger and fine finger movement. Gait: he greg from a chair without difficulty and has a mildly broad-based gait.    Diagnostic Test Results:  Labs reviewed in Epic    ASSESSMENT / PLAN:       ICD-10-CM    1. Encounter for Medicare annual wellness exam  Z00.00    2. Rash on scrotum  R21 clotrimazole-betamethasone (LOTRISONE) 1-0.05 % external cream   3. Benign essential hypertension  I10 chlorthalidone (HYGROTON) 25 MG tablet     losartan (COZAAR) 100 MG tablet   4. CKD (chronic kidney disease) stage 2, GFR 60-89 ml/min  N18.2 Albumin Random Urine Quantitative with Creat Ratio   5. Parkinsonism, unspecified Parkinsonism type (H)  G20    6. Convulsions, unspecified convulsion type (H)  R56.9 Valproic Acid Free     Keppra (Levetiracetam) Level     Valproic acid   7. 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 Encounter for long-term (current) use of medications  Z79.899 Valproic Acid Free     Keppra (Levetiracetam) Level     Valproic acid   9. Hypertension goal BP (blood pressure) < 140/90  I10 hydrALAZINE (APRESOLINE) 25 MG tablet   10. Tension headache  G44.209 CT Head w/o Contrast       COUNSELING:  Reviewed preventive health counseling, as reflected in patient instructions       Regular exercise       Healthy  "diet/nutrition    Estimated body mass index is 37.74 kg/m  as calculated from the following:    Height as of this encounter: 1.778 m (5' 10\").    Weight as of this encounter: 119.3 kg (263 lb).    Weight management plan: Discussed healthy diet and exercise guidelines     reports that he has never smoked. He has never used smokeless tobacco.      Appropriate preventive services were discussed with this patient, including applicable screening as appropriate for cardiovascular disease, diabetes, osteopenia/osteoporosis, and glaucoma.  As appropriate for age/gender, discussed screening for colorectal cancer, prostate cancer, breast cancer, and cervical cancer. Checklist reviewing preventive services available has been given to the patient.    Reviewed patients plan of care and provided an AVS. The Basic Care Plan (routine screening as documented in Health Maintenance) for Prashant meets the Care Plan requirement. This Care Plan has been established and reviewed with the Patient.    Counseling Resources:  ATP IV Guidelines  Pooled Cohorts Equation Calculator  Breast Cancer Risk Calculator  FRAX Risk Assessment  ICSI Preventive Guidelines  Dietary Guidelines for Americans, 2010  [employer] MyPlate  ASA Prophylaxis  Lung CA Screening    Matt Swan PA-C  [facility]  Answers for HPI/ROS submitted by the patient on 7/27/2020   Annual Exam:  In general, how would you rate your overall physical health?: fair  Frequency of exercise:: None  Do you usually eat at least 4 servings of fruit and vegetables a day, include whole grains & fiber, and avoid regularly eating high fat or \"junk\" foods? : No  Taking medications regularly:: No  Medication side effects:: None  Activities of Daily Living: no assistance needed  Home safety: no safety concerns identified  Hearing Impairment:: no hearing concerns  In the past 6 months, have you been bothered by leaking of urine?: No  abdominal pain: No  Blood in stool: No  Blood in " urine: No  chest pain: No  chills: No  congestion: No  constipation: No  cough: No  diarrhea: No  dizziness: No  ear pain: No  eye pain: No  hearing loss: Yes  heartburn: No  arthralgias: No  joint swelling: No  peripheral edema: No  mood changes: No  myalgias: No  nausea: No  dysuria: No  palpitations: No  Skin sensation changes: No  sore throat: No  urgency: No  rash: No  shortness of breath: No  visual disturbance: No  weakness: No  impotence: No  penile discharge: No  In general, how would you rate your overall mental or emotional health?: good  Additional concerns today:: Yes  Barriers to taking medications:: None

## 2020-07-28 NOTE — PATIENT INSTRUCTIONS
Patient Education   Personalized Prevention Plan  You are due for the preventive services outlined below.  Your care team is available to assist you in scheduling these services.  If you have already completed any of these items, please share that information with your care team to update in your medical record.  Health Maintenance Due   Topic Date Due     Kidney Microalbumin Urine Test  06/25/2020     FALL RISK ASSESSMENT  07/01/2020     Annual Wellness Visit  07/01/2020

## 2020-07-29 LAB
CREAT UR-MCNC: 22 MG/DL
LEVETIRACETAM SERPL-MCNC: 35 UG/ML (ref 12–46)
MICROALBUMIN UR-MCNC: <5 MG/L
MICROALBUMIN/CREAT UR: NORMAL MG/G CR (ref 0–17)
VALPROATE FREE SERPL-MCNC: <5 UG/ML (ref 6–20)

## 2020-07-29 NOTE — RESULT ENCOUNTER NOTE
Please advise Prashant Keyes,  1950, that his Depakote level is in the subtherapeutic (low) range. Keppra level is in therapeutic range. Did his primary care provider have any thoughts about the numbness in his hand? Not clear from the note. Is the sensation similar to any of his past seizures (Left-sided weakness?)  [phone] (home)   Bisi Temple MD

## 2020-08-04 ENCOUNTER — ANCILLARY PROCEDURE (OUTPATIENT)
Dept: CT IMAGING | Facility: CLINIC | Age: 70
End: 2020-08-04
Attending: PHYSICIAN ASSISTANT
Payer: MEDICARE

## 2020-08-04 DIAGNOSIS — G44.209 TENSION HEADACHE: ICD-10-CM

## 2020-08-04 PROCEDURE — 70450 CT HEAD/BRAIN W/O DYE: CPT | Mod: TC

## 2020-08-08 DIAGNOSIS — E78.00 PURE HYPERCHOLESTEROLEMIA: ICD-10-CM

## 2020-08-10 RX ORDER — SIMVASTATIN 20 MG
TABLET ORAL
Qty: 180 TABLET | Refills: 1 | Status: SHIPPED | OUTPATIENT
Start: 2020-08-10 | End: 2021-02-04

## 2020-08-14 ENCOUNTER — MYC MEDICAL ADVICE (OUTPATIENT)
Dept: FAMILY MEDICINE | Facility: CLINIC | Age: 70
End: 2020-08-14

## 2020-08-19 ENCOUNTER — E-VISIT (OUTPATIENT)
Dept: FAMILY MEDICINE | Facility: CLINIC | Age: 70
End: 2020-08-19
Payer: MEDICARE

## 2020-08-19 DIAGNOSIS — Z53.9 ERRONEOUS ENCOUNTER--DISREGARD: Primary | ICD-10-CM

## 2020-08-19 NOTE — PROGRESS NOTES
"Subjective     Prashant Keyes is a 69 year old male who presents to clinic today for the following health issues:    HPI       Rash  Onset/Duration: 2 weeks  Description  Location: face/ chin  Character: raised, red  Itching: moderate  Intensity:  moderate  Progression of Symptoms:  same  Accompanying signs and symptoms:   Fever: no  Body aches or joint pain: no  Sore throat symptoms: no  Recent cold symptoms: no  History:           Previous episodes of similar rash: None  New exposures:  YES- wears a mask 8 hours per day and c-pap at night  Recent travel: no  Exposure to similar rash: no  Precipitating or alleviating factors: None  Therapies tried and outcome: hydrocortisone cream -  not effective    Asha Price CMA          Objective    /72   Pulse 63   Temp 98.7  F (37.1  C) (Tympanic)   Resp 14   Ht 1.778 m (5' 10\")   Wt 120.2 kg (265 lb)   BMI 38.02 kg/m    Body mass index is 38.02 kg/m .     Wears a mask at work for 8 hours and then wears a CPAP at night with nasal pillows. Area where chin strap goes is where the rash is at. Is burning. Has been putting hydrocortisone cream on it, anti itch cream that is not working. Also using lotrisone cream and is not getting better. No over the counter is helping it to get better. Gets a new mask for work daily. CPAP chin strap is brand new. CPAP uses nasal pillows.     Review of Systems   Constitutional: Negative for diaphoresis, fatigue and fever.   HENT: Negative for congestion, ear pain, rhinorrhea, sinus pressure and sore throat.    Eyes: Negative for discharge.   Respiratory: Negative for cough, shortness of breath and wheezing.    Cardiovascular: Negative for chest pain.   Gastrointestinal: Negative for diarrhea, nausea and vomiting.   Skin: Positive for rash (to chin).   Neurological: Negative for headaches.     Physical Exam  Constitutional:       Appearance: He is well-developed.   HENT:      Head:     Cardiovascular:      Rate and Rhythm: Normal " rate and regular rhythm.      Heart sounds: Normal heart sounds.   Pulmonary:      Effort: Pulmonary effort is normal.      Breath sounds: Normal breath sounds.   Skin:     General: Skin is warm and dry.   Neurological:      Mental Status: He is alert.             Assessment & Plan     1. Dermatitis  Educated on use of cream.  Notify if no improvement  Educated that may have.  Remission and times that returns.   - triamcinolone (KENALOG) 0.1 % external cream; Apply topically 2 times daily  Dispense: 80 g; Refill: 1    2. Cellulitis, unspecified cellulitis site  Educated on use of antibiotic.  Notify if no improvement  - cephALEXin (KEFLEX) 500 MG capsule; Take 1 capsule (500 mg) by mouth 4 times daily for 10 days  Dispense: 40 capsule; Refill: 0       Return in about 1 week (around 8/27/2020), or if symptoms worsen or fail to improve.    GENESIS Enrique [facility]

## 2020-08-20 ENCOUNTER — OFFICE VISIT (OUTPATIENT)
Dept: FAMILY MEDICINE | Facility: CLINIC | Age: 70
End: 2020-08-20
Payer: MEDICARE

## 2020-08-20 VITALS
HEART RATE: 63 BPM | DIASTOLIC BLOOD PRESSURE: 72 MMHG | SYSTOLIC BLOOD PRESSURE: 136 MMHG | HEIGHT: 70 IN | BODY MASS INDEX: 37.94 KG/M2 | WEIGHT: 265 LBS | TEMPERATURE: 98.7 F | RESPIRATION RATE: 14 BRPM

## 2020-08-20 DIAGNOSIS — L30.9 DERMATITIS: Primary | ICD-10-CM

## 2020-08-20 DIAGNOSIS — L03.90 CELLULITIS, UNSPECIFIED CELLULITIS SITE: ICD-10-CM

## 2020-08-20 PROCEDURE — 99213 OFFICE O/P EST LOW 20 MIN: CPT | Performed by: NURSE PRACTITIONER

## 2020-08-20 RX ORDER — CEPHALEXIN 500 MG/1
500 CAPSULE ORAL 4 TIMES DAILY
Qty: 40 CAPSULE | Refills: 0 | Status: SHIPPED | OUTPATIENT
Start: 2020-08-20 | End: 2020-08-30

## 2020-08-20 RX ORDER — TRIAMCINOLONE ACETONIDE 1 MG/G
CREAM TOPICAL 2 TIMES DAILY
Qty: 80 G | Refills: 1 | Status: SHIPPED | OUTPATIENT
Start: 2020-08-20 | End: 2020-12-01

## 2020-08-20 ASSESSMENT — ENCOUNTER SYMPTOMS
WHEEZING: 0
COUGH: 0
SHORTNESS OF BREATH: 0
SINUS PRESSURE: 0
FATIGUE: 0
RHINORRHEA: 0
HEADACHES: 0
EYE DISCHARGE: 0
NAUSEA: 0
DIARRHEA: 0
FEVER: 0
DIAPHORESIS: 0
VOMITING: 0
SORE THROAT: 0

## 2020-08-20 ASSESSMENT — MIFFLIN-ST. JEOR: SCORE: 1973.28

## 2020-08-26 NOTE — PROGRESS NOTES
"Subjective     Prashant Keyes is a 69 year old male who presents to clinic today for the following health issues:    HPI     Chief Complaint   Patient presents with     Follow Up     Follow up rash. Prescribed Keflex and Triamcinolone cream 8/20/2020. States that things were getting better but this morning he noticed rash getting worse       Was not having any itching.  Continues on antibiotic Keflex. Has been using triamcinolone cream. Itching had gotten all better but then today woke up and area had one area that was itching again. No drainage that is aware of. Thinks chin strap from CPAP sat in that area last night. Chin strap is elastic and has Velcro to be adjusted. Just got new head gear. This all started when changed to this mask about 1 month ago.  Prior to using full facemask had been using nasal pillows with CPAP.  Was recommended to change over because was sleeping with mouth open.      Objective    /63   Pulse 64   Temp 97.7  F (36.5  C) (Tympanic)   Resp 17   Ht 1.778 m (5' 10\")   Wt 121.4 kg (267 lb 9.6 oz)   SpO2 95%   BMI 38.40 kg/m    Body mass index is 38.4 kg/m .          Assessment & Plan      Review of Systems   Constitutional: Negative for chills and fever.   Skin: Positive for rash (chin).        Itching to chin-started again this AM       Physical Exam  Constitutional:       Appearance: He is well-developed.   HENT:      Head:     Cardiovascular:      Rate and Rhythm: Normal rate and regular rhythm.      Heart sounds: Normal heart sounds.   Pulmonary:      Effort: Pulmonary effort is normal.      Breath sounds: Normal breath sounds.   Skin:     General: Skin is warm and dry.   Neurological:      Mental Status: He is alert.           Dermatitis  Area much improved.  Take full course of Keflex.  May continue to use triamcinolone cream.  Decrease use to as needed.  Informed this will likely be ongoing due to CPAP headgear.        No follow-ups on file.    Traci Kumar, GENESIS " CNP  [facility]

## 2020-08-27 ENCOUNTER — OFFICE VISIT (OUTPATIENT)
Dept: FAMILY MEDICINE | Facility: CLINIC | Age: 70
End: 2020-08-27
Payer: MEDICARE

## 2020-08-27 VITALS
SYSTOLIC BLOOD PRESSURE: 120 MMHG | DIASTOLIC BLOOD PRESSURE: 63 MMHG | RESPIRATION RATE: 17 BRPM | TEMPERATURE: 97.7 F | BODY MASS INDEX: 38.31 KG/M2 | HEIGHT: 70 IN | HEART RATE: 64 BPM | WEIGHT: 267.6 LBS | OXYGEN SATURATION: 95 %

## 2020-08-27 DIAGNOSIS — L30.9 DERMATITIS: Primary | ICD-10-CM

## 2020-08-27 PROCEDURE — 99213 OFFICE O/P EST LOW 20 MIN: CPT | Performed by: NURSE PRACTITIONER

## 2020-08-27 ASSESSMENT — ENCOUNTER SYMPTOMS
CHILLS: 0
FEVER: 0

## 2020-08-27 ASSESSMENT — MIFFLIN-ST. JEOR: SCORE: 1985.08

## 2020-09-02 DIAGNOSIS — I10 BENIGN ESSENTIAL HYPERTENSION: ICD-10-CM

## 2020-09-03 RX ORDER — FUROSEMIDE 20 MG
TABLET ORAL
Qty: 180 TABLET | Refills: 0 | Status: SHIPPED | OUTPATIENT
Start: 2020-09-03 | End: 2020-12-08

## 2020-09-04 ENCOUNTER — TELEPHONE (OUTPATIENT)
Dept: FAMILY MEDICINE | Facility: CLINIC | Age: 70
End: 2020-09-04

## 2020-09-04 NOTE — TELEPHONE ENCOUNTER
"Flu Shot     Prashant Keyes \"Bill\"  Patient Customer Service Request Pool 1 hour ago (6:58 AM)          Topic: General Compliment     Hi....This is Demian Keyes...please update my records to show I got my Flu Shot yesterday (Sept 3 @ about 2pm) at the [facility] at [address] [place] [address].    Thanks       "

## 2020-09-11 DIAGNOSIS — K21.9 GASTROESOPHAGEAL REFLUX DISEASE, ESOPHAGITIS PRESENCE NOT SPECIFIED: ICD-10-CM

## 2020-09-14 RX ORDER — OMEPRAZOLE 40 MG/1
CAPSULE, DELAYED RELEASE ORAL
Qty: 90 CAPSULE | Refills: 1 | Status: SHIPPED | OUTPATIENT
Start: 2020-09-14 | End: 2021-03-08

## 2020-09-15 NOTE — TELEPHONE ENCOUNTER
"Prescription approved per [facility] Refill Protocol.  Requested Prescriptions   Signed Prescriptions Disp Refills    omeprazole (PRILOSEC) 40 MG DR capsule 90 capsule 1     Sig: Take 1 capsule by mouth once daily       PPI Protocol Passed - 9/11/2020  7:16 AM        Passed - Not on Clopidogrel (unless Pantoprazole ordered)        Passed - No diagnosis of osteoporosis on record        Passed - Recent (12 mo) or future (30 days) visit within the authorizing provider's specialty     Patient has had an office visit with the authorizing provider or a provider within the authorizing providers department within the previous 12 mos or has a future within next 30 days. See \"Patient Info\" tab in inbasket, or \"Choose Columns\" in Meds & Orders section of the refill encounter.              Passed - Medication is active on med list        Passed - Patient is age 18 or older             "

## 2020-09-21 ENCOUNTER — VIRTUAL VISIT (OUTPATIENT)
Dept: NEUROLOGY | Facility: CLINIC | Age: 70
End: 2020-09-21
Payer: MEDICARE

## 2020-09-21 DIAGNOSIS — Z79.899 ENCOUNTER FOR LONG-TERM (CURRENT) USE OF MEDICATIONS: ICD-10-CM

## 2020-09-21 DIAGNOSIS — G40.A09 NONINTRACTABLE ABSENCE EPILEPSY WITHOUT STATUS EPILEPTICUS (H): ICD-10-CM

## 2020-09-21 DIAGNOSIS — G40.109 LOCALIZATION-RELATED EPILEPSY (H): Primary | ICD-10-CM

## 2020-09-21 DIAGNOSIS — R56.9 CONVULSIONS, UNSPECIFIED CONVULSION TYPE (H): Chronic | ICD-10-CM

## 2020-09-21 DIAGNOSIS — G20.C PARKINSONISM, UNSPECIFIED PARKINSONISM TYPE (H): ICD-10-CM

## 2020-09-21 PROCEDURE — 99441 ZZC PHYSICIAN TELEPHONE EVALUATION 5-10 MIN: CPT | Performed by: PSYCHIATRY & NEUROLOGY

## 2020-09-21 RX ORDER — LEVETIRACETAM 1000 MG/1
TABLET ORAL
Qty: 180 TABLET | Refills: 2 | Status: SHIPPED | OUTPATIENT
Start: 2020-09-21 | End: 2021-03-23

## 2020-09-21 RX ORDER — DIVALPROEX SODIUM 500 MG/1
500 TABLET, DELAYED RELEASE ORAL 2 TIMES DAILY
Qty: 180 TABLET | Refills: 2 | Status: SHIPPED | OUTPATIENT
Start: 2020-09-21 | End: 2021-03-23

## 2020-09-21 RX ORDER — CARBIDOPA AND LEVODOPA 25; 100 MG/1; MG/1
TABLET ORAL
Qty: 450 TABLET | Refills: 2 | Status: SHIPPED | OUTPATIENT
Start: 2020-09-21 | End: 2020-12-07

## 2020-09-21 NOTE — PROGRESS NOTES
Physician Note:    Mr. 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 His seizures have been described as episodes of Left face and arm weakness with amnesia. He also has a remote history GTCs. His seizures have been well-controlled for some time on Keppra and Depakote. He is on Sinemet for the Parkinsonism. When I spoke with Demian back in July, he confirmed no seizure recurrence since out prior visit. He reported then possibly an increase in his hand tremors, in the setting of increased stress. We decided not to change the carbidopa/levodopa at that time. He is on: 1 tablet Qam, 2 tablets at noon and 2 tablets in the evening.     AED regimen: Keppra 1000mg BID and Depakote 500mg BID. His valproic acid level was below the therapeutic range in July, but tends to run low. His Keppra level was in therapeutic range.    Demian called into the clinic in the interim to ask about listlessness on the Left in the setting of fatigue. I inquired whether it was similar to previous seizures, but the return message relayed more of a numbness in the fingers, for which he saw his PCP. Head CT was normal in August.     I spoke with Demian over the phone again today. He says he has been doing really well. He gets a little more jittery at times when he is anxious or stressed, but otherwise feels that the tremor is under good control. He says the numbness in his Left hand went away. He says he has a little numbness in the Right hand after it started in the Left hand and then both resolved after a day or two. He says this was not an experience similar to any of his prior seizures. No seizures since our last visit. He denies side effects on the AEDs or Sinemet. No new concern for me otherwise.     Current Outpatient Medications   Medication     allopurinol (ZYLOPRIM) 100 MG tablet     amLODIPine (NORVASC) 10 MG tablet     aspirin 81 MG tablet     carbidopa-levodopa (SINEMET)  MG tablet      carvedilol (COREG) 12.5 MG tablet     chlorthalidone (HYGROTON) 25 MG tablet     Cholecalciferol (VITAMIN D) 2000 UNITS tablet     colchicine (COLCYRS) 0.6 MG tablet     Cyanocobalamin (VITAMIN  B-12) 2500 MCG tablet     divalproex sodium delayed-release (DEPAKOTE) 500 MG DR tablet     ferrous sulfate (IRON) 325 (65 FE) MG tablet     furosemide (LASIX) 20 MG tablet     gabapentin (NEURONTIN) 100 MG capsule     hydrALAZINE (APRESOLINE) 25 MG tablet     levETIRAcetam (KEPPRA) 1000 MG tablet     losartan (COZAAR) 100 MG tablet     Multiple Vitamin (MULTI VITAMIN MENS PO)     omeprazole (PRILOSEC) 40 MG DR capsule     ORDER FOR DME     oxybutynin (DITROPAN) 5 MG tablet     potassium chloride ER (KLOR-CON M) 20 MEQ CR tablet     sertraline (ZOLOFT) 50 MG tablet     simvastatin (ZOCOR) 20 MG tablet     traZODone (DESYREL) 100 MG tablet     triamcinolone (KENALOG) 0.1 % external cream     UNABLE TO FIND     clotrimazole-betamethasone (LOTRISONE) 1-0.05 % external cream     No current facility-administered medications for this visit.        Assessment and Plan:  Encounter Diagnoses   Name Primary?     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 Demian reports no seizure recurrence since our previous visit. The transient sensory changes he had in the hands resolved, so we will not investigate this further. He says the tremor is also under adequate control currently, so no changes in the Sinemet are needed at this time. I would like to see Demian back in clinic in about 6 months. I have put in an order to recheck the valproic acid level just in case Demian happens to be in for labs before I  see him again in clinic. He expressed understanding and agreement with the plan.    Patient Instructions:  -- Continue the Keppra: 1000mg twice daily.  -- Continue the Depakote: 500mg twice daily.  -- Continue the Sinemet (carbidopa/levodopa): 1 tablet in the morning and 2 tablets at noon and in the evening.   -- Repeat Depakote level has been ordered, just in case you happen to come in for other labs. Otherwise we will check this when I see you back in clinic.  -- Return to Neurology clinic in 6 months.   -- Please let us know if any concerns arise in the meantime.     Phone call duration: 5 minutes. Additional 15 minutes spent in chart review and documentation by me.    Bisi Temple MD  Neurology

## 2020-09-21 NOTE — PROGRESS NOTES
"Prashant Keyes is a 69 year old male who is being evaluated via a billable telephone visit.      The patient has been notified of following:     \"This telephone visit will be conducted via a call between you and your physician/provider. We have found that certain health care needs can be provided without the need for a physical exam.  This service lets us provide the care you need with a short phone conversation.  If a prescription is necessary we can send it directly to your pharmacy.  If lab work is needed we can place an order for that and you can then stop by our lab to have the test done at a later time.    Telephone visits are billed at different rates depending on your insurance coverage. During this emergency period, for some insurers they may be billed the same as an in-person visit.  Please reach out to your insurance provider with any questions.    If during the course of the call the physician/provider feels a telephone visit is not appropriate, you will not be charged for this service.\"    Patient has given verbal consent for Telephone visit?  Yes    What phone number would you like to be contacted at? [phone]    How would you like to obtain your AVS? [facility]    Phone call duration: 5 minutes    Bisi Temple MD      "

## 2020-09-21 NOTE — NURSING NOTE
"Initial There were no vitals taken for this visit. Estimated body mass index is 38.4 kg/m  as calculated from the following:    Height as of 8/27/20: 1.778 m (5' 10\").    Weight as of 8/27/20: 121.4 kg (267 lb 9.6 oz).     Yohana GUERRERO      "

## 2020-09-21 NOTE — PATIENT INSTRUCTIONS
Patient Instructions:  -- Continue the Keppra: 1000mg twice daily.  -- Continue the Depakote: 500mg twice daily.  -- Continue the Sinemet (carbidopa/levodopa): 1 tablet in the morning and 2 tablets at noon and in the evening.   -- Repeat Depakote level has been ordered, just in case you happen to come in for other labs. Otherwise we will check this when I see you back in clinic.  -- Return to Neurology clinic in 6 months.   -- Please let us know if any concerns arise in the meantime.

## 2020-09-21 NOTE — LETTER
"    9/21/2020         RE: Prashant Keyes  [address]  [place] [address]        Dear Colleague,    Thank you for referring your patient, Prashant Keyes, to the [facility]. Please see a copy of my visit note below.    Prashant Keyes is a 69 year old male who is being evaluated via a billable telephone visit.      The patient has been notified of following:     \"This telephone visit will be conducted via a call between you and your physician/provider. We have found that certain health care needs can be provided without the need for a physical exam.  This service lets us provide the care you need with a short phone conversation.  If a prescription is necessary we can send it directly to your pharmacy.  If lab work is needed we can place an order for that and you can then stop by our lab to have the test done at a later time.    Telephone visits are billed at different rates depending on your insurance coverage. During this emergency period, for some insurers they may be billed the same as an in-person visit.  Please reach out to your insurance provider with any questions.    If during the course of the call the physician/provider feels a telephone visit is not appropriate, you will not be charged for this service.\"    Patient has given verbal consent for Telephone visit?  Yes    What phone number would you like to be contacted at? [phone]    How would you like to obtain your AVS? [facility]    Phone call duration: 5 minutes    Bisi Temple MD        Physician Note:    Mr. 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 His seizures have been described as episodes of Left face and arm weakness with amnesia. He also has a remote history GTCs. His seizures have been well-controlled for some time on Keppra and Depakote. He is on Sinemet for the Parkinsonism. When I spoke with Demian back in July, he confirmed no seizure recurrence " since out prior visit. He reported then possibly an increase in his hand tremors, in the setting of increased stress. We decided not to change the carbidopa/levodopa at that time. He is on: 1 tablet Qam, 2 tablets at noon and 2 tablets in the evening.     AED regimen: Keppra 1000mg BID and Depakote 500mg BID. His valproic acid level was below the therapeutic range in July, but tends to run low. His Keppra level was in therapeutic range.    Demian called into the clinic in the interim to ask about listlessness on the Left in the setting of fatigue. I inquired whether it was similar to previous seizures, but the return message relayed more of a numbness in the fingers, for which he saw his PCP. Head CT was normal in August.     I spoke with Demian over the phone again today. He says he has been doing really well. He gets a little more jittery at times when he is anxious or stressed, but otherwise feels that the tremor is under good control. He says the numbness in his Left hand went away. He says he has a little numbness in the Right hand after it started in the Left hand and then both resolved after a day or two. He says this was not an experience similar to any of his prior seizures. No seizures since our last visit. He denies side effects on the AEDs or Sinemet. No new concern for me otherwise.     Current Outpatient Medications   Medication     allopurinol (ZYLOPRIM) 100 MG tablet     amLODIPine (NORVASC) 10 MG tablet     aspirin 81 MG tablet     carbidopa-levodopa (SINEMET)  MG tablet     carvedilol (COREG) 12.5 MG tablet     chlorthalidone (HYGROTON) 25 MG tablet     Cholecalciferol (VITAMIN D) 2000 UNITS tablet     colchicine (COLCYRS) 0.6 MG tablet     Cyanocobalamin (VITAMIN  B-12) 2500 MCG tablet     divalproex sodium delayed-release (DEPAKOTE) 500 MG DR tablet     ferrous sulfate (IRON) 325 (65 FE) MG tablet     furosemide (LASIX) 20 MG tablet     gabapentin (NEURONTIN) 100 MG capsule     hydrALAZINE  (APRESOLINE) 25 MG tablet     levETIRAcetam (KEPPRA) 1000 MG tablet     losartan (COZAAR) 100 MG tablet     Multiple Vitamin (MULTI VITAMIN MENS PO)     omeprazole (PRILOSEC) 40 MG DR capsule     ORDER FOR DME     oxybutynin (DITROPAN) 5 MG tablet     potassium chloride ER (KLOR-CON M) 20 MEQ CR tablet     sertraline (ZOLOFT) 50 MG tablet     simvastatin (ZOCOR) 20 MG tablet     traZODone (DESYREL) 100 MG tablet     triamcinolone (KENALOG) 0.1 % external cream     UNABLE TO FIND     clotrimazole-betamethasone (LOTRISONE) 1-0.05 % external cream     No current facility-administered medications for this visit.        Assessment and Plan:  Encounter Diagnoses   Name Primary?     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 Demian reports no seizure recurrence since our previous visit. The transient sensory changes he had in the hands resolved, so we will not investigate this further. He says the tremor is also under adequate control currently, so no changes in the Sinemet are needed at this time. I would like to see Demian back in clinic in about 6 months. I have put in an order to recheck the valproic acid level just in case Demian happens to be in for labs before I see him again in clinic. He expressed understanding and agreement with the plan.    Patient Instructions:  -- Continue the Keppra: 1000mg twice daily.  -- Continue the Depakote: 500mg twice daily.  -- Continue the Sinemet (carbidopa/levodopa): 1 tablet in the morning and 2 tablets at noon and in the evening.   -- Repeat Depakote level has been ordered, just in case you happen to come in for other labs. Otherwise we will  check this when I see you back in clinic.  -- Return to Neurology clinic in 6 months.   -- Please let us know if any concerns arise in the meantime.     Phone call duration: 5 minutes. Additional 15 minutes spent in chart review and documentation by me.    Bisi Temple MD  Neurology        Again, thank you for allowing me to participate in the care of your patient.        Sincerely,        Bisi Temple MD

## 2020-09-28 DIAGNOSIS — I10 BENIGN ESSENTIAL HYPERTENSION: ICD-10-CM

## 2020-09-29 NOTE — TELEPHONE ENCOUNTER
Carvedilol refill request  Last refill: 7/2/20  #90 day  Last OV: 7/28/20 PCP  RN unable to refill.  Major Drug interaction with colchicine flags

## 2020-09-30 ENCOUNTER — ANCILLARY PROCEDURE (OUTPATIENT)
Dept: GENERAL RADIOLOGY | Facility: CLINIC | Age: 70
End: 2020-09-30
Attending: PHYSICIAN ASSISTANT
Payer: MEDICARE

## 2020-09-30 ENCOUNTER — OFFICE VISIT (OUTPATIENT)
Dept: FAMILY MEDICINE | Facility: CLINIC | Age: 70
End: 2020-09-30
Payer: MEDICARE

## 2020-09-30 VITALS
OXYGEN SATURATION: 97 % | BODY MASS INDEX: 38.1 KG/M2 | HEART RATE: 73 BPM | RESPIRATION RATE: 20 BRPM | DIASTOLIC BLOOD PRESSURE: 74 MMHG | WEIGHT: 272.13 LBS | TEMPERATURE: 98 F | HEIGHT: 71 IN | SYSTOLIC BLOOD PRESSURE: 132 MMHG

## 2020-09-30 DIAGNOSIS — M54.50 ACUTE BILATERAL LOW BACK PAIN WITHOUT SCIATICA: ICD-10-CM

## 2020-09-30 DIAGNOSIS — M54.50 ACUTE BILATERAL LOW BACK PAIN WITHOUT SCIATICA: Primary | ICD-10-CM

## 2020-09-30 PROCEDURE — 72100 X-RAY EXAM L-S SPINE 2/3 VWS: CPT

## 2020-09-30 PROCEDURE — 99213 OFFICE O/P EST LOW 20 MIN: CPT | Performed by: PHYSICIAN ASSISTANT

## 2020-09-30 ASSESSMENT — MIFFLIN-ST. JEOR: SCORE: 2013.54

## 2020-09-30 NOTE — PATIENT INSTRUCTIONS
George Arciniega,    Thank you for allowing [facility] to manage your care.    I am unsure the cause your symptoms, however your exam is reassuring.  This could be due to arthritic changes in your spine versus muscle strain from your new job duties.  This will likely take 3-4 weeks to resolve.  Go to the emergency department if you develop numbness/tingling in the groin, changes in go to the bathroom, weakness in your legs, fever/chills, or any other worsening/worrisome symptoms.    I ordered some xrays, please go to our radiology department to get your xrays.    For your pain, please use Tylenol 650mg every 6 hours as needed.     Max acetaminophen (Tylenol) 3,000mg/24 hours    Please allow 1-2 business days for our office to contact you in regards to your laboratory/radiological studies.  If not done so, I encourage you to login into [employer] ([email or web address] to review your results as well.     If you have any questions or concerns, please feel free to call us at [phone]    Sincerely,    Tariq Carroll PA-C    Did you know?  You can schedule an e-Visit for certain simple non-emergent issue for your convenience.  To learn more about or start an eVisit, simply login to [employer], click  Visits  on top banner, click  Start a Virtual Visit  drop down, and click  Symptom-Specific E-Visit     Patient Education     Back Care Tips    Caring for your back  These are things you can do to prevent a recurrence of acute back pain and to reduce symptoms from chronic back pain:    Stay at a healthy weight. If you are overweight, losing weight will help most types of back pain.    Exercise is an important part of recovery from most types of back pain. The muscles behind and in front of the spine support the back. This means strengthening both the back muscles and the abdominal muscles will provide better support for your spine.     Swimming and brisk walking are good overall exercises to improve your  fitness level.    Practice safe lifting methods (see below).    Practice good posture when sitting, standing, and walking. Don't sit for a long time. This puts more stress on the lower back than standing or walking.    Wear quality shoes with good arch support. Foot and ankle alignment can affect back symptoms. Don't wear high heels.    Therapeutic massage can help relax the back muscles without stretching them.    During the first 24 to 72 hours after an acute injury or flare-up of chronic back pain, put an ice pack on the painful area for 20 minutes and then remove it for 20 minutes. Do thisover a period of 60 to 90 minutes, or several times a day. As a safety precaution, don't use a heating pad at bedtime. Sleeping on a heating pad can lead to skin burns or tissue damage.    You can alternate using ice and heat.  Medicines  Talk with your healthcare provider before using medicines, especially if you have other health problems or are taking other medicines.    You may use over-the-counter medicines, such as acetaminophen, ibuprofen, or naprosyn to control pain, unless your healthcare provider prescribed other pain medicine. Talk with your healthcare provider before taking any medicines if you have a chronic condition such ass diabetes, liver or kidney disease, stomach ulcers, or digestive bleeding, or are taking blood thinners.    Be careful if you are given prescription pain medicines, opioids, or medicine for muscle spasm. They can cause drowsiness, and affect your coordination, reflexes, and judgment. Don't drive or operate heavy machinery while taking these types of medicines. Take prescription pain medicine only as prescribed by your healthcare provider.  Lumbar stretch  This simple stretch will help relax muscle spasm and keep your back more limber. If exercise makes your back pain worse, don t do it.    Lie on your back with your knees bent and both feet on the ground.    Slowly raise your left knee to your  chest as you flatten your lower back against the floor. Hold for 5 seconds.    Relax and repeat the exercise with your right knee.    Do 10 of these exercises for each leg.  Safe lifting method    Don t bend over at the waist to lift an object off the floor.  Instead, bend your knees and hips in a squat.     Keep your back and head upright    Hold the object close to your body, directly in front of you.    Straighten your legs to lift the object.     Lower the object to the floor in the reverse fashion.    If you must slide something across the floor, push it.    Posture tips  Sitting  Sit in chairs with straight backs or low-back support. Keep your knees lower than your hips, with your feet flat on the floor.  When driving, sit up straight. Adjust the seat forward so you are not leaning toward the steering wheel.  A small pillow or rolled towel behind your lower back may help if you are driving long distances.   Standing  When standing for long periods, shift most of your weight to one leg at a time. Switch legs every few minutes.   Sleeping  The best way to sleep is on your side with your knees bent. Put a low pillow under your head to support your neck in a neutral spine position. Don't use thick pillows that bend your neck to one side. Put a pillow between your legs to further relax your lower back. If you sleep on your back, put pillows under your knees to support your legs in a slightly flexed position. Use a firm mattress. If your mattress sags, replace it, or use a 1/2-inch plywood board under the mattress to add support.  Follow-up care  Follow up with your healthcare provider, or as advised.  If X-rays, a CT scan or an MRI scan were taken, they may be reviewed by a radiologist. You will be told of any new findings that may affect your care.  Call 911  Call 911 if any of the following occur:    Trouble breathing    Confusion    Very drowsy    Fainting or loss of consciousness    Rapid or very slow heart  rate    Loss of  bowel or bladder control  When to seek medical advice  Call your healthcare provider right away if any of the following occur:    Pain becomes worse or spreads to your arms or legs    Weakness or numbness in one or both arms or legs    Numbness in the groin area  Date Last Reviewed: 6/1/2016 2000-2019 [employer]. [address] [place] [address]. All rights reserved. This information is not intended as a substitute for professional medical care. Always follow your healthcare professional's instructions.

## 2020-09-30 NOTE — PROGRESS NOTES
"Subjective     Prashant Keyes is a 69 year old male who presents to clinic today for the following health issues:    HPI       Back Pain  Onset/Duration: 1 week   Description:   Location of pain: low back both, R>L  Character of pain: dull ache -constant  Pain radiation: none  New numbness or weakness in legs, not attributed to pain: no   Intensity: Currently 6-7/10  Progression of Symptoms: worsening and constant  History:   Specific cause: work-related  Pain interferes with job: YES  History of back problems: no prior back problems  Any previous MRI or X-rays: None  Sees a specialist for back pain: No  Alleviating factors:   Improved by: acetaminophen (Tylenol) and heat    Precipitating factors:  Worsened by: Lifting, Bending and Standing  Therapies tried and outcome: acetaminophen (Tylenol) and heat - little relief     Accompanying Signs & Symptoms:  Risk of Fracture: Age >64  Risk of Cauda Equina: None  Risk of Infection: None  Risk of Cancer: None  Risk of Ankylosing Spondylitis: Onset at age <35, male, AND morning back stiffness  no     Works as a  at [employer], but has recently started standing more for work.    Review of Systems   Constitutional, HEENT, cardiovascular, pulmonary, gi and gu systems are negative, except as otherwise noted.      Objective    /74   Pulse 73   Temp 98  F (36.7  C) (Tympanic)   Resp 20   Ht 1.791 m (5' 10.5\")   Wt 123.4 kg (272 lb 2 oz)   SpO2 97%   BMI 38.49 kg/m    Body mass index is 38.49 kg/m .  Physical Exam  Vitals signs and nursing note reviewed.   Constitutional:       General: He is not in acute distress.     Appearance: He is not ill-appearing or diaphoretic.   HENT:      Head: Normocephalic and atraumatic.      Mouth/Throat:      Mouth: Mucous membranes are moist.   Eyes:      Conjunctiva/sclera: Conjunctivae normal.   Cardiovascular:      Rate and Rhythm: Normal rate and regular rhythm.      Heart sounds: Normal heart sounds. No murmur. No friction " rub. No gallop.    Pulmonary:      Effort: Pulmonary effort is normal. No respiratory distress.      Breath sounds: Normal breath sounds. No stridor. No wheezing, rhonchi or rales.   Abdominal:      General: Bowel sounds are normal. There is no distension.      Palpations: Abdomen is soft. There is no mass.      Tenderness: There is no abdominal tenderness. There is no right CVA tenderness, left CVA tenderness, guarding or rebound.      Hernia: No hernia is present.   Musculoskeletal:      Comments: No midline spinal tenderness except in the sacral region. No overlying signs of trauma or infection.   Skin:     General: Skin is warm and dry.   Neurological:      General: No focal deficit present.      Mental Status: He is alert. Mental status is at baseline.      Comments: Able to toe lift, heel walk and knee bend without difficulty.   Psychiatric:         Mood and Affect: Mood normal.         Behavior: Behavior normal.          Results for orders placed or performed in visit on 09/30/20   XR Lumbar Spine 2/3 Views     Status: None    Narrative    LUMBAR SPINE 2-3 VIEWS 9/30/2020 11:19 AM     COMPARISON: Lumbar spine x-ray series 1/15/2020    HISTORY: Acute bilateral low back pain without sciatica.      Impression    IMPRESSION: There is continued normal alignment of the lumbar  vertebrae. Vertebral body heights of the lumbar spine remain normal.  Lumbar intervertebral disc space heights are fairly well maintained.  There is minimal degenerative endplate spurring at all levels of the  lumbar spine. There is facet arthropathy at the L4-L5 and L5-S1  levels. No evidence for fracture. No significant change from the  comparison study.    ADRIANA MOBLEY MD           Assessment & Plan   Problem List Items Addressed This Visit     None      Visit Diagnoses     Acute bilateral low back pain without sciatica    -  Primary    Relevant Orders    XR Lumbar Spine 2/3 Views (Completed)         I do not believe a fracture to be the  source of this patient's pain as there was no preceding trauma.  Lumbar x-ray shows no fracture, subluxation or other worrisome process.    I do not believe the pain is caused by an epidural abscess as the patient denies hx of IV drug use and does not have fevers/chills and no recent procedures.    I do not believe this patient's pain is from an infiltrative vertebral tumor as the patient does not have weight loss or night sweats and no known history of cancer.    I do not believe this patient's pain represents a rupture AAA.  There is no palpable, pulsatile mass on exam and patient does not have any ABD pain.      Based on history and exam, the most likely etiology of this patient's back pain is lumbosacral radiculopathy.  Emergent MRI is not indicated as this patient does not have new weakness or cauda equina syndrome.  Patient has no saddle anesthesia, bowel or bladder incontinence. Will send with otc analgesics, RICE/heat and follow up in 2 weeks if not improving.     Complete history and physical exam as above. AF with normal VS.    DDx and Dx discussed with and explained to the pt to their satisfaction.  All questions were answered at this time. Pt expressed understanding of and agreement with this dx, tx, and plan. No further workup warranted and standard medication warnings given. I have given the patient a list of pertinent indications for re-evaluation. Will go to the Emergency Department if symptoms worsen or new concerning symptoms arise. Patient left in no apparent distress.     See Patient Instructions  Return in about 3 weeks (around 10/21/2020) for a recheck of your symptoms.    LUANA Franklin  [facility]

## 2020-10-01 RX ORDER — CARVEDILOL 12.5 MG/1
TABLET ORAL
Qty: 180 TABLET | Refills: 2 | Status: SHIPPED | OUTPATIENT
Start: 2020-10-01 | End: 2021-06-28

## 2020-10-04 DIAGNOSIS — M10.071 ACUTE IDIOPATHIC GOUT OF RIGHT ANKLE: Chronic | ICD-10-CM

## 2020-10-05 NOTE — TELEPHONE ENCOUNTER
Last Written Prescription Date:  7/10/2020  Last Fill Quantity: 180,  # refills: 0   Last office visit: 9/30/2020 with provider:  Tej Carroll w/advised F/U in 3 wks.     Future Office Visit: none    Routing refill request to provider for review/approval because:  Labs not current:  ALT, Uric Acid    Med pended with reminder due for appt. Please advise.    Diann Barry, RN, BSN

## 2020-10-06 RX ORDER — ALLOPURINOL 100 MG/1
TABLET ORAL
Qty: 180 TABLET | Refills: 0 | Status: SHIPPED | OUTPATIENT
Start: 2020-10-06 | End: 2021-01-04

## 2020-10-12 ENCOUNTER — MYC REFILL (OUTPATIENT)
Dept: FAMILY MEDICINE | Facility: CLINIC | Age: 70
End: 2020-10-12

## 2020-10-12 DIAGNOSIS — G57.13 MERALGIA PARESTHETICA OF BOTH LOWER EXTREMITIES: ICD-10-CM

## 2020-10-13 NOTE — TELEPHONE ENCOUNTER
Routing refill request to provider for review/approval because:  Drug not on the [facility] refill protocol     gabapentin (NEURONTIN) 100 MG capsule  Last Written Prescription Date:  7/14/20  Last Fill Quantity: 90,  # refills: 2   Last office visit: 9/30/2020 with prescribing provider:  shani   Future Office Visit:

## 2020-10-14 RX ORDER — GABAPENTIN 100 MG/1
100 CAPSULE ORAL 3 TIMES DAILY
Qty: 90 CAPSULE | Refills: 2 | Status: SHIPPED | OUTPATIENT
Start: 2020-10-14 | End: 2021-01-15

## 2020-11-04 ENCOUNTER — TRANSFERRED RECORDS (OUTPATIENT)
Dept: HEALTH INFORMATION MANAGEMENT | Facility: CLINIC | Age: 70
End: 2020-11-04

## 2020-11-08 ENCOUNTER — TRANSFERRED RECORDS (OUTPATIENT)
Dept: HEALTH INFORMATION MANAGEMENT | Facility: CLINIC | Age: 70
End: 2020-11-08

## 2020-12-01 ENCOUNTER — VIRTUAL VISIT (OUTPATIENT)
Dept: FAMILY MEDICINE | Facility: CLINIC | Age: 70
End: 2020-12-01
Payer: MEDICARE

## 2020-12-01 DIAGNOSIS — U07.1 CLINICAL DIAGNOSIS OF COVID-19: Primary | ICD-10-CM

## 2020-12-01 DIAGNOSIS — N17.9 ACUTE KIDNEY INJURY (H): ICD-10-CM

## 2020-12-01 DIAGNOSIS — R05.9 COUGH: ICD-10-CM

## 2020-12-01 DIAGNOSIS — S99.929D INJURY OF TOE, UNSPECIFIED LATERALITY, SUBSEQUENT ENCOUNTER: ICD-10-CM

## 2020-12-01 PROCEDURE — 99442 PR PHYSICIAN TELEPHONE EVALUATION 11-20 MIN: CPT | Mod: 95 | Performed by: PHYSICIAN ASSISTANT

## 2020-12-01 NOTE — PROGRESS NOTES
"Prashant Keyes is a 69 year old male who is being evaluated via a billable telephone visit.      The patient has been notified of following:     \"This telephone visit will be conducted via a call between you and your physician/provider. We have found that certain health care needs can be provided without the need for a physical exam.  This service lets us provide the care you need with a short phone conversation.  If a prescription is necessary we can send it directly to your pharmacy.  If lab work is needed we can place an order for that and you can then stop by our lab to have the test done at a later time.    Telephone visits are billed at different rates depending on your insurance coverage. During this emergency period, for some insurers they may be billed the same as an in-person visit.  Please reach out to your insurance provider with any questions.    If during the course of the call the physician/provider feels a telephone visit is not appropriate, you will not be charged for this service.\"    Patient has given verbal consent for Telephone visit?  Yes    What phone number would you like to be contacted at? [phone]    How would you like to obtain your AVS? Grover Lund     Prashant Keyes is a 69 year old male who presents via phone visit today for the following health issues:    [facility]       Hospital Follow-up Visit:    Hospital/Nursing Home/IP Rehab Facility: [facility]  Date of Visit: 11/08/20  Reason(s) for Admission: Cough - tested positive for COVID    *Kidney function tests were abnormal - ER stated to discuss with provider, possibly from dehydration??    *Blue bump below left eye  - appeared after coughing hard     Was your hospitalization related to COVID-19? YES   How are you feeling today? Better  In the past 24 hours have you had shortness of breath when speaking, walking, or climbing stairs? My breathing issues have improved  Do you have a cough? Yes, I have a cough but it's " not worse  When is the last time you had a fever greater than 100? 11/10/20  Are you having any other symptoms? Loss of appetite, Fatigue, Body aches, Headaches and Confusion   Do you have any other stressors you would like to discuss with your provider? No       Was the patient in the ICU or did the patient experience delirium during hospitalization?  No          Problems taking medications regularly:  Hasn't been taking supplements and vitamins  Medication changes since discharge: Started wellbutrin lately - stopped zoloft per psych visit  Problems adhering to non-medication therapy:  None    Summary of hospitalization:  CareEverywhere information obtained and reviewed  Diagnostic Tests/Treatments reviewed.  Follow up needed: none  Other Healthcare Providers Involved in Patient s Care:         None  Update since discharge: improved.  Post Discharge Medication Reconciliation: discharge medications reconciled and changed, per note/orders  Plan of care communicated with patient          still noting some coughing. o2 sats in the 90 % plus range. No wheezing. No leg swelling or pleuritic chest pain   No fevers .   Appetite improving some. No diarrhea. Some constipation. Voiding fine.           Review of Systems   Constitutional, HEENT, cardiovascular, pulmonary, GI, , musculoskeletal, neuro, skin, endocrine and psych systems are negative, except as otherwise noted.       Objective          Vitals:  No vitals were obtained today due to virtual visit.    healthy, alert and no distress  PSYCH: Alert and oriented times 3; coherent speech, normal   rate and volume, able to articulate logical thoughts, able   to abstract reason, no tangential thoughts, no hallucinations   or delusions  His affect is normal  RESP: No cough, no audible wheezing, able to talk in full sentences  Remainder of exam unable to be completed due to telephone visits            Assessment/Plan:    Prashant was seen today for toe injury, covid concern  and letter for school/work.    Diagnoses and all orders for this visit:    Clinical diagnosis of COVID-19    Cough    Injury of toe, unspecified laterality, subsequent encounter    Acute kidney injury (H)      Advised supportive and symptomatic treatment.  Follow up with Provider - if condition persists or worsens.   Recheck in clinic in 1 week.    Phone call duration:  15 minutes

## 2020-12-05 ENCOUNTER — TELEPHONE (OUTPATIENT)
Dept: NEUROLOGY | Facility: CLINIC | Age: 70
End: 2020-12-05

## 2020-12-05 DIAGNOSIS — G20.C PARKINSONISM, UNSPECIFIED PARKINSONISM TYPE (H): ICD-10-CM

## 2020-12-05 DIAGNOSIS — I10 BENIGN ESSENTIAL HYPERTENSION: ICD-10-CM

## 2020-12-05 NOTE — TELEPHONE ENCOUNTER
Reason for call:  Symptom     Symptom or request: SYMPTOM     Duration (how long have symptoms been present):NA  Have you been treated for this before? Yes    Additional comments: positive for covid 11.10, AFTER QUARANTINE PATIENT IS EXPERIENCING jittering FROM [place] THAT   has increased.  even with medication      Phone number to reach patient:  Cell number on file:    Telephone Information:   Mobile [phone]       Best Time:  ANY    Can we leave a detailed message on this number?  YES    Travel screening: Not Applicable

## 2020-12-07 RX ORDER — CARBIDOPA AND LEVODOPA 25; 100 MG/1; MG/1
2.5 TABLET ORAL 3 TIMES DAILY
COMMUNITY
Start: 2020-12-07 | End: 2021-01-19

## 2020-12-07 NOTE — TELEPHONE ENCOUNTER
"Is Demian referring to increased tremor as \"jittery?\" We could try increasing his Sinemet to see if that calms things. I believe he is still taking 1 tablet in the am and 2 tablets in the afternoon and evening? If so, I would say we should try increasing to 2.5 tablets TID. Let me know if he agrees.    Thank you,  Dr. Temple  "

## 2020-12-07 NOTE — TELEPHONE ENCOUNTER
Furosemide refill request  Routing to provider to clarify what the instructions are to be.  Currently states 2 tab by mouth as needed and take 40mg by mouth daily.  Please clarify dosing instructions and send to pharmacy.  Candi Ren RN

## 2020-12-08 ENCOUNTER — ANCILLARY PROCEDURE (OUTPATIENT)
Dept: GENERAL RADIOLOGY | Facility: CLINIC | Age: 70
End: 2020-12-08
Attending: PHYSICIAN ASSISTANT
Payer: MEDICARE

## 2020-12-08 ENCOUNTER — OFFICE VISIT (OUTPATIENT)
Dept: FAMILY MEDICINE | Facility: CLINIC | Age: 70
End: 2020-12-08
Payer: MEDICARE

## 2020-12-08 VITALS
SYSTOLIC BLOOD PRESSURE: 107 MMHG | RESPIRATION RATE: 20 BRPM | DIASTOLIC BLOOD PRESSURE: 62 MMHG | OXYGEN SATURATION: 96 % | HEART RATE: 68 BPM | TEMPERATURE: 96.6 F

## 2020-12-08 DIAGNOSIS — J20.9 ACUTE BRONCHITIS, UNSPECIFIED ORGANISM: ICD-10-CM

## 2020-12-08 DIAGNOSIS — R05.9 COUGH: ICD-10-CM

## 2020-12-08 DIAGNOSIS — R05.9 COUGH: Primary | ICD-10-CM

## 2020-12-08 DIAGNOSIS — J98.4 PNEUMONITIS: ICD-10-CM

## 2020-12-08 DIAGNOSIS — N17.9 ACUTE KIDNEY INJURY (H): ICD-10-CM

## 2020-12-08 LAB
ANION GAP SERPL CALCULATED.3IONS-SCNC: 2 MMOL/L (ref 3–14)
BASOPHILS # BLD AUTO: 0 10E9/L (ref 0–0.2)
BASOPHILS NFR BLD AUTO: 0.3 %
BUN SERPL-MCNC: 18 MG/DL (ref 7–30)
CALCIUM SERPL-MCNC: 9.6 MG/DL (ref 8.5–10.1)
CHLORIDE SERPL-SCNC: 100 MMOL/L (ref 94–109)
CO2 SERPL-SCNC: 35 MMOL/L (ref 20–32)
CREAT SERPL-MCNC: 1.22 MG/DL (ref 0.66–1.25)
DIFFERENTIAL METHOD BLD: ABNORMAL
EOSINOPHIL # BLD AUTO: 0.2 10E9/L (ref 0–0.7)
EOSINOPHIL NFR BLD AUTO: 2.5 %
ERYTHROCYTE [DISTWIDTH] IN BLOOD BY AUTOMATED COUNT: 14.1 % (ref 10–15)
GFR SERPL CREATININE-BSD FRML MDRD: 60 ML/MIN/{1.73_M2}
GLUCOSE SERPL-MCNC: 108 MG/DL (ref 70–99)
HCT VFR BLD AUTO: 35.8 % (ref 40–53)
HGB BLD-MCNC: 12 G/DL (ref 13.3–17.7)
LYMPHOCYTES # BLD AUTO: 1.9 10E9/L (ref 0.8–5.3)
LYMPHOCYTES NFR BLD AUTO: 26 %
MCH RBC QN AUTO: 33.1 PG (ref 26.5–33)
MCHC RBC AUTO-ENTMCNC: 33.5 G/DL (ref 31.5–36.5)
MCV RBC AUTO: 99 FL (ref 78–100)
MONOCYTES # BLD AUTO: 0.8 10E9/L (ref 0–1.3)
MONOCYTES NFR BLD AUTO: 11.5 %
NEUTROPHILS # BLD AUTO: 4.4 10E9/L (ref 1.6–8.3)
NEUTROPHILS NFR BLD AUTO: 59.7 %
PLATELET # BLD AUTO: 159 10E9/L (ref 150–450)
POTASSIUM SERPL-SCNC: 3.8 MMOL/L (ref 3.4–5.3)
RBC # BLD AUTO: 3.62 10E12/L (ref 4.4–5.9)
SODIUM SERPL-SCNC: 137 MMOL/L (ref 133–144)
WBC # BLD AUTO: 7.3 10E9/L (ref 4–11)

## 2020-12-08 PROCEDURE — 85025 COMPLETE CBC W/AUTO DIFF WBC: CPT | Performed by: PHYSICIAN ASSISTANT

## 2020-12-08 PROCEDURE — 36415 COLL VENOUS BLD VENIPUNCTURE: CPT | Performed by: PHYSICIAN ASSISTANT

## 2020-12-08 PROCEDURE — 99214 OFFICE O/P EST MOD 30 MIN: CPT | Performed by: PHYSICIAN ASSISTANT

## 2020-12-08 PROCEDURE — 80048 BASIC METABOLIC PNL TOTAL CA: CPT | Performed by: PHYSICIAN ASSISTANT

## 2020-12-08 PROCEDURE — 71046 X-RAY EXAM CHEST 2 VIEWS: CPT

## 2020-12-08 RX ORDER — ALBUTEROL SULFATE 90 UG/1
2 AEROSOL, METERED RESPIRATORY (INHALATION) EVERY 6 HOURS
Qty: 1 INHALER | Refills: 1 | Status: SHIPPED | OUTPATIENT
Start: 2020-12-08 | End: 2021-12-03

## 2020-12-08 RX ORDER — AZITHROMYCIN 250 MG/1
TABLET, FILM COATED ORAL
Qty: 6 TABLET | Refills: 0 | Status: SHIPPED | OUTPATIENT
Start: 2020-12-08 | End: 2021-02-08

## 2020-12-08 RX ORDER — PREDNISONE 20 MG/1
20 TABLET ORAL 2 TIMES DAILY
Qty: 10 TABLET | Refills: 0 | Status: SHIPPED | OUTPATIENT
Start: 2020-12-08 | End: 2020-12-13

## 2020-12-08 RX ORDER — BENZONATATE 200 MG/1
200 CAPSULE ORAL 3 TIMES DAILY PRN
Qty: 30 CAPSULE | Refills: 1 | Status: SHIPPED | OUTPATIENT
Start: 2020-12-08 | End: 2021-12-02

## 2020-12-08 RX ORDER — FUROSEMIDE 20 MG
40 TABLET ORAL DAILY
Qty: 180 TABLET | Refills: 1 | Status: SHIPPED | OUTPATIENT
Start: 2020-12-08 | End: 2021-06-04

## 2020-12-08 NOTE — LETTER
[facility] [place]  [address] [facility]  [place] [address]  Phone: [phone]    December 8, 2020        Prashant Keyes  [address] [facility] [address]          To whom it may concern:    RE: Prashant Keyes    Patient was seen and treated today at our clinic. Due to complication associated with his recent bout with COVID19, I am advising he remain out of work through 12/17/20.        Please contact me for questions or concerns.      Sincerely,        Matt Swan PA-C

## 2020-12-08 NOTE — PROGRESS NOTES
Subjective     Prashant Keyes is a 69 year old male who presents to clinic today for the following health issues:    HPI         Following up on: COVID symptoms - tested positive on 11/8/20    Last visit this was discussed: 12/01/20 with Matt Swan    Progression of Symptoms:  same    Accompanying Signs & Symptoms: Shakiness - might be related to Parkinsons    Taking Medication as prescribed: NA    Side Effects:  None    Medication Helping Symptoms:  not applicable    Still noting some malaise and cough. Some wheezing.   No leg edema.   Toe is feeling better.   Doesn't feel capable of returning to work later this week.  No blood in stools. Intermittent constipation. No irritative/obst voiding symptoms.   Still some diminished taste concerns.   No chest pain/palps.      Review of Systems   Constitutional, HEENT, cardiovascular, pulmonary, GI, , musculoskeletal, neuro, skin, endocrine and psych systems are negative, except as otherwise noted.      Objective    /62   Pulse 68   Temp 96.6  F (35.9  C) (Tympanic)   Resp 20   SpO2 96%   There is no height or weight on file to calculate BMI.  Physical Exam   Eye exam - right eye normal lid, conjunctiva, cornea, pupil and fundus, left eye normal lid, conjunctiva, cornea, pupil and fundus.  ENT exam reveals - bilateral TM normal without fluid or infection, post nasal drip noted, nasal mucosa congested and nasal mucosa pale and congested.  Thyroid not palpable, not enlarged, no nodules detected.  CHEST:no tachypnea, retractions or cyanosis, mild expiratory wheezing heard diffusely throughout both lungs and S1, S2 normal, no murmur, no gallop, rate regular.  No leg edema. Patient denies pleuritic chest pain and he denies calf pain.       Prashant was seen today for covid concern.    Diagnoses and all orders for this visit:    Cough  -     XR Chest 2 Views; Future  -     benzonatate (TESSALON) 200 MG capsule; Take 1 capsule (200 mg) by mouth 3 times daily as  needed    Acute kidney injury (H)  -     Basic metabolic panel  (Ca, Cl, CO2, Creat, Gluc, K, Na, BUN)    Acute bronchitis, unspecified organism  -     CBC with platelets and differential  -     XR Chest 2 Views; Future  -     predniSONE (DELTASONE) 20 MG tablet; Take 1 tablet (20 mg) by mouth 2 times daily for 5 days  -     albuterol (PROAIR HFA/PROVENTIL HFA/VENTOLIN HFA) 108 (90 Base) MCG/ACT inhaler; Inhale 2 puffs into the lungs every 6 hours    Pneumonitis  -     azithromycin (ZITHROMAX) 250 MG tablet; Two tablets first day, then one tablet daily for four days.      work on lifestyle modification  Advised supportive and symptomatic treatment.  Follow up with Provider - if condition persists or worsens.

## 2020-12-29 DIAGNOSIS — E87.6 HYPOKALEMIA: ICD-10-CM

## 2020-12-31 DIAGNOSIS — M10.071 ACUTE IDIOPATHIC GOUT OF RIGHT ANKLE: Chronic | ICD-10-CM

## 2021-01-04 ENCOUNTER — MYC MEDICAL ADVICE (OUTPATIENT)
Dept: FAMILY MEDICINE | Facility: CLINIC | Age: 71
End: 2021-01-04

## 2021-01-04 RX ORDER — POTASSIUM CHLORIDE 1500 MG/1
TABLET, EXTENDED RELEASE ORAL
Qty: 270 TABLET | Refills: 0 | Status: SHIPPED | OUTPATIENT
Start: 2021-01-04 | End: 2021-03-30

## 2021-01-04 NOTE — TELEPHONE ENCOUNTER
See patient's mychart question, indeed, his last allopurinol Rx in October does say he needs to be seen.    Patient was seen 12/8/20.    Plan:           Instructions       Return in about 4 weeks (around 1/5/2021), or if symptoms worsen or fail to improve.        Routed allopurinol refill request to Matt Swan, see other refill request from pharmacy.   Message routed back to patient advising we'd let him know if he needs to be seen again.    Yolis Montgomery RN  [facility]

## 2021-01-04 NOTE — TELEPHONE ENCOUNTER
"Requested Prescriptions   Pending Prescriptions Disp Refills     allopurinol (ZYLOPRIM) 100 MG tablet [Pharmacy Med Name: Allopurinol 100 MG Oral Tablet] 180 tablet 0     Sig: TAKE 2 TABLETS BY MOUTH ONCE DAILY       Gout Agents Protocol Failed - 12/31/2020 10:50 AM        Failed - ALT on file in past 12 months     Recent Labs   Lab Test 12/18/18  0926   ALT 17             Failed - Has Uric Acid on file in past 12 months and value is less than 6     Recent Labs   Lab Test 07/19/16  1159   URIC 9.0*     If level is 6mg/dL or greater, ok to refill one time and refer to provider.           Passed - CBC on file in past 12 months     Recent Labs   Lab Test 12/08/20  0935   WBC 7.3   RBC 3.62*   HGB 12.0*   HCT 35.8*                    Passed - Recent (12 mo) or future (30 days) visit within the authorizing provider's specialty     Patient has had an office visit with the authorizing provider or a provider within the authorizing providers department within the previous 12 mos or has a future within next 30 days. See \"Patient Info\" tab in inbasket, or \"Choose Columns\" in Meds & Orders section of the refill encounter.              Passed - Medication is active on med list        Passed - Patient is age 18 or older        Passed - Normal serum creatinine on file in the past 12 months     Recent Labs   Lab Test 12/08/20  0935   CR 1.22       Ok to refill medication if creatinine is low             Routing refill request to provider for review/approval because:  Labs not current:  ALT and uric acid.      Was recently seen 12/8/20.    Yolis Montgomery RN  [facility]              "

## 2021-01-04 NOTE — TELEPHONE ENCOUNTER
Prescription approved per [facility] Refill Protocol.    Lesley Aaron RN BSN  [facility]

## 2021-01-05 RX ORDER — ALLOPURINOL 100 MG/1
TABLET ORAL
Qty: 180 TABLET | Refills: 0 | Status: SHIPPED | OUTPATIENT
Start: 2021-01-05 | End: 2021-04-08

## 2021-01-12 DIAGNOSIS — G57.13 MERALGIA PARESTHETICA OF BOTH LOWER EXTREMITIES: ICD-10-CM

## 2021-01-15 RX ORDER — GABAPENTIN 100 MG/1
CAPSULE ORAL
Qty: 90 CAPSULE | Refills: 0 | Status: SHIPPED | OUTPATIENT
Start: 2021-01-15 | End: 2021-02-08

## 2021-01-16 DIAGNOSIS — I10 BENIGN ESSENTIAL HYPERTENSION: ICD-10-CM

## 2021-01-19 ENCOUNTER — TELEPHONE (OUTPATIENT)
Dept: NEUROLOGY | Facility: CLINIC | Age: 71
End: 2021-01-19

## 2021-01-19 DIAGNOSIS — G20.C PARKINSONISM, UNSPECIFIED PARKINSONISM TYPE (H): ICD-10-CM

## 2021-01-19 RX ORDER — AMLODIPINE BESYLATE 10 MG/1
TABLET ORAL
Qty: 90 TABLET | Refills: 0 | Status: SHIPPED | OUTPATIENT
Start: 2021-01-19 | End: 2021-04-13

## 2021-01-19 RX ORDER — CARBIDOPA AND LEVODOPA 25; 100 MG/1; MG/1
2.5 TABLET ORAL 3 TIMES DAILY
Qty: 225 TABLET | Refills: 0 | Status: SHIPPED | OUTPATIENT
Start: 2021-01-19 | End: 2021-02-16

## 2021-01-19 NOTE — TELEPHONE ENCOUNTER
Reason for Call:  Other prescription    Detailed comments: [facility] pharmacy calling stating they are needing the updated rx for carbidopa-levodopa 2.5 tabs 3 time daily. Pt will be out of medications soon.     Phone Number Patient can be reached at: Other phone number:  [phone]*    Best Time: any    Can we leave a detailed message on this number? YES    Call taken on 1/19/2021 at 10:55 AM by Rachel Leonard

## 2021-01-19 NOTE — TELEPHONE ENCOUNTER
Routing refill request to provider for review/approval because:  Drug interaction warning    90 day supply pended

## 2021-01-21 DIAGNOSIS — I10 BENIGN ESSENTIAL HYPERTENSION: ICD-10-CM

## 2021-01-23 RX ORDER — CHLORTHALIDONE 25 MG/1
TABLET ORAL
Qty: 90 TABLET | Refills: 0 | Status: SHIPPED | OUTPATIENT
Start: 2021-01-23 | End: 2021-04-21

## 2021-01-23 NOTE — CONFIDENTIAL NOTE
"Prescription approved per [facility] Refill Protocol.    Requested Prescriptions   Pending Prescriptions Disp Refills     chlorthalidone (HYGROTON) 25 MG tablet [Pharmacy Med Name: Chlorthalidone 25 MG Oral Tablet] 90 tablet 0     Sig: Take 1 tablet by mouth once daily       Diuretics (Including Combos) Protocol Passed - 1/21/2021  6:05 PM        Passed - Blood pressure under 140/90 in past 12 months     BP Readings from Last 3 Encounters:   12/08/20 107/62   09/30/20 132/74   08/27/20 120/63                 Passed - Recent (12 mo) or future (30 days) visit within the authorizing provider's specialty     Patient has had an office visit with the authorizing provider or a provider within the authorizing providers department within the previous 12 mos or has a future within next 30 days. See \"Patient Info\" tab in inbasket, or \"Choose Columns\" in Meds & Orders section of the refill encounter.              Passed - Medication is active on med list        Passed - Patient is age 18 or older        Passed - Normal serum creatinine on file in past 12 months     Recent Labs   Lab Test 12/08/20  0935   CR 1.22              Passed - Normal serum potassium on file in past 12 months     Recent Labs   Lab Test 12/08/20  0935   POTASSIUM 3.8                    Passed - Normal serum sodium on file in past 12 months     Recent Labs   Lab Test 12/08/20  0935                      "

## 2021-02-02 DIAGNOSIS — E78.00 PURE HYPERCHOLESTEROLEMIA: ICD-10-CM

## 2021-02-04 RX ORDER — SIMVASTATIN 20 MG
TABLET ORAL
Qty: 180 TABLET | Refills: 0 | Status: SHIPPED | OUTPATIENT
Start: 2021-02-04 | End: 2021-05-02

## 2021-02-04 NOTE — TELEPHONE ENCOUNTER
Routing refill request to provider for review/approval because:  Drug interaction warning      Yesica Roche RN

## 2021-02-08 ENCOUNTER — OFFICE VISIT (OUTPATIENT)
Dept: FAMILY MEDICINE | Facility: CLINIC | Age: 71
End: 2021-02-08
Payer: MEDICARE

## 2021-02-08 VITALS
WEIGHT: 275.4 LBS | OXYGEN SATURATION: 96 % | HEART RATE: 69 BPM | SYSTOLIC BLOOD PRESSURE: 122 MMHG | RESPIRATION RATE: 20 BRPM | BODY MASS INDEX: 38.96 KG/M2 | TEMPERATURE: 97.5 F | DIASTOLIC BLOOD PRESSURE: 72 MMHG

## 2021-02-08 DIAGNOSIS — M79.7 FIBROMYALGIA: Primary | ICD-10-CM

## 2021-02-08 DIAGNOSIS — M25.561 CHRONIC PAIN OF RIGHT KNEE: ICD-10-CM

## 2021-02-08 DIAGNOSIS — G89.29 CHRONIC PAIN OF RIGHT KNEE: ICD-10-CM

## 2021-02-08 DIAGNOSIS — R52 GENERALIZED BODY ACHES: ICD-10-CM

## 2021-02-08 LAB
ALBUMIN SERPL-MCNC: 4 G/DL (ref 3.4–5)
ALP SERPL-CCNC: 91 U/L (ref 40–150)
ALT SERPL W P-5'-P-CCNC: 12 U/L (ref 0–70)
ANION GAP SERPL CALCULATED.3IONS-SCNC: 3 MMOL/L (ref 3–14)
AST SERPL W P-5'-P-CCNC: 16 U/L (ref 0–45)
BILIRUB SERPL-MCNC: 0.3 MG/DL (ref 0.2–1.3)
BUN SERPL-MCNC: 15 MG/DL (ref 7–30)
CALCIUM SERPL-MCNC: 9.3 MG/DL (ref 8.5–10.1)
CHLORIDE SERPL-SCNC: 100 MMOL/L (ref 94–109)
CK SERPL-CCNC: 93 U/L (ref 30–300)
CO2 SERPL-SCNC: 36 MMOL/L (ref 20–32)
CREAT SERPL-MCNC: 0.97 MG/DL (ref 0.66–1.25)
CRP SERPL-MCNC: 9.4 MG/L (ref 0–8)
ERYTHROCYTE [SEDIMENTATION RATE] IN BLOOD BY WESTERGREN METHOD: 18 MM/H (ref 0–20)
GFR SERPL CREATININE-BSD FRML MDRD: 78 ML/MIN/{1.73_M2}
GLUCOSE SERPL-MCNC: 89 MG/DL (ref 70–99)
POTASSIUM SERPL-SCNC: 3.7 MMOL/L (ref 3.4–5.3)
PROT SERPL-MCNC: 7.6 G/DL (ref 6.8–8.8)
SODIUM SERPL-SCNC: 139 MMOL/L (ref 133–144)
TSH SERPL DL<=0.005 MIU/L-ACNC: 0.49 MU/L (ref 0.4–4)

## 2021-02-08 PROCEDURE — 84165 PROTEIN E-PHORESIS SERUM: CPT | Performed by: PATHOLOGY

## 2021-02-08 PROCEDURE — 86140 C-REACTIVE PROTEIN: CPT | Performed by: PHYSICIAN ASSISTANT

## 2021-02-08 PROCEDURE — 85652 RBC SED RATE AUTOMATED: CPT | Performed by: PHYSICIAN ASSISTANT

## 2021-02-08 PROCEDURE — 99000 SPECIMEN HANDLING OFFICE-LAB: CPT | Performed by: PHYSICIAN ASSISTANT

## 2021-02-08 PROCEDURE — 86747 PARVOVIRUS ANTIBODY: CPT | Mod: 90 | Performed by: PHYSICIAN ASSISTANT

## 2021-02-08 PROCEDURE — 36415 COLL VENOUS BLD VENIPUNCTURE: CPT | Performed by: PHYSICIAN ASSISTANT

## 2021-02-08 PROCEDURE — 86431 RHEUMATOID FACTOR QUANT: CPT | Performed by: PHYSICIAN ASSISTANT

## 2021-02-08 PROCEDURE — 86038 ANTINUCLEAR ANTIBODIES: CPT | Performed by: PHYSICIAN ASSISTANT

## 2021-02-08 PROCEDURE — 84443 ASSAY THYROID STIM HORMONE: CPT | Performed by: PHYSICIAN ASSISTANT

## 2021-02-08 PROCEDURE — 99214 OFFICE O/P EST MOD 30 MIN: CPT | Performed by: PHYSICIAN ASSISTANT

## 2021-02-08 PROCEDURE — 86618 LYME DISEASE ANTIBODY: CPT | Performed by: PHYSICIAN ASSISTANT

## 2021-02-08 PROCEDURE — 82550 ASSAY OF CK (CPK): CPT | Performed by: PHYSICIAN ASSISTANT

## 2021-02-08 PROCEDURE — 80053 COMPREHEN METABOLIC PANEL: CPT | Performed by: PHYSICIAN ASSISTANT

## 2021-02-08 PROCEDURE — 99N1036 PR STATISTIC TOTAL PROTEIN: Performed by: PHYSICIAN ASSISTANT

## 2021-02-08 RX ORDER — PREDNISONE 10 MG/1
10 TABLET ORAL 2 TIMES DAILY
Qty: 14 TABLET | Refills: 0 | Status: SHIPPED | OUTPATIENT
Start: 2021-02-08 | End: 2021-02-15

## 2021-02-08 RX ORDER — TRAMADOL HYDROCHLORIDE 50 MG/1
50 TABLET ORAL 3 TIMES DAILY PRN
Qty: 30 TABLET | Refills: 0 | Status: SHIPPED | OUTPATIENT
Start: 2021-02-08 | End: 2021-02-23

## 2021-02-08 RX ORDER — GABAPENTIN 300 MG/1
CAPSULE ORAL
Qty: 90 CAPSULE | Refills: 2 | Status: SHIPPED | OUTPATIENT
Start: 2021-02-08 | End: 2021-05-02

## 2021-02-08 ASSESSMENT — ENCOUNTER SYMPTOMS
SHORTNESS OF BREATH: 0
ABDOMINAL PAIN: 0
NERVOUS/ANXIOUS: 0
FREQUENCY: 0
DYSURIA: 0
PARESTHESIAS: 0
MYALGIAS: 1
JOINT SWELLING: 0
EYE PAIN: 0
WEAKNESS: 0
CONSTIPATION: 0
NAUSEA: 0
HEMATOCHEZIA: 0
PALPITATIONS: 0
CHILLS: 0
COUGH: 0
HEARTBURN: 0
HEADACHES: 1
FEVER: 0
SORE THROAT: 0
DIZZINESS: 1
HEMATURIA: 0
DIARRHEA: 0
ARTHRALGIAS: 1

## 2021-02-08 ASSESSMENT — PATIENT HEALTH QUESTIONNAIRE - PHQ9
10. IF YOU CHECKED OFF ANY PROBLEMS, HOW DIFFICULT HAVE THESE PROBLEMS MADE IT FOR YOU TO DO YOUR WORK, TAKE CARE OF THINGS AT HOME, OR GET ALONG WITH OTHER PEOPLE: NOT DIFFICULT AT ALL
SUM OF ALL RESPONSES TO PHQ QUESTIONS 1-9: 10
SUM OF ALL RESPONSES TO PHQ QUESTIONS 1-9: 10

## 2021-02-08 ASSESSMENT — ACTIVITIES OF DAILY LIVING (ADL): CURRENT_FUNCTION: NO ASSISTANCE NEEDED

## 2021-02-08 NOTE — PROGRESS NOTES
"    Kevon Arciniega is a 70 year old who presents to clinic today for the following health issues     HPI       Musculoskeletal problem/pain  Onset/Duration: everywhere, worse in upper arms  Description  Location: ::everywhere\"   Joint Swelling: no  Redness: no  Pain: YES  Warmth: no  Intensity:  severe  Progression of Symptoms:  worsening  Accompanying signs and symptoms:   Fevers: no  Numbness/tingling/weakness: YES - tips of fingers  History  Trauma to the area: no  Recent illness:  YES - COVID 11/8/20  Previous similar problem: YES  Previous evaluation:  YES  Precipitating or alleviating factors:  Aggravating factors include: painful to the touch  Therapies tried and outcome: rest/inactivity, acetaminophen and aspirin    Fibro has been flaring. Feels like his classic fibro pain.   Some right knee pain. Achy. No catching.   No joint swelling.   Noting fatigue.  Denies depression     Review of Systems   Constitutional, HEENT, cardiovascular, pulmonary, GI, , musculoskeletal, neuro, skin, endocrine and psych systems are negative, except as otherwise noted.      Objective    /72   Pulse 69   Temp 97.5  F (36.4  C) (Tympanic)   Resp 20   Wt 124.9 kg (275 lb 6.4 oz)   SpO2 96%   BMI 38.96 kg/m    Body mass index is 38.96 kg/m .  Physical Exam   Eye exam - right eye normal lid, conjunctiva, cornea, pupil and fundus, left eye normal lid, conjunctiva, cornea, pupil and fundus.  Thyroid not palpable, not enlarged, no nodules detected.  CHEST:chest clear to IPPA, no tachypnea, retractions or cyanosis and S1, S2 normal, no murmur, no gallop, rate regular.  Multiple painful tender points.   No joint swelling.   KNEE: The injured knee reveals soft tissue tenderness over the medial joint line, reduced range of motion, negative drawer sign, collateral ligaments intact, negative Kenji sign. X-ray is negative for fracture.  Prashant was seen today for musculoskeletal problem.    Diagnoses and all orders for " this visit:    Fibromyalgia  -     gabapentin (NEURONTIN) 300 MG capsule; TAKE 1 CAPSULE BY MOUTH THREE TIMES DAILY    Generalized body aches  -     Comprehensive metabolic panel (BMP + Alb, Alk Phos, ALT, AST, Total. Bili, TP)  -     CK total  -     CRP inflammation  -     Erythrocyte sedimentation rate auto  -     Rheumatoid factor  -     Lyme Disease Jocy with reflex to WB Serum  -     Parvovirus B19 antibodies IgG IgM  -     ROCHELLE Scrn Rflx to Titer and Ptrn ([facility])  -     TSH with free T4 reflex  -     traMADol (ULTRAM) 50 MG tablet; Take 1 tablet (50 mg) by mouth 3 times daily as needed for severe pain  -     predniSONE (DELTASONE) 10 MG tablet; Take 1 tablet (10 mg) by mouth 2 times daily for 7 days  -     Protein electrophoresis    Chronic pain of right knee  -     predniSONE (DELTASONE) 10 MG tablet; Take 1 tablet (10 mg) by mouth 2 times daily for 7 days      work on lifestyle modification  Advised supportive and symptomatic treatment.  Follow up with Provider - if condition persists or worsens.

## 2021-02-08 NOTE — PROGRESS NOTES
"SUBJECTIVE:   Prashant Keyes is a 70 year old male who presents for Preventive Visit.    {Split Bill scripting  The purpose of this visit is to discuss your medical history and prevent health problems before you are sick. You may be responsible for a co-pay, coinsurance, or deductible if your visit today includes services such as checking on a sore throat, having an x-ray or lab test, or treating and evaluating a new or existing condition :784779}  Patient has been advised of split billing requirements and indicates understanding: {Yes and No:358673}   Are you in the first 12 months of your Medicare coverage?  { :423166::\"No\"}    Healthy Habits:     In general, how would you rate your overall health?  Good    Frequency of exercise:  None    Do you usually eat at least 4 servings of fruit and vegetables a day, include whole grains    & fiber and avoid regularly eating high fat or \"junk\" foods?  No    Taking medications regularly:  Yes    Medication side effects:  Muscle aches, Lightheadedness and Other    Ability to successfully perform activities of daily living:  No assistance needed    Home Safety:  No safety concerns identified    Hearing Impairment:  No hearing concerns    In the past 6 months, have you been bothered by leaking of urine?  No    In general, how would you rate your overall mental or emotional health?  Good      PHQ-2 Total Score: 3    Additional concerns today:  No    Do you feel safe in your environment? { :427813}    Have you ever done Advance Care Planning? (For example, a Health Directive, POLST, or a discussion with a medical provider or your loved ones about your wishes): { :542477}    {Hearing Test Done (Optional):900986}   Fall risk  { :471869}  {If any of the above assessments are answered yes, consider ordering appropriate referrals (Optional):009371::\"click delete button to remove this line now\"}  Cognitive Screening { :293007}    {Do you have sleep apnea, excessive snoring or " daytime drowsiness? (Optional):866047}    Reviewed and updated as needed this visit by clinical staff                 Reviewed and updated as needed this visit by Provider                Social History     Tobacco Use     Smoking status: Never Smoker     Smokeless tobacco: Never Used   Substance Use Topics     Alcohol use: No     Alcohol/week: 0.0 standard drinks     Comment: Quit since 2003.          Alcohol Use 2/8/2021   Prescreen: >3 drinks/day or >7 drinks/week? No   Prescreen: >3 drinks/day or >7 drinks/week? -   No flowsheet data found.    {Outside tests to abstract? :843525}    {additional problems to add (Optional):090627}    Current providers sharing in care for this patient include: {Rooming staff:  Please update Care Team in Rooming Activity, refresh this note and then delete this statement}  Patient Care Team:  Matt Swan PA-C as PCP - General (Physician Assistant)  Dimas Qureshi MD as MD (Neurology)  Matt Swan PA-C as Assigned PCP  Damon Rosas MD as Assigned Musculoskeletal Provider  Bisi Mckeon MD as Assigned Neuroscience Provider  Jackie Quintanilla MD as Assigned Heart and Vascular Provider    The following health maintenance items are reviewed in Epic and correct as of today:  Health Maintenance   Topic Date Due     PHQ-9  11/07/2015     LIPID  07/03/2021     MEDICARE ANNUAL WELLNESS VISIT  07/28/2021     MICROALBUMIN  07/28/2021     FALL RISK ASSESSMENT  07/28/2021     BMP  12/08/2021     DEXA  06/05/2022     COLORECTAL CANCER SCREENING  08/17/2022     ADVANCE CARE PLANNING  07/28/2025     DTAP/TDAP/TD IMMUNIZATION (5 - Td) 10/05/2028     HEPATITIS C SCREENING  Completed     MIGRAINE ACTION PLAN  Completed     INFLUENZA VACCINE  Completed     Pneumococcal Vaccine: 65+ Years  Completed     ZOSTER IMMUNIZATION  Completed     AORTIC ANEURYSM SCREENING (SYSTEM ASSIGNED)  Completed     Pneumococcal Vaccine: Pediatrics (0 to 5 Years) and At-Risk Patients  "(6 to 64 Years)  Aged Out     IPV IMMUNIZATION  Aged Out     MENINGITIS IMMUNIZATION  Aged Out     HEPATITIS B IMMUNIZATION  Aged Out     {Chronicprobdata (optional):592414}  {Mammo Decision Support :220450}    Review of Systems   Constitutional: Negative for chills and fever.   HENT: Positive for hearing loss. Negative for congestion, ear pain and sore throat.    Eyes: Negative for pain and visual disturbance.   Respiratory: Negative for cough and shortness of breath.    Cardiovascular: Positive for peripheral edema. Negative for chest pain and palpitations.   Gastrointestinal: Negative for abdominal pain, constipation, diarrhea, heartburn, hematochezia and nausea.   Genitourinary: Negative for discharge, dysuria, frequency, genital sores, hematuria, impotence and urgency.   Musculoskeletal: Positive for arthralgias and myalgias. Negative for joint swelling.   Skin: Negative for rash.   Neurological: Positive for dizziness and headaches. Negative for weakness and paresthesias.   Psychiatric/Behavioral: Negative for mood changes. The patient is not nervous/anxious.      {ROS COMP (Optional):736540}    OBJECTIVE:   There were no vitals taken for this visit. Estimated body mass index is 38.49 kg/m  as calculated from the following:    Height as of 20: 1.791 m (5' 10.5\").    Weight as of 20: 123.4 kg (272 lb 2 oz).  Physical Exam  {Exam (Optional) :219451}    {Diagnostic Test Results (Optional):732121::\"Diagnostic Test Results:\",\"Labs reviewed in Epic\"}    ASSESSMENT / PLAN:   {Dia Picklist:113124}    Patient has been advised of split billing requirements and indicates understanding: {YES / NO:746655::\"Yes\"}  COUNSELING:  {Medicare Counselin}    Estimated body mass index is 38.49 kg/m  as calculated from the following:    Height as of 20: 1.791 m (5' 10.5\").    Weight as of 20: 123.4 kg (272 lb 2 oz).    {Weight Management Plan (ACO) Complete if BMI is abnormal-  Ages 18-64  BMI >24.9.  Age " 65+ with BMI <23 or >30 (Optional):270287}    He reports that he has never smoked. He has never used smokeless tobacco.      Appropriate preventive services were discussed with this patient, including applicable screening as appropriate for cardiovascular disease, diabetes, osteopenia/osteoporosis, and glaucoma.  As appropriate for age/gender, discussed screening for colorectal cancer, prostate cancer, breast cancer, and cervical cancer. Checklist reviewing preventive services available has been given to the patient.    Reviewed patients plan of care and provided an AVS. The {CarePlan:038538} for Prashant meets the Care Plan requirement. This Care Plan has been established and reviewed with the {PATIENT, FAMILY MEMBER, CAREGIVER:973470}.    Counseling Resources:  ATP IV Guidelines  Pooled Cohorts Equation Calculator  Breast Cancer Risk Calculator  Breast Cancer: Medication to Reduce Risk  FRAX Risk Assessment  ICSI Preventive Guidelines  Dietary Guidelines for Americans, 2010  [employer]'s MyPlate  ASA Prophylaxis  Lung CA Screening    [facility]    Identified Health Risks:  Answers for HPI/ROS submitted by the patient on 2/8/2021   Annual Exam:  If you checked off any problems, how difficult have these problems made it for you to do your work, take care of things at home, or get along with other people?: Not difficult at all  PHQ9 TOTAL SCORE: 10

## 2021-02-09 LAB
ALBUMIN SERPL ELPH-MCNC: 4.3 G/DL (ref 3.7–5.1)
ALPHA1 GLOB SERPL ELPH-MCNC: 0.3 G/DL (ref 0.2–0.4)
ALPHA2 GLOB SERPL ELPH-MCNC: 0.9 G/DL (ref 0.5–0.9)
ANA SER QL IF: NEGATIVE
B BURGDOR IGG+IGM SER QL: 0.2 (ref 0–0.89)
B-GLOBULIN SERPL ELPH-MCNC: 0.9 G/DL (ref 0.6–1)
B19V IGG SER IA-ACNC: 5.3 IV
B19V IGM SER IA-ACNC: 0.71 IV
GAMMA GLOB SERPL ELPH-MCNC: 0.8 G/DL (ref 0.7–1.6)
M PROTEIN SERPL ELPH-MCNC: 0 G/DL
PROT PATTERN SERPL ELPH-IMP: NORMAL
RHEUMATOID FACT SER NEPH-ACNC: <7 IU/ML (ref 0–20)

## 2021-02-09 ASSESSMENT — PATIENT HEALTH QUESTIONNAIRE - PHQ9: SUM OF ALL RESPONSES TO PHQ QUESTIONS 1-9: 10

## 2021-02-16 DIAGNOSIS — G20.C PARKINSONISM, UNSPECIFIED PARKINSONISM TYPE (H): ICD-10-CM

## 2021-02-16 RX ORDER — CARBIDOPA AND LEVODOPA 25; 100 MG/1; MG/1
2.5 TABLET ORAL 3 TIMES DAILY
Qty: 225 TABLET | Refills: 0 | Status: SHIPPED | OUTPATIENT
Start: 2021-02-16 | End: 2021-03-19

## 2021-02-16 NOTE — TELEPHONE ENCOUNTER
Requested Prescriptions   Pending Prescriptions Disp Refills     carbidopa-levodopa (SINEMET)  MG tablet 225 tablet 0     Sig: Take 2.5 tablets by mouth 3 times daily       There is no refill protocol information for this order        Last Written Prescription Date:    Last Fill Quantity: ,  # refills:    Last office visit: 1/7/2020 with prescribing provider:  M[place]ick   Future Office Visit:   Next 5 appointments (look out 90 days)    Mar 23, 2021  9:00 AM  Return Visit with Bisi Temple MD  [facility] [place] ([facility] [place] ) [address] [place] [address]  [phone]

## 2021-02-18 ENCOUNTER — MYC MEDICAL ADVICE (OUTPATIENT)
Dept: NEUROLOGY | Facility: CLINIC | Age: 71
End: 2021-02-18

## 2021-02-19 NOTE — TELEPHONE ENCOUNTER
Please let Bill know that I do not think the issue he is having with his toes is related to his Parkinson's nor the carbidopa/levodopa.     I recommend he talk to his primary care provider about the problem. I will also send this note to Matt Swan .    Thank you,  Dr. Temple

## 2021-02-22 DIAGNOSIS — R52 GENERALIZED BODY ACHES: ICD-10-CM

## 2021-02-22 DIAGNOSIS — I10 HYPERTENSION GOAL BP (BLOOD PRESSURE) < 140/90: ICD-10-CM

## 2021-02-23 RX ORDER — HYDRALAZINE HYDROCHLORIDE 25 MG/1
TABLET, FILM COATED ORAL
Qty: 180 TABLET | Refills: 0 | Status: SHIPPED | OUTPATIENT
Start: 2021-02-23 | End: 2021-05-27

## 2021-02-23 RX ORDER — TRAMADOL HYDROCHLORIDE 50 MG/1
TABLET ORAL
Qty: 30 TABLET | Refills: 0 | Status: SHIPPED | OUTPATIENT
Start: 2021-02-23 | End: 2021-03-17

## 2021-02-23 NOTE — TELEPHONE ENCOUNTER
Routing refill request to provider for review/approval because:  Drug not on the [facility] refill protocol     traMADol (ULTRAM) 50 MG tablet  Last Written Prescription Date:  2/8/21  Last Fill Quantity: 30,  # refills: 0   Last office visit: 2/8/2021 with prescribing provider:  jessica Swan   Future Office Visit:   Next 5 appointments (look out 90 days)    Mar 23, 2021  9:00 AM  Return Visit with Bisi Temple MD  [facility] [place] ([facility] [place] ) [address] [address]  [phone]

## 2021-02-25 DIAGNOSIS — I10 BENIGN ESSENTIAL HYPERTENSION: ICD-10-CM

## 2021-02-27 NOTE — TELEPHONE ENCOUNTER
Routing refill request to provider for review/approval because:  Drug allergy    Med pended for 6 month supply

## 2021-02-28 RX ORDER — LOSARTAN POTASSIUM 100 MG/1
TABLET ORAL
Qty: 90 TABLET | Refills: 1 | Status: SHIPPED | OUTPATIENT
Start: 2021-02-28 | End: 2021-08-31

## 2021-03-08 DIAGNOSIS — K21.9 GASTROESOPHAGEAL REFLUX DISEASE: ICD-10-CM

## 2021-03-08 RX ORDER — OMEPRAZOLE 40 MG/1
CAPSULE, DELAYED RELEASE ORAL
Qty: 90 CAPSULE | Refills: 1 | Status: SHIPPED | OUTPATIENT
Start: 2021-03-08 | End: 2021-09-03

## 2021-03-08 NOTE — TELEPHONE ENCOUNTER
"Requested Prescriptions   Pending Prescriptions Disp Refills     omeprazole (PRILOSEC) 40 MG DR capsule [Pharmacy Med Name: Omeprazole 40 MG Oral Capsule Delayed Release] 90 capsule 0     Sig: Take 1 capsule by mouth once daily       PPI Protocol Passed - 3/8/2021  5:30 AM        Passed - Not on Clopidogrel (unless Pantoprazole ordered)        Passed - No diagnosis of osteoporosis on record        Passed - Recent (12 mo) or future (30 days) visit within the authorizing provider's specialty     Patient has had an office visit with the authorizing provider or a provider within the authorizing providers department within the previous 12 mos or has a future within next 30 days. See \"Patient Info\" tab in inbasket, or \"Choose Columns\" in Meds & Orders section of the refill encounter.              Passed - Medication is active on med list        Passed - Patient is age 18 or older             Prescription approved per [facility] Refill Protocol.  Renetta MERRITT-RN  Triage Nurse  [facility]: [facility]      "

## 2021-03-15 DIAGNOSIS — R52 GENERALIZED BODY ACHES: ICD-10-CM

## 2021-03-16 ENCOUNTER — MYC MEDICAL ADVICE (OUTPATIENT)
Dept: FAMILY MEDICINE | Facility: CLINIC | Age: 71
End: 2021-03-16

## 2021-03-16 NOTE — TELEPHONE ENCOUNTER
Routing refill request to provider for review/approval because:  Drug not on the [facility] refill protocol     Last Written Prescription Date:  2-23-21  Last Fill Quantity: 30,  # refills: 0   Last office visit: 2/8/2021 with prescribing provider:  Matt Swan   Future Office Visit:   Next 5 appointments (look out 90 days)    Mar 23, 2021  9:00 AM  Return Visit with Bisi Temple MD  [facility] [place] ([facility] [place] ) [address] [address]  [phone]

## 2021-03-17 RX ORDER — TRAMADOL HYDROCHLORIDE 50 MG/1
TABLET ORAL
Qty: 30 TABLET | Refills: 0 | Status: SHIPPED | OUTPATIENT
Start: 2021-03-17 | End: 2021-04-16

## 2021-03-19 DIAGNOSIS — G20.C PARKINSONISM, UNSPECIFIED PARKINSONISM TYPE (H): ICD-10-CM

## 2021-03-19 RX ORDER — CARBIDOPA AND LEVODOPA 25; 100 MG/1; MG/1
2.5 TABLET ORAL 3 TIMES DAILY
Qty: 225 TABLET | Refills: 0 | Status: SHIPPED | OUTPATIENT
Start: 2021-03-19 | End: 2021-03-23

## 2021-03-22 ENCOUNTER — IMMUNIZATION (OUTPATIENT)
Dept: NURSING | Facility: CLINIC | Age: 71
End: 2021-03-22
Payer: MEDICARE

## 2021-03-22 PROCEDURE — 91300 PR COVID VAC PFIZER DIL RECON 30 MCG/0.3 ML IM: CPT

## 2021-03-22 PROCEDURE — 0001A PR COVID VAC PFIZER DIL RECON 30 MCG/0.3 ML IM: CPT

## 2021-03-23 ENCOUNTER — OFFICE VISIT (OUTPATIENT)
Dept: NEUROLOGY | Facility: CLINIC | Age: 71
End: 2021-03-23
Payer: MEDICARE

## 2021-03-23 VITALS — SYSTOLIC BLOOD PRESSURE: 128 MMHG | DIASTOLIC BLOOD PRESSURE: 66 MMHG | TEMPERATURE: 96.7 F | HEART RATE: 65 BPM

## 2021-03-23 DIAGNOSIS — Z79.899 ENCOUNTER FOR LONG-TERM (CURRENT) USE OF MEDICATIONS: ICD-10-CM

## 2021-03-23 DIAGNOSIS — G40.A09 NONINTRACTABLE ABSENCE EPILEPSY WITHOUT STATUS EPILEPTICUS (H): ICD-10-CM

## 2021-03-23 DIAGNOSIS — R20.2 INTERMITTENT PARESTHESIA OF LEFT HAND AND FOOT: ICD-10-CM

## 2021-03-23 DIAGNOSIS — G20.C PARKINSONISM, UNSPECIFIED PARKINSONISM TYPE (H): Primary | ICD-10-CM

## 2021-03-23 DIAGNOSIS — R56.9 CONVULSIONS, UNSPECIFIED CONVULSION TYPE (H): Chronic | ICD-10-CM

## 2021-03-23 LAB
ALBUMIN SERPL-MCNC: 3.7 G/DL (ref 3.4–5)
ALP SERPL-CCNC: 81 U/L (ref 40–150)
ALT SERPL W P-5'-P-CCNC: 9 U/L (ref 0–70)
AMMONIA PLAS-SCNC: 19 UMOL/L (ref 10–50)
ANION GAP SERPL CALCULATED.3IONS-SCNC: 3 MMOL/L (ref 3–14)
AST SERPL W P-5'-P-CCNC: 13 U/L (ref 0–45)
BILIRUB SERPL-MCNC: 0.3 MG/DL (ref 0.2–1.3)
BUN SERPL-MCNC: 17 MG/DL (ref 7–30)
CALCIUM SERPL-MCNC: 9.4 MG/DL (ref 8.5–10.1)
CHLORIDE SERPL-SCNC: 101 MMOL/L (ref 94–109)
CO2 SERPL-SCNC: 33 MMOL/L (ref 20–32)
CREAT SERPL-MCNC: 1.08 MG/DL (ref 0.66–1.25)
ERYTHROCYTE [DISTWIDTH] IN BLOOD BY AUTOMATED COUNT: 12.3 % (ref 10–15)
GFR SERPL CREATININE-BSD FRML MDRD: 69 ML/MIN/{1.73_M2}
GLUCOSE SERPL-MCNC: 98 MG/DL (ref 70–99)
HCT VFR BLD AUTO: 37.2 % (ref 40–53)
HGB BLD-MCNC: 12.2 G/DL (ref 13.3–17.7)
MCH RBC QN AUTO: 33 PG (ref 26.5–33)
MCHC RBC AUTO-ENTMCNC: 32.8 G/DL (ref 31.5–36.5)
MCV RBC AUTO: 101 FL (ref 78–100)
PLATELET # BLD AUTO: 220 10E9/L (ref 150–450)
POTASSIUM SERPL-SCNC: 3.3 MMOL/L (ref 3.4–5.3)
PROT SERPL-MCNC: 7.2 G/DL (ref 6.8–8.8)
RBC # BLD AUTO: 3.7 10E12/L (ref 4.4–5.9)
SODIUM SERPL-SCNC: 137 MMOL/L (ref 133–144)
VALPROATE SERPL-MCNC: 32 MG/L (ref 50–100)
WBC # BLD AUTO: 11.2 10E9/L (ref 4–11)

## 2021-03-23 PROCEDURE — 80164 ASSAY DIPROPYLACETIC ACD TOT: CPT | Performed by: PSYCHIATRY & NEUROLOGY

## 2021-03-23 PROCEDURE — 85027 COMPLETE CBC AUTOMATED: CPT | Performed by: PSYCHIATRY & NEUROLOGY

## 2021-03-23 PROCEDURE — 80053 COMPREHEN METABOLIC PANEL: CPT | Performed by: PSYCHIATRY & NEUROLOGY

## 2021-03-23 PROCEDURE — 82140 ASSAY OF AMMONIA: CPT | Performed by: PSYCHIATRY & NEUROLOGY

## 2021-03-23 PROCEDURE — 36415 COLL VENOUS BLD VENIPUNCTURE: CPT | Performed by: PSYCHIATRY & NEUROLOGY

## 2021-03-23 PROCEDURE — 99215 OFFICE O/P EST HI 40 MIN: CPT | Performed by: PSYCHIATRY & NEUROLOGY

## 2021-03-23 RX ORDER — LEVETIRACETAM 1000 MG/1
TABLET ORAL
Qty: 180 TABLET | Refills: 2 | Status: SHIPPED | OUTPATIENT
Start: 2021-03-23 | End: 2021-12-20

## 2021-03-23 RX ORDER — CARBIDOPA AND LEVODOPA 25; 100 MG/1; MG/1
2 TABLET ORAL 3 TIMES DAILY
Qty: 540 TABLET | Refills: 2 | Status: SHIPPED | OUTPATIENT
Start: 2021-03-23 | End: 2022-01-12

## 2021-03-23 RX ORDER — DIVALPROEX SODIUM 500 MG/1
500 TABLET, DELAYED RELEASE ORAL 2 TIMES DAILY
Qty: 180 TABLET | Refills: 2 | Status: SHIPPED | OUTPATIENT
Start: 2021-03-23 | End: 2022-01-12

## 2021-03-23 NOTE — LETTER
3/23/2021         RE: Prashant Keyes  [address]  [place] [address]        Dear Colleague,    Thank you for referring your patient, Prashant Keyes, to the [facility] [place]. Please see a copy of my visit note below.    ESTABLISHED PATIENT NEUROLOGY NOTE    DATE OF VISIT: 3/23/2021  MRN: [id number]  PATIENT NAME: Prashant Keyes  YOB: 1950    Chief Complaint   Patient presents with     RECHECK     Epilepsy.       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    History as previously documented by me (9.21.20):  Mr. 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 His seizures have been described as episodes of Left face and arm weakness with amnesia. He also has a remote history GTCs. His seizures have been well-controlled for some time on Keppra and Depakote. He is on Sinemet for the Parkinsonism. When I spoke with Demian back in July, he confirmed no seizure recurrence since out prior visit. He reported then possibly an increase in his hand tremors, in the setting of increased stress. We decided not to change the carbidopa/levodopa at that time. He is on: 1 tablet Qam, 2 tablets at noon and 2 tablets in the evening.      AED regimen: Keppra 1000mg BID and Depakote 500mg BID. His valproic acid level was below the therapeutic range in July, but tends to run low. His Keppra level was in therapeutic range.     Demian called into the clinic in the interim to ask about listlessness on the Left in the setting of fatigue. I inquired whether it was similar to previous seizures, but the return message relayed more of a numbness in the fingers, for which he saw his PCP. Head CT was normal in August.      I spoke with Demian over the phone again today. He says he has been doing really  "well. He gets a little more jittery at times when he is anxious or stressed, but otherwise feels that the tremor is under good control. He says the numbness in his Left hand went away. He says he has a little numbness in the Right hand after it started in the Left hand and then both resolved after a day or two. He says this was not an experience similar to any of his prior seizures. No seizures since our last visit. He denies side effects on the AEDs or Sinemet. No new concern for me otherwise.     Given that Demian felt that his tremor was under adequate control when we last spoke, we did not make any changes in his carbidopa/levodopa regimen at the time.  We also continued the Keppra and Depakote doses without change.  He did call in in the interim to describe feeling somewhat jittery.  So we discussed at that point an increase in his Sinemet to 2-1/2 tablets 3 times daily.  This was about 3 months ago.  He is due for updated AED levels.    He tells me that recently has been having some problems with dry mouth. He noticed this since increase in the Sinemet. He has noticed some breakthrough tremors between the morning dose and lunch dose, this occurs just before he is going to take his noon dose. He can tell when he misses a dose. He says that his \"whole Left side\" feels funny lately. He drools occasionally. He says the only other new medication he is on is Tramadol, for his fibromyalgia. He has not had any additional seizures. He does notice his balance is a little off. No falls.     He does use the Biotene mouth rinse, and tries to keep the mouth moist with sucking on Sucrets.  When asked about hydration, he says he drinks coffee in the morning and also a Coca-Cola.  He has Gatorade at lunchtime and possibly Sprite in the evening and some water.    He feels that his memory has declined. He does not think any formal testing is done.  He has noticed some hallucinations of seeing a dead cat in his peripheral vision.  " No additional hallucinations, and he remarks that he does not think these are bothersome.    CURRENT MEDICATIONS:   albuterol (PROAIR HFA/PROVENTIL HFA/VENTOLIN HFA) 108 (90 Base) MCG/ACT inhaler, Inhale 2 puffs into the lungs every 6 hours  allopurinol (ZYLOPRIM) 100 MG tablet, TAKE 2 TABLETS BY MOUTH ONCE DAILY  amLODIPine (NORVASC) 10 MG tablet, TAKE 1 TABLET BY MOUTH ONCE DAILY WITH SUPPER  aspirin 81 MG tablet, Take 1 tablet (81 mg) by mouth daily  benzonatate (TESSALON) 200 MG capsule, Take 1 capsule (200 mg) by mouth 3 times daily as needed  carvedilol (COREG) 12.5 MG tablet, TAKE 1 TABLET BY MOUTH TWICE DAILY WITH MEALS  chlorthalidone (HYGROTON) 25 MG tablet, Take 1 tablet by mouth once daily  Cholecalciferol (VITAMIN D) 2000 UNITS tablet, Take 2,000 Units by mouth daily  colchicine (COLCYRS) 0.6 MG tablet, Take 1 tablet (0.6 mg) by mouth daily as needed Take 2 tabs on first day.  Take at first sign of gout flair.  Take for max of 1 week per flair  Cyanocobalamin (VITAMIN  B-12) 2500 MCG tablet, Place 2,500 mcg under the tongue daily  ferrous sulfate (IRON) 325 (65 FE) MG tablet, Take 1 tablet (325 mg) by mouth 2 times daily  furosemide (LASIX) 20 MG tablet, Take 2 tablets (40 mg) by mouth daily  gabapentin (NEURONTIN) 300 MG capsule, TAKE 1 CAPSULE BY MOUTH THREE TIMES DAILY  hydrALAZINE (APRESOLINE) 25 MG tablet, Take 1 tablet by mouth twice daily  losartan (COZAAR) 100 MG tablet, Take 1 tablet by mouth once daily  Multiple Vitamin (MULTI VITAMIN MENS PO), Take  by mouth.  omeprazole (PRILOSEC) 40 MG DR capsule, Take 1 capsule by mouth once daily  ORDER FOR DME, CPAP daily  oxybutynin (DITROPAN) 5 MG tablet, Take 1 tablet by mouth twice daily  potassium chloride ER (KLOR-CON M) 20 MEQ CR tablet, TAKE 1  BY MOUTH THREE TIMES DAILY  simvastatin (ZOCOR) 20 MG tablet, TAKE 2 TABLETS BY MOUTH IN THE EVENING AT BEDTIME  traMADol (ULTRAM) 50 MG tablet, TAKE 1 TABLET BY MOUTH THREE TIMES DAILY AS NEEDED FOR  SEVERE PAIN  traZODone (DESYREL) 100 MG tablet, Take 100 mg by mouth At Bedtime  UNABLE TO FIND, daily MEDICATION NAME: sawpalmetto - 2 capsules once a day    No current facility-administered medications on file prior to visit.       RECENT DIAGNOSTIC STUDIES:   Labs:   Results for orders placed or performed in visit on 03/23/21   Valproic acid     Status: Abnormal   Result Value Ref Range    Valproic Acid Level 32 (L) 50 - 100 mg/L   Comprehensive metabolic panel     Status: Abnormal   Result Value Ref Range    Sodium 137 133 - 144 mmol/L    Potassium 3.3 (L) 3.4 - 5.3 mmol/L    Chloride 101 94 - 109 mmol/L    Carbon Dioxide 33 (H) 20 - 32 mmol/L    Anion Gap 3 3 - 14 mmol/L    Glucose 98 70 - 99 mg/dL    Urea Nitrogen 17 7 - 30 mg/dL    Creatinine 1.08 0.66 - 1.25 mg/dL    GFR Estimate 69 >60 mL/min/[1.73_m2]    GFR Estimate If Black 80 >60 mL/min/[1.73_m2]    Calcium 9.4 8.5 - 10.1 mg/dL    Bilirubin Total 0.3 0.2 - 1.3 mg/dL    Albumin 3.7 3.4 - 5.0 g/dL    Protein Total 7.2 6.8 - 8.8 g/dL    Alkaline Phosphatase 81 40 - 150 U/L    ALT 9 0 - 70 U/L    AST 13 0 - 45 U/L   CBC with platelets     Status: Abnormal   Result Value Ref Range    WBC 11.2 (H) 4.0 - 11.0 10e9/L    RBC Count 3.70 (L) 4.4 - 5.9 10e12/L    Hemoglobin 12.2 (L) 13.3 - 17.7 g/dL    Hematocrit 37.2 (L) 40.0 - 53.0 %     (H) 78 - 100 fl    MCH 33.0 26.5 - 33.0 pg    MCHC 32.8 31.5 - 36.5 g/dL    RDW 12.3 10.0 - 15.0 %    Platelet Count 220 150 - 450 10e9/L   Ammonia     Status: None   Result Value Ref Range    Ammonia 19 10 - 50 umol/L         REVIEW OF SYSTEMS:                                                      10-point review of systems is negative except as mentioned above in HPI.    EXAM:                                                      Physical Exam:   Vitals: /66 (BP Location: Left arm, Patient Position: Sitting, Cuff Size: Adult Large)   Pulse 65   Temp 96.7  F (35.9  C) (Tympanic)   BMI= There is no height or weight  on file to calculate BMI.  GENERAL: NAD.  HEENT: NC/AT.  CV: RRR. S1, S2.  Distant heart sounds  NECK: No bruits.  PULM: Non-labored breathing at rest, though his breathing becomes slightly labored with activity.   Neurologic:  MENTAL STATUS: Alert, attentive. Speech is fluent. Normal comprehension. Normal concentration. Adequate fund of knowledge.  CRANIAL NERVES: Discs flat. Visual fields intact to confrontation. Pupils equally, round and reactive to light. Facial sensation and movement normal. EOM full. 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 Hearing intact to conversations with hearing aids in place. Trapezius strength intact. Palate moves symmetrically. Tongue midline.  MOTOR: Strength is 5/5 in proximal and distal muscle groups of upper and lower extremities. Tone and bulk normal.   DTRs: Intact and symmetric in UEs. Right patella 1-, cannot elicit Left. Unable to elicit ankle jerks.   SENSATION: Normal light touch throughout. Vibration: Decreased at both ankles.  COORDINATION: Normal finger nose finger. Normal hand opening/closing speed and amplitude.   STATION AND GAIT: Romberg negative. Good postural reflexes. Gait appears normal except for perhaps decreased arm swing.   TREMOR: No hand tremor on today's exam.  Right hand-dominant.     ASSESSMENT and PLAN:                                                      Assessment:    ICD-10-CM    1. Parkinsonism, unspecified Parkinsonism type (H)  G20 carbidopa-levodopa (SINEMET)  MG tablet   2. Convulsions, unspecified convulsion type (H)  R56.9 divalproex sodium delayed-release (DEPAKOTE) 500 MG DR tablet     levETIRAcetam (KEPPRA) 1000 MG tablet     Keppra (Levetiracetam) Level     Valproic acid     Valproic Acid Free   3. 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)  G40.A09 divalproex sodium delayed-release (DEPAKOTE) 500 MG DR tablet     levETIRAcetam (KEPPRA) 1000 MG tablet   4. Encounter for long-term (current) use of medications  Z79.899 Keppra  (Levetiracetam) Level     Valproic acid     Valproic Acid Free     Comprehensive metabolic panel     CBC with platelets     Ammonia   5. Intermittent paresthesia of left hand and foot  R20.2 MR Brain w/o Contrast       Mr. Keyes is a pleasant 70-year-old male who is followed in Neurology for localization-related epilepsy and parkinsonian tremor.  He he is having some problems with dry mouth since we increased his Sinemet, so we will go ahead and pull back to 2 tablets 3 times daily.  I also encouraged him to stay well-hydrated with water rather than caffeinated beverages.  He denies any recurrence of seizures and reports that he is doing well on his Keppra and Depakote.  We will check these levels today along with some basic lab work.  I do note that her recent TSH was normal.  I would also like to do a brain MRI for the left-sided sensory changes he describes.  1 to make sure there is not a central structural cause for his symptoms.  I would like to see Demian back in clinic in about 6 months.  He understands and agrees with the plan.    Plan:  -- Basic labs today to check medication levels and basic blood work that can be affected by the meds.  We will notify you of the results.  -- MRI brain for the left-sided sensory changes.  We will notify you of the results.  -- Decrease the carbidopa/levodopa dose to: 2 tablets 3 times daily.  If this is contributing to your dry mouth, hopefully you will notice improvement.  If you do not notice a change in the tremor becomes more problematic, we may have to increase the dose back up again.  Continue Biotene rinses.  Try to stay well-hydrated with water rather than caffeinated drinks.  -- Continue the Keppra 1000mg twice daily.  -- Continue the Depakote 500mg twice daily.  -- Return to Neurology clinic in 6 months.    Total Time: 40 minutes were spent with the patient and in chart review/documentation (as described above in Assessment and Plan)/coordinating the  care.    Bisi Temple MD  Neurology                      Again, thank you for allowing me to participate in the care of your patient.        Sincerely,        Bisi Temple MD

## 2021-03-23 NOTE — PATIENT INSTRUCTIONS
Plan:  -- Basic labs today to check medication levels and basic blood work that can be affected by the meds.  We will notify you of the results.  --MRI brain for the left-sided sensory changes.  We will notify you of the results.  --Decrease the carbidopa/levodopa dose to: 2 tablets 3 times daily.  If this is contributing to your dry mouth, hopefully you will notice improvement.  If you do not notice a change in the tremor becomes more problematic, we may have to increase the dose back up again.  Continue Biotene rinses.  Try to stay well-hydrated with water rather than caffeinated drinks.  -- Continue the Keppra 1000mg twice daily.  -- Continue the Depakote 500mg twice daily.  -- Return to Neurology clinic in 6 months.

## 2021-03-23 NOTE — PROGRESS NOTES
ESTABLISHED PATIENT NEUROLOGY NOTE    DATE OF VISIT: 3/23/2021  MRN: [id number]  PATIENT NAME: Prashant Keyes  YOB: 1950    Chief Complaint   Patient presents with     RECHECK     Epilepsy.       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    History as previously documented by me (9.21.20):  Mr. 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 His seizures have been described as episodes of Left face and arm weakness with amnesia. He also has a remote history GTCs. His seizures have been well-controlled for some time on Keppra and Depakote. He is on Sinemet for the Parkinsonism. When I spoke with Demian back in July, he confirmed no seizure recurrence since out prior visit. He reported then possibly an increase in his hand tremors, in the setting of increased stress. We decided not to change the carbidopa/levodopa at that time. He is on: 1 tablet Qam, 2 tablets at noon and 2 tablets in the evening.      AED regimen: Keppra 1000mg BID and Depakote 500mg BID. His valproic acid level was below the therapeutic range in July, but tends to run low. His Keppra level was in therapeutic range.     Demian called into the clinic in the interim to ask about listlessness on the Left in the setting of fatigue. I inquired whether it was similar to previous seizures, but the return message relayed more of a numbness in the fingers, for which he saw his PCP. Head CT was normal in August.      I spoke with Demian over the phone again today. He says he has been doing really well. He gets a little more jittery at times when he is anxious or stressed, but otherwise feels that the tremor is under good control. He says the numbness in his Left hand went away. He says he has a little numbness in the Right hand after it started in the Left hand  "and then both resolved after a day or two. He says this was not an experience similar to any of his prior seizures. No seizures since our last visit. He denies side effects on the AEDs or Sinemet. No new concern for me otherwise.     Given that Demian felt that his tremor was under adequate control when we last spoke, we did not make any changes in his carbidopa/levodopa regimen at the time.  We also continued the Keppra and Depakote doses without change.  He did call in in the interim to describe feeling somewhat jittery.  So we discussed at that point an increase in his Sinemet to 2-1/2 tablets 3 times daily.  This was about 3 months ago.  He is due for updated AED levels.    He tells me that recently has been having some problems with dry mouth. He noticed this since increase in the Sinemet. He has noticed some breakthrough tremors between the morning dose and lunch dose, this occurs just before he is going to take his noon dose. He can tell when he misses a dose. He says that his \"whole Left side\" feels funny lately. He drools occasionally. He says the only other new medication he is on is Tramadol, for his fibromyalgia. He has not had any additional seizures. He does notice his balance is a little off. No falls.     He does use the Biotene mouth rinse, and tries to keep the mouth moist with sucking on Sucrets.  When asked about hydration, he says he drinks coffee in the morning and also a Coca-Cola.  He has Gatorade at lunchtime and possibly Sprite in the evening and some water.    He feels that his memory has declined. He does not think any formal testing is done.  He has noticed some hallucinations of seeing a dead cat in his peripheral vision.  No additional hallucinations, and he remarks that he does not think these are bothersome.    CURRENT MEDICATIONS:   albuterol (PROAIR HFA/PROVENTIL HFA/VENTOLIN HFA) 108 (90 Base) MCG/ACT inhaler, Inhale 2 puffs into the lungs every 6 hours  allopurinol (ZYLOPRIM) 100 " MG tablet, TAKE 2 TABLETS BY MOUTH ONCE DAILY  amLODIPine (NORVASC) 10 MG tablet, TAKE 1 TABLET BY MOUTH ONCE DAILY WITH SUPPER  aspirin 81 MG tablet, Take 1 tablet (81 mg) by mouth daily  benzonatate (TESSALON) 200 MG capsule, Take 1 capsule (200 mg) by mouth 3 times daily as needed  carvedilol (COREG) 12.5 MG tablet, TAKE 1 TABLET BY MOUTH TWICE DAILY WITH MEALS  chlorthalidone (HYGROTON) 25 MG tablet, Take 1 tablet by mouth once daily  Cholecalciferol (VITAMIN D) 2000 UNITS tablet, Take 2,000 Units by mouth daily  colchicine (COLCYRS) 0.6 MG tablet, Take 1 tablet (0.6 mg) by mouth daily as needed Take 2 tabs on first day.  Take at first sign of gout flair.  Take for max of 1 week per flair  Cyanocobalamin (VITAMIN  B-12) 2500 MCG tablet, Place 2,500 mcg under the tongue daily  ferrous sulfate (IRON) 325 (65 FE) MG tablet, Take 1 tablet (325 mg) by mouth 2 times daily  furosemide (LASIX) 20 MG tablet, Take 2 tablets (40 mg) by mouth daily  gabapentin (NEURONTIN) 300 MG capsule, TAKE 1 CAPSULE BY MOUTH THREE TIMES DAILY  hydrALAZINE (APRESOLINE) 25 MG tablet, Take 1 tablet by mouth twice daily  losartan (COZAAR) 100 MG tablet, Take 1 tablet by mouth once daily  Multiple Vitamin (MULTI VITAMIN MENS PO), Take  by mouth.  omeprazole (PRILOSEC) 40 MG DR capsule, Take 1 capsule by mouth once daily  ORDER FOR DME, CPAP daily  oxybutynin (DITROPAN) 5 MG tablet, Take 1 tablet by mouth twice daily  potassium chloride ER (KLOR-CON M) 20 MEQ CR tablet, TAKE 1  BY MOUTH THREE TIMES DAILY  simvastatin (ZOCOR) 20 MG tablet, TAKE 2 TABLETS BY MOUTH IN THE EVENING AT BEDTIME  traMADol (ULTRAM) 50 MG tablet, TAKE 1 TABLET BY MOUTH THREE TIMES DAILY AS NEEDED FOR SEVERE PAIN  traZODone (DESYREL) 100 MG tablet, Take 100 mg by mouth At Bedtime  UNABLE TO FIND, daily MEDICATION NAME: sawpalmetto - 2 capsules once a day    No current facility-administered medications on file prior to visit.       RECENT DIAGNOSTIC STUDIES:   Labs:    Results for orders placed or performed in visit on 03/23/21   Valproic acid     Status: Abnormal   Result Value Ref Range    Valproic Acid Level 32 (L) 50 - 100 mg/L   Comprehensive metabolic panel     Status: Abnormal   Result Value Ref Range    Sodium 137 133 - 144 mmol/L    Potassium 3.3 (L) 3.4 - 5.3 mmol/L    Chloride 101 94 - 109 mmol/L    Carbon Dioxide 33 (H) 20 - 32 mmol/L    Anion Gap 3 3 - 14 mmol/L    Glucose 98 70 - 99 mg/dL    Urea Nitrogen 17 7 - 30 mg/dL    Creatinine 1.08 0.66 - 1.25 mg/dL    GFR Estimate 69 >60 mL/min/[1.73_m2]    GFR Estimate If Black 80 >60 mL/min/[1.73_m2]    Calcium 9.4 8.5 - 10.1 mg/dL    Bilirubin Total 0.3 0.2 - 1.3 mg/dL    Albumin 3.7 3.4 - 5.0 g/dL    Protein Total 7.2 6.8 - 8.8 g/dL    Alkaline Phosphatase 81 40 - 150 U/L    ALT 9 0 - 70 U/L    AST 13 0 - 45 U/L   CBC with platelets     Status: Abnormal   Result Value Ref Range    WBC 11.2 (H) 4.0 - 11.0 10e9/L    RBC Count 3.70 (L) 4.4 - 5.9 10e12/L    Hemoglobin 12.2 (L) 13.3 - 17.7 g/dL    Hematocrit 37.2 (L) 40.0 - 53.0 %     (H) 78 - 100 fl    MCH 33.0 26.5 - 33.0 pg    MCHC 32.8 31.5 - 36.5 g/dL    RDW 12.3 10.0 - 15.0 %    Platelet Count 220 150 - 450 10e9/L   Ammonia     Status: None   Result Value Ref Range    Ammonia 19 10 - 50 umol/L         REVIEW OF SYSTEMS:                                                      10-point review of systems is negative except as mentioned above in HPI.    EXAM:                                                      Physical Exam:   Vitals: /66 (BP Location: Left arm, Patient Position: Sitting, Cuff Size: Adult Large)   Pulse 65   Temp 96.7  F (35.9  C) (Tympanic)   BMI= There is no height or weight on file to calculate BMI.  GENERAL: NAD.  HEENT: NC/AT.  CV: RRR. S1, S2.  Distant heart sounds  NECK: No bruits.  PULM: Non-labored breathing at rest, though his breathing becomes slightly labored with activity.   Neurologic:  MENTAL STATUS: Alert, attentive. Speech is  fluent. Normal comprehension. Normal concentration. Adequate fund of knowledge.  CRANIAL NERVES: Discs flat. Visual fields intact to confrontation. Pupils equally, round and reactive to light. Facial sensation and movement normal. EOM full. 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 Hearing intact to conversations with hearing aids in place. Trapezius strength intact. Palate moves symmetrically. Tongue midline.  MOTOR: Strength is 5/5 in proximal and distal muscle groups of upper and lower extremities. Tone and bulk normal.   DTRs: Intact and symmetric in UEs. Right patella 1-, cannot elicit Left. Unable to elicit ankle jerks.   SENSATION: Normal light touch throughout. Vibration: Decreased at both ankles.  COORDINATION: Normal finger nose finger. Normal hand opening/closing speed and amplitude.   STATION AND GAIT: Romberg negative. Good postural reflexes. Gait appears normal except for perhaps decreased arm swing.   TREMOR: No hand tremor on today's exam.  Right hand-dominant.     ASSESSMENT and PLAN:                                                      Assessment:    ICD-10-CM    1. Parkinsonism, unspecified Parkinsonism type (H)  G20 carbidopa-levodopa (SINEMET)  MG tablet   2. Convulsions, unspecified convulsion type (H)  R56.9 divalproex sodium delayed-release (DEPAKOTE) 500 MG DR tablet     levETIRAcetam (KEPPRA) 1000 MG tablet     Keppra (Levetiracetam) Level     Valproic acid     Valproic Acid Free   3. 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)  G40.A09 divalproex sodium delayed-release (DEPAKOTE) 500 MG DR tablet     levETIRAcetam (KEPPRA) 1000 MG tablet   4. Encounter for long-term (current) use of medications  Z79.899 Keppra (Levetiracetam) Level     Valproic acid     Valproic Acid Free     Comprehensive metabolic panel     CBC with platelets     Ammonia   5. Intermittent paresthesia of left hand and foot  R20.2 MR Brain w/o Contrast       Mr. Keyes is a pleasant 70-year-old male who is followed  in Neurology for localization-related epilepsy and parkinsonian tremor.  He he is having some problems with dry mouth since we increased his Sinemet, so we will go ahead and pull back to 2 tablets 3 times daily.  I also encouraged him to stay well-hydrated with water rather than caffeinated beverages.  He denies any recurrence of seizures and reports that he is doing well on his Keppra and Depakote.  We will check these levels today along with some basic lab work.  I do note that her recent TSH was normal.  I would also like to do a brain MRI for the left-sided sensory changes he describes.  1 to make sure there is not a central structural cause for his symptoms.  I would like to see Demian back in clinic in about 6 months.  He understands and agrees with the plan.    Plan:  -- Basic labs today to check medication levels and basic blood work that can be affected by the meds.  We will notify you of the results.  -- MRI brain for the left-sided sensory changes.  We will notify you of the results.  -- Decrease the carbidopa/levodopa dose to: 2 tablets 3 times daily.  If this is contributing to your dry mouth, hopefully you will notice improvement.  If you do not notice a change in the tremor becomes more problematic, we may have to increase the dose back up again.  Continue Biotene rinses.  Try to stay well-hydrated with water rather than caffeinated drinks.  -- Continue the Keppra 1000mg twice daily.  -- Continue the Depakote 500mg twice daily.  -- Return to Neurology clinic in 6 months.    Total Time: 40 minutes were spent with the patient and in chart review/documentation (as described above in Assessment and Plan)/coordinating the care.    Bisi Temple MD  Neurology

## 2021-03-24 LAB
LEVETIRACETAM SERPL-MCNC: 38 UG/ML (ref 12–46)
VALPROATE FREE SERPL-MCNC: <5 UG/ML (ref 6–20)

## 2021-03-26 NOTE — RESULT ENCOUNTER NOTE
Please let Bill know that his labs appear stable. Depakote level continues to be lower than the therapeutic range. No change recommended at this time. Keppra level is in therapeutic range. He continues to have evidence of anemia, so I will send this result to his primary care provider as well. His potassium is a little low, so I recommend he increase the potassium intake in his diet (bananas, avocados, etc).     Thank you,  Dr. Temple

## 2021-03-29 DIAGNOSIS — E87.6 HYPOKALEMIA: ICD-10-CM

## 2021-03-30 DIAGNOSIS — N32.81 OVERACTIVE BLADDER: ICD-10-CM

## 2021-03-30 RX ORDER — POTASSIUM CHLORIDE 1500 MG/1
TABLET, EXTENDED RELEASE ORAL
Qty: 270 TABLET | Refills: 0 | Status: SHIPPED | OUTPATIENT
Start: 2021-03-30 | End: 2021-07-01

## 2021-03-30 NOTE — TELEPHONE ENCOUNTER
Routing refill request to provider for review/approval because:  Labs out of range:  potassium      Yesica Roche RN

## 2021-04-01 RX ORDER — OXYBUTYNIN CHLORIDE 5 MG/1
TABLET ORAL
Qty: 180 TABLET | Refills: 0 | Status: SHIPPED | OUTPATIENT
Start: 2021-04-01 | End: 2021-06-25

## 2021-04-06 DIAGNOSIS — M10.071 ACUTE IDIOPATHIC GOUT OF RIGHT ANKLE: Chronic | ICD-10-CM

## 2021-04-07 DIAGNOSIS — G47.33 OSA (OBSTRUCTIVE SLEEP APNEA): Primary | ICD-10-CM

## 2021-04-07 NOTE — TELEPHONE ENCOUNTER
"Requested Prescriptions   Pending Prescriptions Disp Refills    allopurinol (ZYLOPRIM) 100 MG tablet [Pharmacy Med Name: Allopurinol 100 MG Oral Tablet] 180 tablet 0     Sig: TAKE 2 TABLETS BY MOUTH ONCE DAILY       Gout Agents Protocol Failed - 4/6/2021  6:42 PM        Failed - Has Uric Acid on file in past 12 months and value is less than 6     Recent Labs   Lab Test 07/19/16  1159   URIC 9.0*     If level is 6mg/dL or greater, ok to refill one time and refer to provider.           Passed - CBC on file in past 12 months     Recent Labs   Lab Test 03/23/21  1015   WBC 11.2*   RBC 3.70*   HGB 12.2*   HCT 37.2*                    Passed - ALT on file in past 12 months     Recent Labs   Lab Test 03/23/21  1015   ALT 9             Passed - Recent (12 mo) or future (30 days) visit within the authorizing provider's specialty     Patient has had an office visit with the authorizing provider or a provider within the authorizing providers department within the previous 12 mos or has a future within next 30 days. See \"Patient Info\" tab in inbasket, or \"Choose Columns\" in Meds & Orders section of the refill encounter.              Passed - Medication is active on med list        Passed - Patient is age 18 or older        Passed - Normal serum creatinine on file in the past 12 months     Recent Labs   Lab Test 03/23/21  1015   CR 1.08       Ok to refill medication if creatinine is low             Routing refill request to provider for review/approval because:  Failed protocol.  Renetta Cohen BSN-RN  [facility]    "

## 2021-04-08 RX ORDER — ALLOPURINOL 100 MG/1
TABLET ORAL
Qty: 180 TABLET | Refills: 0 | Status: SHIPPED | OUTPATIENT
Start: 2021-04-08 | End: 2021-07-06

## 2021-04-10 DIAGNOSIS — I10 BENIGN ESSENTIAL HYPERTENSION: ICD-10-CM

## 2021-04-12 ENCOUNTER — IMMUNIZATION (OUTPATIENT)
Dept: NURSING | Facility: CLINIC | Age: 71
End: 2021-04-12
Attending: FAMILY MEDICINE
Payer: MEDICARE

## 2021-04-12 PROCEDURE — 91300 PR COVID VAC PFIZER DIL RECON 30 MCG/0.3 ML IM: CPT

## 2021-04-12 PROCEDURE — 0002A PR COVID VAC PFIZER DIL RECON 30 MCG/0.3 ML IM: CPT

## 2021-04-13 DIAGNOSIS — R52 GENERALIZED BODY ACHES: ICD-10-CM

## 2021-04-13 NOTE — TELEPHONE ENCOUNTER
"Routing refill request to provider for review/approval because:  Drug interaction warning    Pended for 90 day supply        Requested Prescriptions   Pending Prescriptions Disp Refills     amLODIPine (NORVASC) 10 MG tablet [Pharmacy Med Name: amLODIPine Besylate 10 MG Oral Tablet] 90 tablet 0     Sig: TAKE 1 TABLET BY MOUTH ONCE DAILY WITH SUPPER       Calcium Channel Blockers Protocol  Passed - 4/10/2021  6:53 AM        Passed - Blood pressure under 140/90 in past 12 months     BP Readings from Last 3 Encounters:   03/23/21 128/66   02/08/21 122/72   12/08/20 107/62                 Passed - Recent (12 mo) or future (30 days) visit within the authorizing provider's specialty     Patient has had an office visit with the authorizing provider or a provider within the authorizing providers department within the previous 12 mos or has a future within next 30 days. See \"Patient Info\" tab in inbasket, or \"Choose Columns\" in Meds & Orders section of the refill encounter.              Passed - Medication is active on med list        Passed - Patient is age 18 or older        Passed - Normal serum creatinine on file in past 12 months     Recent Labs   Lab Test 03/23/21  1015   CR 1.08       Ok to refill medication if creatinine is low               "

## 2021-04-15 RX ORDER — AMLODIPINE BESYLATE 10 MG/1
TABLET ORAL
Qty: 90 TABLET | Refills: 0 | Status: SHIPPED | OUTPATIENT
Start: 2021-04-15 | End: 2021-07-16

## 2021-04-15 NOTE — TELEPHONE ENCOUNTER
Routing refill request to provider for review/approval because:  Drug not on the [facility] refill protocol.  Last Written Prescription Date:  3/17/21  Last Fill Quantity: 30,  # refills: 0   Last office visit: 2/8/2021 with prescribing provider:   Future Office Visit:   Next 5 appointments (look out 90 days)    May 03, 2021 11:00 AM  Pre-Op physical with Matt Swan PA-C  [facility] [place] ([facility] [place] ) [address]  [place] [address]  [phone]

## 2021-04-16 RX ORDER — TRAMADOL HYDROCHLORIDE 50 MG/1
TABLET ORAL
Qty: 30 TABLET | Refills: 0 | Status: SHIPPED | OUTPATIENT
Start: 2021-04-16 | End: 2021-05-20

## 2021-04-20 DIAGNOSIS — I10 BENIGN ESSENTIAL HYPERTENSION: ICD-10-CM

## 2021-04-20 NOTE — TELEPHONE ENCOUNTER
Routing refill request to provider for review/approval because:  Labs not current:  creatinine          Pending Prescriptions:                       Disp   Refills    chlorthalidone (HYGROTON) 25 MG tablet [Ph*90 tab*0        Sig: Take 1 tablet by mouth once daily        Fadi Osuna RN

## 2021-04-21 RX ORDER — CHLORTHALIDONE 25 MG/1
TABLET ORAL
Qty: 90 TABLET | Refills: 0 | Status: SHIPPED | OUTPATIENT
Start: 2021-04-21 | End: 2021-07-28

## 2021-05-01 DIAGNOSIS — E78.00 PURE HYPERCHOLESTEROLEMIA: ICD-10-CM

## 2021-05-01 DIAGNOSIS — M79.7 FIBROMYALGIA: ICD-10-CM

## 2021-05-02 RX ORDER — GABAPENTIN 300 MG/1
CAPSULE ORAL
Qty: 90 CAPSULE | Refills: 0 | Status: SHIPPED | OUTPATIENT
Start: 2021-05-02 | End: 2021-06-08

## 2021-05-02 NOTE — TELEPHONE ENCOUNTER
Routing refill request to provider for review/approval because:  Drug interaction warning    meds pended for 90 day supply with reminder for fasting labs in July

## 2021-05-03 ENCOUNTER — OFFICE VISIT (OUTPATIENT)
Dept: FAMILY MEDICINE | Facility: CLINIC | Age: 71
End: 2021-05-03
Payer: MEDICARE

## 2021-05-03 VITALS
WEIGHT: 280 LBS | TEMPERATURE: 97.3 F | HEART RATE: 68 BPM | SYSTOLIC BLOOD PRESSURE: 127 MMHG | RESPIRATION RATE: 20 BRPM | OXYGEN SATURATION: 96 % | BODY MASS INDEX: 40.09 KG/M2 | DIASTOLIC BLOOD PRESSURE: 71 MMHG | HEIGHT: 70 IN

## 2021-05-03 DIAGNOSIS — L30.9 DERMATITIS: ICD-10-CM

## 2021-05-03 DIAGNOSIS — Z01.818 PREOP GENERAL PHYSICAL EXAM: Primary | ICD-10-CM

## 2021-05-03 DIAGNOSIS — H25.89 OTHER AGE-RELATED CATARACT OF BOTH EYES: ICD-10-CM

## 2021-05-03 PROCEDURE — 99214 OFFICE O/P EST MOD 30 MIN: CPT | Performed by: PHYSICIAN ASSISTANT

## 2021-05-03 RX ORDER — TRIAMCINOLONE ACETONIDE 1 MG/G
CREAM TOPICAL 2 TIMES DAILY
Qty: 80 G | Refills: 1 | Status: SHIPPED | OUTPATIENT
Start: 2021-05-03 | End: 2022-01-12

## 2021-05-03 ASSESSMENT — MIFFLIN-ST. JEOR: SCORE: 2032.35

## 2021-05-03 NOTE — PROGRESS NOTES
[facility]  [address] [facility]  [place] [address]  Phone: [phone]  Primary Provider: Dakota Flynn  Pre-op Performing Provider: DAKOTA FLYNN      PREOPERATIVE EVALUATION:  Today's date: 5/3/2021    Prashant Keyes is a 70 year old male who presents for a preoperative evaluation.    Surgical Information:  Surgery/Procedure: Right Eye Cataract  Surgery Location: [facility]  Surgeon: Joe Yeager MD  Surgery Date: 5/10/21  Time of Surgery: To be determined  Where patient plans to recover: At home with family  Fax number for surgical facility:     Type of Anesthesia Anticipated: to be determined    Prashant was seen today for pre-op exam.    Diagnoses and all orders for this visit:    Preop general physical exam    Other age-related cataract of both eyes    Other orders  -     REVIEW OF HEALTH MAINTENANCE PROTOCOL ORDERS      Bill is cleared for his procedure and appropriate anesthesia.    Subjective     HPI related to upcoming procedure: Right Eye Cataract    Preop Questions 4/27/2021   1. Have you ever had a heart attack or stroke? No   2. Have you ever had surgery on your heart or blood vessels, such as a stent placement, a coronary artery bypass, or surgery on an artery in your head, neck, heart, or legs? No   3. Do you have chest pain with activity? No   4. Do you have a history of  heart failure? No   5. Do you currently have a cold, bronchitis or symptoms of other infection? No   6. Do you have a cough, shortness of breath, or wheezing? No   7. Do you or anyone in your family have previous history of blood clots? No   8. Do you or does anyone in your family have a serious bleeding problem such as prolonged bleeding following surgeries or cuts? No   9. Have you ever had problems with anemia or been told to take iron pills? YES - mildly anemic currently   10. Have you had any abnormal blood loss such as black, tarry or bloody stools? No    11. Have you ever had a blood transfusion? No   12. Are you willing to have a blood transfusion if it is medically needed before, during, or after your surgery? Yes   13. Have you or any of your relatives ever had problems with anesthesia? No   14. Do you have sleep apnea, excessive snoring or daytime drowsiness? YES    14a. Do you have a CPAP machine? Yes   15. Do you have any artifical heart valves or other implanted medical devices like a pacemaker, defibrillator, or continuous glucose monitor? No   16. Do you have artificial joints? No   17. Are you allergic to latex? No       Health Care Directive:  Patient has a Health Care Directive on file      Preoperative Review of :   reviewed - no record of controlled substances prescribed.      Status of Chronic Conditions:  See problem list for active medical problems.  Problems all longstanding and stable, except as noted/documented.  See ROS for pertinent symptoms related to these conditions.      Review of Systems  CONSTITUTIONAL: NEGATIVE for fever, chills, change in weight  INTEGUMENTARY/SKIN: NEGATIVE for worrisome rashes, moles or lesions  EYES: NEGATIVE for vision changes or irritation  ENT/MOUTH: NEGATIVE for ear, mouth and throat problems  RESP: NEGATIVE for significant cough or SOB  BREAST: NEGATIVE for masses, tenderness or discharge  CV: NEGATIVE for chest pain, palpitations or peripheral edema  GI: NEGATIVE for nausea, abdominal pain, heartburn, or change in bowel habits  : NEGATIVE for frequency, dysuria, or hematuria  MUSCULOSKELETAL: NEGATIVE for significant arthralgias or myalgia  NEURO: NEGATIVE for weakness, dizziness or paresthesias  ENDOCRINE: NEGATIVE for temperature intolerance, skin/hair changes  HEME: NEGATIVE for bleeding problems  PSYCHIATRIC: NEGATIVE for changes in mood or affect    Patient Active Problem List    Diagnosis Date Noted     Hypertension goal BP (blood pressure) < 140/90 09/13/2002     Priority: High     Treatment  since 1993       Parkinsonism (H) 01/16/2020     Priority: Medium     Obesity (BMI 35.0-39.9) with comorbidity (H) 07/01/2019     Priority: Medium     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 Spells uncertain etiology with left sided numbness, speech arrest, amnesia, occasional collapse. Vague inconsistent historian. EEG Lt temporal slowing. Neuropsych w nl cognition; some depression. Presented on levetiracetam, previously Rx w VPA, PHT, lacosamide, eslicarbazine. EEG w left temporal slowing. Adding VPA to LEV appeared to stop spells.       CKD (chronic kidney disease) stage 2, GFR 60-89 ml/min 12/18/2012     Priority: Medium     Lichen planus 08/02/2012     Priority: Medium     KALEB (obstructive sleep apnea) 02/06/2012     Priority: Medium     PSG on 1/2/12, RDI: 71.3, REM RDI: 60 AHI: 54.2, Lowest O2: 78 %. Wt during sleep study: 285.3. BI-LEVELPAP: 15/9 cm H2O.        Eczema 11/16/2011     Priority: Medium     Hyperlipidemia LDL goal <130 03/15/2011     Priority: Medium     Treatment since 1998       Fibromyalgia syndrome      Priority: Medium     per patient  Scheduled med refill protocol  Last refill:8/23/2010  Last clinic visit:8/31/2010  Controlled substance aggreement on file from this date: 8/31/10  Documentation in problem list:  narcotic agreement not on file   #240 tabs of 500mg tabs methocarbamol filled 8/23/10.  No refills anticipated prior to 8/23/11, and needs discussion in clinic prior to refills.         GERD 07/29/2005     Priority: Medium     Treatment since 1998        Past Medical  History:   Diagnosis Date     Calculus of kidney ~1975     Carpal tunnel syndrome 11/94     Concussion, unspecified 12/95     Eczema 11/16/2011     Epileptic seizure (H) 1995    diagnosed 1995, controlled with Depakote and Keppra     Fibromyalgia syndrome ~2000    per pt     Hypertension      Left testicle cyst      Photosensitive contact dermatitis      Prostatitis, unspecified      Seizures (H)     Diagnosed 1995, controlled with Depakote and Keppra     Umbilical hernia 11/26/2012     Unspecified asthma(493.90)      Past Surgical History:   Procedure Laterality Date     ANGIOGRAM  2003    Coronary Angiogram- negative     ARTHROSCOPY KNEE Left 9/5/2019    Procedure: LEFT KNEE ARTHROSCOPY WITH MENISCAL AND CHONDRAL DEBRIDEMENT;  Surgeon: Damon Rosas MD;  Location: [facility] OR     COLONOSCOPY WITH CO2 INSUFFLATION N/A 8/17/2017    Procedure: COLONOSCOPY WITH CO2 INSUFFLATION;  COLON SCREEN/ FLYNN;  Surgeon: Varun Bond MD;  Location: [facility] OR     HC REMOVAL TESTIS,SIMPLE  1978    Right undecended     HERNIA REPAIR, INGUINAL RT/LT  1963, 1978    Right Hernia     HERNIORRHAPHY UMBILICAL  4/2013    [facility]     Current Outpatient Medications   Medication Sig Dispense Refill     albuterol (PROAIR HFA/PROVENTIL HFA/VENTOLIN HFA) 108 (90 Base) MCG/ACT inhaler Inhale 2 puffs into the lungs every 6 hours 1 Inhaler 1     allopurinol (ZYLOPRIM) 100 MG tablet Take 2 tablets by mouth once daily 180 tablet 0     amLODIPine (NORVASC) 10 MG tablet TAKE 1 TABLET BY MOUTH ONCE DAILY WITH SUPPER 90 tablet 0     aspirin 81 MG tablet Take 1 tablet (81 mg) by mouth daily 30 tablet      benzonatate (TESSALON) 200 MG capsule Take 1 capsule (200 mg) by mouth 3 times daily as needed 30 capsule 1     carbidopa-levodopa (SINEMET)  MG tablet Take 2 tablets by mouth 3 times daily 540 tablet 2     carvedilol (COREG) 12.5 MG tablet TAKE 1 TABLET BY MOUTH TWICE DAILY WITH MEALS 180 tablet 2     chlorthalidone (HYGROTON) 25 MG  tablet Take 1 tablet by mouth once daily 90 tablet 0     Cholecalciferol (VITAMIN D) 2000 UNITS tablet Take 2,000 Units by mouth daily 90 tablet 3     colchicine (COLCYRS) 0.6 MG tablet Take 1 tablet (0.6 mg) by mouth daily as needed Take 2 tabs on first day.  Take at first sign of gout flair.  Take for max of 1 week per flair 30 tablet 0     Cyanocobalamin (VITAMIN  B-12) 2500 MCG tablet Place 2,500 mcg under the tongue daily 90 tablet 3     divalproex sodium delayed-release (DEPAKOTE) 500 MG DR tablet Take 1 tablet (500 mg) by mouth 2 times daily 180 tablet 2     ferrous sulfate (IRON) 325 (65 FE) MG tablet Take 1 tablet (325 mg) by mouth 2 times daily 60 tablet 2     furosemide (LASIX) 20 MG tablet Take 2 tablets (40 mg) by mouth daily 180 tablet 1     gabapentin (NEURONTIN) 300 MG capsule TAKE 1 CAPSULE BY MOUTH THREE TIMES DAILY 90 capsule 0     hydrALAZINE (APRESOLINE) 25 MG tablet Take 1 tablet by mouth twice daily 180 tablet 0     levETIRAcetam (KEPPRA) 1000 MG tablet TAKE 1 TABLET BY MOUTH TWICE DAILY IN THE MORNING AND AT BEDTIME 180 tablet 2     losartan (COZAAR) 100 MG tablet Take 1 tablet by mouth once daily 90 tablet 1     Multiple Vitamin (MULTI VITAMIN MENS PO) Take  by mouth.       omeprazole (PRILOSEC) 40 MG DR capsule Take 1 capsule by mouth once daily 90 capsule 1     ORDER FOR DME CPAP daily       oxybutynin (DITROPAN) 5 MG tablet Take 1 tablet by mouth twice daily 180 tablet 0     potassium chloride ER (KLOR-CON M) 20 MEQ CR tablet TAKE 1 TABLET BY MOUTH THREE TIMES DAILY 270 tablet 0     simvastatin (ZOCOR) 20 MG tablet TAKE 2 TABLETS BY MOUTH IN THE EVENING AT BEDTIME 180 tablet 0     traMADol (ULTRAM) 50 MG tablet TAKE 1 TABLET BY MOUTH THREE TIMES DAILY AS NEEDED FOR SEVERE PAIN 30 tablet 0     traZODone (DESYREL) 100 MG tablet Take 100 mg by mouth At Bedtime       UNABLE TO FIND daily MEDICATION NAME: sawpalmetto - 2 capsules once a day         Allergies   Allergen Reactions     Accupril  "[Ace Inhibitors] Difficulty breathing     accupril causes SOB     Aptiom [Eslicarbazepine] Difficulty breathing     Atorvastatin Calcium      Myalgias from Lipitor     Contrast Dye Hives     Coreg [Carvedilol] Nausea and Fatigue     Lactose GI Disturbance     Lisinopril Difficulty breathing     SOB     Nitroglycerin      Headache     Paroxetine Hives     Tetracycline [Tetracyclines]      \"splitting headaches\"     Vimpat [Lacosamide] Difficulty breathing     Zoloft Rash     Zolpidem      Adhesive Tape Rash     Without itching- EKG pads        Social History     Tobacco Use     Smoking status: Never Smoker     Smokeless tobacco: Never Used   Substance Use Topics     Alcohol use: No     Alcohol/week: 0.0 standard drinks     Comment: Quit since 2003.      Family History   Problem Relation Age of Onset     Cerebrovascular Disease Mother         60's     C.A.D. Mother         CABG 75     Arthritis Mother      Eye Disorder Mother      Heart Disease Mother      Cardiovascular Father         Rheumatic Heart Disease     Hypertension Brother      Lipids Brother      C.A.D. Brother         CABG 46     Arthritis Brother      Heart Disease Brother         CABG x5     Obesity Brother      Hypertension Brother      Lipids Brother      Thyroid Disease Brother      Alcohol/Drug Brother      Heart Disease Brother         CABG     Cancer Sister         Thyroid CA     Hypertension Sister      Obesity Sister      Thyroid Disease Sister      Hemochromatosis Sister      Cerebrovascular Disease Sister         birth defect     Depression Daughter      Depression Daughter      Depression Son      Diabetes Son      Depression Son      Gastrointestinal Disease Brother         Crohn's Disease-Ostomy     Cerebrovascular Disease Brother         lipids     Arrhythmia Sister      Cancer Maternal Grandmother         Thyroid CA     Cancer Paternal Grandfather         Throat CA     Thyroid Disease Maternal Grandfather      History   Drug Use No       "   Objective     There were no vitals taken for this visit.    Physical Exam    GENERAL APPEARANCE: healthy, alert and no distress     HENT: ear canals and TM's normal and nose and mouth without ulcers or lesions     NECK: no adenopathy, no asymmetry, masses, or scars and thyroid normal to palpation     RESP: lungs clear to auscultation - no rales, rhonchi or wheezes     CV: regular rates and rhythm, normal S1 S2, no S3 or S4 and no murmur, click or rub     ABDOMEN:  soft, nontender, no HSM or masses and bowel sounds normal     MS: extremities normal- no gross deformities noted, no evidence of inflammation in joints, FROM in all extremities.     SKIN: no suspicious lesions or rashes     NEURO: Normal strength and tone, sensory exam grossly normal, mentation intact and speech normal     PSYCH: mentation appears normal. and affect normal/bright     LYMPHATICS: No cervical adenopathy    Recent Labs   Lab Test 03/23/21  1015 02/08/21  1432 12/08/20  0935   HGB 12.2*  --  12.0*     --  159    139 137   POTASSIUM 3.3* 3.7 3.8   CR 1.08 0.97 1.22        Diagnostics:  No labs were ordered during this visit.   No EKG required for low risk surgery (cataract, skin procedure, breast biopsy, etc).    Revised Cardiac Risk Index (RCRI):  The patient has the following serious cardiovascular risks for perioperative complications:   - No serious cardiac risks = 0 points     RCRI Interpretation: 0 points: Class I (very low risk - 0.4% complication rate)           Signed Electronically by: Matt Swan PA-C  Copy of this evaluation report is provided to requesting physician.

## 2021-05-04 RX ORDER — SIMVASTATIN 20 MG
TABLET ORAL
Qty: 180 TABLET | Refills: 0 | Status: SHIPPED | OUTPATIENT
Start: 2021-05-04 | End: 2021-07-30

## 2021-05-17 DIAGNOSIS — R52 GENERALIZED BODY ACHES: ICD-10-CM

## 2021-05-19 NOTE — TELEPHONE ENCOUNTER
Routing refill request to provider for review/approval because:  Drug not on the [facility] refill protocol           Pending Prescriptions:                       Disp   Refills    traMADol (ULTRAM) 50 MG tablet [Pharmacy M*30 tab*0        Sig: TAKE 1 TABLET BY MOUTH THREE TIMES DAILY AS NEEDED           FOR SEVERE PAIN        Kit LUNA Osuna

## 2021-05-20 RX ORDER — TRAMADOL HYDROCHLORIDE 50 MG/1
TABLET ORAL
Qty: 30 TABLET | Refills: 0 | Status: SHIPPED | OUTPATIENT
Start: 2021-05-20 | End: 2021-06-28

## 2021-05-26 DIAGNOSIS — I10 HYPERTENSION GOAL BP (BLOOD PRESSURE) < 140/90: ICD-10-CM

## 2021-05-27 RX ORDER — HYDRALAZINE HYDROCHLORIDE 25 MG/1
TABLET, FILM COATED ORAL
Qty: 180 TABLET | Refills: 1 | Status: SHIPPED | OUTPATIENT
Start: 2021-05-27 | End: 2021-11-24

## 2021-05-28 ENCOUNTER — MYC MEDICAL ADVICE (OUTPATIENT)
Dept: FAMILY MEDICINE | Facility: CLINIC | Age: 71
End: 2021-05-28

## 2021-06-01 DIAGNOSIS — I10 BENIGN ESSENTIAL HYPERTENSION: ICD-10-CM

## 2021-06-04 RX ORDER — FUROSEMIDE 20 MG
TABLET ORAL
Qty: 180 TABLET | Refills: 0 | Status: SHIPPED | OUTPATIENT
Start: 2021-06-04 | End: 2021-09-03

## 2021-06-07 DIAGNOSIS — M79.7 FIBROMYALGIA: ICD-10-CM

## 2021-06-07 NOTE — TELEPHONE ENCOUNTER
Routing refill request to provider for review/approval because:  Drug not on the [facility] refill protocol     Last Written Prescription Date:  5-2-21  Last Fill Quantity: 90,  # refills: 0   Last office visit: 5/3/2021 with prescribing provider:  Matt Swan   Future Office Visit:

## 2021-06-08 RX ORDER — GABAPENTIN 300 MG/1
CAPSULE ORAL
Qty: 90 CAPSULE | Refills: 0 | Status: SHIPPED | OUTPATIENT
Start: 2021-06-08 | End: 2021-07-06

## 2021-06-25 DIAGNOSIS — N32.81 OVERACTIVE BLADDER: ICD-10-CM

## 2021-06-25 RX ORDER — OXYBUTYNIN CHLORIDE 5 MG/1
TABLET ORAL
Qty: 180 TABLET | Refills: 0 | Status: SHIPPED | OUTPATIENT
Start: 2021-06-25 | End: 2021-10-03

## 2021-06-25 NOTE — TELEPHONE ENCOUNTER
Prescription approved per [facility] Refill Protocol.  Serenity Sher RN  [facility]

## 2021-06-26 DIAGNOSIS — I10 BENIGN ESSENTIAL HYPERTENSION: ICD-10-CM

## 2021-06-27 DIAGNOSIS — R52 GENERALIZED BODY ACHES: ICD-10-CM

## 2021-06-28 RX ORDER — CARVEDILOL 12.5 MG/1
TABLET ORAL
Qty: 180 TABLET | Refills: 0 | Status: SHIPPED | OUTPATIENT
Start: 2021-06-28 | End: 2021-10-03

## 2021-06-28 RX ORDER — TRAMADOL HYDROCHLORIDE 50 MG/1
TABLET ORAL
Qty: 30 TABLET | Refills: 0 | Status: SHIPPED | OUTPATIENT
Start: 2021-06-28 | End: 2021-07-13

## 2021-06-28 NOTE — TELEPHONE ENCOUNTER
Routing refill request to provider for review/approval because:  Drug not on the [facility] refill protocol   Last filled-5/20/21 #30  Last OV-5/3/2021  Marjorie Mena RN

## 2021-06-29 DIAGNOSIS — E87.6 HYPOKALEMIA: ICD-10-CM

## 2021-07-02 RX ORDER — POTASSIUM CHLORIDE 1500 MG/1
TABLET, EXTENDED RELEASE ORAL
Qty: 270 TABLET | Refills: 0 | Status: SHIPPED | OUTPATIENT
Start: 2021-07-02 | End: 2021-10-03

## 2021-07-05 DIAGNOSIS — M10.071 ACUTE IDIOPATHIC GOUT OF RIGHT ANKLE: Chronic | ICD-10-CM

## 2021-07-05 DIAGNOSIS — M79.7 FIBROMYALGIA: ICD-10-CM

## 2021-07-05 NOTE — TELEPHONE ENCOUNTER
Routing refill request to provider for review/approval because:  Drug not on the [facility] refill protocol - gabapentin  Labs not current:    Uric Acid   Date Value Ref Range Status   07/19/2016 9.0 (H) 3.5 - 7.2 mg/dL Final       Last office visit: 5/3/2021 with prescribing provider:  Matt Swan   Future Office Visit:   Next 5 appointments (look out 90 days)    Sep 22, 2021 11:20 AM  Return Visit with Bisi Temple MD  [facility] [place] ([facility] [place] ) [address] [address]  [phone]

## 2021-07-06 RX ORDER — GABAPENTIN 300 MG/1
CAPSULE ORAL
Qty: 90 CAPSULE | Refills: 0 | Status: SHIPPED | OUTPATIENT
Start: 2021-07-06 | End: 2021-07-30

## 2021-07-06 RX ORDER — ALLOPURINOL 100 MG/1
TABLET ORAL
Qty: 180 TABLET | Refills: 0 | Status: SHIPPED | OUTPATIENT
Start: 2021-07-06 | End: 2021-10-03

## 2021-07-13 DIAGNOSIS — R52 GENERALIZED BODY ACHES: ICD-10-CM

## 2021-07-13 RX ORDER — TRAMADOL HYDROCHLORIDE 50 MG/1
TABLET ORAL
Qty: 30 TABLET | Refills: 0 | Status: SHIPPED | OUTPATIENT
Start: 2021-07-13 | End: 2021-07-28

## 2021-07-13 NOTE — TELEPHONE ENCOUNTER
Requested Prescriptions   Pending Prescriptions Disp Refills     traMADol (ULTRAM) 50 MG tablet [Pharmacy Med Name: traMADol HCl 50 MG Oral Tablet] 30 tablet 0     Sig: TAKE 1 TABLET BY MOUTH THREE TIMES DAILY AS NEEDED FOR SEVERE PAIN       There is no refill protocol information for this order        Last Written Prescription Date:  6/28/21  Last Fill Quantity: 30,  # refills: 0   Last office visit: 5/3/21  Future Office Visit:   Next 5 appointments (look out 90 days)    Sep 22, 2021 11:20 AM  Return Visit with Bisi Temple MD  [facility] [place] ([facility] [place] ) [address] [address]  [phone]           Routing refill request to provider for review/approval because:  Drug not on the [facility] refill protocol

## 2021-07-14 DIAGNOSIS — I10 BENIGN ESSENTIAL HYPERTENSION: ICD-10-CM

## 2021-07-15 NOTE — TELEPHONE ENCOUNTER
Routing refill request to provider for review/approval because:  Labs out of range:    Potassium   Date Value Ref Range Status   03/23/2021 3.3 (L) 3.4 - 5.3 mmol/L Final

## 2021-07-16 RX ORDER — AMLODIPINE BESYLATE 10 MG/1
TABLET ORAL
Qty: 90 TABLET | Refills: 0 | Status: SHIPPED | OUTPATIENT
Start: 2021-07-16 | End: 2021-10-14

## 2021-07-26 ENCOUNTER — MYC MEDICAL ADVICE (OUTPATIENT)
Dept: FAMILY MEDICINE | Facility: CLINIC | Age: 71
End: 2021-07-26

## 2021-07-26 DIAGNOSIS — I10 BENIGN ESSENTIAL HYPERTENSION: ICD-10-CM

## 2021-07-27 DIAGNOSIS — R52 GENERALIZED BODY ACHES: ICD-10-CM

## 2021-07-27 NOTE — TELEPHONE ENCOUNTER
Routing refill request to provider for review/approval because:  Labs out of range:  Potassium    Last Comprehensive Metabolic Panel:  Sodium   Date Value Ref Range Status   03/23/2021 137 133 - 144 mmol/L Final     Potassium   Date Value Ref Range Status   03/23/2021 3.3 (L) 3.4 - 5.3 mmol/L Final     Chloride   Date Value Ref Range Status   03/23/2021 101 94 - 109 mmol/L Final     Carbon Dioxide   Date Value Ref Range Status   03/23/2021 33 (H) 20 - 32 mmol/L Final     Anion Gap   Date Value Ref Range Status   03/23/2021 3 3 - 14 mmol/L Final     Glucose   Date Value Ref Range Status   03/23/2021 98 70 - 99 mg/dL Final     Urea Nitrogen   Date Value Ref Range Status   03/23/2021 17 7 - 30 mg/dL Final     Creatinine   Date Value Ref Range Status   03/23/2021 1.08 0.66 - 1.25 mg/dL Final     GFR Estimate   Date Value Ref Range Status   03/23/2021 69 >60 mL/min/[1.73_m2] Final     Comment:     Non  GFR Calc  Starting 12/18/2018, serum creatinine based estimated GFR (eGFR) will be   calculated using the Chronic Kidney Disease Epidemiology Collaboration   (CKD-EPI) equation.       Calcium   Date Value Ref Range Status   03/23/2021 9.4 8.5 - 10.1 mg/dL Final     Bilirubin Total   Date Value Ref Range Status   03/23/2021 0.3 0.2 - 1.3 mg/dL Final     Alkaline Phosphatase   Date Value Ref Range Status   03/23/2021 81 40 - 150 U/L Final     ALT   Date Value Ref Range Status   03/23/2021 9 0 - 70 U/L Final     AST   Date Value Ref Range Status   03/23/2021 13 0 - 45 U/L Final

## 2021-07-27 NOTE — TELEPHONE ENCOUNTER
Routing refill request to provider for review/approval because:  Drug not on the [facility] refill protocol     Last Written Prescription Date:  7-13-21  Last Fill Quantity: 30,  # refills: 0   Last office visit: 5/3/2021 with prescribing provider:  Matt Swan   Future Office Visit:   Next 5 appointments (look out 90 days)    Sep 22, 2021 11:20 AM  Return Visit with Bisi Temple MD  [facility] [place] ([facility] [place] ) [address] [address]  [phone]

## 2021-07-28 RX ORDER — CHLORTHALIDONE 25 MG/1
TABLET ORAL
Qty: 90 TABLET | Refills: 0 | Status: SHIPPED | OUTPATIENT
Start: 2021-07-28 | End: 2021-10-29

## 2021-07-28 RX ORDER — TRAMADOL HYDROCHLORIDE 50 MG/1
TABLET ORAL
Qty: 30 TABLET | Refills: 0 | Status: SHIPPED | OUTPATIENT
Start: 2021-07-28 | End: 2021-07-30

## 2021-07-28 NOTE — TELEPHONE ENCOUNTER
byron is due for a visit with me. Schedule him for a virtual (video or phone based on patient preference. Always promote a video visit first) visit with me. To discuss his neuropathy pain management.

## 2021-07-28 NOTE — TELEPHONE ENCOUNTER
Help Bill schedule a virtual visit with me to discuss his fibromyalgia/chronic pain and management options/changes.

## 2021-07-30 ENCOUNTER — VIRTUAL VISIT (OUTPATIENT)
Dept: FAMILY MEDICINE | Facility: CLINIC | Age: 71
End: 2021-07-30
Payer: MEDICARE

## 2021-07-30 DIAGNOSIS — R52 GENERALIZED BODY ACHES: ICD-10-CM

## 2021-07-30 DIAGNOSIS — M79.7 FIBROMYALGIA: ICD-10-CM

## 2021-07-30 PROCEDURE — 99442 PR PHYSICIAN TELEPHONE EVALUATION 11-20 MIN: CPT | Mod: 95 | Performed by: PHYSICIAN ASSISTANT

## 2021-07-30 RX ORDER — GABAPENTIN 300 MG/1
600 CAPSULE ORAL 3 TIMES DAILY
Qty: 180 CAPSULE | Refills: 5 | Status: SHIPPED | OUTPATIENT
Start: 2021-07-30 | End: 2021-12-30

## 2021-07-30 RX ORDER — TRAMADOL HYDROCHLORIDE 50 MG/1
100 TABLET ORAL 3 TIMES DAILY PRN
Qty: 60 TABLET | Refills: 0
Start: 2021-07-30 | End: 2021-08-05

## 2021-08-03 ENCOUNTER — MYC MEDICAL ADVICE (OUTPATIENT)
Dept: FAMILY MEDICINE | Facility: CLINIC | Age: 71
End: 2021-08-03

## 2021-08-03 DIAGNOSIS — M79.7 FIBROMYALGIA: ICD-10-CM

## 2021-08-05 ENCOUNTER — TELEPHONE (OUTPATIENT)
Dept: FAMILY MEDICINE | Facility: CLINIC | Age: 71
End: 2021-08-05

## 2021-08-05 DIAGNOSIS — M79.7 FIBROMYALGIA: ICD-10-CM

## 2021-08-05 RX ORDER — TRAMADOL HYDROCHLORIDE 50 MG/1
100 TABLET ORAL 3 TIMES DAILY PRN
Status: CANCELLED | OUTPATIENT
Start: 2021-08-05

## 2021-08-05 RX ORDER — TRAMADOL HYDROCHLORIDE 50 MG/1
100 TABLET ORAL 3 TIMES DAILY PRN
Qty: 60 TABLET | Refills: 0 | Status: SHIPPED | OUTPATIENT
Start: 2021-08-05 | End: 2021-08-17

## 2021-08-05 NOTE — TELEPHONE ENCOUNTER
Routing refill request to provider for review/approval because: Patient states he is out of this medication.  Drug not on the [facility] refill protocol     Patient had Virtual Visit on 7/30/21 with Matt Swan PA-C with the following dose change:        Increase tramadol to 2 tabs tid.    TraMADol (ULTRAM) 50 MG tablet; Take 2 tablets (100 mg) by mouth 3 times daily as needed for severe pain     Last Written Prescription Date:  7/28/21  Last Fill Quantity: 30 tablets (10 day supply) refills: 0       Last VIRTUAL visit: 7/30/21 with prescribing provider:  Matt Swan PA-C     Future Office Visit: Virtual visit with Matt Swan PA-C on Friday, August 6    Lesley Aaron RN BSN  [facility]

## 2021-08-05 NOTE — TELEPHONE ENCOUNTER
Patient is scheduled for a Virtual Visit with Matt Swan PA-C tomorrow morning.    We need to call patient this afternoon to inform him of his appointment.     There is another encounter for his Tramadol refill.     Lesley MERRITT  [facility]

## 2021-08-05 NOTE — TELEPHONE ENCOUNTER
"I see 7/30/21 Rx for tramadol 50 mg, take 2 tablets TID, 60 tabs as \"no print out\", no Rx sent.    Routed to provider to send increased Rx per below.   Patient has virtual visit tomorrow.    Yolis Montgomery RN  [facility]      "
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
## 2021-08-05 NOTE — TELEPHONE ENCOUNTER
Patient called back, he said he got the message regarding his appointment tomorrow and is okay with this. Patient did not sound short of breath, no wheezing heard while on the phone either.     Yesica Roche RN

## 2021-08-05 NOTE — TELEPHONE ENCOUNTER
Rx has been sent, patient has been notified via [employer].    Yolis Montgomery RN  [facility]

## 2021-08-05 NOTE — TELEPHONE ENCOUNTER
Attempted to call patient at home/mobile number, no answer, left message on voicemail; patient was instructed to return call to [facility] at [phone].    Yolis Montgomery RN  [facility]

## 2021-08-06 ENCOUNTER — VIRTUAL VISIT (OUTPATIENT)
Dept: FAMILY MEDICINE | Facility: CLINIC | Age: 71
End: 2021-08-06
Payer: MEDICARE

## 2021-08-06 DIAGNOSIS — J01.00 ACUTE NON-RECURRENT MAXILLARY SINUSITIS: Primary | ICD-10-CM

## 2021-08-06 DIAGNOSIS — R06.2 WHEEZING: ICD-10-CM

## 2021-08-06 DIAGNOSIS — J20.9 ACUTE BRONCHITIS, UNSPECIFIED ORGANISM: ICD-10-CM

## 2021-08-06 PROCEDURE — 99441 PR PHYSICIAN TELEPHONE EVALUATION 5-10 MIN: CPT | Mod: 95 | Performed by: PHYSICIAN ASSISTANT

## 2021-08-06 RX ORDER — AZITHROMYCIN 250 MG/1
TABLET, FILM COATED ORAL
Qty: 6 TABLET | Refills: 0 | Status: SHIPPED | OUTPATIENT
Start: 2021-08-06 | End: 2021-09-28

## 2021-08-06 RX ORDER — PREDNISONE 20 MG/1
20 TABLET ORAL 2 TIMES DAILY
Qty: 10 TABLET | Refills: 0 | Status: SHIPPED | OUTPATIENT
Start: 2021-08-06 | End: 2021-08-11
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
## 2021-08-16 DIAGNOSIS — M79.7 FIBROMYALGIA: ICD-10-CM

## 2021-08-17 RX ORDER — TRAMADOL HYDROCHLORIDE 50 MG/1
TABLET ORAL
Qty: 60 TABLET | Refills: 0 | Status: SHIPPED | OUTPATIENT
Start: 2021-08-17 | End: 2021-09-06

## 2021-08-29 DIAGNOSIS — I10 BENIGN ESSENTIAL HYPERTENSION: ICD-10-CM

## 2021-08-30 ENCOUNTER — OFFICE VISIT (OUTPATIENT)
Dept: FAMILY MEDICINE | Facility: CLINIC | Age: 71
End: 2021-08-30
Payer: OTHER MISCELLANEOUS

## 2021-08-30 VITALS
SYSTOLIC BLOOD PRESSURE: 130 MMHG | RESPIRATION RATE: 20 BRPM | BODY MASS INDEX: 41.33 KG/M2 | WEIGHT: 286 LBS | HEART RATE: 63 BPM | DIASTOLIC BLOOD PRESSURE: 78 MMHG | TEMPERATURE: 97 F | OXYGEN SATURATION: 96 %

## 2021-08-30 DIAGNOSIS — S20.211D: Primary | ICD-10-CM

## 2021-08-30 DIAGNOSIS — E66.01 MORBID OBESITY (H): ICD-10-CM

## 2021-08-30 DIAGNOSIS — S40.011D CONTUSION OF RIGHT SHOULDER, SUBSEQUENT ENCOUNTER: ICD-10-CM

## 2021-08-30 PROCEDURE — 99214 OFFICE O/P EST MOD 30 MIN: CPT | Performed by: PHYSICIAN ASSISTANT

## 2021-08-30 ASSESSMENT — PATIENT HEALTH QUESTIONNAIRE - PHQ9
5. POOR APPETITE OR OVEREATING: NOT AT ALL
SUM OF ALL RESPONSES TO PHQ QUESTIONS 1-9: 1

## 2021-08-30 ASSESSMENT — ANXIETY QUESTIONNAIRES
IF YOU CHECKED OFF ANY PROBLEMS ON THIS QUESTIONNAIRE, HOW DIFFICULT HAVE THESE PROBLEMS MADE IT FOR YOU TO DO YOUR WORK, TAKE CARE OF THINGS AT HOME, OR GET ALONG WITH OTHER PEOPLE: NOT DIFFICULT AT ALL
1. FEELING NERVOUS, ANXIOUS, OR ON EDGE: NOT AT ALL
7. FEELING AFRAID AS IF SOMETHING AWFUL MIGHT HAPPEN: NOT AT ALL
2. NOT BEING ABLE TO STOP OR CONTROL WORRYING: NOT AT ALL
3. WORRYING TOO MUCH ABOUT DIFFERENT THINGS: NOT AT ALL
6. BECOMING EASILY ANNOYED OR IRRITABLE: NOT AT ALL
5. BEING SO RESTLESS THAT IT IS HARD TO SIT STILL: NOT AT ALL
GAD7 TOTAL SCORE: 0

## 2021-08-30 NOTE — PROGRESS NOTES
Kevon Arciniega is a 70 year old who presents for the following health issues     HPI     ED/UC Followup:    Facility:  [facility]  Date of visit: 8/21/21  Reason for visit: Fell at work  Current Status: Better       Right chest wall contusion and right shoulder pain. Tripped on an electrical cord while at work. No sob. No hematuria. No hematochezia. No abd pain.      Review of Systems   Constitutional, HEENT, cardiovascular, pulmonary, GI, , musculoskeletal, neuro, skin, endocrine and psych systems are negative, except as otherwise noted.      Objective    /78   Pulse 63   Temp 97  F (36.1  C)   Resp 20   Wt 129.7 kg (286 lb)   SpO2 96%   BMI 41.33 kg/m    Body mass index is 41.33 kg/m .  Physical Exam   Eye exam - right eye normal lid, conjunctiva, cornea, pupil and fundus, left eye normal lid, conjunctiva, cornea, pupil and fundus.  Thyroid not palpable, not enlarged, no nodules detected.  CHEST:chest clear to IPPA, no tachypnea, retractions or cyanosis and S1, S2 normal, no murmur, no gallop, rate regular.  His disks are flat. Pupils equal, round, reactive to light. Extraocular movements full. Visual fields full. Face moves symmetrically. Tongue midline. Hearing mildly decreased to finger-rubbing at approximately 6-8 inches. Neck without bruits. CV: S1, S2. Motor strength 5/5. Reflexes were 2/4. Toe signs were downgoing. Normal position sense. Good finger-nose-finger and fine finger movement. Gait: he greg from a chair without difficulty and has a mildly broad-based gait.  Right deltoid tenderness. Shoulder rom normal as is strength.  Some mild right 5th rib tenderness. No crepitations.     Prashant was seen today for hospital f/u.    Diagnoses and all orders for this visit:    Bruised ribs, right, subsequent encounter    Morbid obesity (H)    Contusion of right shoulder, subsequent encounter      Discussed lifestyle changes to aid in losing weight.   Advised supportive and symptomatic  treatment.  Follow up with Provider - if condition persists or worsens.

## 2021-08-30 NOTE — TELEPHONE ENCOUNTER
Routing refill request to provider for review/approval because:  Labs out of range:  Potassium    Last seen today 8-30-21 with Matt Swan    Last Comprehensive Metabolic Panel:  Sodium   Date Value Ref Range Status   03/23/2021 137 133 - 144 mmol/L Final     Potassium   Date Value Ref Range Status   03/23/2021 3.3 (L) 3.4 - 5.3 mmol/L Final     Chloride   Date Value Ref Range Status   03/23/2021 101 94 - 109 mmol/L Final     Carbon Dioxide   Date Value Ref Range Status   03/23/2021 33 (H) 20 - 32 mmol/L Final     Anion Gap   Date Value Ref Range Status   03/23/2021 3 3 - 14 mmol/L Final     Glucose   Date Value Ref Range Status   03/23/2021 98 70 - 99 mg/dL Final     Urea Nitrogen   Date Value Ref Range Status   03/23/2021 17 7 - 30 mg/dL Final     Creatinine   Date Value Ref Range Status   03/23/2021 1.08 0.66 - 1.25 mg/dL Final     GFR Estimate   Date Value Ref Range Status   03/23/2021 69 >60 mL/min/[1.73_m2] Final     Comment:     Non  GFR Calc  Starting 12/18/2018, serum creatinine based estimated GFR (eGFR) will be   calculated using the Chronic Kidney Disease Epidemiology Collaboration   (CKD-EPI) equation.       Calcium   Date Value Ref Range Status   03/23/2021 9.4 8.5 - 10.1 mg/dL Final     Bilirubin Total   Date Value Ref Range Status   03/23/2021 0.3 0.2 - 1.3 mg/dL Final     Alkaline Phosphatase   Date Value Ref Range Status   03/23/2021 81 40 - 150 U/L Final     ALT   Date Value Ref Range Status   03/23/2021 9 0 - 70 U/L Final     AST   Date Value Ref Range Status   03/23/2021 13 0 - 45 U/L Final

## 2021-08-31 DIAGNOSIS — I10 BENIGN ESSENTIAL HYPERTENSION: ICD-10-CM

## 2021-08-31 RX ORDER — LOSARTAN POTASSIUM 100 MG/1
TABLET ORAL
Qty: 90 TABLET | Refills: 0 | Status: SHIPPED | OUTPATIENT
Start: 2021-08-31 | End: 2021-12-03

## 2021-08-31 ASSESSMENT — ANXIETY QUESTIONNAIRES: GAD7 TOTAL SCORE: 0

## 2021-09-01 DIAGNOSIS — K21.9 GASTROESOPHAGEAL REFLUX DISEASE: ICD-10-CM

## 2021-09-02 NOTE — TELEPHONE ENCOUNTER
Routing refill request to provider for review/approval because:  Labs out of range:  Potassium  Last seen by Matt Swan on 8-30-21  Last Comprehensive Metabolic Panel:  Sodium   Date Value Ref Range Status   03/23/2021 137 133 - 144 mmol/L Final     Potassium   Date Value Ref Range Status   03/23/2021 3.3 (L) 3.4 - 5.3 mmol/L Final     Chloride   Date Value Ref Range Status   03/23/2021 101 94 - 109 mmol/L Final     Carbon Dioxide   Date Value Ref Range Status   03/23/2021 33 (H) 20 - 32 mmol/L Final     Anion Gap   Date Value Ref Range Status   03/23/2021 3 3 - 14 mmol/L Final     Glucose   Date Value Ref Range Status   03/23/2021 98 70 - 99 mg/dL Final     Urea Nitrogen   Date Value Ref Range Status   03/23/2021 17 7 - 30 mg/dL Final     Creatinine   Date Value Ref Range Status   03/23/2021 1.08 0.66 - 1.25 mg/dL Final     GFR Estimate   Date Value Ref Range Status   03/23/2021 69 >60 mL/min/[1.73_m2] Final     Comment:     Non  GFR Calc  Starting 12/18/2018, serum creatinine based estimated GFR (eGFR) will be   calculated using the Chronic Kidney Disease Epidemiology Collaboration   (CKD-EPI) equation.       Calcium   Date Value Ref Range Status   03/23/2021 9.4 8.5 - 10.1 mg/dL Final     Bilirubin Total   Date Value Ref Range Status   03/23/2021 0.3 0.2 - 1.3 mg/dL Final     Alkaline Phosphatase   Date Value Ref Range Status   03/23/2021 81 40 - 150 U/L Final     ALT   Date Value Ref Range Status   03/23/2021 9 0 - 70 U/L Final     AST   Date Value Ref Range Status   03/23/2021 13 0 - 45 U/L Final

## 2021-09-03 ENCOUNTER — MYC MEDICAL ADVICE (OUTPATIENT)
Dept: FAMILY MEDICINE | Facility: CLINIC | Age: 71
End: 2021-09-03

## 2021-09-03 RX ORDER — OMEPRAZOLE 40 MG/1
CAPSULE, DELAYED RELEASE ORAL
Qty: 90 CAPSULE | Refills: 1 | Status: SHIPPED | OUTPATIENT
Start: 2021-09-03 | End: 2022-03-03

## 2021-09-03 RX ORDER — FUROSEMIDE 20 MG
TABLET ORAL
Qty: 180 TABLET | Refills: 0 | Status: SHIPPED | OUTPATIENT
Start: 2021-09-03 | End: 2021-12-12

## 2021-09-03 NOTE — TELEPHONE ENCOUNTER
"Requested Prescriptions   Pending Prescriptions Disp Refills     omeprazole (PRILOSEC) 40 MG DR capsule [Pharmacy Med Name: Omeprazole 40 MG Oral Capsule Delayed Release] 90 capsule 0     Sig: Take 1 capsule by mouth once daily       PPI Protocol Passed - 9/1/2021  8:34 PM        Passed - Not on Clopidogrel (unless Pantoprazole ordered)        Passed - No diagnosis of osteoporosis on record        Passed - Recent (12 mo) or future (30 days) visit within the authorizing provider's specialty     Patient has had an office visit with the authorizing provider or a provider within the authorizing providers department within the previous 12 mos or has a future within next 30 days. See \"Patient Info\" tab in inbasket, or \"Choose Columns\" in Meds & Orders section of the refill encounter.              Passed - Medication is active on med list        Passed - Patient is age 18 or older           Prescription approved per [facility] Refill Protocol.  Renetta CARRASCO BSN  Triage Nurse  [facility]      "

## 2021-09-04 DIAGNOSIS — M79.7 FIBROMYALGIA: ICD-10-CM

## 2021-09-05 ENCOUNTER — HEALTH MAINTENANCE LETTER (OUTPATIENT)
Age: 71
End: 2021-09-05

## 2021-09-05 NOTE — TELEPHONE ENCOUNTER
Routing refill request to provider for review/approval because:  Drug not on the [facility] refill protocol   traMADol (ULTRAM) 50 MG tablet 60 tablet 0 8/17/2021  No   Sig: TAKE 2 TABLETS BY MOUTH THREE TIMES DAILY AS NEEDED FOR SEVERE PAIN   Sent to pharmacy as: traMADol HCl 50 MG Oral Tablet (ULTRAM)   Class: E-Prescribe   Order: [id number]   E-Prescribing Status: Receipt confirmed by pharmacy (8/18/2021  8:28 AM CDT)

## 2021-09-06 RX ORDER — TRAMADOL HYDROCHLORIDE 50 MG/1
TABLET ORAL
Qty: 60 TABLET | Refills: 0 | Status: SHIPPED | OUTPATIENT
Start: 2021-09-06 | End: 2021-09-18

## 2021-09-15 DIAGNOSIS — M79.7 FIBROMYALGIA: ICD-10-CM

## 2021-09-15 NOTE — TELEPHONE ENCOUNTER
Routing refill request to provider for review/approval because:  Drug not on the [facility] refill protocol           Pending Prescriptions:                       Disp   Refills    traMADol (ULTRAM) 50 MG tablet [Pharmacy M*60 tab*0        Sig: TAKE 2 TABLETS BY MOUTH THREE TIMES DAILY AS NEEDED           FOR SEVERE PAIN        Kit LUNA Osuna

## 2021-09-17 ENCOUNTER — TELEPHONE (OUTPATIENT)
Dept: SLEEP MEDICINE | Facility: CLINIC | Age: 71
End: 2021-09-17

## 2021-09-17 NOTE — TELEPHONE ENCOUNTER
Reason for Call:  Other call back    Detailed comments: patient is calling because he feels his cpap may need some adjustments. Please follow p with patient.    Phone Number Patient can be reached at: Cell number on file:    Telephone Information:   Mobile [phone]       Best Time: any    Can we leave a detailed message on this number? YES    Call taken on 9/17/2021 at 12:16 PM by Kaylen Mcgarry

## 2021-09-17 NOTE — TELEPHONE ENCOUNTER
Spoke with patient he has been scheduled with a follow up appointment with, patient states he has gained weight and feels his pressure is too low.     Lady Glass MA

## 2021-09-18 RX ORDER — TRAMADOL HYDROCHLORIDE 50 MG/1
TABLET ORAL
Qty: 60 TABLET | Refills: 0 | Status: SHIPPED | OUTPATIENT
Start: 2021-09-18 | End: 2021-09-28

## 2021-09-21 ENCOUNTER — DOCUMENTATION ONLY (OUTPATIENT)
Dept: SLEEP MEDICINE | Facility: CLINIC | Age: 71
End: 2021-09-21

## 2021-09-21 DIAGNOSIS — G47.33 OSA (OBSTRUCTIVE SLEEP APNEA): Primary | ICD-10-CM

## 2021-09-21 ASSESSMENT — ENCOUNTER SYMPTOMS
PARESTHESIAS: 1
NERVOUS/ANXIOUS: 0
NAUSEA: 0
MYALGIAS: 1
FREQUENCY: 0
DIZZINESS: 1
HEMATOCHEZIA: 0
HEADACHES: 0
ARTHRALGIAS: 1
HEARTBURN: 0
PALPITATIONS: 0
DIARRHEA: 0
SORE THROAT: 0
ABDOMINAL PAIN: 0
COUGH: 0
HEMATURIA: 0
FEVER: 0
EYE PAIN: 0
WEAKNESS: 0
CONSTIPATION: 0
JOINT SWELLING: 0
CHILLS: 0
SHORTNESS OF BREATH: 0
DYSURIA: 0

## 2021-09-21 ASSESSMENT — ACTIVITIES OF DAILY LIVING (ADL): CURRENT_FUNCTION: NO ASSISTANCE NEEDED

## 2021-09-21 ASSESSMENT — PATIENT HEALTH QUESTIONNAIRE - PHQ9
SUM OF ALL RESPONSES TO PHQ QUESTIONS 1-9: 10
10. IF YOU CHECKED OFF ANY PROBLEMS, HOW DIFFICULT HAVE THESE PROBLEMS MADE IT FOR YOU TO DO YOUR WORK, TAKE CARE OF THINGS AT HOME, OR GET ALONG WITH OTHER PEOPLE: NOT DIFFICULT AT ALL
SUM OF ALL RESPONSES TO PHQ QUESTIONS 1-9: 10

## 2021-09-21 NOTE — PROGRESS NOTES
STM recheck     Diagnostic AHI: 54.2    PSG    Subjective measures:   Patient reports that he needs more air pressure after some weight gain.  He does not feel his sleep is as refreshed at it once was.   Reports not remembering his dreams lately (last 6 months)      Assessment: Pt not meeting objective benchmarks for AHI Patient failing following subjective benchmarks: pressure issues    Action plan: pended order placed to provider for pressure change to 16/10 bilevel recheck in 2 wks          PAP settings:    Bilevel S ()    EPAP Fixed 9    IPAP Fixed 15          Mask type:  Nasal Pillows    Objective measures: 14 day rolling measures      Compliance  100 %      Leak  2.48  lpm  last  upload      AHI 6.68   last  upload      Average number of minutes 552      Objective measure goal  Compliance   Goal >70%  Leak   Goal < 24 lpm  AHI  Goal < 5  Usage  Goal >240        Total time spent on accessing and interpreting remote patient PAP therapy data  10 minutes    Total time spent counseling, coaching  and reviewing PAP therapy data with patient  4 minutes

## 2021-09-22 ASSESSMENT — PATIENT HEALTH QUESTIONNAIRE - PHQ9: SUM OF ALL RESPONSES TO PHQ QUESTIONS 1-9: 10

## 2021-09-27 DIAGNOSIS — M10.071 ACUTE IDIOPATHIC GOUT OF RIGHT ANKLE: Chronic | ICD-10-CM

## 2021-09-27 DIAGNOSIS — E87.6 HYPOKALEMIA: ICD-10-CM

## 2021-09-27 DIAGNOSIS — N32.81 OVERACTIVE BLADDER: ICD-10-CM

## 2021-09-27 DIAGNOSIS — I10 BENIGN ESSENTIAL HYPERTENSION: ICD-10-CM

## 2021-09-28 ENCOUNTER — OFFICE VISIT (OUTPATIENT)
Dept: FAMILY MEDICINE | Facility: CLINIC | Age: 71
End: 2021-09-28
Payer: MEDICARE

## 2021-09-28 VITALS
HEIGHT: 69 IN | SYSTOLIC BLOOD PRESSURE: 128 MMHG | DIASTOLIC BLOOD PRESSURE: 74 MMHG | TEMPERATURE: 97.4 F | BODY MASS INDEX: 42.75 KG/M2 | HEART RATE: 66 BPM | OXYGEN SATURATION: 94 % | WEIGHT: 288.6 LBS

## 2021-09-28 DIAGNOSIS — Z13.220 SCREENING FOR HYPERLIPIDEMIA: ICD-10-CM

## 2021-09-28 DIAGNOSIS — M79.7 FIBROMYALGIA: ICD-10-CM

## 2021-09-28 DIAGNOSIS — L30.9 DERMATITIS: ICD-10-CM

## 2021-09-28 DIAGNOSIS — F32.1 CURRENT MODERATE EPISODE OF MAJOR DEPRESSIVE DISORDER WITHOUT PRIOR EPISODE (H): ICD-10-CM

## 2021-09-28 DIAGNOSIS — Z00.00 ENCOUNTER FOR MEDICARE ANNUAL WELLNESS EXAM: Primary | ICD-10-CM

## 2021-09-28 DIAGNOSIS — E78.00 PURE HYPERCHOLESTEROLEMIA: ICD-10-CM

## 2021-09-28 DIAGNOSIS — I10 BENIGN ESSENTIAL HYPERTENSION: ICD-10-CM

## 2021-09-28 LAB
ALBUMIN SERPL-MCNC: 3.6 G/DL (ref 3.4–5)
ALP SERPL-CCNC: 80 U/L (ref 40–150)
ALT SERPL W P-5'-P-CCNC: 18 U/L (ref 0–70)
ANION GAP SERPL CALCULATED.3IONS-SCNC: 7 MMOL/L (ref 3–14)
AST SERPL W P-5'-P-CCNC: 17 U/L (ref 0–45)
BASOPHILS # BLD AUTO: 0 10E3/UL (ref 0–0.2)
BASOPHILS NFR BLD AUTO: 0 %
BILIRUB SERPL-MCNC: 0.3 MG/DL (ref 0.2–1.3)
BUN SERPL-MCNC: 16 MG/DL (ref 7–30)
CALCIUM SERPL-MCNC: 9.3 MG/DL (ref 8.5–10.1)
CHLORIDE BLD-SCNC: 103 MMOL/L (ref 94–109)
CO2 SERPL-SCNC: 32 MMOL/L (ref 20–32)
CREAT SERPL-MCNC: 1.02 MG/DL (ref 0.66–1.25)
CREAT UR-MCNC: 15 MG/DL
EOSINOPHIL # BLD AUTO: 0.1 10E3/UL (ref 0–0.7)
EOSINOPHIL NFR BLD AUTO: 2 %
ERYTHROCYTE [DISTWIDTH] IN BLOOD BY AUTOMATED COUNT: 13.2 % (ref 10–15)
GFR SERPL CREATININE-BSD FRML MDRD: 74 ML/MIN/1.73M2
GLUCOSE BLD-MCNC: 141 MG/DL (ref 70–99)
HCT VFR BLD AUTO: 36.4 % (ref 40–53)
HGB BLD-MCNC: 11.9 G/DL (ref 13.3–17.7)
LYMPHOCYTES # BLD AUTO: 1.7 10E3/UL (ref 0.8–5.3)
LYMPHOCYTES NFR BLD AUTO: 19 %
MCH RBC QN AUTO: 32.8 PG (ref 26.5–33)
MCHC RBC AUTO-ENTMCNC: 32.7 G/DL (ref 31.5–36.5)
MCV RBC AUTO: 100 FL (ref 78–100)
MICROALBUMIN UR-MCNC: <5 MG/L
MICROALBUMIN/CREAT UR: NORMAL MG/G{CREAT}
MONOCYTES # BLD AUTO: 0.8 10E3/UL (ref 0–1.3)
MONOCYTES NFR BLD AUTO: 8 %
NEUTROPHILS # BLD AUTO: 6.4 10E3/UL (ref 1.6–8.3)
NEUTROPHILS NFR BLD AUTO: 71 %
PLATELET # BLD AUTO: 209 10E3/UL (ref 150–450)
POTASSIUM BLD-SCNC: 3.2 MMOL/L (ref 3.4–5.3)
PROT SERPL-MCNC: 6.9 G/DL (ref 6.8–8.8)
RBC # BLD AUTO: 3.63 10E6/UL (ref 4.4–5.9)
SODIUM SERPL-SCNC: 142 MMOL/L (ref 133–144)
WBC # BLD AUTO: 9.1 10E3/UL (ref 4–11)

## 2021-09-28 PROCEDURE — 80053 COMPREHEN METABOLIC PANEL: CPT | Performed by: PHYSICIAN ASSISTANT

## 2021-09-28 PROCEDURE — 99213 OFFICE O/P EST LOW 20 MIN: CPT | Mod: 25 | Performed by: PHYSICIAN ASSISTANT

## 2021-09-28 PROCEDURE — 85025 COMPLETE CBC W/AUTO DIFF WBC: CPT | Performed by: PHYSICIAN ASSISTANT

## 2021-09-28 PROCEDURE — G0439 PPPS, SUBSEQ VISIT: HCPCS | Performed by: PHYSICIAN ASSISTANT

## 2021-09-28 PROCEDURE — 36415 COLL VENOUS BLD VENIPUNCTURE: CPT | Performed by: PHYSICIAN ASSISTANT

## 2021-09-28 PROCEDURE — 82043 UR ALBUMIN QUANTITATIVE: CPT | Performed by: PHYSICIAN ASSISTANT

## 2021-09-28 RX ORDER — FLUOXETINE 40 MG/1
CAPSULE ORAL
Qty: 30 CAPSULE | Refills: 1 | Status: SHIPPED | OUTPATIENT
Start: 2021-09-28 | End: 2024-07-18

## 2021-09-28 RX ORDER — SIMVASTATIN 20 MG
TABLET ORAL
Qty: 180 TABLET | Refills: 0 | COMMUNITY
Start: 2021-09-28 | End: 2021-09-30

## 2021-09-28 RX ORDER — TRIAMCINOLONE ACETONIDE 1 MG/ML
LOTION TOPICAL 3 TIMES DAILY
Qty: 60 ML | Refills: 1 | Status: SHIPPED | OUTPATIENT
Start: 2021-09-28 | End: 2022-05-25

## 2021-09-28 ASSESSMENT — ENCOUNTER SYMPTOMS
ABDOMINAL PAIN: 0
DYSURIA: 0
NERVOUS/ANXIOUS: 0
SHORTNESS OF BREATH: 0
CHILLS: 0
ARTHRALGIAS: 1
HEMATURIA: 0
EYE PAIN: 0
PARESTHESIAS: 1
HEARTBURN: 0
JOINT SWELLING: 0
FEVER: 0
COUGH: 0
NAUSEA: 0
DIARRHEA: 0
MYALGIAS: 1
SORE THROAT: 0
DIZZINESS: 1
WEAKNESS: 0
FREQUENCY: 0
HEMATOCHEZIA: 0
CONSTIPATION: 0
HEADACHES: 0
PALPITATIONS: 0

## 2021-09-28 ASSESSMENT — MIFFLIN-ST. JEOR: SCORE: 2059.46

## 2021-09-28 ASSESSMENT — ACTIVITIES OF DAILY LIVING (ADL): CURRENT_FUNCTION: NO ASSISTANCE NEEDED

## 2021-09-28 NOTE — PROGRESS NOTES
"SUBJECTIVE:   Prashant Keyes is a 70 year old male who presents for Preventive Visit.      Patient has been advised of split billing requirements and indicates understanding: Yes   Are you in the first 12 months of your Medicare coverage?  No        Concern: foot rash. Pruritic. No novel exposures. Cleared up when he took prednisone for something else .   Also, still noting depression and irritability. Definite anhedonia.     Healthy Habits:     In general, how would you rate your overall health?  Good    Frequency of exercise:  None    Do you usually eat at least 4 servings of fruit and vegetables a day, include whole grains    & fiber and avoid regularly eating high fat or \"junk\" foods?  No    Taking medications regularly:  No    Medication side effects:  Lightheadedness    Ability to successfully perform activities of daily living:  No assistance needed    Home Safety:  No safety concerns identified    Hearing Impairment:  Difficulty following a conversation in a noisy restaurant or crowded room, need to ask people to speak up or repeat themselves, difficulty understanding soft or whispered speech and difficulty understanding speech on the telephone    In the past 6 months, have you been bothered by leaking of urine? Yes    In general, how would you rate your overall mental or emotional health?  Good      PHQ-2 Total Score: 3    Additional concerns today:  No    Do you feel safe in your environment? Yes    Have you ever done Advance Care Planning? (For example, a Health Directive, POLST, or a discussion with a medical provider or your loved ones about your wishes): Yes, advance care planning is on file.       Fall risk  Fallen 2 or more times in the past year?: No  Any fall with injury in the past year?: Yes  Timed Up and Go Test (>13.5 is fall risk; contact physician) : 10    Cognitive Screening   1) Repeat 3 items (Leader, Season, Table)    2) Clock draw: NORMAL  3) 3 item recall: Recalls 3 objects  Results: " 3 items recalled: COGNITIVE IMPAIRMENT LESS LIKELY    Mini-CogTM Copyright JAROCHO Matson. Licensed by the author for use in [facility]; reprinted with permission ([email or web address]). All rights reserved.      Do you have sleep apnea, excessive snoring or daytime drowsiness?: yes    Reviewed and updated as needed this visit by clinical staff  Tobacco  Allergies  Meds   Med Hx  Surg Hx  Fam Hx  Soc Hx        Reviewed and updated as needed this visit by Provider                Social History     Tobacco Use     Smoking status: Never Smoker     Smokeless tobacco: Never Used   Substance Use Topics     Alcohol use: No     Alcohol/week: 0.0 standard drinks     Comment: Quit since 2003.      If you drink alcohol do you typically have >3 drinks per day or >7 drinks per week? No    Alcohol Use 9/28/2021   Prescreen: >3 drinks/day or >7 drinks/week? -   Prescreen: >3 drinks/day or >7 drinks/week? No               Current providers sharing in care for this patient include:   Patient Care Team:  Matt Swan PA-C as PCP - General (Physician Assistant)  Dimas Qureshi MD as MD (Neurology)  Matt Swan PA-C as Assigned PCP  Bisi Mckeon MD as Assigned Neuroscience Provider    The following health maintenance items are reviewed in Epic and correct as of today:  Health Maintenance Due   Topic Date Due     URINE DRUG SCREEN  Never done     LIPID  07/03/2021     MICROALBUMIN  07/28/2021     Lab work is in process  Labs reviewed in EPIC  BP Readings from Last 3 Encounters:   09/28/21 128/74   08/30/21 130/78   05/03/21 127/71    Wt Readings from Last 3 Encounters:   09/28/21 130.9 kg (288 lb 9.6 oz)   08/30/21 129.7 kg (286 lb)   05/03/21 127 kg (280 lb)                  Patient Active Problem List   Diagnosis     Hypertension goal BP (blood pressure) < 140/90     GERD     Fibromyalgia syndrome     Hyperlipidemia LDL goal <130     Eczema     KALEB (obstructive sleep apnea)     Lichen  planus     CKD (chronic kidney disease) stage 2, GFR 60-89 ml/min     Spells     Acute gouty arthritis     Acute idiopathic gout of foot, unspecified laterality     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)     Class 1 obesity with serious comorbidity and body mass index (BMI) of 31.0 to 31.9 in adult, unspecified obesity type     Obesity (BMI 35.0-39.9) with comorbidity (H)     Parkinsonism (H)     Past Surgical History:   Procedure Laterality Date     ABDOMEN SURGERY  2015    Fixed belly button     ANGIOGRAM  2003    Coronary Angiogram- negative     ARTHROSCOPY KNEE Left 9/5/2019    Procedure: LEFT KNEE ARTHROSCOPY WITH MENISCAL AND CHONDRAL DEBRIDEMENT;  Surgeon: Damon Rosas MD;  Location: [facility] OR     COLONOSCOPY  2/2/2013     COLONOSCOPY WITH CO2 INSUFFLATION N/A 8/17/2017    Procedure: COLONOSCOPY WITH CO2 INSUFFLATION;  COLON SCREEN/ FLYNN;  Surgeon: Varun Bond MD;  Location: [facility] OR     ENT SURGERY  2/2/2008    Hearing aids     EYE SURGERY  April/2012    Cataracts     HC REMOVAL TESTIS,SIMPLE  1978    Right undecended     HERNIA REPAIR, INGUINAL RT/LT  1963, 1978    Right Hernia     HERNIORRHAPHY UMBILICAL  4/2013    [place]       Social History     Tobacco Use     Smoking status: Never Smoker     Smokeless tobacco: Never Used   Substance Use Topics     Alcohol use: No     Alcohol/week: 0.0 standard drinks     Comment: Quit since 2003.      Family History   Problem Relation Age of Onset     Cerebrovascular Disease Mother         60's     C.A.D. Mother         CABG 75     Arthritis Mother      Eye Disorder Mother      Heart Disease Mother      Cardiovascular Father         Rheumatic Heart Disease     Hypertension Brother      Lipids Brother      C.A.D. Brother         CABG 46     Arthritis Brother      Heart Disease Brother         CABG x5     Obesity Brother      Coronary Artery Disease Brother      Hyperlipidemia Brother      Hypertension Brother      Lipids Brother       Thyroid Disease Brother      Alcohol/Drug Brother      Heart Disease Brother         CABG     Coronary Artery Disease Brother      Hyperlipidemia Brother      Obesity Brother      Cancer Sister         Thyroid CA     Hypertension Sister      Obesity Sister      Thyroid Disease Sister      Hemochromatosis Sister      Cerebrovascular Disease Sister         birth defect     Hyperlipidemia Sister      Other Cancer Sister         Goiter     Depression Daughter      Depression Daughter      Depression Son      Diabetes Son      Depression Son      Gastrointestinal Disease Brother         Crohn's Disease-Ostomy     Cerebrovascular Disease Brother         lipids     Coronary Artery Disease Brother      Hyperlipidemia Brother      Arrhythmia Sister      Cancer Maternal Grandmother         Thyroid CA     Other Cancer Maternal Grandmother         Goiter     Cancer Paternal Grandfather         Throat CA     Thyroid Disease Maternal Grandfather      Thyroid Disease Paternal Grandmother          Current Outpatient Medications   Medication Sig Dispense Refill     albuterol (PROAIR HFA/PROVENTIL HFA/VENTOLIN HFA) 108 (90 Base) MCG/ACT inhaler Inhale 2 puffs into the lungs every 6 hours 1 Inhaler 1     allopurinol (ZYLOPRIM) 100 MG tablet Take 2 tablets by mouth once daily 180 tablet 0     amLODIPine (NORVASC) 10 MG tablet TAKE 1 TABLET BY MOUTH ONCE DAILY WITH SUPPER 90 tablet 0     aspirin 81 MG tablet Take 1 tablet (81 mg) by mouth daily 30 tablet      benzonatate (TESSALON) 200 MG capsule Take 1 capsule (200 mg) by mouth 3 times daily as needed 30 capsule 1     carbidopa-levodopa (SINEMET)  MG tablet Take 2 tablets by mouth 3 times daily 540 tablet 2     carvedilol (COREG) 12.5 MG tablet TAKE 1 TABLET BY MOUTH TWICE DAILY WITH MEALS 180 tablet 0     chlorthalidone (HYGROTON) 25 MG tablet Take 1 tablet by mouth once daily 90 tablet 0     Cholecalciferol (VITAMIN D) 2000 UNITS tablet Take 2,000 Units by mouth daily 90  tablet 3     colchicine (COLCYRS) 0.6 MG tablet Take 1 tablet (0.6 mg) by mouth daily as needed Take 2 tabs on first day.  Take at first sign of gout flair.  Take for max of 1 week per flair 30 tablet 0     Cyanocobalamin (VITAMIN  B-12) 2500 MCG tablet Place 2,500 mcg under the tongue daily 90 tablet 3     divalproex sodium delayed-release (DEPAKOTE) 500 MG DR tablet Take 1 tablet (500 mg) by mouth 2 times daily 180 tablet 2     ferrous sulfate (IRON) 325 (65 FE) MG tablet Take 1 tablet (325 mg) by mouth 2 times daily 60 tablet 2     FLUoxetine (PROZAC) 40 MG capsule TAKE 1 CAPSULE BY MOUTH ONCE DAILY 30 capsule 1     furosemide (LASIX) 20 MG tablet Take 2 tablets by mouth once daily 180 tablet 0     gabapentin (NEURONTIN) 300 MG capsule Take 2 capsules (600 mg) by mouth 3 times daily 180 capsule 5     hydrALAZINE (APRESOLINE) 25 MG tablet Take 1 tablet by mouth twice daily 180 tablet 1     levETIRAcetam (KEPPRA) 1000 MG tablet TAKE 1 TABLET BY MOUTH TWICE DAILY IN THE MORNING AND AT BEDTIME 180 tablet 2     losartan (COZAAR) 100 MG tablet Take 1 tablet by mouth once daily 90 tablet 0     Multiple Vitamin (MULTI VITAMIN MENS PO) Take  by mouth.       omeprazole (PRILOSEC) 40 MG DR capsule Take 1 capsule by mouth once daily 90 capsule 1     ORDER FOR DME CPAP daily       oxybutynin (DITROPAN) 5 MG tablet Take 1 tablet by mouth twice daily 180 tablet 0     potassium chloride ER (KLOR-CON M) 20 MEQ CR tablet TAKE 1 TABLET BY MOUTH THREE TIMES DAILY 270 tablet 0     simvastatin (ZOCOR) 20 MG tablet TAKE 2 TABLETS BY MOUTH IN THE EVENING AT BEDTIME 180 tablet 0     traMADol (ULTRAM) 50 MG tablet TAKE 2 TABLETS BY MOUTH THREE TIMES DAILY AS NEEDED FOR SEVERE PAIN 60 tablet 0     traZODone (DESYREL) 100 MG tablet Take 100 mg by mouth At Bedtime       triamcinolone (KENALOG) 0.1 % external cream Apply topically 2 times daily 80 g 1     triamcinolone (KENALOG) 0.1 % external lotion Apply topically 3 times daily 60 mL 1      "UNABLE TO FIND daily MEDICATION NAME: sawpalmetto - 2 capsules once a day       azithromycin (ZITHROMAX) 250 MG tablet Two tablets first day, then one tablet daily for four days. (Patient not taking: Reported on 8/30/2021) 6 tablet 0     predniSONE (DELTASONE) 10 MG tablet Take 60 mg days 1 and 2, 50 mg days 3 and 4, 40 mg days 5 and 6, 30 mg days 7 and 8, 20 mg days 9 and 10, 10 mg days 11 and 12 42 tablet 0     Allergies   Allergen Reactions     Accupril [Ace Inhibitors] Difficulty breathing     accupril causes SOB     Aptiom [Eslicarbazepine] Difficulty breathing     Atorvastatin Calcium      Myalgias from Lipitor     Contrast Dye Hives     Coreg [Carvedilol] Nausea and Fatigue     Lactose GI Disturbance     Lisinopril Difficulty breathing     SOB     Nitroglycerin      Headache     Paroxetine Hives     Tetracycline [Tetracyclines]      \"splitting headaches\"     Vimpat [Lacosamide] Difficulty breathing     Zoloft Rash     Zolpidem      Adhesive Tape Rash     Without itching- EKG pads     Recent Labs   Lab Test 03/23/21  1015 02/08/21  1432 12/08/20  0935 07/03/20  0802 08/27/19  1106 06/25/19  0936 12/18/18  0926 09/10/18  0759 01/29/18  1643   LDL  --   --   --  69  --  69  --  65  --    HDL  --   --   --  53  --  70  --  72  --    TRIG  --   --   --  124  --  100  --  93  --    ALT 9 12  --   --   --   --  17  --    < >   CR 1.08 0.97   < > 1.07   < >  --  1.03  --    < >   GFRESTIMATED 69 78   < > 70   < >  --  72  --    < >   GFRESTBLACK 80 >90   < > 81   < >  --  87  --    < >   POTASSIUM 3.3* 3.7   < > 3.7   < >  --  3.7  --    < >   TSH  --  0.49  --   --   --   --  0.29*  --    < >    < > = values in this interval not displayed.              Review of Systems   Constitutional: Negative for chills and fever.   HENT: Positive for hearing loss. Negative for congestion, ear pain and sore throat.    Eyes: Positive for visual disturbance. Negative for pain.   Respiratory: Negative for cough and shortness of " "breath.    Cardiovascular: Positive for peripheral edema. Negative for chest pain and palpitations.   Gastrointestinal: Negative for abdominal pain, constipation, diarrhea, heartburn, hematochezia and nausea.   Genitourinary: Positive for impotence. Negative for discharge, dysuria, frequency, genital sores, hematuria and urgency.   Musculoskeletal: Positive for arthralgias and myalgias. Negative for joint swelling.   Skin: Negative for rash.   Neurological: Positive for dizziness and paresthesias. Negative for weakness and headaches.   Psychiatric/Behavioral: Negative for mood changes. The patient is not nervous/anxious.      Constitutional, HEENT, cardiovascular, pulmonary, GI, , musculoskeletal, neuro, skin, endocrine and psych systems are negative, except as otherwise noted.    OBJECTIVE:   /74 (BP Location: Left arm, Cuff Size: Adult Large)   Pulse 66   Temp 97.4  F (36.3  C) (Tympanic)   Ht 1.753 m (5' 9\")   Wt 130.9 kg (288 lb 9.6 oz)   SpO2 94%   BMI 42.62 kg/m   Estimated body mass index is 42.62 kg/m  as calculated from the following:    Height as of this encounter: 1.753 m (5' 9\").    Weight as of this encounter: 130.9 kg (288 lb 9.6 oz).  Physical Exam  GENERAL: healthy, alert and no distress  EYES: Eyes grossly normal to inspection, PERRL and conjunctivae and sclerae normal  HENT: ear canals and TM's normal, nose and mouth without ulcers or lesions  NECK: no adenopathy, no asymmetry, masses, or scars and thyroid normal to palpation  RESP: lungs clear to auscultation - no rales, rhonchi or wheezes  CV: regular rate and rhythm, normal S1 S2, no S3 or S4, no murmur, click or rub, no peripheral edema and peripheral pulses strong  ABDOMEN: soft, nontender, no hepatosplenomegaly, no masses and bowel sounds normal  MS: no gross musculoskeletal defects noted, no edema  SKIN: no suspicious lesions or rashes  NEURO: Normal strength and tone, mentation intact and speech normal  PSYCH: mentation " "appears normal, affect normal/bright    Diagnostic Test Results:  Labs reviewed in Epic    ASSESSMENT / PLAN:       ICD-10-CM    1. Encounter for Medicare annual wellness exam  Z00.00    2. Screening for hyperlipidemia  Z13.220 CANCELED: Lipid panel reflex to direct LDL Fasting   3. Dermatitis  L30.9 triamcinolone (KENALOG) 0.1 % external lotion   4. Benign essential hypertension  I10 Comprehensive metabolic panel (BMP + Alb, Alk Phos, ALT, AST, Total. Bili, TP)     CBC with platelets and differential     Albumin Random Urine Quantitative with Creat Ratio     CANCELED: Albumin Random Urine Quantitative with Creat Ratio   5. Current moderate episode of major depressive disorder without prior episode (H)  F32.1 FLUoxetine (PROZAC) 40 MG capsule   6. Pure Hypercholesterolemia goal ldl <130  E78.00 simvastatin (ZOCOR) 20 MG tablet     Restart simvastatin.  Inc fluoxetine to 40 mg daily.  Patient to f/up with psych in 3-4 wks.     Patient has been advised of split billing requirements and indicates understanding: Yes  COUNSELING:  Reviewed preventive health counseling, as reflected in patient instructions       Regular exercise       Healthy diet/nutrition    Estimated body mass index is 42.62 kg/m  as calculated from the following:    Height as of this encounter: 1.753 m (5' 9\").    Weight as of this encounter: 130.9 kg (288 lb 9.6 oz).    Weight management plan: Discussed healthy diet and exercise guidelines    He reports that he has never smoked. He has never used smokeless tobacco.      Appropriate preventive services were discussed with this patient, including applicable screening as appropriate for cardiovascular disease, diabetes, osteopenia/osteoporosis, and glaucoma.  As appropriate for age/gender, discussed screening for colorectal cancer, prostate cancer, breast cancer, and cervical cancer. Checklist reviewing preventive services available has been given to the patient.    Reviewed patients plan of care and " provided an AVS. The Basic Care Plan (routine screening as documented in Health Maintenance) for Prashant meets the Care Plan requirement. This Care Plan has been established and reviewed with the Patient.    Counseling Resources:  ATP IV Guidelines  Pooled Cohorts Equation Calculator  Breast Cancer Risk Calculator  Breast Cancer: Medication to Reduce Risk  FRAX Risk Assessment  ICSI Preventive Guidelines  Dietary Guidelines for Americans, 2010  [employer]'s MyPlate  ASA Prophylaxis  Lung CA Screening    LYUDMILA Donohue [facility] TREVON    Identified Health Risks:    The patient s PHQ-9 score is consistent with moderate depression. He was provided with information regarding depression and was advised to schedule a follow up appointment in 4 weeks to further address this issue.

## 2021-09-28 NOTE — TELEPHONE ENCOUNTER
Last Written Prescription Date:  9/18/21  Last Fill Quantity: 60,  # refills: 0   Last office visit: 9/28/21 with prescribing provider:  Matt Swan for annual physical   Future Office Visit: none    Routing refill request to provider for review/approval because:  Drug not on the [facility] refill protocol     Diann Barry RN, BSN  [facility]

## 2021-09-28 NOTE — PATIENT INSTRUCTIONS
"  Patient Education   Personalized Prevention Plan  You are due for the preventive services outlined below.  Your care team is available to assist you in scheduling these services.  If you have already completed any of these items, please share that information with your care team to update in your medical record.  Health Maintenance Due   Topic Date Due     URINE DRUG SCREEN  Never done     Cholesterol Lab  07/03/2021     Annual Wellness Visit  07/28/2021     Kidney Microalbumin Urine Test  07/28/2021       Depression and Suicide in Older Adults    Nearly 2 million older Americans have some type of depression. Some of them even take their own lives. Yet depression among older adults is often ignored. Learn the warning signs. You may help spare a loved one needless pain. You may also save a life.   What is depression?  Depression is a common and serious illness that affects the way you think and feel. It is not a normal part of aging, nor is it a sign of weakness, a character flaw, or something you can snap out of. Most people with depression need treatment to get better. The most common symptom is a feeling of deep sadness. People who are depressed also may seem tired and listless. And nothing seems to give them pleasure. It s normal to grieve or be sad sometimes. But sadness lessens or passes with time. Depression rarely goes away or improves on its own. A person with clinical depression can't \"snap out of it.\" Other symptoms of depression are:     Sleeping more or less than normal    Eating more or less than normal    Having headaches, stomachaches, or other pains that don t go away    Feeling nervous,  empty,  or worthless    Crying a great deal    Thinking or talking about suicide or death    Loss of interest in activities previously enjoyed    Social isolation    Feeling confused or forgetful  What causes it?  The causes of depression aren t fully known. But it is thought to result from a complex blend of " these factors:     Biochemistry. Certain chemicals in the brain play a role.    Genes. Depression does run in families.    Life stress. Life stresses can also trigger depression in some people. Older adults often face many stressors, such as death of friends or a spouse, health problems, and financial concerns.    Chronic conditions. This includes conditions such as diabetes, heart disease, or cancer. These can cause symptoms of depression. Medicine side effects can cause changes in thoughts and behaviors.  How you can help  Often, depressed people may not want to ask for help. When they do, they may be ignored. Or, they may receive the wrong treatment. You can help by showing parents and older friends love and support. If they seem depressed, don t lecture the person, ignore the symptoms, or discount the symptoms as a  normal  part of aging -which they are not. Get involved, listen, and show interest and support.   Help them understand that depression is a treatable illness. Tell them you can help them find the right treatment. Offer to go to their healthcare provider's appointment with them for support when the symptoms are discussed. With their approval, contact a local mental health center, social service agency, or hospital about services.   You can be an advocate for him or her at healthcare appointments. Many older adults have chronic illnesses that can cause symptoms of depression. Medicine side effects can change thoughts and behaviors. You can help make sure that the healthcare provider looks at all of these factors. He or she should refer your family member or friend to a mental healthcare provider when needed. in some cases, untreated depression can lead to a misdiagnosis. A person may be diagnosed with a brain disorder such as dementia. If the healthcare provider does not take the issue of depression seriously, help your family member or friend to find another provider.   Don't be afraid to ask  If you  think an older person you care about could be suicidal, ask,  Have you thought about suicide?  Most people will tell you the truth. If they say  yes,  they may already have a plan for how and when they will attempt it. Find out as much as you can. The more detailed the plan, and the easier it is to carry out, the more danger the person is in right now. Tell the person you are there for them and do not want them to harm him or herself. Don't wait to get help for the person. Call the person's healthcare provider, local hospital, or emergency services.   To learn more    National Suicide Prevention Lifeline (crisis hotline) 766-395-SSTU (567-603-7746)    National [place] of Mental Ibtags768-888-8295qqm.[facility].nih.gov    National [place] on Mental Sdrjyhu422-004-5872gqv.oleg.org    Mental Health Jybwupj149-497-8658rrq.[facility].org    National Suicide Qhqjtcu260-OKGAEST (439-200-8321)    Call 911  Never leave the person alone. A person who is actively suicidal needs psychiatric care right away. They will need constant supervision. Never leave the person out of sight. Call 911 or the national 24-hour suicide crisis hotline at 021-033-OXRI (829-266-2513). You can also take the person to the closest emergency room.   John last reviewed this educational content on 5/1/2020 2000-2021 [employer]. All rights reserved. This information is not intended as a substitute for professional medical care. Always follow your healthcare professional's instructions.

## 2021-09-30 RX ORDER — TRAMADOL HYDROCHLORIDE 50 MG/1
TABLET ORAL
Qty: 40 TABLET | Refills: 0 | Status: SHIPPED | OUTPATIENT
Start: 2021-09-30 | End: 2021-10-14

## 2021-09-30 RX ORDER — SIMVASTATIN 20 MG
TABLET ORAL
Qty: 180 TABLET | Refills: 0 | Status: SHIPPED | OUTPATIENT
Start: 2021-09-30 | End: 2021-12-28

## 2021-09-30 NOTE — TELEPHONE ENCOUNTER
Routing refill request to provider for review/approval because:  Drug not on the [facility] refill protocol   Potassium   Date Value Ref Range Status   09/28/2021 3.2 (L) 3.4 - 5.3 mmol/L Final   03/23/2021 3.3 (L) 3.4 - 5.3 mmol/L Final     Creatinine   Date Value Ref Range Status   09/28/2021 1.02 0.66 - 1.25 mg/dL Final   03/23/2021 1.08 0.66 - 1.25 mg/dL Final

## 2021-10-01 DIAGNOSIS — E87.6 HYPOKALEMIA: ICD-10-CM

## 2021-10-01 DIAGNOSIS — I10 BENIGN ESSENTIAL HYPERTENSION: ICD-10-CM

## 2021-10-01 DIAGNOSIS — N32.81 OVERACTIVE BLADDER: ICD-10-CM

## 2021-10-01 DIAGNOSIS — M10.071 ACUTE IDIOPATHIC GOUT OF RIGHT ANKLE: Chronic | ICD-10-CM

## 2021-10-01 RX ORDER — POTASSIUM CHLORIDE 1500 MG/1
TABLET, EXTENDED RELEASE ORAL
Qty: 270 TABLET | Refills: 0 | Status: CANCELLED | OUTPATIENT
Start: 2021-10-01

## 2021-10-01 RX ORDER — ALLOPURINOL 100 MG/1
TABLET ORAL
Qty: 180 TABLET | Refills: 0 | Status: CANCELLED | OUTPATIENT
Start: 2021-10-01

## 2021-10-01 RX ORDER — CARVEDILOL 12.5 MG/1
TABLET ORAL
Qty: 180 TABLET | Refills: 0 | Status: CANCELLED | OUTPATIENT
Start: 2021-10-01

## 2021-10-01 RX ORDER — OXYBUTYNIN CHLORIDE 5 MG/1
5 TABLET ORAL 2 TIMES DAILY
Qty: 180 TABLET | Refills: 0 | Status: CANCELLED | OUTPATIENT
Start: 2021-10-01

## 2021-10-03 RX ORDER — POTASSIUM CHLORIDE 1500 MG/1
TABLET, EXTENDED RELEASE ORAL
Qty: 270 TABLET | Refills: 0 | Status: SHIPPED | OUTPATIENT
Start: 2021-10-03 | End: 2021-12-28

## 2021-10-03 RX ORDER — CARVEDILOL 12.5 MG/1
TABLET ORAL
Qty: 180 TABLET | Refills: 0 | Status: SHIPPED | OUTPATIENT
Start: 2021-10-03 | End: 2021-12-28

## 2021-10-03 RX ORDER — ALLOPURINOL 100 MG/1
TABLET ORAL
Qty: 180 TABLET | Refills: 0 | Status: SHIPPED | OUTPATIENT
Start: 2021-10-03 | End: 2021-12-28

## 2021-10-03 RX ORDER — OXYBUTYNIN CHLORIDE 5 MG/1
TABLET ORAL
Qty: 180 TABLET | Refills: 0 | Status: SHIPPED | OUTPATIENT
Start: 2021-10-03 | End: 2021-12-28

## 2021-10-05 ENCOUNTER — DOCUMENTATION ONLY (OUTPATIENT)
Dept: SLEEP MEDICINE | Facility: CLINIC | Age: 71
End: 2021-10-05

## 2021-10-05 NOTE — PROGRESS NOTES
STM recheck after pressure change.     Diagnostic AHI: 54.2     PSG    Subjective measures:   Patient reports he is sleeping lateral.  We are unable to determine why the increase in events after the pressures. No significant health changed.  He did increase his prozac.  Discussed the use of medication and sleep.      Assessment: Pt not meeting objective benchmarks for AHI,  Events increased after pressure change. Patient failing following subjective benchmarks: not feeling benefit from therapy    Action plan: 2 week STM recheck appt scheduled      Device type: Bilevel    PAP settings:  16/10     Mask type:  Nasal Pillows    Objective measures: 14 day rolling measures      Compliance  100 %      Leak  3.92  lpm  last  upload      AHI 7.09   last  upload      Average number of minutes 552      Objective measure goal  Compliance   Goal >70%  Leak   Goal < 24 lpm  AHI  Goal < 5  Usage  Goal >240        Total time spent on accessing and interpreting remote patient PAP therapy data  11 minutes    Total time spent counseling, coaching  and reviewing PAP therapy data with patient  4 minutes    [id number]  [id number]  no (3 day STM)

## 2021-10-10 DIAGNOSIS — I10 BENIGN ESSENTIAL HYPERTENSION: ICD-10-CM

## 2021-10-10 DIAGNOSIS — M79.7 FIBROMYALGIA: ICD-10-CM

## 2021-10-12 ENCOUNTER — DOCUMENTATION ONLY (OUTPATIENT)
Dept: SLEEP MEDICINE | Facility: CLINIC | Age: 71
End: 2021-10-12

## 2021-10-12 DIAGNOSIS — G47.33 OSA (OBSTRUCTIVE SLEEP APNEA): Primary | ICD-10-CM

## 2021-10-12 NOTE — PROGRESS NOTES
STM recheck      Diagnostic AHI: 54.2    PSG    Message left for patient to return call     Assessment: Pt not meeting objective benchmarks for AHI, slightly elevated.     Action plan: waiting for patient to return call.       Device type: Bilevel    PAP settings:  RESMED 16/10 cm h20    Mask type:  Nasal Pillows    Objective measures: 14 day rolling measures      Compliance  92 %      Leak  2.96  lpm  last  upload      AHI 5.71   last  upload      Average number of minutes 510      Objective measure goal  Compliance   Goal >70%  Leak   Goal < 24 lpm  AHI  Goal < 5  Usage  Goal >240        Total time spent on accessing and interpreting remote patient PAP therapy data  10 minutes    Total time spent counseling, coaching  and reviewing PAP therapy data with patient  0  minutes

## 2021-10-12 NOTE — Clinical Note
Hi this patient is still having issues with his sleep quality and would like to try one more pressure change.  Please sign pended order.  I feel the change is due to a recent medication change but we will see if a small change helps.  His AHI index is only slightly elevated as an average.   I put him in for another recheck in two weeks and he sees you in Nov.    Thanks    Jacqui

## 2021-10-13 NOTE — TELEPHONE ENCOUNTER
Routing refill request to provider for review/approval because:  Drug not on the [facility] refill protocol   traMADol (ULTRAM) 50 MG tablet 40 tablet 0 9/30/2021  No   Sig: TAKE 2 TABLETS BY MOUTH THREE TIMES DAILY AS NEEDED FOR SEVERE PAIN   Sent to pharmacy as: traMADol HCl 50 MG Oral Tablet (ULTRAM)   Class: E-Prescribe   Order: [id number]   E-Prescribing Status: Receipt confirmed by pharmacy (9/30/2021  4:18 AM CDT)

## 2021-10-14 RX ORDER — TRAMADOL HYDROCHLORIDE 50 MG/1
TABLET ORAL
Qty: 40 TABLET | Refills: 0 | Status: SHIPPED | OUTPATIENT
Start: 2021-10-14 | End: 2021-10-23

## 2021-10-14 RX ORDER — AMLODIPINE BESYLATE 10 MG/1
TABLET ORAL
Qty: 90 TABLET | Refills: 0 | Status: SHIPPED | OUTPATIENT
Start: 2021-10-14 | End: 2021-12-28

## 2021-10-14 NOTE — PROGRESS NOTES
Patient returned call.    Subjective measures:   Patient reports that he still is not dreaming.   He would like to try another pressure adjustment to see if this improves his sleep quality.       Assessment: Pt not meeting objective benchmarks for AHI Patient failing following subjective benchmarks: not feeling benefit from therapy    Action plan:pended order placed to provider for pressure change to 17/10      Total time spent counseling, coaching  and reviewing PAP therapy data with patient  4 minutes     [id number] (this call)    [id number] no (previous call)

## 2021-10-21 DIAGNOSIS — M79.7 FIBROMYALGIA: ICD-10-CM

## 2021-10-22 NOTE — TELEPHONE ENCOUNTER
Routing refill request to provider for review/approval because:  Drug not on the [facility] refill protocol   traMADol (ULTRAM) 50 MG tablet 40 tablet 0 10/14/2021  No   Sig: TAKE 2 TABLETS BY MOUTH THREE TIMES DAILY AS NEEDED FOR SEVERE PAIN   Sent to pharmacy as: traMADol HCl 50 MG Oral Tablet (ULTRAM)   Class: E-Prescribe   Order: [id number]   E-Prescribing Status: Receipt confirmed by pharmacy (10/14/2021  7:37 AM CDT)

## 2021-10-23 RX ORDER — TRAMADOL HYDROCHLORIDE 50 MG/1
TABLET ORAL
Qty: 40 TABLET | Refills: 0 | Status: SHIPPED | OUTPATIENT
Start: 2021-10-23 | End: 2021-11-06

## 2021-10-25 DIAGNOSIS — I10 BENIGN ESSENTIAL HYPERTENSION: ICD-10-CM

## 2021-10-26 ENCOUNTER — DOCUMENTATION ONLY (OUTPATIENT)
Dept: SLEEP MEDICINE | Facility: CLINIC | Age: 71
End: 2021-10-26

## 2021-10-26 NOTE — PROGRESS NOTES
STM recheck     Diagnostic AHI: 54.2    PSG    Subjective measures:   Patient reports that he is once  again dreaming the last few nights.       Assessment: Pt not meeting objective benchmarks for AHI (slightly elevated) Patient meeting subjective benchmarks.     Action plan: follow up per provider request      Device type: Bilevel         Mask type:  Nasal Pillows    Objective measures: 14 day rolling measures      Compliance  100 %      Leak  3.84  lpm  last  upload      AHI 6.85   last  upload      Average number of minutes 571      Objective measure goal  Compliance   Goal >70%  Leak   Goal < 24 lpm  AHI  Goal < 5  Usage  Goal >240        Total time spent on accessing and interpreting remote patient PAP therapy data  11 minutes    Total time spent counseling, coaching  and reviewing PAP therapy data with patient  4 minutes    [id number]  [id number]  no (3 day STM)

## 2021-10-28 ENCOUNTER — DOCUMENTATION ONLY (OUTPATIENT)
Dept: SLEEP MEDICINE | Facility: CLINIC | Age: 71
End: 2021-10-28

## 2021-10-28 NOTE — TELEPHONE ENCOUNTER
Routing refill request to provider for review/approval because:  Labs out of range:    Potassium   Date Value Ref Range Status   09/28/2021 3.2 (L) 3.4 - 5.3 mmol/L Final   03/23/2021 3.3 (L) 3.4 - 5.3 mmol/L Final

## 2021-10-28 NOTE — PROGRESS NOTES
STM recheck     Diagnostic AHI: 54.2    PSG    See previous STM note.            Device type: Bilevel         Mask type:  Nasal Pillows    Objective measures: 14 day rolling measures      Compliance  100 %      Leak  3.84  lpm  last  upload      AHI 6.72   last  Upload continues to decrease       Average number of minutes 552      Objective measure goal  Compliance   Goal >70%  Leak   Goal < 24 lpm  AHI  Goal < 5  Usage  Goal >240        Total time spent on accessing and interpreting remote patient PAP therapy data  10 minutes       [id number]

## 2021-10-29 RX ORDER — CHLORTHALIDONE 25 MG/1
TABLET ORAL
Qty: 90 TABLET | Refills: 0 | Status: SHIPPED | OUTPATIENT
Start: 2021-10-29 | End: 2022-01-19

## 2021-11-04 DIAGNOSIS — M79.7 FIBROMYALGIA: ICD-10-CM

## 2021-11-05 NOTE — TELEPHONE ENCOUNTER
Routing refill request to provider for review/approval because:  Drug not on the [facility] refill protocol   traMADol (ULTRAM) 50 MG tablet 40 tablet 0 10/23/2021  No   Sig: TAKE 2 TABLETS BY MOUTH THREE TIMES DAILY AS NEEDED FOR SEVERE PAIN   Sent to pharmacy as: traMADol HCl 50 MG Oral Tablet (ULTRAM)   Class: E-Prescribe   Order: [id number]   E-Prescribing Status: Receipt confirmed by pharmacy (10/23/2021  8:30 AM CDT)   LAST OV-9/28/21

## 2021-11-06 RX ORDER — TRAMADOL HYDROCHLORIDE 50 MG/1
TABLET ORAL
Qty: 40 TABLET | Refills: 0 | Status: SHIPPED | OUTPATIENT
Start: 2021-11-06 | End: 2021-11-16

## 2021-11-13 ENCOUNTER — MYC MEDICAL ADVICE (OUTPATIENT)
Dept: FAMILY MEDICINE | Facility: CLINIC | Age: 71
End: 2021-11-13
Payer: MEDICARE

## 2021-11-13 DIAGNOSIS — M79.7 FIBROMYALGIA: ICD-10-CM

## 2021-11-14 ASSESSMENT — SLEEP AND FATIGUE QUESTIONNAIRES
HOW LIKELY ARE YOU TO NOD OFF OR FALL ASLEEP WHILE SITTING AND TALKING TO SOMEONE: WOULD NEVER DOZE
HOW LIKELY ARE YOU TO NOD OFF OR FALL ASLEEP WHEN YOU ARE A PASSENGER IN A CAR FOR AN HOUR WITHOUT A BREAK: MODERATE CHANCE OF DOZING
HOW LIKELY ARE YOU TO NOD OFF OR FALL ASLEEP WHILE SITTING INACTIVE IN A PUBLIC PLACE: SLIGHT CHANCE OF DOZING
HOW LIKELY ARE YOU TO NOD OFF OR FALL ASLEEP WHILE WATCHING TV: SLIGHT CHANCE OF DOZING
HOW LIKELY ARE YOU TO NOD OFF OR FALL ASLEEP WHILE SITTING AND READING: SLIGHT CHANCE OF DOZING
HOW LIKELY ARE YOU TO NOD OFF OR FALL ASLEEP WHILE LYING DOWN TO REST IN THE AFTERNOON WHEN CIRCUMSTANCES PERMIT: MODERATE CHANCE OF DOZING
HOW LIKELY ARE YOU TO NOD OFF OR FALL ASLEEP WHILE SITTING QUIETLY AFTER LUNCH WITHOUT ALCOHOL: SLIGHT CHANCE OF DOZING
HOW LIKELY ARE YOU TO NOD OFF OR FALL ASLEEP IN A CAR, WHILE STOPPED FOR A FEW MINUTES IN TRAFFIC: WOULD NEVER DOZE

## 2021-11-14 NOTE — TELEPHONE ENCOUNTER
Routing refill request to provider for review/approval because:  Drug not on the [facility] refill protocol   traMADol (ULTRAM) 50 MG tablet 40 tablet 0 11/6/2021  No   Sig: TAKE 2 TABLETS BY MOUTH THREE TIMES DAILY AS NEEDED FOR SEVERE PAIN     LAST OV-9/28/2021

## 2021-11-16 RX ORDER — TRAMADOL HYDROCHLORIDE 50 MG/1
TABLET ORAL
Qty: 40 TABLET | Refills: 0 | Status: SHIPPED | OUTPATIENT
Start: 2021-11-16 | End: 2021-11-26

## 2021-11-19 ENCOUNTER — OFFICE VISIT (OUTPATIENT)
Dept: SLEEP MEDICINE | Facility: CLINIC | Age: 71
End: 2021-11-19
Payer: MEDICARE

## 2021-11-19 VITALS
HEART RATE: 66 BPM | OXYGEN SATURATION: 95 % | BODY MASS INDEX: 43.25 KG/M2 | HEIGHT: 69 IN | SYSTOLIC BLOOD PRESSURE: 135 MMHG | DIASTOLIC BLOOD PRESSURE: 72 MMHG | WEIGHT: 292 LBS

## 2021-11-19 DIAGNOSIS — G47.33 OSA (OBSTRUCTIVE SLEEP APNEA): Primary | ICD-10-CM

## 2021-11-19 PROCEDURE — 99213 OFFICE O/P EST LOW 20 MIN: CPT | Performed by: PHYSICIAN ASSISTANT

## 2021-11-19 ASSESSMENT — MIFFLIN-ST. JEOR: SCORE: 2074.88

## 2021-11-19 NOTE — NURSING NOTE
1 year appointment reminder card created and will be mailed when appropriate. DME orders have been automatically faxed to [facility].  Glendy Freire, CMA

## 2021-11-19 NOTE — PATIENT INSTRUCTIONS
Your BMI is Body mass index is 43.12 kg/m .  Weight management is a personal decision.  If you are interested in exploring weight loss strategies, the following discussion covers the approaches that may be successful. Body mass index (BMI) is one way to tell whether you are at a healthy weight, overweight, or obese. It measures your weight in relation to your height.  A BMI of 18.5 to 24.9 is in the healthy range. A person with a BMI of 25 to 29.9 is considered overweight, and someone with a BMI of 30 or greater is considered obese. More than two-thirds of American adults are considered overweight or obese.  Being overweight or obese increases the risk for further weight gain. Excess weight may lead to heart disease and diabetes.  Creating and following plans for healthy eating and physical activity may help you improve your health.  Weight control is part of healthy lifestyle and includes exercise, emotional health, and healthy eating habits. Careful eating habits lifelong are the mainstay of weight control. Though there are significant health benefits from weight loss, long-term weight loss with diet alone may be very difficult to achieve- studies show long-term success with dietary management in less than 10% of people. Attaining a healthy weight may be especially difficult to achieve in those with severe obesity. In some cases, medications, devices and surgical management might be considered.  What can you do?  If you are overweight or obese and are interested in methods for weight loss, you should discuss this with your provider.     Consider reducing daily calorie intake by 500 calories.     Keep a food journal.     Avoiding skipping meals, consider cutting portions instead.    Diet combined with exercise helps maintain muscle while optimizing fat loss. Strength training is particularly important for building and maintaining muscle mass. Exercise helps reduce stress, increase energy, and improves fitness.  Increasing exercise without diet control, however, may not burn enough calories to loose weight.       Start walking three days a week 10-20 minutes at a time    Work towards walking thirty minutes five days a week     Eventually, increase the speed of your walking for 1-2 minutes at time    In addition, we recommend that you review healthy lifestyles and methods for weight loss available through the National Institutes of Health patient information sites:  http://win.niddk.nih.gov/publications/index.htm    And look into health and wellness programs that may be available through your health insurance provider, employer, local community center, or leanna club.    Weight management plan: Patient was referred to their PCP to discuss a diet and exercise plan.

## 2021-11-19 NOTE — NURSING NOTE
"Chief Complaint   Patient presents with     CPAP Follow Up     Patient is worried about at night he would stop breathing lately than before with CPAP.       Initial /72   Pulse 66   Ht 1.753 m (5' 9\")   Wt 132.5 kg (292 lb)   SpO2 95%   BMI 43.12 kg/m   Estimated body mass index is 43.12 kg/m  as calculated from the following:    Height as of this encounter: 1.753 m (5' 9\").    Weight as of this encounter: 132.5 kg (292 lb).    Medication Reconciliation: complete    Jacklyn Salas CMA on 11/19/2021 at 12:04 PM    "

## 2021-11-22 DIAGNOSIS — M79.7 FIBROMYALGIA: ICD-10-CM

## 2021-11-22 DIAGNOSIS — I10 HYPERTENSION GOAL BP (BLOOD PRESSURE) < 140/90: ICD-10-CM

## 2021-11-24 RX ORDER — HYDRALAZINE HYDROCHLORIDE 25 MG/1
TABLET, FILM COATED ORAL
Qty: 180 TABLET | Refills: 0 | Status: SHIPPED | OUTPATIENT
Start: 2021-11-24 | End: 2022-02-25

## 2021-11-24 NOTE — TELEPHONE ENCOUNTER
Routing refill request to provider for review/approval because:  Drug not on the [facility] refill protocol     Requested Prescriptions   Pending Prescriptions Disp Refills     traMADol (ULTRAM) 50 MG tablet [Pharmacy Med Name: traMADol HCl 50 MG Oral Tablet] 40 tablet 0     Sig: TAKE 2 TABLETS BY MOUTH THREE TIMES DAILY AS NEEDED FOR SEVERE PAIN       There is no refill protocol information for this order

## 2021-11-26 RX ORDER — TRAMADOL HYDROCHLORIDE 50 MG/1
TABLET ORAL
Qty: 40 TABLET | Refills: 0 | Status: SHIPPED | OUTPATIENT
Start: 2021-11-26 | End: 2021-12-03

## 2021-11-30 DIAGNOSIS — I10 BENIGN ESSENTIAL HYPERTENSION: ICD-10-CM

## 2021-12-01 ENCOUNTER — MYC MEDICAL ADVICE (OUTPATIENT)
Dept: FAMILY MEDICINE | Facility: CLINIC | Age: 71
End: 2021-12-01
Payer: MEDICARE

## 2021-12-01 DIAGNOSIS — M79.7 FIBROMYALGIA: ICD-10-CM

## 2021-12-01 NOTE — TELEPHONE ENCOUNTER
Called patient and assisted with setting up an appointment later this week and relayed providers message below. He verbalized understanding.     Yesica Roche RN

## 2021-12-01 NOTE — TELEPHONE ENCOUNTER
Help bill to schedule a virtual visit to discuss his fibromyalgia management. I suspect the lump on his rist is a benign ganglion cyst.

## 2021-12-01 NOTE — TELEPHONE ENCOUNTER
I spoke to Bill and let him know that I received his forms yesterday and that I completed, faxed and abstracted them. He appreciated the call.    Mc Suarez PA-C, RAZA (Ortho)  Supervising Physician: Damon Rosas M.D., M.S.  Dept. of Orthopaedic Surgery  [facility]    
Patient LVM requesting a call back to confirm that his short term disability forms have been received. Or he said to call to answer the questions that are on the form  ( he has a copy?)     Please clal # [phone]  
Patient calling. He had surgery yesterday. Vicki faxed the form to [place]. Please complete and return.   
Negative Screen

## 2021-12-02 NOTE — TELEPHONE ENCOUNTER
Routing refill request to provider for review/approval because:  Drug not on the [facility] refill protocol   traMADol (ULTRAM) 50 MG tablet 40 tablet 0 11/26/2021  No   Sig: TAKE 2 TABLETS BY MOUTH THREE TIMES DAILY AS NEEDED FOR SEVERE PAIN

## 2021-12-02 NOTE — PROGRESS NOTES
Demian is a 70 year old who is being evaluated via a billable telephone visit.      What phone number would you like to be contacted at? [phone]  How would you like to obtain your AVS? Declined        Subjective   Demian is a 70 year old who presents for the following health issues     HPI     Medication Followup of Tramadol    Taking Medication as prescribed: yes    Side Effects:  None    Medication Helping Symptoms:  NO     Bilateral upper arm pain. No neck pain. No paresthesia. No pain with movement involving his shoulders. Achy pain , even at rest. No chest . No rash or fevers.    Review of Systems   Constitutional, HEENT, cardiovascular, pulmonary, GI, , musculoskeletal, neuro, skin, endocrine and psych systems are negative, except as otherwise noted.      Objective             Physical Exam   healthy, alert and no distress  PSYCH: Alert and oriented times 3; coherent speech, normal   rate and volume, able to articulate logical thoughts, able   to abstract reason, no tangential thoughts, no hallucinations   or delusions  His affect is normal  RESP: No cough, no audible wheezing, able to talk in full sentences  Remainder of exam unable to be completed due to telephone visits    Prashant was seen today for recheck medication.    Diagnoses and all orders for this visit:    Fibromyalgia    Screening for hyperlipidemia  -     Lipid panel reflex to direct LDL Fasting; Future    Bilateral arm pain      Continue tramadol.  work on lifestyle modification  Advised supportive and symptomatic treatment.  Follow up with Provider - if condition persists or worsens.           Phone call duration: 10 minutes

## 2021-12-03 ENCOUNTER — VIRTUAL VISIT (OUTPATIENT)
Dept: FAMILY MEDICINE | Facility: CLINIC | Age: 71
End: 2021-12-03
Payer: MEDICARE

## 2021-12-03 DIAGNOSIS — M79.7 FIBROMYALGIA: Primary | ICD-10-CM

## 2021-12-03 DIAGNOSIS — M79.602 BILATERAL ARM PAIN: ICD-10-CM

## 2021-12-03 DIAGNOSIS — M79.601 BILATERAL ARM PAIN: ICD-10-CM

## 2021-12-03 DIAGNOSIS — Z13.220 SCREENING FOR HYPERLIPIDEMIA: ICD-10-CM

## 2021-12-03 PROCEDURE — 99441 PR PHYSICIAN TELEPHONE EVALUATION 5-10 MIN: CPT | Mod: 95 | Performed by: PHYSICIAN ASSISTANT

## 2021-12-03 RX ORDER — LOSARTAN POTASSIUM 100 MG/1
TABLET ORAL
Qty: 90 TABLET | Refills: 0 | Status: SHIPPED | OUTPATIENT
Start: 2021-12-03 | End: 2022-02-26

## 2021-12-03 RX ORDER — TRAMADOL HYDROCHLORIDE 50 MG/1
TABLET ORAL
Qty: 40 TABLET | Refills: 0 | Status: SHIPPED | OUTPATIENT
Start: 2021-12-03 | End: 2021-12-09

## 2021-12-09 ENCOUNTER — MYC REFILL (OUTPATIENT)
Dept: FAMILY MEDICINE | Facility: CLINIC | Age: 71
End: 2021-12-09
Payer: MEDICARE

## 2021-12-09 DIAGNOSIS — M79.7 FIBROMYALGIA: ICD-10-CM

## 2021-12-09 DIAGNOSIS — I10 BENIGN ESSENTIAL HYPERTENSION: ICD-10-CM

## 2021-12-10 RX ORDER — TRAMADOL HYDROCHLORIDE 50 MG/1
100 TABLET ORAL
Qty: 40 TABLET | Refills: 0 | Status: SHIPPED | OUTPATIENT
Start: 2021-12-10 | End: 2021-12-20

## 2021-12-12 RX ORDER — FUROSEMIDE 20 MG
TABLET ORAL
Qty: 180 TABLET | Refills: 0 | Status: SHIPPED | OUTPATIENT
Start: 2021-12-12 | End: 2022-03-13

## 2021-12-16 ENCOUNTER — MYC REFILL (OUTPATIENT)
Dept: FAMILY MEDICINE | Facility: CLINIC | Age: 71
End: 2021-12-16
Payer: MEDICARE

## 2021-12-16 DIAGNOSIS — M79.7 FIBROMYALGIA: ICD-10-CM

## 2021-12-17 DIAGNOSIS — M79.7 FIBROMYALGIA: ICD-10-CM

## 2021-12-17 NOTE — TELEPHONE ENCOUNTER
Routing refill request to provider for review/approval because:  Drug not on the [facility] refill protocol     Last Written Prescription Date:  12-10-21  Last Fill Quantity: 40,  # refills: 0   Last office visit: 9/28/2021 virtual 12-3-21 with prescribing provider:  Matt Swan   Future Office Visit:   Next 5 appointments (look out 90 days)    Jan 12, 2022  9:00 AM  Return Visit with Bisi Temple MD  [facility] [place] ([facility] [place] ) [address] [address] [phone]

## 2021-12-20 DIAGNOSIS — G40.A09 NONINTRACTABLE ABSENCE EPILEPSY WITHOUT STATUS EPILEPTICUS (H): ICD-10-CM

## 2021-12-20 DIAGNOSIS — R56.9 CONVULSIONS, UNSPECIFIED CONVULSION TYPE (H): Chronic | ICD-10-CM

## 2021-12-20 RX ORDER — LEVETIRACETAM 1000 MG/1
TABLET ORAL
Qty: 180 TABLET | Refills: 0 | Status: SHIPPED | OUTPATIENT
Start: 2021-12-20 | End: 2022-01-12

## 2021-12-20 RX ORDER — TRAMADOL HYDROCHLORIDE 50 MG/1
TABLET ORAL
Qty: 40 TABLET | Refills: 0 | Status: SHIPPED | OUTPATIENT
Start: 2021-12-20 | End: 2022-03-12

## 2021-12-20 RX ORDER — TRAMADOL HYDROCHLORIDE 50 MG/1
100 TABLET ORAL
Qty: 40 TABLET | Refills: 0 | Status: SHIPPED | OUTPATIENT
Start: 2021-12-20 | End: 2021-12-28

## 2021-12-20 NOTE — TELEPHONE ENCOUNTER
Requested Prescriptions   Pending Prescriptions Disp Refills     levETIRAcetam (KEPPRA) 1000 MG tablet 180 tablet 2     Sig: TAKE 1 TABLET BY MOUTH TWICE DAILY IN THE MORNING AND AT BEDTIME       There is no refill protocol information for this order        Last office visit: 3/23/2021 with prescribing provider:  Dr. Temple    Future Office Visit:   Next 5 appointments (look out 90 days)    Jan 12, 2022  9:00 AM  Return Visit with Bisi Temple MD  [facility] [place] ([facility] [place] ) [address] [address]  [phone]               [facility]  Specialty Clinic CSS

## 2021-12-26 ENCOUNTER — MYC MEDICAL ADVICE (OUTPATIENT)
Dept: FAMILY MEDICINE | Facility: CLINIC | Age: 71
End: 2021-12-26
Payer: MEDICARE

## 2021-12-26 ENCOUNTER — MYC MEDICAL ADVICE (OUTPATIENT)
Dept: NEUROLOGY | Facility: CLINIC | Age: 71
End: 2021-12-26
Payer: MEDICARE

## 2021-12-27 NOTE — TELEPHONE ENCOUNTER
Received fax from:  [facility]  Ph:  [phone]    traMADol (ULTRAM) 50 MG tablet    Pharmacy comments:  PATIENT REQUESTS NEW RX:  PATIENT IS NEW TO [facility] AND CANNOT TRANSFER NEW MEDS AT [facility] DUE TO STATE LAW. PLEASE SEND NEW RX.  THANK YOU!    Karo Akhtar  Specialty Clinic CSS

## 2021-12-27 NOTE — TELEPHONE ENCOUNTER
Suzan Jackson RN,BSN  Triage Nurse  [facility]: [facility]  Ph: [phone]

## 2021-12-28 ENCOUNTER — MYC REFILL (OUTPATIENT)
Dept: FAMILY MEDICINE | Facility: CLINIC | Age: 71
End: 2021-12-28
Payer: MEDICARE

## 2021-12-28 DIAGNOSIS — I10 BENIGN ESSENTIAL HYPERTENSION: ICD-10-CM

## 2021-12-28 DIAGNOSIS — M79.7 FIBROMYALGIA: ICD-10-CM

## 2021-12-28 DIAGNOSIS — N32.81 OVERACTIVE BLADDER: ICD-10-CM

## 2021-12-28 DIAGNOSIS — M10.071 ACUTE IDIOPATHIC GOUT OF RIGHT ANKLE: Chronic | ICD-10-CM

## 2021-12-28 DIAGNOSIS — E87.6 HYPOKALEMIA: ICD-10-CM

## 2021-12-28 DIAGNOSIS — E78.00 PURE HYPERCHOLESTEROLEMIA: ICD-10-CM

## 2021-12-28 RX ORDER — OXYBUTYNIN CHLORIDE 5 MG/1
5 TABLET ORAL 2 TIMES DAILY
Qty: 180 TABLET | Refills: 0 | Status: CANCELLED | OUTPATIENT
Start: 2021-12-28

## 2021-12-28 RX ORDER — OXYBUTYNIN CHLORIDE 5 MG/1
5 TABLET ORAL 2 TIMES DAILY
Qty: 180 TABLET | Refills: 0 | Status: SHIPPED | OUTPATIENT
Start: 2021-12-28 | End: 2022-02-26

## 2021-12-28 RX ORDER — POTASSIUM CHLORIDE 1500 MG/1
TABLET, EXTENDED RELEASE ORAL
Qty: 270 TABLET | Refills: 0 | Status: CANCELLED | OUTPATIENT
Start: 2021-12-28

## 2021-12-28 NOTE — TELEPHONE ENCOUNTER
"Requested Prescriptions   Pending Prescriptions Disp Refills     potassium chloride ER (KLOR-CON M) 20 MEQ CR tablet 270 tablet 0     Sig: TAKE 1  BY MOUTH THREE TIMES DAILY       Potassium Supplements Protocol Failed - 12/28/2021  7:56 AM        Failed - Normal serum potassium in past 12 months     Recent Labs   Lab Test 09/28/21  1049   POTASSIUM 3.2*                    Passed - Recent (12 mo) or future (30 days) visit within the authorizing provider's department     Patient has had an office visit with the authorizing provider or a provider within the authorizing providers department within the previous 12 mos or has a future within next 30 days. See \"Patient Info\" tab in inbasket, or \"Choose Columns\" in Meds & Orders section of the refill encounter.              Passed - Medication is active on med list        Passed - Patient is age 18 or older           Routing refill request to provider for review/approval because:  Labs out of range:  Potassium    Yolis Montgmoery RN  [facility]            "

## 2021-12-28 NOTE — TELEPHONE ENCOUNTER
Duplicate request for potassium, already routed to provider.    Yolis Montgomery RN  [facility]

## 2021-12-28 NOTE — TELEPHONE ENCOUNTER
See response from patient in other encounter, copied below:    Yes I picked up the Tramadol from [facility] at its full price.  They are no longer a provider for .  I only get enough Tramadol for 5 or 6 days at a time.  The reason for all the refill requests was because the new Pharmacy ([facility]) needs them to complete their records....they did not transfer as there were no refills left on them.      So, per his report, 40 tabs tramadol is only a 5 or 6 day supply.    Routing refill request to provider for review/approval because:  Drug not on the [facility] refill protocol     Yolsi Montgomery RN  [facility]

## 2021-12-28 NOTE — TELEPHONE ENCOUNTER
"Requested Prescriptions   Pending Prescriptions Disp Refills     oxybutynin (DITROPAN) 5 MG tablet 180 tablet 0     Sig: Take 1 tablet (5 mg) by mouth 2 times daily       Muscarinic Antagonists (Urinary Incontinence Agents) Passed - 12/28/2021  7:59 AM        Passed - Recent (12 mo) or future (30 days) visit within the authorizing provider's specialty     Patient has had an office visit with the authorizing provider or a provider within the authorizing providers department within the previous 12 mos or has a future within next 30 days. See \"Patient Info\" tab in inbasket, or \"Choose Columns\" in Meds & Orders section of the refill encounter.              Passed - Medication is Oxybutynin and patient is 5 years of age or older        Passed - Patient does not have a diagnosis of glaucoma on the problem list     If glaucoma diagnosis is new, refer refill to physician.          Passed - Medication is active on med list        Passed - Patient is 18 years of age or older           Prescription approved per [facility] Refill Protocol.  Yolis Montgomery RN  [facility]      "

## 2021-12-28 NOTE — TELEPHONE ENCOUNTER
"Requested Prescriptions   Pending Prescriptions Disp Refills     allopurinol (ZYLOPRIM) 100 MG tablet 180 tablet 0     Sig: Take 2 tablets by mouth once daily       Gout Agents Protocol Failed - 12/28/2021  7:56 AM        Failed - Has Uric Acid on file in past 12 months and value is less than 6     Recent Labs   Lab Test 07/19/16  1159   URIC 9.0*     If level is 6mg/dL or greater, ok to refill one time and refer to provider.           Passed - CBC on file in past 12 months     Recent Labs   Lab Test 09/28/21  1049   WBC 9.1   RBC 3.63*   HGB 11.9*   HCT 36.4*                    Passed - ALT on file in past 12 months     Recent Labs   Lab Test 09/28/21  1049   ALT 18             Passed - Recent (12 mo) or future (30 days) visit within the authorizing provider's specialty     Patient has had an office visit with the authorizing provider or a provider within the authorizing providers department within the previous 12 mos or has a future within next 30 days. See \"Patient Info\" tab in inbasket, or \"Choose Columns\" in Meds & Orders section of the refill encounter.              Passed - Medication is active on med list        Passed - Patient is age 18 or older        Passed - Normal serum creatinine on file in the past 12 months     Recent Labs   Lab Test 09/28/21  1049   CR 1.02       Ok to refill medication if creatinine is low             Routing refill request to provider for review/approval because:  Labs out of range:  Uric acid  Labs not current:  Uric acid    Yolis Montgomery RN  [facility]            "

## 2021-12-28 NOTE — TELEPHONE ENCOUNTER
"Requested Prescriptions   Pending Prescriptions Disp Refills     simvastatin (ZOCOR) 20 MG tablet 180 tablet 0       Statins Protocol Failed - 12/28/2021  8:02 AM        Failed - LDL on file in past 12 months     Recent Labs   Lab Test 07/03/20  0802   LDL 69             Passed - No abnormal creatine kinase in past 12 months     Recent Labs   Lab Test 02/08/21  1432   CKT 93                Passed - Recent (12 mo) or future (30 days) visit within the authorizing provider's specialty     Patient has had an office visit with the authorizing provider or a provider within the authorizing providers department within the previous 12 mos or has a future within next 30 days. See \"Patient Info\" tab in inbasket, or \"Choose Columns\" in Meds & Orders section of the refill encounter.              Passed - Medication is active on med list        Passed - Patient is age 18 or older           Routing refill request to provider for review/approval because:  Allergy alert, FLP not current, no plan noted at 12/3/21 virtual visit    Yolis Montgomery RN  [facility]            "

## 2021-12-28 NOTE — TELEPHONE ENCOUNTER
Duplicate request.    Just refilled per protocol.    Yolis Montgomery RN  [facility]

## 2021-12-28 NOTE — TELEPHONE ENCOUNTER
"Requested Prescriptions   Pending Prescriptions Disp Refills     carvedilol (COREG) 12.5 MG tablet 180 tablet 0     Sig: TAKE 1 TABLET BY MOUTH TWICE DAILY WITH MEALS       Beta-Blockers Protocol Passed - 12/28/2021  7:57 AM        Passed - Blood pressure under 140/90 in past 12 months     BP Readings from Last 3 Encounters:   11/19/21 135/72   09/28/21 128/74   08/30/21 130/78                 Passed - Patient is age 6 or older        Passed - Recent (12 mo) or future (30 days) visit within the authorizing provider's specialty     Patient has had an office visit with the authorizing provider or a provider within the authorizing providers department within the previous 12 mos or has a future within next 30 days. See \"Patient Info\" tab in inbasket, or \"Choose Columns\" in Meds & Orders section of the refill encounter.              Passed - Medication is active on med list           Routing refill request to provider for review/approval because:  Drug interaction warning        Yolis Montgomery RN  [facility]      "

## 2021-12-28 NOTE — TELEPHONE ENCOUNTER
40 tabs refill tramadol appears was sent to [facility] (twice) on 12/22/21.    Routed response to patient, did he pick that one up?    Yolis Montgomery RN  [facility]

## 2021-12-29 RX ORDER — TRAMADOL HYDROCHLORIDE 50 MG/1
100 TABLET ORAL
Qty: 40 TABLET | Refills: 0 | Status: SHIPPED | OUTPATIENT
Start: 2021-12-29 | End: 2022-01-04

## 2021-12-29 RX ORDER — SIMVASTATIN 20 MG
TABLET ORAL
Qty: 180 TABLET | Refills: 0 | Status: SHIPPED | OUTPATIENT
Start: 2021-12-29 | End: 2022-03-18

## 2021-12-29 RX ORDER — ALLOPURINOL 100 MG/1
TABLET ORAL
Qty: 180 TABLET | Refills: 0 | Status: SHIPPED | OUTPATIENT
Start: 2021-12-29 | End: 2022-03-18

## 2021-12-29 RX ORDER — POTASSIUM CHLORIDE 1500 MG/1
TABLET, EXTENDED RELEASE ORAL
Qty: 270 TABLET | Refills: 0 | Status: SHIPPED | OUTPATIENT
Start: 2021-12-29 | End: 2022-03-18

## 2021-12-29 RX ORDER — CARVEDILOL 12.5 MG/1
TABLET ORAL
Qty: 180 TABLET | Refills: 0 | Status: SHIPPED | OUTPATIENT
Start: 2021-12-29 | End: 2022-03-18

## 2021-12-29 NOTE — TELEPHONE ENCOUNTER
Routing refill request to provider for review/approval because:  Drug not on the [facility] refill protocol   Drug interaction warning with amlodipine and simvastatin.  Serenity Sher RN  [facility]

## 2021-12-30 RX ORDER — GABAPENTIN 300 MG/1
600 CAPSULE ORAL 3 TIMES DAILY
Qty: 180 CAPSULE | Refills: 5 | Status: SHIPPED | OUTPATIENT
Start: 2021-12-30 | End: 2022-07-26

## 2021-12-30 RX ORDER — AMLODIPINE BESYLATE 10 MG/1
TABLET ORAL
Qty: 90 TABLET | Refills: 0 | Status: SHIPPED | OUTPATIENT
Start: 2021-12-30 | End: 2022-03-18

## 2022-01-04 DIAGNOSIS — M79.7 FIBROMYALGIA: ICD-10-CM

## 2022-01-04 NOTE — TELEPHONE ENCOUNTER
Requested Prescriptions   Pending Prescriptions Disp Refills     traMADol (ULTRAM) 50 MG tablet [Pharmacy Med Name: TRAMADOL HCL 50 MG TABLET] 40 tablet 0     Sig: TAKE 2 TABLETS BY MOUTH 3 TIMES DAILY AS NEEDED FOR SEVERE PAIN       There is no refill protocol information for this order        Last Written Prescription Date:  12/29/21  Last Fill Quantity: 40,  # refills: 0   Last office visit: 12/3/21 with prescribing provider  Future Office Visit:   Next 5 appointments (look out 90 days)    Jan 12, 2022  9:00 AM  Return Visit with Bisi Temple MD  [facility] [place] ([facility] [place] ) [address] [address] [phone]           Routing refill request to provider for review/approval because:  Drug not on the [facility] refill protocol

## 2022-01-05 RX ORDER — TRAMADOL HYDROCHLORIDE 50 MG/1
TABLET ORAL
Qty: 40 TABLET | Refills: 0 | Status: SHIPPED | OUTPATIENT
Start: 2022-01-05 | End: 2022-01-12

## 2022-01-11 NOTE — PROGRESS NOTES
ESTABLISHED PATIENT NEUROLOGY NOTE    DATE OF VISIT: 1/12/2022  MRN: [id number]  PATIENT NAME: Prashant Keyes  YOB: 1950    Chief Complaint   Patient presents with     RECHECK     SUBJECTIVE:                                                      HISTORY OF PRESENT ILLNESS:  Prashant is here for follow up regarding localization-related epilepsy and parkinsonian tremor. Demian's seizures have been described as episodes of Left face and arm weakness with amnesia. He also has a remote history GTCs. His seizures have been well-controlled for some time on Keppra and Depakote.    AED regimen:  Depakote 500mg twice daily.  Keppra 1000mg twice daily.    He is on carbidopa/levodopa for tremor management.  Current dosing is: 25/100mg, 2 tablets 3 times daily. We did try to increase his dose but he had some problems with dry mouth.  At his previous visit he was also concerned about some left-sided sensory changes so I ordered a brain MRI but it does not appear that this has been completed.  He did have blood work completed and this showed a therapeutic Keppra level (38) but his valproic acid level was below the therapeutic range (32; tends to run low).  Because he had been doing well from a seizure standpoint we decided not to make any changes in the doses.  Additional findings of anemia (not new) and mildly low potassium on blood work, ammonia level was normal.    Demian tells me that he had some facial twitching since his last minute, otherwise no seizure activity. No loss of consciousness. He denies side effects on the Depakote and Keppra.    He feels like his Left side sometimes does not catch up with the right. He does not feel as steady as he used to be. He has had a recent fall backwards in a chair. He complains of lightheadedness upon standing. He has some knee pain, and this wakes him at night. He did have Left knee surgery. He thinks he probably need surgery on the right and plans to talk to his primary  provider about this.    He clarifies that he did not have the brain MRI done. He is willing to do this as long as it is an open MRI.    CURRENT MEDICATIONS:   allopurinol (ZYLOPRIM) 100 MG tablet, Take 2 tablets by mouth once daily  amLODIPine (NORVASC) 10 MG tablet, TAKE 1 TABLET BY MOUTH ONCE DAILY WITH SUPPER  aspirin 81 MG tablet, Take 1 tablet (81 mg) by mouth daily  carvedilol (COREG) 12.5 MG tablet, TAKE 1 TABLET BY MOUTH TWICE DAILY WITH MEALS  chlorthalidone (HYGROTON) 25 MG tablet, Take 1 tablet by mouth once daily  Cholecalciferol (VITAMIN D) 2000 UNITS tablet, Take 2,000 Units by mouth daily  colchicine (COLCYRS) 0.6 MG tablet, Take 1 tablet (0.6 mg) by mouth daily as needed Take 2 tabs on first day.  Take at first sign of gout flair.  Take for max of 1 week per flair  Cyanocobalamin (VITAMIN  B-12) 2500 MCG tablet, Place 2,500 mcg under the tongue daily  ferrous sulfate (IRON) 325 (65 FE) MG tablet, Take 1 tablet (325 mg) by mouth 2 times daily  FLUoxetine (PROZAC) 40 MG capsule, TAKE 1 CAPSULE BY MOUTH ONCE DAILY  furosemide (LASIX) 20 MG tablet, Take 2 tablets by mouth once daily  gabapentin (NEURONTIN) 300 MG capsule, Take 2 capsules (600 mg) by mouth 3 times daily  hydrALAZINE (APRESOLINE) 25 MG tablet, Take 1 tablet by mouth twice daily  losartan (COZAAR) 100 MG tablet, Take 1 tablet by mouth once daily  Multiple Vitamin (MULTI VITAMIN MENS PO), Take  by mouth.  omeprazole (PRILOSEC) 40 MG DR capsule, Take 1 capsule by mouth once daily  ORDER FOR DME, CPAP daily  oxybutynin (DITROPAN) 5 MG tablet, Take 1 tablet (5 mg) by mouth 2 times daily  potassium chloride ER (KLOR-CON M) 20 MEQ CR tablet, TAKE 1  BY MOUTH THREE TIMES DAILY  simvastatin (ZOCOR) 20 MG tablet, TAKE 2 TABLETS BY MOUTH IN THE EVENING AT BEDTIME  traMADol (ULTRAM) 50 MG tablet, TAKE 2 TABLETS BY MOUTH 3 TIMES DAILY AS NEEDED FOR SEVERE PAIN  traMADol (ULTRAM) 50 MG tablet, TAKE 2 TABLETS BY MOUTH THREE TIMES DAILY AS NEEDED FOR  "SEVERE PAIN  traZODone (DESYREL) 100 MG tablet, Take 100 mg by mouth At Bedtime  triamcinolone (KENALOG) 0.1 % external lotion, Apply topically 3 times daily  UNABLE TO FIND, daily MEDICATION NAME: sawpalmetto - 2 capsules once a day    No current facility-administered medications on file prior to visit.      RECENT DIAGNOSTIC STUDIES:   Labs: No results found for any visits on 22.      REVIEW OF SYSTEMS:                                                      10-point review of systems is negative except as mentioned above in HPI.    EXAM:                                                      Physical Exam:   Vitals: BP (!) 141/71 (BP Location: Left arm, Patient Position: Sitting, Cuff Size: Adult Large)   Pulse 61   Temp 97.2  F (36.2  C) (Tympanic)   Ht 1.753 m (5' 9\")   Wt 132.5 kg (292 lb)   BMI 43.12 kg/m    BMI= Body mass index is 43.12 kg/m .  GENERAL: NAD.  HEENT: NC/AT.  CV: RRR. S1, S2.   NECK: No bruits.  PULM: Non-labored breathing.   Neurologic:  MENTAL STATUS: Alert, attentive. Speech is fluent. Normal comprehension. Normal concentration. Adequate fund of knowledge. Mini-Co/5 (missed 1 hand on the clock).  CRANIAL NERVES: Visual fields intact to confrontation. Pupils equally, round and reactive to light. Facial sensation and movement normal. EOM full. 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 Hearing intact to conversations with hearing aids in place. Trapezius strength intact. Palate moves symmetrically. Tongue midline.  MOTOR: Strength is 5/5 in proximal and distal muscle groups of upper and lower extremities. Tone and bulk normal.   DTRs: Intact and symmetric in UEs. Right patella 1-, cannot elicit Left. Unable to elicit ankle jerks.   SENSATION: Normal light touch throughout.   COORDINATION: Normal finger nose finger. Normal hand opening/closing speed and amplitude.   STATION AND GAIT: Romberg positive. Good postural reflexes. Gait appears antalgic, decreased arm swing.   TREMOR:  Minimal postural and " action tremor on exam. No resting tremor today.  Right hand-dominant.      ASSESSMENT and PLAN:                                                      Assessment:    ICD-10-CM    1. Parkinsonian tremor (H)  G20 MR Brain w/o & w Contrast   2. Encounter for long-term (current) use of medications  Z79.899 Keppra (Levetiracetam) Level     Valproic Acid level     Valproic Acid Free     Ammonia   3. Balance problems  R26.89 Vitamin B12     MR Brain w/o & w Contrast     Physical Therapy Referral   4. Seizure disorder (H)  G40.909 Keppra (Levetiracetam) Level     Valproic Acid level     Valproic Acid Free     Ammonia   5. Memory problem  R41.3 Vitamin B12     Methylmalonic Acid     Folate     TSH with free T4 reflex   6. Parkinsonism, unspecified Parkinsonism type (H)  G20 carbidopa-levodopa (SINEMET)  MG tablet   7. Convulsions, unspecified convulsion type (H)  R56.9 divalproex sodium delayed-release (DEPAKOTE) 500 MG DR tablet     levETIRAcetam (KEPPRA) 1000 MG tablet   8. 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 Keyes is here for follow-up regarding parkinsonism and epilepsy. Seizures seem to be under adequate control, though he does mention one episode of facial twitching. No alteration of awareness. We will check medication levels today. He has additional concerns about worsening balance, which is likely multifactorial. Because he is also lightheaded, I think we should try decreasing his Sinemet dose. We will start with labs for memory. He did well on the mini cog today. I would also like to get the brain MRI completed that was discussed previously.    Demian and I also talked about the potential benefits of physical therapy for his balance. He will think about this. I would like to see Demian back in clinic in about 6 months. He understands and agrees with the  plan    Plan:  -- Continue current doses of Keppra and Depakote.  -- Let's try decreasing the carbidopa/levodopa to 1-1/2 tablets 3 times daily. I am hoping this change will help with your dry mouth and lightheadedness.  -- Labs for medication monitoring and memory.  -- Brain MRI. Open study requested.  -- We will notify you of the test results.  -- Consider physical therapy for balance. I have placed the referral.  -- Follow-up in neurology clinic in 6 months. Please let us know if any concerns arise in the meantime.    Total Time: 30 minutes were spent with the patient and in chart review/documentation (as described above in Assessment and Plan)/coordinating the care on date of service.    Bisi Temple MD  Neurology    Dragon software used in the dictation of this note.

## 2022-01-12 ENCOUNTER — OFFICE VISIT (OUTPATIENT)
Dept: NEUROLOGY | Facility: CLINIC | Age: 72
End: 2022-01-12
Payer: MEDICARE

## 2022-01-12 VITALS
HEART RATE: 61 BPM | HEIGHT: 69 IN | DIASTOLIC BLOOD PRESSURE: 71 MMHG | BODY MASS INDEX: 43.25 KG/M2 | TEMPERATURE: 97.2 F | SYSTOLIC BLOOD PRESSURE: 141 MMHG | WEIGHT: 292 LBS

## 2022-01-12 DIAGNOSIS — R56.9 CONVULSIONS, UNSPECIFIED CONVULSION TYPE (H): Chronic | ICD-10-CM

## 2022-01-12 DIAGNOSIS — R41.3 MEMORY PROBLEM: ICD-10-CM

## 2022-01-12 DIAGNOSIS — Z79.899 ENCOUNTER FOR LONG-TERM (CURRENT) USE OF MEDICATIONS: ICD-10-CM

## 2022-01-12 DIAGNOSIS — G40.909 SEIZURE DISORDER (H): ICD-10-CM

## 2022-01-12 DIAGNOSIS — G40.A09 NONINTRACTABLE ABSENCE EPILEPSY WITHOUT STATUS EPILEPTICUS (H): ICD-10-CM

## 2022-01-12 DIAGNOSIS — G20.C PARKINSONIAN TREMOR (H): Primary | ICD-10-CM

## 2022-01-12 DIAGNOSIS — G20.C PARKINSONISM, UNSPECIFIED PARKINSONISM TYPE (H): ICD-10-CM

## 2022-01-12 DIAGNOSIS — R26.89 BALANCE PROBLEMS: ICD-10-CM

## 2022-01-12 DIAGNOSIS — M79.7 FIBROMYALGIA: ICD-10-CM

## 2022-01-12 LAB
AMMONIA PLAS-SCNC: 45 UMOL/L (ref 10–50)
FOLATE SERPL-MCNC: 28.9 NG/ML
TSH SERPL DL<=0.005 MIU/L-ACNC: 0.74 MU/L (ref 0.4–4)
VALPROATE SERPL-MCNC: 48 MG/L
VIT B12 SERPL-MCNC: 2191 PG/ML (ref 193–986)

## 2022-01-12 PROCEDURE — 80164 ASSAY DIPROPYLACETIC ACD TOT: CPT | Performed by: PSYCHIATRY & NEUROLOGY

## 2022-01-12 PROCEDURE — 82140 ASSAY OF AMMONIA: CPT | Performed by: PSYCHIATRY & NEUROLOGY

## 2022-01-12 PROCEDURE — 80165 DIPROPYLACETIC ACID FREE: CPT | Mod: 90 | Performed by: PSYCHIATRY & NEUROLOGY

## 2022-01-12 PROCEDURE — 82746 ASSAY OF FOLIC ACID SERUM: CPT | Performed by: PSYCHIATRY & NEUROLOGY

## 2022-01-12 PROCEDURE — 84443 ASSAY THYROID STIM HORMONE: CPT | Performed by: PSYCHIATRY & NEUROLOGY

## 2022-01-12 PROCEDURE — 82607 VITAMIN B-12: CPT | Performed by: PSYCHIATRY & NEUROLOGY

## 2022-01-12 PROCEDURE — 80177 DRUG SCRN QUAN LEVETIRACETAM: CPT | Mod: 90 | Performed by: PSYCHIATRY & NEUROLOGY

## 2022-01-12 PROCEDURE — 83921 ORGANIC ACID SINGLE QUANT: CPT | Performed by: PSYCHIATRY & NEUROLOGY

## 2022-01-12 PROCEDURE — 99214 OFFICE O/P EST MOD 30 MIN: CPT | Performed by: PSYCHIATRY & NEUROLOGY

## 2022-01-12 PROCEDURE — 36415 COLL VENOUS BLD VENIPUNCTURE: CPT | Performed by: PSYCHIATRY & NEUROLOGY

## 2022-01-12 RX ORDER — LEVETIRACETAM 1000 MG/1
TABLET ORAL
Qty: 180 TABLET | Refills: 2 | Status: SHIPPED | OUTPATIENT
Start: 2022-01-12 | End: 2022-01-19

## 2022-01-12 RX ORDER — CARBIDOPA AND LEVODOPA 25; 100 MG/1; MG/1
1.5 TABLET ORAL 3 TIMES DAILY
Qty: 405 TABLET | Refills: 2 | Status: SHIPPED | OUTPATIENT
Start: 2022-01-12 | End: 2022-09-12

## 2022-01-12 RX ORDER — DIVALPROEX SODIUM 500 MG/1
500 TABLET, DELAYED RELEASE ORAL 2 TIMES DAILY
Qty: 180 TABLET | Refills: 2 | Status: SHIPPED | OUTPATIENT
Start: 2022-01-12 | End: 2022-11-02

## 2022-01-12 RX ORDER — TRAMADOL HYDROCHLORIDE 50 MG/1
TABLET ORAL
Qty: 40 TABLET | Refills: 0 | Status: SHIPPED | OUTPATIENT
Start: 2022-01-12 | End: 2022-01-20

## 2022-01-12 ASSESSMENT — PAIN SCALES - GENERAL: PAINLEVEL: NO PAIN (0)

## 2022-01-12 ASSESSMENT — MIFFLIN-ST. JEOR: SCORE: 2069.88

## 2022-01-12 NOTE — LETTER
1/12/2022         RE: Prashant Keyes  [address]  [place] [address]        Dear Colleague,    Thank you for referring your patient, Prashant Keyes, to the [facility] [place]. Please see a copy of my visit note below.    ESTABLISHED PATIENT NEUROLOGY NOTE    DATE OF VISIT: 1/12/2022  MRN: [id number]  PATIENT NAME: Prashant Keyes  YOB: 1950    Chief Complaint   Patient presents with     RECHECK     SUBJECTIVE:                                                      HISTORY OF PRESENT ILLNESS:  Prashant is here for follow up regarding localization-related epilepsy and parkinsonian tremor. Demian's seizures have been described as episodes of Left face and arm weakness with amnesia. He also has a remote history GTCs. His seizures have been well-controlled for some time on Keppra and Depakote.    AED regimen:  Depakote 500mg twice daily.  Keppra 1000mg twice daily.    He is on carbidopa/levodopa for tremor management.  Current dosing is: 25/100mg, 2 tablets 3 times daily. We did try to increase his dose but he had some problems with dry mouth.  At his previous visit he was also concerned about some left-sided sensory changes so I ordered a brain MRI but it does not appear that this has been completed.  He did have blood work completed and this showed a therapeutic Keppra level (38) but his valproic acid level was below the therapeutic range (32; tends to run low).  Because he had been doing well from a seizure standpoint we decided not to make any changes in the doses.  Additional findings of anemia (not new) and mildly low potassium on blood work, ammonia level was normal.    Demian tells me that he had some facial twitching since his last minute, otherwise no seizure activity. No loss of consciousness. He denies side effects on the Depakote and Keppra.    He feels like his Left side sometimes does not catch up with the right. He does not feel as steady as he used to  be. He has had a recent fall backwards in a chair. He complains of lightheadedness upon standing. He has some knee pain, and this wakes him at night. He did have Left knee surgery. He thinks he probably need surgery on the right and plans to talk to his primary provider about this.    He clarifies that he did not have the brain MRI done. He is willing to do this as long as it is an open MRI.    CURRENT MEDICATIONS:   allopurinol (ZYLOPRIM) 100 MG tablet, Take 2 tablets by mouth once daily  amLODIPine (NORVASC) 10 MG tablet, TAKE 1 TABLET BY MOUTH ONCE DAILY WITH SUPPER  aspirin 81 MG tablet, Take 1 tablet (81 mg) by mouth daily  carvedilol (COREG) 12.5 MG tablet, TAKE 1 TABLET BY MOUTH TWICE DAILY WITH MEALS  chlorthalidone (HYGROTON) 25 MG tablet, Take 1 tablet by mouth once daily  Cholecalciferol (VITAMIN D) 2000 UNITS tablet, Take 2,000 Units by mouth daily  colchicine (COLCYRS) 0.6 MG tablet, Take 1 tablet (0.6 mg) by mouth daily as needed Take 2 tabs on first day.  Take at first sign of gout flair.  Take for max of 1 week per flair  Cyanocobalamin (VITAMIN  B-12) 2500 MCG tablet, Place 2,500 mcg under the tongue daily  ferrous sulfate (IRON) 325 (65 FE) MG tablet, Take 1 tablet (325 mg) by mouth 2 times daily  FLUoxetine (PROZAC) 40 MG capsule, TAKE 1 CAPSULE BY MOUTH ONCE DAILY  furosemide (LASIX) 20 MG tablet, Take 2 tablets by mouth once daily  gabapentin (NEURONTIN) 300 MG capsule, Take 2 capsules (600 mg) by mouth 3 times daily  hydrALAZINE (APRESOLINE) 25 MG tablet, Take 1 tablet by mouth twice daily  losartan (COZAAR) 100 MG tablet, Take 1 tablet by mouth once daily  Multiple Vitamin (MULTI VITAMIN MENS PO), Take  by mouth.  omeprazole (PRILOSEC) 40 MG DR capsule, Take 1 capsule by mouth once daily  ORDER FOR DME, CPAP daily  oxybutynin (DITROPAN) 5 MG tablet, Take 1 tablet (5 mg) by mouth 2 times daily  potassium chloride ER (KLOR-CON M) 20 MEQ CR tablet, TAKE 1  BY MOUTH THREE TIMES DAILY  simvastatin  "(ZOCOR) 20 MG tablet, TAKE 2 TABLETS BY MOUTH IN THE EVENING AT BEDTIME  traMADol (ULTRAM) 50 MG tablet, TAKE 2 TABLETS BY MOUTH 3 TIMES DAILY AS NEEDED FOR SEVERE PAIN  traMADol (ULTRAM) 50 MG tablet, TAKE 2 TABLETS BY MOUTH THREE TIMES DAILY AS NEEDED FOR SEVERE PAIN  traZODone (DESYREL) 100 MG tablet, Take 100 mg by mouth At Bedtime  triamcinolone (KENALOG) 0.1 % external lotion, Apply topically 3 times daily  UNABLE TO FIND, daily MEDICATION NAME: sawpalmetto - 2 capsules once a day    No current facility-administered medications on file prior to visit.      RECENT DIAGNOSTIC STUDIES:   Labs: No results found for any visits on 22.      REVIEW OF SYSTEMS:                                                      10-point review of systems is negative except as mentioned above in HPI.    EXAM:                                                      Physical Exam:   Vitals: BP (!) 141/71 (BP Location: Left arm, Patient Position: Sitting, Cuff Size: Adult Large)   Pulse 61   Temp 97.2  F (36.2  C) (Tympanic)   Ht 1.753 m (5' 9\")   Wt 132.5 kg (292 lb)   BMI 43.12 kg/m    BMI= Body mass index is 43.12 kg/m .  GENERAL: NAD.  HEENT: NC/AT.  CV: RRR. S1, S2.   NECK: No bruits.  PULM: Non-labored breathing.   Neurologic:  MENTAL STATUS: Alert, attentive. Speech is fluent. Normal comprehension. Normal concentration. Adequate fund of knowledge. Mini-Co/5 (missed 1 hand on the clock).  CRANIAL NERVES: Visual fields intact to confrontation. Pupils equally, round and reactive to light. Facial sensation and movement normal. EOM full. 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 Hearing intact to conversations with hearing aids in place. Trapezius strength intact. Palate moves symmetrically. Tongue midline.  MOTOR: Strength is 5/5 in proximal and distal muscle groups of upper and lower extremities. Tone and bulk normal.   DTRs: Intact and symmetric in UEs. Right patella 1-, cannot elicit Left. Unable to elicit ankle jerks.   SENSATION: " Normal light touch throughout.   COORDINATION: Normal finger nose finger. Normal hand opening/closing speed and amplitude.   STATION AND GAIT: Romberg positive. Good postural reflexes. Gait appears antalgic, decreased arm swing.   TREMOR:  Minimal postural and action tremor on exam. No resting tremor today.  Right hand-dominant.      ASSESSMENT and PLAN:                                                      Assessment:    ICD-10-CM    1. Parkinsonian tremor (H)  G20 MR Brain w/o & w Contrast   2. Encounter for long-term (current) use of medications  Z79.899 Keppra (Levetiracetam) Level     Valproic Acid level     Valproic Acid Free     Ammonia   3. Balance problems  R26.89 Vitamin B12     MR Brain w/o & w Contrast     Physical Therapy Referral   4. Seizure disorder (H)  G40.909 Keppra (Levetiracetam) Level     Valproic Acid level     Valproic Acid Free     Ammonia   5. Memory problem  R41.3 Vitamin B12     Methylmalonic Acid     Folate     TSH with free T4 reflex   6. Parkinsonism, unspecified Parkinsonism type (H)  G20 carbidopa-levodopa (SINEMET)  MG tablet   7. Convulsions, unspecified convulsion type (H)  R56.9 divalproex sodium delayed-release (DEPAKOTE) 500 MG DR tablet     levETIRAcetam (KEPPRA) 1000 MG tablet   8. 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 Keyes is here for follow-up regarding parkinsonism and epilepsy. Seizures seem to be under adequate control, though he does mention one episode of facial twitching. No alteration of awareness. We will check medication levels today. He has additional concerns about worsening balance, which is likely multifactorial. Because he is also lightheaded, I think we should try decreasing his Sinemet dose. We will start with labs for memory. He did well on the mini cog today. I would also like to get the brain MRI  completed that was discussed previously.    Demina and I also talked about the potential benefits of physical therapy for his balance. He will think about this. I would like to see Demian back in clinic in about 6 months. He understands and agrees with the plan    Plan:  -- Continue current doses of Keppra and Depakote.  -- Let's try decreasing the carbidopa/levodopa to 1-1/2 tablets 3 times daily. I am hoping this change will help with your dry mouth and lightheadedness.  -- Labs for medication monitoring and memory.  -- Brain MRI. Open study requested.  -- We will notify you of the test results.  -- Consider physical therapy for balance. I have placed the referral.  -- Follow-up in neurology clinic in 6 months. Please let us know if any concerns arise in the meantime.    Total Time: 30 minutes were spent with the patient and in chart review/documentation (as described above in Assessment and Plan)/coordinating the care on date of service.    Bisi Temple MD  Neurology    Dragon software used in the dictation of this note.                        Again, thank you for allowing me to participate in the care of your patient.        Sincerely,        Bisi Temple MD

## 2022-01-12 NOTE — TELEPHONE ENCOUNTER
Requested Prescriptions   Pending Prescriptions Disp Refills     traMADol (ULTRAM) 50 MG tablet [Pharmacy Med Name: TRAMADOL HCL 50 MG TABLET] 40 tablet 0     Sig: TAKE 2 TABLETS BY MOUTH 3 TIMES DAILY AS NEEDED FOR SEVERE PAIN       There is no refill protocol information for this order        Last Written Prescription Date: 1/5/22  Last Fill Quantity, 40 refills: 0   Last office visit: 12/3/21      Routing refill request to provider for review/approval because:  Drug not on the [facility] refill protocol

## 2022-01-12 NOTE — PATIENT INSTRUCTIONS
Plan:  -- Continue current doses of Keppra and Depakote.  -- Let's try decreasing the carbidopa/levodopa to 1-1/2 tablets 3 times daily. I am hoping this change will help with your dry mouth and lightheadedness.  -- Labs for medication monitoring and memory.  -- Brain MRI. Open study requested.  -- We will notify you of the test results.  -- Consider physical therapy for balance. I have placed the referral.  -- Follow-up in neurology clinic in 6 months. Please let us know if any concerns arise in the meantime.

## 2022-01-12 NOTE — NURSING NOTE
"Initial BP (!) 141/71 (BP Location: Left arm, Patient Position: Sitting, Cuff Size: Adult Large)   Pulse 61   Temp 97.2  F (36.2  C) (Tympanic)   Ht 1.753 m (5' 9\")   Wt 132.5 kg (292 lb)   BMI 43.12 kg/m   Estimated body mass index is 43.12 kg/m  as calculated from the following:    Height as of this encounter: 1.753 m (5' 9\").    Weight as of this encounter: 132.5 kg (292 lb). .    Yohana Kapoor LPN on 1/12/2022 at 9:00 AM    "

## 2022-01-13 LAB — VALPROATE FREE SERPL-MCNC: 7.8 UG/ML

## 2022-01-14 LAB — LEVETIRACETAM SERPL-MCNC: 36 UG/ML

## 2022-01-18 LAB — METHYLMALONATE SERPL-SCNC: <0.1 UMOL/L (ref 0–0.4)

## 2022-01-19 ENCOUNTER — MYC REFILL (OUTPATIENT)
Dept: FAMILY MEDICINE | Facility: CLINIC | Age: 72
End: 2022-01-19
Payer: MEDICARE

## 2022-01-19 ENCOUNTER — MYC MEDICAL ADVICE (OUTPATIENT)
Dept: NEUROLOGY | Facility: CLINIC | Age: 72
End: 2022-01-19
Payer: MEDICARE

## 2022-01-19 DIAGNOSIS — G40.A09 NONINTRACTABLE ABSENCE EPILEPSY WITHOUT STATUS EPILEPTICUS (H): ICD-10-CM

## 2022-01-19 DIAGNOSIS — I10 BENIGN ESSENTIAL HYPERTENSION: ICD-10-CM

## 2022-01-19 DIAGNOSIS — M10.9 ACUTE GOUTY ARTHRITIS: ICD-10-CM

## 2022-01-19 DIAGNOSIS — M79.7 FIBROMYALGIA: ICD-10-CM

## 2022-01-19 DIAGNOSIS — R56.9 CONVULSIONS, UNSPECIFIED CONVULSION TYPE (H): Chronic | ICD-10-CM

## 2022-01-19 RX ORDER — TRAMADOL HYDROCHLORIDE 50 MG/1
TABLET ORAL
Qty: 40 TABLET | Refills: 0 | Status: CANCELLED | OUTPATIENT
Start: 2022-01-19

## 2022-01-19 RX ORDER — LEVETIRACETAM 1000 MG/1
TABLET ORAL
Qty: 180 TABLET | Refills: 2 | Status: SHIPPED | OUTPATIENT
Start: 2022-01-19 | End: 2023-02-07

## 2022-01-19 RX ORDER — CHLORTHALIDONE 25 MG/1
25 TABLET ORAL DAILY
Qty: 90 TABLET | Refills: 0 | Status: CANCELLED | OUTPATIENT
Start: 2022-01-19

## 2022-01-19 NOTE — RESULT ENCOUNTER NOTE
Please let Bill know that his labs are largely unremarkable except that his B12 level is quite high.  If he is taking a supplement he can cut back on every third day dosing of this.  Dose should be 1000mcg.    Thank you,Dr. Temple

## 2022-01-19 NOTE — TELEPHONE ENCOUNTER
Component      Latest Ref Rng & Units 1/12/2022   Keppra (Levetiracetam) Level      12 - 46 ug/mL 36

## 2022-01-19 NOTE — TELEPHONE ENCOUNTER
Scripts changed to [facility] for maykel.  Aretha VACA RN BSN PHN  Specialty Clinics

## 2022-01-20 RX ORDER — CHLORTHALIDONE 25 MG/1
25 TABLET ORAL DAILY
Qty: 90 TABLET | Refills: 0 | Status: SHIPPED | OUTPATIENT
Start: 2022-01-20 | End: 2022-04-04

## 2022-01-20 RX ORDER — TRAMADOL HYDROCHLORIDE 50 MG/1
TABLET ORAL
Qty: 40 TABLET | Refills: 0 | Status: SHIPPED | OUTPATIENT
Start: 2022-01-20 | End: 2022-01-26

## 2022-01-20 NOTE — TELEPHONE ENCOUNTER
Routing refill request to provider for review/approval because:  Diuretics (Including Combos) Protocol Failed 01/20/2022 03:01 PM   Protocol Details  Blood pressure under 140/90 in past 12 months    Normal serum potassium on file in past 12 months     BP Readings from Last 3 Encounters:   01/12/22 (!) 141/71   11/19/21 135/72   09/28/21 128/74     Last Comprehensive Metabolic Panel:  Sodium   Date Value Ref Range Status   09/28/2021 142 133 - 144 mmol/L Final   03/23/2021 137 133 - 144 mmol/L Final     Potassium   Date Value Ref Range Status   09/28/2021 3.2 (L) 3.4 - 5.3 mmol/L Final   03/23/2021 3.3 (L) 3.4 - 5.3 mmol/L Final     Chloride   Date Value Ref Range Status   09/28/2021 103 94 - 109 mmol/L Final   03/23/2021 101 94 - 109 mmol/L Final     Carbon Dioxide   Date Value Ref Range Status   03/23/2021 33 (H) 20 - 32 mmol/L Final     Carbon Dioxide (CO2)   Date Value Ref Range Status   09/28/2021 32 20 - 32 mmol/L Final     Anion Gap   Date Value Ref Range Status   09/28/2021 7 3 - 14 mmol/L Final   03/23/2021 3 3 - 14 mmol/L Final     Glucose   Date Value Ref Range Status   09/28/2021 141 (H) 70 - 99 mg/dL Final   03/23/2021 98 70 - 99 mg/dL Final     Urea Nitrogen   Date Value Ref Range Status   09/28/2021 16 7 - 30 mg/dL Final   03/23/2021 17 7 - 30 mg/dL Final     Creatinine   Date Value Ref Range Status   09/28/2021 1.02 0.66 - 1.25 mg/dL Final   03/23/2021 1.08 0.66 - 1.25 mg/dL Final     GFR Estimate   Date Value Ref Range Status   09/28/2021 74 >60 mL/min/1.73m2 Final     Comment:     As of July 11, 2021, eGFR is calculated by the CKD-EPI creatinine equation, without race adjustment. eGFR can be influenced by muscle mass, exercise, and diet. The reported eGFR is an estimation only and is only applicable if the renal function is stable.   03/23/2021 69 >60 mL/min/[1.73_m2] Final     Comment:     Non  GFR Calc  Starting 12/18/2018, serum creatinine based estimated GFR (eGFR) will be    calculated using the Chronic Kidney Disease Epidemiology Collaboration   (CKD-EPI) equation.       Calcium   Date Value Ref Range Status   09/28/2021 9.3 8.5 - 10.1 mg/dL Final   03/23/2021 9.4 8.5 - 10.1 mg/dL Final     Bilirubin Total   Date Value Ref Range Status   09/28/2021 0.3 0.2 - 1.3 mg/dL Final   03/23/2021 0.3 0.2 - 1.3 mg/dL Final     Alkaline Phosphatase   Date Value Ref Range Status   09/28/2021 80 40 - 150 U/L Final   03/23/2021 81 40 - 150 U/L Final     ALT   Date Value Ref Range Status   09/28/2021 18 0 - 70 U/L Final   03/23/2021 9 0 - 70 U/L Final     AST   Date Value Ref Range Status   09/28/2021 17 0 - 45 U/L Final   03/23/2021 13 0 - 45 U/L Final

## 2022-01-20 NOTE — TELEPHONE ENCOUNTER
Routing refill request to provider for review/approval because:  Drug not on the [facility] refill protocol     Last Written Prescription Date:  1-12-22  Last Fill Quantity: 40,  # refills: 0   Last office visit: 9/28/2021 Virtual 12-3-21with prescribing provider:  Matt Swan   Future Office Visit:

## 2022-01-21 RX ORDER — COLCHICINE 0.6 MG/1
0.6 TABLET ORAL DAILY PRN
Qty: 30 TABLET | Refills: 0 | Status: SHIPPED | OUTPATIENT
Start: 2022-01-21 | End: 2022-02-15

## 2022-01-21 NOTE — TELEPHONE ENCOUNTER
"Routing refill request to provider for review/approval because:  Labs not current:  Uric acid    Requested Prescriptions   Pending Prescriptions Disp Refills     colchicine (COLCYRS) 0.6 MG tablet 30 tablet 0     Sig: Take 1 tablet (0.6 mg) by mouth daily as needed Take 2 tabs on first day.  Take at first sign of gout flair.  Take for max of 1 week per flair       Gout Agents Protocol Failed - 1/19/2022  8:14 AM        Failed - Has Uric Acid on file in past 12 months and value is less than 6     Recent Labs   Lab Test 07/19/16  1159   URIC 9.0*     If level is 6mg/dL or greater, ok to refill one time and refer to provider.           Passed - CBC on file in past 12 months     Recent Labs   Lab Test 09/28/21  1049   WBC 9.1   RBC 3.63*   HGB 11.9*   HCT 36.4*                    Passed - ALT on file in past 12 months     Recent Labs   Lab Test 09/28/21  1049   ALT 18             Passed - Recent (12 mo) or future (30 days) visit within the authorizing provider's specialty     Patient has had an office visit with the authorizing provider or a provider within the authorizing providers department within the previous 12 mos or has a future within next 30 days. See \"Patient Info\" tab in inbasket, or \"Choose Columns\" in Meds & Orders section of the refill encounter.              Passed - Medication is active on med list        Passed - Patient is age 18 or older        Passed - Normal serum creatinine on file in the past 12 months     Recent Labs   Lab Test 09/28/21  1049   CR 1.02       Ok to refill medication if creatinine is low                     " Pt reports \" I feel like to pulse on my right foot is pulsating. And I would like to get the arterial brachial testing done.\"    Pt denies any new color change to foot.    Denies any other new symptoms.    Per last office visit:       #Pain in the feet:  The patient was advised to avoid trimming the toenails too short.     If the pain persists he may need to get EMG to evaluate the pain further.      Please advise. Thank you.

## 2022-01-26 ENCOUNTER — MYC REFILL (OUTPATIENT)
Dept: FAMILY MEDICINE | Facility: CLINIC | Age: 72
End: 2022-01-26
Payer: MEDICARE

## 2022-01-26 DIAGNOSIS — M79.7 FIBROMYALGIA: ICD-10-CM

## 2022-01-28 NOTE — TELEPHONE ENCOUNTER
Routing refill request to provider for review/approval because:  Drug not on the [facility] refill protocol   traMADol (ULTRAM) 50 MG tablet 40 tablet 0 1/20/2022  No   Sig: TAKE 2 TABLETS BY MOUTH 3 TIMES DAILY AS NEEDED FOR SEVERE PAIN     LAST OV-12/3/2021

## 2022-01-28 NOTE — TELEPHONE ENCOUNTER
Duplicate request.  See also refill from pharmacy same date 1-26-22.  Patient my charting asking provider to increase quantity.  Will send message to provider to advise.

## 2022-01-31 RX ORDER — TRAMADOL HYDROCHLORIDE 50 MG/1
TABLET ORAL
Qty: 40 TABLET | Refills: 0 | OUTPATIENT
Start: 2022-01-31

## 2022-01-31 RX ORDER — TRAMADOL HYDROCHLORIDE 50 MG/1
100 TABLET ORAL 3 TIMES DAILY PRN
Qty: 40 TABLET | Refills: 0 | Status: SHIPPED | OUTPATIENT
Start: 2022-01-31 | End: 2022-02-07

## 2022-02-04 DIAGNOSIS — M79.7 FIBROMYALGIA: ICD-10-CM

## 2022-02-06 NOTE — TELEPHONE ENCOUNTER
Routing refill request to provider for review/approval because:  Drug not on the [facility] refill protocol   traMADol (ULTRAM) 50 MG tablet 40 tablet 0 1/31/2022  No   Sig - Route: Take 2 tablets (100 mg) by mouth 3 times daily as needed for severe pain TAKE 2 TABLETS BY MOUTH 3 TIMES DAILY AS NEEDED FOR SEVERE PAIN - Oral   Sent to pharmacy as: traMADol HCl 50 MG Oral Tablet (ULTRAM)

## 2022-02-07 RX ORDER — TRAMADOL HYDROCHLORIDE 50 MG/1
TABLET ORAL
Qty: 40 TABLET | Refills: 0 | Status: SHIPPED | OUTPATIENT
Start: 2022-02-07 | End: 2022-02-14

## 2022-02-08 ENCOUNTER — HOSPITAL ENCOUNTER (OUTPATIENT)
Dept: PHYSICAL THERAPY | Facility: CLINIC | Age: 72
Setting detail: THERAPIES SERIES
End: 2022-02-08
Attending: PSYCHIATRY & NEUROLOGY
Payer: MEDICARE

## 2022-02-08 DIAGNOSIS — R26.89 BALANCE PROBLEMS: ICD-10-CM

## 2022-02-08 PROCEDURE — 97161 PT EVAL LOW COMPLEX 20 MIN: CPT | Mod: GP | Performed by: PHYSICAL THERAPIST

## 2022-02-08 PROCEDURE — 97116 GAIT TRAINING THERAPY: CPT | Mod: GP | Performed by: PHYSICAL THERAPIST

## 2022-02-11 DIAGNOSIS — M79.7 FIBROMYALGIA: ICD-10-CM

## 2022-02-13 NOTE — TELEPHONE ENCOUNTER
Routing refill request to provider for review/approval because:  Drug not on the [facility] refill protocol   traMADol (ULTRAM) 50 MG tablet 40 tablet 0 2/7/2022  No   Sig: TAKE 2 TABLETS (100 MG) BY MOUTH 3 TIMES DAILY AS NEEDED FOR SEVERE PAIN   LAST OV-12/3/21

## 2022-02-14 RX ORDER — TRAMADOL HYDROCHLORIDE 50 MG/1
TABLET ORAL
Qty: 40 TABLET | Refills: 0 | Status: SHIPPED | OUTPATIENT
Start: 2022-02-14 | End: 2022-02-22

## 2022-02-15 DIAGNOSIS — M10.9 ACUTE GOUTY ARTHRITIS: ICD-10-CM

## 2022-02-15 RX ORDER — COLCHICINE 0.6 MG/1
TABLET ORAL
Qty: 30 TABLET | Refills: 0 | Status: SHIPPED | OUTPATIENT
Start: 2022-02-15 | End: 2022-03-12

## 2022-02-15 NOTE — TELEPHONE ENCOUNTER
"Requested Prescriptions   Pending Prescriptions Disp Refills    colchicine (COLCYRS) 0.6 MG tablet [Pharmacy Med Name: COLCHICINE 0.6 MG TABLET] 30 tablet 0     Sig: TAKE 2 TABS BY MOUTH ON 1ST DAY.TAKE 1 TAB DAILY AS NEEDED FOR GOUT FLAIR.MAX OF 1 WEEK PER FLAIR        Gout Agents Protocol Failed - 2/15/2022  9:31 AM        Failed - Has Uric Acid on file in past 12 months and value is less than 6     Recent Labs   Lab Test 07/19/16  1159   URIC 9.0*     If level is 6mg/dL or greater, ok to refill one time and refer to provider.             Passed - CBC on file in past 12 months     Recent Labs   Lab Test 09/28/21  1049   WBC 9.1   RBC 3.63*   HGB 11.9*   HCT 36.4*                      Passed - ALT on file in past 12 months     Recent Labs   Lab Test 09/28/21  1049   ALT 18               Passed - Recent (12 mo) or future (30 days) visit within the authorizing provider's specialty     Patient has had an office visit with the authorizing provider or a provider within the authorizing providers department within the previous 12 mos or has a future within next 30 days. See \"Patient Info\" tab in inbasket, or \"Choose Columns\" in Meds & Orders section of the refill encounter.              Passed - Medication is active on med list        Passed - Patient is age 18 or older        Passed - Normal serum creatinine on file in the past 12 months     Recent Labs   Lab Test 09/28/21  1049   CR 1.02       Ok to refill medication if creatinine is low              Routing refill request to provider for review/approval because:  Failed protocol.  Renetta Cohen RN, BSN  Triage nurse  [facility]: [place]    "

## 2022-02-20 DIAGNOSIS — M79.7 FIBROMYALGIA: ICD-10-CM

## 2022-02-21 NOTE — PROGRESS NOTES
[facility]                                                                                   OUTPATIENT PHYSICAL THERAPY FUNCTIONAL EVALUATION  PLAN OF TREATMENT FOR OUTPATIENT REHABILITATION  (COMPLETE FOR INITIAL CLAIMS ONLY)  Patient's Last Name, First Name, M.I.  YOB: 1950  Prashant Keyes     Provider's Name   [facility]   Medical Record No.  [id number]     Start of Care Date:  02/08/22   Onset Date:  01/01/19   Type:     _X__PT   ____OT  ____SLP Medical Diagnosis:  Balance Problems     PT Diagnosis:  Impaired balance Visits from SOC:  1                              __________________________________________________________________________________  Plan of Treatment/Functional Goals:  balance training, gait training, neuromuscular re-education, ROM, strengthening, stretching, transfer training           GOALS  Stairs  Demian will perform 7 steps with a reciprocal step and use of rail in 3/3 trials with no loss of balance for access to his house.  05/08/22    Walking with cane     05/08/22    1 block  Demian will be able to walk 1 block with least restrictive assistive device for aerobica activity for community mobility.  05/08/22                                                           Therapy Frequency:  other (see comments)   Predicted Duration of Therapy Intervention:  Biweekly fro 90 days    Nasrin Marcano, PT                                    I CERTIFY THE NEED FOR THESE SERVICES FURNISHED UNDER        THIS PLAN OF TREATMENT AND WHILE UNDER MY CARE     (Physician co-signature of this document indicates review and certification of the therapy plan).                Certification Date From:  02/08/22   Certification Date To:  05/08/22    Referring Provider:  Bisi Mckeon    Initial Assessment  See Epic Evaluation- Start of Care Date:  02/08/22

## 2022-02-21 NOTE — PROGRESS NOTES
02/08/22 1300   Quick Adds   Quick Adds Certification   Type of Visit Initial OP PT Evaluation   General Information   Start of Care Date 02/08/22   Referring Physician Bisi Mckeon   Orders Evaluate and Treat as Indicated   Order Date 01/12/22   Medical Diagnosis Balance Problems   Onset of illness/injury or Date of Surgery 01/01/19   Surgical/Medical history reviewed Yes   Pertinent history of current problem (include personal factors and/or comorbidities that impact the POC) Bill is referred to PT impaired balance due to parkinsonism and epilepsy. Seizures seem to be under adequate control, though he does mention one episode of facial twitching. No alteration of awareness. He has additional concerns about worsening balance, which is likely multifactorial. No tremor in hands which is good today but reports it has been a little worse lately.   Pertinent Visual History  Recent cataract surgery with new prescriptive lenses.   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  He can do the stairs with a non-reciprocal pattern. Railings on both sets of stairs. Flight to basement and 3 steps to access house. Driving is going well.   ADL Devices Shower/Tub Grab Bar   General Information Comments Feels like balance has worsened since backing off of Sinemet and is feeling Light headedness still. Reports vertigo with last instance about a month ago and wears bilateral hearing aides.   Fall Risk Screen   Fall screen completed by PT   Have you fallen 2 or more times in the past year? Yes   Have you fallen and had an injury in the past year? Yes   Timed Up and Go score (seconds) 29   Is patient a fall risk? Yes   Abuse Screen (yes response referral indicated)   Feels Unsafe at Home or Work/School no   Feels Threatened by Someone no   Does Anyone Try to  Keep You From Having Contact with Others or Doing Things Outside Your Home? no   Physical Signs of Abuse Present no   Pain   Patient currently in pain Yes   Pain location Bilateral shoulders from fibromyalgia   Vitals Signs   Blood Pressure 137/77   Vital Signs Comments 123/72   Cognitive Status Examination   Orientation orientation to person, place and time   Follows Commands and Answers Questions 100% of the time;able to follow multistep instructions   Personal Safety and Judgment intact   Memory impaired   Cognitive Comment Losing track of time   Posture   Posture Forward head position;Protracted shoulders   Range of Motion (ROM)   ROM Comment WFLs, but bilateral shoulders are very painful with movement, CROM with pain and cracking/popping reported wtih rotation/lateral flexion.   Strength   Strength Comments Right knee flexion/extension=4/5, hip flexion=4/5   Bed Mobility   Bed Mobility Comments Independent with use of furniture   Transfer Skills   Transfer Comments Use of hands for support   Gait   Gait Gait Analysis   Gait Analysis   Gait Pattern Used swing-through gait   Gait Deviations Noted decreased walter;increased time in double stance;decreased step length;decreased toe-to-floor clearance;decreased weight-shifting ability   Impairments Contributing to Gait Deviations impaired balance;pain;decreased ROM;decreased strength   Gait Special Tests   Gait Special Tests 25 FOOT TIMED WALK   Gait Special Tests 25 Foot Timed Walk   Seconds 11   Steps 17 Steps   Comments Very unsteady using wall x 1   Balance   Balance other (describe)   Balance Special Tests   Balance Special Tests Sit to stand reps   Balance Special Tests Sit to Stand Reps in 30 Seconds   Comments Cannot perform without max use of UEs   Sensory Examination   Sensory Perception other (describe)   Sensory Perception Comments Tingling in fingers and toes with some numbness   Planned Therapy Interventions   Planned Therapy Interventions balance  training;gait training;neuromuscular re-education;ROM;strengthening;stretching;transfer training   Clinical Impression   Criteria for Skilled Therapeutic Interventions Met yes, treatment indicated   PT Diagnosis Impaired balance   Influenced by the following impairments Imparied balance, impaired gait, weakness, fatigue   Functional limitations due to impairments Increased risk of falls, decreaed tolerance to functional activity, limited walking distances.   Clinical Presentation Evolving/Changing   Clinical Presentation Rationale Symptom report, clinical presentaiton   Clinical Decision Making (Complexity) Low complexity   Therapy Frequency other (see comments)   Predicted Duration of Therapy Intervention (days/wks) Biweekly fro 90 days   Risk & Benefits of therapy have been explained Yes   Patient, Family & other staff in agreement with plan of care Yes   Clinical Impression Comments Demian presents with weakness, impaired balance, pain and fatigue limiting all tolerance to functional activity.  He will benefit from skilled PT to address impairments and reduce for falls.   GOALS   PT Eval Goals 1;2;3   Goal 1   Goal Identifier Stairs   Goal Description Demian will perform 7 steps with a reciprocal step and use of rail in 3/3 trials with no loss of balance for access to his house.   Target Date 05/08/22   Goal 2   Goal Identifier Walking with cane   Goal Progress Demian will complete a 25 Foot Timed Walk in <15 steps and <10 seconds with use of cane to demonstrate imrpoved walking speed with safe sequence.   Target Date 05/08/22   Goal 3   Goal Identifier 1 block   Goal Description Demian will be able to walk 1 block with least restrictive assistive device for aerobica activity for community mobility.   Target Date 05/08/22   Total Evaluation Time   PT Eval, Low Complexity Minutes (80269) 45   Therapy Certification   Certification date from 02/08/22   Certification date to 05/08/22   Medical Diagnosis Balance Problems    Certification I certify the need for these services furnished under this plan of treatment and while under my care.  (Physician co-signature of this document indicates review and certification of the therapy plan).

## 2022-02-22 RX ORDER — TRAMADOL HYDROCHLORIDE 50 MG/1
TABLET ORAL
Qty: 40 TABLET | Refills: 0 | Status: SHIPPED | OUTPATIENT
Start: 2022-02-22 | End: 2022-03-01

## 2022-02-22 NOTE — TELEPHONE ENCOUNTER
Routing refill request to provider for review/approval because:  Drug not on the [facility] refill protocol     Last Written Prescription Date:  2-14-22  Last Fill Quantity: 40,  # refills: 0   Last office visit: 9/28/2021virtual 12-3-21 with prescribing provider:  Matt Swan   Future Office Visit:

## 2022-02-25 ENCOUNTER — MYC REFILL (OUTPATIENT)
Dept: FAMILY MEDICINE | Facility: CLINIC | Age: 72
End: 2022-02-25
Payer: MEDICARE

## 2022-02-25 ENCOUNTER — MYC MEDICAL ADVICE (OUTPATIENT)
Dept: NEUROLOGY | Facility: CLINIC | Age: 72
End: 2022-02-25
Payer: MEDICARE

## 2022-02-25 DIAGNOSIS — I10 HYPERTENSION GOAL BP (BLOOD PRESSURE) < 140/90: ICD-10-CM

## 2022-02-26 ENCOUNTER — MYC REFILL (OUTPATIENT)
Dept: FAMILY MEDICINE | Facility: CLINIC | Age: 72
End: 2022-02-26
Payer: MEDICARE

## 2022-02-26 DIAGNOSIS — I10 BENIGN ESSENTIAL HYPERTENSION: ICD-10-CM

## 2022-02-26 DIAGNOSIS — N32.81 OVERACTIVE BLADDER: ICD-10-CM

## 2022-02-27 DIAGNOSIS — M79.7 FIBROMYALGIA: ICD-10-CM

## 2022-02-27 NOTE — TELEPHONE ENCOUNTER
Routing refill request to provider for review/approval because:  Drug not on the [facility] refill protocol   traMADol (ULTRAM) 50 MG tablet 40 tablet 0 2/22/2022  No   Sig: TAKE 2 TABLETS (100 MG) BY MOUTH 3 TIMES DAILY AS NEEDED FOR SEVERE PAIN

## 2022-02-28 RX ORDER — OXYBUTYNIN CHLORIDE 5 MG/1
5 TABLET ORAL 2 TIMES DAILY
Qty: 180 TABLET | Refills: 0 | Status: SHIPPED | OUTPATIENT
Start: 2022-02-28 | End: 2022-06-03

## 2022-02-28 NOTE — TELEPHONE ENCOUNTER
Routing losartan refill request to provider for review/approval because:  Labs out of range:  Potassium  Elevated BP    No follow up plan noted      Yolis Montgomery RN  [facility]

## 2022-02-28 NOTE — TELEPHONE ENCOUNTER
Routing refill request to provider for review/approval because:  Failed protocols: BP not at goal     Yesica Roche RN

## 2022-03-01 ENCOUNTER — MYC MEDICAL ADVICE (OUTPATIENT)
Dept: FAMILY MEDICINE | Facility: CLINIC | Age: 72
End: 2022-03-01
Payer: MEDICARE

## 2022-03-01 RX ORDER — TRAMADOL HYDROCHLORIDE 50 MG/1
TABLET ORAL
Qty: 40 TABLET | Refills: 0 | Status: SHIPPED | OUTPATIENT
Start: 2022-03-01 | End: 2022-03-07

## 2022-03-01 RX ORDER — LOSARTAN POTASSIUM 100 MG/1
100 TABLET ORAL DAILY
Qty: 90 TABLET | Refills: 0 | Status: SHIPPED | OUTPATIENT
Start: 2022-03-01 | End: 2022-05-18

## 2022-03-01 RX ORDER — HYDRALAZINE HYDROCHLORIDE 25 MG/1
25 TABLET, FILM COATED ORAL 2 TIMES DAILY
Qty: 180 TABLET | Refills: 0 | Status: SHIPPED | OUTPATIENT
Start: 2022-03-01 | End: 2022-05-18

## 2022-03-01 NOTE — TELEPHONE ENCOUNTER
Routed to MA pool to enter reported weight and BP into chart.    Yolis Montgomery RN  [facility]

## 2022-03-03 ENCOUNTER — MYC MEDICAL ADVICE (OUTPATIENT)
Dept: FAMILY MEDICINE | Facility: CLINIC | Age: 72
End: 2022-03-03
Payer: MEDICARE

## 2022-03-03 DIAGNOSIS — K21.9 GASTROESOPHAGEAL REFLUX DISEASE: ICD-10-CM

## 2022-03-04 DIAGNOSIS — M79.7 FIBROMYALGIA: ICD-10-CM

## 2022-03-04 RX ORDER — OMEPRAZOLE 40 MG/1
CAPSULE, DELAYED RELEASE ORAL
Qty: 90 CAPSULE | Refills: 1 | Status: SHIPPED | OUTPATIENT
Start: 2022-03-04 | End: 2022-08-01

## 2022-03-05 NOTE — TELEPHONE ENCOUNTER
Routing refill request to provider for review/approval because:  Drug not on the [facility] refill protocol     Requested Prescriptions   Pending Prescriptions Disp Refills     traMADol (ULTRAM) 50 MG tablet [Pharmacy Med Name: TRAMADOL HCL 50 MG TABLET] 40 tablet 0     Sig: TAKE 2 TABLETS (100 MG) BY MOUTH 3 TIMES DAILY AS NEEDED FOR SEVERE PAIN       There is no refill protocol information for this order

## 2022-03-07 RX ORDER — TRAMADOL HYDROCHLORIDE 50 MG/1
TABLET ORAL
Qty: 40 TABLET | Refills: 0 | Status: SHIPPED | OUTPATIENT
Start: 2022-03-07 | End: 2022-03-11

## 2022-03-08 ENCOUNTER — HOSPITAL ENCOUNTER (OUTPATIENT)
Dept: PHYSICAL THERAPY | Facility: CLINIC | Age: 72
Setting detail: THERAPIES SERIES
End: 2022-03-08
Attending: PSYCHIATRY & NEUROLOGY
Payer: MEDICARE

## 2022-03-08 ENCOUNTER — MYC MEDICAL ADVICE (OUTPATIENT)
Dept: FAMILY MEDICINE | Facility: CLINIC | Age: 72
End: 2022-03-08
Payer: MEDICARE

## 2022-03-08 PROCEDURE — 97116 GAIT TRAINING THERAPY: CPT | Mod: GP | Performed by: PHYSICAL THERAPIST

## 2022-03-08 PROCEDURE — 97110 THERAPEUTIC EXERCISES: CPT | Mod: GP | Performed by: PHYSICAL THERAPIST

## 2022-03-09 NOTE — TELEPHONE ENCOUNTER
Patient reported vitals entered:  Vitals - Patient Reported 3/9/2022   Weight (Patient Reported)    Systolic (Patient Reported) 115   Diastolic (Patient Reported) 65   Pulse (Patient Reported) 63     Jocelyne Martell CMA on 3/9/2022 at 7:10 AM

## 2022-03-11 ENCOUNTER — VIRTUAL VISIT (OUTPATIENT)
Dept: FAMILY MEDICINE | Facility: CLINIC | Age: 72
End: 2022-03-11
Payer: MEDICARE

## 2022-03-11 ENCOUNTER — MYC MEDICAL ADVICE (OUTPATIENT)
Dept: FAMILY MEDICINE | Facility: CLINIC | Age: 72
End: 2022-03-11

## 2022-03-11 DIAGNOSIS — M10.9 ACUTE GOUTY ARTHRITIS: ICD-10-CM

## 2022-03-11 DIAGNOSIS — M79.7 FIBROMYALGIA: ICD-10-CM

## 2022-03-11 DIAGNOSIS — H10.33 ACUTE BACTERIAL CONJUNCTIVITIS OF BOTH EYES: Primary | ICD-10-CM

## 2022-03-11 PROCEDURE — 99213 OFFICE O/P EST LOW 20 MIN: CPT | Mod: 95 | Performed by: PHYSICIAN ASSISTANT

## 2022-03-11 RX ORDER — POLYMYXIN B SULFATE AND TRIMETHOPRIM 1; 10000 MG/ML; [USP'U]/ML
1-2 SOLUTION OPHTHALMIC EVERY 6 HOURS
Qty: 10 ML | Refills: 1 | Status: SHIPPED | OUTPATIENT
Start: 2022-03-11 | End: 2022-05-25

## 2022-03-11 NOTE — PROGRESS NOTES
Demian is a 71 year old who is being evaluated via a billable video visit.      How would you like to obtain your AVS? MyChart  If the video visit is dropped, the invitation should be resent by: Text to cell phone: [phone]  Will anyone else be joining your video visit? No    Video Start Time: 7:01 AM        Subjective   Demian is a 71 year old who presents for the following health issues     History of Present Illness       Reason for visit:  Possible Pink eye  Symptom onset:  1-3 days ago  Symptoms include:  Itchiness, red puffiness, feels like I have a hair in my eye  Symptom intensity:  Moderate  Symptom progression:  Staying the same  Had these symptoms before:  Yes  Has tried/received treatment for these symptoms:  Yes  Previous treatment was successful:  Yes  Prior treatment description:  The doctor gave me some eye drops  What makes it worse:  Rubbing it!!  What makes it better:  No    He eats 0-1 servings of fruits and vegetables daily.He consumes 3 sweetened beverage(s) daily.He exercises with enough effort to increase his heart rate 9 or less minutes per day.  He exercises with enough effort to increase his heart rate 3 or less days per week.   He is taking medications regularly.   left worse than right. Red. Discomfort. Purulent discharge . No blurred vision no photophobia.        Review of Systems   Constitutional, HEENT, cardiovascular, pulmonary, GI, , musculoskeletal, neuro, skin, endocrine and psych systems are negative, except as otherwise noted.      Objective           Vitals:  No vitals were obtained today due to virtual visit.    Physical Exam   GENERAL: Healthy, alert and no distress  EYES: Eyes red.  RESP: No audible wheeze, cough, or visible cyanosis.  No visible retractions or increased work of breathing.    SKIN: Visible skin clear. No significant rash, abnormal pigmentation or lesions.  NEURO: Cranial nerves grossly intact.  Mentation and speech appropriate for age.  PSYCH: Mentation  appears normal, affect normal/bright, judgement and insight intact, normal speech and appearance well-groomed.        Prashant was seen today for eye problem.    Diagnoses and all orders for this visit:    Acute bacterial conjunctivitis of both eyes  -     trimethoprim-polymyxin b (POLYTRIM) 49433-2.1 UNIT/ML-% ophthalmic solution; Place 1-2 drops into both eyes every 6 hours      Advised supportive and symptomatic treatment.  Follow up with Provider - if condition persists or worsens.           Video-Visit Details    Type of service:  Video Visit    Video End Time:7:13 AM    Originating Location (pt. Location): Home    Distant Location (provider location):  [facility] [place]     Platform used for Video Visit: Lucky Ant

## 2022-03-11 NOTE — TELEPHONE ENCOUNTER
"Requested Prescriptions   Pending Prescriptions Disp Refills    colchicine (COLCYRS) 0.6 MG tablet [Pharmacy Med Name: COLCHICINE 0.6 MG TABLET] 30 tablet 0     Sig: TAKE 2 TABS BY MOUTH ON 1ST DAY.TAKE 1 TAB DAILY AS NEEDED FOR GOUT FLAIR.MAX OF 1 WEEK PER FLAIR        Gout Agents Protocol Failed - 3/11/2022 12:30 PM        Failed - Has Uric Acid on file in past 12 months and value is less than 6     Recent Labs   Lab Test 07/19/16  1159   URIC 9.0*     If level is 6mg/dL or greater, ok to refill one time and refer to provider.             Passed - CBC on file in past 12 months     Recent Labs   Lab Test 09/28/21  1049   WBC 9.1   RBC 3.63*   HGB 11.9*   HCT 36.4*                      Passed - ALT on file in past 12 months     Recent Labs   Lab Test 09/28/21  1049   ALT 18               Passed - Recent (12 mo) or future (30 days) visit within the authorizing provider's specialty     Patient has had an office visit with the authorizing provider or a provider within the authorizing providers department within the previous 12 mos or has a future within next 30 days. See \"Patient Info\" tab in inbasket, or \"Choose Columns\" in Meds & Orders section of the refill encounter.              Passed - Medication is active on med list        Passed - Patient is age 18 or older        Passed - Normal serum creatinine on file in the past 12 months     Recent Labs   Lab Test 09/28/21  1049   CR 1.02       Ok to refill medication if creatinine is low              Routing refill request to provider for review/approval because:  Failed protocol.  Renetta Cohen RN, BSN  Triage nurse  [facility]: [place]    "

## 2022-03-11 NOTE — TELEPHONE ENCOUNTER
Requested Prescriptions   Pending Prescriptions Disp Refills     traMADol (ULTRAM) 50 MG tablet [Pharmacy Med Name: TRAMADOL HCL 50 MG TABLET] 40 tablet 0     Sig: TAKE 2 TABLETS (100 MG) BY MOUTH 3 TIMES DAILY AS NEEDED FOR SEVERE PAIN       There is no refill protocol information for this order        Routing refill request to provider for review/approval because:  Drug not on the [facility] refill protocol

## 2022-03-12 RX ORDER — COLCHICINE 0.6 MG/1
TABLET ORAL
Qty: 30 TABLET | Refills: 0 | Status: SHIPPED | OUTPATIENT
Start: 2022-03-12 | End: 2022-04-05

## 2022-03-12 RX ORDER — TRAMADOL HYDROCHLORIDE 50 MG/1
TABLET ORAL
Qty: 40 TABLET | Refills: 0 | Status: SHIPPED | OUTPATIENT
Start: 2022-03-12 | End: 2022-03-18

## 2022-03-13 ENCOUNTER — MYC REFILL (OUTPATIENT)
Dept: FAMILY MEDICINE | Facility: CLINIC | Age: 72
End: 2022-03-13
Payer: MEDICARE

## 2022-03-13 DIAGNOSIS — I10 BENIGN ESSENTIAL HYPERTENSION: ICD-10-CM

## 2022-03-14 ENCOUNTER — TRANSFERRED RECORDS (OUTPATIENT)
Dept: HEALTH INFORMATION MANAGEMENT | Facility: CLINIC | Age: 72
End: 2022-03-14
Payer: MEDICARE

## 2022-03-15 ENCOUNTER — HOSPITAL ENCOUNTER (OUTPATIENT)
Dept: PHYSICAL THERAPY | Facility: CLINIC | Age: 72
Setting detail: THERAPIES SERIES
Discharge: HOME | End: 2022-03-15
Attending: PSYCHIATRY & NEUROLOGY
Payer: MEDICARE

## 2022-03-15 DIAGNOSIS — E87.6 HYPOKALEMIA: ICD-10-CM

## 2022-03-15 DIAGNOSIS — E78.00 PURE HYPERCHOLESTEROLEMIA: ICD-10-CM

## 2022-03-15 DIAGNOSIS — M10.071 ACUTE IDIOPATHIC GOUT OF RIGHT ANKLE: Chronic | ICD-10-CM

## 2022-03-15 DIAGNOSIS — I10 BENIGN ESSENTIAL HYPERTENSION: ICD-10-CM

## 2022-03-15 PROCEDURE — 97110 THERAPEUTIC EXERCISES: CPT | Mod: GP | Performed by: PHYSICAL THERAPIST

## 2022-03-15 PROCEDURE — 97116 GAIT TRAINING THERAPY: CPT | Mod: GP | Performed by: PHYSICAL THERAPIST

## 2022-03-15 RX ORDER — FUROSEMIDE 20 MG
TABLET ORAL
Qty: 180 TABLET | Refills: 0 | Status: SHIPPED | OUTPATIENT
Start: 2022-03-15 | End: 2022-06-09

## 2022-03-15 NOTE — TELEPHONE ENCOUNTER
Routing refill request to provider for review/approval because:  Needs provider approval, failed BP and labs. Pended for approval  Serenity Sher RN  [facility]

## 2022-03-16 ENCOUNTER — TELEPHONE (OUTPATIENT)
Dept: NEUROLOGY | Facility: CLINIC | Age: 72
End: 2022-03-16
Payer: MEDICARE

## 2022-03-16 NOTE — TELEPHONE ENCOUNTER
Please let Bill know that his brain MRI looks fine.  It does show some moderate shrinkage in certain areas, still within the norm for his age.  No evidence of stroke.    Thank you,  Dr. Temple

## 2022-03-17 DIAGNOSIS — M79.7 FIBROMYALGIA: ICD-10-CM

## 2022-03-18 RX ORDER — ALLOPURINOL 100 MG/1
TABLET ORAL
Qty: 180 TABLET | Refills: 0 | Status: SHIPPED | OUTPATIENT
Start: 2022-03-18 | End: 2022-06-09

## 2022-03-18 RX ORDER — POTASSIUM CHLORIDE 1500 MG/1
TABLET, EXTENDED RELEASE ORAL
Qty: 270 TABLET | Refills: 0 | Status: SHIPPED | OUTPATIENT
Start: 2022-03-18 | End: 2022-07-01

## 2022-03-18 RX ORDER — AMLODIPINE BESYLATE 10 MG/1
TABLET ORAL
Qty: 90 TABLET | Refills: 0 | Status: SHIPPED | OUTPATIENT
Start: 2022-03-18 | End: 2022-06-09

## 2022-03-18 RX ORDER — TRAMADOL HYDROCHLORIDE 50 MG/1
TABLET ORAL
Qty: 40 TABLET | Refills: 0 | Status: SHIPPED | OUTPATIENT
Start: 2022-03-18 | End: 2022-04-01

## 2022-03-18 RX ORDER — CARVEDILOL 12.5 MG/1
TABLET ORAL
Qty: 180 TABLET | Refills: 0 | Status: SHIPPED | OUTPATIENT
Start: 2022-03-18 | End: 2022-06-09

## 2022-03-18 RX ORDER — SIMVASTATIN 20 MG
TABLET ORAL
Qty: 180 TABLET | Refills: 0 | Status: SHIPPED | OUTPATIENT
Start: 2022-03-18 | End: 2022-06-09

## 2022-03-21 ENCOUNTER — HOSPITAL ENCOUNTER (OUTPATIENT)
Dept: PHYSICAL THERAPY | Facility: CLINIC | Age: 72
Setting detail: THERAPIES SERIES
Discharge: HOME | End: 2022-03-21
Attending: PSYCHIATRY & NEUROLOGY
Payer: MEDICARE

## 2022-03-21 PROCEDURE — 97110 THERAPEUTIC EXERCISES: CPT | Mod: GP | Performed by: PHYSICAL THERAPIST

## 2022-03-21 PROCEDURE — 97112 NEUROMUSCULAR REEDUCATION: CPT | Mod: GP | Performed by: PHYSICAL THERAPIST

## 2022-03-21 PROCEDURE — 97116 GAIT TRAINING THERAPY: CPT | Mod: GP | Performed by: PHYSICAL THERAPIST

## 2022-03-23 NOTE — TELEPHONE ENCOUNTER
Patient my charting to have med sent to a different pharmacy.  New pharmacy pended for provider  
Routing refill request to provider for review/approval because:  Drug not on the [facility] refill protocol     Last Written Prescription Date:  12-3-21  Last Fill Quantity: 40,  # refills: 0   Last office visit: 9/28/2021 Virtual 12-3-21with prescribing provider:  Matt Swan   Future Office Visit:   Next 5 appointments (look out 90 days)    Jan 12, 2022  9:00 AM  Return Visit with Bisi Temple MD  [facility] [place] ([facility] [place] [facility] [address] [id number] [phone]               
room air

## 2022-03-24 ENCOUNTER — MYC MEDICAL ADVICE (OUTPATIENT)
Dept: NEUROLOGY | Facility: CLINIC | Age: 72
End: 2022-03-24
Payer: MEDICARE

## 2022-03-25 NOTE — TELEPHONE ENCOUNTER
Emailed forms to patient, let Juan Jose know they were being emailed to be filled out and faxed back to us.  We will finish forms once we receive them back form patient.    Yohana Kapoor LPN on 3/25/2022 at 10:49 AM

## 2022-03-27 DIAGNOSIS — E87.6 HYPOKALEMIA: ICD-10-CM

## 2022-03-28 ENCOUNTER — MYC REFILL (OUTPATIENT)
Dept: FAMILY MEDICINE | Facility: CLINIC | Age: 72
End: 2022-03-28
Payer: MEDICARE

## 2022-03-28 DIAGNOSIS — M79.7 FIBROMYALGIA: ICD-10-CM

## 2022-03-28 NOTE — TELEPHONE ENCOUNTER
Received Application for Disability Parking Certificate form via fax.  Placed in provider MISBAH diaz.    Karo Akhtar  [facility]

## 2022-03-29 RX ORDER — POTASSIUM CHLORIDE 1500 MG/1
TABLET, EXTENDED RELEASE ORAL
Qty: 270 TABLET | Refills: 0 | OUTPATIENT
Start: 2022-03-29

## 2022-03-29 NOTE — TELEPHONE ENCOUNTER
Routing refill request to provider for review/approval because:  Drug not on the [facility] refill protocol   traMADol (ULTRAM) 50 MG tablet 40 tablet 0 3/18/2022  No   Sig: TAKE 2 TABLETS (100 MG) BY MOUTH 3 TIMES DAILY AS NEEDED FOR SEVERE PAIN     LAST APPT-3/11/22

## 2022-03-29 NOTE — TELEPHONE ENCOUNTER
Last ordered: 1 week ago by Matt Swan PA-C      Patient comment: Once again the Pharmacy got it wrong and tells me I cannot renew this medication till the 5th of April.  I am out of this medicine by lunch tomarrow     Regarding Tramadol      Routing refill request to provider for review/approval because:  Drug not on the [facility] refill protocol

## 2022-03-31 ENCOUNTER — MYC REFILL (OUTPATIENT)
Dept: FAMILY MEDICINE | Facility: CLINIC | Age: 72
End: 2022-03-31
Payer: MEDICARE

## 2022-03-31 DIAGNOSIS — M79.7 FIBROMYALGIA: ICD-10-CM

## 2022-04-01 DIAGNOSIS — I10 BENIGN ESSENTIAL HYPERTENSION: ICD-10-CM

## 2022-04-01 RX ORDER — TRAMADOL HYDROCHLORIDE 50 MG/1
50 TABLET ORAL EVERY 6 HOURS PRN
Qty: 40 TABLET | Refills: 0 | Status: SHIPPED | OUTPATIENT
Start: 2022-04-01 | End: 2022-04-04

## 2022-04-01 RX ORDER — TRAMADOL HYDROCHLORIDE 50 MG/1
TABLET ORAL
Qty: 40 TABLET | Refills: 0 | Status: SHIPPED | OUTPATIENT
Start: 2022-04-01 | End: 2022-04-21

## 2022-04-01 RX ORDER — TRAMADOL HYDROCHLORIDE 50 MG/1
TABLET ORAL
Qty: 40 TABLET | Refills: 0 | OUTPATIENT
Start: 2022-04-01

## 2022-04-01 NOTE — PROGRESS NOTES
"      Kevon Arciniega is a 71 year old who presents for the following health issues  History of Present Illness       Reason for visit:  Possible Goiter, Lumps on joints; Sore Knee  Symptom onset:  3-7 days ago  Symptoms include:  My knee aches and swells with excess fluid  Symptom intensity:  Severe  Symptom progression:  Worsening  Had these symptoms before:  No  What makes it worse:  Standing  What makes it better:  Warm heatHe consumes 0 sweetened beverage(s) daily.He exercises with enough effort to increase his heart rate 9 or less minutes per day.  He exercises with enough effort to increase his heart rate 3 or less days per week.   He is taking medications regularly.     Recheck of his htn. Recheck of obesity. Advised lifestyle changes to aid in losing weight.  Left knee pain. Minimal swelling. No locking. Joint line pain.   Recheck of his depression.. doing well.   Takes 2 tramadol tid for his joint pains. i've asked him to try taking 1 tab tid. Want to see which dosage will be relied on moving forward. Then will fill out controlled substance agreement.     Patient is followed by Matt Swan PA-C for ongoing prescription of pain medication.  All refills should only be approved by this provider, or covering partner.    Medication(s): tramadol.   Maximum quantity per month: 1-2 tabs tid (/mos)  Clinic visit frequency required: Q 3 months   PDMP Review     None        Controlled substance agreement:  CSA -- Patient Level:    CSA: None found at the patient level.       Pain Clinic evaluation in the past: Yes       Date/Location:      Opioid Risk Tool Total Score(s):    Review of Systems   Constitutional, HEENT, cardiovascular, pulmonary, GI, , musculoskeletal, neuro, skin, endocrine and psych systems are negative, except as otherwise noted.      Objective    /72   Pulse 73   Temp 97.7  F (36.5  C) (Tympanic)   Resp 24   Ht 1.753 m (5' 9\")   Wt 133 kg (293 lb 3.2 oz)   SpO2 95%   BMI " 43.30 kg/m    Body mass index is 43.3 kg/m .  Physical Exam   Eye exam - right eye normal lid, conjunctiva, cornea, pupil and fundus, left eye normal lid, conjunctiva, cornea, pupil and fundus.  Thyroid not palpable, not enlarged, no nodules detected.  CHEST:chest clear to IPPA, no tachypnea, retractions or cyanosis and S1, S2 normal, no murmur, no gallop, rate regular.  KNEE: The injured knee reveals antalgic gait, soft tissue tenderness over his joint line, mild effusion, reduced range of motion, negative drawer sign, negative Kenji sign. Positive thessaly's sign.   Right wrist ganglion cyst. Wrist rom normal. Strength normal.     Prashant was seen today for musculoskeletal problem.    Diagnoses and all orders for this visit:    Benign essential hypertension  -     Comprehensive metabolic panel (BMP + Alb, Alk Phos, ALT, AST, Total. Bili, TP); Future    Encounter for long-term (current) use of medications  -     [id number] - Urine Drug Confirmation Panel (Comprehensive); Future    Current moderate episode of major depressive disorder without prior episode (H)    Morbid obesity (H)    Ganglion cyst of wrist, right    Fibromyalgia    Controlled substance agreement signed      If knee pain persists with obtain an mri.  Continue all current meds.  work on lifestyle modification

## 2022-04-03 ASSESSMENT — ANXIETY QUESTIONNAIRES
2. NOT BEING ABLE TO STOP OR CONTROL WORRYING: NOT AT ALL
GAD7 TOTAL SCORE: 0
1. FEELING NERVOUS, ANXIOUS, OR ON EDGE: NOT AT ALL
GAD7 TOTAL SCORE: 0
4. TROUBLE RELAXING: NOT AT ALL
7. FEELING AFRAID AS IF SOMETHING AWFUL MIGHT HAPPEN: NOT AT ALL
GAD7 TOTAL SCORE: 0
3. WORRYING TOO MUCH ABOUT DIFFERENT THINGS: NOT AT ALL
6. BECOMING EASILY ANNOYED OR IRRITABLE: NOT AT ALL
5. BEING SO RESTLESS THAT IT IS HARD TO SIT STILL: NOT AT ALL
7. FEELING AFRAID AS IF SOMETHING AWFUL MIGHT HAPPEN: NOT AT ALL

## 2022-04-03 ASSESSMENT — PATIENT HEALTH QUESTIONNAIRE - PHQ9
SUM OF ALL RESPONSES TO PHQ QUESTIONS 1-9: 0
10. IF YOU CHECKED OFF ANY PROBLEMS, HOW DIFFICULT HAVE THESE PROBLEMS MADE IT FOR YOU TO DO YOUR WORK, TAKE CARE OF THINGS AT HOME, OR GET ALONG WITH OTHER PEOPLE: NOT DIFFICULT AT ALL
SUM OF ALL RESPONSES TO PHQ QUESTIONS 1-9: 0

## 2022-04-04 ENCOUNTER — OFFICE VISIT (OUTPATIENT)
Dept: FAMILY MEDICINE | Facility: CLINIC | Age: 72
End: 2022-04-04
Payer: MEDICARE

## 2022-04-04 VITALS
DIASTOLIC BLOOD PRESSURE: 72 MMHG | TEMPERATURE: 97.7 F | RESPIRATION RATE: 24 BRPM | HEIGHT: 69 IN | HEART RATE: 73 BPM | WEIGHT: 293.2 LBS | BODY MASS INDEX: 43.43 KG/M2 | SYSTOLIC BLOOD PRESSURE: 136 MMHG | OXYGEN SATURATION: 95 %

## 2022-04-04 DIAGNOSIS — E66.01 MORBID OBESITY (H): ICD-10-CM

## 2022-04-04 DIAGNOSIS — Z79.899 ENCOUNTER FOR LONG-TERM (CURRENT) USE OF MEDICATIONS: ICD-10-CM

## 2022-04-04 DIAGNOSIS — M67.431 GANGLION CYST OF WRIST, RIGHT: ICD-10-CM

## 2022-04-04 DIAGNOSIS — M79.7 FIBROMYALGIA: ICD-10-CM

## 2022-04-04 DIAGNOSIS — M10.9 ACUTE GOUTY ARTHRITIS: ICD-10-CM

## 2022-04-04 DIAGNOSIS — Z79.899 CONTROLLED SUBSTANCE AGREEMENT SIGNED: ICD-10-CM

## 2022-04-04 DIAGNOSIS — I10 BENIGN ESSENTIAL HYPERTENSION: Primary | ICD-10-CM

## 2022-04-04 DIAGNOSIS — F32.1 CURRENT MODERATE EPISODE OF MAJOR DEPRESSIVE DISORDER WITHOUT PRIOR EPISODE (H): ICD-10-CM

## 2022-04-04 LAB
ALBUMIN SERPL-MCNC: 3.6 G/DL (ref 3.4–5)
ALP SERPL-CCNC: 80 U/L (ref 40–150)
ALT SERPL W P-5'-P-CCNC: 16 U/L (ref 0–70)
ANION GAP SERPL CALCULATED.3IONS-SCNC: 7 MMOL/L (ref 3–14)
AST SERPL W P-5'-P-CCNC: 20 U/L (ref 0–45)
BILIRUB SERPL-MCNC: 0.3 MG/DL (ref 0.2–1.3)
BUN SERPL-MCNC: 20 MG/DL (ref 7–30)
CALCIUM SERPL-MCNC: 9.5 MG/DL (ref 8.5–10.1)
CHLORIDE BLD-SCNC: 99 MMOL/L (ref 94–109)
CO2 SERPL-SCNC: 33 MMOL/L (ref 20–32)
CREAT SERPL-MCNC: 1.05 MG/DL (ref 0.66–1.25)
GFR SERPL CREATININE-BSD FRML MDRD: 76 ML/MIN/1.73M2
GLUCOSE BLD-MCNC: 97 MG/DL (ref 70–99)
POTASSIUM BLD-SCNC: 3.1 MMOL/L (ref 3.4–5.3)
PROT SERPL-MCNC: 7.4 G/DL (ref 6.8–8.8)
SODIUM SERPL-SCNC: 139 MMOL/L (ref 133–144)

## 2022-04-04 PROCEDURE — 80307 DRUG TEST PRSMV CHEM ANLYZR: CPT | Performed by: PHYSICIAN ASSISTANT

## 2022-04-04 PROCEDURE — 99214 OFFICE O/P EST MOD 30 MIN: CPT | Performed by: PHYSICIAN ASSISTANT

## 2022-04-04 PROCEDURE — 80053 COMPREHEN METABOLIC PANEL: CPT | Performed by: PHYSICIAN ASSISTANT

## 2022-04-04 PROCEDURE — 36415 COLL VENOUS BLD VENIPUNCTURE: CPT | Performed by: PHYSICIAN ASSISTANT

## 2022-04-04 RX ORDER — CHLORTHALIDONE 25 MG/1
TABLET ORAL
Qty: 90 TABLET | Refills: 0 | Status: SHIPPED | OUTPATIENT
Start: 2022-04-04 | End: 2022-07-02

## 2022-04-04 ASSESSMENT — ANXIETY QUESTIONNAIRES: GAD7 TOTAL SCORE: 0

## 2022-04-04 ASSESSMENT — PATIENT HEALTH QUESTIONNAIRE - PHQ9: SUM OF ALL RESPONSES TO PHQ QUESTIONS 1-9: 0

## 2022-04-04 ASSESSMENT — PAIN SCALES - GENERAL: PAINLEVEL: NO PAIN (1)

## 2022-04-04 NOTE — LETTER
My Depression Action Plan  Name: Prashant Keyes   Date of Birth 1950  Date: 4/4/2022    My doctor: Matt Swan   My clinic: [facility] [place]  [address]  [place] [id number] [phone]          GREEN    ZONE   Good Control    What it looks like:     Things are going generally well. You have normal ups and downs. You may even feel depressed from time to time, but bad moods usually last less than a day.   What you need to do:  1. Continue to care for yourself (see self care plan)  2. Check your depression survival kit and update it as needed  3. Follow your physician s recommendations including any medication.  4. Do not stop taking medication unless you consult with your physician first.           YELLOW         ZONE Getting Worse    What it looks like:     Depression is starting to interfere with your life.     It may be hard to get out of bed; you may be starting to isolate yourself from others.    Symptoms of depression are starting to last most all day and this has happened for several days.     You may have suicidal thoughts but they are not constant.   What you need to do:     1. Call your care team. Your response to treatment will improve if you keep your care team informed of your progress. Yellow periods are signs an adjustment may need to be made.     2. Continue your self-care.  Just get dressed and ready for the day.  Don't give yourself time to talk yourself out of it.    3. Talk to someone in your support network.    4. Open up your Depression Self-Care Plan/Wellness Kit.           RED    ZONE Medical Alert - Get Help    What it looks like:     Depression is seriously interfering with your life.     You may experience these or other symptoms: You can t get out of bed most days, can t work or engage in other necessary activities, you have trouble taking care of basic hygiene, or basic responsibilities, thoughts of suicide or death that will  not go away, self-injurious behavior.     What you need to do:  1. Call your care team and request a same-day appointment. If they are not available (weekends or after hours) call your local crisis line, emergency room or 911.          Depression Self-Care Plan / Wellness Kit    Many people find that medication and therapy are helpful treatments for managing depression. In addition, making small changes to your everyday life can help to boost your mood and improve your wellbeing. Below are some tips for you to consider. Be sure to talk with your medical provider and/or behavioral health consultant if your symptoms are worsening or not improving.     Sleep   Sleep hygiene  means all of the habits that support good, restful sleep. It includes maintaining a consistent bedtime and wake time, using your bedroom only for sleeping or sex, and keeping the bedroom dark and free of distractions like a computer, smartphone, or television.     Develop a Healthy Routine  Maintain good hygiene. Get out of bed in the morning, make your bed, brush your teeth, take a shower, and get dressed. Don t spend too much time viewing media that makes you feel stressed. Find time to relax each day.    Exercise  Get some form of exercise every day. This will help reduce pain and release endorphins, the  feel good  chemicals in your brain. It can be as simple as just going for a walk or doing some gardening, anything that will get you moving.      Diet  Strive to eat healthy foods, including fruits and vegetables. Drink plenty of water. Avoid excessive sugar, caffeine, alcohol, and other mood-altering substances.     Stay Connected with Others  Stay in touch with friends and family members.    Manage Your Mood  Try deep breathing, massage therapy, biofeedback, or meditation. Take part in fun activities when you can. Try to find something to smile about each day.     Psychotherapy  Be open to working with a therapist if your provider recommends  it.     Medication  Be sure to take your medication as prescribed. Most anti-depressants need to be taken every day. It usually takes several weeks for medications to work. Not all medicines work for all people. It is important to follow-up with your provider to make sure you have a treatment plan that is working for you. Do not stop your medication abruptly without first discussing it with your provider.    Crisis Resources   These hotlines are for both adults and children. They and are open 24 hours a day, 7 days a week unless noted otherwise.      National Suicide Prevention Lifeline   0-680-250-TALK (7304)      Crisis Text Line    www.crisistextline.org  Text HOME to 586217 from anywhere in the [place], anytime, about any type of crisis. A live, trained crisis counselor will receive the text and respond quickly.      [facility] Lifeline for LGBTQ Youth  A national crisis intervention and suicide lifeline for LGBTQ youth under 25. Provides a safe place to talk without judgement. Call 1-276.202.7429; text START to 605158 or visit www.thetrevorproject.org to talk to a trained counselor.      For [facility] crisis numbers, visit the [facility] website at:  https://[place].gov/dhs/people-we-serve/adults/health-care/mental-health/resources/crisis-contacts.jsp

## 2022-04-05 LAB — CREAT UR-MCNC: 22 MG/DL

## 2022-04-05 RX ORDER — COLCHICINE 0.6 MG/1
TABLET ORAL
Qty: 30 TABLET | Refills: 0 | Status: SHIPPED | OUTPATIENT
Start: 2022-04-05 | End: 2022-04-19

## 2022-04-05 NOTE — TELEPHONE ENCOUNTER
"Requested Prescriptions   Pending Prescriptions Disp Refills    colchicine (COLCYRS) 0.6 MG tablet [Pharmacy Med Name: COLCHICINE 0.6 MG TABLET] 30 tablet 0     Sig: TAKE 2 TABS BY MOUTH ON 1ST DAY.TAKE 1 TAB DAILY AS NEEDED FOR GOUT FLAIR.MAX OF 1 WEEK PER FLAIR        Gout Agents Protocol Failed - 4/4/2022 10:31 AM        Failed - Has Uric Acid on file in past 12 months and value is less than 6     Recent Labs   Lab Test 07/19/16  1159   URIC 9.0*     If level is 6mg/dL or greater, ok to refill one time and refer to provider.             Passed - CBC on file in past 12 months     Recent Labs   Lab Test 09/28/21  1049   WBC 9.1   RBC 3.63*   HGB 11.9*   HCT 36.4*                      Passed - ALT on file in past 12 months     Recent Labs   Lab Test 04/04/22  1402   ALT 16               Passed - Recent (12 mo) or future (30 days) visit within the authorizing provider's specialty     Patient has had an office visit with the authorizing provider or a provider within the authorizing providers department within the previous 12 mos or has a future within next 30 days. See \"Patient Info\" tab in inbasket, or \"Choose Columns\" in Meds & Orders section of the refill encounter.              Passed - Medication is active on med list        Passed - Patient is age 18 or older        Passed - Normal serum creatinine on file in the past 12 months     Recent Labs   Lab Test 04/04/22  1402   CR 1.05       Ok to refill medication if creatinine is low              Routing refill request to provider for review/approval because:  Failed protocol.  Renetta Cohen RN, BSN  Triage nurse  [facility]: [place]    "

## 2022-04-06 ENCOUNTER — TELEPHONE (OUTPATIENT)
Dept: NEUROLOGY | Facility: CLINIC | Age: 72
End: 2022-04-06
Payer: MEDICARE

## 2022-04-06 NOTE — TELEPHONE ENCOUNTER
DVS paper work mailed to patient, DVS and also a copy sent to scanning on 4/6/22.    Yohana Kapoor LPN on 4/6/2022 at 10:25 AM

## 2022-04-07 LAB
GABAPENTIN UR QL CFM: PRESENT
N-NORTRAMADOL/CREAT UR CFM: ABNORMAL NG/MG {CREAT}
O-NORTRAMADOL UR CFM-MCNC: 5740 NG/ML
TRAMADOL CTO UR CFM-MCNC: 6680 NG/ML
TRAMADOL/CREAT UR: ABNORMAL NG/MG {CREAT}

## 2022-04-11 ENCOUNTER — MYC MEDICAL ADVICE (OUTPATIENT)
Dept: FAMILY MEDICINE | Facility: CLINIC | Age: 72
End: 2022-04-11

## 2022-04-11 ENCOUNTER — HOSPITAL ENCOUNTER (OUTPATIENT)
Dept: PHYSICAL THERAPY | Facility: CLINIC | Age: 72
Setting detail: THERAPIES SERIES
Discharge: HOME | End: 2022-04-11
Attending: PSYCHIATRY & NEUROLOGY
Payer: MEDICARE

## 2022-04-11 DIAGNOSIS — G89.29 CHRONIC PAIN OF LEFT KNEE: Primary | ICD-10-CM

## 2022-04-11 DIAGNOSIS — M25.562 CHRONIC PAIN OF LEFT KNEE: Primary | ICD-10-CM

## 2022-04-11 PROCEDURE — 97112 NEUROMUSCULAR REEDUCATION: CPT | Mod: GP | Performed by: PHYSICAL THERAPIST

## 2022-04-11 PROCEDURE — 97110 THERAPEUTIC EXERCISES: CPT | Mod: GP | Performed by: PHYSICAL THERAPIST

## 2022-04-20 DIAGNOSIS — M79.7 FIBROMYALGIA: ICD-10-CM

## 2022-04-21 NOTE — TELEPHONE ENCOUNTER
Routing refill request to provider for review/approval because:  Drug not on the [facility] refill protocol   traMADol (ULTRAM) 50 MG tablet 40 tablet 0 4/1/2022  No   Sig: TAKE 2 TABLETS (100 MG) BY MOUTH 3 TIMES DAILY AS NEEDED FOR SEVERE PAIN   LAST OV-4/4/22

## 2022-04-22 RX ORDER — TRAMADOL HYDROCHLORIDE 50 MG/1
TABLET ORAL
Qty: 40 TABLET | Refills: 3 | Status: SHIPPED | OUTPATIENT
Start: 2022-04-22 | End: 2022-06-14

## 2022-05-02 ENCOUNTER — ANCILLARY PROCEDURE (OUTPATIENT)
Dept: MRI IMAGING | Facility: CLINIC | Age: 72
End: 2022-05-02
Attending: PHYSICIAN ASSISTANT
Payer: MEDICARE

## 2022-05-02 DIAGNOSIS — G89.29 CHRONIC PAIN OF LEFT KNEE: ICD-10-CM

## 2022-05-02 DIAGNOSIS — M25.562 CHRONIC PAIN OF LEFT KNEE: ICD-10-CM

## 2022-05-02 PROCEDURE — 73721 MRI JNT OF LWR EXTRE W/O DYE: CPT | Mod: LT | Performed by: RADIOLOGY

## 2022-05-15 DIAGNOSIS — I10 BENIGN ESSENTIAL HYPERTENSION: ICD-10-CM

## 2022-05-15 DIAGNOSIS — I10 HYPERTENSION GOAL BP (BLOOD PRESSURE) < 140/90: ICD-10-CM

## 2022-05-18 RX ORDER — HYDRALAZINE HYDROCHLORIDE 25 MG/1
TABLET, FILM COATED ORAL
Qty: 180 TABLET | Refills: 0 | Status: SHIPPED | OUTPATIENT
Start: 2022-05-18 | End: 2022-08-01

## 2022-05-18 RX ORDER — LOSARTAN POTASSIUM 100 MG/1
TABLET ORAL
Qty: 90 TABLET | Refills: 0 | Status: SHIPPED | OUTPATIENT
Start: 2022-05-18 | End: 2022-08-02

## 2022-05-18 NOTE — TELEPHONE ENCOUNTER
Routing refill request to provider for review/approval because:  Labs out of range:    Potassium   Date Value Ref Range Status   04/04/2022 3.1 (L) 3.4 - 5.3 mmol/L Final   03/23/2021 3.3 (L) 3.4 - 5.3 mmol/L Final

## 2022-05-25 ENCOUNTER — OFFICE VISIT (OUTPATIENT)
Dept: URGENT CARE | Facility: URGENT CARE | Age: 72
End: 2022-05-25
Payer: OTHER MISCELLANEOUS

## 2022-05-25 VITALS
WEIGHT: 291.4 LBS | DIASTOLIC BLOOD PRESSURE: 76 MMHG | SYSTOLIC BLOOD PRESSURE: 145 MMHG | TEMPERATURE: 97.8 F | OXYGEN SATURATION: 97 % | BODY MASS INDEX: 43.03 KG/M2 | HEART RATE: 67 BPM

## 2022-05-25 DIAGNOSIS — M54.16 LUMBAR RADICULOPATHY: Primary | ICD-10-CM

## 2022-05-25 PROCEDURE — 99214 OFFICE O/P EST MOD 30 MIN: CPT | Performed by: FAMILY MEDICINE

## 2022-05-25 RX ORDER — METHOCARBAMOL 500 MG/1
500 TABLET, FILM COATED ORAL 3 TIMES DAILY
Qty: 30 TABLET | Refills: 0 | Status: SHIPPED | OUTPATIENT
Start: 2022-05-25 | End: 2022-12-20

## 2022-05-25 RX ORDER — LORAZEPAM 0.5 MG/1
TABLET ORAL
Qty: 2 TABLET | Refills: 0 | Status: SHIPPED | OUTPATIENT
Start: 2022-05-25 | End: 2022-12-20

## 2022-05-25 RX ORDER — METHYLPREDNISOLONE 4 MG
TABLET, DOSE PACK ORAL
Qty: 21 TABLET | Refills: 0 | Status: SHIPPED | OUTPATIENT
Start: 2022-05-25 | End: 2022-12-20

## 2022-05-25 NOTE — PROGRESS NOTES
Chief complaint: back pain    Employer: [employer]   Altercation on may 13th     There were customers that patient was asking for receipt on their way out  Patient couldn't find an item that was in the cart that wasn't in the receipt   Patient grabbed a hold of the cart as the customer was trying to escape with the receipt     In the process of this patient was pulling at the cart and has back pain since then     Progressively worsening   Left lumbar radiating to the left leg   Occasional shooting numbness on left leg     Problem list, Medication list, Allergies, and Medical/Social/Surgical histories reviewed in [facility] and updated as appropriate.        REVIEW OF SYSTEMS  General: negative for fever, constitutional symptoms or weight loss  Resp: negative for chest pain or shortness of breath  CV: negative for chest pain  : negative for dysuria , incontinence, frequency  Musculoskeletal: as above  Neurologic: negative for ataxia, saddle anesthesia, fecal or urinary incontinence, one sided weakness,  Paresthesias  Constitutional, HEENT, cardiovascular, pulmonary, gi and gu systems are negative, except as otherwise noted.    Physical Exam:  Vitals: BP (!) 145/76   Pulse 67   Temp 97.8  F (36.6  C)   Wt 132.2 kg (291 lb 6.4 oz)   SpO2 97%   BMI 43.03 kg/m    BMI= Body mass index is 43.03 kg/m .  Constitutional: healthy, alert and no acute distress   Head: atraumatic  CARDIO: regular in rate and rhythm no murmurs rubs or gallops  RESP: lungs clear to auscultation  ABDOMEN: soft nontender  NEURO: Patellar reflexes intact and equal b/l  BACK:  Straight leg raise intact, No spine tenderness  Positive left lumbar  paraspinal muscle tenderness to palpation, strength intact and equal b/l lower extremities. Patient reports very mild decreasd senation entire left lower leg not following dermatomal distribution . Rectal exam declined/deferred.   GAIT: intact  Psychiatric: mentation appears normal and affect  normal/bright      Impression:    ICD-10-CM    1. Lumbar radiculopathy  M54.16 methylPREDNISolone (MEDROL DOSEPAK) 4 MG tablet therapy pack     methocarbamol (ROBAXIN) 500 MG tablet     Spine Referral     MR Lumbar Spine w/o Contrast     LORazepam (ATIVAN) 0.5 MG tablet         Plan:  Patient states he had good relief from robaxin in the past   Warned sedating  Sedating medications given. Aware not to drive or operate machinery while on these medications. Caution with .   Trial medrol dose pack  Patient reports worsening numbness (subjective not following dermatomal distribution)  - will obtain MRI within 24 hours   Ativan to help with anxiety MRI - warned sedating do not taake with other sedating meds  Patient denies taking any opioids at this time   Instructions for back care and return precautions discussed.    back pain stretching excercises discussed. supportive treatment.  considery physical therapy if not better despite supportive treatment.  activity modifications advised.  Over the counter medications discussed. Patient aware to avoid NSAIDS if with any kidney disease or ulcers. Proper dosing of over the counter medications likewise discussed.  Adverse reaction to medication discussed.  aware to come in right away if with any fever or chills, worsening symptoms, headache, bowel or bladder incontinence, motor or sensory deficits or gait disturbances.   follow-up recommended.      Lisa Carrera MD

## 2022-05-26 ENCOUNTER — ANCILLARY PROCEDURE (OUTPATIENT)
Dept: MRI IMAGING | Facility: CLINIC | Age: 72
End: 2022-05-26
Attending: FAMILY MEDICINE
Payer: MEDICARE

## 2022-05-26 DIAGNOSIS — M54.16 LUMBAR RADICULOPATHY: ICD-10-CM

## 2022-05-26 PROCEDURE — G1004 CDSM NDSC: HCPCS | Mod: TC | Performed by: RADIOLOGY

## 2022-05-26 PROCEDURE — 72148 MRI LUMBAR SPINE W/O DYE: CPT | Mod: TC | Performed by: RADIOLOGY

## 2022-05-26 NOTE — PROGRESS NOTES
Kevon Arciniega is a 71 year old who presents for the following health issues    History of Present Illness       Back Pain:  He presents for follow up of back pain. Patient's back pain is a new problem.    Original cause of back pain: work related injury  First noticed back pain: in the last week  Patient feels back pain: constantlyLocation of back pain:  Left side of waist  Description of back pain: dull ache  Back pain spreads: left thigh, left knee and left foot    Since patient first noticed back pain, pain is: always present, but gets better and worse  Does back pain interfere with his job:  Yes  On a scale of 1-10 (10 being the worst), patient describes pain as:  6  What makes back pain worse: bending, certain positions, lying down, sitting, standing, twisting and other  Acupuncture: not tried  Acetaminophen: not helpful  Activity or exercise: not tried  Chiropractor:  Not tried  Cold: not tried  Heat: helpful  Massage: helpful  Muscle relaxants: helpful  NSAIDS: not tried  Opioids: not tried  Physical Therapy: not tried  Rest: helpful  Steroid Injection: not tried  Stretching: not tried  Surgery: not tried  TENS unit: not tried  Topical pain relievers: not tried  Other healthcare providers patient is seeing for back pain: None    He eats 2-3 servings of fruits and vegetables daily.He consumes 2 sweetened beverage(s) daily.He exercises with enough effort to increase his heart rate 9 or less minutes per day.  He exercises with enough effort to increase his heart rate 3 or less days per week.   He is taking medications regularly.     Low back pain continues to improve. Denies radicular symptoms. No weakness.   Derm Concern  - Left side of head, painful. Reddish to brown spot just posterior to left temple . Suspicious for an ak lesion .        Review of Systems   Constitutional, HEENT, cardiovascular, pulmonary, GI, , musculoskeletal, neuro, skin, endocrine and psych systems are negative, except as  otherwise noted.      Objective    There were no vitals taken for this visit.  There is no height or weight on file to calculate BMI.  Physical Exam     Lumbosacral spine area reveals no local tenderness or mass.  Full and painless lumbosacral range of motion is noted. Straight leg raise is negative at 90 degrees on both sides. DTR's, motor strength and sensation normal, including heel and toe gait.  Peripheral pulses are palpable. Hips and knees have full range of motion without pain. No abdominal tenderness, mass or organomegaly.  CHEST:chest clear to IPPA, no tachypnea, retractions or cyanosis and S1, S2 normal, no murmur, no gallop, rate regular.  Eye exam - right eye normal lid, conjunctiva, cornea, pupil and fundus, left eye normal lid, conjunctiva, cornea, pupil and fundus.    Lesion on left scalp is roughly 8mm in diameter. Hyperpigmented. Lesion was tx'd with 2 ftc's of cryotherapy.     Prashant was seen today for recheck and derm problem.    Diagnoses and all orders for this visit:    Strain of lumbar region, subsequent encounter  Resolving nicely.  Reviewed mri.  work on lifestyle modification  Advised supportive and symptomatic treatment.  Follow up with Provider - if condition persists or worsens.     Osteopenia of multiple sites  -     DX Hip/Pelvis/Spine; Future    AK (actinic keratosis)  -     DESTRUCT BENIGN LESION, UP TO 14    Other orders  -     REVIEW OF HEALTH MAINTENANCE PROTOCOL ORDERS

## 2022-05-31 ENCOUNTER — OFFICE VISIT (OUTPATIENT)
Dept: FAMILY MEDICINE | Facility: CLINIC | Age: 72
End: 2022-05-31
Payer: MEDICARE

## 2022-05-31 VITALS
HEIGHT: 69 IN | WEIGHT: 290.2 LBS | OXYGEN SATURATION: 96 % | HEART RATE: 62 BPM | SYSTOLIC BLOOD PRESSURE: 125 MMHG | DIASTOLIC BLOOD PRESSURE: 77 MMHG | TEMPERATURE: 97.2 F | BODY MASS INDEX: 42.98 KG/M2 | RESPIRATION RATE: 20 BRPM

## 2022-05-31 DIAGNOSIS — M85.89 OSTEOPENIA OF MULTIPLE SITES: ICD-10-CM

## 2022-05-31 DIAGNOSIS — S39.012D STRAIN OF LUMBAR REGION, SUBSEQUENT ENCOUNTER: Primary | ICD-10-CM

## 2022-05-31 DIAGNOSIS — L57.0 AK (ACTINIC KERATOSIS): ICD-10-CM

## 2022-05-31 PROCEDURE — 99213 OFFICE O/P EST LOW 20 MIN: CPT | Mod: 25 | Performed by: PHYSICIAN ASSISTANT

## 2022-05-31 PROCEDURE — 17110 DESTRUCTION B9 LES UP TO 14: CPT | Performed by: PHYSICIAN ASSISTANT

## 2022-05-31 ASSESSMENT — PAIN SCALES - GENERAL: PAINLEVEL: NO PAIN (0)

## 2022-06-01 DIAGNOSIS — N32.81 OVERACTIVE BLADDER: ICD-10-CM

## 2022-06-03 RX ORDER — OXYBUTYNIN CHLORIDE 5 MG/1
TABLET ORAL
Qty: 180 TABLET | Refills: 0 | Status: SHIPPED | OUTPATIENT
Start: 2022-06-03 | End: 2022-08-26

## 2022-06-03 NOTE — TELEPHONE ENCOUNTER
"Requested Prescriptions   Pending Prescriptions Disp Refills     oxybutynin (DITROPAN) 5 MG tablet [Pharmacy Med Name: OXYBUTYNIN 5 MG TABLET] 180 tablet 0     Sig: TAKE 1 TABLET BY MOUTH TWICE A DAY       Muscarinic Antagonists (Urinary Incontinence Agents) Passed - 6/1/2022 12:22 AM        Passed - Recent (12 mo) or future (30 days) visit within the authorizing provider's specialty     Patient has had an office visit with the authorizing provider or a provider within the authorizing providers department within the previous 12 mos or has a future within next 30 days. See \"Patient Info\" tab in inbasket, or \"Choose Columns\" in Meds & Orders section of the refill encounter.              Passed - Medication is Oxybutynin and patient is 5 years of age or older        Passed - Patient does not have a diagnosis of glaucoma on the problem list     If glaucoma diagnosis is new, refer refill to physician.          Passed - Medication is active on med list        Passed - Patient is 18 years of age or older           Prescription approved per [facility] Refill Protocol.  Renetta CARRASCO,BSN  Triage Nurse  [facility]: [facility]  Ph: [phone]      "

## 2022-06-05 DIAGNOSIS — M10.071 ACUTE IDIOPATHIC GOUT OF RIGHT ANKLE: Chronic | ICD-10-CM

## 2022-06-05 DIAGNOSIS — I10 BENIGN ESSENTIAL HYPERTENSION: ICD-10-CM

## 2022-06-05 DIAGNOSIS — E78.00 PURE HYPERCHOLESTEROLEMIA: ICD-10-CM

## 2022-06-08 NOTE — TELEPHONE ENCOUNTER
Routing refill request to provider for review/approval because:  Drug not on the [facility] refill protocol   Potassium   Date Value Ref Range Status   04/04/2022 3.1 (L) 3.4 - 5.3 mmol/L Final   03/23/2021 3.3 (L) 3.4 - 5.3 mmol/L Final     Uric Acid   Date Value Ref Range Status   07/19/2016 9.0 (H) 3.5 - 7.2 mg/dL Final

## 2022-06-09 RX ORDER — ALLOPURINOL 100 MG/1
TABLET ORAL
Qty: 180 TABLET | Refills: 0 | Status: SHIPPED | OUTPATIENT
Start: 2022-06-09 | End: 2022-08-26

## 2022-06-09 RX ORDER — SIMVASTATIN 20 MG
TABLET ORAL
Qty: 180 TABLET | Refills: 0 | Status: SHIPPED | OUTPATIENT
Start: 2022-06-09 | End: 2022-08-26

## 2022-06-09 RX ORDER — FUROSEMIDE 20 MG
TABLET ORAL
Qty: 180 TABLET | Refills: 0 | Status: SHIPPED | OUTPATIENT
Start: 2022-06-09 | End: 2022-08-26

## 2022-06-09 RX ORDER — CARVEDILOL 12.5 MG/1
TABLET ORAL
Qty: 180 TABLET | Refills: 0 | Status: SHIPPED | OUTPATIENT
Start: 2022-06-09 | End: 2022-08-26

## 2022-06-09 RX ORDER — AMLODIPINE BESYLATE 10 MG/1
TABLET ORAL
Qty: 90 TABLET | Refills: 0 | Status: SHIPPED | OUTPATIENT
Start: 2022-06-09 | End: 2022-08-26

## 2022-06-10 ENCOUNTER — MYC MEDICAL ADVICE (OUTPATIENT)
Dept: SLEEP MEDICINE | Facility: CLINIC | Age: 72
End: 2022-06-10

## 2022-06-13 ENCOUNTER — ANCILLARY PROCEDURE (OUTPATIENT)
Dept: BONE DENSITY | Facility: CLINIC | Age: 72
End: 2022-06-13
Attending: PHYSICIAN ASSISTANT
Payer: MEDICARE

## 2022-06-13 DIAGNOSIS — M85.89 OSTEOPENIA OF MULTIPLE SITES: ICD-10-CM

## 2022-06-13 DIAGNOSIS — M79.7 FIBROMYALGIA: ICD-10-CM

## 2022-06-13 PROCEDURE — 77080 DXA BONE DENSITY AXIAL: CPT | Performed by: INTERNAL MEDICINE

## 2022-06-14 RX ORDER — TRAMADOL HYDROCHLORIDE 50 MG/1
TABLET ORAL
Qty: 40 TABLET | Refills: 3 | Status: SHIPPED | OUTPATIENT
Start: 2022-06-14 | End: 2022-07-26

## 2022-06-29 DIAGNOSIS — E87.6 HYPOKALEMIA: ICD-10-CM

## 2022-06-30 DIAGNOSIS — I10 BENIGN ESSENTIAL HYPERTENSION: ICD-10-CM

## 2022-06-30 NOTE — TELEPHONE ENCOUNTER
Routing refill request to provider for review/approval because:  Labs out of range:  K+      Potassium   Date Value Ref Range Status   04/04/2022 3.1 (L) 3.4 - 5.3 mmol/L Final   03/23/2021 3.3 (L) 3.4 - 5.3 mmol/L Final

## 2022-07-01 RX ORDER — POTASSIUM CHLORIDE 1500 MG/1
TABLET, EXTENDED RELEASE ORAL
Qty: 270 TABLET | Refills: 0 | Status: SHIPPED | OUTPATIENT
Start: 2022-07-01 | End: 2022-09-13

## 2022-07-01 NOTE — TELEPHONE ENCOUNTER
"Requested Prescriptions   Pending Prescriptions Disp Refills    chlorthalidone (HYGROTON) 25 MG tablet [Pharmacy Med Name: CHLORTHALIDONE 25 MG TABLET] 90 tablet 0     Sig: TAKE 1 TABLET BY MOUTH EVERY DAY        Diuretics (Including Combos) Protocol Failed - 6/30/2022 10:45 AM        Failed - Normal serum potassium on file in past 12 months       Recent Labs   Lab Test 04/04/22  1402   POTASSIUM 3.1*                    Passed - Blood pressure under 140/90 in past 12 months       BP Readings from Last 3 Encounters:   05/31/22 125/77   05/25/22 (!) 145/76   04/04/22 136/72                 Passed - Recent (12 mo) or future (30 days) visit within the authorizing provider's specialty     Patient has had an office visit with the authorizing provider or a provider within the authorizing providers department within the previous 12 mos or has a future within next 30 days. See \"Patient Info\" tab in inbasket, or \"Choose Columns\" in Meds & Orders section of the refill encounter.              Passed - Medication is active on med list        Passed - Patient is age 18 or older        Passed - Normal serum creatinine on file in past 12 months       Recent Labs   Lab Test 04/04/22  1402   CR 1.05              Passed - Normal serum sodium on file in past 12 months       Recent Labs   Lab Test 04/04/22  1402                     Routing refill request to provider for review/approval because:  Failed protocol.  Renetta Cohen RN, BSN  Triage nurse  [facility]: [place]"

## 2022-07-02 RX ORDER — CHLORTHALIDONE 25 MG/1
TABLET ORAL
Qty: 90 TABLET | Refills: 0 | Status: SHIPPED | OUTPATIENT
Start: 2022-07-02 | End: 2022-09-19

## 2022-07-22 NOTE — PROGRESS NOTES
"      Kevon Arciniega is a 71 year old{, presenting for the following health issues:  Derm Problem (Rash on legs- bilateral x 3-4 wks, no itch, just red)      History of Present Illness       Reason for visit:  Rash on leg.  Symptom onset:  3-4 weeks ago  Symptoms include:  Just a red spot near upper left groin.  Symptom intensity:  Mild  Symptom progression:  Staying the same  Had these symptoms before:  No  What makes it worse:  Nope  What makes it better:  Nope    He eats 0-1 servings of fruits and vegetables daily.He consumes 3 sweetened beverage(s) daily.He exercises with enough effort to increase his heart rate 9 or less minutes per day.  He exercises with enough effort to increase his heart rate 3 or less days per week.   He is taking medications regularly.     Red discoloration of skin of inner upper thigh bilaterally. No itching or pain. No trauma. Has tried over the counter lotrimin and hydrocortisone cream with out any impact.     AK lesion on the dorsal right forearm.    Review of Systems   Constitutional, HEENT, cardiovascular, pulmonary, GI, , musculoskeletal, neuro, skin, endocrine and psych systems are negative, except as otherwise noted.      Objective    /67   Pulse 59   Temp 97.1  F (36.2  C) (Tympanic)   Resp 20   Ht 1.753 m (5' 9\")   Wt 135.5 kg (298 lb 12.8 oz)   SpO2 95%   BMI 44.13 kg/m    Body mass index is 44.13 kg/m .  Physical Exam   ak lesion on his right forearm was tx'd with 2 ftc's of cryotherapy.  Red blanchable skin discoloration of both upper inner thighs. No scaling. No tenderness.     Prashant was seen today for derm problem.    Diagnoses and all orders for this visit:    Rash  -     Adult Dermatology Referral; Future  -     triamcinolone (KENALOG) 0.1 % external cream; Apply topically 2 times daily    Screen for colon cancer  -     Colonscopy Screening  Referral; Future    AK (actinic keratosis)  -     DESTRUCT BENIGN LESION, UP TO 14      Advised " supportive and symptomatic treatment.  Follow up with Provider - if condition persists or worsens.         .  ..

## 2022-07-24 DIAGNOSIS — M79.7 FIBROMYALGIA: ICD-10-CM

## 2022-07-25 ENCOUNTER — OFFICE VISIT (OUTPATIENT)
Dept: FAMILY MEDICINE | Facility: CLINIC | Age: 72
End: 2022-07-25
Payer: MEDICARE

## 2022-07-25 VITALS
WEIGHT: 298.8 LBS | OXYGEN SATURATION: 95 % | DIASTOLIC BLOOD PRESSURE: 67 MMHG | RESPIRATION RATE: 20 BRPM | HEART RATE: 59 BPM | BODY MASS INDEX: 44.25 KG/M2 | TEMPERATURE: 97.1 F | SYSTOLIC BLOOD PRESSURE: 131 MMHG | HEIGHT: 69 IN

## 2022-07-25 DIAGNOSIS — Z12.11 SCREEN FOR COLON CANCER: ICD-10-CM

## 2022-07-25 DIAGNOSIS — L57.0 AK (ACTINIC KERATOSIS): ICD-10-CM

## 2022-07-25 DIAGNOSIS — R21 RASH: Primary | ICD-10-CM

## 2022-07-25 PROCEDURE — 17000 DESTRUCT PREMALG LESION: CPT | Performed by: PHYSICIAN ASSISTANT

## 2022-07-25 PROCEDURE — 99213 OFFICE O/P EST LOW 20 MIN: CPT | Mod: 25 | Performed by: PHYSICIAN ASSISTANT

## 2022-07-25 RX ORDER — TRIAMCINOLONE ACETONIDE 1 MG/G
CREAM TOPICAL 2 TIMES DAILY
Qty: 80 G | Refills: 1 | Status: SHIPPED | OUTPATIENT
Start: 2022-07-25 | End: 2022-09-13

## 2022-07-25 ASSESSMENT — PAIN SCALES - GENERAL: PAINLEVEL: NO PAIN (0)

## 2022-07-25 NOTE — TELEPHONE ENCOUNTER
Routing refill request to provider for review/approval because:  Drug not on the [facility] refill protocol     Requested Prescriptions   Pending Prescriptions Disp Refills    traMADol (ULTRAM) 50 MG tablet [Pharmacy Med Name: TRAMADOL HCL 50 MG TABLET] 40 tablet      Sig: TAKE 1 TABLET (50 MG) BY MOUTH EVERY 6 HOURS AS NEEDED FOR SEVERE PAIN        There is no refill protocol information for this order        gabapentin (NEURONTIN) 300 MG capsule [Pharmacy Med Name: GABAPENTIN 300 MG CAPSULE] 180 capsule 5     Sig: TAKE 2 CAPSULES BY MOUTH 3 TIMES DAILY.        There is no refill protocol information for this order            Beverly Geiger RN   [facility]

## 2022-07-26 ENCOUNTER — HOSPITAL ENCOUNTER (OUTPATIENT)
Facility: CLINIC | Age: 72
End: 2022-07-26
Attending: COLON & RECTAL SURGERY | Admitting: COLON & RECTAL SURGERY
Payer: MEDICARE

## 2022-07-26 ENCOUNTER — TELEPHONE (OUTPATIENT)
Dept: GASTROENTEROLOGY | Facility: CLINIC | Age: 72
End: 2022-07-26

## 2022-07-26 DIAGNOSIS — Z12.11 ENCOUNTER FOR SCREENING COLONOSCOPY: Primary | ICD-10-CM

## 2022-07-26 RX ORDER — TRAMADOL HYDROCHLORIDE 50 MG/1
TABLET ORAL
Qty: 40 TABLET | Refills: 0 | Status: SHIPPED | OUTPATIENT
Start: 2022-07-26 | End: 2022-08-02

## 2022-07-26 RX ORDER — GABAPENTIN 300 MG/1
CAPSULE ORAL
Qty: 180 CAPSULE | Refills: 5 | Status: SHIPPED | OUTPATIENT
Start: 2022-07-26 | End: 2023-01-19

## 2022-07-26 NOTE — TELEPHONE ENCOUNTER
Screening Questions    BlueKIND OF PREP RedLOCATION [review exclusion criteria] GreenSEDATION TYPE      1. Are you active on mychart? Y    2. What insurance is in the chart? MEDICARE AND      3.   Ordering/Referring Provider: Matt Swan PA-C    4. BMI   (If greater than 40 review exclusion criteria [PAC APPT IF [MAC] @ [facility])  44.13  [If yes, BMI OVER 40-EXTENDED PREP]      **(Sedation review/consideration needed)**  Do you have a legal guardian or Medical Power of    and/or are you able to give consent for your medical care?     Y SELF    5. Have you had a positive covid test in the last 90 days?   N -     6.  Are you currently on dialysis?   N [ If yes, G-PREP & HOSPITAL setting ONLY]     7.  Do you have chronic kidney disease?  Y? STAGE 2 HE THINKS [ If yes, G-PREP ]    8.   Do you have a diagnosis of diabetes?   N   [ If yes, G-PREP ]    9.  On a regular basis do you go 3-5 days between bowel movements?   N   [ If yes, EXTENDED PREP]    10.  Are you taking any prescription pain medications on a routine schedule?    Y TRAMADOL 3 X'S A DAY [ If yes, EXTENDED PREP] [If yes, MAC]      11.   Do you have any chemical dependencies such as alcohol, street drugs, or methadone?    N [If yes, MAC]    12.   Do you have any history of post-traumatic stress syndrome, severe anxiety or history of psychosis?    N  [If yes, MAC]    13.  [FEMALES] Are you currently pregnant?     If yes, how many weeks?       Respiratory/Heart Screening:  [If yes to any of the following HOSPITAL setting only]     14. Do you have Pulmonary Hypertension [Lungs]?   N       15. Do you have UNCONTROLLED asthma?   N     16.  Do you use daily home oxygen?  N      17. Do you have mod to severe Obstructive Sleep Apnea?         (OKAY @ [facility]  [facility]  SH  PH  RI  MG - if pt is not on OXYGEN)  Y CPAP      18.   Have you had a heart or lung transplant?   N      19.   Have you had a stroke or Transient ischemic attack (TIA - aka  mini  stroke ) within 6 months?  (If yes, please review exclusion criteria)  N     20.   In the past 6 months, have you had any heart related issues including cardiomyopathy or heart attack?   N           If yes, did it require cardiac stenting or other implantable device?         21.   Do you have any implantable devices in your body (pacemaker, defib, LVAD)? (If yes, please review exclusion criteria)  N   22.  Do you take the medication Phentermine? N     Yes-> Hold for 7 days before procedure.  Please consult your prescribing provider if you have questions about holding this medication.     No-> Continue to next question.    23. Do you take nitroglycerin?   N           If yes, how often?   (if yes, HOSPITAL setting ONLY)    24.  Are you currently taking any blood thinners?    [If yes, INFORM patient to follw up w/ ORDERING PROVIDER FOR BRIDGING INSTRUCTIONS]     N    25.   Do you transfer independently?                (If NO, please HOSPITAL setting ONLY)  y    26.   Preferred LOCAL Pharmacy for Pre Prescription:      [id number] - [place], [place] - [address] [address] [facility] [address] [facility]    Scheduling Details  (Please ask for phone number if not scheduled by patient)      Caller : Juice  Date of Procedure: 9/26/22  Surgeon: Ernst  Location:         Sedation Type: mac l per protocols  Conscious Sedation- Needs  for 6 hours after the procedure  MAC/General-Needs  for 24 hours after procedure    y :[Pre-op Required] at [facility]  MG and OR for MAC sedation   (advise patient they will need a pre-op WITH IN 30 DAYS of procedure date)     Type of Procedure Scheduled:   Lower Endoscopy [Colonoscopy]    Which Colonoscopy Prep was Sent?:   extended    KHORUTS CF PATIENTS & GROEN'S PATIENTS NEEDS EXTENDED PREP       Informed patient they will need an adult  y  Cannot take any type of public or medical transportation alone    Pre-Procedure Covid test to be completed at [facility] or Externally: at home  test  **INFORMED OF HOME TESTING & LAB OPTION**        Confirmed Nurse will call to complete assessment y  Additional comments: n

## 2022-07-31 DIAGNOSIS — I10 HYPERTENSION GOAL BP (BLOOD PRESSURE) < 140/90: ICD-10-CM

## 2022-07-31 DIAGNOSIS — I10 BENIGN ESSENTIAL HYPERTENSION: ICD-10-CM

## 2022-07-31 DIAGNOSIS — K21.9 GASTROESOPHAGEAL REFLUX DISEASE: ICD-10-CM

## 2022-08-01 RX ORDER — HYDRALAZINE HYDROCHLORIDE 25 MG/1
TABLET, FILM COATED ORAL
Qty: 180 TABLET | Refills: 3 | Status: SHIPPED | OUTPATIENT
Start: 2022-08-01 | End: 2023-06-16

## 2022-08-01 RX ORDER — OMEPRAZOLE 40 MG/1
CAPSULE, DELAYED RELEASE ORAL
Qty: 90 CAPSULE | Refills: 3 | Status: SHIPPED | OUTPATIENT
Start: 2022-08-01 | End: 2023-03-20

## 2022-08-01 NOTE — TELEPHONE ENCOUNTER
"Routing refill request to provider for review/approval because:  Labs out of range:  Potassium    Requested Prescriptions   Pending Prescriptions Disp Refills    losartan (COZAAR) 100 MG tablet [Pharmacy Med Name: LOSARTAN POTASSIUM 100 MG TAB] 90 tablet 0     Sig: TAKE 1 TABLET BY MOUTH EVERY DAY        Angiotensin-II Receptors Failed - 7/31/2022 12:33 PM        Failed - Normal serum potassium on file in past 12 months       Recent Labs   Lab Test 04/04/22  1402   POTASSIUM 3.1*                    Passed - Last blood pressure under 140/90 in past 12 months       BP Readings from Last 3 Encounters:   07/25/22 131/67   05/31/22 125/77 05/25/22 (!) 145/76                 Passed - Recent (12 mo) or future (30 days) visit within the authorizing provider's specialty     Patient has had an office visit with the authorizing provider or a provider within the authorizing providers department within the previous 12 mos or has a future within next 30 days. See \"Patient Info\" tab in inbasket, or \"Choose Columns\" in Meds & Orders section of the refill encounter.              Passed - Medication is active on med list        Passed - Patient is age 18 or older        Passed - Normal serum creatinine on file in past 12 months     Recent Labs   Lab Test 04/04/22  1402   CR 1.05       Ok to refill medication if creatinine is low            Signed Prescriptions Disp Refills    hydrALAZINE (APRESOLINE) 25 MG tablet 180 tablet 3     Sig: TAKE 1 TABLET BY MOUTH TWICE A DAY        Vasodilators Passed - 7/31/2022 12:33 PM        Passed - Most recent BP less than 140/90 on record       BP Readings from Last 3 Encounters:   07/25/22 131/67   05/31/22 125/77 05/25/22 (!) 145/76                 Passed - Most recent encounter is not a hospital encounter. Patient has recent (12 mos) or future (1 mos) visit with authorizing provider's specialty     Patient's most recent encounter is NOT a hospital encounter and has had an office visit in the " "last 12 months or has a visit in the next 30 days with authorizing provider or within the authorizing provider's specialty.      See \"Patient Info\" tab in inbasket, or \"Choose Columns\" in Meds & Orders section of the refill encounter.      If most recent encounter is a hospital encounter AND the patient does NOT have an appointment scheduled with the authorizing provider or authorizing provider's specialty within the next 30 days, forward refill to authorizing provider for medication review.            Passed - Medication is active on med list        Passed - Patient is of age 18 years or older           omeprazole (PRILOSEC) 40 MG DR capsule 90 capsule 3     Sig: TAKE 1 CAPSULE BY MOUTH ONCE DAILY        PPI Protocol Passed - 7/31/2022 12:33 PM        Passed - Not on Clopidogrel (unless Pantoprazole ordered)        Passed - No diagnosis of osteoporosis on record        Passed - Recent (12 mo) or future (30 days) visit within the authorizing provider's specialty     Patient has had an office visit with the authorizing provider or a provider within the authorizing providers department within the previous 12 mos or has a future within next 30 days. See \"Patient Info\" tab in inbasket, or \"Choose Columns\" in Meds & Orders section of the refill encounter.              Passed - Medication is active on med list        Passed - Patient is age 18 or older              Beverly Geiger RN   [facility]     "

## 2022-08-01 NOTE — TELEPHONE ENCOUNTER
Hydralazine and Omeprazole-Prescription approved per [facility] Refill Protocol.      Beverly Geiger RN   [facility]

## 2022-08-02 RX ORDER — LOSARTAN POTASSIUM 100 MG/1
TABLET ORAL
Qty: 90 TABLET | Refills: 0 | Status: SHIPPED | OUTPATIENT
Start: 2022-08-02 | End: 2022-10-24

## 2022-08-10 DIAGNOSIS — M79.7 FIBROMYALGIA: ICD-10-CM

## 2022-08-10 NOTE — TELEPHONE ENCOUNTER
Routing refill request to provider for review/approval because:  Drug not on the [facility] refill protocol     Juanita Webster RN, BSN, PHN  [facility]: [place]

## 2022-08-11 RX ORDER — TRAMADOL HYDROCHLORIDE 50 MG/1
TABLET ORAL
Qty: 40 TABLET | Refills: 0 | Status: SHIPPED | OUTPATIENT
Start: 2022-08-11 | End: 2022-08-19

## 2022-08-18 DIAGNOSIS — M79.7 FIBROMYALGIA: ICD-10-CM

## 2022-08-19 RX ORDER — TRAMADOL HYDROCHLORIDE 50 MG/1
TABLET ORAL
Qty: 40 TABLET | Refills: 0 | Status: SHIPPED | OUTPATIENT
Start: 2022-08-19 | End: 2022-08-27

## 2022-08-30 ENCOUNTER — TELEPHONE (OUTPATIENT)
Dept: FAMILY MEDICINE | Facility: CLINIC | Age: 72
End: 2022-08-30

## 2022-08-30 DIAGNOSIS — H10.33 ACUTE BACTERIAL CONJUNCTIVITIS OF BOTH EYES: Primary | ICD-10-CM

## 2022-08-30 NOTE — TELEPHONE ENCOUNTER
Routing to PCP to review/advise.    Please route back to [facility] RN triage    Urgent care?    Patient states he has pink eye.    Has attempted e-vist but visit is requesting phone and patient does not have the capability and visit will not let him proceed.    RN  Attempted to assist patient as well to schedule visit.    No avaliable visits.    Both eyes are red and feel like they have something in them.    Left eye has yellow drainage.    RN advised she would send provider message to review and make recommendations.    Koffi Rouse RN, BSN, PHN  [facility]

## 2022-08-31 RX ORDER — POLYMYXIN B SULFATE AND TRIMETHOPRIM 1; 10000 MG/ML; [USP'U]/ML
1-2 SOLUTION OPHTHALMIC 3 TIMES DAILY
Qty: 5 ML | Refills: 1 | Status: SHIPPED | OUTPATIENT
Start: 2022-08-31 | End: 2022-09-07

## 2022-08-31 NOTE — TELEPHONE ENCOUNTER
Called [phone] (home)     Did patient answer the phone: No, left a message with a male to have patient return call to [phone].    Renetta RN,BSN  Triage Nurse  [facility]: [facility]

## 2022-09-01 NOTE — TELEPHONE ENCOUNTER
Left message on voice mail for patient to call clinic or check with his [facility] pharmacy, a script has been sent in. Did check with pharmacy, script has not been picked up.  [phone]  Serenity Sher RN  [facility]

## 2022-09-02 ENCOUNTER — MYC MEDICAL ADVICE (OUTPATIENT)
Dept: FAMILY MEDICINE | Facility: CLINIC | Age: 72
End: 2022-09-02

## 2022-09-02 DIAGNOSIS — M79.7 FIBROMYALGIA: ICD-10-CM

## 2022-09-02 NOTE — TELEPHONE ENCOUNTER
Routing refill request to provider for review/approval because:  Drug not on the [facility] refill protocol     Lesley Aaron RN BSN  [facility]

## 2022-09-02 NOTE — TELEPHONE ENCOUNTER
Notified patient, and he stated that he picked up the drops and they are helping him.    Advised him to call us back with maría further questions or concerns.    Patient stated understanding and agreeable with the plan of care.     Renetta CARRASCO,BSN  Triage Nurse  [facility]: [facility]

## 2022-09-03 RX ORDER — TRAMADOL HYDROCHLORIDE 50 MG/1
TABLET ORAL
Qty: 40 TABLET | Refills: 0 | Status: SHIPPED | OUTPATIENT
Start: 2022-09-03 | End: 2022-09-20

## 2022-09-12 DIAGNOSIS — G20.C PARKINSONISM, UNSPECIFIED PARKINSONISM TYPE (H): ICD-10-CM

## 2022-09-12 RX ORDER — CARBIDOPA AND LEVODOPA 25; 100 MG/1; MG/1
1.5 TABLET ORAL 3 TIMES DAILY
Qty: 405 TABLET | Refills: 1 | Status: SHIPPED | OUTPATIENT
Start: 2022-09-12 | End: 2023-04-04

## 2022-09-12 NOTE — TELEPHONE ENCOUNTER
Requested Prescriptions   Pending Prescriptions Disp Refills     carbidopa-levodopa (SINEMET)  MG tablet 405 tablet 2     Sig: Take 1.5 tablets by mouth 3 times daily       There is no refill protocol information for this order        Last office visit: 1/12/2022 with prescribing provider:  Dr. Temple    [facility]

## 2022-09-19 DIAGNOSIS — M79.7 FIBROMYALGIA: ICD-10-CM

## 2022-09-20 RX ORDER — TRAMADOL HYDROCHLORIDE 50 MG/1
TABLET ORAL
Qty: 40 TABLET | Refills: 0 | Status: SHIPPED | OUTPATIENT
Start: 2022-09-20 | End: 2022-09-26

## 2022-09-23 ENCOUNTER — MYC MEDICAL ADVICE (OUTPATIENT)
Dept: FAMILY MEDICINE | Facility: CLINIC | Age: 72
End: 2022-09-23

## 2022-09-28 PROBLEM — F11.90 CHRONIC, CONTINUOUS USE OF OPIOIDS: Status: ACTIVE | Noted: 2021-02-08

## 2022-09-28 NOTE — PROGRESS NOTES
Demian is a 71 year old who is being evaluated via a billable telephone visit.      What phone number would you like to be contacted at? [phone]  How would you like to obtain your AVS? Grover Lund   Demian is a 71 year old, presenting for the following health issues:  No chief complaint on file.      History of Present Illness       Reason for visit:  [id number]  ED    He eats 0-1 servings of fruits and vegetables daily.He consumes 2 sweetened beverage(s) daily.He exercises with enough effort to increase his heart rate 9 or less minutes per day.  He exercises with enough effort to increase his heart rate 4 days per week.   He is taking medications regularly.     Likely med induced.   ED issues more lately since start prozac. No testicular pain or swelling or penile lesions or masses.   Able to attain and erection, just not maintain it.        Review of Systems   Constitutional, HEENT, cardiovascular, pulmonary, GI, , musculoskeletal, neuro, skin, endocrine and psych systems are negative, except as otherwise noted.      Objective           Vitals:  No vitals were obtained today due to virtual visit.    Physical Exam   healthy, alert and no distress  PSYCH: Alert and oriented times 3; coherent speech, normal   rate and volume, able to articulate logical thoughts, able   to abstract reason, no tangential thoughts, no hallucinations   or delusions  His affect is normal  RESP: No cough, no audible wheezing, able to talk in full sentences  Remainder of exam unable to be completed due to telephone visits    Prashant was seen today for recheck.    Diagnoses and all orders for this visit:    Drug-induced erectile dysfunction  -     sildenafil (REVATIO) 20 MG tablet; Take 3-5 tabs 1/2 to 4 hours prior to relations          Phone call duration: 7 minutes

## 2022-10-04 ENCOUNTER — VIRTUAL VISIT (OUTPATIENT)
Dept: FAMILY MEDICINE | Facility: CLINIC | Age: 72
End: 2022-10-04
Payer: MEDICARE

## 2022-10-04 DIAGNOSIS — N52.2 DRUG-INDUCED ERECTILE DYSFUNCTION: Primary | ICD-10-CM

## 2022-10-04 PROCEDURE — 99441 PR PHYSICIAN TELEPHONE EVALUATION 5-10 MIN: CPT | Mod: 95 | Performed by: PHYSICIAN ASSISTANT

## 2022-10-04 RX ORDER — SILDENAFIL CITRATE 20 MG/1
TABLET ORAL
Qty: 30 TABLET | Refills: 11 | Status: SHIPPED | OUTPATIENT
Start: 2022-10-04

## 2022-10-22 ENCOUNTER — HEALTH MAINTENANCE LETTER (OUTPATIENT)
Age: 72
End: 2022-10-22

## 2022-10-25 ENCOUNTER — MYC MEDICAL ADVICE (OUTPATIENT)
Dept: FAMILY MEDICINE | Facility: CLINIC | Age: 72
End: 2022-10-25

## 2022-10-25 DIAGNOSIS — M79.7 FIBROMYALGIA: ICD-10-CM

## 2022-10-26 RX ORDER — TRAMADOL HYDROCHLORIDE 50 MG/1
TABLET ORAL
Qty: 40 TABLET | Refills: 0 | OUTPATIENT
Start: 2022-10-26

## 2022-11-02 DIAGNOSIS — R56.9 CONVULSIONS, UNSPECIFIED CONVULSION TYPE (H): Chronic | ICD-10-CM

## 2022-11-02 DIAGNOSIS — G40.A09 NONINTRACTABLE ABSENCE EPILEPSY WITHOUT STATUS EPILEPTICUS (H): ICD-10-CM

## 2022-11-02 RX ORDER — DIVALPROEX SODIUM 500 MG/1
500 TABLET, DELAYED RELEASE ORAL 2 TIMES DAILY
Qty: 240 TABLET | Refills: 0 | Status: SHIPPED | OUTPATIENT
Start: 2022-11-02 | End: 2023-04-04

## 2022-11-02 NOTE — TELEPHONE ENCOUNTER
Requested Prescriptions   Pending Prescriptions Disp Refills     divalproex sodium delayed-release (DEPAKOTE) 500 MG DR tablet 180 tablet 2     Sig: Take 1 tablet (500 mg) by mouth 2 times daily       There is no refill protocol information for this order        Last office visit: 1/12/2022 with prescribing provider:  Dr. Temple    [facility]  [facility]

## 2022-11-02 NOTE — TELEPHONE ENCOUNTER
Per LOV 1/2022: plan is to in continue current doses of Keppra and Depakote.   Patient has follow-up visit scheduled in February. Refill provided per Neurology RN refill protocol    Yosef SARAH RN  Specialty Clinics

## 2022-11-03 ENCOUNTER — TELEPHONE (OUTPATIENT)
Dept: SLEEP MEDICINE | Facility: CLINIC | Age: 72
End: 2022-11-03

## 2022-11-03 ENCOUNTER — TELEPHONE (OUTPATIENT)
Dept: FAMILY MEDICINE | Facility: CLINIC | Age: 72
End: 2022-11-03

## 2022-11-03 DIAGNOSIS — G47.33 OSA (OBSTRUCTIVE SLEEP APNEA): Primary | ICD-10-CM
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
## 2022-11-03 NOTE — TELEPHONE ENCOUNTER
Called patient to discuss BiPAP loaner. Advised pt that we have loaners available at [facility] location. Pt mentioned that he received a call from the sleep center in [place] stating they have loaners available and pt's partner, Usama, is currently on the way to go pick one up. I will reach out to [place] to confirm that they have a BiPAP loaner available and we can send pt's current BiPAP in for repairs.     I checked in with [facility] staff (Lesley PERRIN) and she confirmed that she has a spare A10 power cord and loaner BiPAP machine. She has a scheduled appointment at 3:30pm, so she will take care of BiPAP machine afterwards.
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
## 2022-11-09 NOTE — TELEPHONE ENCOUNTER
Pt called and unable to reach pt.  [employer] message will be sent.      VIRAJ Mckeon

## 2022-12-06 DIAGNOSIS — M79.7 FIBROMYALGIA: ICD-10-CM

## 2022-12-07 RX ORDER — TRAMADOL HYDROCHLORIDE 50 MG/1
TABLET ORAL
Qty: 40 TABLET | Refills: 0 | Status: SHIPPED | OUTPATIENT
Start: 2022-12-07 | End: 2022-12-18

## 2022-12-08 ENCOUNTER — OFFICE VISIT (OUTPATIENT)
Dept: URGENT CARE | Facility: URGENT CARE | Age: 72
End: 2022-12-08
Payer: MEDICARE

## 2022-12-08 VITALS
OXYGEN SATURATION: 96 % | DIASTOLIC BLOOD PRESSURE: 70 MMHG | SYSTOLIC BLOOD PRESSURE: 117 MMHG | HEART RATE: 56 BPM | TEMPERATURE: 97.2 F | RESPIRATION RATE: 10 BRPM

## 2022-12-08 DIAGNOSIS — J06.9 VIRAL URI: Primary | ICD-10-CM

## 2022-12-08 DIAGNOSIS — R05.1 ACUTE COUGH: ICD-10-CM

## 2022-12-08 DIAGNOSIS — J45.901 EXACERBATION OF ASTHMA, UNSPECIFIED ASTHMA SEVERITY, UNSPECIFIED WHETHER PERSISTENT: ICD-10-CM

## 2022-12-08 LAB
FLUAV AG SPEC QL IA: NEGATIVE
FLUBV AG SPEC QL IA: NEGATIVE

## 2022-12-08 PROCEDURE — U0003 INFECTIOUS AGENT DETECTION BY NUCLEIC ACID (DNA OR RNA); SEVERE ACUTE RESPIRATORY SYNDROME CORONAVIRUS 2 (SARS-COV-2) (CORONAVIRUS DISEASE [COVID-19]), AMPLIFIED PROBE TECHNIQUE, MAKING USE OF HIGH THROUGHPUT TECHNOLOGIES AS DESCRIBED BY CMS-2020-01-R: HCPCS | Performed by: NURSE PRACTITIONER

## 2022-12-08 PROCEDURE — 99214 OFFICE O/P EST MOD 30 MIN: CPT | Performed by: NURSE PRACTITIONER

## 2022-12-08 PROCEDURE — U0005 INFEC AGEN DETEC AMPLI PROBE: HCPCS | Performed by: NURSE PRACTITIONER

## 2022-12-08 PROCEDURE — 87804 INFLUENZA ASSAY W/OPTIC: CPT | Performed by: NURSE PRACTITIONER

## 2022-12-08 RX ORDER — ALBUTEROL SULFATE 90 UG/1
2 AEROSOL, METERED RESPIRATORY (INHALATION) EVERY 6 HOURS PRN
Qty: 8.5 G | Refills: 0 | Status: SHIPPED | OUTPATIENT
Start: 2022-12-08

## 2022-12-08 NOTE — PROGRESS NOTES
Assessment & Plan     Viral URI  Acute cough    - Symptomatic COVID-19 Virus (Coronavirus) by PCR Nose  - Influenza A & B Antigen - Clinic Collect    Exacerbation of asthma, unspecified asthma severity, unspecified whether persistent    - albuterol (PROAIR HFA/PROVENTIL HFA/VENTOLIN HFA) 108 (90 Base) MCG/ACT inhaler  Dispense: 8.5 g; Refill: 0     Reviewed negative influenza results during visit, COVID testing in process, will notify if positive. Discussed symptoms likely viral and antibiotic not indicated currently. Recommend rest, fluids, continue Mucinex. Prescription sent to pharmacy for albuterol inhaler has needed for asthma exacerbation. Letter given for work.    Follow-up with PCP if symptoms persist for 7 days, and sooner if symptoms worsen or new symptoms develop.     Discussed red flag symptoms which warrant immediate visit in emergency room    All questions were answered and patient verbalized understanding. AVS reviewed with patient.     Jessenia Friedman, DNP, APRN, CNP 2022 3:07 PM  [facility] [place]        Kevon Arciniega is a 71 year old male who presents to clinic today for the following health issues:  Chief Complaint   Patient presents with     Sick     Cough & runny nose since Tuesday     Patient presents for evaluation of cough and runny nose for 2 days which has been worsening. Associated symptoms: headache, mild shortness of breath. He had a fever of 102F yesterday which resolved. Denies body aches, wheezing. His  had similar symptoms. He has tried Robitussin and mucinex which helps temporarily. Cough is productive with yellow mucus and does not wake him. He hasn't needed his albuterol inhaler, though it is . He has a history of asthma, HTN, CKD.     Problem list, Medication list, Allergies, and Medical history reviewed in EPIC.    ROS:  Review of systems negative except for noted above        Objective    /70   Pulse 56   Temp 97.2  F (36.2   C) (Tympanic)   Resp 10   SpO2 96%   Physical Exam  Constitutional:       General: He is not in acute distress.     Appearance: He is obese. He is not toxic-appearing or diaphoretic.   HENT:      Head: Normocephalic and atraumatic.      Right Ear: Tympanic membrane, ear canal and external ear normal.      Left Ear: Tympanic membrane, ear canal and external ear normal.      Nose:      Right Sinus: No maxillary sinus tenderness or frontal sinus tenderness.      Left Sinus: No maxillary sinus tenderness or frontal sinus tenderness.      Comments: Mild nasal congestion     Mouth/Throat:      Mouth: Mucous membranes are moist.      Pharynx: Oropharynx is clear. No oropharyngeal exudate or posterior oropharyngeal erythema.      Tonsils: No tonsillar abscesses. 0 on the right. 0 on the left.   Eyes:      Conjunctiva/sclera: Conjunctivae normal.   Cardiovascular:      Rate and Rhythm: Normal rate and regular rhythm.      Heart sounds: Normal heart sounds.   Pulmonary:      Effort: Pulmonary effort is normal. No respiratory distress.      Breath sounds: Normal breath sounds. No wheezing, rhonchi or rales.      Comments: Able to speak in complete sentences  Lymphadenopathy:      Cervical: No cervical adenopathy.   Skin:     General: Skin is warm and dry.   Neurological:      Mental Status: He is alert.          Labs:  Results for orders placed or performed in visit on 12/08/22   Influenza A & B Antigen - Clinic Collect     Status: Normal    Specimen: Nose; Swab   Result Value Ref Range    Influenza A antigen Negative Negative    Influenza B antigen Negative Negative    Narrative    Test results must be correlated with clinical data. If necessary, results should be confirmed by a molecular assay or viral culture.

## 2022-12-08 NOTE — LETTER
December 8, 2022      Prashant Keyes  [address] [facility] [address]        To Whom It May Concern:    Prashant Keyes  was seen on 12/8/22.  Please excuse him until symptoms improving for 24hours and COVID results available         Sincerely,        ONEIL Ruelas

## 2022-12-09 LAB — SARS-COV-2 RNA RESP QL NAA+PROBE: NEGATIVE

## 2022-12-10 ENCOUNTER — HEALTH MAINTENANCE LETTER (OUTPATIENT)
Age: 72
End: 2022-12-10

## 2022-12-18 ENCOUNTER — NURSE TRIAGE (OUTPATIENT)
Dept: NURSING | Facility: CLINIC | Age: 72
End: 2022-12-18

## 2022-12-18 NOTE — TELEPHONE ENCOUNTER
Clinic Action Needed?  No. FYI only.     [facility]NA Triage Call   Presenting Problem: [facility] unable to fulfill order to start home P.T. today due to limited staffing. They will start home P.T. tomorrow (12/19)      Routed to: Primary Care BE RN pool         Reason for Disposition    Health Information question, no triage required and triager able to answer question    Additional Information    Negative: [1] Caller is not with the adult (patient) AND [2] reporting urgent symptoms    Negative: Lab result questions    Negative: Medication questions    Negative: Caller can't be reached by phone    Negative: Caller has already spoken to PCP or another triager    Negative: RN needs further essential information from caller in order to complete triage    Negative: Requesting regular office appointment    Negative: [1] Caller requesting NON-URGENT health information AND [2] PCP's office is the best resource    Protocols used: INFORMATION ONLY CALL - NO TRIAGE-A-

## 2022-12-19 ENCOUNTER — TELEPHONE (OUTPATIENT)
Dept: FAMILY MEDICINE | Facility: CLINIC | Age: 72
End: 2022-12-19

## 2022-12-19 ASSESSMENT — ASTHMA QUESTIONNAIRES
QUESTION_3 LAST FOUR WEEKS HOW OFTEN DID YOUR ASTHMA SYMPTOMS (WHEEZING, COUGHING, SHORTNESS OF BREATH, CHEST TIGHTNESS OR PAIN) WAKE YOU UP AT NIGHT OR EARLIER THAN USUAL IN THE MORNING: NOT AT ALL
EMERGENCY_ROOM_LAST_YEAR_TOTAL: TWO
ACT_TOTALSCORE: 21
QUESTION_1 LAST FOUR WEEKS HOW MUCH OF THE TIME DID YOUR ASTHMA KEEP YOU FROM GETTING AS MUCH DONE AT WORK, SCHOOL OR AT HOME: NONE OF THE TIME
ACT_TOTALSCORE: 21
QUESTION_4 LAST FOUR WEEKS HOW OFTEN HAVE YOU USED YOUR RESCUE INHALER OR NEBULIZER MEDICATION (SUCH AS ALBUTEROL): NOT AT ALL
QUESTION_2 LAST FOUR WEEKS HOW OFTEN HAVE YOU HAD SHORTNESS OF BREATH: MORE THAN ONCE A DAY
QUESTION_5 LAST FOUR WEEKS HOW WOULD YOU RATE YOUR ASTHMA CONTROL: COMPLETELY CONTROLLED

## 2022-12-19 NOTE — TELEPHONE ENCOUNTER
Reason for Call:  Appointment Request -- see comments     Patient requesting this type of appt:  Hospital/ED Follow-Up     Requested provider: Matt Swan    Reason patient unable to be scheduled: Not with their preferred provider    When does patient want to be seen/preferred time: Same day    Comments: [facility] [place] 12/12 - 12/16 for flu a and pneumonia     Patient was told to follow up this week.     Patient stated ok for clinic to talk to spouse Juan Jose.     Could we send this information to you in [facility] or would you prefer to receive a phone call?:   Patient would prefer a phone call   Okay to leave a detailed message?: Yes at Home number on file [phone] (home)    Call taken on 12/19/2022 at 7:49 AM by Vianney Yeung

## 2022-12-19 NOTE — PROGRESS NOTES
"      Kevon Arciniega is a 72 year old presenting for the following health issues:  Hospital F/U ([facility], Influenza and Pneumonia 12/12 through 12/16/22)      History of Present Illness       Reason for visit:  Followup to ICU stay from Dec 9th...to Dec 16th.    He eats 0-1 servings of fruits and vegetables daily.He consumes 0 sweetened beverage(s) daily.He exercises with enough effort to increase his heart rate 9 or less minutes per day.  He exercises with enough effort to increase his heart rate 3 or less days per week.   He is taking medications regularly.    Today's PHQ-9         PHQ-9 Total Score: 2    PHQ-9 Q9 Thoughts of better off dead/self-harm past 2 weeks :   Not at all    How difficult have these problems made it for you to do your work, take care of things at home, or get along with other people: Not difficult at all  Today's SHREYA-7 Score: 0         Hospital Follow-up Visit:    Hospital/Nursing Home/IP Rehab Facility: [facility]  Date of Admission: 12/12/22  Date of Discharge: 12/16/22  Reason(s) for Admission: Influenza and Pneumonia    Was your hospitalization related to COVID-19? No   Problems taking medications regularly:  None  Medication changes since discharge: None  Problems adhering to non-medication therapy:  None    Summary of hospitalization:  CareEverywhere information obtained and reviewed    Influenza A with secondary pneumonia and sepsis. Acute renal failure.   Lasix and losartan have been held since leaving hospital.   Overall feeling much better. No fevers. Mild cough. No profound sob.       Review of Systems   Constitutional, HEENT, cardiovascular, pulmonary, GI, , musculoskeletal, neuro, skin, endocrine and psych systems are negative, except as otherwise noted.      Objective    /66   Pulse 76   Temp 97.5  F (36.4  C) (Tympanic)   Resp 20   Ht 1.765 m (5' 9.5\")   Wt 129.9 kg (286 lb 6.4 oz)   SpO2 93%   BMI 41.69 kg/m    Body mass index is 41.69 " kg/m .  Physical Exam     Eye exam - right eye normal lid, conjunctiva, cornea, pupil and fundus, left eye normal lid, conjunctiva, cornea, pupil and fundus.  Thyroid not palpable, not enlarged, no nodules detected.  CHEST:chest clear to IPPA, no tachypnea, retractions or cyanosis and S1, S2 normal, no murmur, no gallop, rate regular.  Mild leg edema    Prashant was seen today for hospital f/u.    Diagnoses and all orders for this visit:    Sepsis due to methicillin susceptible Staphylococcus aureus (MSSA) with acute renal failure and septic shock, unspecified acute renal failure type (H)  -     Basic metabolic panel  (Ca, Cl, CO2, Creat, Gluc, K, Na, BUN); Future  -     CBC with platelets and differential; Future  -     Basic metabolic panel  (Ca, Cl, CO2, Creat, Gluc, K, Na, BUN)  -     CBC with platelets and differential    Screening for hyperlipidemia  -     Lipid panel reflex to direct LDL Non-fasting; Future  -     Lipid panel reflex to direct LDL Non-fasting    Hyperlipidemia LDL goal <130  -     Lipid panel reflex to direct LDL Non-fasting; Future  -     Lipid panel reflex to direct LDL Non-fasting    Screen for colon cancer  -     Colonoscopy Screening  Referral; Future    CKD (chronic kidney disease) stage 2, GFR 60-89 ml/min  -     Cancel: Albumin Random Urine Quantitative with Creat Ratio; Future    Benign essential hypertension  -     Basic metabolic panel  (Ca, Cl, CO2, Creat, Gluc, K, Na, BUN); Future  -     Basic metabolic panel  (Ca, Cl, CO2, Creat, Gluc, K, Na, BUN)    Acute kidney injury (H)  -     Basic metabolic panel  (Ca, Cl, CO2, Creat, Gluc, K, Na, BUN); Future  -     CBC with platelets and differential; Future  -     Basic metabolic panel  (Ca, Cl, CO2, Creat, Gluc, K, Na, BUN)  -     CBC with platelets and differential    Other iron deficiency anemia  -     Ferritin; Future  -     Ferritin      Condition improving clinically.  I want to see him for a recheck in 3-4 wks. Will repeat  a chest xray.  Remain off of lasix for now.   Restart losartan. 1/2 tab daily

## 2022-12-20 ENCOUNTER — TELEPHONE (OUTPATIENT)
Dept: FAMILY MEDICINE | Facility: CLINIC | Age: 72
End: 2022-12-20

## 2022-12-20 ENCOUNTER — MEDICAL CORRESPONDENCE (OUTPATIENT)
Dept: FAMILY MEDICINE | Facility: CLINIC | Age: 72
End: 2022-12-20

## 2022-12-20 ENCOUNTER — OFFICE VISIT (OUTPATIENT)
Dept: FAMILY MEDICINE | Facility: CLINIC | Age: 72
End: 2022-12-20
Payer: MEDICARE

## 2022-12-20 VITALS
HEIGHT: 70 IN | HEART RATE: 76 BPM | SYSTOLIC BLOOD PRESSURE: 116 MMHG | OXYGEN SATURATION: 93 % | DIASTOLIC BLOOD PRESSURE: 66 MMHG | WEIGHT: 286.4 LBS | BODY MASS INDEX: 41 KG/M2 | TEMPERATURE: 97.5 F | RESPIRATION RATE: 20 BRPM

## 2022-12-20 DIAGNOSIS — E78.5 HYPERLIPIDEMIA LDL GOAL <130: ICD-10-CM

## 2022-12-20 DIAGNOSIS — Z13.220 SCREENING FOR HYPERLIPIDEMIA: ICD-10-CM

## 2022-12-20 DIAGNOSIS — I10 BENIGN ESSENTIAL HYPERTENSION: ICD-10-CM

## 2022-12-20 DIAGNOSIS — D50.8 OTHER IRON DEFICIENCY ANEMIA: ICD-10-CM

## 2022-12-20 DIAGNOSIS — R65.21: Primary | ICD-10-CM

## 2022-12-20 DIAGNOSIS — N17.9: Primary | ICD-10-CM

## 2022-12-20 DIAGNOSIS — N18.2 CKD (CHRONIC KIDNEY DISEASE) STAGE 2, GFR 60-89 ML/MIN: ICD-10-CM

## 2022-12-20 DIAGNOSIS — N17.9 ACUTE KIDNEY INJURY (H): ICD-10-CM

## 2022-12-20 DIAGNOSIS — A41.01: Primary | ICD-10-CM

## 2022-12-20 DIAGNOSIS — Z12.11 SCREEN FOR COLON CANCER: ICD-10-CM

## 2022-12-20 PROCEDURE — 80061 LIPID PANEL: CPT | Performed by: PHYSICIAN ASSISTANT

## 2022-12-20 PROCEDURE — 85007 BL SMEAR W/DIFF WBC COUNT: CPT | Performed by: PHYSICIAN ASSISTANT

## 2022-12-20 PROCEDURE — 36415 COLL VENOUS BLD VENIPUNCTURE: CPT | Performed by: PHYSICIAN ASSISTANT

## 2022-12-20 PROCEDURE — 99495 TRANSJ CARE MGMT MOD F2F 14D: CPT | Performed by: PHYSICIAN ASSISTANT

## 2022-12-20 PROCEDURE — 85027 COMPLETE CBC AUTOMATED: CPT | Performed by: PHYSICIAN ASSISTANT

## 2022-12-20 PROCEDURE — 80048 BASIC METABOLIC PNL TOTAL CA: CPT | Performed by: PHYSICIAN ASSISTANT

## 2022-12-20 PROCEDURE — 82728 ASSAY OF FERRITIN: CPT | Performed by: PHYSICIAN ASSISTANT

## 2022-12-20 RX ORDER — LOSARTAN POTASSIUM 100 MG/1
50 TABLET ORAL DAILY
Qty: 90 TABLET | Refills: 0 | COMMUNITY
Start: 2022-12-20 | End: 2023-01-19

## 2022-12-20 ASSESSMENT — ANXIETY QUESTIONNAIRES
1. FEELING NERVOUS, ANXIOUS, OR ON EDGE: NOT AT ALL
2. NOT BEING ABLE TO STOP OR CONTROL WORRYING: NOT AT ALL
GAD7 TOTAL SCORE: 0
4. TROUBLE RELAXING: NOT AT ALL
8. IF YOU CHECKED OFF ANY PROBLEMS, HOW DIFFICULT HAVE THESE MADE IT FOR YOU TO DO YOUR WORK, TAKE CARE OF THINGS AT HOME, OR GET ALONG WITH OTHER PEOPLE?: NOT DIFFICULT AT ALL
7. FEELING AFRAID AS IF SOMETHING AWFUL MIGHT HAPPEN: NOT AT ALL
5. BEING SO RESTLESS THAT IT IS HARD TO SIT STILL: NOT AT ALL
7. FEELING AFRAID AS IF SOMETHING AWFUL MIGHT HAPPEN: NOT AT ALL
GAD7 TOTAL SCORE: 0
6. BECOMING EASILY ANNOYED OR IRRITABLE: NOT AT ALL
3. WORRYING TOO MUCH ABOUT DIFFERENT THINGS: NOT AT ALL
IF YOU CHECKED OFF ANY PROBLEMS ON THIS QUESTIONNAIRE, HOW DIFFICULT HAVE THESE PROBLEMS MADE IT FOR YOU TO DO YOUR WORK, TAKE CARE OF THINGS AT HOME, OR GET ALONG WITH OTHER PEOPLE: NOT DIFFICULT AT ALL
GAD7 TOTAL SCORE: 0

## 2022-12-20 ASSESSMENT — PAIN SCALES - GENERAL: PAINLEVEL: NO PAIN (0)

## 2022-12-20 ASSESSMENT — PATIENT HEALTH QUESTIONNAIRE - PHQ9
SUM OF ALL RESPONSES TO PHQ QUESTIONS 1-9: 2
10. IF YOU CHECKED OFF ANY PROBLEMS, HOW DIFFICULT HAVE THESE PROBLEMS MADE IT FOR YOU TO DO YOUR WORK, TAKE CARE OF THINGS AT HOME, OR GET ALONG WITH OTHER PEOPLE: NOT DIFFICULT AT ALL
SUM OF ALL RESPONSES TO PHQ QUESTIONS 1-9: 2

## 2022-12-20 NOTE — TELEPHONE ENCOUNTER
Forms received from: [employer]   Phone number listed: [phone]   Fax listed: [phone]  Date received: 12/19/22  Form description: Requesting updated order ok for home care admission 12/19/22  Once forms are completed, please return to [employer] via fax [phone].  Is patient requesting to be contacted when forms are completed: na  Phone: na  Form placed:  To Matt Boone

## 2022-12-20 NOTE — LETTER
Opioid / Opioid Plus Controlled Substance Agreement    This is an agreement between you and your provider about the safe and appropriate use of controlled substance/opioids prescribed by your care team. Controlled substances are medicines that can cause physical and mental dependence (abuse).    There are strict laws about having and using these medicines. We here at [facility] are committing to working with you in your efforts to get better. To support you in this work, we ll help you schedule regular office appointments for medicine refills. If we must cancel or change your appointment for any reason, we ll make sure you have enough medicine to last until your next appointment.     As a Provider, I will:    Listen carefully to your concerns and treat you with respect.     Recommend a treatment plan that I believe is in your best interest. This plan may involve therapies other than opioid pain medication.     Talk with you often about the possible benefits, and the risk of harm of any medicine that we prescribe for you.     Provide a plan on how to taper (discontinue or go off) using this medicine if the decision is made to stop its use.    As a Patient, I understand that opioid(s):     Are a controlled substance prescribed by my care team to help me function or work and manage my condition(s).     Are strong medicines and can cause serious side effects such as:    Drowsiness, which can seriously affect my driving ability    A lower breathing rate, enough to cause death    Harm to my thinking ability     Depression     Abuse of and addiction to this medicine    Need to be taken exactly as prescribed. Combining opioids with certain medicines or chemicals (such as illegal drugs, sedatives, sleeping pills, and benzodiazepines) can be dangerous or even fatal. If I stop opioids suddenly, I may have severe withdrawal symptoms.    Do not work for all types of pain nor for all patients. If they re not helpful, I may  be asked to stop them.    I am also being prescribed a benzodiazepine (tranquilizer) controlled substance: I understand this type of medicine is sedating and can increase the risk of death when taken together with opioids. I have talked to my care team about having prescription Narcan available for reversing the opioid medicine and have been instructed in its use. I will be very careful to take my medicines only as directed  I am also being prescribed a stimulant controlled substance to help manage symptoms of attention deficit, appetite suppression, and/or fatigue.    The risks, benefits and side effects of these medicine(s) were explained to me. I agree that:  1. I will take part in other treatments as advised by my care team. This may be psychiatry or counseling, physical therapy, behavioral therapy, group treatment or a referral to a specialist.     2. I will keep all my appointments. I understand that this is part of the monitoring of opioids. My care team may require an office visit for EVERY opioid/controlled substance refill. If I miss appointments or don t follow instructions, my care team may stop my medicine.    3. I will take my medicines as prescribed. I will not change the dose or schedule unless my care team tells me to. There will be no refills if I run out early.     4. I may be asked to come to the clinic and complete a urine drug test or complete a pill count at any time. If I don t give a urine sample or participate in a pill count, the care team may stop my medicine.    5. I will only receive prescriptions from this clinic for chronic pain. If I am treated by another provider for acute pain issues, I will tell them that I am taking opioid pain medication for chronic pain and that I have a treatment agreement with this provider. I will inform my [facility] care team within one business day if I am given a prescription for any pain medication by another healthcare provider. My [facility] care team can contact other providers and pharmacists about my use of any medicines.    6. It is up to me to make sure that I don t run out of my medicines on weekends or holidays. If my care team is willing to refill my opioid prescription without a visit, I must request refills only during office hours. Refills may take up to 3 business days to process. I will use one pharmacy to fill all my opioid and other controlled substance prescriptions. I will notify the clinic about any changes to my insurance or medication availability.    7. I am responsible for my prescriptions. If the medicine/prescription is lost, stolen or destroyed, it will not be replaced. I also agree not to share controlled substance medicines with anyone.    8. I am aware I should not use any illegal or recreational drugs. I agree not to drink alcohol unless my care team says I can.       9. If I enroll in the [place] Medical Cannabis program, I will tell my care team prior to my next refill.     10. I will tell my care team right away if I become pregnant, have a new medical problem treated outside of my regular clinic, or have a change in my medications.    11. I understand that this medicine can affect my thinking, judgment and reaction time. Alcohol and drugs affect the brain and body, which can affect the safety of my driving. Being under the influence of alcohol or drugs can affect my decision-making, behaviors, personal safety, and the safety of others. Driving while impaired (DWI) can occur if a person is driving, operating, or in physical control of a car, motorcycle, boat, snowmobile, ATV, motorbike, off-road vehicle, or any other motor vehicle ([place] Statute 169A.20). I understand the risk if I choose to drive or operate any vehicle or machinery.    I understand that if I do not follow any of the conditions above, my prescriptions or treatment may be stopped or changed.          Opioids  What You Need to Know    What are  opioids?   Opioids are pain medicines that must be prescribed by a doctor. They are also known as narcotics.     Examples are:   1. morphine (MS Contin, Kendra)  2. oxycodone (Oxycontin)  3. oxycodone and acetaminophen (Percocet)  4. hydrocodone and acetaminophen (Vicodin, Norco)   5. fentanyl patch (Duragesic)   6. hydromorphone (Dilaudid)   7. methadone  8. codeine (Tylenol #3)     What do opioids do well?   Opioids are best for severe short-term pain such as after a surgery or injury. They may work well for cancer pain. They may help some people with long-lasting (chronic) pain.     What do opioids NOT do well?   Opioids never get rid of pain entirely, and they don t work well for most patients with chronic pain. Opioids don t reduce swelling, one of the causes of pain.                                    Other ways to manage chronic pain and improve function include:       Treat the health problem that may be causing pain    Anti-inflammation medicines, which reduce swelling and tenderness, such as ibuprofen (Advil, Motrin) or naproxen (Aleve)    Acetaminophen (Tylenol)    Antidepressants and anti-seizure medicines, especially for nerve pain    Topical treatments such as patches or creams    Injections or nerve blocks    Chiropractic or osteopathic treatment    Acupuncture, massage, deep breathing, meditation, visual imagery, aromatherapy    Use heat or ice at the pain site    Physical therapy     Exercise    Stop smoking    Take part in therapy       Risks and side effects     Talk to your doctor before you start or decide to keep taking opioids. Possible side effects include:      Lowering your breathing rate enough to cause death    Overdose, including death, especially if taking higher than prescribed doses    Worse depression symptoms; less pleasure in things you usually enjoy    Feeling tired or sluggish    Slower thoughts or cloudy thinking    Being more sensitive to pain over time; pain is harder to  control    Trouble sleeping or restless sleep    Changes in hormone levels (for example, less testosterone)    Changes in sex drive or ability to have sex    Constipation    Unsafe driving    Itching and sweating    Dizziness    Nausea, throwing up and dry mouth    What else should I know about opioids?    Opioids may lead to dependence, tolerance, or addiction.      Dependence means that if you stop or reduce the medicine too quickly, you will have withdrawal symptoms. These include loose poop (diarrhea), jitters, flu-like symptoms, nervousness and tremors. Dependence is not the same as addiction.                       Tolerance means needing higher doses over time to get the same effect. This may increase the chance of serious side effects.      Addiction is when people improperly use a substance that harms their body, their mind or their relations with others. Use of opiates can cause a relapse of addiction if you have a history of drug or alcohol abuse.      People who have used opioids for a long time may have a lower quality of life, worse depression, higher levels of pain and more visits to doctors.    You can overdose on opioids. Take these steps to lower your risk of overdose:    1. Recognize the signs:  Signs of overdose include decrease or loss of consciousness (blackout), slowed breathing, trouble waking up and blue lips. If someone is worried about overdose, they should call 911.    2. Talk to your doctor about Narcan (naloxone).   If you are at risk for overdose, you may be given a prescription for Narcan. This medicine very quickly reverses the effects of opioids.   If you overdose, a friend or family member can give you Narcan while waiting for the ambulance. They need to know the signs of overdose and how to give Narcan.     3. Don't use alcohol or street drugs.   Taking them with opioids can cause death.    4. Do not take any of these medicines unless your doctor says it s OK. Taking these with  opioids can cause death:    Benzodiazepines, such as lorazepam (Ativan), alprazolam (Xanax) or diazepam (Valium)    Muscle relaxers, such as cyclobenzaprine (Flexeril)    Sleeping pills like zolpidem (Ambien)     Other opioids      How to keep you and other people safe while taking opioids:    1. Never share your opioids with others.  Opioid medicines are regulated by the Drug Enforcement Agency (RADHA). Selling or sharing medications is a criminal act.    2. Be sure to store opioids in a secure place, locked up if possible. Young children can easily swallow them and overdose.    3. When you are traveling with your medicines, keep them in the original bottles. If you use a pill box, be sure you also carry a copy of your medicine list from your clinic or pharmacy.    4. Safe disposal of opioids    Most pharmacies have places to get rid of medicine, called disposal kiosks. Medicine disposal options are also available in every [facility]. Search your county and  medication disposal  to find more options. You can find more details at:  https://[email or web address].[facility].[email or web address]./living-green/managing-unwanted-medications     I agree that my provider, clinic care team, and pharmacy may work with any city, state or federal law enforcement agency that investigates the misuse, sale, or other diversion of my controlled medicine. I will allow my provider to discuss my care with, or share a copy of, this agreement with any other treating provider, pharmacy or emergency room where I receive care.    I have read this agreement and have asked questions about anything I did not understand.    _______________________________________________________  Patient Signature - Prashant Keyes _____________________                   Date     _______________________________________________________  Provider Signature - Matt Swan PA-C   _____________________                   Date      _______________________________________________________  Witness Signature (required if provider not present while patient signing)   _____________________                   Date

## 2022-12-20 NOTE — LETTER
My Asthma Action Plan    Name: Prashant Keyes   YOB: 1950  Date: 12/20/2022   My doctor: Matt Swan PA-C   My clinic: [facility]        My Rescue Medicine:   Albuterol inhaler (Proair/Ventolin/Proventil HFA)  2-4 puffs EVERY 4 HOURS as needed. Use a spacer if recommended by your provider.   My Asthma Severity:   Intermittent / Exercise Induced  Know your asthma triggers:              GREEN ZONE   Good Control    I feel good    No cough or wheeze    Can work, sleep and play without asthma symptoms       Take your asthma control medicine every day.     1. If exercise triggers your asthma, take your rescue medication    15 minutes before exercise or sports, and    During exercise if you have asthma symptoms  2. Spacer to use with inhaler: If you have a spacer, make sure to use it with your inhaler             YELLOW ZONE Getting Worse  I have ANY of these:    I do not feel good    Cough or wheeze    Chest feels tight    Wake up at night   1. Keep taking your Green Zone medications  2. Start taking your rescue medicine:    every 20 minutes for up to 1 hour. Then every 4 hours for 24-48 hours.  3. If you stay in the Yellow Zone for more than 12-24 hours, contact your doctor.  4. If you do not return to the Green Zone in 12-24 hours or you get worse, start taking your oral steroid medicine if prescribed by your provider.           RED ZONE Medical Alert - Get Help  I have ANY of these:    I feel awful    Medicine is not helping    Breathing getting harder    Trouble walking or talking    Nose opens wide to breathe       1. Take your rescue medicine NOW  2. If your provider has prescribed an oral steroid medicine, start taking it NOW  3. Call your doctor NOW  4. If you are still in the Red Zone after 20 minutes and you have not reached your doctor:    Take your rescue medicine again and    Call 911 or go to the emergency room right away    See your regular doctor within 2  weeks of an Emergency Room or Urgent Care visit for follow-up treatment.          Annual Reminders:  Meet with Asthma Educator,  Flu Shot in the Fall, consider Pneumonia Vaccination for patients with asthma (aged 19 and older).    Pharmacy: [id number] - [place] - [address] [facility] [facility]    Electronically signed by Matt Swan PA-C   Date: 12/20/22                    Asthma Triggers  How To Control Things That Make Your Asthma Worse    Triggers are things that make your asthma worse.  Look at the list below to help you find your triggers and   what you can do about them. You can help prevent asthma flare-ups by staying away from your triggers.      Trigger                                                          What you can do   Cigarette Smoke  Tobacco smoke can make asthma worse. Do not allow smoking in your home, car or around you.  Be sure no one smokes at a child s day care or school.  If you smoke, ask your health care provider for ways to help you quit.  Ask family members to quit too.  Ask your health care provider for a referral to Quit Plan to help you quit smoking, or call 0-548-932PLAN.     Colds, Flu, Bronchitis  These are common triggers of asthma. Wash your hands often.  Don t touch your eyes, nose or mouth.  Get a flu shot every year.     Dust Mites  These are tiny bugs that live in cloth or carpet. They are too small to see. Wash sheets and blankets in hot water every week.   Encase pillows and mattress in dust mite proof covers.  Avoid having carpet if you can. If you have carpet, vacuum weekly.   Use a dust mask and HEPA vacuum.   Pollen and Outdoor Mold  Some people are allergic to trees, grass, or weed pollen, or molds. Try to keep your windows closed.  Limit time out doors when pollen count is high.   Ask you health care provider about taking medicine during allergy season.     Animal Dander  Some people are allergic to skin flakes, urine or saliva from pets with fur or  feathers. Keep pets with fur or feathers out of your home.    If you can t keep the pet outdoors, then keep the pet out of your bedroom.  Keep the bedroom door closed.  Keep pets off cloth furniture and away from stuffed toys.     Mice, Rats, and Cockroaches  Some people are allergic to the waste from these pests.   Cover food and garbage.  Clean up spills and food crumbs.  Store grease in the refrigerator.   Keep food out of the bedroom.   Indoor Mold  This can be a trigger if your home has high moisture. Fix leaking faucets, pipes, or other sources of water.   Clean moldy surfaces.  Dehumidify basement if it is damp and smelly.   Smoke, Strong Odors, and Sprays  These can reduce air quality. Stay away from strong odors and sprays, such as perfume, powder, hair spray, paints, smoke incense, paint, cleaning products, candles and new carpet.   Exercise or Sports  Some people with asthma have this trigger. Be active!  Ask your doctor about taking medicine before sports or exercise to prevent symptoms.    Warm up for 5-10 minutes before and after sports or exercise.     Other Triggers of Asthma  Cold air:  Cover your nose and mouth with a scarf.  Sometimes laughing or crying can be a trigger.  Some medicines and food can trigger asthma.

## 2022-12-21 ENCOUNTER — MEDICAL CORRESPONDENCE (OUTPATIENT)
Dept: HEALTH INFORMATION MANAGEMENT | Facility: CLINIC | Age: 72
End: 2022-12-21

## 2022-12-21 LAB
ANION GAP SERPL CALCULATED.3IONS-SCNC: 3 MMOL/L (ref 3–14)
BASOPHILS # BLD MANUAL: 0 10E3/UL (ref 0–0.2)
BASOPHILS NFR BLD MANUAL: 0 %
BUN SERPL-MCNC: 13 MG/DL (ref 7–30)
CALCIUM SERPL-MCNC: 9.8 MG/DL (ref 8.5–10.1)
CHLORIDE BLD-SCNC: 102 MMOL/L (ref 94–109)
CHOLEST SERPL-MCNC: 129 MG/DL
CO2 SERPL-SCNC: 34 MMOL/L (ref 20–32)
CREAT SERPL-MCNC: 0.96 MG/DL (ref 0.66–1.25)
EOSINOPHIL # BLD MANUAL: 0.3 10E3/UL (ref 0–0.7)
EOSINOPHIL NFR BLD MANUAL: 2 %
ERYTHROCYTE [DISTWIDTH] IN BLOOD BY AUTOMATED COUNT: 14.1 % (ref 10–15)
FASTING STATUS PATIENT QL REPORTED: YES
FERRITIN SERPL-MCNC: 1048 NG/ML (ref 26–388)
GFR SERPL CREATININE-BSD FRML MDRD: 84 ML/MIN/1.73M2
GLUCOSE BLD-MCNC: 84 MG/DL (ref 70–99)
HCT VFR BLD AUTO: 34.3 % (ref 40–53)
HDLC SERPL-MCNC: 30 MG/DL
HGB BLD-MCNC: 10.8 G/DL (ref 13.3–17.7)
LDLC SERPL CALC-MCNC: 67 MG/DL
LYMPHOCYTES # BLD MANUAL: 2.4 10E3/UL (ref 0.8–5.3)
LYMPHOCYTES NFR BLD MANUAL: 18 %
MCH RBC QN AUTO: 31.4 PG (ref 26.5–33)
MCHC RBC AUTO-ENTMCNC: 31.5 G/DL (ref 31.5–36.5)
MCV RBC AUTO: 100 FL (ref 78–100)
METAMYELOCYTES # BLD MANUAL: 0.1 10E3/UL
METAMYELOCYTES NFR BLD MANUAL: 1 %
MONOCYTES # BLD MANUAL: 0.3 10E3/UL (ref 0–1.3)
MONOCYTES NFR BLD MANUAL: 2 %
NEUTROPHILS # BLD MANUAL: 10.2 10E3/UL (ref 1.6–8.3)
NEUTROPHILS NFR BLD MANUAL: 77 %
NONHDLC SERPL-MCNC: 99 MG/DL
PLAT MORPH BLD: ABNORMAL
PLATELET # BLD AUTO: 480 10E3/UL (ref 150–450)
POTASSIUM BLD-SCNC: 4.6 MMOL/L (ref 3.4–5.3)
RBC # BLD AUTO: 3.44 10E6/UL (ref 4.4–5.9)
RBC MORPH BLD: ABNORMAL
SODIUM SERPL-SCNC: 139 MMOL/L (ref 133–144)
TRIGL SERPL-MCNC: 162 MG/DL
WBC # BLD AUTO: 13.3 10E3/UL (ref 4–11)

## 2023-01-08 ENCOUNTER — MYC MEDICAL ADVICE (OUTPATIENT)
Dept: FAMILY MEDICINE | Facility: CLINIC | Age: 73
End: 2023-01-08

## 2023-01-16 ENCOUNTER — TELEPHONE (OUTPATIENT)
Dept: FAMILY MEDICINE | Facility: CLINIC | Age: 73
End: 2023-01-16
Payer: MEDICARE

## 2023-01-16 NOTE — TELEPHONE ENCOUNTER
Forms received from: [employer]   Phone number listed: [phone]   Fax listed: [phone]  Date received: 1/13/23  Form description: Home health plan of care 12/1922  Once forms are completed, please return to [employer] via fax [phone].  Is patient requesting to be contacted when forms are completed: na  Phone: na  Form placed:  Malcom Boone

## 2023-01-19 ENCOUNTER — MYC MEDICAL ADVICE (OUTPATIENT)
Dept: FAMILY MEDICINE | Facility: CLINIC | Age: 73
End: 2023-01-19
Payer: MEDICARE

## 2023-01-19 NOTE — TELEPHONE ENCOUNTER
"MyChart sent.  Patient needs to be triaged over-the-the phone for edema, \"rash\", edema, and pain in R calf.    Fadi Osuna RN    "

## 2023-01-20 ENCOUNTER — OFFICE VISIT (OUTPATIENT)
Dept: FAMILY MEDICINE | Facility: CLINIC | Age: 73
End: 2023-01-20
Payer: MEDICARE

## 2023-01-20 ENCOUNTER — NURSE TRIAGE (OUTPATIENT)
Dept: NURSING | Facility: CLINIC | Age: 73
End: 2023-01-20

## 2023-01-20 ENCOUNTER — ANCILLARY PROCEDURE (OUTPATIENT)
Dept: ULTRASOUND IMAGING | Facility: CLINIC | Age: 73
End: 2023-01-20
Attending: FAMILY MEDICINE
Payer: MEDICARE

## 2023-01-20 VITALS
HEART RATE: 74 BPM | BODY MASS INDEX: 43.78 KG/M2 | TEMPERATURE: 97.9 F | HEIGHT: 69 IN | WEIGHT: 295.6 LBS | RESPIRATION RATE: 20 BRPM | DIASTOLIC BLOOD PRESSURE: 62 MMHG | OXYGEN SATURATION: 96 % | SYSTOLIC BLOOD PRESSURE: 126 MMHG

## 2023-01-20 DIAGNOSIS — M79.89 RIGHT LEG SWELLING: ICD-10-CM

## 2023-01-20 DIAGNOSIS — E66.01 MORBID OBESITY (H): ICD-10-CM

## 2023-01-20 DIAGNOSIS — G40.909 SEIZURE DISORDER (H): ICD-10-CM

## 2023-01-20 DIAGNOSIS — F32.1 CURRENT MODERATE EPISODE OF MAJOR DEPRESSIVE DISORDER WITHOUT PRIOR EPISODE (H): ICD-10-CM

## 2023-01-20 DIAGNOSIS — M79.89 RIGHT LEG SWELLING: Primary | ICD-10-CM

## 2023-01-20 DIAGNOSIS — R06.09 OTHER FORMS OF DYSPNEA: ICD-10-CM

## 2023-01-20 DIAGNOSIS — G20.C PARKINSONIAN TREMOR (H): ICD-10-CM

## 2023-01-20 DIAGNOSIS — R60.9 PITTING EDEMA: ICD-10-CM

## 2023-01-20 LAB
ALBUMIN SERPL-MCNC: 3.7 G/DL (ref 3.4–5)
ALP SERPL-CCNC: 83 U/L (ref 40–150)
ALT SERPL W P-5'-P-CCNC: 7 U/L (ref 0–70)
ANION GAP SERPL CALCULATED.3IONS-SCNC: 6 MMOL/L (ref 3–14)
AST SERPL W P-5'-P-CCNC: 15 U/L (ref 0–45)
BILIRUB SERPL-MCNC: 0.3 MG/DL (ref 0.2–1.3)
BUN SERPL-MCNC: 15 MG/DL (ref 7–30)
CALCIUM SERPL-MCNC: 9.6 MG/DL (ref 8.5–10.1)
CHLORIDE BLD-SCNC: 101 MMOL/L (ref 94–109)
CO2 SERPL-SCNC: 31 MMOL/L (ref 20–32)
CREAT SERPL-MCNC: 0.87 MG/DL (ref 0.66–1.25)
ERYTHROCYTE [DISTWIDTH] IN BLOOD BY AUTOMATED COUNT: 13 % (ref 10–15)
GFR SERPL CREATININE-BSD FRML MDRD: >90 ML/MIN/1.73M2
GLUCOSE BLD-MCNC: 95 MG/DL (ref 70–99)
HCT VFR BLD AUTO: 36 % (ref 40–53)
HGB BLD-MCNC: 12.2 G/DL (ref 13.3–17.7)
MCH RBC QN AUTO: 31.8 PG (ref 26.5–33)
MCHC RBC AUTO-ENTMCNC: 33.9 G/DL (ref 31.5–36.5)
MCV RBC AUTO: 94 FL (ref 78–100)
NT-PROBNP SERPL-MCNC: 135 PG/ML (ref 0–900)
PLATELET # BLD AUTO: 199 10E3/UL (ref 150–450)
POTASSIUM BLD-SCNC: 4.1 MMOL/L (ref 3.4–5.3)
PROT SERPL-MCNC: 7 G/DL (ref 6.8–8.8)
RBC # BLD AUTO: 3.84 10E6/UL (ref 4.4–5.9)
SODIUM SERPL-SCNC: 138 MMOL/L (ref 133–144)
WBC # BLD AUTO: 8.3 10E3/UL (ref 4–11)

## 2023-01-20 PROCEDURE — 36415 COLL VENOUS BLD VENIPUNCTURE: CPT | Performed by: FAMILY MEDICINE

## 2023-01-20 PROCEDURE — 83880 ASSAY OF NATRIURETIC PEPTIDE: CPT | Performed by: FAMILY MEDICINE

## 2023-01-20 PROCEDURE — 80053 COMPREHEN METABOLIC PANEL: CPT | Performed by: FAMILY MEDICINE

## 2023-01-20 PROCEDURE — 96127 BRIEF EMOTIONAL/BEHAV ASSMT: CPT | Performed by: FAMILY MEDICINE

## 2023-01-20 PROCEDURE — 99214 OFFICE O/P EST MOD 30 MIN: CPT | Performed by: FAMILY MEDICINE

## 2023-01-20 PROCEDURE — 93971 EXTREMITY STUDY: CPT | Mod: TC | Performed by: RADIOLOGY

## 2023-01-20 PROCEDURE — 85027 COMPLETE CBC AUTOMATED: CPT | Performed by: FAMILY MEDICINE

## 2023-01-20 ASSESSMENT — ENCOUNTER SYMPTOMS
COUGH: 0
DIZZINESS: 0
ARTHRALGIAS: 0
SORE THROAT: 0
JOINT SWELLING: 0
SHORTNESS OF BREATH: 0
MYALGIAS: 0
WEAKNESS: 0
PALPITATIONS: 0
HEADACHES: 0
FEVER: 0
NERVOUS/ANXIOUS: 0
CHILLS: 0
PARESTHESIAS: 0

## 2023-01-20 ASSESSMENT — ANXIETY QUESTIONNAIRES
3. WORRYING TOO MUCH ABOUT DIFFERENT THINGS: NOT AT ALL
4. TROUBLE RELAXING: NOT AT ALL
8. IF YOU CHECKED OFF ANY PROBLEMS, HOW DIFFICULT HAVE THESE MADE IT FOR YOU TO DO YOUR WORK, TAKE CARE OF THINGS AT HOME, OR GET ALONG WITH OTHER PEOPLE?: NOT DIFFICULT AT ALL
7. FEELING AFRAID AS IF SOMETHING AWFUL MIGHT HAPPEN: NOT AT ALL
IF YOU CHECKED OFF ANY PROBLEMS ON THIS QUESTIONNAIRE, HOW DIFFICULT HAVE THESE PROBLEMS MADE IT FOR YOU TO DO YOUR WORK, TAKE CARE OF THINGS AT HOME, OR GET ALONG WITH OTHER PEOPLE: NOT DIFFICULT AT ALL
7. FEELING AFRAID AS IF SOMETHING AWFUL MIGHT HAPPEN: NOT AT ALL
6. BECOMING EASILY ANNOYED OR IRRITABLE: NOT AT ALL
1. FEELING NERVOUS, ANXIOUS, OR ON EDGE: NOT AT ALL
2. NOT BEING ABLE TO STOP OR CONTROL WORRYING: NOT AT ALL
5. BEING SO RESTLESS THAT IT IS HARD TO SIT STILL: NOT AT ALL
GAD7 TOTAL SCORE: 0
GAD7 TOTAL SCORE: 0

## 2023-01-20 ASSESSMENT — ASTHMA QUESTIONNAIRES
QUESTION_5 LAST FOUR WEEKS HOW WOULD YOU RATE YOUR ASTHMA CONTROL: WELL CONTROLLED
QUESTION_3 LAST FOUR WEEKS HOW OFTEN DID YOUR ASTHMA SYMPTOMS (WHEEZING, COUGHING, SHORTNESS OF BREATH, CHEST TIGHTNESS OR PAIN) WAKE YOU UP AT NIGHT OR EARLIER THAN USUAL IN THE MORNING: NOT AT ALL
QUESTION_4 LAST FOUR WEEKS HOW OFTEN HAVE YOU USED YOUR RESCUE INHALER OR NEBULIZER MEDICATION (SUCH AS ALBUTEROL): NOT AT ALL
ACT_TOTALSCORE: 24
QUESTION_1 LAST FOUR WEEKS HOW MUCH OF THE TIME DID YOUR ASTHMA KEEP YOU FROM GETTING AS MUCH DONE AT WORK, SCHOOL OR AT HOME: NONE OF THE TIME
QUESTION_2 LAST FOUR WEEKS HOW OFTEN HAVE YOU HAD SHORTNESS OF BREATH: NOT AT ALL
ACT_TOTALSCORE: 24

## 2023-01-20 ASSESSMENT — PAIN SCALES - GENERAL: PAINLEVEL: NO PAIN (1)

## 2023-01-20 NOTE — TELEPHONE ENCOUNTER
Pt is phoning stating that he was recently hospitalized for Influenza     Pt has a rash where he wears an orthopedic sock on his right leg - rash is spreading     Rash is painful, but no itching     Rates pain 8/10    No fever     Per Disposition: See in Office Today    Transferred to scheduling     Care advice given per protocol and when to call back. Pt verbalized understanding and agrees to plan of care.    Stefanie Robert RN  [place] Nurse Advisor  7:14 AM 1/20/2023        Reason for Disposition    Localized rash is very painful (no fever)    Additional Information    Negative: Sounds like a life-threatening emergency to the triager    Negative: Athlete's Foot suspected (i.e., itchy rash between the toes)    Negative: Insect bite(s) suspected    Negative: Jock Itch suspected (i.e., itchy rash on inner thighs near genital area)    Negative: Localized lump (or swelling) without redness or rash    Negative: Poison ivy, oak, or sumac contact suspected    Negative: Rash of female genital area (vulva)    Negative: Rash of male genital area (penis or scrotum)    Negative: Redness of immunization site    Negative: Shingles suspected (i.e., painful rash, multiple small blisters grouped together in one area of body; dermatomal distribution)    Negative: Small spot, skin growth, or mole    Negative: Wound infection suspected (i.e., pain, spreading redness, or pus; in a cut, puncture, scrape or sutured wound)    Negative: Fever and localized purple or blood-colored spots or dots that are not from injury or friction    Negative: Fever and localized rash is very painful    Negative: Patient sounds very sick or weak to the triager    Negative: Looks like a boil, infected sore, deep ulcer, or other infected rash (spreading redness, pus)    Negative: Painful rash with multiple small blisters grouped together (i.e., dermatomal distribution or 'band' or 'stripe')    Protocols used: RASH OR REDNESS - [id number]

## 2023-01-20 NOTE — LETTER
January 20, 2023      Prashant Keyes  [address] [facility] [id number]        To Whom It May Concern:    Prashant Keyes was seen in our clinic. He may return to work on 1/25/23.  Please excuse 1/20-1/24 due to a medical condition.        Sincerely,      Dr. Arsalan Salazar

## 2023-01-20 NOTE — PROGRESS NOTES
1. Right leg swelling  R>L lower extremity swelling.  Would like to rule out DVT.  DDX includes kidney failure, venous stasis, CHF, iatrogentic (currently on amlodipine) versus cellulitis  - US Lower Extremity Venous Duplex Right; Future  - Comprehensive metabolic panel (BMP + Alb, Alk Phos, ALT, AST, Total. Bili, TP); Future  - CBC with platelets; Future  - CBC with platelets  - Comprehensive metabolic panel (BMP + Alb, Alk Phos, ALT, AST, Total. Bili, TP)    2. Parkinsonian tremor (H)  Stable.  Currently on sinemet    3. Current moderate episode of major depressive disorder without prior episode (H)  Stable.  Currently on prozac    4. Morbid obesity (H)  Stressed the importance of diet and exercise.  Most likely attributing to patient's high blood pressure.     5. Seizure disorder (H)  Stable.  Continue with Keppra    6. Pitting edema  - BNP-N terminal pro; Future  - BNP-N terminal pro    7. Other forms of dyspnea  - BNP-N terminal pro; Future  - BNP-N terminal pro        Kevon Arciniega is a 72 year old, presenting for the following health issues:  Derm Problem (Rash on right lower leg, getting worse over the last few days)      History of Present Illness       Reason for visit:  Rash  Symptom onset:  1-3 days ago  Symptoms include:  Rash  Symptom intensity:  Moderate  Symptom progression:  Worsening  Had these symptoms before:  No  What makes it worse:  No  What makes it better:  No    He eats 0-1 servings of fruits and vegetables daily.He consumes 0 sweetened beverage(s) daily.He exercises with enough effort to increase his heart rate 9 or less minutes per day.  He exercises with enough effort to increase his heart rate 3 or less days per week.   He is taking medications regularly.  Today's SHREYA-7 Score: 0       1. Rash on right lower leg: Three days ago.  Above the orthopedic sock.  Spreads to knee and lower outside calf.  Symptoms has gotten worse.  Pain with touch.  No itchiness.  New episode.  No fevers or  "chills.  States that is is red.  No trauma, no bug bite.  Started spontaneously.  Tried cortisone and antibiotic (over the counter) with no improvement.    Review of Systems   Constitutional: Negative for chills and fever.   HENT: Negative for congestion, ear pain, hearing loss and sore throat.    Respiratory: Negative for cough and shortness of breath.    Cardiovascular: Positive for peripheral edema. Negative for chest pain and palpitations.   Musculoskeletal: Negative for arthralgias, joint swelling and myalgias.   Skin: Negative for rash.        Right lower extremity redness   Neurological: Negative for dizziness, weakness, headaches and paresthesias.   Psychiatric/Behavioral: Negative for mood changes. The patient is not nervous/anxious.             Objective    /62   Pulse 74   Temp 97.9  F (36.6  C) (Tympanic)   Resp 20   Ht 1.759 m (5' 9.25\")   Wt 134.1 kg (295 lb 9.6 oz)   SpO2 96%   BMI 43.34 kg/m    Body mass index is 43.34 kg/m .  Physical Exam  Constitutional:       General: He is not in acute distress.     Appearance: He is well-developed.   HENT:      Head: Normocephalic and atraumatic.      Nose: Nose normal.   Eyes:      Conjunctiva/sclera: Conjunctivae normal.   Neck:      Trachea: No tracheal deviation.   Cardiovascular:      Rate and Rhythm: Normal rate and regular rhythm.      Heart sounds: Normal heart sounds.   Pulmonary:      Effort: Pulmonary effort is normal.      Breath sounds: No wheezing.   Musculoskeletal:         General: Normal range of motion.      Cervical back: Normal range of motion.   Skin:     Findings: No erythema or rash.   Neurological:      Mental Status: He is alert and oriented to person, place, and time.   Psychiatric:         Behavior: Behavior normal.           "

## 2023-01-20 NOTE — PATIENT INSTRUCTIONS
Jvai Arciniega,    Thank you for allowing [facility] to manage your care.    I ordered some blood work, please go to the laboratory to get your laboratory studies.    I ordered a lower extremity ultrasound, please call diagnostic imaging [phone] to schedule your test.    Please elevated your legs while sitting or sleeping.     For your convenience, test results are released as soon as they are available  Please allow 1-2 business days for me to send you a comment about your results.  If not done so, I encourage you to login into [facility] [employer] ([email or web address] to review your results in real time.     If you have any questions or concerns, please feel free to call us at [phone].    Sincerely,    Dr. Salazar    Did you know?      You can schedule a video visit for follow-up appointments as well as future appointments for certain conditions.  Please see the below link.     [email or web address]    If you have not already done so,  I encourage you to sign up for [employer] ([email or web address]).  This will allow you to review your results, securely communicate with a provider, and schedule virtual visits as well.

## 2023-01-23 ENCOUNTER — OFFICE VISIT (OUTPATIENT)
Dept: DERMATOLOGY | Facility: CLINIC | Age: 73
End: 2023-01-23
Attending: PHYSICIAN ASSISTANT
Payer: MEDICARE

## 2023-01-23 DIAGNOSIS — D22.9 MULTIPLE BENIGN NEVI: ICD-10-CM

## 2023-01-23 DIAGNOSIS — L82.1 SEBORRHEIC KERATOSES: ICD-10-CM

## 2023-01-23 DIAGNOSIS — R21 RASH: ICD-10-CM

## 2023-01-23 DIAGNOSIS — L57.0 ACTINIC KERATOSES: Primary | ICD-10-CM

## 2023-01-23 DIAGNOSIS — L81.4 SOLAR LENTIGO: ICD-10-CM

## 2023-01-23 DIAGNOSIS — D48.9 NEOPLASM OF UNCERTAIN BEHAVIOR: ICD-10-CM

## 2023-01-23 DIAGNOSIS — D18.01 CHERRY ANGIOMA: ICD-10-CM

## 2023-01-23 PROCEDURE — 88305 TISSUE EXAM BY PATHOLOGIST: CPT | Performed by: DERMATOLOGY

## 2023-01-23 PROCEDURE — 17000 DESTRUCT PREMALG LESION: CPT | Mod: XS | Performed by: DERMATOLOGY

## 2023-01-23 PROCEDURE — 99203 OFFICE O/P NEW LOW 30 MIN: CPT | Mod: 25 | Performed by: DERMATOLOGY

## 2023-01-23 PROCEDURE — 11104 PUNCH BX SKIN SINGLE LESION: CPT | Performed by: DERMATOLOGY

## 2023-01-23 PROCEDURE — 88313 SPECIAL STAINS GROUP 2: CPT | Performed by: DERMATOLOGY

## 2023-01-23 ASSESSMENT — ASTHMA QUESTIONNAIRES
QUESTION_2 LAST FOUR WEEKS HOW OFTEN HAVE YOU HAD SHORTNESS OF BREATH: THREE TO SIX TIMES A WEEK
QUESTION_1 LAST FOUR WEEKS HOW MUCH OF THE TIME DID YOUR ASTHMA KEEP YOU FROM GETTING AS MUCH DONE AT WORK, SCHOOL OR AT HOME: ALL OF THE TIME
QUESTION_4 LAST FOUR WEEKS HOW OFTEN HAVE YOU USED YOUR RESCUE INHALER OR NEBULIZER MEDICATION (SUCH AS ALBUTEROL): NOT AT ALL
QUESTION_3 LAST FOUR WEEKS HOW OFTEN DID YOUR ASTHMA SYMPTOMS (WHEEZING, COUGHING, SHORTNESS OF BREATH, CHEST TIGHTNESS OR PAIN) WAKE YOU UP AT NIGHT OR EARLIER THAN USUAL IN THE MORNING: TWO OR THREE NIGHTS A WEEK
QUESTION_5 LAST FOUR WEEKS HOW WOULD YOU RATE YOUR ASTHMA CONTROL: COMPLETELY CONTROLLED
ACT_TOTALSCORE: 16
ACT_TOTALSCORE: 16

## 2023-01-23 ASSESSMENT — PAIN SCALES - GENERAL: PAINLEVEL: NO PAIN (0)

## 2023-01-23 NOTE — NURSING NOTE
"Prashant Keyes's chief complaint for this visit includes:  Chief Complaint   Patient presents with     Skin Check     FBSC: area of concern- rash on inner bilateral thighs, pt has had it for about 6 months. Triamcinolone cream and OTC hydrocortisone cream did not help.      PCP: Matt Swan    Referring Provider:  Matt Swan PA-C  [id number] [facility] [place] [address]    There were no vitals taken for this visit.  No Pain (0)        Allergies   Allergen Reactions     Accupril [Ace Inhibitors] Difficulty breathing     accupril causes SOB     Aptiom [Eslicarbazepine] Difficulty breathing     Atorvastatin Calcium      Myalgias from Lipitor     Contrast Dye Hives     Lactose GI Disturbance     Lisinopril Difficulty breathing     SOB     Nitroglycerin      Headache     Paroxetine Hives     Tetracycline [Tetracyclines]      \"splitting headaches\"     Vimpat [Lacosamide] Difficulty breathing     Zolpidem      Adhesive Tape Rash     Without itching- EKG pads         Do you need any medication refills at today's visit?  No .      Pebbles Currie LPN    "

## 2023-01-23 NOTE — PROGRESS NOTES
[facility] Dermatology Note  Encounter Date: Jan 23, 2023  Office Visit     Dermatology Problem List:  Last skin check 1/23/23  0. NUB - left posterior thigh, s/p bx 1/23/23   1. History of benign biopsies  - Hemangioma - right beard, s/p bx 6/5/14  2. AKs, s/p cryo    ____________________________________________    Assessment & Plan:    # Actinic keratosis - right eyebrow x 1. Based on the location and chronic irritation to this lesion, treatment with cryotherapy is warranted.   - See cryo procedure note.     # Dermatitis - thighs and right axilla. New, undiagnosed problem with uncertain prognosis. Favor lichen planus, prior biopsy in 2012 supportive of this. Present for about 6 months, not itchy. Recommended biopsy to confirm diagnosis, patient is agreeable.   - See procedure note below.   - Defer treatment given asymptomatic    # Benign lesions: Multiple benign nevi, solar lentigos, seborrheic keratoses, cherry angiomas. Explained to patient benign nature of lesion. No treatment is necessary at this time unless the lesion changes or becomes symptomatic.   - ABCDEs of melanoma were discussed and self skin checks were advised.  - Sun precaution was advised including the use of sun screens of SPF 30 or higher, sun protective clothing, and avoidance of tanning beds.    Procedures Performed:   - Punch biopsy procedure note, location(s): left posterior thigh. After discussion of benefits and risks including but not limited to bleeding, infection, scar, incomplete removal, recurrence, and non-diagnostic biopsy, written consent and photographs were obtained. The area was cleaned with isopropyl alcohol. 0.5mL of 1% lidocaine with epinephrine was injected to obtain adequate anesthesia and a 4 mm punch biopsy was performed at site(s). 4-0 Prolene sutures were utilized to approximate the epidermal edges. White petrolatum ointment and a bandage was applied to the wound. Explicit verbal and written wound  care instructions were provided. The patient left the dermatology clinic in good condition.     - Cryotherapy procedure note, location(s): right eyebrow. After verbal consent and discussion of risks and benefits including, but not limited to, dyspigmentation/scar, blister, and pain, 1 lesion(s) was(were) treated with 1-2 mm freeze border for 1-2 cycles with liquid nitrogen. Post cryotherapy instructions were provided.     Follow-up: pending path results    Staff and Scribe:     Scribe Disclosure:   I, Prabhjot Worthy, am serving as a scribe to document services personally performed by this physician, Dr. Thomas Hinton, based on data collection and the provider's statements to me.     Provider Disclosure:   The documentation recorded by the scribe accurately reflects the services I personally performed and the decisions made by me.    Thomas Hinton MD    Department of Dermatology  [facility]: Phone: [phone], Fax:[phone]  [facility]: Phone: [phone] Fax: [phone]  ____________________________________________    CC: Skin Check (FBSC: area of concern- rash on inner bilateral thighs, pt has had it for about 6 months. )    HPI:  Mr. Prashant Keyes is a(n) 72 year old male who presents today as a new patient for a skin check.    Referred to derm for a rash on the upper inner thighs.  Previously seen by Dr. Guerrero in 2014.     Today, he reports a rash on the bilateral inner thighs that has been present for 6 months. It is not raised or itchy. He reports OTC hydrocortisone and triamcinolone have not been helpful.     The patient otherwise denies any new or concerning lesions. No bleeding, painful, pruritic, or changing lesions. They report no personal history of skin cancer. There is no family history of skin cancer. No history of immunosuppression. No history of indoor tanning.  No occupational exposure to ultraviolet light or other forms of radiation. Patient is a part-time [employer] employee. They do not regularly use sunscreen and protective clothing when outdoors for sun protection. Health otherwise stable. No other skin concerns.       Labs Reviewed:  N/A    Physical Exam:  Vitals: There were no vitals taken for this visit.  SKIN: Full skin, which includes the head/face, both arms, chest, back, abdomen,both legs, genitalia and/or groin buttocks, digits and/or nails, was examined.  - There are dome shaped bright red papules on the trunk and extremities.   - Multiple regular brown pigmented macules and papules are identified on the trunk and extremities.   - Scattered brown macules on sun exposed areas.  - There are waxy stuck on tan to brown papules on the trunk and extremities.    - Pink to violaceous papules coalescing into plaques on the posterior thighs, left medial thigh, right axilla.  - There is an erythematous macule with overyling adherent scale on the right eyebrow x 1.    - No other lesions of concern on areas examined.     Medications:  Current Outpatient Medications   Medication     albuterol (PROAIR HFA/PROVENTIL HFA/VENTOLIN HFA) 108 (90 Base) MCG/ACT inhaler     allopurinol (ZYLOPRIM) 100 MG tablet     amLODIPine (NORVASC) 10 MG tablet     aspirin 81 MG tablet     carbidopa-levodopa (SINEMET)  MG tablet     carvedilol (COREG) 12.5 MG tablet     chlorthalidone (HYGROTON) 25 MG tablet     Cholecalciferol (VITAMIN D) 2000 UNITS tablet     Cyanocobalamin (VITAMIN  B-12) 2500 MCG tablet     divalproex sodium delayed-release (DEPAKOTE) 500 MG DR tablet     ferrous sulfate (IRON) 325 (65 FE) MG tablet     FLUoxetine (PROZAC) 40 MG capsule     gabapentin (NEURONTIN) 300 MG capsule     hydrALAZINE (APRESOLINE) 25 MG tablet     levETIRAcetam (KEPPRA) 1000 MG tablet     losartan (COZAAR) 100 MG tablet     Multiple Vitamin (MULTI VITAMIN MENS PO)     omeprazole (PRILOSEC) 40 MG  DR capsule     ORDER FOR DME     oxybutynin (DITROPAN) 5 MG tablet     potassium chloride ER (KLOR-CON) 20 MEQ CR tablet     sildenafil (REVATIO) 20 MG tablet     simvastatin (ZOCOR) 20 MG tablet     traMADol (ULTRAM) 50 MG tablet     traZODone (DESYREL) 100 MG tablet     triamcinolone (KENALOG) 0.1 % external cream     No current facility-administered medications for this visit.      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)     Class 1 obesity with serious comorbidity and body mass index (BMI) of 31.0 to 31.9 in adult, unspecified obesity type     Obesity (BMI 35.0-39.9) with comorbidity (H)     Parkinsonism (H)     Current moderate episode of major depressive disorder without prior episode (H)     Chronic, continuous use of opioids     Past Medical History:   Diagnosis Date     Arthritis 1/1/2012     Calculus of kidney ~1975     Carpal tunnel syndrome 11/94     Concussion, unspecified 12/95     Depressive disorder 2/2/2015     Eczema 11/16/2011     Epileptic seizure (H) 1995    diagnosed 1995, controlled with Depakote and Keppra     Fibromyalgia syndrome ~2000    per pt     Hypertension      Left testicle cyst      Photosensitive contact dermatitis      Prostatitis, unspecified      Seizures (H)     Diagnosed 1995, controlled with Depakote and Keppra     Umbilical hernia 11/26/2012     Uncomplicated asthma      Unspecified asthma(493.90)         CC Matt Swan PA-C  [id number] [id number] on close of this encounter.

## 2023-01-23 NOTE — PROGRESS NOTES
The following medication was given:     MEDICATION:  Lidocaine with epinephrine 1% [id number]  ROUTE: SQ  SITE: see procedure note  DOSE: 1 mL  LOT #: [id number]  : WISETIVI  EXPIRATION DATE: 07/31/2023  NDC#: 44980-189-03  Was there drug waste? No  Multi-dose vial: Yes    Pebbles Currie LPN  January 23, 2023

## 2023-01-23 NOTE — LETTER
1/23/2023         RE: Prashant Keyes  [address]  [place] [address]        Dear Colleague,    Thank you for referring your patient, Prashant Keyes, to the [facility]. Please see a copy of my visit note below.    [facility] Dermatology Note  Encounter Date: Jan 23, 2023  Office Visit     Dermatology Problem List:  Last skin check 1/23/23  0. NUB - left posterior thigh, s/p bx 1/23/23   1. History of benign biopsies  - Hemangioma - right beard, s/p bx 6/5/14  2. AKs, s/p cryo    ____________________________________________    Assessment & Plan:    # Actinic keratosis - right eyebrow x 1. Based on the location and chronic irritation to this lesion, treatment with cryotherapy is warranted.   - See cryo procedure note.     # Dermatitis - thighs and right axilla. New, undiagnosed problem with uncertain prognosis. Favor lichen planus, prior biopsy in 2012 supportive of this. Present for about 6 months, not itchy. Recommended biopsy to confirm diagnosis, patient is agreeable.   - See procedure note below.   - Defer treatment given asymptomatic    # Benign lesions: Multiple benign nevi, solar lentigos, seborrheic keratoses, cherry angiomas. Explained to patient benign nature of lesion. No treatment is necessary at this time unless the lesion changes or becomes symptomatic.   - ABCDEs of melanoma were discussed and self skin checks were advised.  - Sun precaution was advised including the use of sun screens of SPF 30 or higher, sun protective clothing, and avoidance of tanning beds.    Procedures Performed:   - Punch biopsy procedure note, location(s): left posterior thigh. After discussion of benefits and risks including but not limited to bleeding, infection, scar, incomplete removal, recurrence, and non-diagnostic biopsy, written consent and photographs were obtained. The area was cleaned with isopropyl alcohol. 0.5mL of 1% lidocaine with epinephrine was  injected to obtain adequate anesthesia and a 4 mm punch biopsy was performed at site(s). 4-0 Prolene sutures were utilized to approximate the epidermal edges. White petrolatum ointment and a bandage was applied to the wound. Explicit verbal and written wound care instructions were provided. The patient left the dermatology clinic in good condition.     - Cryotherapy procedure note, location(s): right eyebrow. After verbal consent and discussion of risks and benefits including, but not limited to, dyspigmentation/scar, blister, and pain, 1 lesion(s) was(were) treated with 1-2 mm freeze border for 1-2 cycles with liquid nitrogen. Post cryotherapy instructions were provided.     Follow-up: pending path results    Staff and Scribe:     Scribe Disclosure:   I, Prabhjot Worthy, am serving as a scribe to document services personally performed by this physician, Dr. Thomas Hinton, based on data collection and the provider's statements to me.     Provider Disclosure:   The documentation recorded by the scribe accurately reflects the services I personally performed and the decisions made by me.    Thomas Hinton MD    Department of Dermatology  [facility]: Phone: [phone], Fax:[phone]  [facility]: Phone: [phone] Fax: [phone]  ____________________________________________    CC: Skin Check (FBSC: area of concern- rash on inner bilateral thighs, pt has had it for about 6 months. )    HPI:  Mr. Prashant Keyes is a(n) 72 year old male who presents today as a new patient for a skin check.    Referred to derm for a rash on the upper inner thighs.  Previously seen by Dr. Guerrero in 2014.     Today, he reports a rash on the bilateral inner thighs that has been present for 6 months. It is not raised or itchy. He reports OTC hydrocortisone and triamcinolone have not been helpful.     The  patient otherwise denies any new or concerning lesions. No bleeding, painful, pruritic, or changing lesions. They report no personal history of skin cancer. There is no family history of skin cancer. No history of immunosuppression. No history of indoor tanning. No occupational exposure to ultraviolet light or other forms of radiation. Patient is a part-time [employer] employee. They do not regularly use sunscreen and protective clothing when outdoors for sun protection. Health otherwise stable. No other skin concerns.       Labs Reviewed:  N/A    Physical Exam:  Vitals: There were no vitals taken for this visit.  SKIN: Full skin, which includes the head/face, both arms, chest, back, abdomen,both legs, genitalia and/or groin buttocks, digits and/or nails, was examined.  - There are dome shaped bright red papules on the trunk and extremities.   - Multiple regular brown pigmented macules and papules are identified on the trunk and extremities.   - Scattered brown macules on sun exposed areas.  - There are waxy stuck on tan to brown papules on the trunk and extremities.    - Pink to violaceous papules coalescing into plaques on the posterior thighs, left medial thigh, right axilla.  - There is an erythematous macule with overyling adherent scale on the right eyebrow x 1.    - No other lesions of concern on areas examined.     Medications:  Current Outpatient Medications   Medication     albuterol (PROAIR HFA/PROVENTIL HFA/VENTOLIN HFA) 108 (90 Base) MCG/ACT inhaler     allopurinol (ZYLOPRIM) 100 MG tablet     amLODIPine (NORVASC) 10 MG tablet     aspirin 81 MG tablet     carbidopa-levodopa (SINEMET)  MG tablet     carvedilol (COREG) 12.5 MG tablet     chlorthalidone (HYGROTON) 25 MG tablet     Cholecalciferol (VITAMIN D) 2000 UNITS tablet     Cyanocobalamin (VITAMIN  B-12) 2500 MCG tablet     divalproex sodium delayed-release (DEPAKOTE) 500 MG DR tablet     ferrous sulfate (IRON) 325 (65 FE) MG tablet     FLUoxetine  (PROZAC) 40 MG capsule     gabapentin (NEURONTIN) 300 MG capsule     hydrALAZINE (APRESOLINE) 25 MG tablet     levETIRAcetam (KEPPRA) 1000 MG tablet     losartan (COZAAR) 100 MG tablet     Multiple Vitamin (MULTI VITAMIN MENS PO)     omeprazole (PRILOSEC) 40 MG DR capsule     ORDER FOR DME     oxybutynin (DITROPAN) 5 MG tablet     potassium chloride ER (KLOR-CON) 20 MEQ CR tablet     sildenafil (REVATIO) 20 MG tablet     simvastatin (ZOCOR) 20 MG tablet     traMADol (ULTRAM) 50 MG tablet     traZODone (DESYREL) 100 MG tablet     triamcinolone (KENALOG) 0.1 % external cream     No current facility-administered medications for this visit.      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)     Class 1 obesity with serious comorbidity and body mass index (BMI) of 31.0 to 31.9 in adult, unspecified obesity type     Obesity (BMI 35.0-39.9) with comorbidity (H)     Parkinsonism (H)     Current moderate episode of major depressive disorder without prior episode (H)     Chronic, continuous use of opioids     Past Medical History:   Diagnosis Date     Arthritis 1/1/2012     Calculus of kidney ~1975     Carpal tunnel syndrome 11/94     Concussion, unspecified 12/95     Depressive disorder 2/2/2015     Eczema 11/16/2011     Epileptic seizure (H) 1995    diagnosed 1995, controlled with Depakote and Keppra     Fibromyalgia syndrome ~2000    per pt     Hypertension      Left testicle cyst      Photosensitive contact dermatitis      Prostatitis, unspecified      Seizures (H)     Diagnosed 1995, controlled with Depakote  and Keppra     Umbilical hernia 11/26/2012     Uncomplicated asthma      Unspecified asthma(493.90)         CC Matt Swan PA-C  [address] [place] [address] on close of this encounter.    The following medication was given:     MEDICATION:  Lidocaine with epinephrine 1% 1:[id number]  ROUTE: SQ  SITE: see procedure note  DOSE: 1 mL  LOT #: [id number]  : [employer]  EXPIRATION DATE: 07/31/2023  NDC#: 72976-354-74  Was there drug waste? No  Multi-dose vial: Yes    Pebbles Currie LPN  January 23, 2023      Again, thank you for allowing me to participate in the care of your patient.        Sincerely,        Thomas Hinton MD

## 2023-01-23 NOTE — PROGRESS NOTES
"  1. Venous stasis dermatitis of right lower extremity  Improved compared to last visit.  Most recent labs reviewed and otherwise reassuring.  US negative for DVT.  Continue with leg elevation.    2. Screen for colon cancer  - Colonoscopy Screening  Referral; Future      Subjective   Bill is a 72 year old, presenting for the following health issues:  RECHECK      HPI     Concern - Leg Pain  Onset: seen on 1/20/23 by Dr. Salazar  Progression of Symptoms:  Improving, no longer swollen like it had been  Accompanying Signs & Symptoms: sore spot (indentation) right leg   Therapies tried and outcome: leg elevation    1. Right leg swelling: States that leg swelling has improved after leg elevation.  Redness has improved.  Swelling has improved also.  Was previously on lasix.  Patient is currently on chlorthalidone.     Review of Systems   Constitutional: Negative for chills and fever.   HENT: Negative for congestion, ear pain, hearing loss and sore throat.    Respiratory: Negative for cough and shortness of breath.    Cardiovascular: Negative for chest pain, palpitations and peripheral edema.   Musculoskeletal: Negative for arthralgias, joint swelling and myalgias.   Skin: Negative for rash.   Neurological: Negative for dizziness, weakness, headaches and paresthesias.   Psychiatric/Behavioral: Negative for mood changes. The patient is not nervous/anxious.             Objective    /62   Pulse 68   Temp (!) 96.4  F (35.8  C) (Tympanic)   Resp 16   Ht 1.778 m (5' 10\")   Wt 134.8 kg (297 lb 3.2 oz)   SpO2 96%   BMI 42.64 kg/m    Body mass index is 42.64 kg/m .  Physical Exam  Constitutional:       General: He is not in acute distress.  HENT:      Head: Normocephalic and atraumatic.   Eyes:      Conjunctiva/sclera: Conjunctivae normal.   Cardiovascular:      Rate and Rhythm: Normal rate and regular rhythm.      Heart sounds: Normal heart sounds. No murmur heard.  Pulmonary:      Effort: Pulmonary effort is " normal. No respiratory distress.      Breath sounds: No wheezing or rales.   Musculoskeletal:         General: Normal range of motion.      Right lower leg: Edema (traced (improved compared to previous visit)) present.   Skin:     Findings: No rash.   Neurological:      Mental Status: He is alert and oriented to person, place, and time.

## 2023-01-23 NOTE — PATIENT INSTRUCTIONS
Wound Care After a Biopsy    What is a skin biopsy?  A skin biopsy allows the doctor to examine a very small piece of tissue under the microscope to determine the diagnosis and the best treatment for the skin condition. A local anesthetic (numbing medicine)  is injected with a very small needle into the skin area to be tested. A small piece of skin is taken from the area. Sometimes a suture (stitch) is used.     What are the risks of a skin biopsy?  I will experience scar, bleeding, swelling, pain, crusting and redness. I may experience incomplete removal or recurrence. Risks of this procedure are excessive bleeding, bruising, infection, nerve damage, numbness, thick (hypertrophic or keloidal) scar and non-diagnostic biopsy.    How should I care for my wound for the first 24 hours?  Keep the wound dry and covered for 24 hours  If it bleeds, hold direct pressure on the area for 15 minutes. If bleeding does not stop then go to the emergency room  Avoid strenuous exercise the first 1-2 days or as your doctor instructs you    How should I care for the wound after 24 hours?  After 24 hours, remove the bandage  You may bathe or shower as normal  If you had a scalp biopsy, you can shampoo as usual and can use shower water to clean the biopsy site daily  Clean the wound twice a day with gentle soap and water  Do not scrub, be gentle  Apply white petroleum/Vaseline after cleaning the wound with a cotton swab or a clean finger, and keep the site covered with a Bandaid /bandage. Bandages are not necessary with a scalp biopsy  If you are unable to cover the site with a Bandaid /bandage, re-apply ointment 2-3 times a day to keep the site moist. Moisture will help with healing  Avoid strenuous activity for first 1-2 days  Avoid lakes, rivers, pools, and oceans until the stitches are removed or the site is healed    How do I clean my wound?  Wash hands thoroughly with soap or use hand  before all wound care  Clean the  wound with gentle soap and water  Apply white petroleum/Vaseline  to wound after it is clean  Replace the Bandaid /bandage to keep the wound covered for the first few days or as instructed by your doctor  If you had a scalp biopsy, warm shower water to the area on a daily basis should suffice    What should I use to clean my wound?   Cotton-tipped applicators (Qtips )  White petroleum jelly (Vaseline ). Use a clean new container and use Q-tips to apply.  Bandaids   as needed  Gentle soap     How should I care for my wound long term?  Do not get your wound dirty  Keep up with wound care for one week or until the area is healed.  A small scab will form and fall off by itself when the area is completely healed. The area will be red and will become pink in color as it heals. Sun protection is very important for how your scar will turn out. Sunscreen with an SPF 30 or greater is recommended once the area is healed.  If you have stitches, stitches need to be removed in 14 days. You may return to our clinic for this or you may have it done locally at your doctor s office.  You should have some soreness but it should be mild and slowly go away over several days. Talk to your doctor about using tylenol for pain,    When should I call my doctor?  If you have increased:   Pain or swelling  Pus or drainage (clear or slightly yellow drainage is ok)  Temperature over 100F  Spreading redness or warmth around wound    When will I hear about my results?  The biopsy results can take 2 weeks to come back.  Your results will automatically release to [employer] before your provider has even reviewed them.  The clinic will call you with the results, send you a [employer] message, or have you schedule a follow-up clinic or phone time to discuss the results.  Contact our clinics if you do not hear from us in 2 weeks.    Who should I call with questions?  [facility]: [phone]  [facility]  [place]: [phone]  For urgent needs outside of business hours call the [facility] at [phone] and ask for the dermatology resident on call       Cryotherapy    What is it?  Use of a very cold liquid, such as liquid nitrogen, to freeze and destroy abnormal skin cells that need to be removed    What should I expect?  Tenderness and redness  A small blister that might grow and fill with dark purple blood. There may be crusting.  More than one treatment may be needed if the lesions do not go away.    How do I care for the treated area?  Gently wash the area with your hands when bathing.  Use a thin layer of Vaseline to help with healing. You may use a Band-Aid.   The area should heal within 7-10 days and may leave behind a pink or lighter color.   Do not use an antibiotic or Neosporin ointment.   You may take acetaminophen (Tylenol) for pain.     Call your doctor if you have:  Severe pain  Signs of infection (warmth, redness, cloudy yellow drainage, and or a bad smell)  Questions or concerns    Who should I call with questions?      [facility]: [phone]      [facility]: [phone]      For urgent needs outside of business hours call the [facility] at [phone] and ask for the dermatology resident on call         Patient Education     Checking for Skin Cancer  You can find cancer early by checking your skin each month. There are 3 kinds of skin cancer. They are melanoma, basal cell carcinoma, and squamous cell carcinoma. Doing monthly skin checks is the best way to find new marks or skin changes. Follow the instructions below for checking your skin.   The ABCDEs of checking moles for melanoma   Check your moles or growths for signs of melanoma using ABCDE:   Asymmetry: the sides of the mole or growth don t match  Border: the edges are ragged, notched, or blurred  Color: the color within the mole or growth varies  Diameter:  the mole or growth is larger than 6 mm (size of a pencil eraser)  Evolving: the size, shape, or color of the mole or growth is changing (evolving is not shown in the images below)    Checking for other types of skin cancer  Basal cell carcinoma or squamous cell carcinoma have symptoms such as:     A spot or mole that looks different from all other marks on your skin  Changes in how an area feels, such as itching, tenderness, or pain  Changes in the skin's surface, such as oozing, bleeding, or scaliness  A sore that does not heal  New swelling or redness beyond the border of a mole    Who s at risk?  Anyone can get skin cancer. But you are at greater risk if you have:   Fair skin, light-colored hair, or light-colored eyes  Many moles or abnormal moles on your skin  A history of sunburns from sunlight or tanning beds  A family history of skin cancer  A history of exposure to radiation or chemicals  A weakened immune system  If you have had skin cancer in the past, you are at risk for recurring skin cancer.   How to check your skin  Do your monthly skin checkups in front of a full-length mirror. Check all parts of your body, including your:   Head (ears, face, neck, and scalp)  Torso (front, back, and sides)  Arms (tops, undersides, upper, and lower armpits)  Hands (palms, backs, and fingers, including under the nails)  Buttocks and genitals  Legs (front, back, and sides)  Feet (tops, soles, toes, including under the nails, and between toes)  If you have a lot of moles, take digital photos of them each month. Make sure to take photos both up close and from a distance. These can help you see if any moles change over time.   Most skin changes are not cancer. But if you see any changes in your skin, call your doctor right away. Only he or she can diagnose a problem. If you have skin cancer, seeing your doctor can be the first step toward getting the treatment that could save your life.   John last reviewed this  educational content on 4/1/2019 2000-2020 [employer] [facility] [employer]. [address] [place] [address], [place] [address]. All rights reserved. This information is not intended as a substitute for professional medical care. Always follow your healthcare professional's instructions.       When should I call my doctor?  If you are worsening or not improving, please, contact us or seek urgent care as noted below.     Who should I call with questions (adults)?  [facility] (adult and pediatric): [phone]  [facility] (adult): [phone]  For urgent needs outside of business hours call the [facility] at [phone] and ask for the dermatology resident on call to be paged  If this is a medical emergency and you are unable to reach an ER, Call [phone]    Who should I call with questions (pediatric)?  [facility]- Pediatric Dermatology  Dr. Darlin Yeager, Dr. Karo Diamond, Dr. Bisi Barrientos, LUANA Perales, Dr. Marija Villa, Dr. Renetta Isaac & Dr. Juan Jose Green  Non-urgent nurse triage line; [phone]- Savanah and Praveena RN Care Coordinators   Kaylen (/Complex ) [phone]    If you need a prescription refill, please contact your pharmacy. Refills are approved or denied by our Physicians during normal business hours, Monday through Fridays  Per office policy, refills will not be granted if you have not been seen within the past year (or sooner depending on your child's condition)    Scheduling Information:  Pediatric Appointment Scheduling and Call Center [phone]  Radiology Scheduling- [phone]  Sedation Unit Scheduling- [phone]  [place] Scheduling- General [phone]; Pediatric Dermatology [phone]  Main  Services: [phone]  Ukrainian: [phone]  Gabonese: [phone]  Hmong/Indian/Burt: [phone]  Preadmission Nursing Department Fax Number: [phone] (Fax all pre-operative paperwork to this number)    For urgent matters arising during evenings, weekends, or holidays that cannot wait for normal business hours please call [phone] and ask for the dermatology resident on call to be paged.

## 2023-01-24 ENCOUNTER — OFFICE VISIT (OUTPATIENT)
Dept: FAMILY MEDICINE | Facility: CLINIC | Age: 73
End: 2023-01-24
Payer: MEDICARE

## 2023-01-24 VITALS
SYSTOLIC BLOOD PRESSURE: 120 MMHG | WEIGHT: 297.2 LBS | OXYGEN SATURATION: 96 % | HEART RATE: 68 BPM | TEMPERATURE: 96.4 F | HEIGHT: 70 IN | DIASTOLIC BLOOD PRESSURE: 62 MMHG | BODY MASS INDEX: 42.55 KG/M2 | RESPIRATION RATE: 16 BRPM

## 2023-01-24 DIAGNOSIS — Z12.11 SCREEN FOR COLON CANCER: ICD-10-CM

## 2023-01-24 DIAGNOSIS — I87.2 VENOUS STASIS DERMATITIS OF RIGHT LOWER EXTREMITY: Primary | ICD-10-CM

## 2023-01-24 PROCEDURE — 99213 OFFICE O/P EST LOW 20 MIN: CPT | Performed by: FAMILY MEDICINE

## 2023-01-24 ASSESSMENT — ENCOUNTER SYMPTOMS
WEAKNESS: 0
PALPITATIONS: 0
DIZZINESS: 0
MYALGIAS: 0
COUGH: 0
HEADACHES: 0
FEVER: 0
SHORTNESS OF BREATH: 0
NERVOUS/ANXIOUS: 0
PARESTHESIAS: 0
CHILLS: 0
ARTHRALGIAS: 0
SORE THROAT: 0
JOINT SWELLING: 0

## 2023-01-24 ASSESSMENT — PAIN SCALES - GENERAL: PAINLEVEL: NO PAIN (0)

## 2023-01-24 NOTE — PROGRESS NOTES
"      Kevon Arciniega is a 72 year old presenting for the following health issues:  Hospital F/U (12/12/22 Pneumonia, [place]) and Knee Pain (Left Knee - discussion after surgery)      HPI     Hospital Follow Up  - [facility]  -12/12/22  - Pneumonia  Back for  A recheck of a cxr. History of pneumonia with sepsis 6 wks ago. No cough or sob. No fevers. Overall he reports feeling great. No hemoptysis.   Left Knee   - Discuss issues after having surgery    Knee gives out at times. No swelling . Some pain. No obvious locking or catching.     Review of Systems   Constitutional, HEENT, cardiovascular, pulmonary, GI, , musculoskeletal, neuro, skin, endocrine and psych systems are negative, except as otherwise noted.      Objective    /84   Pulse 70   Temp 98.6  F (37  C) (Tympanic)   Resp 20   Ht 1.778 m (5' 10\")   Wt 134.1 kg (295 lb 9.6 oz)   SpO2 98%   BMI 42.41 kg/m    Body mass index is 42.41 kg/m .  Physical Exam   Eye exam - right eye normal lid, conjunctiva, cornea, pupil and fundus, left eye normal lid, conjunctiva, cornea, pupil and fundus.  ENT exam reveals - ENT exam normal, no neck nodes or sinus tenderness.  CHEST:chest clear to IPPA, no tachypnea, retractions or cyanosis and S1, S2 normal, no murmur, no gallop, rate regular.  KNEE: The injured knee reveals antalgic gait, soft tissue tenderness over medial joint line, reduced range of motion, negative drawer sign, collateral ligaments intact, negative Kenji sign. X-ray is negative for fracture. Mild medial joint space narrowing.        The risks, benefits and potential complications (including but not limited to, bleeding, infection, pain, scar, damage to adjacent structures, atrophy or necrosis of soft tissue, skin blanching, failure to relieve symptoms) of injection were discussed with the patient. Questions were addressed and answered.The patient elected to proceed. Written informed consent was obtained. The correct procedural site was " identified and confirmed. A Left Knee intraarticular injection was performed using 2mL Depo Medrol 40mg per mL and 4mL (0.25% marcaine) of local anesthetic after sterile prep, to the correct procedural site. Sterile bandaid applied. This was tolerated well by the patient. No apparent complications.Did also discuss that if diabetic, recommend close monitoring of blood sugars over the next week as cortisone injections can temporarily elevate blood sugars.    Cxr: no obvious opacities or infiltrates noted .    Prashant was seen today for hospital f/u and knee pain.    Diagnoses and all orders for this visit:    Pneumonia of both lungs due to infectious organism, unspecified part of lung  -     XR Chest 2 Views; Future    CKD (chronic kidney disease) stage 2, GFR 60-89 ml/min  -     Albumin Random Urine Quantitative with Creat Ratio; Future  -     Albumin Random Urine Quantitative with Creat Ratio    Acute pain of left knee  -     XR Knee Left 1/2 Views; Future  -     DRAIN/INJECT LARGE JOINT/BURSA  -     methylPREDNISolone (DEPO-MEDROL) injection 80 mg      Tylenol .  Activity as tolerated.

## 2023-01-24 NOTE — PATIENT INSTRUCTIONS
Javi Arciniega,    Thank you for allowing [facility] to manage your care.    Please continue with leg elevation.     I made a colonoscopy referral, they will be calling in approximately 1 week to set up your appointment.  If you do not hear from them, please call the specialty number on your after visit.     If you have any questions or concerns, please feel free to call us at [phone].    Sincerely,    Dr. Salazar    Did you know?      You can schedule a video visit for follow-up appointments as well as future appointments for certain conditions.  Please see the below link.     [email or web address]    If you have not already done so,  I encourage you to sign up for [email or web address] ([email or web address]).  This will allow you to review your results, securely communicate with a provider, and schedule virtual visits as well.     Statement Selected

## 2023-01-26 LAB
PATH REPORT.COMMENTS IMP SPEC: NORMAL
PATH REPORT.COMMENTS IMP SPEC: NORMAL
PATH REPORT.FINAL DX SPEC: NORMAL
PATH REPORT.GROSS SPEC: NORMAL
PATH REPORT.MICROSCOPIC SPEC OTHER STN: NORMAL
PATH REPORT.RELEVANT HX SPEC: NORMAL

## 2023-01-28 NOTE — RESULT ENCOUNTER NOTE
Mariela sent.   Can we schedule a virtual follow-up with Demian (Tuesday AM virtual clinic - can use CECILLE slot) at next avaialble to discuss treatment options. Thanks!    Thomas Hinton MD  Pronouns: he/him/his    Department of Dermatology  [facility]: Phone: [phone], Fax:[phone]  [facility]: Phone: [phone] Fax: [phone]

## 2023-01-30 ENCOUNTER — ANCILLARY PROCEDURE (OUTPATIENT)
Dept: GENERAL RADIOLOGY | Facility: CLINIC | Age: 73
End: 2023-01-30
Attending: PHYSICIAN ASSISTANT
Payer: MEDICARE

## 2023-01-30 ENCOUNTER — OFFICE VISIT (OUTPATIENT)
Dept: FAMILY MEDICINE | Facility: CLINIC | Age: 73
End: 2023-01-30
Payer: MEDICARE

## 2023-01-30 VITALS
RESPIRATION RATE: 20 BRPM | BODY MASS INDEX: 42.32 KG/M2 | DIASTOLIC BLOOD PRESSURE: 84 MMHG | TEMPERATURE: 98.6 F | OXYGEN SATURATION: 98 % | WEIGHT: 295.6 LBS | HEIGHT: 70 IN | SYSTOLIC BLOOD PRESSURE: 118 MMHG | HEART RATE: 70 BPM

## 2023-01-30 DIAGNOSIS — J18.9 PNEUMONIA OF BOTH LUNGS DUE TO INFECTIOUS ORGANISM, UNSPECIFIED PART OF LUNG: Primary | ICD-10-CM

## 2023-01-30 DIAGNOSIS — M25.562 ACUTE PAIN OF LEFT KNEE: ICD-10-CM

## 2023-01-30 DIAGNOSIS — N18.2 CKD (CHRONIC KIDNEY DISEASE) STAGE 2, GFR 60-89 ML/MIN: ICD-10-CM

## 2023-01-30 DIAGNOSIS — J18.9 PNEUMONIA OF BOTH LUNGS DUE TO INFECTIOUS ORGANISM, UNSPECIFIED PART OF LUNG: ICD-10-CM

## 2023-01-30 PROCEDURE — 99214 OFFICE O/P EST MOD 30 MIN: CPT | Mod: 25 | Performed by: PHYSICIAN ASSISTANT

## 2023-01-30 PROCEDURE — 20610 DRAIN/INJ JOINT/BURSA W/O US: CPT | Performed by: PHYSICIAN ASSISTANT

## 2023-01-30 PROCEDURE — 71046 X-RAY EXAM CHEST 2 VIEWS: CPT | Mod: TC | Performed by: RADIOLOGY

## 2023-01-30 PROCEDURE — 73560 X-RAY EXAM OF KNEE 1 OR 2: CPT | Mod: TC | Performed by: RADIOLOGY

## 2023-01-30 RX ORDER — METHYLPREDNISOLONE ACETATE 40 MG/ML
80 INJECTION, SUSPENSION INTRA-ARTICULAR; INTRALESIONAL; INTRAMUSCULAR; SOFT TISSUE ONCE
Status: COMPLETED | OUTPATIENT
Start: 2023-01-30 | End: 2023-01-30

## 2023-01-30 RX ADMIN — METHYLPREDNISOLONE ACETATE 80 MG: 40 INJECTION, SUSPENSION INTRA-ARTICULAR; INTRALESIONAL; INTRAMUSCULAR; SOFT TISSUE at 12:49

## 2023-01-30 ASSESSMENT — PAIN SCALES - GENERAL: PAINLEVEL: NO PAIN (0)

## 2023-02-01 DIAGNOSIS — N18.1 CKD (CHRONIC KIDNEY DISEASE) STAGE 1, GFR 90 ML/MIN OR GREATER: Primary | ICD-10-CM

## 2023-02-07 ENCOUNTER — MYC MEDICAL ADVICE (OUTPATIENT)
Dept: NEUROLOGY | Facility: CLINIC | Age: 73
End: 2023-02-07

## 2023-02-07 DIAGNOSIS — R56.9 CONVULSIONS, UNSPECIFIED CONVULSION TYPE (H): Chronic | ICD-10-CM

## 2023-02-07 DIAGNOSIS — G40.A09 NONINTRACTABLE ABSENCE EPILEPSY WITHOUT STATUS EPILEPTICUS (H): ICD-10-CM

## 2023-02-07 RX ORDER — LEVETIRACETAM 1000 MG/1
TABLET ORAL
Qty: 180 TABLET | Refills: 2 | Status: SHIPPED | OUTPATIENT
Start: 2023-02-07 | End: 2023-04-04

## 2023-02-07 NOTE — TELEPHONE ENCOUNTER
Last OV 1/12/22. F/u appointment 8/7/23.  Per note:    Please let Bill know that his labs are largely unremarkable except that his B12 level is quite high.  If he is taking a supplement he can cut back on every third day dosing of this.  Dose should be 1000mcg.     Routing refill request to provider for review/approval because:  Patient needs to be seen because it has been more than 1 year since last office visit.    Please advise for refill request. Rx pended.  Yesica Hernandez RN on 2/7/2023 at 1:58 PM

## 2023-02-14 ENCOUNTER — MYC MEDICAL ADVICE (OUTPATIENT)
Dept: FAMILY MEDICINE | Facility: CLINIC | Age: 73
End: 2023-02-14
Payer: MEDICARE

## 2023-02-28 DIAGNOSIS — I10 BENIGN ESSENTIAL HYPERTENSION: ICD-10-CM

## 2023-02-28 RX ORDER — CHLORTHALIDONE 25 MG/1
TABLET ORAL
Qty: 90 TABLET | Refills: 1 | Status: SHIPPED | OUTPATIENT
Start: 2023-02-28 | End: 2023-08-03

## 2023-03-02 ENCOUNTER — MYC MEDICAL ADVICE (OUTPATIENT)
Dept: DERMATOLOGY | Facility: CLINIC | Age: 73
End: 2023-03-02
Payer: MEDICARE

## 2023-03-02 DIAGNOSIS — E87.6 HYPOKALEMIA: ICD-10-CM

## 2023-03-03 RX ORDER — POTASSIUM CHLORIDE 1500 MG/1
TABLET, EXTENDED RELEASE ORAL
Qty: 270 TABLET | Refills: 0 | Status: SHIPPED | OUTPATIENT
Start: 2023-03-03 | End: 2023-05-15

## 2023-03-03 NOTE — TELEPHONE ENCOUNTER
Patient has been scheduled for a telephone visit on 3/7/23 with Dr. Hinton. He was provided with the details and had no further questions.    Steffany Paz LPN     Performed

## 2023-03-07 ENCOUNTER — VIRTUAL VISIT (OUTPATIENT)
Dept: DERMATOLOGY | Facility: CLINIC | Age: 73
End: 2023-03-07
Payer: MEDICARE

## 2023-03-07 ENCOUNTER — TELEPHONE (OUTPATIENT)
Dept: DERMATOLOGY | Facility: CLINIC | Age: 73
End: 2023-03-07

## 2023-03-07 DIAGNOSIS — R79.9 ABNORMAL FINDING OF BLOOD CHEMISTRY, UNSPECIFIED: ICD-10-CM

## 2023-03-07 DIAGNOSIS — L92.0 GRANULOMA ANNULARE: Primary | ICD-10-CM

## 2023-03-07 PROCEDURE — 99442 PR PHYSICIAN TELEPHONE EVALUATION 11-20 MIN: CPT | Mod: 95 | Performed by: DERMATOLOGY

## 2023-03-07 RX ORDER — HYDROXYCHLOROQUINE SULFATE 200 MG/1
200 TABLET, FILM COATED ORAL 2 TIMES DAILY
Qty: 60 TABLET | Refills: 5 | Status: SHIPPED | OUTPATIENT
Start: 2023-03-07 | End: 2023-09-11

## 2023-03-07 NOTE — PROGRESS NOTES
[facility] Dermatology Note  Encounter Date: Mar 7, 2023  Store-and-Forward and Telephone ([phone] ). Location of teledermatologist: [facility] [place].  Start time: 9:37 AM. End time: 9:51 AM.    Dermatology Problem List:  Last skin check 1/23/23  1. History of benign biopsies  - Hemangioma - right beard, s/p bx 6/5/14  2. AKs, s/p cryo  3. Granuloma annulare    ____________________________________________    Assessment & Plan:     # Granuloma annulare. Chronic, flare.   Etiology discussed. Discussed option for topicals, phototherapy, oral systemic agents such as plaquenil. Patient opted for plaquenil. Has had recent normal CBC, CMP. Glucose normal, no recent A1c. Will start plaquenil with plan for screening labs in 3 months, including A1c given association of diabetes with GA (although recent glucose wnl). Patient will obtain labs prior to appointment.   - Labs: CBC, CMP, and A1 c in 3 months  - Reviewed dermpath report from 1/23/23   - Start plaquenil 200 mg PO BID; risk of GI upset and retinal toxicity discussed. Patient has history of cataract surgery; will place referral to ophtho for baseline eye exam.     Procedures Performed:    None    Follow-up: 3 month(s) virtually (telephone with photos), or earlier for new or changing lesions    Staff:     Thomas Hinton MD  Pronouns: he/him/his    Department of Dermatology  [facility]: Phone: [phone], Fax:[phone]  [facility]: Phone: [phone] Fax: [phone]  ____________________________________________    CC: Follow Up (Discuss treatment plan )    HPI:  Mr. Prashant Keyes is a(n) 72 year old male who presents today as a return patient for granuloma annulare. Last seen on 1/23/23 for skin check when had biopsy of a rash on his leg c/w GA.     Today, he reports worsening of  the spots on his legs with new spots arising. Has prior tried OTC hydrocortisone and triam 0.1% cream which were not helpful. He reports spots are not itchy. He is interested in treatment.     Patient is otherwise feeling well, without additional skin concerns.    Labs Reviewed:  N/A    Physical Exam:  Vitals: There were no vitals taken for this visit.  SKIN: Teledermatology photos were reviewed; image quality and interpretability: acceptable. Image date: 3/7/23.  - Pink, oval-shaped, non-scaly plaques on the posterior and medial thighs.   - No other lesions of concern on areas examined.       Medications:  Current Outpatient Medications   Medication     albuterol (PROAIR HFA/PROVENTIL HFA/VENTOLIN HFA) 108 (90 Base) MCG/ACT inhaler     allopurinol (ZYLOPRIM) 100 MG tablet     amLODIPine (NORVASC) 10 MG tablet     aspirin 81 MG tablet     carbidopa-levodopa (SINEMET)  MG tablet     carvedilol (COREG) 12.5 MG tablet     chlorthalidone (HYGROTON) 25 MG tablet     Cholecalciferol (VITAMIN D) 2000 UNITS tablet     Cyanocobalamin (VITAMIN  B-12) 2500 MCG tablet     divalproex sodium delayed-release (DEPAKOTE) 500 MG DR tablet     ferrous sulfate (IRON) 325 (65 FE) MG tablet     FLUoxetine (PROZAC) 40 MG capsule     gabapentin (NEURONTIN) 300 MG capsule     hydrALAZINE (APRESOLINE) 25 MG tablet     levETIRAcetam (KEPPRA) 1000 MG tablet     losartan (COZAAR) 100 MG tablet     Multiple Vitamin (MULTI VITAMIN MENS PO)     omeprazole (PRILOSEC) 40 MG DR capsule     ORDER FOR DME     oxybutynin (DITROPAN) 5 MG tablet     potassium chloride ER (KLOR-CON) 20 MEQ CR tablet     sildenafil (REVATIO) 20 MG tablet     simvastatin (ZOCOR) 20 MG tablet     traMADol (ULTRAM) 50 MG tablet     traZODone (DESYREL) 100 MG tablet     triamcinolone (KENALOG) 0.1 % external cream     No current facility-administered medications for this visit.      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)     Class 1 obesity with serious comorbidity and body mass index (BMI) of 31.0 to 31.9 in adult, unspecified obesity type     Obesity (BMI 35.0-39.9) with comorbidity (H)     Parkinsonism (H)     Current moderate episode of major depressive disorder without prior episode (H)     Chronic, continuous use of opioids     Past Medical History:   Diagnosis Date     Arthritis 1/1/2012     Calculus of kidney ~1975     Carpal tunnel syndrome 11/94     Concussion, unspecified 12/95     Depressive disorder 2/2/2015     Eczema 11/16/2011     Epileptic seizure (H) 1995    diagnosed 1995, controlled with Depakote and Keppra     Fibromyalgia syndrome ~2000    per pt     Hypertension      Left testicle cyst      Photosensitive contact dermatitis      Prostatitis, unspecified      Seizures (H)     Diagnosed 1995, controlled with Depakote and Keppra     Umbilical hernia 11/26/2012     Uncomplicated asthma      Unspecified asthma(493.90)

## 2023-03-07 NOTE — TELEPHONE ENCOUNTER
Called patient to schedule 3 month follow up (6/6/23) and Lab appointment prior (6/5/23).     Shelby Kocher

## 2023-03-07 NOTE — NURSING NOTE
Chief Complaint   Patient presents with     Follow Up     Discuss treatment plan      Teledermatology Nurse Call Patients:     Are you in the [facility] at the time of the encounter? yes    Today's visit will be billed to you and your insurance.    A teledermatology visit is not as thorough as an in-person visit and the quality of the photograph sent may not be of the same quality as that taken by the dermatology clinic.    Shelby Kocher

## 2023-03-07 NOTE — LETTER
3/7/2023       RE: Prashant Keyes  [address]  [place] [address]     Dear Colleague,    Thank you for referring your patient, Prashant Keyes, to the [facility] [place] at [facility]. Please see a copy of my visit note below.    [facility] Dermatology Note  Encounter Date: Mar 7, 2023  Store-and-Forward and Telephone ([phone] ). Location of teledermatologist: [facility] [place].  Start time: 9:37 AM. End time: 9:51 AM.    Dermatology Problem List:  Last skin check 1/23/23  1. History of benign biopsies  - Hemangioma - right beard, s/p bx 6/5/14  2. AKs, s/p cryo  3. Granuloma annulare    ____________________________________________    Assessment & Plan:     # Granuloma annulare. Chronic, flare.   Etiology discussed. Discussed option for topicals, phototherapy, oral systemic agents such as plaquenil. Patient opted for plaquenil. Has had recent normal CBC, CMP. Glucose normal, no recent A1c. Will start plaquenil with plan for screening labs in 3 months, including A1c given association of diabetes with GA (although recent glucose wnl). Patient will obtain labs prior to appointment.   - Labs: CBC, CMP, and A1 c in 3 months  - Reviewed dermpath report from 1/23/23   - Start plaquenil 200 mg PO BID; risk of GI upset and retinal toxicity discussed. Patient has history of cataract surgery; will place referral to ophtho for baseline eye exam.     Procedures Performed:    None    Follow-up: 3 month(s) virtually (telephone with photos), or earlier for new or changing lesions    Staff:     Thomas Hinton MD  Pronouns: he/him/his    Department of Dermatology  [facility]: Phone: [phone], Fax:[phone]  [facility]: Phone: [phone] Fax:  [phone]  ____________________________________________    CC: Follow Up (Discuss treatment plan )    HPI:  Mr. Prashant Keyes is a(n) 72 year old male who presents today as a return patient for granuloma annulare. Last seen on 1/23/23 for skin check when had biopsy of a rash on his leg c/w GA.     Today, he reports worsening of the spots on his legs with new spots arising. Has prior tried OTC hydrocortisone and triam 0.1% cream which were not helpful. He reports spots are not itchy. He is interested in treatment.     Patient is otherwise feeling well, without additional skin concerns.    Labs Reviewed:  N/A    Physical Exam:  Vitals: There were no vitals taken for this visit.  SKIN: Teledermatology photos were reviewed; image quality and interpretability: acceptable. Image date: 3/7/23.  - Pink, oval-shaped, non-scaly plaques on the posterior and medial thighs.   - No other lesions of concern on areas examined.       Medications:  Current Outpatient Medications   Medication     albuterol (PROAIR HFA/PROVENTIL HFA/VENTOLIN HFA) 108 (90 Base) MCG/ACT inhaler     allopurinol (ZYLOPRIM) 100 MG tablet     amLODIPine (NORVASC) 10 MG tablet     aspirin 81 MG tablet     carbidopa-levodopa (SINEMET)  MG tablet     carvedilol (COREG) 12.5 MG tablet     chlorthalidone (HYGROTON) 25 MG tablet     Cholecalciferol (VITAMIN D) 2000 UNITS tablet     Cyanocobalamin (VITAMIN  B-12) 2500 MCG tablet     divalproex sodium delayed-release (DEPAKOTE) 500 MG DR tablet     ferrous sulfate (IRON) 325 (65 FE) MG tablet     FLUoxetine (PROZAC) 40 MG capsule     gabapentin (NEURONTIN) 300 MG capsule     hydrALAZINE (APRESOLINE) 25 MG tablet     levETIRAcetam (KEPPRA) 1000 MG tablet     losartan (COZAAR) 100 MG tablet     Multiple Vitamin (MULTI VITAMIN MENS PO)     omeprazole (PRILOSEC) 40 MG DR capsule     ORDER FOR DME     oxybutynin (DITROPAN) 5 MG tablet     potassium chloride ER (KLOR-CON) 20 MEQ CR tablet     sildenafil  (REVATIO) 20 MG tablet     simvastatin (ZOCOR) 20 MG tablet     traMADol (ULTRAM) 50 MG tablet     traZODone (DESYREL) 100 MG tablet     triamcinolone (KENALOG) 0.1 % external cream     No current facility-administered medications for this visit.      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)     Class 1 obesity with serious comorbidity and body mass index (BMI) of 31.0 to 31.9 in adult, unspecified obesity type     Obesity (BMI 35.0-39.9) with comorbidity (H)     Parkinsonism (H)     Current moderate episode of major depressive disorder without prior episode (H)     Chronic, continuous use of opioids     Past Medical History:   Diagnosis Date     Arthritis 1/1/2012     Calculus of kidney ~1975     Carpal tunnel syndrome 11/94     Concussion, unspecified 12/95     Depressive disorder 2/2/2015     Eczema 11/16/2011     Epileptic seizure (H) 1995    diagnosed 1995, controlled with Depakote and Keppra     Fibromyalgia syndrome ~2000    per pt     Hypertension      Left testicle cyst      Photosensitive contact dermatitis      Prostatitis, unspecified      Seizures (H)     Diagnosed 1995, controlled with Depakote and Keppra     Umbilical hernia 11/26/2012     Uncomplicated asthma      Unspecified asthma(493.90)

## 2023-03-07 NOTE — PATIENT INSTRUCTIONS
[facility] Dermatology Visit    Thank you for allowing us to participate in your care. Your findings, instructions and follow-up plan are as follows:     Granuloma Annulare.   Start plaquenil taken as 200 mg twice daily.   Follow-up in 3 months; please obtain labs 1-2 days prior to visit.     When should I call my doctor?  If you are worsening or not improving, please, contact us or seek urgent care as noted below.     Who should I call with questions (adults)?  [facility] (adult and pediatric): [phone]  [facility] (adult): [phone]  For urgent needs outside of business hours call the [facility] at [phone] and ask for the dermatology resident on call  If this is a medical emergency and you are unable to reach an ER, Call [phone]    Who should I call with questions (pediatric)?  [facility]- Pediatric Dermatology  Dr. Darlin Yeager, Dr. Karo Diamond, Dr. Bisi Barrientos, Aileen Lemons, LUANA Villa, Dr. Renetta Isaac & Dr. Juan Jose Green  Non Urgent  Nurse Triage Line; [phone]- Savanah and Praveena RN Care Coordinators   Kaylen (/Complex ) [phone]    If you need a prescription refill, please contact your pharmacy. Refills are approved or denied by our physicians during normal business hours, Monday through Fridays  Per office policy, refills will not be granted if you have not been seen within the past year (or sooner depending on your child's condition).    Scheduling Information:  Pediatric Appointment Scheduling and Call Center [phone]  Radiology Scheduling- [phone]  Sedation Unit Scheduling- [phone]  [place] Scheduling- General [phone]; Pediatric Dermatology [phone]  Main  Services: [phone]  Ivorian: [phone]  Jordanian: [phone]  Hmong/Irish/Sri Lankan: [phone]  Preadmission Nursing Department  Fax Number: [phone] (fax all pre-operative paperwork to this number)    For urgent matters arising during evenings, weekends, or holidays that cannot wait for normal business hours please call [phone] and ask for the dermatology resident on call to be paged.

## 2023-03-18 ENCOUNTER — OFFICE VISIT (OUTPATIENT)
Dept: URGENT CARE | Facility: URGENT CARE | Age: 73
End: 2023-03-18
Payer: MEDICARE

## 2023-03-18 VITALS
TEMPERATURE: 97 F | SYSTOLIC BLOOD PRESSURE: 135 MMHG | DIASTOLIC BLOOD PRESSURE: 76 MMHG | RESPIRATION RATE: 16 BRPM | BODY MASS INDEX: 42.76 KG/M2 | WEIGHT: 298 LBS | HEART RATE: 69 BPM | OXYGEN SATURATION: 96 %

## 2023-03-18 DIAGNOSIS — K21.9 GASTROESOPHAGEAL REFLUX DISEASE: ICD-10-CM

## 2023-03-18 DIAGNOSIS — S39.012A STRAIN OF LUMBAR REGION, INITIAL ENCOUNTER: Primary | ICD-10-CM

## 2023-03-18 PROCEDURE — 99213 OFFICE O/P EST LOW 20 MIN: CPT | Performed by: FAMILY MEDICINE

## 2023-03-18 RX ORDER — CYCLOBENZAPRINE HCL 5 MG
5 TABLET ORAL
Qty: 20 TABLET | Refills: 0 | Status: SHIPPED | OUTPATIENT
Start: 2023-03-18 | End: 2023-04-03

## 2023-03-18 ASSESSMENT — PAIN SCALES - GENERAL: PAINLEVEL: SEVERE PAIN (6)

## 2023-03-18 NOTE — PROGRESS NOTES
"  Assessment & Plan     Strain of lumbar region, initial encounter  Continue with heating messages,   Continue active and home daily stretches and exercises.  - Menthol, Topical Analgesic, (BIOFREEZE) 4 % GEL; Externally apply topically 4 times daily as needed (as needed)  - cyclobenzaprine (FLEXERIL) 5 MG tablet; Take 1 tablet (5 mg) by mouth nightly as needed for muscle spasms       BMI:   Estimated body mass index is 42.76 kg/m  as calculated from the following:    Height as of 1/30/23: 1.778 m (5' 10\").    Weight as of this encounter: 135.2 kg (298 lb).   Weight management plan: Discussed healthy diet and exercise guidelines    Follow up with PCP in the next a few days if symptoms remain.      Kevon Arciniega is a 72 year old presenting for the following health issues:  Urgent Care and Back Pain  Patient reports that for the past 2 to 3 days he has been having more stiffness in his right lumbar region.  He had no injury, no trauma.  He has no abdominal pain, no nausea no vomiting.  Denies any frequency or urgency, denies pain with urination, and  no blood in his urine.    He reports pain worse when he moves certain ways.      Review of Systems   Constitutional, HEENT, cardiovascular, pulmonary, GI, , musculoskeletal, neuro, skin, endocrine and psych systems are negative, except as otherwise noted.      Objective    /76   Pulse 69   Temp 97  F (36.1  C) (Tympanic)   Resp 16   Wt 135.2 kg (298 lb)   SpO2 96%   BMI 42.76 kg/m    Body mass index is 42.76 kg/m .  Physical Exam   GENERAL: healthy, alert and no distress  ABDOMEN: soft, nontender, no hepatosplenomegaly, no masses and bowel sounds normal  NEURO: Normal strength and tone, mentation intact and speech normal  BACK: no CVA tenderness, no paralumbar tenderness  Comprehensive back pain exam:  Tenderness of right lower lumbar region, Range of motion not limited by pain, Lower extremity strength functional and equal on both sides, Lower extremity " reflexes within normal limits bilaterally, Lower extremity sensation normal and equal on both sides and Straight leg raise negative bilaterally  PSYCH: mentation appears normal, affect normal/bright      Jose Coon MD

## 2023-03-18 NOTE — LETTER
March 18, 2023      Prashant Keyes  [address] [facility] [address]        To Whom It May Concern:    Prashant Keyes was seen on 03/18/2023.  Please excuse him  until 03/21/2023 due to illness.  Return to work on 03/21/2023        Sincerely,        Jose Coon MD

## 2023-03-20 RX ORDER — OMEPRAZOLE 40 MG/1
CAPSULE, DELAYED RELEASE ORAL
Qty: 90 CAPSULE | Refills: 3 | Status: SHIPPED | OUTPATIENT
Start: 2023-03-20 | End: 2024-05-02

## 2023-04-02 DIAGNOSIS — S39.012A STRAIN OF LUMBAR REGION, INITIAL ENCOUNTER: ICD-10-CM

## 2023-04-02 DIAGNOSIS — I10 BENIGN ESSENTIAL HYPERTENSION: ICD-10-CM

## 2023-04-02 DIAGNOSIS — M79.7 FIBROMYALGIA: ICD-10-CM

## 2023-04-03 RX ORDER — CYCLOBENZAPRINE HCL 5 MG
5 TABLET ORAL
Qty: 20 TABLET | Refills: 0 | Status: SHIPPED | OUTPATIENT
Start: 2023-04-03

## 2023-04-03 RX ORDER — TRAMADOL HYDROCHLORIDE 50 MG/1
TABLET ORAL
Qty: 40 TABLET | Refills: 0 | Status: SHIPPED | OUTPATIENT
Start: 2023-04-03 | End: 2023-04-18

## 2023-04-03 RX ORDER — LOSARTAN POTASSIUM 100 MG/1
TABLET ORAL
Qty: 90 TABLET | Refills: 0 | Status: SHIPPED | OUTPATIENT
Start: 2023-04-03 | End: 2023-06-16

## 2023-04-04 DIAGNOSIS — I10 BENIGN ESSENTIAL HYPERTENSION: ICD-10-CM

## 2023-04-04 DIAGNOSIS — G20.C PARKINSONISM, UNSPECIFIED PARKINSONISM TYPE (H): ICD-10-CM

## 2023-04-04 DIAGNOSIS — G40.A09 NONINTRACTABLE ABSENCE EPILEPSY WITHOUT STATUS EPILEPTICUS (H): ICD-10-CM

## 2023-04-04 DIAGNOSIS — R56.9 CONVULSIONS, UNSPECIFIED CONVULSION TYPE (H): Chronic | ICD-10-CM

## 2023-04-04 RX ORDER — CARBIDOPA AND LEVODOPA 25; 100 MG/1; MG/1
1.5 TABLET ORAL 3 TIMES DAILY
Qty: 405 TABLET | Refills: 1 | Status: SHIPPED | OUTPATIENT
Start: 2023-04-04 | End: 2023-10-23

## 2023-04-04 RX ORDER — DIVALPROEX SODIUM 500 MG/1
500 TABLET, DELAYED RELEASE ORAL 2 TIMES DAILY
Qty: 180 TABLET | Refills: 1 | Status: SHIPPED | OUTPATIENT
Start: 2023-04-04 | End: 2023-09-28

## 2023-04-04 NOTE — TELEPHONE ENCOUNTER
Refill request for: divalproex DR 500mg    Directions: Take 1 tablet (500 mg) by mouth 2 times daily     Refill request for: carbidopa-levodopa 25-100mg   Directions: Take 1.5 tablets by mouth 3 times daily       LOV: 01/12/22  NOV: Pt will be transferring to a provider in [place]. Next appt 8/29/23    90 day supply with 1 refills Medication T'd for review and signature    Luiza Simms LPN on 4/4/2023 at 10:57 AM

## 2023-04-04 NOTE — TELEPHONE ENCOUNTER
[facility] Call Center    Phone Message    May a detailed message be left on voicemail: yes     Reason for Call: Other: Pt called to inquire on status, Pt is nervous because he is out of the medication tonight.  Writer did inform him its waiting on review by provider.    Please call Pt back at [phone] to confirm.      Action Taken: Message routed to:  Other: [facility]    Travel Screening: Not Applicable

## 2023-04-27 NOTE — TELEPHONE ENCOUNTER
Prescription approved per [facility] Refill Protocol.  Serenity Sher RN  [facility]     Spoke to mom regarding her concerns for surgery tomorrow. Mom stated she has not received a call yet stating what time and where the patient needs to go for surgery tomorrow. Informed mom day surgery usually calls between 2-4pm the day before surgery to provide all pre-op instructions. Mom verbalized understanding and had no further questions at this time.

## 2023-05-04 ENCOUNTER — LAB (OUTPATIENT)
Dept: LAB | Facility: CLINIC | Age: 73
End: 2023-05-04
Payer: MEDICARE

## 2023-05-04 DIAGNOSIS — N18.1 CKD (CHRONIC KIDNEY DISEASE) STAGE 1, GFR 90 ML/MIN OR GREATER: ICD-10-CM

## 2023-05-04 DIAGNOSIS — R79.9 ABNORMAL FINDING OF BLOOD CHEMISTRY, UNSPECIFIED: ICD-10-CM

## 2023-05-04 DIAGNOSIS — L92.0 GRANULOMA ANNULARE: ICD-10-CM

## 2023-05-04 LAB
ALBUMIN SERPL BCG-MCNC: 4.2 G/DL (ref 3.5–5.2)
ALP SERPL-CCNC: 77 U/L (ref 40–129)
ALT SERPL W P-5'-P-CCNC: 6 U/L (ref 10–50)
ANION GAP SERPL CALCULATED.3IONS-SCNC: 13 MMOL/L (ref 7–15)
AST SERPL W P-5'-P-CCNC: 21 U/L (ref 10–50)
BASOPHILS # BLD AUTO: 0 10E3/UL (ref 0–0.2)
BASOPHILS NFR BLD AUTO: 0 %
BILIRUB SERPL-MCNC: 0.2 MG/DL
BUN SERPL-MCNC: 12.5 MG/DL (ref 8–23)
CALCIUM SERPL-MCNC: 9.6 MG/DL (ref 8.8–10.2)
CHLORIDE SERPL-SCNC: 96 MMOL/L (ref 98–107)
CREAT SERPL-MCNC: 1.05 MG/DL (ref 0.67–1.17)
CREAT UR-MCNC: 43.4 MG/DL
DEPRECATED HCO3 PLAS-SCNC: 29 MMOL/L (ref 22–29)
EOSINOPHIL # BLD AUTO: 0.3 10E3/UL (ref 0–0.7)
EOSINOPHIL NFR BLD AUTO: 3 %
ERYTHROCYTE [DISTWIDTH] IN BLOOD BY AUTOMATED COUNT: 12.7 % (ref 10–15)
GFR SERPL CREATININE-BSD FRML MDRD: 75 ML/MIN/1.73M2
GLUCOSE SERPL-MCNC: 88 MG/DL (ref 70–99)
HBA1C MFR BLD: 5.6 % (ref 0–5.6)
HCT VFR BLD AUTO: 37.9 % (ref 40–53)
HGB BLD-MCNC: 12.6 G/DL (ref 13.3–17.7)
LYMPHOCYTES # BLD AUTO: 2.7 10E3/UL (ref 0.8–5.3)
LYMPHOCYTES NFR BLD AUTO: 30 %
MCH RBC QN AUTO: 31.7 PG (ref 26.5–33)
MCHC RBC AUTO-ENTMCNC: 33.2 G/DL (ref 31.5–36.5)
MCV RBC AUTO: 95 FL (ref 78–100)
MICROALBUMIN UR-MCNC: <12 MG/L
MICROALBUMIN/CREAT UR: NORMAL MG/G{CREAT}
MONOCYTES # BLD AUTO: 1 10E3/UL (ref 0–1.3)
MONOCYTES NFR BLD AUTO: 11 %
NEUTROPHILS # BLD AUTO: 5.1 10E3/UL (ref 1.6–8.3)
NEUTROPHILS NFR BLD AUTO: 56 %
PLATELET # BLD AUTO: 189 10E3/UL (ref 150–450)
POTASSIUM SERPL-SCNC: 3.7 MMOL/L (ref 3.4–5.3)
PROT SERPL-MCNC: 6.9 G/DL (ref 6.4–8.3)
RBC # BLD AUTO: 3.98 10E6/UL (ref 4.4–5.9)
SODIUM SERPL-SCNC: 138 MMOL/L (ref 136–145)
WBC # BLD AUTO: 9 10E3/UL (ref 4–11)

## 2023-05-04 PROCEDURE — 85025 COMPLETE CBC W/AUTO DIFF WBC: CPT

## 2023-05-04 PROCEDURE — 80053 COMPREHEN METABOLIC PANEL: CPT

## 2023-05-04 PROCEDURE — 82570 ASSAY OF URINE CREATININE: CPT

## 2023-05-04 PROCEDURE — 83036 HEMOGLOBIN GLYCOSYLATED A1C: CPT

## 2023-05-04 PROCEDURE — 36415 COLL VENOUS BLD VENIPUNCTURE: CPT

## 2023-05-04 PROCEDURE — 82043 UR ALBUMIN QUANTITATIVE: CPT

## 2023-05-14 DIAGNOSIS — E87.6 HYPOKALEMIA: ICD-10-CM

## 2023-05-15 RX ORDER — POTASSIUM CHLORIDE 1500 MG/1
TABLET, EXTENDED RELEASE ORAL
Qty: 270 TABLET | Refills: 0 | Status: SHIPPED | OUTPATIENT
Start: 2023-05-15 | End: 2023-08-03

## 2023-05-30 ENCOUNTER — TELEPHONE (OUTPATIENT)
Dept: FAMILY MEDICINE | Facility: CLINIC | Age: 73
End: 2023-05-30

## 2023-05-30 ENCOUNTER — OFFICE VISIT (OUTPATIENT)
Dept: FAMILY MEDICINE | Facility: CLINIC | Age: 73
End: 2023-05-30
Payer: MEDICARE

## 2023-05-30 VITALS
TEMPERATURE: 97.4 F | OXYGEN SATURATION: 97 % | RESPIRATION RATE: 18 BRPM | HEIGHT: 70 IN | HEART RATE: 71 BPM | DIASTOLIC BLOOD PRESSURE: 60 MMHG | BODY MASS INDEX: 42.12 KG/M2 | SYSTOLIC BLOOD PRESSURE: 114 MMHG | WEIGHT: 294.2 LBS

## 2023-05-30 DIAGNOSIS — L03.119 CELLULITIS AND ABSCESS OF LEG: Primary | ICD-10-CM

## 2023-05-30 DIAGNOSIS — I87.2 VENOUS STASIS DERMATITIS OF RIGHT LOWER EXTREMITY: ICD-10-CM

## 2023-05-30 DIAGNOSIS — L02.419 CELLULITIS AND ABSCESS OF LEG: Primary | ICD-10-CM

## 2023-05-30 DIAGNOSIS — I10 BENIGN ESSENTIAL HYPERTENSION: ICD-10-CM

## 2023-05-30 DIAGNOSIS — L60.8 ACQUIRED DEFORMITY OF TOENAIL: ICD-10-CM

## 2023-05-30 PROCEDURE — 99213 OFFICE O/P EST LOW 20 MIN: CPT | Performed by: PHYSICIAN ASSISTANT

## 2023-05-30 RX ORDER — CEPHALEXIN 500 MG/1
500 CAPSULE ORAL 2 TIMES DAILY
Qty: 20 CAPSULE | Refills: 0 | Status: SHIPPED | OUTPATIENT
Start: 2023-05-30 | End: 2023-06-09

## 2023-05-30 RX ORDER — TRIAMCINOLONE ACETONIDE 1 MG/G
CREAM TOPICAL 2 TIMES DAILY
Qty: 80 G | Refills: 0 | Status: SHIPPED | OUTPATIENT
Start: 2023-05-30 | End: 2023-07-25

## 2023-05-30 ASSESSMENT — ANXIETY QUESTIONNAIRES
GAD7 TOTAL SCORE: 0
7. FEELING AFRAID AS IF SOMETHING AWFUL MIGHT HAPPEN: NOT AT ALL
4. TROUBLE RELAXING: NOT AT ALL
2. NOT BEING ABLE TO STOP OR CONTROL WORRYING: NOT AT ALL
8. IF YOU CHECKED OFF ANY PROBLEMS, HOW DIFFICULT HAVE THESE MADE IT FOR YOU TO DO YOUR WORK, TAKE CARE OF THINGS AT HOME, OR GET ALONG WITH OTHER PEOPLE?: NOT DIFFICULT AT ALL
GAD7 TOTAL SCORE: 0
IF YOU CHECKED OFF ANY PROBLEMS ON THIS QUESTIONNAIRE, HOW DIFFICULT HAVE THESE PROBLEMS MADE IT FOR YOU TO DO YOUR WORK, TAKE CARE OF THINGS AT HOME, OR GET ALONG WITH OTHER PEOPLE: NOT DIFFICULT AT ALL
1. FEELING NERVOUS, ANXIOUS, OR ON EDGE: NOT AT ALL
5. BEING SO RESTLESS THAT IT IS HARD TO SIT STILL: NOT AT ALL
7. FEELING AFRAID AS IF SOMETHING AWFUL MIGHT HAPPEN: NOT AT ALL
3. WORRYING TOO MUCH ABOUT DIFFERENT THINGS: NOT AT ALL
6. BECOMING EASILY ANNOYED OR IRRITABLE: NOT AT ALL

## 2023-05-30 ASSESSMENT — ASTHMA QUESTIONNAIRES
ACT_TOTALSCORE: 25
QUESTION_1 LAST FOUR WEEKS HOW MUCH OF THE TIME DID YOUR ASTHMA KEEP YOU FROM GETTING AS MUCH DONE AT WORK, SCHOOL OR AT HOME: NONE OF THE TIME
QUESTION_4 LAST FOUR WEEKS HOW OFTEN HAVE YOU USED YOUR RESCUE INHALER OR NEBULIZER MEDICATION (SUCH AS ALBUTEROL): NOT AT ALL
QUESTION_3 LAST FOUR WEEKS HOW OFTEN DID YOUR ASTHMA SYMPTOMS (WHEEZING, COUGHING, SHORTNESS OF BREATH, CHEST TIGHTNESS OR PAIN) WAKE YOU UP AT NIGHT OR EARLIER THAN USUAL IN THE MORNING: NOT AT ALL
ACT_TOTALSCORE: 25
QUESTION_5 LAST FOUR WEEKS HOW WOULD YOU RATE YOUR ASTHMA CONTROL: COMPLETELY CONTROLLED
QUESTION_2 LAST FOUR WEEKS HOW OFTEN HAVE YOU HAD SHORTNESS OF BREATH: NOT AT ALL

## 2023-05-30 ASSESSMENT — PAIN SCALES - GENERAL: PAINLEVEL: MILD PAIN (3)

## 2023-05-30 NOTE — TELEPHONE ENCOUNTER
Pt calling to discuss open sore that has been ongoing for over 72 hours.     Pt reports white patch that he scratched off that started bleeding and is larger than it was the last time there was a sore on it. Pt reports that there is no more bleeding but has small open sores around it and would like to be seen for this as it has not resolved on it's own. Location of sore is front right leg and above ankle per pt.     Pt scheduled with pcp (Matt) 5/30/23.    Mariana Greene RN

## 2023-05-30 NOTE — PROGRESS NOTES
"      Kevon Arciniega is a 72 year old, presenting for the following health issues:  Derm Problem        5/30/2023    10:58 AM   Additional Questions   Roomed by Delmer   Accompanied by EARL         5/30/2023    10:58 AM   Patient Reported Additional Medications   Patient reports taking the following new medications N/A     History of Present Illness       Reason for visit:  Open sore on right calf  Symptom onset:  1-3 days ago  Symptoms include:  Ichiness sore  Symptom intensity:  Moderate  Symptom progression:  Worsening  Had these symptoms before:  Yes  Has tried/received treatment for these symptoms:  No  What makes it worse:  No  What makes it better:  No    He eats 2-3 servings of fruits and vegetables daily.He consumes 0 sweetened beverage(s) daily.He exercises with enough effort to increase his heart rate 10 to 19 minutes per day.  He exercises with enough effort to increase his heart rate 4 days per week.   He is taking medications regularly.  Today's SHREYA-7 Score: 0     Lesion on Right lower leg has had it x 1 day itches and painful.  History of venous stasis dermatitis. Now some surrounding redness and tenderness.  No fevers.  Blood pressure looks great.   No chest pain/sob/palpitations/dizziness/ha's        Review of Systems   Constitutional, HEENT, cardiovascular, pulmonary, GI, , musculoskeletal, neuro, skin, endocrine and psych systems are negative, except as otherwise noted.      Objective    /60   Pulse 71   Temp 97.4  F (36.3  C) (Tympanic)   Resp 18   Ht 1.778 m (5' 10\")   Wt 133.4 kg (294 lb 3.2 oz)   SpO2 97%   BMI 42.21 kg/m    Body mass index is 42.21 kg/m .  Physical Exam   Eye exam - right eye normal lid, conjunctiva, cornea, pupil and fundus, left eye normal lid, conjunctiva, cornea, pupil and fundus.  Thyroid not palpable, not enlarged, no nodules detected.  CHEST:chest clear to IPPA, no tachypnea, retractions or cyanosis and S1, S2 normal, no murmur, no gallop, rate " regular.  Area of stasis dermatitis on right anterior shin. Surrounding redness and induration.  Multiple deformed and fungal toenails.         Prashant was seen today for derm problem.    Diagnoses and all orders for this visit:    Cellulitis and abscess of leg  -     cephALEXin (KEFLEX) 500 MG capsule; Take 1 capsule (500 mg) by mouth 2 times daily for 10 days    Venous stasis dermatitis of right lower extremity  -     triamcinolone (KENALOG) 0.1 % external cream; Apply topically 2 times daily    Benign essential hypertension    Acquired deformity of toenail  -     Orthopedic  Referral; Future      Advised supportive and symptomatic treatment.  Follow up with Provider - if condition persists or worsens.   work on lifestyle modification

## 2023-05-31 NOTE — TELEPHONE ENCOUNTER
RECORDS RECEIVED FROM: Internal   REASON FOR VISIT: PD   Date of Appt: 08/29/2023    NOTES (FOR ALL VISITS) STATUS DETAILS   OFFICE NOTE from referring provider N/A    OFFICE NOTE from other specialist Internal 01/12/2022 Dr Hung Temple [facility]    DISCHARGE SUMMARY from hospital N/A    DISCHARGE REPORT from the ER N/A    OPERATIVE REPORT N/A    MARU Virus Labs (MS ONLY) N/A    EMG N/A    EEG N/A    MEDICATION LIST N/A    IMAGING  (FOR ALL VISITS)     LUMBAR PUNCTURE N/A    AMANDA SCAN (MOVEMENT) N/A    ULTRASOUND (CAROTID BILAT) *VASCULAR* N/A    MRI (HEAD, NECK, SPINE) Internal    CT (HEAD, NECK, SPINE) Internal

## 2023-06-11 NOTE — TELEPHONE ENCOUNTER
Carvedilol      Last Written Prescription Date: 01/13/17  Last Fill Quantity: 180, # refills: 0    Last Office Visit with G, P or [facility] prescribing provider:  03/07/17   Future Office Visit:        BP Readings from Last 3 Encounters:   03/07/17 117/66   12/15/16 138/76   12/13/16 154/82       
Prescription approved per [facility] [facility].  
88-year-old male with history of CAD, hypertension, A-fib, heart failure reduced ejection fraction presenting to the ED sent in by Dr. Razo for INR check due to to get epidural injection tomorrow.  Patient takes Lovenox 60 mg daily, plan was to check INR less than 2 give Lovenox 60 mg, if greater than 2 patient to be discharged for procedure tomorrow.

## 2023-06-15 ENCOUNTER — MYC MEDICAL ADVICE (OUTPATIENT)
Dept: FAMILY MEDICINE | Facility: CLINIC | Age: 73
End: 2023-06-15
Payer: MEDICARE

## 2023-06-20 ENCOUNTER — OFFICE VISIT (OUTPATIENT)
Dept: PODIATRY | Facility: CLINIC | Age: 73
End: 2023-06-20
Payer: MEDICARE

## 2023-06-20 VITALS — HEART RATE: 62 BPM | DIASTOLIC BLOOD PRESSURE: 73 MMHG | OXYGEN SATURATION: 98 % | SYSTOLIC BLOOD PRESSURE: 114 MMHG

## 2023-06-20 DIAGNOSIS — N18.2 CKD (CHRONIC KIDNEY DISEASE) STAGE 2, GFR 60-89 ML/MIN: Chronic | ICD-10-CM

## 2023-06-20 DIAGNOSIS — G20.C PARKINSONISM, UNSPECIFIED PARKINSONISM TYPE (H): Chronic | ICD-10-CM

## 2023-06-20 DIAGNOSIS — L60.0 INGROWING NAIL: Primary | ICD-10-CM

## 2023-06-20 PROCEDURE — 99203 OFFICE O/P NEW LOW 30 MIN: CPT | Performed by: PODIATRIST

## 2023-06-20 NOTE — NURSING NOTE
"Chief Complaint   Patient presents with     Right Great Toe - Toenail     Toe pain- Burning sensation.      Right 2nd Toe - Toenail     Toe pain-     Left Great Toe - Toenail     Toe pain-     Left 2nd Toe - Toenail     Toe pain-     Vital signs:      BP: 114/73 Pulse: 62     SpO2: 98 %          Estimated body mass index is 42.21 kg/m  as calculated from the following:    Height as of 5/30/23: 1.778 m (5' 10\").    Weight as of 5/30/23: 133.4 kg (294 lb 3.2 oz).      Elodia Silverio MA  [facility]  "

## 2023-06-20 NOTE — LETTER
"    6/20/2023         RE: Prashant Keyes  [address]  [place] [address]        Dear Colleague,    Thank you for referring your patient, Prashant Keyes, to the [facility]. Please see a copy of my visit note below.    Subjective:    Pt is seen today in consult from Matt Swan w/ the c/c of a painful big toes but most painful is right great nail medial border.  This has been problematic for 6 month(s).  Aggravated by activity and relieved by rest.  Has tried soaking which has not helped.   Was wearing shoes in past but gone now.  No erythema, edema, or drainage.  No numbness.  Has parkinsons, CKD stage II.        ROS:  see above         Allergies   Allergen Reactions     Accupril [Ace Inhibitors] Difficulty breathing     accupril causes SOB     Aptiom [Eslicarbazepine] Difficulty breathing     Atorvastatin Calcium      Myalgias from Lipitor     Contrast Dye Hives     Lactose GI Disturbance     Lisinopril Difficulty breathing     SOB     Nitroglycerin      Headache     Paroxetine Hives     Tetracycline [Tetracyclines & Related]      \"splitting headaches\"     Vimpat [Lacosamide] Difficulty breathing     Zolpidem      Adhesive Tape Rash     Without itching- EKG pads       Current Outpatient Medications   Medication Sig Dispense Refill     albuterol (PROAIR HFA/PROVENTIL HFA/VENTOLIN HFA) 108 (90 Base) MCG/ACT inhaler Inhale 2 puffs into the lungs every 6 hours as needed for shortness of breath / dyspnea 8.5 g 0     allopurinol (ZYLOPRIM) 100 MG tablet TAKE 2 TABLETS BY MOUTH EVERY  tablet 0     amLODIPine (NORVASC) 10 MG tablet TAKE 1 TABLET BY MOUTH ONCE DAILY WITH SUPPER 90 tablet 0     aspirin 81 MG tablet Take 1 tablet (81 mg) by mouth daily 30 tablet      carbidopa-levodopa (SINEMET)  MG tablet Take 1.5 tablets by mouth 3 times daily 405 tablet 1     carvedilol (COREG) 12.5 MG tablet TAKE 1 TABLET BY MOUTH TWICE A DAY WITH MEALS 180 tablet 0     " chlorthalidone (HYGROTON) 25 MG tablet TAKE 1 TABLET BY MOUTH EVERY DAY 90 tablet 1     Cholecalciferol (VITAMIN D) 2000 UNITS tablet Take 2,000 Units by mouth daily 90 tablet 3     Cyanocobalamin (VITAMIN  B-12) 2500 MCG tablet Place 2,500 mcg under the tongue daily (Patient taking differently: Place 2,500 mcg under the tongue 2 times a week) 90 tablet 3     cyclobenzaprine (FLEXERIL) 5 MG tablet Take 1 tablet (5 mg) by mouth nightly as needed for muscle spasms (Patient not taking: Reported on 5/30/2023) 20 tablet 0     divalproex sodium delayed-release (DEPAKOTE) 500 MG DR tablet Take 1 tablet (500 mg) by mouth 2 times daily 180 tablet 1     ferrous sulfate (IRON) 325 (65 FE) MG tablet Take 1 tablet (325 mg) by mouth 2 times daily 60 tablet 2     FLUoxetine (PROZAC) 40 MG capsule TAKE 1 CAPSULE BY MOUTH ONCE DAILY (Patient taking differently: 50 capsules TAKE 1 CAPSULE BY MOUTH ONCE DAILY) 30 capsule 1     furosemide (LASIX) 20 MG tablet Take 2 tablets (40 mg) by mouth daily 180 tablet 0     gabapentin (NEURONTIN) 300 MG capsule TAKE 2 CAPSULES BY MOUTH 3 TIMES A  capsule 5     hydrALAZINE (APRESOLINE) 25 MG tablet TAKE 1 TABLET BY MOUTH TWICE A  tablet 0     hydroxychloroquine (PLAQUENIL) 200 MG tablet Take 1 tablet (200 mg) by mouth 2 times daily 60 tablet 5     levETIRAcetam (KEPPRA) 1000 MG tablet TAKE 1 TABLET BY MOUTH TWICE DAILY IN THE MORNING AND AT BEDTIME 180 tablet 2     losartan (COZAAR) 100 MG tablet TAKE 1 TABLET BY MOUTH EVERY DAY 90 tablet 0     Menthol, Topical Analgesic, (BIOFREEZE) 4 % GEL Externally apply topically 4 times daily as needed (as needed) (Patient not taking: Reported on 5/30/2023) 118 mL 0     Multiple Vitamin (MULTI VITAMIN MENS PO) Take  by mouth.       omeprazole (PRILOSEC) 40 MG DR capsule TAKE 1 CAPSULE BY MOUTH ONCE DAILY 90 capsule 3     ORDER FOR DME CPAP daily       oxybutynin (DITROPAN) 5 MG tablet TAKE 1 TABLET BY MOUTH TWICE A  tablet 0      potassium chloride ER (KLOR-CON) 20 MEQ CR tablet TAKE 1 TABLET BY MOUTH THREE TIMES A  tablet 0     sildenafil (REVATIO) 20 MG tablet Take 3-5 tabs 1/2 to 4 hours prior to relations 30 tablet 11     simvastatin (ZOCOR) 20 MG tablet TAKE 2 TABLETS BY MOUTH IN THE EVENING AT BEDTIME 180 tablet 0     traMADol (ULTRAM) 50 MG tablet TAKE 1 TABLET (50 MG) BY MOUTH EVERY 6 HOURS AS NEEDED FOR SEVERE PAIN 40 tablet 0     traZODone (DESYREL) 100 MG tablet Take 100 mg by mouth At Bedtime       triamcinolone (KENALOG) 0.1 % external cream Apply topically 2 times daily 80 g 0     triamcinolone (KENALOG) 0.1 % external cream APPLY TO AFFECTED AREA TWICE A DAY (Patient not taking: Reported on 5/30/2023) 90 g 1       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)     Class 1 obesity with serious comorbidity and body mass index (BMI) of 31.0 to 31.9 in adult, unspecified obesity type     Obesity (BMI 35.0-39.9) with comorbidity (H)     Parkinsonism (H)     Current moderate episode of major depressive disorder without prior episode (H)     Chronic, continuous use of opioids       Past Medical History:   Diagnosis Date     Arthritis 1/1/2012     Calculus of kidney ~1975     Carpal tunnel syndrome 11/94     Concussion, unspecified 12/95     Depressive disorder 2/2/2015     Eczema 11/16/2011     Epileptic seizure (H) 1995    diagnosed 1995, controlled with Depakote and Keppra     Fibromyalgia syndrome ~2000    per pt     Hypertension      Left testicle cyst      Photosensitive contact dermatitis      Prostatitis, unspecified      Seizures  (H)     Diagnosed 1995, controlled with Depakote and Keppra     Umbilical hernia 11/26/2012     Uncomplicated asthma      Unspecified asthma(493.90)        Past Surgical History:   Procedure Laterality Date     ABDOMEN SURGERY  2015    Fixed belly button     ANGIOGRAM  2003    Coronary Angiogram- negative     ARTHROSCOPY KNEE Left 9/5/2019    Procedure: LEFT KNEE ARTHROSCOPY WITH MENISCAL AND CHONDRAL DEBRIDEMENT;  Surgeon: Damon Rosas MD;  Location: [facility] OR     COLONOSCOPY  2/2/2013     COLONOSCOPY WITH CO2 INSUFFLATION N/A 8/17/2017    Procedure: COLONOSCOPY WITH CO2 INSUFFLATION;  COLON SCREEN/ FLYNN;  Surgeon: Varun Bond MD;  Location: [facility] OR     ENT SURGERY  2/2/2008    Hearing aids     EYE SURGERY  April/2012    Cataracts     HC REMOVAL TESTIS,SIMPLE  1978    Right undecended     HERNIA REPAIR, INGUINAL RT/LT  1963, 1978    Right Hernia     HERNIORRHAPHY UMBILICAL  4/2013    [place]       Family History   Problem Relation Age of Onset     Cerebrovascular Disease Mother         60's     C.A.D. Mother         CABG 75     Arthritis Mother      Eye Disorder Mother      Heart Disease Mother      Cardiovascular Father         Rheumatic Heart Disease     Cancer Maternal Grandmother         Thyroid CA     Other Cancer Maternal Grandmother         Goiter     Thyroid Disease Maternal Grandfather      Thyroid Disease Paternal Grandmother      Cancer Paternal Grandfather         Throat CA     Hypertension Brother      Lipids Brother      C.A.D. Brother         CABG 46     Arthritis Brother      Heart Disease Brother         CABG x5     Obesity Brother      Coronary Artery Disease Brother      Hyperlipidemia Brother      Hypertension Brother      Lipids Brother      Thyroid Disease Brother      Alcohol/Drug Brother      Heart Disease Brother         CABG     Coronary Artery Disease Brother      Hyperlipidemia Brother      Obesity Brother      Gastrointestinal Disease Brother         Crohn's  Disease-Ostomy     Cerebrovascular Disease Brother         lipids     Coronary Artery Disease Brother      Hyperlipidemia Brother      Cancer Sister         Thyroid CA     Hypertension Sister      Obesity Sister      Thyroid Disease Sister      Hemochromatosis Sister      Cerebrovascular Disease Sister         birth defect     Hyperlipidemia Sister      Other Cancer Sister         Goiter     Arrhythmia Sister      Depression Son      Diabetes Son      Depression Son      Depression Daughter      Depression Daughter        Social History     Tobacco Use     Smoking status: Never     Smokeless tobacco: Never   Vaping Use     Vaping status: Never Used   Substance Use Topics     Alcohol use: No     Alcohol/week: 0.0 standard drinks of alcohol     Comment: Quit since 2003.          Exam:    Vitals: /73   Pulse 62   SpO2 98%   BMI: There is no height or weight on file to calculate BMI.  Height: Data Unavailable    Constitutional/ general:  Pt is in no apparent distress, appears well-nourished.  Cooperative with history and physical exam.     Psych:  The patient answered questions appropriately.  Normal affect.  Seems to have reasonable expectations, in terms of treatment.     Lungs:  Non labored breathing, non labored speech. No cough.  No audible wheezing. Even, quiet breathing.       Vascular:  positive pedal pulses bilaterally for both the DP arteries.  CFT < 3 sec.  positive ankle edema.  negative pedal hair growth.    Neuro:  Alert and oriented x 3. Coordinated gait.  Light touch sensation is intact     Derm: Normal texture and turgor.  No erythema, ecchymosis, or cyanosis.      Musculoskeletal:     right and left great toe nailboth border shows soft tissue impingement.  Right medial border is worse.  negative active drainage/purulence at this time.  No sinus tracts.  No nailbed masses or exostosis.  No pain with range of motion of IPJ or MTPJ.  No ascending cellulitis.    ASSESSMENT:    Onychocryptosis with  paronychia aforementioned toes.    Discussed etiology and treatment options in detail w/ the patient.  The potential causes and nature of an ingrown toenail were discussed with the patient.  We reviewed the natural history/prognosis of the condition and potential risks if no treatment is provided.      After thorough discussion and answering all questions, the patient elected to make sure no more wide shoes.  Soften nails with vics vaporub.  Trim shorter.  If still painful discussed temporary nail removal.    Return to clinic prn.  Thank you for allowing me participate in the care of this patient.        Александр Su DPM, FACFAS          Again, thank you for allowing me to participate in the care of your patient.        Sincerely,        Александр Su DPM

## 2023-06-20 NOTE — PATIENT INSTRUCTIONS
ROUTINE FOOT CARE (NAIL TRIMMING / CALLUSES)    Go to [email or web address].org ([employer]) and search for providers near you.  Otherwise, this is a list we have gathered of  recommended locations/providers in [place].    [facility]   [phone]   [facility]  [phone]  [email or web address]   [facility]  [phone]  [place] + [place] mile radius [facility]  [phone]  [email or web address]   [facility]  [address] [place] [address]  [phone] Beck Gonsalves DPM  [address] [place] [address] [phone]   [facility]  [phone] [address] [place] [address]  [facility]  Dr. Tye Lindsey  [phone]  [facility]  [phone]  [place] & [place] Locations  (does not take BCBS) FYI:  *Some providers accept insurance while others are out of pocket. Please contact them for details*        We wish you continued good healing. If you have any questions or concerns, please do not hesitate to contact us at  [phone]    [facility] (secure e-mail communication and access to your chart) to send a message or to make an appointment.    Please remember to call and schedule a follow up appointment if one was recommended at your earliest convenience.     PODIATRY CLINIC HOURS  TELEPHONE NUMBER    Dr. Александр Su DRyneP.M [facility]        Clinics:  [facility], [place]  Tuesday 1PM-6PM  [place]  Wednesday 745AM-330PM  [place]  Thursday/Friday 745AM-230PM  [place]   1st and 3rd Mondays  845-430 PM   [facility] APPOINTMENTS  [phone]    [place] APPOINTMENTS  [phone]    [place] APPOINTMENTS  [phone]        If you need a medication refill, please contact us you may need lab work and/or a follow up visit prior to your refill (i.e. Antifungal medications).  If MRI needed please call Imaging at  [phone]   HOW DO I GET MY KNEE SCOOTER? Knee scooters can be picked up at ANY Medical Supply stores with your knee scooter Prescription.  OR  Bring your signed prescription to an [facility] showroom.  Call or bring in your Orthotics order to any Orthotics locations. Or call [phone]

## 2023-06-20 NOTE — PROGRESS NOTES
"Subjective:    Pt is seen today in consult from Matt Swan w/ [facility] c/c of a painful big toes but most painful is right great nail medial border.  This has been problematic for 6 month(s).  Aggravated by activity and relieved by rest.  Has tried soaking which has not helped.   Was wearing shoes in past but gone now.  No erythema, edema, or drainage.  No numbness.  Has parkinsons, CKD stage II.        ROS:  see above         Allergies   Allergen Reactions     Accupril [Ace Inhibitors] Difficulty breathing     accupril causes SOB     Aptiom [Eslicarbazepine] Difficulty breathing     Atorvastatin Calcium      Myalgias from Lipitor     Contrast Dye Hives     Lactose GI Disturbance     Lisinopril Difficulty breathing     SOB     Nitroglycerin      Headache     Paroxetine Hives     Tetracycline [Tetracyclines & Related]      \"splitting headaches\"     Vimpat [Lacosamide] Difficulty breathing     Zolpidem      Adhesive Tape Rash     Without itching- EKG pads       Current Outpatient Medications   Medication Sig Dispense Refill     albuterol (PROAIR HFA/PROVENTIL HFA/VENTOLIN HFA) 108 (90 Base) MCG/ACT inhaler Inhale 2 puffs into the lungs every 6 hours as needed for shortness of breath / dyspnea 8.5 g 0     allopurinol (ZYLOPRIM) 100 MG tablet TAKE 2 TABLETS BY MOUTH EVERY  tablet 0     amLODIPine (NORVASC) 10 MG tablet TAKE 1 TABLET BY MOUTH ONCE DAILY WITH SUPPER 90 tablet 0     aspirin 81 MG tablet Take 1 tablet (81 mg) by mouth daily 30 tablet      carbidopa-levodopa (SINEMET)  MG tablet Take 1.5 tablets by mouth 3 times daily 405 tablet 1     carvedilol (COREG) 12.5 MG tablet TAKE 1 TABLET BY MOUTH TWICE A DAY WITH MEALS 180 tablet 0     chlorthalidone (HYGROTON) 25 MG tablet TAKE 1 TABLET BY MOUTH EVERY DAY 90 tablet 1     Cholecalciferol (VITAMIN D) 2000 UNITS tablet Take 2,000 Units by mouth daily 90 tablet 3     Cyanocobalamin (VITAMIN  B-12) 2500 MCG tablet Place 2,500 mcg under the tongue daily " (Patient taking differently: Place 2,500 mcg under the tongue 2 times a week) 90 tablet 3     cyclobenzaprine (FLEXERIL) 5 MG tablet Take 1 tablet (5 mg) by mouth nightly as needed for muscle spasms (Patient not taking: Reported on 5/30/2023) 20 tablet 0     divalproex sodium delayed-release (DEPAKOTE) 500 MG DR tablet Take 1 tablet (500 mg) by mouth 2 times daily 180 tablet 1     ferrous sulfate (IRON) 325 (65 FE) MG tablet Take 1 tablet (325 mg) by mouth 2 times daily 60 tablet 2     FLUoxetine (PROZAC) 40 MG capsule TAKE 1 CAPSULE BY MOUTH ONCE DAILY (Patient taking differently: 50 capsules TAKE 1 CAPSULE BY MOUTH ONCE DAILY) 30 capsule 1     furosemide (LASIX) 20 MG tablet Take 2 tablets (40 mg) by mouth daily 180 tablet 0     gabapentin (NEURONTIN) 300 MG capsule TAKE 2 CAPSULES BY MOUTH 3 TIMES A  capsule 5     hydrALAZINE (APRESOLINE) 25 MG tablet TAKE 1 TABLET BY MOUTH TWICE A  tablet 0     hydroxychloroquine (PLAQUENIL) 200 MG tablet Take 1 tablet (200 mg) by mouth 2 times daily 60 tablet 5     levETIRAcetam (KEPPRA) 1000 MG tablet TAKE 1 TABLET BY MOUTH TWICE DAILY IN THE MORNING AND AT BEDTIME 180 tablet 2     losartan (COZAAR) 100 MG tablet TAKE 1 TABLET BY MOUTH EVERY DAY 90 tablet 0     Menthol, Topical Analgesic, (BIOFREEZE) 4 % GEL Externally apply topically 4 times daily as needed (as needed) (Patient not taking: Reported on 5/30/2023) 118 mL 0     Multiple Vitamin (MULTI VITAMIN MENS PO) Take  by mouth.       omeprazole (PRILOSEC) 40 MG DR capsule TAKE 1 CAPSULE BY MOUTH ONCE DAILY 90 capsule 3     ORDER FOR DME CPAP daily       oxybutynin (DITROPAN) 5 MG tablet TAKE 1 TABLET BY MOUTH TWICE A  tablet 0     potassium chloride ER (KLOR-CON) 20 MEQ CR tablet TAKE 1 TABLET BY MOUTH THREE TIMES A  tablet 0     sildenafil (REVATIO) 20 MG tablet Take 3-5 tabs 1/2 to 4 hours prior to relations 30 tablet 11     simvastatin (ZOCOR) 20 MG tablet TAKE 2 TABLETS BY MOUTH IN THE  EVENING AT BEDTIME 180 tablet 0     traMADol (ULTRAM) 50 MG tablet TAKE 1 TABLET (50 MG) BY MOUTH EVERY 6 HOURS AS NEEDED FOR SEVERE PAIN 40 tablet 0     traZODone (DESYREL) 100 MG tablet Take 100 mg by mouth At Bedtime       triamcinolone (KENALOG) 0.1 % external cream Apply topically 2 times daily 80 g 0     triamcinolone (KENALOG) 0.1 % external cream APPLY TO AFFECTED AREA TWICE A DAY (Patient not taking: Reported on 5/30/2023) 90 g 1       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)     Class 1 obesity with serious comorbidity and body mass index (BMI) of 31.0 to 31.9 in adult, unspecified obesity type     Obesity (BMI 35.0-39.9) with comorbidity (H)     Parkinsonism (H)     Current moderate episode of major depressive disorder without prior episode (H)     Chronic, continuous use of opioids       Past Medical History:   Diagnosis Date     Arthritis 1/1/2012     Calculus of kidney ~1975     Carpal tunnel syndrome 11/94     Concussion, unspecified 12/95     Depressive disorder 2/2/2015     Eczema 11/16/2011     Epileptic seizure (H) 1995    diagnosed 1995, controlled with Depakote and Keppra     Fibromyalgia syndrome ~2000    per pt     Hypertension      Left testicle cyst      Photosensitive contact dermatitis      Prostatitis, unspecified      Seizures (H)     Diagnosed 1995, controlled with Depakote and Keppra     Umbilical hernia 11/26/2012     Uncomplicated asthma      Unspecified asthma(493.90)        Past Surgical History:   Procedure Laterality Date     ABDOMEN SURGERY  2015    Fixed belly button     ANGIOGRAM   2003    Coronary Angiogram- negative     ARTHROSCOPY KNEE Left 9/5/2019    Procedure: LEFT KNEE ARTHROSCOPY WITH MENISCAL AND CHONDRAL DEBRIDEMENT;  Surgeon: Damon Rosas MD;  Location: [facility] OR     COLONOSCOPY  2/2/2013     COLONOSCOPY WITH CO2 INSUFFLATION N/A 8/17/2017    Procedure: COLONOSCOPY WITH CO2 INSUFFLATION;  COLON SCREEN/ FLYNN;  Surgeon: Varun Bond MD;  Location: [facility] OR     ENT SURGERY  2/2/2008    Hearing aids     EYE SURGERY  April/2012    Cataracts     HC REMOVAL TESTIS,SIMPLE  1978    Right undecended     HERNIA REPAIR, INGUINAL RT/LT  1963, 1978    Right Hernia     HERNIORRHAPHY UMBILICAL  4/2013    [place]       Family History   Problem Relation Age of Onset     Cerebrovascular Disease Mother         60's     C.A.D. Mother         CABG 75     Arthritis Mother      Eye Disorder Mother      Heart Disease Mother      Cardiovascular Father         Rheumatic Heart Disease     Cancer Maternal Grandmother         Thyroid CA     Other Cancer Maternal Grandmother         Goiter     Thyroid Disease Maternal Grandfather      Thyroid Disease Paternal Grandmother      Cancer Paternal Grandfather         Throat CA     Hypertension Brother      Lipids Brother      C.A.D. Brother         CABG 46     Arthritis Brother      Heart Disease Brother         CABG x5     Obesity Brother      Coronary Artery Disease Brother      Hyperlipidemia Brother      Hypertension Brother      Lipids Brother      Thyroid Disease Brother      Alcohol/Drug Brother      Heart Disease Brother         CABG     Coronary Artery Disease Brother      Hyperlipidemia Brother      Obesity Brother      Gastrointestinal Disease Brother         Crohn's Disease-Ostomy     Cerebrovascular Disease Brother         lipids     Coronary Artery Disease Brother      Hyperlipidemia Brother      Cancer Sister         Thyroid CA     Hypertension Sister      Obesity Sister      Thyroid Disease Sister      Hemochromatosis Sister       Cerebrovascular Disease Sister         birth defect     Hyperlipidemia Sister      Other Cancer Sister         Goiter     Arrhythmia Sister      Depression Son      Diabetes Son      Depression Son      Depression Daughter      Depression Daughter        Social History     Tobacco Use     Smoking status: Never     Smokeless tobacco: Never   Vaping Use     Vaping status: Never Used   Substance Use Topics     Alcohol use: No     Alcohol/week: 0.0 standard drinks of alcohol     Comment: Quit since 2003.          Exam:    Vitals: /73   Pulse 62   SpO2 98%   BMI: There is no height or weight on file to calculate BMI.  Height: Data Unavailable    Constitutional/ general:  Pt is in no apparent distress, appears well-nourished.  Cooperative with history and physical exam.     Psych:  The patient answered questions appropriately.  Normal affect.  Seems to have reasonable expectations, in terms of treatment.     Lungs:  Non labored breathing, non labored speech. No cough.  No audible wheezing. Even, quiet breathing.       Vascular:  positive pedal pulses bilaterally for both the DP arteries.  CFT < 3 sec.  positive ankle edema.  negative pedal hair growth.    Neuro:  Alert and oriented x 3. Coordinated gait.  Light touch sensation is intact     Derm: Normal texture and turgor.  No erythema, ecchymosis, or cyanosis.      Musculoskeletal:     right and left great toe nailboth border shows soft tissue impingement.  Right medial border is worse.  negative active drainage/purulence at this time.  No sinus tracts.  No nailbed masses or exostosis.  No pain with range of motion of IPJ or MTPJ.  No ascending cellulitis.    ASSESSMENT:    Onychocryptosis with paronychia aforementioned toes.    Discussed etiology and treatment options in detail w/ the patient.  The potential causes and nature of an ingrown toenail were discussed with the patient.  We reviewed the natural history/prognosis of the condition and potential risks if  no treatment is provided.      After thorough discussion and answering all questions, the patient elected to make sure no more wide shoes.  Soften nails with vics vaporub.  Trim shorter.  If still painful discussed temporary nail removal.    Return to clinic prn.  Thank you for allowing me participate in the care of this patient.        Александр Su DPM, FACFAS

## 2023-07-08 ENCOUNTER — ANCILLARY PROCEDURE (OUTPATIENT)
Dept: GENERAL RADIOLOGY | Facility: CLINIC | Age: 73
End: 2023-07-08
Attending: NURSE PRACTITIONER
Payer: MEDICARE

## 2023-07-08 ENCOUNTER — OFFICE VISIT (OUTPATIENT)
Dept: URGENT CARE | Facility: URGENT CARE | Age: 73
End: 2023-07-08
Payer: MEDICARE

## 2023-07-08 VITALS
TEMPERATURE: 98 F | OXYGEN SATURATION: 96 % | WEIGHT: 291 LBS | RESPIRATION RATE: 14 BRPM | DIASTOLIC BLOOD PRESSURE: 73 MMHG | SYSTOLIC BLOOD PRESSURE: 125 MMHG | BODY MASS INDEX: 41.75 KG/M2 | HEART RATE: 66 BPM

## 2023-07-08 DIAGNOSIS — M25.562 CHRONIC PAIN OF LEFT KNEE: Primary | ICD-10-CM

## 2023-07-08 DIAGNOSIS — F11.90 CHRONIC, CONTINUOUS USE OF OPIOIDS: ICD-10-CM

## 2023-07-08 DIAGNOSIS — M25.562 ACUTE PAIN OF LEFT KNEE: ICD-10-CM

## 2023-07-08 DIAGNOSIS — N18.2 CKD (CHRONIC KIDNEY DISEASE) STAGE 2, GFR 60-89 ML/MIN: Chronic | ICD-10-CM

## 2023-07-08 DIAGNOSIS — G89.29 CHRONIC PAIN OF LEFT KNEE: Primary | ICD-10-CM

## 2023-07-08 DIAGNOSIS — M25.469 KNEE SWELLING: ICD-10-CM

## 2023-07-08 PROCEDURE — 99213 OFFICE O/P EST LOW 20 MIN: CPT | Performed by: NURSE PRACTITIONER

## 2023-07-08 PROCEDURE — 73562 X-RAY EXAM OF KNEE 3: CPT | Mod: TC | Performed by: RADIOLOGY

## 2023-07-08 NOTE — PROGRESS NOTES
"Assessment & Plan     Chronic pain of left knee    - XR Knee Left 3 Views  - Orthopedic  Referral    Knee swelling    - Orthopedic  Referral    CKD (chronic kidney disease) stage 2, GFR 60-89 ml/min    Chronic, continuous use of opioids       Reviewed xray images and results during visit showing, \"There is considerable soft tissue swelling along the anterior aspect of the knee and proximal tibia. Probable left knee joint effusion. Moderate medial compartment joint space narrowing. Mild patellofemoral compartment degenerative change. No  evidence of an acute fracture. Bones are demineralized.\"    Recommend rest, ice, compress, elevation. Referral placed for orthopedics for further evaluation of chronic knee pain. With chronic pain, will be more difficult to manage acute on chronic left knee pain. Continue chronic medications for pain, may add icy hot, salon pas as needed. Avoid NSAIDs with CKD.     Follow-up with ortho if symptoms persist for 7 days, and sooner if symptoms worsen or new symptoms develop.     Discussed red flag symptoms which warrant immediate visit in emergency room    All questions were answered and patient verbalized understanding. AVS reviewed with patient.     Jessenia Friedman, DNP, APRN, CNP 7/8/2023 4:06 PM  [facility] [place].        Kevon Arciniega is a 72 year old male who presents to clinic today for the following health issues:  Chief Complaint   Patient presents with     Knee Pain     Sx left knee , pain pressure and burning, unable to point his toe In the pt 's own words \" feels like the knee is twisting\".      MS Injury/Pain    Onset of symptoms was months ago.  Location: left knee  Context: No known injury  Course of symptoms is worsening.    Severity severe  Current and Associated symptoms: Pain, Swelling and Decreased range of motion  Denies  Bruising, Warmth and Redness  Aggravating Factors: walking and weight-bearing  Therapies to improve " symptoms include: aspirin or tylenol hasn't helped. He takes tramadol three times daily for fibromyalgia mostly causing aching shoulders  This is the first time this type of problem has occurred for this patient.   He has a history of meniscus repair in left knee  He has a history of CKD stage 2, obesity, HTN, arthritis, chronic pain.      Problem list, Medication list, Allergies, and Medical history reviewed in EPIC.    ROS:  Review of systems negative except for noted above        Objective    /73   Pulse 66   Temp 98  F (36.7  C) (Tympanic)   Resp 14   Wt 132 kg (291 lb)   SpO2 96%   BMI 41.75 kg/m    Physical Exam  Constitutional:       General: He is not in acute distress.     Appearance: He is obese. He is not toxic-appearing or diaphoretic.   Musculoskeletal:      Left knee: Swelling present. Decreased range of motion. Tenderness present over the medial joint line.      Left lower leg: Swelling present. No tenderness.      Comments: Moderate swelling left knee   Skin:     General: Skin is warm and dry.      Findings: No bruising or erythema.   Neurological:      Mental Status: He is alert.      Gait: Gait abnormal.      Comments: Walking slowly with a limp favoring left leg          X-ray left knee was performed and reviewed independently by myself showing no obvious fracture or dislocation  Radiologist impression:   Results for orders placed or performed in visit on 07/08/23   XR Knee Left 3 Views     Status: None    Narrative    EXAM: XR KNEE LEFT 3 VIEWS  LOCATION: [facility]  DATE: 7/8/2023    INDICATION: Left medial knee pain  COMPARISON: None.      Impression    IMPRESSION: There is considerable soft tissue swelling along the anterior aspect of the knee and proximal tibia. Probable left knee joint effusion. Moderate medial compartment joint space narrowing. Mild patellofemoral compartment degenerative change. No   evidence of an acute fracture. Bones are demineralized.

## 2023-07-24 ASSESSMENT — PATIENT HEALTH QUESTIONNAIRE - PHQ9
SUM OF ALL RESPONSES TO PHQ QUESTIONS 1-9: 2
SUM OF ALL RESPONSES TO PHQ QUESTIONS 1-9: 2

## 2023-07-25 ENCOUNTER — OFFICE VISIT (OUTPATIENT)
Dept: FAMILY MEDICINE | Facility: CLINIC | Age: 73
End: 2023-07-25
Payer: MEDICARE

## 2023-07-25 VITALS
TEMPERATURE: 97.1 F | HEART RATE: 69 BPM | OXYGEN SATURATION: 97 % | RESPIRATION RATE: 20 BRPM | WEIGHT: 287.6 LBS | SYSTOLIC BLOOD PRESSURE: 128 MMHG | BODY MASS INDEX: 41.17 KG/M2 | DIASTOLIC BLOOD PRESSURE: 62 MMHG | HEIGHT: 70 IN

## 2023-07-25 DIAGNOSIS — M25.562 CHRONIC PAIN OF LEFT KNEE: ICD-10-CM

## 2023-07-25 DIAGNOSIS — G89.29 CHRONIC PAIN OF LEFT KNEE: ICD-10-CM

## 2023-07-25 DIAGNOSIS — R09.89 BILATERAL CAROTID BRUITS: ICD-10-CM

## 2023-07-25 DIAGNOSIS — Z00.00 ENCOUNTER FOR ANNUAL WELLNESS EXAM IN MEDICARE PATIENT: Primary | ICD-10-CM

## 2023-07-25 DIAGNOSIS — Z00.00 ENCOUNTER FOR MEDICARE ANNUAL WELLNESS EXAM: ICD-10-CM

## 2023-07-25 DIAGNOSIS — Z12.11 SCREEN FOR COLON CANCER: ICD-10-CM

## 2023-07-25 PROCEDURE — 99213 OFFICE O/P EST LOW 20 MIN: CPT | Mod: 25 | Performed by: PHYSICIAN ASSISTANT

## 2023-07-25 PROCEDURE — G0439 PPPS, SUBSEQ VISIT: HCPCS | Performed by: PHYSICIAN ASSISTANT

## 2023-07-25 RX ORDER — MELOXICAM 15 MG/1
15 TABLET ORAL DAILY
Qty: 90 TABLET | Refills: 0 | Status: SHIPPED | OUTPATIENT
Start: 2023-07-25 | End: 2023-10-24

## 2023-07-25 ASSESSMENT — PAIN SCALES - GENERAL: PAINLEVEL: MODERATE PAIN (5)

## 2023-07-25 NOTE — PATIENT INSTRUCTIONS
Patient Education   Personalized Prevention Plan  You are due for the preventive services outlined below.  Your care team is available to assist you in scheduling these services.  If you have already completed any of these items, please share that information with your care team to update in your medical record.  Health Maintenance Due   Topic Date Due     Hepatitis A Vaccine (1 of 2 - Risk 2-dose series) Never done     Hepatitis B Vaccine (1 of 3 - Risk 3-dose series) Never done     Colorectal Cancer Screening  08/17/2022     COVID-19 Vaccine (6 - Pfizer series) 03/01/2023     URINE DRUG SCREEN  04/04/2023     Learning About Dietary Guidelines  What are the Dietary Guidelines for Americans?     Dietary Guidelines for Americans provide tips for eating well and staying healthy. This helps reduce the risk for long-term (chronic) diseases.  These guidelines recommend that you:  Eat and drink the right amount for you. The U.[place] government's food guide is called MyPlate. It can help you make your own well-balanced eating plan.  Try to balance your eating with your activity. This helps you stay at a healthy weight.  Drink alcohol in moderation, if at all.  Limit foods high in salt, saturated fat, trans fat, and added sugar.  These guidelines are from the U.S. Department of Agriculture and the U.S. Department of Health and Human Services. They are updated every 5 years.  What is MyPlate?  MyPlate is the U.S[place] government's food guide. It can help you make your own well-balanced eating plan. A balanced eating plan means that you eat enough, but not too much, and that your food gives you the nutrients you need to stay healthy.  MyPlate focuses on eating plenty of whole grains, fruits, and vegetables, and on limiting fat and sugar. It is available online at www.ChooseMyPlate.gov.  How can you get started?  If you're trying to eat healthier, you can slowly change your eating habits over time. You don't have to make big changes  "all at once. Start by adding one or two healthy foods to your meals each day.  Grains  Choose whole-grain breads and cereals and whole-wheat pasta and whole-grain crackers.  Vegetables  Eat a variety of vegetables every day. They have lots of nutrients and are part of a heart-healthy diet.  Fruits  Eat a variety of fruits every day. Fruits contain lots of nutrients. Choose fresh fruit instead of fruit juice.  Protein foods  Choose fish and lean poultry more often. Eat red meat and fried meats less often. Dried beans, tofu, and nuts are also good sources of protein.  Dairy  Choose low-fat or fat-free products from this food group. If you have problems digesting milk, try eating cheese or yogurt instead.  Fats and oils  Limit fats and oils if you're trying to cut calories. Choose healthy fats when you cook. These include canola oil and olive oil.  Where can you learn more?  Go to https://www.[employer].net/patiented  Enter D676 in the search box to learn more about \"Learning About Dietary Guidelines.\"  Current as of: March 1, 2023               Content Version: 13.7    0579-5558 [employer].   Care instructions adapted under license by your healthcare professional. If you have questions about a medical condition or this instruction, always ask your healthcare professional. [employer] disclaims any warranty or liability for your use of this information.      Bladder Training: Care Instructions  Your Care Instructions     Bladder training is used to treat urge incontinence and stress incontinence. Urge incontinence means that the need to urinate comes on so fast that you can't get to a toilet in time. Stress incontinence means that you leak urine because of pressure on your bladder. For example, it may happen when you laugh, cough, or lift something heavy.  Bladder training can increase how long you can wait before you have to urinate. It can also help your bladder hold more urine. And it can " give you better control over the urge to urinate.  It is important to remember that bladder training takes a few weeks to a few months to make a difference. You may not see results right away, but don't give up.  Follow-up care is a key part of your treatment and safety. Be sure to make and go to all appointments, and call your doctor if you are having problems. It's also a good idea to know your test results and keep a list of the medicines you take.  How can you care for yourself at home?  Work with your doctor to come up with a bladder training program that is right for you. You may use one or more of the following methods.  Delayed urination  In the beginning, try to keep from urinating for 5 minutes after you first feel the need to go.  While you wait, take deep, slow breaths to relax. Kegel exercises can also help you delay the need to go to the bathroom.  After some practice, when you can easily wait 5 minutes to urinate, try to wait 10 minutes before you urinate.  Slowly increase the waiting period until you are able to control when you have to urinate.  Scheduled urination  Empty your bladder when you first wake up in the morning.  Schedule times throughout the day when you will urinate.  Start by going to the bathroom every hour, even if you don't need to go.  Slowly increase the time between trips to the bathroom.  When you have found a schedule that works well for you, keep doing it.  If you wake up during the night and have to urinate, do it. Apply your schedule to waking hours only.  Kegel exercises  These tighten and strengthen pelvic muscles, which can help you control the flow of urine. (If doing these exercises causes pain, stop doing them and talk with your doctor.) To do Kegel exercises:  Squeeze your muscles as if you were trying not to pass gas. Or squeeze your muscles as if you were stopping the flow of urine. Your belly, legs, and buttocks shouldn't move.  Hold the squeeze for 3 seconds, then  "relax for 5 to 10 seconds.  Start with 3 seconds, then add 1 second each week until you are able to squeeze for 10 seconds.  Repeat the exercise 10 times a session. Do 3 to 8 sessions a day.  When should you call for help?  Watch closely for changes in your health, and be sure to contact your doctor if:    Your incontinence is getting worse.     You do not get better as expected.   Where can you learn more?  Go to https://www.[employer].net/patiented  Enter V684 in the search box to learn more about \"Bladder Training: Care Instructions.\"  Current as of: March 1, 2023               Content Version: 13.7    3727-3672 [employer].   Care instructions adapted under license by your healthcare professional. If you have questions about a medical condition or this instruction, always ask your healthcare professional. [employer] disclaims any warranty or liability for your use of this information.         Patient Education   Personalized Prevention Plan  You are due for the preventive services outlined below.  Your care team is available to assist you in scheduling these services.  If you have already completed any of these items, please share that information with your care team to update in your medical record.  Health Maintenance Due   Topic Date Due     Hepatitis A Vaccine (1 of 2 - Risk 2-dose series) Never done     Hepatitis B Vaccine (1 of 3 - Risk 3-dose series) Never done     Colorectal Cancer Screening  08/17/2022     COVID-19 Vaccine (6 - Pfizer series) 03/01/2023     URINE DRUG SCREEN  04/04/2023     Learning About Dietary Guidelines  What are the Dietary Guidelines for Americans?     Dietary Guidelines for Americans provide tips for eating well and staying healthy. This helps reduce the risk for long-term (chronic) diseases.  These guidelines recommend that you:  Eat and drink the right amount for you. The U.[place]. government's food guide is called MyPlate. It can help you make your own " "well-balanced eating plan.  Try to balance your eating with your activity. This helps you stay at a healthy weight.  Drink alcohol in moderation, if at all.  Limit foods high in salt, saturated fat, trans fat, and added sugar.  These guidelines are from the U.S. Department of Agriculture and the U.S. Department of Health and Human Services. They are updated every 5 years.  What is MyPlate?  MyPlate is the U.[place]. government's food guide. It can help you make your own well-balanced eating plan. A balanced eating plan means that you eat enough, but not too much, and that your food gives you the nutrients you need to stay healthy.  MyPlate focuses on eating plenty of whole grains, fruits, and vegetables, and on limiting fat and sugar. It is available online at www.ChooseMyPlate.gov.  How can you get started?  If you're trying to eat healthier, you can slowly change your eating habits over time. You don't have to make big changes all at once. Start by adding one or two healthy foods to your meals each day.  Grains  Choose whole-grain breads and cereals and whole-wheat pasta and whole-grain crackers.  Vegetables  Eat a variety of vegetables every day. They have lots of nutrients and are part of a heart-healthy diet.  Fruits  Eat a variety of fruits every day. Fruits contain lots of nutrients. Choose fresh fruit instead of fruit juice.  Protein foods  Choose fish and lean poultry more often. Eat red meat and fried meats less often. Dried beans, tofu, and nuts are also good sources of protein.  Dairy  Choose low-fat or fat-free products from this food group. If you have problems digesting milk, try eating cheese or yogurt instead.  Fats and oils  Limit fats and oils if you're trying to cut calories. Choose healthy fats when you cook. These include canola oil and olive oil.  Where can you learn more?  Go to https://www.healthwise.net/patiented  Enter D676 in the search box to learn more about \"Learning About Dietary " "Guidelines.\"  Current as of: March 1, 2023               Content Version: 13.7    0977-8564 [employer].   Care instructions adapted under license by your healthcare professional. If you have questions about a medical condition or this instruction, always ask your healthcare professional. [employer] disclaims any warranty or liability for your use of this information.      Bladder Training: Care Instructions  Your Care Instructions     Bladder training is used to treat urge incontinence and stress incontinence. Urge incontinence means that the need to urinate comes on so fast that you can't get to a toilet in time. Stress incontinence means that you leak urine because of pressure on your bladder. For example, it may happen when you laugh, cough, or lift something heavy.  Bladder training can increase how long you can wait before you have to urinate. It can also help your bladder hold more urine. And it can give you better control over the urge to urinate.  It is important to remember that bladder training takes a few weeks to a few months to make a difference. You may not see results right away, but don't give up.  Follow-up care is a key part of your treatment and safety. Be sure to make and go to all appointments, and call your doctor if you are having problems. It's also a good idea to know your test results and keep a list of the medicines you take.  How can you care for yourself at home?  Work with your doctor to come up with a bladder training program that is right for you. You may use one or more of the following methods.  Delayed urination  In the beginning, try to keep from urinating for 5 minutes after you first feel the need to go.  While you wait, take deep, slow breaths to relax. Kegel exercises can also help you delay the need to go to the bathroom.  After some practice, when you can easily wait 5 minutes to urinate, try to wait 10 minutes before you urinate.  Slowly increase " "the waiting period until you are able to control when you have to urinate.  Scheduled urination  Empty your bladder when you first wake up in the morning.  Schedule times throughout the day when you will urinate.  Start by going to the bathroom every hour, even if you don't need to go.  Slowly increase the time between trips to the bathroom.  When you have found a schedule that works well for you, keep doing it.  If you wake up during the night and have to urinate, do it. Apply your schedule to waking hours only.  Kegel exercises  These tighten and strengthen pelvic muscles, which can help you control the flow of urine. (If doing these exercises causes pain, stop doing them and talk with your doctor.) To do Kegel exercises:  Squeeze your muscles as if you were trying not to pass gas. Or squeeze your muscles as if you were stopping the flow of urine. Your belly, legs, and buttocks shouldn't move.  Hold the squeeze for 3 seconds, then relax for 5 to 10 seconds.  Start with 3 seconds, then add 1 second each week until you are able to squeeze for 10 seconds.  Repeat the exercise 10 times a session. Do 3 to 8 sessions a day.  When should you call for help?  Watch closely for changes in your health, and be sure to contact your doctor if:    Your incontinence is getting worse.     You do not get better as expected.   Where can you learn more?  Go to https://www.[employer].net/patiented  Enter V684 in the search box to learn more about \"Bladder Training: Care Instructions.\"  Current as of: March 1, 2023               Content Version: 13.7    4490-2285 [employer].   Care instructions adapted under license by your healthcare professional. If you have questions about a medical condition or this instruction, always ask your healthcare professional. [employer] disclaims any warranty or liability for your use of this information.         "

## 2023-07-25 NOTE — PROGRESS NOTES
"    Subjective   Bill is a 72 year old, presenting for the following health issues:  Knee Pain (Left knee pain), Derm Problem (Left side of face, spot that is changing), Wellness Visit, and Health Maintenance (Patient is not fasting)        7/25/2023    11:22 AM   Additional Questions   Roomed by Alta Schmitt CMA   Accompanied by None         7/25/2023    11:22 AM   Patient Reported Additional Medications   Patient reports taking the following new medications none     History of Present Illness       Reason for visit:  To check some spots for cancer    He eats 0-1 servings of fruits and vegetables daily.He consumes 0 sweetened beverage(s) daily.He exercises with enough effort to increase his heart rate 10 to 19 minutes per day.  He exercises with enough effort to increase his heart rate 5 days per week.   He is taking medications regularly.       Annual Wellness Visit    Patient has been advised of split billing requirements and indicates understanding: Yes     Are you in the first 12 months of your Medicare Part B coverage?  No    Physical Health:  In general, how would you rate your overall physical health? good  Outside of work, how many days during the week do you exercise?4-5 days/week  Outside of work, approximately how many minutes a day do you exercise?30-45 minutes  If you drink alcohol do you typically have >3 drinks per day or >7 drinks per week? No  Do you usually eat at least 4 servings of fruit and vegetables a day, include whole grains & fiber and avoid regularly eating high fat or \"junk\" foods? No  Do you have any problems taking medications regularly? No  Do you have any side effects from medications?  Dry mouth, pitting edema  Needs assistance for the following daily activities:  putting on socks and shoes  Which of the following safety concerns are present in your home?  none identified   Hearing impairment:Wears hearing aids  In the past 6 months, have you been bothered by leaking of urine? " yes    Mental Health:  In general, how would you rate your overall mental or emotional health? good  PHQ-2 Score:      Do you feel safe in your environment? Yes    Have you ever done Advance Care Planning? (For example, a Health Directive, POLST, or a discussion with a medical provider or your loved ones about your wishes)? Yes, patient states has an Advance Care Planning document and will bring a copy to the clinic.    Fall risk:  Fallen 2 or more times in the past year?: No  Any fall with injury in the past year?: No    Cognitive Screenin) Repeat 3 items (Leader, Season, Table)    2) Clock draw: NORMAL  3) 3 item recall: Recalls 3 objects  Results: 3 items recalled: COGNITIVE IMPAIRMENT LESS LIKELY    Mini-CogTM Copyright JAROCHO Matson. Licensed by the author for use in [facility]; reprinted with permission ([email or web address]@[email or web address]). All rights reserved.      Do you have sleep apnea, excessive snoring or daytime drowsiness? : no    Social History     Tobacco Use     Smoking status: Never     Smokeless tobacco: Never   Substance Use Topics     Alcohol use: No     Comment: Quit since .              2021     9:56 PM   Alcohol Use   Prescreen: >3 drinks/day or >7 drinks/week? No     Do you have a current opioid prescription? Yes   How severe is your pain on a scale from 1-10? 5/10       Current providers sharing in care for this patient include:   Patient Care Team:  Matt Swan PA-C as PCP - General (Physician Assistant)  Dimas Qureshi MD as MD (Neurology)  Matt Swan PA-C as Assigned PCP  Bisi Mckeon MD as Assigned Neuroscience Provider  Matt Swan PA-C as Referring Physician (Family Medicine)  Thomas Hinton MD as MD (Dermatology)  Matt Swan PA-C as Assigned Pain Medication Provider  Thomas Hinton MD as Assigned Surgical Provider  Michel Zavala MD as MD (Ophthalmology)  Thomas Hinton MD as Referring Physician  "(Dermatology)    Patient has been advised of split billing requirements and indicates understanding: Yes    Concerns:  Left knee swelling and pain. No locking or catching.       Review of Systems   Constitutional, HEENT, cardiovascular, pulmonary, GI, , musculoskeletal, neuro, skin, endocrine and psych systems are negative, except as otherwise noted.      Objective    /62   Pulse 69   Temp 97.1  F (36.2  C) (Temporal)   Resp 20   Ht 1.765 m (5' 9.5\")   Wt 130.5 kg (287 lb 9.6 oz)   SpO2 97%   BMI 41.86 kg/m    Body mass index is 41.86 kg/m .  Physical Exam     Eye exam - right eye normal lid, conjunctiva, cornea, pupil and fundus, left eye normal lid, conjunctiva, cornea, pupil and fundus.  ENT exam reveals - ENT exam normal, no neck nodes or sinus tenderness.  CHEST:chest clear to IPPA, no tachypnea, retractions or cyanosis, and S1, S2 normal, no murmur, no gallop, rate regular.  Thyroid not palpable, not enlarged, no nodules detected.  Bilateral carotid bruits.  KNEE: The injured knee reveals antalgic gait, soft tissue tenderness over the medial joint line, effusion, negative drawer sign, collateral ligaments intact, positive Kenji sign.     Prashant was seen today for knee pain, derm problem, wellness visit and health maintenance.    Diagnoses and all orders for this visit:    Encounter for annual wellness exam in Medicare patient    Screen for colon cancer  -     Colonoscopy Screening  Referral; Future    Bilateral carotid bruits  -     US Carotid Bilateral; Future    Chronic pain of left knee  -     MR Knee Left w/o Contrast; Future    Other orders  -     REVIEW OF HEALTH MAINTENANCE PROTOCOL ORDERS    Continue all current meds.    work on lifestyle modification  Advised supportive and symptomatic treatment.  Follow up with Provider - if condition persists or worsens.       The patient was counseled and encouraged to consider modifying their diet and eating habits. He was provided with " information on recommended healthy diet options.  Information on urinary incontinence and treatment options given to patient.  The patient was counseled and encouraged to consider modifying their diet and eating habits. He was provided with information on recommended healthy diet options.  Information on urinary incontinence and treatment options given to patient.

## 2023-08-07 ENCOUNTER — ANCILLARY PROCEDURE (OUTPATIENT)
Dept: ULTRASOUND IMAGING | Facility: CLINIC | Age: 73
End: 2023-08-07
Attending: PHYSICIAN ASSISTANT
Payer: MEDICARE

## 2023-08-07 DIAGNOSIS — R09.89 BILATERAL CAROTID BRUITS: ICD-10-CM

## 2023-08-07 PROCEDURE — 93880 EXTRACRANIAL BILAT STUDY: CPT | Mod: TC | Performed by: RADIOLOGY

## 2023-08-14 DIAGNOSIS — L92.0 GRANULOMA ANNULARE: ICD-10-CM

## 2023-08-17 RX ORDER — HYDROXYCHLOROQUINE SULFATE 200 MG/1
200 TABLET, FILM COATED ORAL 2 TIMES DAILY
Qty: 60 TABLET | Refills: 5 | OUTPATIENT
Start: 2023-08-17

## 2023-08-17 NOTE — TELEPHONE ENCOUNTER
Last Clinic Visit:  3/7/23   NV: NONE   RTC 3 MOS  Scheduling has been notified to contact the pt for appointment.

## 2023-08-21 ENCOUNTER — TELEPHONE (OUTPATIENT)
Dept: DERMATOLOGY | Facility: CLINIC | Age: 73
End: 2023-08-21
Payer: MEDICARE

## 2023-08-21 NOTE — TELEPHONE ENCOUNTER
Patient Contacted for the patient to call back and schedule the following:    Appointment type: Telephone Visit Elder  Provider: Dr. Velazco  Return date: 3/5/24  Specialty phone number: [phone]

## 2023-08-29 ENCOUNTER — OFFICE VISIT (OUTPATIENT)
Dept: NEUROLOGY | Facility: CLINIC | Age: 73
End: 2023-08-29
Payer: MEDICARE

## 2023-08-29 ENCOUNTER — PRE VISIT (OUTPATIENT)
Dept: NEUROLOGY | Facility: CLINIC | Age: 73
End: 2023-08-29

## 2023-08-29 VITALS
BODY MASS INDEX: 43.11 KG/M2 | DIASTOLIC BLOOD PRESSURE: 65 MMHG | WEIGHT: 296.2 LBS | HEART RATE: 61 BPM | SYSTOLIC BLOOD PRESSURE: 119 MMHG

## 2023-08-29 DIAGNOSIS — I10 HYPERTENSION GOAL BP (BLOOD PRESSURE) < 140/90: ICD-10-CM

## 2023-08-29 DIAGNOSIS — G62.9 NEUROPATHY: ICD-10-CM

## 2023-08-29 DIAGNOSIS — R56.9 CONVULSIONS, UNSPECIFIED CONVULSION TYPE (H): Primary | ICD-10-CM

## 2023-08-29 DIAGNOSIS — G20.C PARKINSONISM, UNSPECIFIED PARKINSONISM TYPE (H): ICD-10-CM

## 2023-08-29 LAB — VALPROATE SERPL-MCNC: 38.8 UG/ML

## 2023-08-29 PROCEDURE — 99215 OFFICE O/P EST HI 40 MIN: CPT | Performed by: INTERNAL MEDICINE

## 2023-08-29 PROCEDURE — 86235 NUCLEAR ANTIGEN ANTIBODY: CPT | Performed by: INTERNAL MEDICINE

## 2023-08-29 PROCEDURE — 80164 ASSAY DIPROPYLACETIC ACD TOT: CPT | Performed by: INTERNAL MEDICINE

## 2023-08-29 PROCEDURE — 86334 IMMUNOFIX E-PHORESIS SERUM: CPT

## 2023-08-29 PROCEDURE — 36415 COLL VENOUS BLD VENIPUNCTURE: CPT | Performed by: INTERNAL MEDICINE
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
## 2023-08-30 LAB
ENA SS-A AB SER IA-ACNC: <0.5 U/ML
ENA SS-A AB SER IA-ACNC: NEGATIVE
ENA SS-B IGG SER IA-ACNC: <0.6 U/ML
ENA SS-B IGG SER IA-ACNC: NEGATIVE
PROT PATTERN SERPL IFE-IMP: NORMAL

## 2023-08-30 RX ORDER — HYDRALAZINE HYDROCHLORIDE 25 MG/1
TABLET, FILM COATED ORAL
Qty: 180 TABLET | Refills: 1 | Status: SHIPPED | OUTPATIENT
Start: 2023-08-30 | End: 2024-02-21

## 2023-09-07 ENCOUNTER — MYC MEDICAL ADVICE (OUTPATIENT)
Dept: FAMILY MEDICINE | Facility: CLINIC | Age: 73
End: 2023-09-07
Payer: MEDICARE

## 2023-09-11 DIAGNOSIS — L92.0 GRANULOMA ANNULARE: ICD-10-CM

## 2023-09-11 RX ORDER — HYDROXYCHLOROQUINE SULFATE 200 MG/1
200 TABLET, FILM COATED ORAL 2 TIMES DAILY
Qty: 60 TABLET | Refills: 5 | Status: SHIPPED | OUTPATIENT
Start: 2023-09-11 | End: 2024-03-08

## 2023-09-12 DIAGNOSIS — I10 BENIGN ESSENTIAL HYPERTENSION: ICD-10-CM

## 2023-09-12 RX ORDER — LOSARTAN POTASSIUM 100 MG/1
TABLET ORAL
Qty: 90 TABLET | Refills: 0 | Status: SHIPPED | OUTPATIENT
Start: 2023-09-12 | End: 2023-11-28

## 2023-09-19 ENCOUNTER — MYC MEDICAL ADVICE (OUTPATIENT)
Dept: FAMILY MEDICINE | Facility: CLINIC | Age: 73
End: 2023-09-19
Payer: MEDICARE

## 2023-09-22 NOTE — TELEPHONE ENCOUNTER
Schedule bill for a virtual visit with me to discuss his questions. Ok to book him around the lunch hour next week.

## 2023-09-27 ENCOUNTER — MYC MEDICAL ADVICE (OUTPATIENT)
Dept: NEUROLOGY | Facility: CLINIC | Age: 73
End: 2023-09-27
Payer: MEDICARE

## 2023-09-27 DIAGNOSIS — R56.9 CONVULSIONS, UNSPECIFIED CONVULSION TYPE (H): Chronic | ICD-10-CM

## 2023-09-27 DIAGNOSIS — G40.A09 NONINTRACTABLE ABSENCE EPILEPSY WITHOUT STATUS EPILEPTICUS (H): ICD-10-CM

## 2023-09-28 RX ORDER — DIVALPROEX SODIUM 500 MG/1
500 TABLET, DELAYED RELEASE ORAL 2 TIMES DAILY
Qty: 180 TABLET | Refills: 1 | Status: SHIPPED | OUTPATIENT
Start: 2023-09-28 | End: 2024-03-06

## 2023-09-28 NOTE — TELEPHONE ENCOUNTER
Dr Davis has never prescribed this medication for Bill. Encounter routed to Dr Davis to review.    Agnieszka Deras, RN Care Coordinator   Neurology/Neurosurgery/PM&R/ Pain Management

## 2023-09-28 NOTE — TELEPHONE ENCOUNTER
Pending Prescriptions:                       Disp   Refills    divalproex sodium delayed-release (DEPAKO*180 ta*1            Sig: Take 1 tablet (500 mg) by mouth 2 times daily        Requested Pharmacy: [facility] in [place]    Pt's last office visit: 8/29/23  Next scheduled office visit: 2/28/24      Per the RN/LPN medication refill protocol, writer is unable to refill this request.

## 2023-10-14 DIAGNOSIS — E87.6 HYPOKALEMIA: ICD-10-CM

## 2023-10-16 ENCOUNTER — VIRTUAL VISIT (OUTPATIENT)
Dept: FAMILY MEDICINE | Facility: CLINIC | Age: 73
End: 2023-10-16
Payer: MEDICARE

## 2023-10-16 DIAGNOSIS — L65.9 HAIR LOSS: Primary | ICD-10-CM

## 2023-10-16 DIAGNOSIS — E66.812 CLASS 2 OBESITY WITHOUT SERIOUS COMORBIDITY WITH BODY MASS INDEX (BMI) OF 39.0 TO 39.9 IN ADULT, UNSPECIFIED OBESITY TYPE: ICD-10-CM

## 2023-10-16 PROCEDURE — 99442 PR PHYSICIAN TELEPHONE EVALUATION 11-20 MIN: CPT | Mod: 95 | Performed by: PHYSICIAN ASSISTANT

## 2023-10-16 RX ORDER — GABAPENTIN 300 MG/1
600 CAPSULE ORAL 3 TIMES DAILY
Qty: 180 CAPSULE | Refills: 5 | Status: CANCELLED | OUTPATIENT
Start: 2023-10-16

## 2023-10-16 RX ORDER — OXYBUTYNIN CHLORIDE 5 MG/1
5 TABLET ORAL 2 TIMES DAILY
Qty: 180 TABLET | Refills: 0 | Status: CANCELLED | OUTPATIENT
Start: 2023-10-16

## 2023-10-16 RX ORDER — POTASSIUM CHLORIDE 1500 MG/1
TABLET, EXTENDED RELEASE ORAL
Qty: 270 TABLET | Refills: 0 | Status: SHIPPED | OUTPATIENT
Start: 2023-10-16 | End: 2024-01-08

## 2023-10-16 RX ORDER — FERROUS SULFATE 325(65) MG
325 TABLET ORAL 2 TIMES DAILY
Qty: 60 TABLET | Refills: 2 | Status: CANCELLED | OUTPATIENT
Start: 2023-10-16

## 2023-10-16 RX ORDER — FLUOXETINE 40 MG/1
CAPSULE ORAL
Qty: 30 CAPSULE | Refills: 1 | Status: CANCELLED | OUTPATIENT
Start: 2023-10-16

## 2023-10-16 RX ORDER — POTASSIUM CHLORIDE 1500 MG/1
TABLET, EXTENDED RELEASE ORAL
Qty: 270 TABLET | Refills: 0 | Status: CANCELLED | OUTPATIENT
Start: 2023-10-16

## 2023-10-16 RX ORDER — CARVEDILOL 12.5 MG/1
TABLET ORAL
Qty: 180 TABLET | Refills: 0 | Status: CANCELLED | OUTPATIENT
Start: 2023-10-16

## 2023-10-16 RX ORDER — FUROSEMIDE 20 MG
40 TABLET ORAL DAILY
Qty: 180 TABLET | Refills: 0 | Status: CANCELLED | OUTPATIENT
Start: 2023-10-16

## 2023-10-16 RX ORDER — CYCLOBENZAPRINE HCL 5 MG
5 TABLET ORAL
Qty: 20 TABLET | Refills: 0 | Status: CANCELLED | OUTPATIENT
Start: 2023-10-16

## 2023-10-16 RX ORDER — TRAMADOL HYDROCHLORIDE 50 MG/1
50 TABLET ORAL EVERY 6 HOURS PRN
COMMUNITY
Start: 2023-10-16 | End: 2023-10-23

## 2023-10-16 RX ORDER — RESPIRATORY SYNCYTIAL VIRUS VACCINE 120MCG/0.5
0.5 KIT INTRAMUSCULAR ONCE
Qty: 1 EACH | Refills: 0 | Status: CANCELLED | OUTPATIENT
Start: 2023-10-16 | End: 2023-10-16

## 2023-10-16 RX ORDER — MELOXICAM 15 MG/1
15 TABLET ORAL DAILY
Qty: 90 TABLET | Refills: 0 | Status: CANCELLED | OUTPATIENT
Start: 2023-10-16

## 2023-10-16 RX ORDER — LOSARTAN POTASSIUM 100 MG/1
100 TABLET ORAL DAILY
Qty: 90 TABLET | Refills: 0 | Status: CANCELLED | OUTPATIENT
Start: 2023-10-16

## 2023-10-16 RX ORDER — SILDENAFIL CITRATE 20 MG/1
TABLET ORAL
Qty: 30 TABLET | Refills: 11 | Status: CANCELLED | OUTPATIENT
Start: 2023-10-16

## 2023-10-16 RX ORDER — DULAGLUTIDE 0.75 MG/.5ML
0.75 INJECTION, SOLUTION SUBCUTANEOUS
Qty: 2 ML | Refills: 1 | Status: SHIPPED | OUTPATIENT
Start: 2023-10-16 | End: 2024-01-16

## 2023-10-16 NOTE — PROGRESS NOTES
Demian is a 72 year old who is being evaluated via a billable telephone visit.      What phone number would you like to be contacted at? [phone]   How would you like to obtain your AVS? [employer] grass [email or web address]    Distant Location (provider location):  On-site      Subjective   Demina is a 72 year old, presenting for the following health issues:  Derm Problem, Imm/Inj, and Weight Problem        10/16/2023     8:56 AM   Additional Questions   Roomed by Patient completed echeck in via [employer]       HPI   Patient would like to discuss the following  1)  Should he get COVID Booster, Flu and RSV Shots?   advised against rsv. Ok for flu and covid.    2)  Weight loss medication options   discussed ozempic .   Discussed past studies.   Discussed how this med works to lose weight.  Discussed lower carb/starch/fat/calorie diet  More exerise.   3)  Lost armpit hair-causes?        Review of Systems   Constitutional, HEENT, cardiovascular, pulmonary, GI, , musculoskeletal, neuro, skin, endocrine and psych systems are negative, except as otherwise noted.      Objective           Vitals:  No vitals were obtained today due to virtual visit.    Physical Exam   healthy, alert, and no distress  PSYCH: Alert and oriented times 3; coherent speech, normal   rate and volume, able to articulate logical thoughts, able   to abstract reason, no tangential thoughts, no hallucinations   or delusions  His affect is normal  RESP: No cough, no audible wheezing, able to talk in full sentences  Remainder of exam unable to be completed due to telephone visits  Demian was seen today for derm problem, imm/inj and weight problem.    Diagnoses and all orders for this visit:    Hair loss  -     Testosterone, total; Future  -     TSH with free T4 reflex; Future    Class 2 obesity without serious comorbidity with body mass index (BMI) of 39.0 to 39.9 in adult, unspecified obesity type  -     TSH with free T4 reflex; Future  -     dulaglutide (TRULICITY) 0.75 MG/0.5ML pen;  Inject 0.75 mg Subcutaneous every 7 days      Diet and exercise          Phone call duration: 17 minutes

## 2023-10-17 ENCOUNTER — TELEPHONE (OUTPATIENT)
Dept: FAMILY MEDICINE | Facility: CLINIC | Age: 73
End: 2023-10-17

## 2023-10-17 ENCOUNTER — ANCILLARY PROCEDURE (OUTPATIENT)
Dept: MRI IMAGING | Facility: CLINIC | Age: 73
End: 2023-10-17
Attending: PHYSICIAN ASSISTANT
Payer: MEDICARE

## 2023-10-17 DIAGNOSIS — M25.562 ACUTE PAIN OF LEFT KNEE: Primary | ICD-10-CM

## 2023-10-17 DIAGNOSIS — G89.29 CHRONIC PAIN OF LEFT KNEE: ICD-10-CM

## 2023-10-17 DIAGNOSIS — M25.562 CHRONIC PAIN OF LEFT KNEE: ICD-10-CM

## 2023-10-17 PROCEDURE — 73721 MRI JNT OF LWR EXTRE W/O DYE: CPT | Mod: LT | Performed by: RADIOLOGY

## 2023-10-17 PROCEDURE — G1010 CDSM STANSON: HCPCS | Performed by: RADIOLOGY

## 2023-10-17 RX ORDER — TRAMADOL HYDROCHLORIDE 50 MG/1
50 TABLET ORAL EVERY 6 HOURS PRN
Qty: 90 TABLET | Status: CANCELLED | OUTPATIENT
Start: 2023-10-17

## 2023-10-17 NOTE — TELEPHONE ENCOUNTER
Central Prior Authorization Team   Phone: [phone]    PRIOR AUTHORIZATION DENIED    Medication: TRULICITY 0.75 MG/0.5ML SC SOPN  Insurance Company: [employer] - Phone [phone] Fax [phone]  Denial Date: 10/17/2023  Denial Rational: MUST HAVE DX OF TYPE 2 DIABETES      Appeal Information: IF PROVIDER WOULD LIKE TO APPEAL THIS DECISION PLEASE PROVIDE THE PA TEAM WITH A LETTER OF MEDICAL NECESSITY      Patient Notified: No

## 2023-10-17 NOTE — TELEPHONE ENCOUNTER
Patient calling about Truculicity not being covered by insurance they will cover Wegovy or Saxenda injection

## 2023-10-17 NOTE — TELEPHONE ENCOUNTER
Patient calling, had virtual visit yesterday with Matt Swan, he says Matt was supposed to send a 30 day supply of his tramadol to [facility].   I see tramadol listed historically on med list, was not sent.   Patient says he usually takes 3 tramadol daily so 90 tabs would be 30 day supply.    I hiren'd up requested Rx and pharmacy, routed to PCP to address.    Yolis CHAHAL RN  [facility] Triage

## 2023-10-20 DIAGNOSIS — I10 BENIGN ESSENTIAL HYPERTENSION: ICD-10-CM

## 2023-10-20 DIAGNOSIS — N32.81 OVERACTIVE BLADDER: ICD-10-CM

## 2023-10-23 ENCOUNTER — TELEPHONE (OUTPATIENT)
Dept: FAMILY MEDICINE | Facility: CLINIC | Age: 73
End: 2023-10-23

## 2023-10-23 DIAGNOSIS — G89.29 CHRONIC PAIN OF LEFT KNEE: Primary | ICD-10-CM

## 2023-10-23 DIAGNOSIS — M25.562 CHRONIC PAIN OF LEFT KNEE: Primary | ICD-10-CM

## 2023-10-23 RX ORDER — TRAMADOL HYDROCHLORIDE 50 MG/1
50 TABLET ORAL EVERY 6 HOURS PRN
Qty: 120 TABLET | Refills: 0 | Status: SHIPPED | OUTPATIENT
Start: 2023-10-23 | End: 2023-11-28

## 2023-10-23 RX ORDER — OXYBUTYNIN CHLORIDE 5 MG/1
TABLET ORAL
Qty: 180 TABLET | Refills: 0 | Status: SHIPPED | OUTPATIENT
Start: 2023-10-23 | End: 2024-04-03

## 2023-10-23 RX ORDER — CARVEDILOL 12.5 MG/1
TABLET ORAL
Qty: 180 TABLET | Refills: 0 | Status: SHIPPED | OUTPATIENT
Start: 2023-10-23 | End: 2024-01-08

## 2023-10-23 NOTE — TELEPHONE ENCOUNTER
Patient calling with second request for tramadol refill (see TE 10/17/23)     Patient PA denied for Trulicity - patient states his insurance is providing alternate: blair Li RN  [facility]

## 2023-10-26 NOTE — TELEPHONE ENCOUNTER
New Rx was sent on 10/23/23.    Closing encounter.    Yolis CHAHAL RN  [facility] Triage

## 2023-10-30 ENCOUNTER — OFFICE VISIT (OUTPATIENT)
Dept: ORTHOPEDICS | Facility: CLINIC | Age: 73
End: 2023-10-30
Attending: PHYSICIAN ASSISTANT
Payer: MEDICARE

## 2023-10-30 VITALS — BODY MASS INDEX: 43.08 KG/M2 | WEIGHT: 296 LBS | OXYGEN SATURATION: 96 % | HEART RATE: 75 BPM

## 2023-10-30 DIAGNOSIS — M17.12 PRIMARY OSTEOARTHRITIS OF LEFT KNEE: ICD-10-CM

## 2023-10-30 PROCEDURE — 99203 OFFICE O/P NEW LOW 30 MIN: CPT | Performed by: PEDIATRICS

## 2023-10-30 NOTE — LETTER
"    10/30/2023         RE: Prashant Keyes  [address]  [place] [address]        Dear Colleague,    Thank you for referring your patient, Prashant Keyes, to the [facility]. Please see a copy of my visit note below.    ASSESSMENT & PLAN    Demian was seen today for pain.    Diagnoses and all orders for this visit:    Primary osteoarthritis of left knee  -     Orthopedic  Referral  -     (PRE-AUTH REQUEST) 48 mg hylan (SYNVISC ONE) injection 48 mg/6mL-ONCE  -     Physical Therapy Referral; Future        He desired trial of  brace and visco next.  See below.  Questions answered. Discussed signs and symptoms that may indicate more serious issues; the patient was instructed to seek appropriate care if noted. Demian indicates understanding of these issues and agrees with the plan.        See Patient Instructions  Patient Instructions     Discussed nature of degenerative arthrosis (\"arthritis\") of the knee.  Symptom treatment generally includes over-the-counter medications, ice or heat, topical treatments, and rest if needed.  May use compression or bracing for comfort. This includes  brace trial through PT.  Discussed potential benefits of rehabilitation, to maintain or improve function at the knee. Placed referral to PT, including for  bracing trial.  There are benefits of exercise and remaining active. Also, weight loss (if applicable) can reduce pressure at the knee.  Discussed injection therapy. This typically includes steroid (cortisone), vs viscosupplement (\"lubricating shot\"). Having had previous steroid injections with waning benefit, placed order for prior authorization for visco injection, and plan to have one of my colleagues do the injection with ultrasound guidance.  Also briefly discussed future consideration of referral to orthopedic surgery for further evaluation and discussion of additional treatment options.    Start with " PT and visco trial. Will leave follow-up open ended otherwise. Contact clinic if any other questions/concerns.      If you have any further questions for your physician or physician s care team you can contact them thru MyChart or by calling [phone].        Paul Edmonds DO  [facility]      CC: Matt Swan      -----  Chief Complaint   Patient presents with     Left Knee - Pain       SUBJECTIVE  Prashant Keyes is a/an 72 year old male who is seen in consultation at the request of  Matt Swan PA-C for evaluation of left knee pain.     The patient is seen by themselves.    Onset: several month(s) ago at least. Reports insidious onset without acute precipitating event. Feeling slightly better.   Location of Pain: anterior, medial knee   Worsened by: driving, pivoting or twisting the knee wrong   Better with: nothing   Treatments tried: rest/activity avoidance, ice, heat, and meloxicam - helped slightly. Last cortisone injection in left knee about 1 year ago. Felt relief for a few days at best. Has tried knee braces, but they do not help. 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  Additional history:  + pain at rest currently. Pain more anteromedial knee.  Saw UC in July, had significant swelling at that time.  Today is a little better compared to what it's been.  Some joint noise.    Chart reviewed. I can see steroid injection done at 1/30/23 visit with PCP.      REVIEW OF SYSTEMS:  Review of Systems    OBJECTIVE:  Pulse 75   Wt 134.3 kg (296 lb)   SpO2 96%   BMI 43.08 kg/m     General: healthy, alert and in no distress  Skin: no  suspicious lesions or rash.  CV: distal perfusion intact   Resp: normal respiratory effort without conversational dyspnea   Psych: normal mood and affect  Gait: NORMAL and cane  Neuro: Normal tone      Left Knee exam    Inspection:   + effusion   no ecchymosis    ROM: mild limitation end range with stiffness, no change in pain     Tender:      medial joint line    Special Tests:      neg (-) anterior drawer       neg (-) posterior drawer       neg (-) varus        neg (-) valgus         RADIOLOGY:  Final results and radiologist's interpretation, available in the [facility] health record.  Images were reviewed with the patient in the office today.  My personal interpretation of the performed imaging: left knee arthrosis, with medial compartment articular cartilage loss, and post surgical medial meniscus change.          EXAMINATION: MRI of the left knee without contrast     DATE:  10/17/2023     HISTORY: Knee pain     TECHNIQUE: Multiplanar, multisequence MR imaging of the left knee was  obtained using standard sequences in 3 orthogonal planes without the  use of intravenous or intra-articular gadolinium contrast.     Comparison: Comparison MRI dated 10/17/2023 was reviewed.     FINDINGS:     In the medial compartment, truncation of the anterior horn and body of  the medial meniscus with peripheral extrusion of the meniscal body,  likely postsurgical change. There are areas of full-thickness  cartilage loss with subchondral edema, along both the medial femoral  condyle and medial tibial plateau.     In the lateral compartment, the lateral meniscus is intact. There is  no high-grade or full-thickness cartilage loss or subchondral edema.     In the patellofemoral compartment, there is no high-grade or  full-thickness cartilage loss or subchondral edema.     The posterior cruciate ligament is intact. Marked intrasubstance  signal within the anterior cruciate ligament which is otherwise  intact.     The tibial collateral  ligament is intact. The anterior iliotibial  band, fibular collateral ligament, biceps femoris tendon, and  popliteus tendons are intact.     There is a small to moderate size joint effusion. Trace fluid in the  semimembranosus medial gastrocnemius bursa. No joint bodies.     The extensor mechanism is intact and normal in appearance.                                                                       IMPRESSION:  1. Small to moderate size left knee joint effusion.   2. Postsurgical changes of prior partial medial meniscectomy with  peripheral extrusion of the residual meniscal body.  3. Marked osteoarthrosis in the left knee medial femorotibial joint  compartment with areas of full-thickness cartilage loss and  subchondral edema, along both the medial femoral condyle and medial  tibial plateau.  4. 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 The  posterior cruciate ligament, lateral meniscus, and medial and lateral  supporting structures are intact.     ASHLEY ALMAGUER MD       =========================    Medial compartment narrowing noted on previous x-rays.      EXAM: XR KNEE LEFT 3 VIEWS  LOCATION: [facility]  DATE: 7/8/2023     INDICATION: Left medial knee pain  COMPARISON: None.                                                                      IMPRESSION: There is considerable soft tissue swelling along the anterior aspect of the knee and proximal tibia. Probable left knee joint effusion. Moderate medial compartment joint space narrowing. Mild patellofemoral compartment degenerative change. No   evidence of an acute fracture. Bones are demineralized.          XR LEFT KNEE ONE-TWO VIEWS  1/30/2023 9:56 AM     INDICATION: Acute pain of left knee  COMPARISON: None available.                                                                       IMPRESSION: Anatomic alignment left knee. No acute displaced left knee  fracture. Mild joint space narrowing in the medial compartment  of the  left knee. Small left knee joint effusion.        DAKOTA HALEY MD         Review of prior external note(s) from - primary care  Review of the result(s) of each unique test - imaging  Independent interpretation of a test performed by another physician/other qualified health care professional (not separately reported) - imaging             Again, thank you for allowing me to participate in the care of your patient.        Sincerely,        Paul Edmonds, DO

## 2023-10-30 NOTE — PATIENT INSTRUCTIONS
"  Discussed nature of degenerative arthrosis (\"arthritis\") of the knee.  Symptom treatment generally includes over-the-counter medications, ice or heat, topical treatments, and rest if needed.  May use compression or bracing for comfort. This includes  brace trial through PT.  Discussed potential benefits of rehabilitation, to maintain or improve function at the knee. Placed referral to PT, including for  bracing trial.  There are benefits of exercise and remaining active. Also, weight loss (if applicable) can reduce pressure at the knee.  Discussed injection therapy. This typically includes steroid (cortisone), vs viscosupplement (\"lubricating shot\"). Having had previous steroid injections with waning benefit, placed order for prior authorization for visco injection, and plan to have one of my colleagues do the injection with ultrasound guidance.  Also briefly discussed future consideration of referral to orthopedic surgery for further evaluation and discussion of additional treatment options.    Start with PT and visco trial. Will leave follow-up open ended otherwise. Contact clinic if any other questions/concerns.      If you have any further questions for your physician or physician s care team you can contact them thru iPerceptionst or by calling [phone].        "

## 2023-10-30 NOTE — PROGRESS NOTES
"ASSESSMENT & PLAN    Demian was seen today for pain.    Diagnoses and all orders for this visit:    Primary osteoarthritis of left knee  -     Orthopedic  Referral  -     (PRE-AUTH REQUEST) 48 mg hylan (SYNVISC ONE) injection 48 mg/6mL-ONCE  -     Physical Therapy Referral; Future        He desired trial of  brace and visco next.  See below.  Questions answered. Discussed signs and symptoms that may indicate more serious issues; the patient was instructed to seek appropriate care if noted. Demian indicates understanding of these issues and agrees with the plan.        See Patient Instructions  Patient Instructions     Discussed nature of degenerative arthrosis (\"arthritis\") of the knee.  Symptom treatment generally includes over-the-counter medications, ice or heat, topical treatments, and rest if needed.  May use compression or bracing for comfort. This includes  brace trial through PT.  Discussed potential benefits of rehabilitation, to maintain or improve function at the knee. Placed referral to PT, including for  bracing trial.  There are benefits of exercise and remaining active. Also, weight loss (if applicable) can reduce pressure at the knee.  Discussed injection therapy. This typically includes steroid (cortisone), vs viscosupplement (\"lubricating shot\"). Having had previous steroid injections with waning benefit, placed order for prior authorization for visco injection, and plan to have one of my colleagues do the injection with ultrasound guidance.  Also briefly discussed future consideration of referral to orthopedic surgery for further evaluation and discussion of additional treatment options.    Start with PT and visco trial. Will leave follow-up open ended otherwise. Contact clinic if any other questions/concerns.      If you have any further questions for your physician or physician s care team you can contact them thru Bgiftyt or by calling [phone].        Paul " Newton Edmonds DO  [facility]      CC: Matt Swan      -----  Chief Complaint   Patient presents with    Left Knee - Pain       SUBJECTIVE  Prashant Keyes is a/an 72 year old male who is seen in consultation at the request of  Matt Swan PA-C for evaluation of left knee pain.     The patient is seen by themselves.    Onset: several month(s) ago at least. Reports insidious onset without acute precipitating event. Feeling slightly better.   Location of Pain: anterior, medial knee   Worsened by: driving, pivoting or twisting the knee wrong   Better with: nothing   Treatments tried: rest/activity avoidance, ice, heat, and meloxicam - helped slightly. Last cortisone injection in left knee about 1 year ago. Felt relief for a few days at best. Has tried knee braces, but they do not help. 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     **  Above information per rooming staff.  Additional history:  + pain at rest currently. Pain more anteromedial knee.  Saw UC in July, had significant swelling at that time.  Today is a little better compared to what it's been.  Some joint noise.    Chart reviewed. I can see steroid injection done at 1/30/23 visit with PCP.      REVIEW OF SYSTEMS:  Review of Systems    OBJECTIVE:  Pulse 75   Wt 134.3 kg (296 lb)   SpO2 96%   BMI 43.08 kg/m     General: healthy, alert and in no distress  Skin: no suspicious lesions or rash.  CV: distal perfusion intact   Resp: normal respiratory effort without conversational dyspnea   Psych: normal mood and affect  Gait: NORMAL and cane  Neuro: Normal tone      Left Knee exam    Inspection:   + effusion   no ecchymosis    ROM:  mild limitation end range with stiffness, no change in pain     Tender:      medial joint line    Special Tests:      neg (-) anterior drawer       neg (-) posterior drawer       neg (-) varus        neg (-) valgus         RADIOLOGY:  Final results and radiologist's interpretation, available in the [facility] health record.  Images were reviewed with the patient in the office today.  My personal interpretation of the performed imaging: left knee arthrosis, with medial compartment articular cartilage loss, and post surgical medial meniscus change.          EXAMINATION: MRI of the left knee without contrast     DATE:  10/17/2023     HISTORY: Knee pain     TECHNIQUE: Multiplanar, multisequence MR imaging of the left knee was  obtained using standard sequences in 3 orthogonal planes without the  use of intravenous or intra-articular gadolinium contrast.     Comparison: Comparison MRI dated 10/17/2023 was reviewed.     FINDINGS:     In the medial compartment, truncation of the anterior horn and body of  the medial meniscus with peripheral extrusion of the meniscal body,  likely postsurgical change. There are areas of full-thickness  cartilage loss with subchondral edema, along both the medial femoral  condyle and medial tibial plateau.     In the lateral compartment, the lateral meniscus is intact. There is  no high-grade or full-thickness cartilage loss or subchondral edema.     In the patellofemoral compartment, there is no high-grade or  full-thickness cartilage loss or subchondral edema.     The posterior cruciate ligament is intact. Marked intrasubstance  signal within the anterior cruciate ligament which is otherwise  intact.     The tibial collateral ligament is intact. The anterior iliotibial  band, fibular collateral ligament, biceps femoris tendon, and  popliteus tendons are intact.     There is a small to moderate size joint effusion. Trace fluid in the  semimembranosus medial gastrocnemius bursa. No joint bodies.      The extensor mechanism is intact and normal in appearance.                                                                       IMPRESSION:  1. Small to moderate size left knee joint effusion.   2. Postsurgical changes of prior partial medial meniscectomy with  peripheral extrusion of the residual meniscal body.  3. Marked osteoarthrosis in the left knee medial femorotibial joint  compartment with areas of full-thickness cartilage loss and  subchondral edema, along both the medial femoral condyle and medial  tibial plateau.  4. 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 The  posterior cruciate ligament, lateral meniscus, and medial and lateral  supporting structures are intact.     ASHLEY ALMAGUER MD       =========================    Medial compartment narrowing noted on previous x-rays.      EXAM: XR KNEE LEFT 3 VIEWS  LOCATION: [facility]  DATE: 7/8/2023     INDICATION: Left medial knee pain  COMPARISON: None.                                                                      IMPRESSION: There is considerable soft tissue swelling along the anterior aspect of the knee and proximal tibia. Probable left knee joint effusion. Moderate medial compartment joint space narrowing. Mild patellofemoral compartment degenerative change. No   evidence of an acute fracture. Bones are demineralized.          XR LEFT KNEE ONE-TWO VIEWS  1/30/2023 9:56 AM     INDICATION: Acute pain of left knee  COMPARISON: None available.                                                                       IMPRESSION: Anatomic alignment left knee. No acute displaced left knee  fracture. Mild joint space narrowing in the medial compartment of the  left knee. Small left knee joint effusion.        DAKOTA HALEY MD         Review of prior external note(s) from - primary care  Review of the result(s) of each unique test - imaging  Independent interpretation of a test performed by another physician/other  qualified health care professional (not separately reported) - imaging

## 2023-11-14 ENCOUNTER — OFFICE VISIT (OUTPATIENT)
Dept: ORTHOPEDICS | Facility: CLINIC | Age: 73
End: 2023-11-14
Payer: MEDICARE

## 2023-11-14 VITALS — WEIGHT: 296 LBS | BODY MASS INDEX: 43.08 KG/M2

## 2023-11-14 DIAGNOSIS — M17.12 PRIMARY OSTEOARTHRITIS OF LEFT KNEE: Primary | ICD-10-CM

## 2023-11-14 PROCEDURE — 20611 DRAIN/INJ JOINT/BURSA W/US: CPT | Mod: LT | Performed by: FAMILY MEDICINE

## 2023-11-14 NOTE — PATIENT INSTRUCTIONS
[facility] Injection Discharge Instructions    Procedure: Left knee aspiration/Synvisc injection    You may shower, however avoid swimming, tub baths or hot tubs for 24 hours following your procedure  You may have a mild to moderate increase in pain for several days following the injection.  It may take up to 14 days for the steroid medication to start working although you may feel the effect as early as a few days after the procedure.  You may use ice packs for 10-15 minutes, 3 to 4 times a day at the injection site for comfort  You may use anti-inflammatory medications (such as Ibuprofen or Aleve or Advil) or Tylenol for pain control if necessary  If you were fasting, you may resume your normal diet and medications after the procedure  If you have diabetes, check your blood sugar more frequently than usual as your blood sugar may be higher than normal for 10-14 days following a steroid injection. Contact your doctor who manages your diabetes if your blood sugar is higher than usual    If you experience any of the following, call [facility] @ [phone] or [phone]  -Fever over 100 degree F  -Swelling, bleeding, redness, drainage, warmth at the injection site  - New or worsening pain     It was great seeing you today!    Odell Morgan

## 2023-11-14 NOTE — LETTER
11/14/2023         RE: Prashant Keyes  [address]  [place] [address]        Dear Colleague,    Thank you for referring your patient, Prashant Keyes, to the [facility]. Please see a copy of my visit note below.    Large Joint Injection/Arthocentesis: L knee joint    Date/Time: 11/14/2023 1:12 PM    Performed by: Odell Morgan MD  Authorized by: Odell Morgan MD    Indications:  Pain and osteoarthritis  Needle Size:  21 G  Guidance: ultrasound    Approach:  Superolateral  Location:  Knee      Medications:  48 mg hylan 48 MG/6ML  Aspirate amount (mL):  18  Aspirate:  Serous and yellow  Outcome:  Tolerated well, no immediate complications  Procedure discussed: discussed risks, benefits, and alternatives    Consent Given by:  Patient  Timeout: timeout called immediately prior to procedure    Prep: patient was prepped and draped in usual sterile fashion     Ultrasound images of procedure were permanently stored.   Referred by Dr. Edmonds     Patient tolerated left knee aspiration/hyaluronic acid injection today.  Aftercare instructions given to patient.  Plan to follow-up as previously discussed with referring provider.  Ultrasound guided images were permanently stored.     Odell Morgan MD [facility]    I was present with the resident during the history and exam.  I discussed the case with the resident and agree with the findings as documented in the assessment and plan.               Again, thank you for allowing me to participate in the care of your patient.        Sincerely,        Odell Morgan MD

## 2023-11-14 NOTE — PROGRESS NOTES
Large Joint Injection/Arthocentesis: L knee joint    Date/Time: 11/14/2023 1:12 PM    Performed by: Odell Morgan MD  Authorized by: Odell Morgan MD    Indications:  Pain and osteoarthritis  Needle Size:  21 G  Guidance: ultrasound    Approach:  Superolateral  Location:  Knee      Medications:  48 mg hylan 48 MG/6ML  Aspirate amount (mL):  18  Aspirate:  Serous and yellow  Outcome:  Tolerated well, no immediate complications  Procedure discussed: discussed risks, benefits, and alternatives    Consent Given by:  Patient  Timeout: timeout called immediately prior to procedure    Prep: patient was prepped and draped in usual sterile fashion     Ultrasound images of procedure were permanently stored.   Referred by Dr. Edmonds     Patient tolerated left knee aspiration/hyaluronic acid injection today.  Aftercare instructions given to patient.  Plan to follow-up as previously discussed with referring provider.  Ultrasound guided images were permanently stored.     Odell Morgan MD [facility]    I was present with the resident during the history and exam.  I discussed the case with the resident and agree with the findings as documented in the assessment and plan.

## 2023-12-24 DIAGNOSIS — I10 BENIGN ESSENTIAL HYPERTENSION: ICD-10-CM

## 2023-12-26 RX ORDER — AMLODIPINE BESYLATE 10 MG/1
TABLET ORAL
Qty: 90 TABLET | Refills: 1 | Status: SHIPPED | OUTPATIENT
Start: 2023-12-26

## 2024-01-06 DIAGNOSIS — I10 BENIGN ESSENTIAL HYPERTENSION: ICD-10-CM

## 2024-01-06 DIAGNOSIS — E87.6 HYPOKALEMIA: ICD-10-CM

## 2024-01-07 ENCOUNTER — MYC MEDICAL ADVICE (OUTPATIENT)
Dept: FAMILY MEDICINE | Facility: CLINIC | Age: 74
End: 2024-01-07
Payer: MEDICARE

## 2024-01-08 RX ORDER — POTASSIUM CHLORIDE 1500 MG/1
TABLET, EXTENDED RELEASE ORAL
Qty: 270 TABLET | Refills: 0 | Status: SHIPPED | OUTPATIENT
Start: 2024-01-08 | End: 2024-05-21

## 2024-01-08 RX ORDER — CARVEDILOL 12.5 MG/1
TABLET ORAL
Qty: 180 TABLET | Refills: 0 | Status: SHIPPED | OUTPATIENT
Start: 2024-01-08 | End: 2024-04-05

## 2024-01-08 NOTE — TELEPHONE ENCOUNTER
Arnoldt sent.    Fadi Osuna, RN  Triage Nurse  [facility], [facility]

## 2024-01-16 ENCOUNTER — OFFICE VISIT (OUTPATIENT)
Dept: FAMILY MEDICINE | Facility: CLINIC | Age: 74
End: 2024-01-16
Payer: MEDICARE

## 2024-01-16 VITALS
HEART RATE: 81 BPM | WEIGHT: 294 LBS | SYSTOLIC BLOOD PRESSURE: 134 MMHG | HEIGHT: 70 IN | OXYGEN SATURATION: 94 % | BODY MASS INDEX: 42.09 KG/M2 | RESPIRATION RATE: 20 BRPM | DIASTOLIC BLOOD PRESSURE: 62 MMHG | TEMPERATURE: 96.9 F

## 2024-01-16 DIAGNOSIS — E66.812 CLASS 2 OBESITY WITHOUT SERIOUS COMORBIDITY WITH BODY MASS INDEX (BMI) OF 39.0 TO 39.9 IN ADULT, UNSPECIFIED OBESITY TYPE: ICD-10-CM

## 2024-01-16 DIAGNOSIS — R15.9 FULL INCONTINENCE OF FECES: Primary | ICD-10-CM

## 2024-01-16 DIAGNOSIS — Z79.899 HIGH RISK MEDICATION USE: ICD-10-CM

## 2024-01-16 DIAGNOSIS — Z12.11 SCREEN FOR COLON CANCER: ICD-10-CM

## 2024-01-16 DIAGNOSIS — L65.9 HAIR LOSS: ICD-10-CM

## 2024-01-16 DIAGNOSIS — E34.9 HYPOTESTOSTERONEMIA: ICD-10-CM

## 2024-01-16 DIAGNOSIS — L57.0 ACTINIC KERATOSIS: ICD-10-CM

## 2024-01-16 PROCEDURE — G0481 DRUG TEST DEF 8-14 CLASSES: HCPCS | Performed by: PHYSICIAN ASSISTANT

## 2024-01-16 PROCEDURE — 99213 OFFICE O/P EST LOW 20 MIN: CPT | Mod: 25 | Performed by: PHYSICIAN ASSISTANT

## 2024-01-16 PROCEDURE — 84443 ASSAY THYROID STIM HORMONE: CPT | Performed by: PHYSICIAN ASSISTANT

## 2024-01-16 PROCEDURE — 84403 ASSAY OF TOTAL TESTOSTERONE: CPT | Performed by: PHYSICIAN ASSISTANT

## 2024-01-16 PROCEDURE — 36415 COLL VENOUS BLD VENIPUNCTURE: CPT | Performed by: PHYSICIAN ASSISTANT

## 2024-01-16 PROCEDURE — 17110 DESTRUCTION B9 LES UP TO 14: CPT | Performed by: PHYSICIAN ASSISTANT

## 2024-01-16 ASSESSMENT — ASTHMA QUESTIONNAIRES
QUESTION_5 LAST FOUR WEEKS HOW WOULD YOU RATE YOUR ASTHMA CONTROL: COMPLETELY CONTROLLED
QUESTION_1 LAST FOUR WEEKS HOW MUCH OF THE TIME DID YOUR ASTHMA KEEP YOU FROM GETTING AS MUCH DONE AT WORK, SCHOOL OR AT HOME: NONE OF THE TIME
QUESTION_3 LAST FOUR WEEKS HOW OFTEN DID YOUR ASTHMA SYMPTOMS (WHEEZING, COUGHING, SHORTNESS OF BREATH, CHEST TIGHTNESS OR PAIN) WAKE YOU UP AT NIGHT OR EARLIER THAN USUAL IN THE MORNING: NOT AT ALL
ACT_TOTALSCORE: 25
QUESTION_4 LAST FOUR WEEKS HOW OFTEN HAVE YOU USED YOUR RESCUE INHALER OR NEBULIZER MEDICATION (SUCH AS ALBUTEROL): NOT AT ALL
QUESTION_2 LAST FOUR WEEKS HOW OFTEN HAVE YOU HAD SHORTNESS OF BREATH: NOT AT ALL
ACT_TOTALSCORE: 25

## 2024-01-16 ASSESSMENT — PAIN SCALES - GENERAL: PAINLEVEL: NO PAIN (0)

## 2024-01-16 NOTE — PROGRESS NOTES
"  Subjective   Demian is a 73 year old, presenting for the following health issues:  Incontinence (Fecal incontinence/Urgency/sporadic/)        1/16/2024     2:44 PM   Additional Questions   Roomed by Alta Schmitt CMA   Accompanied by None         1/16/2024     2:44 PM   Patient Reported Additional Medications   Patient reports taking the following new medications none       History of Present Illness       Reason for visit:  Diarreha and spots  Symptom onset:  More than a month  Symptoms include:  None right now  Symptom intensity:  Mild  Symptom progression:  Improving  Had these symptoms before:  Yes  Has tried/received treatment for these symptoms:  No  What makes it worse:  Milk  What makes it better:  Anti diahhetics    He eats 0-1 servings of fruits and vegetables daily.He consumes 0 sweetened beverage(s) daily.He exercises with enough effort to increase his heart rate 9 or less minutes per day.  He exercises with enough effort to increase his heart rate 5 days per week.   He is taking medications regularly.    Once every 5-6 mos has and episode of fecal incontinence. Looses control of bowel function. May last two stool cycles. Then notes some constipation for several days.   No low back pain . No pelvic paresthesias. No blood in stools. No abd pain. No n/v.  Parkinson's has been stable   Doesn't seem to be related to ingesting any particular foods or drink.   Review of Systems   Constitutional, HEENT, cardiovascular, pulmonary, GI, , musculoskeletal, neuro, skin, endocrine and psych systems are negative, except as otherwise noted.      Objective    /62   Pulse 81   Temp 96.9  F (36.1  C) (Temporal)   Resp 20   Ht 1.765 m (5' 9.5\")   Wt 133.4 kg (294 lb)   SpO2 94%   BMI 42.79 kg/m    Body mass index is 42.79 kg/m .  Physical Exam   Eye exam - right eye normal lid, conjunctiva, cornea, pupil and fundus, left eye normal lid, conjunctiva, cornea, pupil and fundus.  Thyroid not palpable, not " enlarged, no nodules detected.  CHEST:chest clear to IPPA, no tachypnea, retractions or cyanosis, and S1, S2 normal, no murmur, no gallop, rate regular.  The abdomen is soft without tenderness, guarding, mass, rebound or organomegaly. Bowel sounds are normal. No CVA tenderness or inguinal adenopathy noted.    Demian was seen today for incontinence.    Diagnoses and all orders for this visit:    Full incontinence of feces  -     Adult GI  Referral - Consult Only; Future    Screen for colon cancer  -     Colonoscopy Screening  Referral; Future    High risk medication use  -     [id number] - Urine Drug Confirmation Panel (Comprehensive); Future    Actinic keratosis  -     DESTRUCT BENIGN LESION, UP TO 14    4 ak lesions on scalp and face. Each lesion was tx'd with 2 ftc's of cryotherapy.   Advised supportive and symptomatic treatment.  Follow up with Provider - if condition persists or worsens.   work on lifestyle modification

## 2024-01-16 NOTE — LETTER

## 2024-01-17 LAB
CREAT UR-MCNC: 26 MG/DL
TSH SERPL DL<=0.005 MIU/L-ACNC: 0.6 UIU/ML (ref 0.3–4.2)

## 2024-01-18 LAB
GABAPENTIN UR QL CFM: PRESENT
N-NORTRAMADOL/CREAT UR CFM: 9308 NG/MG {CREAT}
O-NORTRAMADOL UR CFM-MCNC: 2420 NG/ML
TESTOST SERPL-MCNC: 158 NG/DL (ref 240–950)
TRAMADOL CTO UR CFM-MCNC: 3500 NG/ML
TRAMADOL/CREAT UR: ABNORMAL NG/MG {CREAT}

## 2024-01-18 NOTE — TELEPHONE ENCOUNTER
REFERRAL INFORMATION:  Referring Provider:  Matt Swan PA-C   Referring Clinic:  [facility]   Reason for Visit/Diagnosis: Full incontinence of feces      FUTURE VISIT INFORMATION:  Appointment Date: 2/13/2024  Appointment Time:      NOTES STATUS DETAILS   OFFICE NOTE from Referring Provider Internal 1/16/2024 OV with TOR Swan   OFFICE NOTE from Other Specialist N/A    HOSPITAL DISCHARGE SUMMARY/  ED VISITS N/A    OPERATIVE REPORT N/A    MEDICATION LIST N/A         ENDOSCOPY  Internal    COLONOSCOPY N/A 8/17/2017   IMAGING (CT, MRI, EGD, MRCP, Small Bowel Follow Through/SBT, MR/CT Enterography) N/A

## 2024-01-22 ENCOUNTER — TELEPHONE (OUTPATIENT)
Dept: GASTROENTEROLOGY | Facility: CLINIC | Age: 74
End: 2024-01-22
Payer: MEDICARE

## 2024-01-22 ENCOUNTER — TELEPHONE (OUTPATIENT)
Dept: FAMILY MEDICINE | Facility: CLINIC | Age: 74
End: 2024-01-22
Payer: MEDICARE

## 2024-01-22 NOTE — TELEPHONE ENCOUNTER
"Endoscopy Scheduling Screen    Have you had a positive Covid test in the last 14 days?  No      Are you active on MyChart?   Yes      What insurance is in the chart?  Other:  MEDICARE &     Ordering/Referring Provider: DAKOAT FLYNN   (If ordering provider performs procedure, schedule with ordering provider unless otherwise instructed. )    BMI: Estimated body mass index is 42.79 kg/m  as calculated from the following:    Height as of 1/16/24: 1.765 m (5' 9.5\").    Weight as of 1/16/24: 133.4 kg (294 lb).     Sedation Ordered  moderate sedation.   If patient BMI > 50 do not schedule in [facility].    If patient BMI > 45 do not schedule at [facility].    Are you taking methadone or Suboxone?  No      Are you taking any prescription medications for pain 3 or more times per week?   YES, Colon/Combined: RN review needed to determine MAC and PREP.  (RN Review required.)       Do you have a history of malignant hyperthermia or adverse reaction to anesthesia?  No      (Females) Are you currently pregnant?          Have you been diagnosed or told you have pulmonary hypertension?   No      Do you have an LVAD?  No      Have you been told you have moderate to severe sleep apnea?  Yes (RN Review required for scheduling unless scheduling in Hospital.) bipap      Have you been told you have COPD, asthma, or any other lung disease?  Yes     What breathing problems do you have?  Asthma     Do you use home oxygen?  No    Have your breathing problems required an ED visit or hospitalization in the last year?  No      Do you have any heart conditions?  No       Have you ever had an organ transplant?   No      Have you ever had or are you awaiting a heart or lung transplant?   No      Have you had a stroke or transient ischemic attack (TIA aka \"mini stroke\" in the last 6 months?   No      Have you been diagnosed with or been told you have cirrhosis of the liver?   No      Are you currently on dialysis?   No      Do you need assistance " "transferring?   No    BMI: Estimated body mass index is 42.79 kg/m  as calculated from the following:    Height as of 1/16/24: 1.765 m (5' 9.5\").    Weight as of 1/16/24: 133.4 kg (294 lb).     Is patients BMI > 40 and scheduling location [facility]?  Yes (If MAC sedation is ordered, schedule PAC eval)      Do you take an injectable medication for weight loss or diabetes (excluding insulin)?  No      Do you take the medication Naltrexone?  No      Do you take blood thinners?  No   Baby aspirin     Prep   Are you currently on dialysis or do you have chronic kidney disease?  uncertain      Do you have a diagnosis of diabetes?  No      Do you have a diagnosis of cystic fibrosis (CF)?  No      On a regular basis do you go 3 -5 days between bowel movements?  Yes (Extended Prep)      BMI > 40?  Yes (Extended Prep)    Preferred Pharmacy:    [facility]/pharmacy #[id number] - [place], [place] - [address] [address]  [address] [address],   [employer] [facility] [employer] [facility] [id number]  Phone: [phone] Fax: [phone]      Final Scheduling Details   Colonoscopy prep sent?  Golytely Extended Prep (per RN review, specialized instructions detailed in 8/2017 colonoscopy report as well)    Procedure scheduled  Colonoscopy    Surgeon:  JEROME     Date of procedure:  6/6/24     Pre-OP / PAC:   Yes - PAC clinic evaluation scheduled.    Location  [facility] - Per RN assessment. HOSPITAL    Sedation   MAC/Deep Sedation - Per RN assessment.      Patient Reminders:   You will receive a call from a Nurse to review instructions and health history.  This assessment must be completed prior to your procedure.  Failure to complete the Nurse assessment may result in the procedure being cancelled.      On the day of your procedure, please designate an adult(s) who can drive you home stay with you for the next 24 hours. The medicines used in the exam will make you sleepy. You will not be able to drive.      You cannot take public transportation, ride share services, or non-medical taxi " service without a responsible caregiver.  Medical transport services are allowed with the requirement that a responsible caregiver will receive you at your destination.  We require that drivers and caregivers are confirmed prior to your procedure.     foot/Bilateral:

## 2024-01-22 NOTE — TELEPHONE ENCOUNTER
"Pre Assessment RN Review    Focused Assessments      KALEB Hx  Estimated body mass index is 42.79 kg/m  as calculated from the following:    Height as of 1/16/24: 1.765 m (5' 9.5\").    Weight as of 1/16/24: 133.4 kg (294 lb).     Patient has reported / documented history KALEB and reports he does use a a device for sleep.     Device: CPAP    Severity Assessment    Sleep Study:   Diagnosis/Severity: Severe    AHI: 54.2      Pain Medication Review    Per scheduling questionnaire, patient reports taking prescription pain medication three or more times per week.    Per chart review, patient does have an active prescription for an opioid medication. Prescribed Medication(s): Tramadol      Scheduling Status & Recommendations    Location Type: Hospital - Per RN assessment.  Prep: See last colonoscopy note for prep instructions dates 8/17/17 - Per RN assessment.      Dominga Saleh RN  Endoscopy Procedure Pre Assessment RN  [phone] option 4    "

## 2024-01-22 NOTE — TELEPHONE ENCOUNTER
[facility] Call Center    Phone Message    May a detailed message be left on voicemail: yes     Reason for Call: Medication Refill Request    Has the patient contacted the pharmacy for the refill? Yes   Name of medication being requested: divalproex sodium delayed-release (DEPAKOTE) 500 MG DR tablet  carbidopa-levodopa (SINEMET)  MG tablet  Provider who prescribed the medication: Dr. Temple  Pharmacy: [facility]/PHARMACY #[id number] - [place], [place] - [address] [address]  Date medication is needed: ASAP (pt is out of the medications)         Action Taken: Message routed to:  Other: [facility]    Travel Screening: Not Applicable                                                                       English

## 2024-01-22 NOTE — TELEPHONE ENCOUNTER
Patient calling- he received a call from endocrinology to schedule an appointment.  He was confused why they were calling.    Checked referrals for him and he does have a referral.  Checked recent lab work and appears that his testosterone level was below normal and that's why there was a referral to endo.    Pt now understands and will call them to schedule.    Dayana, RN    Triage Nurse  [facility]

## 2024-01-23 NOTE — TELEPHONE ENCOUNTER
FUTURE VISIT INFORMATION      SURGERY INFORMATION:  Date: 24  Location:  gi  Surgeon:  Dwayne Hart MD   Anesthesia Type:  MAC  Procedure: Colonoscopy     RECORDS REQUESTED FROM:       Primary Care Provider:   Matt Swan PA-C- [facility]     Pertinent Medical History: KALEB, hypertension    Most recent EKG+ Tracin22- [facility]

## 2024-02-01 DIAGNOSIS — I10 BENIGN ESSENTIAL HYPERTENSION: ICD-10-CM

## 2024-02-01 RX ORDER — CHLORTHALIDONE 25 MG/1
TABLET ORAL
Qty: 90 TABLET | Refills: 0 | Status: SHIPPED | OUTPATIENT
Start: 2024-02-01 | End: 2024-03-08

## 2024-02-05 ENCOUNTER — VIRTUAL VISIT (OUTPATIENT)
Dept: ENDOCRINOLOGY | Facility: CLINIC | Age: 74
End: 2024-02-05
Payer: MEDICARE

## 2024-02-05 ENCOUNTER — PRE VISIT (OUTPATIENT)
Dept: ENDOCRINOLOGY | Facility: CLINIC | Age: 74
End: 2024-02-05

## 2024-02-05 VITALS — WEIGHT: 296 LBS | HEIGHT: 70 IN | BODY MASS INDEX: 42.37 KG/M2

## 2024-02-05 DIAGNOSIS — E34.9 HYPOTESTOSTERONEMIA: ICD-10-CM

## 2024-02-05 DIAGNOSIS — E03.8 OTHER SPECIFIED HYPOTHYROIDISM: Primary | ICD-10-CM

## 2024-02-05 PROCEDURE — 99205 OFFICE O/P NEW HI 60 MIN: CPT | Mod: 95 | Performed by: INTERNAL MEDICINE

## 2024-02-05 ASSESSMENT — PATIENT HEALTH QUESTIONNAIRE - PHQ9: SUM OF ALL RESPONSES TO PHQ QUESTIONS 1-9: 10

## 2024-02-05 ASSESSMENT — PAIN SCALES - GENERAL: PAINLEVEL: NO PAIN (0)

## 2024-02-05 NOTE — PATIENT INSTRUCTIONS
Have the lab test done before 8:00 in the morning.  Please contact us to schedule at any of our [place] lab locations  Call [phone] ([phone]), select option [phone]

## 2024-02-05 NOTE — NURSING NOTE
Is the patient currently in the state of [place]? YES    Visit mode:VIDEO    If the visit is dropped, the patient can be reconnected by: VIDEO VISIT: Send to e-mail at: [email or web address]    Will anyone else be joining the visit? NO  (If patient encounters technical issues they should call [phone]    How would you like to obtain your AVS? MyChart    Are changes needed to the allergy or medication list? Pt stated no changes to allergies and Pt stated no med changes  Please remove any meds marked not taking and any flagged for removal.    Reason for visit: Consult    Wt/ht other than 24 hrs:  two weeks ago  Pain more than one location:    [facility]     HIGH PHQ2  Depression Response    Patient completed the PHQ-9 assessment for depression and scored >9? Yes  Question 9 on the PHQ-9 was positive for suicidality? No  Does patient have current mental health provider? Yes    Is this a virtual visit? Yes   Does patient have suicidal ideation (positive question 9)? No - offer to place Mental Health Referral.  Patient declined referral/not needed    I personally notified the following: visit provider
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
## 2024-02-05 NOTE — CONFIDENTIAL NOTE
RECORDS RECEIVED FROM: internal    DATE RECEIVED:  2.5.24    NOTES (FOR ALL VISITS) STATUS DETAILS   OFFICE NOTES from referring provider internal    Matt Swan PA-C      MEDICATION LIST internal     IMAGING      DEXASCAN internal  6.13.22   XR (Chest) Ce/internal  [facility]- 12.12.22   internal - 1.30.23   LABS     DIABETES: HBGA1C, CREATININE, FASTING LIPIDS, MICROALBUMIN URINE, POTASSIUM, TSH, T4    THYROID: TSH, T4, CBC, THYRODLONULIN, TOTAL T3, FREE T4, CALCITONIN, CEA internal  TSH- 1.16.24   T4- 1.16.24  Testosterone total- 1.16.24   HBGA1C- 5.4.23  CMP- 5.4.23  CBC- 5.4.23  Lipid-12.20.22  BMP- 12.13.22
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
## 2024-02-09 ENCOUNTER — LAB (OUTPATIENT)
Dept: LAB | Facility: CLINIC | Age: 74
End: 2024-02-09
Payer: MEDICARE

## 2024-02-09 DIAGNOSIS — E78.5 HYPERLIPIDEMIA LDL GOAL <130: Primary | ICD-10-CM

## 2024-02-09 DIAGNOSIS — E34.9 HYPOTESTOSTERONEMIA: ICD-10-CM

## 2024-02-09 DIAGNOSIS — E03.8 OTHER SPECIFIED HYPOTHYROIDISM: ICD-10-CM

## 2024-02-09 LAB
CHOLEST SERPL-MCNC: 136 MG/DL
CORTIS SERPL-MCNC: 21.7 UG/DL
FASTING STATUS PATIENT QL REPORTED: YES
FSH SERPL IRP2-ACNC: 52 MIU/ML (ref 1.5–12.4)
HDLC SERPL-MCNC: 55 MG/DL
LDLC SERPL CALC-MCNC: 61 MG/DL
LH SERPL-ACNC: 23.6 MIU/ML (ref 1.7–8.6)
NONHDLC SERPL-MCNC: 81 MG/DL
PROLACTIN SERPL 3RD IS-MCNC: 27 NG/ML (ref 4–15)
SHBG SERPL-SCNC: 26 NMOL/L (ref 11–80)
T4 FREE SERPL-MCNC: 1.18 NG/DL (ref 0.9–1.7)
TRIGL SERPL-MCNC: 101 MG/DL
TSH SERPL DL<=0.005 MIU/L-ACNC: 1.67 UIU/ML (ref 0.3–4.2)

## 2024-02-09 PROCEDURE — 83002 ASSAY OF GONADOTROPIN (LH): CPT

## 2024-02-09 PROCEDURE — 84443 ASSAY THYROID STIM HORMONE: CPT

## 2024-02-09 PROCEDURE — 84270 ASSAY OF SEX HORMONE GLOBUL: CPT

## 2024-02-09 PROCEDURE — 84146 ASSAY OF PROLACTIN: CPT

## 2024-02-09 PROCEDURE — 82024 ASSAY OF ACTH: CPT

## 2024-02-09 PROCEDURE — 80061 LIPID PANEL: CPT

## 2024-02-09 PROCEDURE — 84403 ASSAY OF TOTAL TESTOSTERONE: CPT

## 2024-02-09 PROCEDURE — 82533 TOTAL CORTISOL: CPT

## 2024-02-09 PROCEDURE — 36415 COLL VENOUS BLD VENIPUNCTURE: CPT

## 2024-02-09 PROCEDURE — 83001 ASSAY OF GONADOTROPIN (FSH): CPT

## 2024-02-09 PROCEDURE — 84439 ASSAY OF FREE THYROXINE: CPT

## 2024-02-12 LAB — ACTH PLAS-MCNC: 24 PG/ML

## 2024-02-13 ENCOUNTER — PRE VISIT (OUTPATIENT)
Dept: GASTROENTEROLOGY | Facility: CLINIC | Age: 74
End: 2024-02-13

## 2024-02-13 LAB
TESTOST FREE SERPL-MCNC: 3.61 NG/DL
TESTOST SERPL-MCNC: 168 NG/DL (ref 240–950)

## 2024-02-14 ENCOUNTER — TELEPHONE (OUTPATIENT)
Dept: ENDOCRINOLOGY | Facility: CLINIC | Age: 74
End: 2024-02-14
Payer: MEDICARE

## 2024-02-14 NOTE — TELEPHONE ENCOUNTER
Patient call:      Appointment type: Return Endocrine   Provider: Saumya   Return date: 2/15 @ 9:30 am or 2/16 @ 2:30 pm for a virtual visit  Speciality phone number: [phone]  Additional appointment(s) needed: N/A   Additional notes: LVM x2, MyC x1   Anaya Kerns MD Dixon, Anaya Dunham. I meant 2/15. Since I am writing this at 7 pm I imagine he cannot be added for 8 am. If he can be added for 9:30 am    2:30 pm on 2/16 will also work for me.    Thanks!     *These are not scheduled times in Epic you will have to add pt onto providers schedule. While in schedules hit select a time under the providers name when scheduling to manually schedule pt for date/time provided. Thank you.*     *Also when pt is scheduled please send a TE to Anaya Ponce clinic coordinator. I will have to text Dr. Kerns to remind her of the appointment. Thanks*     Anaya Ponce on 2/14/2024 at 1:36 PM

## 2024-02-14 NOTE — TELEPHONE ENCOUNTER
Patient call:     Appointment type: Return Endocrine   Provider: Saumya   Return date: 2/15 @ 9:30 am or 2/16 @ 2:30 pm for a virtual visit  Speciality phone number: [phone]  Additional appointment(s) needed: N/A   Additional notes: AMY, Anaya Blankenship MD Dixon, Anaya Dunham. I meant 2/15. Since I am writing this at 7 pm I imagine he cannot be added for 8 am. If he can be added for 9:30 am    2:30 pm on 2/16 will also work for me.    Thanks!    *These are not scheduled times in Epic you will have to add pt onto providers schedule. While in schedules hit select a time under the providers name when scheduling to manually schedule pt for date/time provided. Thank you.*    *Also when pt is scheduled please send a TE to Anaya Ponce clinic coordinator. I will have to text Dr. Kerns to remind her of the appointment. Thanks*     Anaya Ponce on 2/14/2024 at 8:33 AM

## 2024-02-15 ENCOUNTER — VIRTUAL VISIT (OUTPATIENT)
Dept: ENDOCRINOLOGY | Facility: CLINIC | Age: 74
End: 2024-02-15
Payer: MEDICARE

## 2024-02-15 DIAGNOSIS — N42.9 DISORDER OF PROSTATE, UNSPECIFIED: ICD-10-CM

## 2024-02-15 DIAGNOSIS — E29.1 HYPOGONADISM MALE: ICD-10-CM

## 2024-02-15 DIAGNOSIS — Z51.81 ENCOUNTER FOR MONITORING TESTOSTERONE REPLACEMENT THERAPY: ICD-10-CM

## 2024-02-15 DIAGNOSIS — Z12.5 SCREENING FOR PROSTATE CANCER: ICD-10-CM

## 2024-02-15 DIAGNOSIS — E34.9 HYPOTESTOSTERONEMIA: Primary | ICD-10-CM

## 2024-02-15 DIAGNOSIS — Z79.890 ENCOUNTER FOR MONITORING TESTOSTERONE REPLACEMENT THERAPY: ICD-10-CM

## 2024-02-15 PROCEDURE — 99214 OFFICE O/P EST MOD 30 MIN: CPT | Mod: 95 | Performed by: INTERNAL MEDICINE

## 2024-02-15 RX ORDER — TESTOSTERONE GEL, 1% 10 MG/G
50 GEL TRANSDERMAL DAILY
Qty: 90 G | Refills: 1 | Status: SHIPPED | OUTPATIENT
Start: 2024-02-15 | End: 2024-02-20

## 2024-02-15 NOTE — PATIENT INSTRUCTIONS
Start taking testosterone gel.  Apply 1 packet to your upper chest each day.  I prescribed the testosterone gel because the patches were discontinued in 2023.    I scheduled virtual visit with you in April.  Please have your labs done the week before the visit.    I have also ordered the MRI.  Please have that done as soon as you can.    Please monitor your blood pressure.  Let me know if the top number is consistently above 135.

## 2024-02-15 NOTE — PROGRESS NOTES
Virtual Visit Details    Type of service:  Video Visit   Video Start Time: 9:30 AM  Video End Time:9:45 AM    Originating Location (pt. Location): Home    Distant Location (provider location):  On-site   off site  Platform used for Video Visit: Rosalinda    This 73-year-old man returns to discuss the results of laboratory data collected after our last visit.  He was referred to me by Matt Swan PA-C to evaluate a low testosterone value.  The testosterone was measured at a clinic visit in January when he reported hair loss.  I first saw him in early February and obtained the lab results shown below.    Today he reports he is eager to find out what is causing his low testosterone and very eager to get started on medication.  His fatigue is limiting to him.      Lab on 02/09/2024   Component Date Value Ref Range Status    Luteinizing Hormone 02/09/2024 23.6 (H)  1.7 - 8.6 mIU/mL Final    MALE:   Age                         0 - 6 mo: <0.1-6.2 mIU/mL  6 mo - 11 years: <0.1-1.3 mIU/mL   11 - 14 years: <0.1-2 mIU/mL   14 - 19 years: 1.3-8.4 mIU/mL   19 years and older: 1.7-8.6 mIU/mL      FSH 02/09/2024 52.0 (H)  1.5 - 12.4 mIU/mL Final    Adrenal Corticotropin 02/09/2024 24  <47 pg/mL Final    Prolactin 02/09/2024 27 (H)  4 - 15 ng/mL Final    Cortisol 02/09/2024 21.7    ug/dL Final    6 months and older:  6 to 10 AM Cortisol Reference Range:  4-22 ug/dL   4 to 8 PM Cortisol Reference Range:  3-17 ug/dL    TSH 02/09/2024 1.67  0.30 - 4.20 uIU/mL Final    Free T4 02/09/2024 1.18  0.90 - 1.70 ng/dL Final    Cholesterol 02/09/2024 136  <200 mg/dL Final    Triglycerides 02/09/2024 101  <150 mg/dL Final    Direct Measure HDL 02/09/2024 55  >=40 mg/dL Final    LDL Cholesterol Calculated 02/09/2024 61  <=100 mg/dL Final    Non HDL Cholesterol 02/09/2024 81  <130 mg/dL Final    Patient Fasting > 8hrs? 02/09/2024 Yes   Final    Sex Hormone Binding Globulin 02/09/2024 26  11 - 80 nmol/L Final    Free Testosterone Calculated  02/09/2024 3.61  ng/dL Final    Male Yaya Ranges:  Yaya Stage I: Less than or equal to 0.37 ng/dL  Yaya Stage II: 0.03-2.1 ng/dL  Yaya Stage III: 0.10-9.8 ng/dL  Yaya Stage IV: 3.5-16.9 ng/dL  Yaya Stage V: 4.1-23.9 ng/dL    Testosterone Total 02/09/2024 168 (L)  240 - 950 ng/dL Final     his last visit with me was on 2/14/2024.    Assessment and plan:    The laboratory data confirms that Demian has a low testosterone level.  His LH and FSH are both elevated which suggest that he has a primary cause of hypotestosteronism.  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  He did fathered children but did develop swelling of his epididymitis several years ago.  I am surprised that could actually cause hypogonadism.  It is of interest that his prolactin is mildly elevated and that his FSH is about twice the LH value.  While I think it unlikely that he has a pituitary tumor that is causing these abnormalities, I think for the sake of completeness we should do a dedicated MRI to rule out this possibility.  He has claustrophobia and has required past MRIs to be done in an open sided machine.  I will order the test to be done at such a facility again.  I do note that his cortisol and thyroid function appear to be normal.    Because of his eagerness to start therapy, I will prescribe testosterone for him today.  I will start him on the AndroGel 50 mg daily.  I warned him of side effects of increased hemoglobin, increased risk of stroke if the hemoglobin goes up, hypertension, and progression of prostate cancer.  He will monitor his blood pressure once he starts the testosterone.  I will plan to check his PSA and hemoglobin at his next visit.    I spent an additional 25 minutes on the day of visit reviewing and interpreting his lab data, ordering tests, and doing documentation.    Anaya Kerns MD

## 2024-02-15 NOTE — LETTER
2/15/2024       RE: Prashant Keyes  [address]  [place] [address]     Dear Colleague,    Thank you for referring your patient, Prashant Keyes, to the [facility]OCRINOLOG[facility] [place] [facility]. Please see a copy of my visit note below.    Virtual Visit Details    Type of service:  Video Visit   Video Start Time: 9:30 AM  Video End Time:9:45 AM    Originating Location (pt. Location): Home    Distant Location (provider location):  On-site   off site  Platform used for Video Visit: Gyros    This 73-year-old man returns to discuss the results of laboratory data collected after our last visit.  He was referred to me by Matt Swan PA-C to evaluate a low testosterone value.  The testosterone was measured at a clinic visit in January when he reported hair loss.  I first saw him in early February and obtained the lab results shown below.    Today he reports he is eager to find out what is causing his low testosterone and very eager to get started on medication.  His fatigue is limiting to him.      Lab on 02/09/2024   Component Date Value Ref Range Status    Luteinizing Hormone 02/09/2024 23.6 (H)  1.7 - 8.6 mIU/mL Final    MALE:   Age                         0 - 6 mo: <0.1-6.2 mIU/mL  6 mo - 11 years: <0.1-1.3 mIU/mL   11 - 14 years: <0.1-2 mIU/mL   14 - 19 years: 1.3-8.4 mIU/mL   19 years and older: 1.7-8.6 mIU/mL      FSH 02/09/2024 52.0 (H)  1.5 - 12.4 mIU/mL Final    Adrenal Corticotropin 02/09/2024 24  <47 pg/mL Final    Prolactin 02/09/2024 27 (H)  4 - 15 ng/mL Final    Cortisol 02/09/2024 21.7    ug/dL Final    6 months and older:  6 to 10 AM Cortisol Reference Range:  4-22 ug/dL   4 to 8 PM Cortisol Reference Range:  3-17 ug/dL    TSH 02/09/2024 1.67  0.30 - 4.20 uIU/mL Final    Free T4 02/09/2024 1.18  0.90 - 1.70 ng/dL Final    Cholesterol 02/09/2024 136  <200 mg/dL Final    Triglycerides 02/09/2024 101  <150 mg/dL Final     Direct Measure HDL 02/09/2024 55  >=40 mg/dL Final    LDL Cholesterol Calculated 02/09/2024 61  <=100 mg/dL Final    Non HDL Cholesterol 02/09/2024 81  <130 mg/dL Final    Patient Fasting > 8hrs? 02/09/2024 Yes   Final    Sex Hormone Binding Globulin 02/09/2024 26  11 - 80 nmol/L Final    Free Testosterone Calculated 02/09/2024 3.61  ng/dL Final    Male Yaya Ranges:  Yaya Stage I: Less than or equal to 0.37 ng/dL  Yaya Stage II: 0.03-2.1 ng/dL  Yaya Stage III: 0.10-9.8 ng/dL  Yaya Stage IV: 3.5-16.9 ng/dL  Yaya Stage V: 4.1-23.9 ng/dL    Testosterone Total 02/09/2024 168 (L)  240 - 950 ng/dL Final     his last visit with me was on 2/14/2024.    Assessment and plan:    The laboratory data confirms that Demian has a low testosterone level.  His LH and FSH are both elevated which suggest that he has a primary cause of hypotestosteronism.  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  He did fathered children but did develop swelling of his epididymitis several years ago.  I am surprised that could actually cause hypogonadism.  It is of interest that his prolactin is mildly elevated and that his FSH is about twice the LH value.  While I think it unlikely that he has a pituitary tumor that is causing these abnormalities, I think for the sake of completeness we should do a dedicated MRI to rule out this possibility.  He has claustrophobia and has required past MRIs to be done in an open sided machine.  I will order the test to be done at such a facility again.  I do note that his cortisol and thyroid function appear to be normal.    Because of his eagerness to start therapy, I will prescribe testosterone for him today.  I will start him on the AndroGel 50 mg daily.  I warned him of side effects of increased hemoglobin, increased risk of stroke if the hemoglobin goes up, hypertension, and progression of prostate cancer.  He will monitor his blood pressure once he starts the  testosterone.  I will plan to check his PSA and hemoglobin at his next visit.    I spent an additional 25 minutes on the day of visit reviewing and interpreting his lab data, ordering tests, and doing documentation.  Anaya Kerns MD

## 2024-02-15 NOTE — TELEPHONE ENCOUNTER
Patient returning call to schedule below. Appointment has been added to providers template per encounter below.

## 2024-02-16 ENCOUNTER — TELEPHONE (OUTPATIENT)
Dept: ENDOCRINOLOGY | Facility: CLINIC | Age: 74
End: 2024-02-16
Payer: MEDICARE

## 2024-02-16 NOTE — TELEPHONE ENCOUNTER
Unable to LVM, Sent Mychart (1st Attempt) for the patient to call back and schedule the following:    Appointment type: Return Endocrine  Provider: Dr. Kerns  Return date: 4/16/24 at 5pm Virtual Visit  Specialty phone number: [phone]  Additional appointment(s) needed: labs   Additonal Notes: Per provider's checkout order okay to schedule VV on 4/16/24 at 5pm with Dr. Kerns with labs done prior to visit

## 2024-02-20 ENCOUNTER — TELEPHONE (OUTPATIENT)
Dept: GASTROENTEROLOGY | Facility: CLINIC | Age: 74
End: 2024-02-20
Payer: MEDICARE

## 2024-02-20 DIAGNOSIS — E34.9 HYPOTESTOSTERONEMIA: ICD-10-CM

## 2024-02-20 RX ORDER — TESTOSTERONE GEL, 1% 10 MG/G
50 GEL TRANSDERMAL DAILY
Qty: 90 G | Refills: 1 | Status: SHIPPED | OUTPATIENT
Start: 2024-02-20 | End: 2024-02-22

## 2024-02-20 NOTE — TELEPHONE ENCOUNTER
Caller:     Reason for Reschedule/Cancellation   (please be detailed, any staff messages or encounters to note?): CONFLICT      Prior to reschedule please review:  Ordering Provider:     DAKOTA FLYNN     Sedation Determined: MAC  Does patient have any ASC Exclusions, please identify?: KALEB      Notes on Cancelled Procedure:  Procedure: Lower Endoscopy [Colonoscopy]   Date: 6/6  Location: [facility]; [address] [place] [address]   Surgeon: JEROME      Rescheduled: Yes, ON WAITLIST  Procedure: Lower Endoscopy [Colonoscopy]    Date: 8/1   Location: [facility]; [address], [address] [place] [address]    Surgeon: JEROME   Sedation Level Scheduled  MAC,  Reason for Sedation Level NARC   Prep/Instructions updated and sent: Y  PAC APPT: YES 7/15       Did you cancel or rescheduled an EUS procedure? No.

## 2024-02-20 NOTE — TELEPHONE ENCOUNTER
He was seen 2/15/24 needs a follow up virtual with Dr Kerns in April .Obdulia Anna RN on 2/20/2024 at 12:33 PM

## 2024-02-20 NOTE — TELEPHONE ENCOUNTER
Patient called to schedule an appointment but unfortunately those dates have passed please advise and give him a call.

## 2024-02-21 ENCOUNTER — TELEPHONE (OUTPATIENT)
Dept: ENDOCRINOLOGY | Facility: CLINIC | Age: 74
End: 2024-02-21

## 2024-02-21 DIAGNOSIS — I10 HYPERTENSION GOAL BP (BLOOD PRESSURE) < 140/90: ICD-10-CM

## 2024-02-21 RX ORDER — HYDRALAZINE HYDROCHLORIDE 25 MG/1
TABLET, FILM COATED ORAL
Qty: 180 TABLET | Refills: 1 | Status: SHIPPED | OUTPATIENT
Start: 2024-02-21 | End: 2024-07-18

## 2024-02-21 NOTE — TELEPHONE ENCOUNTER
PRIOR AUTHORIZATION DENIED    Medication: TESTOSTERONE 50 MG/5GM TD GEL  Insurance Company: [employer] Non-Specialty PA's - Phone [phone] Fax [phone]  Denial Date: 2/21/2024  Denial Reason(s):     Appeal Information:     Patient Notified: No

## 2024-02-22 ENCOUNTER — MYC MEDICAL ADVICE (OUTPATIENT)
Dept: ENDOCRINOLOGY | Facility: CLINIC | Age: 74
End: 2024-02-22
Payer: MEDICARE

## 2024-02-22 DIAGNOSIS — E29.1 HYPOGONADISM MALE: ICD-10-CM

## 2024-02-22 DIAGNOSIS — E29.1 HYPOGONADISM MALE: Primary | ICD-10-CM

## 2024-02-22 RX ORDER — TESTOSTERONE CYPIONATE 200 MG/ML
200 INJECTION, SOLUTION INTRAMUSCULAR
Qty: 10 ML | Refills: 0 | Status: CANCELLED | OUTPATIENT
Start: 2024-02-22

## 2024-02-22 RX ORDER — TESTOSTERONE GEL, 1% 10 MG/G
GEL TRANSDERMAL
Qty: 90 PACKET | Refills: 1 | Status: SHIPPED | OUTPATIENT
Start: 2024-02-22 | End: 2024-02-22

## 2024-02-22 RX ORDER — TESTOSTERONE CYPIONATE 200 MG/ML
200 INJECTION, SOLUTION INTRAMUSCULAR
Qty: 10 ML | Refills: 0 | Status: SHIPPED | OUTPATIENT
Start: 2024-02-22 | End: 2024-07-15

## 2024-02-23 NOTE — TELEPHONE ENCOUNTER
"Spoke w/ Pt's spouse. Confirms understanding of scheduling a nurse visit for testosterone injection, if needed. They will ask their pharmacist, with whom they \"have a good relationship\", first.   Talia Gasca RN on 2/23/2024 at 11:04 AM     "

## 2024-02-26 ENCOUNTER — TELEPHONE (OUTPATIENT)
Dept: ENDOCRINOLOGY | Facility: CLINIC | Age: 74
End: 2024-02-26

## 2024-02-26 NOTE — TELEPHONE ENCOUNTER
Prior Authorization Approval    Medication: TESTOSTERONE CYPIONATE 200 MG/ML IJ SOLN  Authorization Effective Date: 1/27/2024  Authorization Expiration Date: 2/25/2025  Approved Dose/Quantity:   Reference #:     Insurance Company: [employer] - Phone [phone] Fax [phone]  Expected CoPay: $    CoPay Card Available:      Financial Assistance Needed: No  Which Pharmacy is filling the prescription: [id number] - [place] - [address] [address],   Pharmacy Notified: Yes  Patient Notified: Pharmacy will notify patient.            PA Initiation    Medication: TESTOSTERONE CYPIONATE 200 MG/ML IJ SOLN  Insurance Company: [employer] - Phone [phone] Fax [phone]  Pharmacy Filling the Rx: [facility] #[id number] - [place] - [address] [address],   Filling Pharmacy Phone: [phone]  Filling Pharmacy Fax:    Start Date: 2/26/2024  Retail Pharmacy Prior Authorization Team   Phone: [phone]

## 2024-02-26 NOTE — TELEPHONE ENCOUNTER
PA testosterone cypionate 200 mg/ ml  1 ml every 2 weeks IM  We had done one for the testosterone gel with same information but it was denied and IM injectable is what was covered. Can you please use previous information provided to check on the IM injectable testosterone ? Obdulia Anna RN on 2/26/2024 at 2:42 PM

## 2024-02-27 NOTE — PROGRESS NOTES
[facility] Neurology Follow Up Visit    Prashant Keyes MRN# [id number]   Age: 73 year old YOB: 1950     Brief history of symptoms: The patient was initially seen in neurologic consultation on 8/29/23 for evaluation of parkinson disease and epilepsy. Please see the comprehensive neurologic consultation note from that date in the Epic records for details.     Interval history:   He denies any seizures.     He has incontinence about every 6 months. He feels that he has to go, but can't make it to the bathroom in time.     Parkinson symptoms are about the same per patient. Walking is about the same as before. He notices worsening tremors prior to next dosage of Sinemet. He would like to continue the same dosage of Sinemet as he has issues with dry mouth as a side effect.      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)    Class 1 obesity with serious comorbidity and body mass index (BMI) of 31.0 to 31.9 in adult, unspecified obesity type    Obesity (BMI 35.0-39.9) with comorbidity (H)    Parkinsonism    Current moderate episode of major depressive disorder without prior episode (H)    Chronic, continuous use of opioids     Past Medical History:   Diagnosis Date    Arthritis 1/1/2012    Calculus of kidney ~1975    Carpal tunnel syndrome 11/94    Concussion, unspecified 12/95    Depressive disorder 2/2/2015    Eczema 11/16/2011    Epileptic seizure (H) 1995    diagnosed 1995, controlled with Depakote and Keppra    Fibromyalgia syndrome ~2000    per pt    Hypertension     Left testicle cyst      Photosensitive contact dermatitis     Prostatitis, unspecified     Seizures (H)     Diagnosed 1995, controlled with Depakote and Keppra    Umbilical hernia 11/26/2012    Uncomplicated asthma     Unspecified asthma(493.90)         Past Surgical History:     Past Surgical History:   Procedure Laterality Date    ABDOMEN SURGERY  2015    Fixed belly button    ANGIOGRAM  2003    Coronary Angiogram- negative    ARTHROSCOPY KNEE Left 9/5/2019    Procedure: LEFT KNEE ARTHROSCOPY WITH MENISCAL AND CHONDRAL DEBRIDEMENT;  Surgeon: Damon Rosas MD;  Location: [facility] OR    COLONOSCOPY  2/2/2013    COLONOSCOPY WITH CO2 INSUFFLATION N/A 8/17/2017    Procedure: COLONOSCOPY WITH CO2 INSUFFLATION;  COLON SCREEN/ FLYNN;  Surgeon: Varun Bond MD;  Location: [facility] OR    ENT SURGERY  2/2/2008    Hearing aids    EYE SURGERY  April/2012    Cataracts    HC REMOVAL TESTIS,SIMPLE  1978    Right undecended    HERNIA REPAIR, INGUINAL RT/LT  1963, 1978    Right Hernia    HERNIORRHAPHY UMBILICAL  4/2013    [facility]        Social History:     Social History     Tobacco Use    Smoking status: Never    Smokeless tobacco: Never   Vaping Use    Vaping Use: Never used   Substance Use Topics    Alcohol use: No     Comment: Quit since 2003.     Drug use: No        Family History:     Family History   Problem Relation Age of Onset    Cerebrovascular Disease Mother         d 2015    C.A.D. Mother         CABG 75    Arthritis Mother     Eye Disorder Mother     Heart Disease Mother     Cardiovascular Father         Rheumatic Heart Disease    Cancer Maternal Grandmother         Thyroid CA    Other Cancer Maternal Grandmother         Goiter    Thyroid Disease Maternal Grandfather         d. 1937    Thyroid Disease Paternal Grandmother     Cancer Paternal Grandfather         Throat CA    Hypertension Brother     Lipids Brother     C.A.D. Brother         CABG 46    Arthritis Brother     Heart Disease Brother         CABG x5    Obesity Brother      Coronary Artery Disease Brother     Hyperlipidemia Brother     Hypertension Brother         d.May 30, 2022    Lipids Brother     Thyroid Disease Brother         d.May 30, 2022    Alcohol/Drug Brother     Heart Disease Brother         CABG    Coronary Artery Disease Brother         Syd is dead now    Hyperlipidemia Brother         dead    Obesity Brother         dead    Gastrointestinal Disease Brother         Crohn's Disease-Ostomy    Cerebrovascular Disease Brother         dead    Coronary Artery Disease Brother         Usama is dead now    Hyperlipidemia Brother         dead    Cancer Sister         Thyroid CA    Hypertension Sister     Obesity Sister     Thyroid Disease Sister     Hemochromatosis Sister     Cerebrovascular Disease Sister         birth defect    Hyperlipidemia Sister     Other Cancer Sister         Goiter    Arrhythmia Sister     Depression Son     Diabetes Son     Depression Son     Depression Daughter     Depression Daughter         Medications:     Current Outpatient Medications   Medication Sig    albuterol (PROAIR HFA/PROVENTIL HFA/VENTOLIN HFA) 108 (90 Base) MCG/ACT inhaler Inhale 2 puffs into the lungs every 6 hours as needed for shortness of breath / dyspnea    allopurinol (ZYLOPRIM) 100 MG tablet TAKE 2 TABLETS BY MOUTH EVERY DAY    amLODIPine (NORVASC) 10 MG tablet TAKE 1 TABLET BY MOUTH ONCE DAILY WITH SUPPER    aspirin 81 MG tablet Take 1 tablet (81 mg) by mouth daily    carbidopa-levodopa (SINEMET)  MG tablet Take 1.5 tablets by mouth 3 times daily    carvedilol (COREG) 12.5 MG tablet TAKE 1 TABLET BY MOUTH TWICE A DAY WITH FOOD    chlorthalidone (HYGROTON) 25 MG tablet TAKE 1 TABLET BY MOUTH EVERY DAY    Cholecalciferol (VITAMIN D) 2000 UNITS tablet Take 2,000 Units by mouth daily    Cyanocobalamin (VITAMIN  B-12) 2500 MCG tablet Place 2,500 mcg under the tongue daily (Patient taking differently: Place 2,500 mcg under the tongue 2 times a week)    cyclobenzaprine (FLEXERIL) 5  "MG tablet Take 1 tablet (5 mg) by mouth nightly as needed for muscle spasms    divalproex sodium delayed-release (DEPAKOTE) 500 MG DR tablet Take 1 tablet (500 mg) by mouth 2 times daily    ferrous sulfate (IRON) 325 (65 FE) MG tablet Take 1 tablet (325 mg) by mouth 2 times daily    FLUoxetine (PROZAC) 40 MG capsule TAKE 1 CAPSULE BY MOUTH ONCE DAILY (Patient taking differently: 50 capsules TAKE 1 CAPSULE BY MOUTH ONCE DAILY)    furosemide (LASIX) 20 MG tablet Take 2 tablets (40 mg) by mouth daily    gabapentin (NEURONTIN) 300 MG capsule TAKE 2 CAPSULES BY MOUTH 3 TIMES A DAY    hydrALAZINE (APRESOLINE) 25 MG tablet TAKE 1 TABLET BY MOUTH TWICE A DAY    hydroxychloroquine (PLAQUENIL) 200 MG tablet Take 1 tablet (200 mg) by mouth 2 times daily    levETIRAcetam (KEPPRA) 1000 MG tablet TAKE 1 TABLET BY MOUTH TWICE DAILY IN THE MORNING AND AT BEDTIME    losartan (COZAAR) 100 MG tablet TAKE 1 TABLET BY MOUTH EVERY DAY    meloxicam (MOBIC) 15 MG tablet TAKE 1 TABLET BY MOUTH EVERY DAY    Menthol, Topical Analgesic, (BIOFREEZE) 4 % GEL Externally apply topically 4 times daily as needed (as needed)    Multiple Vitamin (MULTI VITAMIN MENS PO) Take  by mouth.    omeprazole (PRILOSEC) 40 MG DR capsule TAKE 1 CAPSULE BY MOUTH ONCE DAILY    ORDER FOR DME CPAP daily    oxyBUTYnin (DITROPAN) 5 MG tablet TAKE 1 TABLET BY MOUTH TWICE A DAY    potassium chloride ER (KLOR-CON) 20 MEQ CR tablet TAKE 1 TABLET BY MOUTH THREE TIMES A DAY    sildenafil (REVATIO) 20 MG tablet Take 3-5 tabs 1/2 to 4 hours prior to relations    simvastatin (ZOCOR) 20 MG tablet TAKE 2 TABLETS BY MOUTH IN THE EVENING AT BEDTIME    syringe/needle, disp, 25G X 1\" 3 ML MISC Use for testosterone injections    testosterone cypionate (DEPOTESTOSTERONE) 200 MG/ML injection Inject 1 mL (200 mg) into the muscle every 14 days    traMADol (ULTRAM) 50 MG tablet TAKE 1 TABLET (50 MG) BY MOUTH EVERY 6 HOURS AS NEEDED FOR SEVERE PAIN    traZODone (DESYREL) 100 MG tablet Take " "100 mg by mouth At Bedtime    triamcinolone (KENALOG) 0.1 % external cream APPLY TO AFFECTED AREA TWICE A DAY    vitamin C (ASCORBIC ACID) 1000 MG TABS      Current Facility-Administered Medications   Medication    hylan (SYNVISC ONE) injection 48 mg        Allergies:     Allergies   Allergen Reactions    Accupril [Ace Inhibitors] Difficulty breathing     accupril causes SOB    Aptiom [Eslicarbazepine] Difficulty breathing    Atorvastatin Calcium      Myalgias from Lipitor    Contrast Dye Hives    Lactose GI Disturbance    Lisinopril Difficulty breathing     SOB    Nitroglycerin      Headache    Paroxetine Hives    Tetracycline [Tetracyclines & Related]      \"splitting headaches\"    Vimpat [Lacosamide] Difficulty breathing    Zolpidem     Adhesive Tape Rash     Without itching- EKG pads        Review of Systems:   As above     Physical Exam:   Vitals: /77   Pulse 67   Ht 1.778 m (5' 10\")   Wt 134.3 kg (296 lb)   BMI 42.47 kg/m     General: Seated comfortably in no acute distress.  Lungs: breathing comfortably  Neurologic:     Mental Status: Fully alert, attentive. Language normal, speech clear and fluent, no paraphasic errors     Cranial Nerves: No dysarthria.     Motor: No definitive resting tremor seen. Very mild action tremor in bilateral upper extremities. Muscle tone grossly normal throughout. Bradykinesia/decrement in bilateral rapid finger tapping, left > right. Strength 5/5 throughout upper and lower extremities.     Coordination: Finger-nose-finger and heel-shin intact without dysmetria.      Gait: Mildly wide based and slowed cautious gait. Mildly decreased arm swing. Moderate issues with tandem gait.      Data reviewed on previous visits    Imaging:  MRI brain 3/2022       Procedures:  EEG  2015  IMPRESSION: Abnormal. There is some left temporal slowing indicating focal cerebral dysfunction in this area. Epileptiform discharges or seizures were not seen.     Laboratory:  A1c 5.6 (5/2023)  B12 > " 2000, normal ammonia, Keppra 36, VPA level 48, TSH/MMA normal (1/2022)  ELP without monoclonal protein    Pertinent Investigations since last visit:   VPA 38, normal SSA, SSB, immunofixation         Assessment and Plan:   Assessment:  Prashant Keyes is a 73 year old male who presents today for evaluation of parkinson disease and epilepsy. Seizures began in the mid 1990s. He hasn't had a seizure in many years. They are controlled on Keppra and VPA and we discussed continuing same dosage.     Parkinson disease was reportedly diagnosed around 2017. He would like to continue same dosage of Sinemet today.      Plan:  - Continue Keppra 1000 mg BID and  mg BID  - Continue Sinemet (25/100) 1.5 tablets TID    Follow up in Neurology clinic in 1 year or earlier as needed should new concerns arise.    Brando Davis MD   of Neurology  [facility]

## 2024-02-28 ENCOUNTER — OFFICE VISIT (OUTPATIENT)
Dept: NEUROLOGY | Facility: CLINIC | Age: 74
End: 2024-02-28
Payer: MEDICARE

## 2024-02-28 VITALS
SYSTOLIC BLOOD PRESSURE: 138 MMHG | HEIGHT: 70 IN | BODY MASS INDEX: 42.37 KG/M2 | WEIGHT: 296 LBS | DIASTOLIC BLOOD PRESSURE: 77 MMHG | HEART RATE: 67 BPM

## 2024-02-28 DIAGNOSIS — G40.A09 NONINTRACTABLE ABSENCE EPILEPSY WITHOUT STATUS EPILEPTICUS (H): Primary | ICD-10-CM

## 2024-02-28 DIAGNOSIS — G20.C PARKINSONISM, UNSPECIFIED PARKINSONISM TYPE (H): ICD-10-CM

## 2024-02-28 PROCEDURE — 99214 OFFICE O/P EST MOD 30 MIN: CPT | Performed by: INTERNAL MEDICINE

## 2024-02-28 RX ORDER — MULTIVIT WITH MINERALS/LUTEIN
TABLET ORAL
COMMUNITY

## 2024-02-28 NOTE — LETTER
2/28/2024         RE: Prashant Keyes  [address]  [place] [address]        Dear Colleague,    Thank you for referring your patient, Prashant Keyes, to the [facility] NEUROLOGY [facility] [place]. Please see a copy of my visit note below.    [facility] Neurology Follow Up Visit    Prashant Keyes MRN# [id number]   Age: 73 year old YOB: 1950     Brief history of symptoms: The patient was initially seen in neurologic consultation on 8/29/23 for evaluation of parkinson disease and epilepsy. Please see the comprehensive neurologic consultation note from that date in the Epic records for details.     Interval history:   He denies any seizures.     He has incontinence about every 6 months. He feels that he has to go, but can't make it to the bathroom in time.     Parkinson symptoms are about the same per patient. Walking is about the same as before. He notices worsening tremors prior to next dosage of Sinemet. He would like to continue the same dosage of Sinemet as he has issues with dry mouth as a side effect.      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)     Class 1 obesity with serious comorbidity and body mass index (BMI) of 31.0 to 31.9 in adult, unspecified obesity type     Obesity (BMI 35.0-39.9) with comorbidity (H)     Parkinsonism     Current moderate episode of major depressive disorder without prior episode (H)     Chronic, continuous use of opioids     Past Medical History:   Diagnosis Date     Arthritis 1/1/2012     Calculus of kidney ~1975     Carpal  tunnel syndrome 11/94     Concussion, unspecified 12/95     Depressive disorder 2/2/2015     Eczema 11/16/2011     Epileptic seizure (H) 1995    diagnosed 1995, controlled with Depakote and Keppra     Fibromyalgia syndrome ~2000    per pt     Hypertension      Left testicle cyst      Photosensitive contact dermatitis      Prostatitis, unspecified      Seizures (H)     Diagnosed 1995, controlled with Depakote and Keppra     Umbilical hernia 11/26/2012     Uncomplicated asthma      Unspecified asthma(493.90)         Past Surgical History:     Past Surgical History:   Procedure Laterality Date     ABDOMEN SURGERY  2015    Fixed belly button     ANGIOGRAM  2003    Coronary Angiogram- negative     ARTHROSCOPY KNEE Left 9/5/2019    Procedure: LEFT KNEE ARTHROSCOPY WITH MENISCAL AND CHONDRAL DEBRIDEMENT;  Surgeon: Damon Rosas MD;  Location: [facility] OR     COLONOSCOPY  2/2/2013     COLONOSCOPY WITH CO2 INSUFFLATION N/A 8/17/2017    Procedure: COLONOSCOPY WITH CO2 INSUFFLATION;  COLON SCREEN/ FLYNN;  Surgeon: Varun Bond MD;  Location: [facility] OR     ENT SURGERY  2/2/2008    Hearing aids     EYE SURGERY  April/2012    Cataracts     HC REMOVAL TESTIS,SIMPLE  1978    Right undecended     HERNIA REPAIR, INGUINAL RT/LT  1963, 1978    Right Hernia     HERNIORRHAPHY UMBILICAL  4/2013    [address]        Social History:     Social History     Tobacco Use     Smoking status: Never     Smokeless tobacco: Never   Vaping Use     Vaping Use: Never used   Substance Use Topics     Alcohol use: No     Comment: Quit since 2003.      Drug use: No        Family History:     Family History   Problem Relation Age of Onset     Cerebrovascular Disease Mother         d 2015     C.A.D. Mother         CABG 75     Arthritis Mother      Eye Disorder Mother      Heart Disease Mother      Cardiovascular Father         Rheumatic Heart Disease     Cancer Maternal Grandmother         Thyroid CA     Other Cancer Maternal Grandmother          Goiter     Thyroid Disease Maternal Grandfather         d. 1937     Thyroid Disease Paternal Grandmother      Cancer Paternal Grandfather         Throat CA     Hypertension Brother      Lipids Brother      C.A.D. Brother         CABG 46     Arthritis Brother      Heart Disease Brother         CABG x5     Obesity Brother      Coronary Artery Disease Brother      Hyperlipidemia Brother      Hypertension Brother         d.May 30, 2022     Lipids Brother      Thyroid Disease Brother         d.May 30, 2022     Alcohol/Drug Brother      Heart Disease Brother         CABG     Coronary Artery Disease Brother         Syd is dead now     Hyperlipidemia Brother         dead     Obesity Brother         dead     Gastrointestinal Disease Brother         Crohn's Disease-Ostomy     Cerebrovascular Disease Brother         dead     Coronary Artery Disease Brother         Usama is dead now     Hyperlipidemia Brother         dead     Cancer Sister         Thyroid CA     Hypertension Sister      Obesity Sister      Thyroid Disease Sister      Hemochromatosis Sister      Cerebrovascular Disease Sister         birth defect     Hyperlipidemia Sister      Other Cancer Sister         Goiter     Arrhythmia Sister      Depression Son      Diabetes Son      Depression Son      Depression Daughter      Depression Daughter         Medications:     Current Outpatient Medications   Medication Sig     albuterol (PROAIR HFA/PROVENTIL HFA/VENTOLIN HFA) 108 (90 Base) MCG/ACT inhaler Inhale 2 puffs into the lungs every 6 hours as needed for shortness of breath / dyspnea     allopurinol (ZYLOPRIM) 100 MG tablet TAKE 2 TABLETS BY MOUTH EVERY DAY     amLODIPine (NORVASC) 10 MG tablet TAKE 1 TABLET BY MOUTH ONCE DAILY WITH SUPPER     aspirin 81 MG tablet Take 1 tablet (81 mg) by mouth daily     carbidopa-levodopa (SINEMET)  MG tablet Take 1.5 tablets by mouth 3 times daily     carvedilol (COREG) 12.5 MG tablet TAKE 1 TABLET BY MOUTH TWICE A DAY WITH  FOOD     chlorthalidone (HYGROTON) 25 MG tablet TAKE 1 TABLET BY MOUTH EVERY DAY     Cholecalciferol (VITAMIN D) 2000 UNITS tablet Take 2,000 Units by mouth daily     Cyanocobalamin (VITAMIN  B-12) 2500 MCG tablet Place 2,500 mcg under the tongue daily (Patient taking differently: Place 2,500 mcg under the tongue 2 times a week)     cyclobenzaprine (FLEXERIL) 5 MG tablet Take 1 tablet (5 mg) by mouth nightly as needed for muscle spasms     divalproex sodium delayed-release (DEPAKOTE) 500 MG DR tablet Take 1 tablet (500 mg) by mouth 2 times daily     ferrous sulfate (IRON) 325 (65 FE) MG tablet Take 1 tablet (325 mg) by mouth 2 times daily     FLUoxetine (PROZAC) 40 MG capsule TAKE 1 CAPSULE BY MOUTH ONCE DAILY (Patient taking differently: 50 capsules TAKE 1 CAPSULE BY MOUTH ONCE DAILY)     furosemide (LASIX) 20 MG tablet Take 2 tablets (40 mg) by mouth daily     gabapentin (NEURONTIN) 300 MG capsule TAKE 2 CAPSULES BY MOUTH 3 TIMES A DAY     hydrALAZINE (APRESOLINE) 25 MG tablet TAKE 1 TABLET BY MOUTH TWICE A DAY     hydroxychloroquine (PLAQUENIL) 200 MG tablet Take 1 tablet (200 mg) by mouth 2 times daily     levETIRAcetam (KEPPRA) 1000 MG tablet TAKE 1 TABLET BY MOUTH TWICE DAILY IN THE MORNING AND AT BEDTIME     losartan (COZAAR) 100 MG tablet TAKE 1 TABLET BY MOUTH EVERY DAY     meloxicam (MOBIC) 15 MG tablet TAKE 1 TABLET BY MOUTH EVERY DAY     Menthol, Topical Analgesic, (BIOFREEZE) 4 % GEL Externally apply topically 4 times daily as needed (as needed)     Multiple Vitamin (MULTI VITAMIN MENS PO) Take  by mouth.     omeprazole (PRILOSEC) 40 MG DR capsule TAKE 1 CAPSULE BY MOUTH ONCE DAILY     ORDER FOR DME CPAP daily     oxyBUTYnin (DITROPAN) 5 MG tablet TAKE 1 TABLET BY MOUTH TWICE A DAY     potassium chloride ER (KLOR-CON) 20 MEQ CR tablet TAKE 1 TABLET BY MOUTH THREE TIMES A DAY     sildenafil (REVATIO) 20 MG tablet Take 3-5 tabs 1/2 to 4 hours prior to relations     simvastatin (ZOCOR) 20 MG tablet TAKE 2  "TABLETS BY MOUTH IN THE EVENING AT BEDTIME     syringe/needle, disp, 25G X 1\" 3 ML MISC Use for testosterone injections     testosterone cypionate (DEPOTESTOSTERONE) 200 MG/ML injection Inject 1 mL (200 mg) into the muscle every 14 days     traMADol (ULTRAM) 50 MG tablet TAKE 1 TABLET (50 MG) BY MOUTH EVERY 6 HOURS AS NEEDED FOR SEVERE PAIN     traZODone (DESYREL) 100 MG tablet Take 100 mg by mouth At Bedtime     triamcinolone (KENALOG) 0.1 % external cream APPLY TO AFFECTED AREA TWICE A DAY     vitamin C (ASCORBIC ACID) 1000 MG TABS      Current Facility-Administered Medications   Medication     hylan (SYNVISC ONE) injection 48 mg        Allergies:     Allergies   Allergen Reactions     Accupril [Ace Inhibitors] Difficulty breathing     accupril causes SOB     Aptiom [Eslicarbazepine] Difficulty breathing     Atorvastatin Calcium      Myalgias from Lipitor     Contrast Dye Hives     Lactose GI Disturbance     Lisinopril Difficulty breathing     SOB     Nitroglycerin      Headache     Paroxetine Hives     Tetracycline [Tetracyclines & Related]      \"splitting headaches\"     Vimpat [Lacosamide] Difficulty breathing     Zolpidem      Adhesive Tape Rash     Without itching- EKG pads        Review of Systems:   As above     Physical Exam:   Vitals: /77   Pulse 67   Ht 1.778 m (5' 10\")   Wt 134.3 kg (296 lb)   BMI 42.47 kg/m     General: Seated comfortably in no acute distress.  Lungs: breathing comfortably  Neurologic:     Mental Status: Fully alert, attentive. Language normal, speech clear and fluent, no paraphasic errors     Cranial Nerves: No dysarthria.     Motor: No definitive resting tremor seen. Very mild action tremor in bilateral upper extremities. Muscle tone grossly normal throughout. Bradykinesia/decrement in bilateral rapid finger tapping, left > right. Strength 5/5 throughout upper and lower extremities.     Coordination: Finger-nose-finger and heel-shin intact without dysmetria.      Gait: " Mildly wide based and slowed cautious gait. Mildly decreased arm swing. Moderate issues with tandem gait.      Data reviewed on previous visits    Imaging:  MRI brain 3/2022       Procedures:  EEG  2015  IMPRESSION: Abnormal. There is some left temporal slowing indicating focal cerebral dysfunction in this area. Epileptiform discharges or seizures were not seen.     Laboratory:  A1c 5.6 (5/2023)  B12 > 2000, normal ammonia, Keppra 36, VPA level 48, TSH/MMA normal (1/2022)  ELP without monoclonal protein    Pertinent Investigations since last visit:   VPA 38, normal SSA, SSB, immunofixation         Assessment and Plan:   Assessment:  Prashant Keyes is a 73 year old male who presents today for evaluation of parkinson disease and epilepsy. Seizures began in the mid 1990s. He hasn't had a seizure in many years. They are controlled on Keppra and VPA and we discussed continuing same dosage.     Parkinson disease was reportedly diagnosed around 2017. He would like to continue same dosage of Sinemet today.      Plan:  - Continue Keppra 1000 mg BID and  mg BID  - Continue Sinemet (25/100) 1.5 tablets TID    Follow up in Neurology clinic in 1 year or earlier as needed should new concerns arise.    Brando Davis MD   of Neurology  [facility]      Again, thank you for allowing me to participate in the care of your patient.        Sincerely,        Brando Davis MD

## 2024-02-28 NOTE — NURSING NOTE
"Prashant Keyes's goals for this visit include:   Chief Complaint   Patient presents with    RECHECK     Parkinsonism// follow up in 6 months(resched from 2/27)       He requests these members of his care team be copied on today's visit information: yes    PCP: Matt Swan    Referring Provider:  No referring provider defined for this encounter.    /77   Pulse 67   Ht 1.778 m (5' 10\")   Wt 134.3 kg (296 lb)   BMI 42.47 kg/m      Do you need any medication refills at today's visit? No    Need handicapped parking form   YVONNE Tillman, CMA ([facility])      "

## 2024-02-29 DIAGNOSIS — I10 BENIGN ESSENTIAL HYPERTENSION: ICD-10-CM

## 2024-02-29 RX ORDER — LOSARTAN POTASSIUM 100 MG/1
TABLET ORAL
Qty: 90 TABLET | Refills: 0 | Status: SHIPPED | OUTPATIENT
Start: 2024-02-29 | End: 2024-05-27

## 2024-03-05 ENCOUNTER — TELEPHONE (OUTPATIENT)
Dept: DERMATOLOGY | Facility: CLINIC | Age: 74
End: 2024-03-05

## 2024-03-05 ENCOUNTER — NURSE TRIAGE (OUTPATIENT)
Dept: FAMILY MEDICINE | Facility: CLINIC | Age: 74
End: 2024-03-05

## 2024-03-05 ENCOUNTER — OFFICE VISIT (OUTPATIENT)
Dept: FAMILY MEDICINE | Facility: CLINIC | Age: 74
End: 2024-03-05
Payer: MEDICARE

## 2024-03-05 VITALS
OXYGEN SATURATION: 95 % | HEART RATE: 68 BPM | WEIGHT: 291.8 LBS | DIASTOLIC BLOOD PRESSURE: 81 MMHG | TEMPERATURE: 97 F | SYSTOLIC BLOOD PRESSURE: 152 MMHG | BODY MASS INDEX: 41.87 KG/M2

## 2024-03-05 DIAGNOSIS — A08.4 VIRAL GASTROENTERITIS: Primary | ICD-10-CM

## 2024-03-05 PROCEDURE — 99214 OFFICE O/P EST MOD 30 MIN: CPT | Performed by: FAMILY MEDICINE

## 2024-03-05 RX ORDER — ONDANSETRON 4 MG/1
4 TABLET, ORALLY DISINTEGRATING ORAL EVERY 8 HOURS PRN
Qty: 60 TABLET | Refills: 2 | Status: SHIPPED | OUTPATIENT
Start: 2024-03-05

## 2024-03-05 ASSESSMENT — ENCOUNTER SYMPTOMS: DIARRHEA: 1

## 2024-03-05 NOTE — TELEPHONE ENCOUNTER
LVMx2-- need photos for appointment. Mychart sent earlier at time of first call attempt.    Rochelle Geren, EMT

## 2024-03-05 NOTE — TELEPHONE ENCOUNTER
Patient calling in concern to diarrhea for the past 3 days. He explains it began on Sunday where he also ran 102 F fever. He has missed a day of work due to his symptoms.     He explains he has diarrhea around 5 times. He took imodium, which did not help. His diarrhea is very watery. He feels nauseous, however has not vomited yet.     He went on a pure liquid diet yesterday, which did not help at all. He experiences abdominal pain off and on, which is mild.     He has been pushing liquids and pedialyte, no signs of dehydration. He denies recent travel to a foreign country. He denies any blood in his stool. He explains that two people at work has similar symptoms.     Advised patient should be seen in clinic today for symptoms. Assisted with booking appointment today at 2:30 pm at  clinic as no in-person availability at [facility].     SHAINA Pruitt RN  [facility]  Reason for Disposition   MODERATE diarrhea (e.g., 4-6 times / day more than normal) and present > 48 hours (2 days)    Additional Information   Negative: Shock suspected (e.g., cold/pale/clammy skin, too weak to stand, low BP, rapid pulse)   Negative: Difficult to awaken or acting confused (e.g., disoriented, slurred speech)   Negative: Sounds like a life-threatening emergency to the triager   Negative: Vomiting also present and worse than the diarrhea   Negative: Blood in stool and without diarrhea   Negative: SEVERE abdominal pain (e.g., excruciating) and present > 1 hour   Negative: SEVERE abdominal pain and age > 60 years   Negative: Bloody, black, or tarry bowel movements  (Exception: Chronic-unchanged black-grey bowel movements and is taking iron pills or Pepto-Bismol.)   Negative: SEVERE diarrhea (e.g., 7 or more times / day more than normal) and age > 60 years   Negative: Constant abdominal pain lasting > 2 hours   Negative: Drinking very little and dehydration suspected (e.g., no urine > 12 hours, very dry mouth, very  lightheaded)   Negative: Patient sounds very sick or weak to the triager   Negative: SEVERE diarrhea (e.g., 7 or more times / day more than normal) and present > 24 hours (1 day)    Protocols used: Diarrhea-A-OH

## 2024-03-05 NOTE — PROGRESS NOTES
"  Assessment & Plan       ICD-10-CM    1. Viral gastroenteritis  A08.4 ondansetron (ZOFRAN ODT) 4 MG ODT tab            Review of external notes as documented elsewhere in note        BMI  Estimated body mass index is 41.87 kg/m  as calculated from the following:    Height as of 2/28/24: 1.778 m (5' 10\").    Weight as of this encounter: 132.4 kg (291 lb 12.8 oz).   Weight management plan: Discussed healthy diet and exercise guidelines      There are no Patient Instructions on file for this visit.    Kevon Arciniega is a 73 year old, presenting for the following health issues:  Diarrhea      3/5/2024     2:26 PM   Additional Questions   Roomed by Traci   Accompanied by none         3/5/2024     2:26 PM   Patient Reported Additional Medications   Patient reports taking the following new medications none     History of Present Illness       Reason for visit:  Diarrehea  Symptom onset:  1-3 days ago  Symptoms include:  Woke up Sunday morning with nausea then turned into diarrhea. Nausea has gone away but still having diarrhea. Watery stool    He eats 0-1 servings of fruits and vegetables daily.He consumes 0 sweetened beverage(s) daily.He exercises with enough effort to increase his heart rate 9 or less minutes per day.  He exercises with enough effort to increase his heart rate 3 or less days per week.   He is taking medications regularly.                 Review of Systems  Constitutional, HEENT, cardiovascular, pulmonary, gi and gu systems are negative, except as otherwise noted.      Objective    BP (!) 152/81   Pulse 68   Temp 97  F (36.1  C) (Temporal)   Wt 132.4 kg (291 lb 12.8 oz)   SpO2 95%   BMI 41.87 kg/m    Body mass index is 41.87 kg/m .  Physical Exam  Constitutional:       General: He is not in acute distress.     Appearance: Normal appearance. He is well-developed. He is not ill-appearing.   HENT:      Head: Normocephalic and atraumatic.      Right Ear: External ear normal.      Left Ear: External " ear normal.      Nose: Nose normal.   Eyes:      General: No scleral icterus.     Extraocular Movements: Extraocular movements intact.      Conjunctiva/sclera: Conjunctivae normal.   Cardiovascular:      Rate and Rhythm: Normal rate.   Pulmonary:      Effort: Pulmonary effort is normal.   Musculoskeletal:      Cervical back: Normal range of motion and neck supple.   Skin:     General: Skin is warm and dry.   Neurological:      Mental Status: He is alert and oriented to person, place, and time.   Psychiatric:         Behavior: Behavior normal.         Thought Content: Thought content normal.         Judgment: Judgment normal.                    Signed Electronically by: Raz Hernandez MD

## 2024-03-06 DIAGNOSIS — R56.9 CONVULSIONS, UNSPECIFIED CONVULSION TYPE (H): Chronic | ICD-10-CM

## 2024-03-06 DIAGNOSIS — G40.A09 NONINTRACTABLE ABSENCE EPILEPSY WITHOUT STATUS EPILEPTICUS (H): ICD-10-CM

## 2024-03-06 RX ORDER — DIVALPROEX SODIUM 500 MG/1
500 TABLET, DELAYED RELEASE ORAL 2 TIMES DAILY
Qty: 180 TABLET | Refills: 3 | Status: SHIPPED | OUTPATIENT
Start: 2024-03-06 | End: 2024-09-23

## 2024-03-06 NOTE — TELEPHONE ENCOUNTER
Pending Prescriptions:                       Disp   Refills    divalproex sodium delayed-release (DEPAKO*180 ta*1            Sig: Take 1 tablet (500 mg) by mouth 2 times daily       Requested Pharmacy: [facility] #[id number] - [place], [place] - [address]     Pt's last office visit: 02/28/2024  Next scheduled office visit: 02/04/2025      Per the RN/LPN medication refill protocol, writer is unable to refill this request.

## 2024-03-11 ENCOUNTER — ANCILLARY PROCEDURE (OUTPATIENT)
Dept: MRI IMAGING | Facility: CLINIC | Age: 74
End: 2024-03-11
Attending: INTERNAL MEDICINE
Payer: MEDICARE

## 2024-03-11 DIAGNOSIS — E34.9 HYPOTESTOSTERONEMIA: ICD-10-CM

## 2024-03-11 PROCEDURE — 70553 MRI BRAIN STEM W/O & W/DYE: CPT | Mod: TC | Performed by: RADIOLOGY

## 2024-03-11 PROCEDURE — G1010 CDSM STANSON: HCPCS | Performed by: RADIOLOGY

## 2024-03-11 PROCEDURE — A9585 GADOBUTROL INJECTION: HCPCS | Performed by: RADIOLOGY

## 2024-03-11 RX ORDER — GADOBUTROL 604.72 MG/ML
13 INJECTION INTRAVENOUS ONCE
Status: COMPLETED | OUTPATIENT
Start: 2024-03-11 | End: 2024-03-11

## 2024-03-11 RX ADMIN — Medication 50 ML: at 10:04

## 2024-03-11 RX ADMIN — GADOBUTROL 13 ML: 604.72 INJECTION INTRAVENOUS at 10:04

## 2024-03-15 DIAGNOSIS — E78.00 PURE HYPERCHOLESTEROLEMIA: ICD-10-CM

## 2024-03-15 RX ORDER — SIMVASTATIN 20 MG
TABLET ORAL
Qty: 180 TABLET | Refills: 1 | Status: SHIPPED | OUTPATIENT
Start: 2024-03-15 | End: 2024-07-18

## 2024-03-18 ENCOUNTER — LAB (OUTPATIENT)
Dept: LAB | Facility: CLINIC | Age: 74
End: 2024-03-18
Payer: MEDICARE

## 2024-03-18 DIAGNOSIS — N42.9 DISORDER OF PROSTATE, UNSPECIFIED: ICD-10-CM

## 2024-03-18 DIAGNOSIS — E34.9 HYPOTESTOSTERONEMIA: ICD-10-CM

## 2024-03-18 DIAGNOSIS — Z51.81 ENCOUNTER FOR MONITORING TESTOSTERONE REPLACEMENT THERAPY: ICD-10-CM

## 2024-03-18 DIAGNOSIS — Z12.5 SCREENING FOR PROSTATE CANCER: ICD-10-CM

## 2024-03-18 DIAGNOSIS — Z79.890 ENCOUNTER FOR MONITORING TESTOSTERONE REPLACEMENT THERAPY: ICD-10-CM

## 2024-03-18 LAB
HGB BLD-MCNC: 12.2 G/DL (ref 13.3–17.7)
PSA SERPL DL<=0.01 NG/ML-MCNC: 1.29 NG/ML (ref 0–6.5)

## 2024-03-18 PROCEDURE — 36415 COLL VENOUS BLD VENIPUNCTURE: CPT

## 2024-03-18 PROCEDURE — 85018 HEMOGLOBIN: CPT

## 2024-03-18 PROCEDURE — 84403 ASSAY OF TOTAL TESTOSTERONE: CPT

## 2024-03-18 PROCEDURE — 84153 ASSAY OF PSA TOTAL: CPT

## 2024-03-20 LAB — TESTOST SERPL-MCNC: 610 NG/DL (ref 240–950)

## 2024-04-16 ENCOUNTER — VIRTUAL VISIT (OUTPATIENT)
Dept: ENDOCRINOLOGY | Facility: CLINIC | Age: 74
End: 2024-04-16
Payer: MEDICARE

## 2024-04-16 ENCOUNTER — MYC MEDICAL ADVICE (OUTPATIENT)
Dept: NEUROLOGY | Facility: CLINIC | Age: 74
End: 2024-04-16

## 2024-04-16 DIAGNOSIS — R94.8 ABNORMAL RESULTS OF FUNCTION STUDIES OF OTHER ORGANS AND SYSTEMS: ICD-10-CM

## 2024-04-16 DIAGNOSIS — G40.A09 NONINTRACTABLE ABSENCE EPILEPSY WITHOUT STATUS EPILEPTICUS (H): ICD-10-CM

## 2024-04-16 DIAGNOSIS — R56.9 CONVULSIONS, UNSPECIFIED CONVULSION TYPE (H): Chronic | ICD-10-CM

## 2024-04-16 DIAGNOSIS — E29.1 HYPOGONADISM MALE: Primary | ICD-10-CM

## 2024-04-16 DIAGNOSIS — Z79.890 LONG-TERM CURRENT USE OF TESTOSTERONE REPLACEMENT THERAPY: ICD-10-CM

## 2024-04-16 PROCEDURE — 99213 OFFICE O/P EST LOW 20 MIN: CPT | Mod: 95 | Performed by: INTERNAL MEDICINE

## 2024-04-16 RX ORDER — LEVETIRACETAM 1000 MG/1
TABLET ORAL
Qty: 180 TABLET | Refills: 3 | Status: SHIPPED | OUTPATIENT
Start: 2024-04-16

## 2024-04-16 NOTE — NURSING NOTE
Is the patient currently in the state of [place]? YES    Visit mode:VIDEO    If the visit is dropped, the patient can be reconnected by: VIDEO VISIT: Send to e-mail at: [email or web address]    Will anyone else be joining the visit? NO  (If patient encounters technical issues they should call [phone]    How would you like to obtain your AVS? MyChart    Are changes needed to the allergy or medication list? No patient reported no changes to e-check in information for visit (e-check in completed prior to appointment). VF did not review e-check in information again with patient due to this.    Are refills needed on medications prescribed by this physician? NO    Reason for visit: RECHECK (Patient has concerns regarding nipple irritation/soreness since getting injections. )    Elisa SORTO

## 2024-04-16 NOTE — TELEPHONE ENCOUNTER
Pending Prescriptions:                       Disp   Refills    levETIRAcetam (KEPPRA) 1000 MG tablet     180 ta*2            Sig: TAKE 1 TABLET BY MOUTH TWICE DAILY IN THE MORNING           AND AT BEDTIME        Requested Pharmacy: [facility] in [place]    Pt's last office visit: 2/28/24  Next scheduled office visit: 2/4/25      Per the RN/LPN medication refill protocol, writer is unable to refill this request.

## 2024-04-16 NOTE — PATIENT INSTRUCTIONS
Continue the testosterone at your current dose.    Before our next visit, come in and have the lab test done.  Please have it done 7 days after a testosterone injection.Please contact us to schedule at any of our [place] lab locations  Call [phone] ([phone]), select option [phone]

## 2024-04-16 NOTE — PROGRESS NOTES
This 73-year-old man was seen in follow-up for primary hypogonadism.  I first saw him in February when he was referred to me by his primary care provider because of a low testosterone.  The evaluation that was done indicated he has primary hypogonadism.  He had had an orchiectomy done many years ago because of an undescended testes.  He did fathered children but then had an episode of epididymitis as an adult.  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  He has been taking testosterone 200 mg every 14 days for the last couple of months.  He had the labs listed below done midway between his dosing interval.  Today he reports he feels much better.  He is sleeping better.  His mood is better.  He thinks his memory might be a little bit better.  He has not noted a change in libido or sexual function.  He has developed some tenderness of his nipples.  He has no lumps or change in the tissue underneath the nipples.  He has been monitoring his blood pressure.  When he came home from work 1 night it was 144/84 and the next morning it was 120/70.  He has been seen by his primary care provider who scheduled him for a colonoscopy.  He has not had one for some time and they were concerned about his hemoglobin being too low.  He has not noted any GI bleeding.      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  He does have a more upbeat mood this visit.      Lab on 03/18/2024   Component Date Value Ref Range Status    Testosterone Total 03/18/2024 610  240 - 950 ng/dL Final    Hemoglobin 03/18/2024 12.2 (L)  13.3 - 17.7 g/dL Final    PSA Tumor Marker 03/18/2024 1.29  0.00 - 6.50 ng/mL Final     Assessment and plan:    This 73-year-old man has primary hypogonadism and is done very well on replacement therapy.  Symptomatically he is much improved.  His testosterone level is acceptable.  He is monitoring his blood pressure.  It does not sound as if we need to do anything different with his blood pressure for now.  His PSA and hemoglobin are normal.  I note he has had a low hemoglobin for many years and this could be because of his hypogonadism.  If so, we should expect to get into the normal range for a man over time.  I will plan to see him again in about 6 months.  I will check his labs before that visit.    I spent 15 minutes on the video visit with the patient (start time 4: 00 and end time 4: 1 5).  On the same day of visit I spent an additional 5 minutes reviewing his labs, ordering tests, and doing documentation.    Anaya augustin MD

## 2024-04-16 NOTE — LETTER
4/16/2024       RE: Prashant Keyes  [address]  [place] [address]     Dear Colleague,    Thank you for referring your patient, Prashant Keyes, to the [facility] [place] at [facility]. Please see a copy of my visit note below.    This 73-year-old man was seen in follow-up for primary hypogonadism.  I first saw him in February when he was referred to me by his primary care provider because of a low testosterone.  The evaluation that was done indicated he has primary hypogonadism.  He had had an orchiectomy done many years ago because of an undescended testes.  He did fathered children but then had an episode of epididymitis as an adult.  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  He has been taking testosterone 200 mg every 14 days for the last couple of months.  He had the labs listed below done midway between his dosing interval.  Today he reports he feels much better.  He is sleeping better.  His mood is better.  He thinks his memory might be a little bit better.  He has not noted a change in libido or sexual function.  He has developed some tenderness of his nipples.  He has no lumps or change in the tissue underneath the nipples.  He has been monitoring his blood pressure.  When he came home from work 1 night it was 144/84 and the next morning it was 120/70.  He has been seen by his primary care provider who scheduled him for a colonoscopy.  He has not had one for some time and they were concerned about his hemoglobin being too low.  He has not noted any GI bleeding.      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  He does have a more upbeat mood this visit.      Lab on 03/18/2024   Component Date Value Ref Range Status    Testosterone Total 03/18/2024 610  240 - 950 ng/dL Final    Hemoglobin 03/18/2024 12.2 (L)  13.3 - 17.7 g/dL Final    PSA Tumor Marker 03/18/2024 1.29  0.00 - 6.50 ng/mL Final     Assessment and plan:    This 73-year-old man has primary hypogonadism and is done very well on replacement therapy.  Symptomatically he is much improved.  His testosterone level is acceptable.  He is monitoring his blood pressure.  It does not sound as if we need to do anything different with his blood pressure for now.  His PSA and hemoglobin are normal.  I note he has had a low hemoglobin for many years and this could be because of his hypogonadism.  If so, we should expect to get into the normal range for a man over time.  I will plan to see him again in about 6 months.  I will check his labs before that visit.    I spent 15 minutes on the video visit with the patient (start time 4: 00 and end time 4: 1 5).  On the same day of visit I spent an additional 5 minutes reviewing his labs, ordering tests, and doing documentation.    Sincerely,    Anaya Kerns MD

## 2024-04-17 NOTE — TELEPHONE ENCOUNTER
FUTURE VISIT INFORMATION        SURGERY INFORMATION:  Date: 24  Location:  gi  Surgeon:  Dwayne Hart MD   Anesthesia Type:  MAC  Procedure: Colonoscopy      RECORDS REQUESTED FROM:         [facility] Care Provider[facility]   [facility]      Pertinent Medical History: KALEB, hypertension     Most recent EKG+ Tracin22- [facility]

## 2024-04-25 ENCOUNTER — TELEPHONE (OUTPATIENT)
Dept: ENDOCRINOLOGY | Facility: CLINIC | Age: 74
End: 2024-04-25
Payer: MEDICARE

## 2024-04-25 NOTE — TELEPHONE ENCOUNTER
Unable to LVM. Sent [employer]hart (1st Attempt) for the patient to call back and schedule the following:    Appointment type: Return Diabetes  Provider: Dr. Kerns  Return date: 6 mo (around Oct 2024)  Specialty phone number: [phone]  Additional appointment(s) needed: NA  Additonal Notes: NA  Check Out Comments: Visit can be virtual or in person, what ever is available.

## 2024-04-29 ENCOUNTER — TELEPHONE (OUTPATIENT)
Dept: ENDOCRINOLOGY | Facility: CLINIC | Age: 74
End: 2024-04-29
Payer: MEDICARE

## 2024-05-13 ENCOUNTER — PRE VISIT (OUTPATIENT)
Dept: SURGERY | Facility: CLINIC | Age: 74
End: 2024-05-13

## 2024-05-28 ENCOUNTER — MYC MEDICAL ADVICE (OUTPATIENT)
Dept: ORTHOPEDICS | Facility: CLINIC | Age: 74
End: 2024-05-28
Payer: MEDICARE

## 2024-05-28 DIAGNOSIS — M17.12 PRIMARY OSTEOARTHRITIS OF LEFT KNEE: Primary | ICD-10-CM

## 2024-05-31 NOTE — TELEPHONE ENCOUNTER
Patient scheduled for appointment on TBD @  TBD  for discussion of viscosupplementation injection vs steroid injection of left knee.        SynviscOne injection last completed 11/14/2023.  Patient reports relief for a few months.        Patient has failed trial of OTC NSAIDs/Pain Medication (ibuprofen, tylenol, naproxen,...):  Yes       Patient has completed trial of physical therapy: No    Prior authorization referral for SynviscOne injection pended.    Please advise    REGINE Echeverria

## 2024-06-03 NOTE — TELEPHONE ENCOUNTER
Signed PA request.  Can set up with one of the providers noted, or place  order for assistance with scheduling.  Thanks.  Paul Edmonds DO, CAQ

## 2024-06-03 NOTE — TELEPHONE ENCOUNTER
Patient scheduled for appointment on 6/25/24 [facility] of [facility]upplementation injection vs steroid injection of left knee.        SynviscOne injection last completed 11/14/2023.  Patient reports relief for few months       Patient has failed trial of OTC NSAIDs/Pain Medication (ibuprofen, tylenol, naproxen,...):  Yes       Patient has completed trial of physical therapy: No        Prior authorization referral for SynviscOne injection signed.

## 2024-06-07 ENCOUNTER — TELEPHONE (OUTPATIENT)
Dept: ORTHOPEDICS | Facility: CLINIC | Age: 74
End: 2024-06-07
Payer: MEDICARE

## 2024-06-07 DIAGNOSIS — M17.12 PRIMARY OSTEOARTHRITIS OF LEFT KNEE: Primary | ICD-10-CM

## 2024-06-07 NOTE — TELEPHONE ENCOUNTER
Patient scheduled for appointment on 6/25/24 @ [facility] for discussion of viscosupplementation injection vs steroid injection of left knee.        SynviscOne injection last completed 11/14/23.  Patient reports relief.     Patient has failed trial of OTC NSAIDs/Pain Medication (ibuprofen, tylenol, naproxen,...):  Yes       Patient has completed trial of physical therapy: No         Prior authorization referral for SynviscOne injection placed.    Carol Ramon, ATC

## 2024-06-25 ENCOUNTER — OFFICE VISIT (OUTPATIENT)
Dept: ORTHOPEDICS | Facility: CLINIC | Age: 74
End: 2024-06-25
Payer: MEDICARE

## 2024-06-25 VITALS — HEIGHT: 70 IN | BODY MASS INDEX: 41.87 KG/M2

## 2024-06-25 DIAGNOSIS — E66.01 MORBID OBESITY (H): ICD-10-CM

## 2024-06-25 DIAGNOSIS — M17.12 PRIMARY OSTEOARTHRITIS OF LEFT KNEE: Primary | ICD-10-CM

## 2024-06-25 PROCEDURE — 20611 DRAIN/INJ JOINT/BURSA W/US: CPT | Mod: LT | Performed by: FAMILY MEDICINE

## 2024-06-25 PROCEDURE — 99213 OFFICE O/P EST LOW 20 MIN: CPT | Mod: 25 | Performed by: FAMILY MEDICINE

## 2024-06-25 ASSESSMENT — PAIN SCALES - GENERAL: PAINLEVEL: MODERATE PAIN (5)

## 2024-06-25 NOTE — PROGRESS NOTES
ASSESSMENT & PLAN    Demian was seen today for follow up.    Diagnoses and all orders for this visit:    Primary osteoarthritis of left knee  -     Physical Therapy  Referral; Future  -     diclofenac (VOLTAREN) 1 % topical gel; Apply 4 g topically 4 times daily  -     Large Joint Injection/Arthocentesis: L knee joint    Morbid obesity (H)        # Left Knee Arthritis: Prashant Keyes  was seen today for left knee pain. Symptoms had been going on for 6+ mon. On examination there are positive findings of tenderness to palpation over the left medial knee. Imaging findings showed moderate medial knee arthritis. Likely cause of patient's condition due to flare of knee arthritis. Previous gel injection helped for 3-4 months   Counseled patient on nature of condition and treatment options.  Given this plan as below, follow-up as needed.    # Overweight: Patient would need to lose weight prior to receiving a knee replacement, not that he is interested currently. He is following up with his primary care physician for weight loss treatments. Can consider referral to weightloss center as well.     Image Findings: medial knee arthritis  Treatment: Activities as tolerated, home exercises given today, PT referral for medial  brace  Job: As tolerated  Medications/Injections: Limited tylenol/ibuprofen for pain for 1-2 weeks, Topical Voltaren gel, left knee aspiration, Synvisc injection   Follow-up: In one month if symptoms do not improve, sooner if worsening  Can consider steroid injection, referral to PT    -----    SUBJECTIVE:  Prashant Keyes is a 73 year old male who is seen in follow-up for left knee pain. They were last seen 11/14/23 and left knee Synvisc One injection was performed.  The patient is seen by themselves.    Since their last visit reports returned knee pain.  They indicate that their current pain level is 5/10. They have tried left knee Synvisc One injection 11/14/23, knee braces, ice, heat,  "meloxicam, steroid injection,left knee scope.  Currently works as a  at [employer].       Patient's past medical, surgical, social, and family histories were reviewed today and no changes are noted.    REVIEW OF SYSTEMS:  Constitutional: NEGATIVE for fever, chills, change in weight  Skin: NEGATIVE for worrisome rashes, moles or lesions  GI/: NEGATIVE for bowel or bladder changes  Neuro: NEGATIVE for weakness, dizziness or paresthesias    OBJECTIVE:  Ht 1.778 m (5' 10\")   BMI 41.87 kg/m     General: healthy, alert and in no distress  HEENT: no scleral icterus or conjunctival erythema  Skin: no suspicious lesions or rash. No jaundice.  CV: regular rhythm by palpation, no pedal edema  Resp: normal respiratory effort without conversational dyspnea   Psych: normal mood and affect  Gait: normal steady gait with appropriate coordination and balance  Neuro: normal light touch sensory exam of the extremities.    MSK:    LEFT KNEE  Inspection:    Normal alignment; no edema, erythema, or ecchymosis present  Palpation:    Tender about the medial joint line. Remainder of bony and ligamentous landmarks are nontender.    Mild effusion is present    Patellofemoral crepitus is Present  Range of Motion:     00 extension to 1350 flexion  Strength:    Quadriceps 5/5, hamstrings 5/5, gastrocsoleus 5/5, and tibialis anterior 5/5    Extensor mechanism intact  Special Tests:    Positive: None    Negative: Patellar grind, MCL/valgus stress (0 & 30 deg), LCL/varus stress (0 & 30 deg), Lachman's, anterior drawer, posterior drawer    Independent visualization of the below image:  EXAMINATION: MRI of the left knee without contrast     DATE:  10/17/2023     HISTORY: Knee pain     TECHNIQUE: Multiplanar, multisequence MR imaging of the left knee was  obtained using standard sequences in 3 orthogonal planes without the  use of intravenous or intra-articular gadolinium contrast.     Comparison: Comparison MRI dated 10/17/2023 was reviewed.   "   FINDINGS:     In the medial compartment, truncation of the anterior horn and body of  the medial meniscus with peripheral extrusion of the meniscal body,  likely postsurgical change. There are areas of full-thickness  cartilage loss with subchondral edema, along both the medial femoral  condyle and medial tibial plateau.     In the lateral compartment, the lateral meniscus is intact. There is  no high-grade or full-thickness cartilage loss or subchondral edema.     In the patellofemoral compartment, there is no high-grade or  full-thickness cartilage loss or subchondral edema.     The posterior cruciate ligament is intact. Marked intrasubstance  signal within the anterior cruciate ligament which is otherwise  intact.     The tibial collateral ligament is intact. The anterior iliotibial  band, fibular collateral ligament, biceps femoris tendon, and  popliteus tendons are intact.     There is a small to moderate size joint effusion. Trace fluid in the  semimembranosus medial gastrocnemius bursa. No joint bodies.     The extensor mechanism is intact and normal in appearance.                                                                       IMPRESSION:  1. Small to moderate size left knee joint effusion.   2. Postsurgical changes of prior partial medial meniscectomy with  peripheral extrusion of the residual meniscal body.  3. Marked osteoarthrosis in the left knee medial femorotibial joint  compartment with areas of full-thickness cartilage loss and  subchondral edema, along both the medial femoral condyle and medial  tibial plateau.  4. 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 The  posterior cruciate ligament, lateral meniscus, and medial and lateral  supporting structures are intact.     MD Odell BRAVO MD, [facility]    Disclaimer: This note consists of symbols derived from keyboarding, dictation and/or voice recognition software. As a  result, there may be errors in the script that have gone undetected. Please consider this when interpreting information found in this chart.    Large Joint Injection/Arthocentesis: L knee joint    Date/Time: 6/25/2024 11:13 AM    Performed by: Odell Morgan MD  Authorized by: Odell Morgan MD    Indications:  Pain and osteoarthritis  Needle Size:  21 G  Guidance: ultrasound    Approach:  Superolateral  Location:  Knee      Medications:  48 mg hylan 48 MG/6ML  Aspirate amount (mL):  12  Aspirate:  Serous and yellow  Outcome:  Tolerated well, no immediate complications  Procedure discussed: discussed risks, benefits, and alternatives    Consent Given by:  Patient  Timeout: timeout called immediately prior to procedure    Prep: patient was prepped and draped in usual sterile fashion     Ultrasound images of procedure were permanently stored.     Patient tolerated left knee aspiration/hyaluronic acid injection today.  Aftercare instructions given to patient.  Plan to follow-up as previously discussed with referring provider.  Ultrasound guided images were permanently stored.     Odell Morgan MD [facility]

## 2024-06-25 NOTE — PATIENT INSTRUCTIONS
# Left Knee Arthritis: Prashant Keyes  was seen today for left knee pain. Symptoms had been going on for 6+ mon. On examination there are positive findings of tenderness to palpation over the left medial knee. Imaging findings showed moderate medial knee arthritis. Likely cause of patient's condition due to flare of knee arthritis. Previous gel injection helped for 3-4 months   Counseled patient on nature of condition and treatment options.  Given this plan as below, follow-up as needed.    # Overweight: Patient would need to lose weight prior to receiving a knee replacement, not that he is interested currently. He is following up with his primary care physician for weight loss treatments. Can consider referral to weightloss center as well.     Image Findings: medial knee arthritis  Treatment: Activities as tolerated, home exercises given today, PT referral for medial  brace  Job: As tolerated  Medications/Injections: Limited tylenol/ibuprofen for pain for 1-2 weeks, Topical Voltaren gel, left knee aspiration, Synvisc injection   Follow-up: In one month if symptoms do not improve, sooner if worsening  Can consider steroid injection, referral to PT    Please call [phone]   Ask for my team if you have any questions or concerns    If you have not yet received the influenza vaccine but would like to get one, please call  [phone] or you can schedule via [employer]    It was great seeing you again today!    Odell Morgan MD, [facility] Injection Discharge Instructions    Procedure: left knee aspiration/Synvisc injection     You may shower, however avoid swimming, tub baths or hot tubs for 24 hours following your procedure  You may have a mild to moderate increase in pain for several days following the injection.  It may take up to 30 days for the medication to start working although you may feel the effect as early as a few days after the procedure.  You may use ice packs for 10-15 minutes, 3  to 4 times a day at the injection site for comfort  You may use anti-inflammatory medications (such as Ibuprofen or Aleve or Advil) or Tylenol for pain control if necessary  If you were fasting, you may resume your normal diet and medications after the procedure      If you experience any of the following, call [facility] @ [phone] or [phone]  -Fever over 100 degree F  -Swelling, bleeding, redness, drainage, warmth at the injection site  - New or worsening pain

## 2024-06-25 NOTE — LETTER
6/25/2024      Prashant Keyes  [address]   [place] [address]      Dear Colleague,    Thank you for referring your patient, Prashant Keyes, to the [facility] [place]. Please see a copy of my visit note below.    ASSESSMENT & PLAN    Bill was seen today for follow up.    Diagnoses and all orders for this visit:    Primary osteoarthritis of left knee  -     Physical Therapy  Referral; Future  -     diclofenac (VOLTAREN) 1 % topical gel; Apply 4 g topically 4 times daily  -     Large Joint Injection/Arthocentesis: L knee joint    Morbid obesity (H)        # Left Knee Arthritis: Prashant Keyes  was seen today for left knee pain. Symptoms had been going on for 6+ mon. On examination there are positive findings of tenderness to palpation over the left medial knee. Imaging findings showed moderate medial knee arthritis. Likely cause of patient's condition due to flare of knee arthritis. Previous gel injection helped for 3-4 months   Counseled patient on nature of condition and treatment options.  Given this plan as below, follow-up as needed.    # Overweight: Patient would need to lose weight prior to receiving a knee replacement, not that he is interested currently. He is following up with his primary care physician for weight loss treatments. Can consider referral to weightloss center as well.     Image Findings: medial knee arthritis  Treatment: Activities as tolerated, home exercises given today, PT referral for medial  brace  Job: As tolerated  Medications/Injections: Limited tylenol/ibuprofen for pain for 1-2 weeks, Topical Voltaren gel, left knee aspiration, Synvisc injection   Follow-up: In one month if symptoms do not improve, sooner if worsening  Can consider steroid injection, referral to PT    -----    SUBJECTIVE:  Prashant Keyes is a 73 year old male who is seen in follow-up for left knee pain. They were last seen 11/14/23 and left knee Synvisc  "One injection was performed.  The patient is seen by themselves.    Since their last visit reports returned knee pain.  They indicate that their current pain level is 5/10. They have tried left knee Synvisc One injection 11/14/23, knee braces, ice, heat, meloxicam, steroid injection,left knee scope.  Currently works as a  at [employer].       Patient's past medical, surgical, social, and family histories were reviewed today and no changes are noted.    REVIEW OF SYSTEMS:  Constitutional: NEGATIVE for fever, chills, change in weight  Skin: NEGATIVE for worrisome rashes, moles or lesions  GI/: NEGATIVE for bowel or bladder changes  Neuro: NEGATIVE for weakness, dizziness or paresthesias    OBJECTIVE:  Ht 1.778 m (5' 10\")   BMI 41.87 kg/m     General: healthy, alert and in no distress  HEENT: no scleral icterus or conjunctival erythema  Skin: no suspicious lesions or rash. No jaundice.  CV: regular rhythm by palpation, no pedal edema  Resp: normal respiratory effort without conversational dyspnea   Psych: normal mood and affect  Gait: normal steady gait with appropriate coordination and balance  Neuro: normal light touch sensory exam of the extremities.    MSK:    LEFT KNEE  Inspection:    Normal alignment; no edema, erythema, or ecchymosis present  Palpation:    Tender about the medial joint line. Remainder of bony and ligamentous landmarks are nontender.    Mild effusion is present    Patellofemoral crepitus is Present  Range of Motion:     00 extension to 1350 flexion  Strength:    Quadriceps 5/5, hamstrings 5/5, gastrocsoleus 5/5, and tibialis anterior 5/5    Extensor mechanism intact  Special Tests:    Positive: None    Negative: Patellar grind, MCL/valgus stress (0 & 30 deg), LCL/varus stress (0 & 30 deg), Lachman's, anterior drawer, posterior drawer    Independent visualization of the below image:  EXAMINATION: MRI of the left knee without contrast     DATE:  10/17/2023     HISTORY: Knee pain   "   TECHNIQUE: Multiplanar, multisequence MR imaging of the left knee was  obtained using standard sequences in 3 orthogonal planes without the  use of intravenous or intra-articular gadolinium contrast.     Comparison: Comparison MRI dated 10/17/2023 was reviewed.     FINDINGS:     In the medial compartment, truncation of the anterior horn and body of  the medial meniscus with peripheral extrusion of the meniscal body,  likely postsurgical change. There are areas of full-thickness  cartilage loss with subchondral edema, along both the medial femoral  condyle and medial tibial plateau.     In the lateral compartment, the lateral meniscus is intact. There is  no high-grade or full-thickness cartilage loss or subchondral edema.     In the patellofemoral compartment, there is no high-grade or  full-thickness cartilage loss or subchondral edema.     The posterior cruciate ligament is intact. Marked intrasubstance  signal within the anterior cruciate ligament which is otherwise  intact.     The tibial collateral ligament is intact. The anterior iliotibial  band, fibular collateral ligament, biceps femoris tendon, and  popliteus tendons are intact.     There is a small to moderate size joint effusion. Trace fluid in the  semimembranosus medial gastrocnemius bursa. No joint bodies.     The extensor mechanism is intact and normal in appearance.                                                                       IMPRESSION:  1. Small to moderate size left knee joint effusion.   2. Postsurgical changes of prior partial medial meniscectomy with  peripheral extrusion of the residual meniscal body.  3. Marked osteoarthrosis in the left knee medial femorotibial joint  compartment with areas of full-thickness cartilage loss and  subchondral edema, along both the medial femoral condyle and medial  tibial plateau.  4. 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 The  posterior cruciate ligament, lateral meniscus, and  medial and lateral  supporting structures are intact.     MD Odell BRAVO MD, [facility]    Disclaimer: This note consists of symbols derived from keyboarding, dictation and/or voice recognition software. As a result, there may be errors in the script that have gone undetected. Please consider this when interpreting information found in this chart.    Large Joint Injection/Arthocentesis: L knee joint    Date/Time: 6/25/2024 11:13 AM    Performed by: Odell Morgan MD  Authorized by: Odell Morgan MD    Indications:  Pain and osteoarthritis  Needle Size:  21 G  Guidance: ultrasound    Approach:  Superolateral  Location:  Knee      Medications:  48 mg hylan 48 MG/6ML  Aspirate amount (mL):  12  Aspirate:  Serous and yellow  Outcome:  Tolerated well, no immediate complications  Procedure discussed: discussed risks, benefits, and alternatives    Consent Given by:  Patient  Timeout: timeout called immediately prior to procedure    Prep: patient was prepped and draped in usual sterile fashion     Ultrasound images of procedure were permanently stored.     Patient tolerated left knee aspiration/hyaluronic acid injection today.  Aftercare instructions given to patient.  Plan to follow-up as previously discussed with referring provider.  Ultrasound guided images were permanently stored.     Odell Morgan MD [facility]                Again, thank you for allowing me to participate in the care of your patient.        Sincerely,        Odell Morgan MD

## 2024-06-29 DIAGNOSIS — N32.81 OVERACTIVE BLADDER: ICD-10-CM

## 2024-07-01 RX ORDER — OXYBUTYNIN CHLORIDE 5 MG/1
TABLET ORAL
Qty: 180 TABLET | Refills: 0 | Status: SHIPPED | OUTPATIENT
Start: 2024-07-01 | End: 2024-07-18

## 2024-07-02 ENCOUNTER — THERAPY VISIT (OUTPATIENT)
Dept: PHYSICAL THERAPY | Facility: CLINIC | Age: 74
End: 2024-07-02
Payer: MEDICARE

## 2024-07-02 DIAGNOSIS — M17.12 PRIMARY OSTEOARTHRITIS OF LEFT KNEE: Primary | ICD-10-CM

## 2024-07-02 PROCEDURE — 97161 PT EVAL LOW COMPLEX 20 MIN: CPT | Mod: GP | Performed by: PHYSICAL THERAPIST

## 2024-07-02 NOTE — PROGRESS NOTES
"PHYSICAL THERAPY EVALUATION  Type of Visit: Evaluation       Fall Risk Screen:  Fall screen completed by: PT  Have you fallen 2 or more times in the past year?: Yes  Have you fallen and had an injury in the past year?: No  Timed Up and Go score (seconds): will incorporate during course of PT  Is patient a fall risk?: Yes  Fall screen comments: includes underlying Parkinson's    Subjective       Presenting condition or subjective complaint: Fitting for Left Knee Brace  Date of onset: 06/25/24 (referred to PT)    Relevant medical history: Arthritis; Bladder or bowel problems; COPD; Concussions; Depression; Dizziness; Fibromyalgia; Hearing problems; Hepatitis; High blood pressure; Mental Illness; Osteoarthritis; Overweight; Parkinson s Disease; Seizures; Significant weakness; Vision problems   Dates & types of surgery:      Had menisectomy years ago and generally did well.  L knee has gradually gotten worse more recently.  Pt reports both injections have been helpful, including the most recent one at the end of June.  Notes ongoing difficulty on stairs, sometimes d/t balance vs pain.  Seizures are no longer an issue but Parkinson's is.  Finds cane helpful.  Standing is also difficult.  Seems better staying off the leg.  Pt states his goals include \"being able to walk\" and being more comfortable on stairs.  Medial L knee pain.    Prior diagnostic imaging/testing results: MRI; CT scan; X-ray; Bone scan     Prior therapy history for the same diagnosis, illness or injury: Yes      Prior Level of Function  Transfers: Assistive equipment  Ambulation: Assistive equipment  ADL: Assistive equipment    Living Environment  Social support: With a significant other or spouse   Type of home: House; Basement   Stairs to enter the home: Yes 4 Is there a railing: No     Ramp: No   Stairs inside the home: Yes 12 Is there a railing: Yes     Help at home: None  Equipment owned: Straight Cane     Employment: Yes [employer]  Hobbies/Interests: Reading and Movies    Patient goals for therapy: I think everything should be ok       Objective   KNEE EVALUATION  PAIN: see above  INTEGUMENTARY (edema, incisions): peripheral edema without discoloration B  GAIT: Pt ambulates with single point cane in R hand.  He reports this is baseline for him.  ROM:   AROM R knee 0-1-116.  AROM L knee 0-2-106; discomfort at end range flexion and extension.  STRENGTH: 4/5 B knee flexion and extension without symptoms.  SPECIAL TESTS: Positive valgus testing in about 30 degrees of flexion L for mild instability but not pain.  PALPATION: Tender to palpation L knee medial joint line.    Ossur Knee  Brace Trial:     Pain Rating    Affected Joint: Left Knee    Without  brace:    At rest 0/10  Walkin/10    Squatting: 3/10   Stairs: 5/10      Wearing  brace:    At rest 0/10  Walkin/10    Squattin,\"maybe half\"/10   Stairs: 1/10      Brace and Measurements:    Brace trialed:     Type - Ossur  One  Size - Large  Side - Medial  Affected Knee - Left Knee    Circumference 6 inches below mid patella: 16 3/4 inches (Ossur )    Other Comments:  See PT evaluation in Epic for any additional information including joint special testing/ligament testing         Assessment & Plan   CLINICAL IMPRESSIONS  Medical Diagnosis: Primary osteoarthritis of left knee    Treatment Diagnosis: L knee pain   Impression/Assessment: Patient is a 73 year old male with left knee complaints.  The following significant findings have been identified: Pain, Decreased ROM/flexibility, Decreased strength, Impaired gait, and Decreased activity tolerance. These impairments interfere with their ability to perform community mobility as compared to previous level of function.     Clinical Decision Making (Complexity):  Clinical Presentation: Stable/Uncomplicated  Clinical Presentation Rationale: based on medical and personal factors listed in PT " evaluation  Clinical Decision Making (Complexity): Low complexity    PLAN OF CARE  Treatment Interventions:  Interventions: Manual Therapy, Neuromuscular Re-education, Therapeutic Activity, Therapeutic Exercise, Orthotic Fitting/Training    Long Term Goals     PT Goal 1  Goal Description: Minutes pt will be able to walk: 20  Rationale: to maximize safety and independence within the community  Goal Progress: Minutes pt can walk: 5-10  Target Date: 07/30/24      Frequency of Treatment: once per week  Duration of Treatment: four weeks    Recommended Referrals to Other Professionals: orthotics for   Education Assessment:        Risks and benefits of evaluation/treatment have been explained.   Patient/Family/caregiver agrees with Plan of Care.     Evaluation Time:     PT Eval, Low Complexity Minutes (84189): 35     Signing Clinician: Mat Ochoa PT        [facility]                                                                                   OUTPATIENT PHYSICAL THERAPY      PLAN OF TREATMENT FOR OUTPATIENT REHABILITATION   Patient's Last Name, First Name, Prashant Jose YOB: 1950   Provider's Name   [facility]   Medical Record No.  [id number]     Onset Date: 06/25/24 (referred to PT)  Start of Care Date: 07/02/24     Medical Diagnosis:  Primary osteoarthritis of left knee      PT Treatment Diagnosis:  L knee pain Plan of Treatment  Frequency/Duration: once per week/ four weeks    Certification date from 07/02/24 to 07/30/24         See note for plan of treatment details and functional goals     Mat Ochoa PT                         I CERTIFY THE NEED FOR THESE SERVICES FURNISHED UNDER        THIS PLAN OF TREATMENT AND WHILE UNDER MY CARE     (Physician attestation of this document indicates review and certification of the therapy plan).              Referring Provider:  Odell Morgan    Initial Assessment  See  Epic Evaluation- Start of Care Date: 07/02/24

## 2024-07-06 ENCOUNTER — NURSE TRIAGE (OUTPATIENT)
Dept: NURSING | Facility: CLINIC | Age: 74
End: 2024-07-06
Payer: MEDICARE

## 2024-07-06 ENCOUNTER — MYC MEDICAL ADVICE (OUTPATIENT)
Dept: NEUROLOGY | Facility: CLINIC | Age: 74
End: 2024-07-06
Payer: MEDICARE

## 2024-07-06 ENCOUNTER — TELEPHONE (OUTPATIENT)
Dept: NEUROLOGY | Facility: CLINIC | Age: 74
End: 2024-07-06
Payer: MEDICARE

## 2024-07-06 NOTE — CONFIDENTIAL NOTE
Patient called in to report that yesterday at worse the shaking in his left arm and head worsened, and possibly a little in the right arm. This prompted him to be sent home from work. He denies any weakness symptoms while this happened, but thinks his left face might have been a little droopy. Since then he has felt a little more shaky. He denies any infectious symptoms. He is going to monitor symptoms and give the office a call on Monday.     Brando Davis MD

## 2024-07-06 NOTE — TELEPHONE ENCOUNTER
"\"Breakthrough of my Parkinson's\".    Patient's neurology provider is Dr Brando Davis.    I transferred Bill to the  at [phone] and advised him to tell the  that he doesn't want to speak to a  nurse advisor.      Tell them too that he'd like to speak to the on call provider for Dr Davis.    Caller stated understanding and agreement.    Gemma ZEPEDA RN [facility]           "

## 2024-07-13 DIAGNOSIS — E29.1 HYPOGONADISM MALE: ICD-10-CM

## 2024-07-15 ENCOUNTER — PRE VISIT (OUTPATIENT)
Dept: SURGERY | Facility: CLINIC | Age: 74
End: 2024-07-15

## 2024-07-15 RX ORDER — TESTOSTERONE CYPIONATE 200 MG/ML
200 INJECTION, SOLUTION INTRAMUSCULAR
Qty: 2 ML | Refills: 5 | Status: SHIPPED | OUTPATIENT
Start: 2024-07-15 | End: 2024-07-18

## 2024-07-15 NOTE — TELEPHONE ENCOUNTER
testosterone cypionate (DEPOTESTOSTERONE) 200 MG/ML injection 10 mL 0 2/22/2024 -- No   Sig - Route: Inject 1 mL (200 mg) into the muscle every 14 days - Intramuscular     ----------------------  Last Office Visit : 4/16/2024  [facility] Office visit:     10/29/2024 1:30 PM (30 min)  Ericka   Arrive by:  1:15 PM   RETURN ENDOCRINE    [facility] ([facility])   Anaya Kerns MD     ----------------------      Routing refill request to provider for review/approval because:  Refills for this classification require provider review        Pass/Fail Protocol Criteria:    Androgen Agents [id number]/13/2024 09:39 PM   Protocol Details ALT on file within past 12 mos    HCT less than 54% on file within past 12 mos    Serum PSA on file within past 12 mos    Refills for this classification require provider review    Blood pressure under 140/90 in past 6 months    AST on file within past 12 mos

## 2024-07-18 DIAGNOSIS — I10 HYPERTENSION GOAL BP (BLOOD PRESSURE) < 140/90: ICD-10-CM

## 2024-07-18 DIAGNOSIS — E78.00 PURE HYPERCHOLESTEROLEMIA: ICD-10-CM

## 2024-07-18 DIAGNOSIS — G20.C PARKINSONISM, UNSPECIFIED PARKINSONISM TYPE (H): ICD-10-CM

## 2024-07-18 DIAGNOSIS — G89.29 CHRONIC PAIN OF LEFT KNEE: ICD-10-CM

## 2024-07-18 DIAGNOSIS — K21.9 GASTROESOPHAGEAL REFLUX DISEASE: ICD-10-CM

## 2024-07-18 DIAGNOSIS — M25.562 CHRONIC PAIN OF LEFT KNEE: ICD-10-CM

## 2024-07-18 DIAGNOSIS — I10 BENIGN ESSENTIAL HYPERTENSION: ICD-10-CM

## 2024-07-18 DIAGNOSIS — F32.1 CURRENT MODERATE EPISODE OF MAJOR DEPRESSIVE DISORDER WITHOUT PRIOR EPISODE (H): ICD-10-CM

## 2024-07-18 DIAGNOSIS — R60.0 PERIPHERAL EDEMA: ICD-10-CM

## 2024-07-18 DIAGNOSIS — E29.1 HYPOGONADISM MALE: ICD-10-CM

## 2024-07-18 DIAGNOSIS — N32.81 OVERACTIVE BLADDER: ICD-10-CM

## 2024-07-18 DIAGNOSIS — E87.6 HYPOKALEMIA: ICD-10-CM

## 2024-07-18 DIAGNOSIS — M79.7 FIBROMYALGIA: ICD-10-CM

## 2024-07-18 RX ORDER — HYDRALAZINE HYDROCHLORIDE 25 MG/1
25 TABLET, FILM COATED ORAL 2 TIMES DAILY
Qty: 180 TABLET | Refills: 1 | Status: SHIPPED | OUTPATIENT
Start: 2024-07-18

## 2024-07-18 RX ORDER — MELOXICAM 15 MG/1
15 TABLET ORAL DAILY
Qty: 90 TABLET | Refills: 0 | Status: SHIPPED | OUTPATIENT
Start: 2024-07-18

## 2024-07-18 RX ORDER — CARVEDILOL 12.5 MG/1
TABLET ORAL
Qty: 60 TABLET | Refills: 0 | Status: SHIPPED | OUTPATIENT
Start: 2024-07-18

## 2024-07-18 RX ORDER — LOSARTAN POTASSIUM 100 MG/1
100 TABLET ORAL DAILY
Qty: 30 TABLET | Refills: 0 | Status: SHIPPED | OUTPATIENT
Start: 2024-07-18

## 2024-07-18 RX ORDER — TRAMADOL HYDROCHLORIDE 50 MG/1
50 TABLET ORAL EVERY 6 HOURS PRN
Qty: 108 TABLET | Refills: 1 | Status: SHIPPED | OUTPATIENT
Start: 2024-07-18 | End: 2024-09-17

## 2024-07-18 RX ORDER — CARBIDOPA AND LEVODOPA 25; 100 MG/1; MG/1
1.5 TABLET ORAL 3 TIMES DAILY
Qty: 405 TABLET | Refills: 3 | Status: SHIPPED | OUTPATIENT
Start: 2024-07-18

## 2024-07-18 RX ORDER — SIMVASTATIN 20 MG
TABLET ORAL
Qty: 180 TABLET | Refills: 1 | Status: SHIPPED | OUTPATIENT
Start: 2024-07-18

## 2024-07-18 RX ORDER — POTASSIUM CHLORIDE 1500 MG/1
TABLET, EXTENDED RELEASE ORAL
Qty: 270 TABLET | Refills: 0 | Status: SHIPPED | OUTPATIENT
Start: 2024-07-18

## 2024-07-18 RX ORDER — FLUOXETINE 40 MG/1
CAPSULE ORAL
Qty: 30 CAPSULE | Refills: 0 | Status: SHIPPED | OUTPATIENT
Start: 2024-07-18

## 2024-07-18 RX ORDER — OXYBUTYNIN CHLORIDE 5 MG/1
5 TABLET ORAL 2 TIMES DAILY
Qty: 180 TABLET | Refills: 0 | Status: SHIPPED | OUTPATIENT
Start: 2024-07-18

## 2024-07-18 RX ORDER — TESTOSTERONE CYPIONATE 200 MG/ML
200 INJECTION, SOLUTION INTRAMUSCULAR
Qty: 2 ML | Refills: 5 | Status: SHIPPED | OUTPATIENT
Start: 2024-07-18

## 2024-07-18 RX ORDER — CHLORTHALIDONE 25 MG/1
25 TABLET ORAL DAILY
Qty: 90 TABLET | Refills: 0 | Status: SHIPPED | OUTPATIENT
Start: 2024-07-18 | End: 2024-07-29

## 2024-07-18 RX ORDER — GABAPENTIN 300 MG/1
600 CAPSULE ORAL 3 TIMES DAILY
Qty: 180 CAPSULE | Refills: 0 | Status: SHIPPED | OUTPATIENT
Start: 2024-07-18 | End: 2024-09-04

## 2024-07-18 RX ORDER — OMEPRAZOLE 40 MG/1
CAPSULE, DELAYED RELEASE ORAL
Qty: 90 CAPSULE | Refills: 3 | Status: SHIPPED | OUTPATIENT
Start: 2024-07-18

## 2024-07-18 RX ORDER — FUROSEMIDE 20 MG
40 TABLET ORAL DAILY
Qty: 60 TABLET | Refills: 0 | Status: SHIPPED | OUTPATIENT
Start: 2024-07-18 | End: 2024-09-09

## 2024-07-19 ENCOUNTER — TELEPHONE (OUTPATIENT)
Dept: GASTROENTEROLOGY | Facility: CLINIC | Age: 74
End: 2024-07-19

## 2024-07-19 DIAGNOSIS — Z86.0100 HISTORY OF COLONIC POLYPS: Primary | ICD-10-CM

## 2024-07-19 RX ORDER — BISACODYL 5 MG/1
TABLET, DELAYED RELEASE ORAL
Qty: 4 TABLET | Refills: 0 | Status: SHIPPED | OUTPATIENT
Start: 2024-07-19

## 2024-07-19 RX ORDER — POLYETHYLENE GLYCOL 3350 17 G/17G
POWDER, FOR SOLUTION ORAL
Qty: 238 G | Refills: 0 | Status: SHIPPED | OUTPATIENT
Start: 2024-07-19

## 2024-07-19 NOTE — TELEPHONE ENCOUNTER
Attempted to contact patient in order to complete pre assessment questions.     No answer. Left message to return call to [phone] option 4    Callback required communication sent via [employer].      Procedure details:    Patient scheduled for Colonoscopy on 8.1.24.     Arrival time: 0630. Procedure time 0800    Facility location: [facility]; [address] [place] [address], [address], [place] [address]. Check in location: Main entrance at registration desk.    Sedation type: MAC    Pre op exam needed? Yes. Patient needs to complete PAC eval within 30 days of procedure. Informed patient PAC eval is needed via [employer].    Indication for procedure: Hx polyps      Chart review:     Electronic implanted devices? No    Recent diagnosis of diverticulitis within the last 6 weeks? No    Diabetic? No      Medication review:    Anticoagulants? No    NSAIDS? Yes.  Meloxicam (Mobic).  Holding interval of 10 days.    Other medication HOLDING recommendations:  Weight loss medication/injectable: Semaglutide-Weight Management (WEGOVY). Weekly dosing of medication.  Hold 7 days before procedure.  Follow up with managing provider.       Prep for procedure:     Bowel prep recommendation: Low Volume Extended Golytely. Bowel prep prescription sent to      [id number] - [place], [place] - [address]   Due to: BMI > 40.  and GLP-1 agonist medication noted in chart.     Prep instructions sent via [employer].       Li Knight RN  Endoscopy Procedure Pre Assessment

## 2024-07-22 ENCOUNTER — TELEPHONE (OUTPATIENT)
Dept: GASTROENTEROLOGY | Facility: CLINIC | Age: 74
End: 2024-07-22
Payer: MEDICARE

## 2024-07-22 ENCOUNTER — PATIENT OUTREACH (OUTPATIENT)
Dept: GASTROENTEROLOGY | Facility: CLINIC | Age: 74
End: 2024-07-22
Payer: MEDICARE

## 2024-07-22 NOTE — TELEPHONE ENCOUNTER
Caller: Prashant Keyes      Reason for Reschedule/Cancellation   (please be detailed, any staff messages or encounters to note?): Patient request to reschedule, conflict with personal schedule and did not have pto saved to get day off of work      Prior to reschedule please review:  Ordering Provider: Matt Swan PA-C in [facility]  Sedation Determined: MAC  Does patient have any ASC Exclusions, please identify?: YES, [facility]      Notes on Cancelled Procedure:  Procedure: Lower Endoscopy [Colonoscopy]   Date: 8/1  Location: [facility]; [address], [address] [place] [address]   Surgeon: JEROME      Rescheduled: Yes,   Procedure: Lower Endoscopy [Colonoscopy]    Date: 1/23   Location: [facility]; [address], [address] [place] [address]    Surgeon: DUSTY   Sedation Level Scheduled  MAC ,  Reason for Sedation Level PER RN REVIEW   Instructions updated and sent: MYCHART     Does patient need PAC or Pre -Op Rescheduled? : NO       Did you cancel or rescheduled an EUS procedure? No.

## 2024-07-23 ENCOUNTER — MYC MEDICAL ADVICE (OUTPATIENT)
Dept: NEUROLOGY | Facility: CLINIC | Age: 74
End: 2024-07-23
Payer: MEDICARE

## 2024-07-29 ENCOUNTER — OFFICE VISIT (OUTPATIENT)
Dept: FAMILY MEDICINE | Facility: CLINIC | Age: 74
End: 2024-07-29
Payer: MEDICARE

## 2024-07-29 ENCOUNTER — TELEPHONE (OUTPATIENT)
Dept: NEUROLOGY | Facility: CLINIC | Age: 74
End: 2024-07-29

## 2024-07-29 VITALS
DIASTOLIC BLOOD PRESSURE: 68 MMHG | BODY MASS INDEX: 44.49 KG/M2 | RESPIRATION RATE: 20 BRPM | OXYGEN SATURATION: 95 % | WEIGHT: 300.4 LBS | HEIGHT: 69 IN | HEART RATE: 68 BPM | TEMPERATURE: 98.1 F | SYSTOLIC BLOOD PRESSURE: 112 MMHG

## 2024-07-29 DIAGNOSIS — F32.1 CURRENT MODERATE EPISODE OF MAJOR DEPRESSIVE DISORDER WITHOUT PRIOR EPISODE (H): ICD-10-CM

## 2024-07-29 DIAGNOSIS — L73.8 FOLLICULITIS BARBAE: ICD-10-CM

## 2024-07-29 DIAGNOSIS — Z00.00 ENCOUNTER FOR ANNUAL WELLNESS VISIT (AWV) IN MEDICARE PATIENT: Primary | ICD-10-CM

## 2024-07-29 DIAGNOSIS — I10 BENIGN ESSENTIAL HYPERTENSION: ICD-10-CM

## 2024-07-29 DIAGNOSIS — N18.2 CKD (CHRONIC KIDNEY DISEASE) STAGE 2, GFR 60-89 ML/MIN: ICD-10-CM

## 2024-07-29 PROCEDURE — 82570 ASSAY OF URINE CREATININE: CPT | Performed by: PHYSICIAN ASSISTANT

## 2024-07-29 PROCEDURE — 82043 UR ALBUMIN QUANTITATIVE: CPT | Performed by: PHYSICIAN ASSISTANT

## 2024-07-29 PROCEDURE — G0439 PPPS, SUBSEQ VISIT: HCPCS | Performed by: PHYSICIAN ASSISTANT

## 2024-07-29 PROCEDURE — 80048 BASIC METABOLIC PNL TOTAL CA: CPT | Performed by: PHYSICIAN ASSISTANT

## 2024-07-29 PROCEDURE — 36415 COLL VENOUS BLD VENIPUNCTURE: CPT | Performed by: PHYSICIAN ASSISTANT

## 2024-07-29 PROCEDURE — 99212 OFFICE O/P EST SF 10 MIN: CPT | Mod: 25 | Performed by: PHYSICIAN ASSISTANT

## 2024-07-29 RX ORDER — AMOXICILLIN 875 MG
875 TABLET ORAL 2 TIMES DAILY
Qty: 20 TABLET | Refills: 0 | Status: SHIPPED | OUTPATIENT
Start: 2024-07-29 | End: 2024-08-08

## 2024-07-29 RX ORDER — CHLORTHALIDONE 25 MG/1
25 TABLET ORAL DAILY
Qty: 90 TABLET | Refills: 0 | Status: SHIPPED | OUTPATIENT
Start: 2024-07-29

## 2024-07-29 ASSESSMENT — ANXIETY QUESTIONNAIRES
GAD7 TOTAL SCORE: 0
1. FEELING NERVOUS, ANXIOUS, OR ON EDGE: NOT AT ALL
4. TROUBLE RELAXING: NOT AT ALL
7. FEELING AFRAID AS IF SOMETHING AWFUL MIGHT HAPPEN: NOT AT ALL
5. BEING SO RESTLESS THAT IT IS HARD TO SIT STILL: NOT AT ALL
7. FEELING AFRAID AS IF SOMETHING AWFUL MIGHT HAPPEN: NOT AT ALL
6. BECOMING EASILY ANNOYED OR IRRITABLE: NOT AT ALL
GAD7 TOTAL SCORE: 0
3. WORRYING TOO MUCH ABOUT DIFFERENT THINGS: NOT AT ALL
GAD7 TOTAL SCORE: 0
2. NOT BEING ABLE TO STOP OR CONTROL WORRYING: NOT AT ALL

## 2024-07-29 ASSESSMENT — ASTHMA QUESTIONNAIRES
QUESTION_1 LAST FOUR WEEKS HOW MUCH OF THE TIME DID YOUR ASTHMA KEEP YOU FROM GETTING AS MUCH DONE AT WORK, SCHOOL OR AT HOME: NONE OF THE TIME
QUESTION_3 LAST FOUR WEEKS HOW OFTEN DID YOUR ASTHMA SYMPTOMS (WHEEZING, COUGHING, SHORTNESS OF BREATH, CHEST TIGHTNESS OR PAIN) WAKE YOU UP AT NIGHT OR EARLIER THAN USUAL IN THE MORNING: NOT AT ALL
QUESTION_4 LAST FOUR WEEKS HOW OFTEN HAVE YOU USED YOUR RESCUE INHALER OR NEBULIZER MEDICATION (SUCH AS ALBUTEROL): NOT AT ALL
ACT_TOTALSCORE: 25
QUESTION_2 LAST FOUR WEEKS HOW OFTEN HAVE YOU HAD SHORTNESS OF BREATH: NOT AT ALL
QUESTION_5 LAST FOUR WEEKS HOW WOULD YOU RATE YOUR ASTHMA CONTROL: COMPLETELY CONTROLLED
ACT_TOTALSCORE: 25

## 2024-07-29 ASSESSMENT — PATIENT HEALTH QUESTIONNAIRE - PHQ9
SUM OF ALL RESPONSES TO PHQ QUESTIONS 1-9: 1
SUM OF ALL RESPONSES TO PHQ QUESTIONS 1-9: 1

## 2024-07-29 ASSESSMENT — PAIN SCALES - GENERAL: PAINLEVEL: SEVERE PAIN (7)

## 2024-07-29 NOTE — TELEPHONE ENCOUNTER
PT  20.5    INR 1.7    Taking 5 mg for 5 days - 2.5 mg on Wed & Saturdays    [phone] [facility] Call Center    Phone Message    May a detailed message be left on voicemail: yes     Reason for Call: Form or Letter   Type or form/letter needing completion: Disability Paperwork   Provider: Dr Davis   Date form needed: asap  Once completed: Return call to patient     Patient requesting call back from clinic. He states that 2-3 weeks ago some disability paperwork was sent to the clinic and has not been completed yet. He is requesting a call back to see if the forms were ever received. Please return call to #[phone]    Action Taken: Message routed to:  Other:  Neurology    Travel Screening: Not Applicable     Date of Service:

## 2024-07-29 NOTE — PROGRESS NOTES
Kevon Arciniega is a 73 year old, presenting for the following health issues:  Derm Problem (Bumps on face and head//Rash on neck, under beard) and Wellness Visit        7/29/2024     1:18 PM   Additional Questions   Roomed by Alta Schmitt CMA   Accompanied by none         7/29/2024   Declines Weight   Did patient decline having their weight taken? Yes          7/29/2024     1:18 PM   Patient Reported Additional Medications   Patient reports taking the following new medications none     History of Present Illness       Reason for visit:  I have lakesha sort of bumps forming on my face and my chin still seems a little irritated  Symptom onset:  More than a month  Symptoms include:  Just bumps on face and ichy chin  Symptom intensity:  Mild  Symptom progression:  Worsening  Had these symptoms before:  Yes  Has tried/received treatment for these symptoms:  Yes  Previous treatment was successful:  No    He eats 0-1 servings of fruits and vegetables daily.He consumes 0 sweetened beverage(s) daily.He exercises with enough effort to increase his heart rate 9 or less minutes per day.  He exercises with enough effort to increase his heart rate 3 or less days per week.   He is taking medications regularly.     Recheck of htn.  No chest pain/sob/palpitations/dizziness/ha's  History of depression. Doing well currently. No depression or anhedonia. No anxiety   Annual Wellness Visit     Patient has been advised of split billing requirements and indicates understanding: Yes          Health Care Directive  Patient has a Health Care Directive on file  Advance care planning document is on file and is current.  In general, how would you rate your overall physical health? good  Do you have a special diet?  Regular (no restrictions)        7/29/2024   Exercise, Social Connection, Stress   Days per week of moderate/strenous exercise 0 days   Average minutes spent exercising at this level 0 min   Frequency of gathering with friends  or relatives Never   Feel stress (tense, anxious, or unable to sleep) Not at all        Do you see a dentist two times every year?  Yes  Have you been more tired than usual lately?  No  If you drink alcohol do you typically have >3 drinks per day or >7 drinks per week? No  Do you have a current opioid prescription? (!) YES   How severe is your pain on a scale from 1-10? 7/10       4/4/2022     1:52 PM   OPIOID RISK TOOL TOTAL SCORE   Total Score 0     Low Risk (0-3)  Moderate Risk (4-7)  High Risk (>8)  Do you use any other controlled substances or medications that are not prescribed by a provider? None  Social History     Tobacco Use    Smoking status: Never    Smokeless tobacco: Never   Vaping Use    Vaping status: Never Used   Substance Use Topics    Alcohol use: No     Comment: Quit since 2003.     Drug use: No       Needs assistance for the following daily activities:  putting on pressure socks  Which of the following safety concerns are present in your home?  none identified   Do you (or your family members) have any concerns about your safety while driving?  No  Do you have any of the following hearing concerns?: No hearing concerns, but wears hearing aids  In the past 6 months, have you been bothered by leaking of urine? (!) YES   Information on urinary incontinence and treatment options given to patient.        1/16/2024   Social Factors   Worry food won't last until get money to buy more No   Food not last or not have enough money for food? No   Do you have housing? (Housing is defined as stable permanent housing and does not include staying ouside in a car, in a tent, in an abandoned building, in an overnight shelter, or couch-surfing.) Yes   Are you worried about losing your housing? No   Lack of transportation? No   Unable to get utilities (heat,electricity)? No             7/29/2024   Fall Risk   Fallen 2 or more times in the past year? Yes   Trouble with walking or balance? Yes   Reason Gait Speed  Test Not Completed Patient declines           Today's PHQ-9 Score:       2024     9:10 AM   PHQ-9 SCORE   PHQ-9 Total Score MyChart 1 (Minimal depression)   PHQ-9 Total Score 1     ASCVD Risk   The 10-year ASCVD risk score (Monica RODRIGUEZ, et al., 2019) is: 17%    Values used to calculate the score:      Age: 73 years      Sex: Male      Is Non- : No      Diabetic: No      Tobacco smoker: No      Systolic Blood Pressure: 112 mmHg      Is BP treated: Yes      HDL Cholesterol: 55 mg/dL      Total Cholesterol: 136 mg/dL    Fracture Risk Assessment Tool  Link to Frax Calculator  Use the information below to complete the Frax calculator  : 1950  Sex: male  Weight (kg): 136.3 kg (actual weight)  Height (cm): 175.9 cm  Previous Fragility Fracture:  No  History of parent with fractured hip:  No  Current Smoking:  No  Patient has been on glucocorticoids for more than 3 months (5mg/day or more): No  Rheumatoid Arthritis on Problem List:  No  Secondary Osteoporosis on Problem List:  No  Consumes 3 or more units of alcohol per day: No  Femoral Neck BMD (g/cm2)            Reviewed and updated as needed this visit by Provider                    Past Medical History:   Diagnosis Date    Arthritis 2012    Calculus of kidney ~    Carpal tunnel syndrome     Concussion, unspecified     Depressive disorder 2015    Eczema 2011    Epileptic seizure (H)     diagnosed , controlled with Depakote and Keppra    Fibromyalgia syndrome ~2000    per pt    Hypertension     Left testicle cyst     Photosensitive contact dermatitis     Prostatitis, unspecified     Seizures (H)     Diagnosed , controlled with Depakote and Keppra    Umbilical hernia 2012    Uncomplicated asthma     Unspecified asthma(493.90)      Past Surgical History:   Procedure Laterality Date    ABDOMEN SURGERY      Fixed belly button    ANGIOGRAM      Coronary Angiogram- negative     ARTHROSCOPY KNEE Left 9/5/2019    Procedure: LEFT KNEE ARTHROSCOPY WITH MENISCAL AND CHONDRAL DEBRIDEMENT;  Surgeon: Damon Rosas MD;  Location: [facility]    COLONOSCOPY  2/2/2013    COLONOSCOPY WITH CO2 INSUFFLATION N/A 8/17/2017    Procedure: COLONOSCOPY WITH CO2 INSUFFLATION;  COLON SCREEN/ FLYNN;  Surgeon: Varun Bond MD;  Location: [facility]    ENT SURGERY  2/2/2008    Hearing aids    EYE SURGERY  April/2012    Cataracts    HC REMOVAL TESTIS,SIMPLE  1978    Right undecended    HERNIA REPAIR, INGUINAL RT/LT  1963, 1978    Right Hernia    HERNIORRHAPHY UMBILICAL  4/2013    [place]     BP Readings from Last 3 Encounters:   07/29/24 112/68   03/05/24 (!) 152/81   02/28/24 138/77    Wt Readings from Last 3 Encounters:   07/29/24 136.3 kg (300 lb 6.4 oz)   03/05/24 132.4 kg (291 lb 12.8 oz)   02/28/24 134.3 kg (296 lb)                  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)    Class 1 obesity with serious comorbidity and body mass index (BMI) of 31.0 to 31.9 in adult, unspecified obesity type    Obesity (BMI 35.0-39.9) with comorbidity (H)    Parkinsonism (H)    Current moderate episode of major depressive disorder without prior episode (H)    Chronic, continuous use of opioids    Primary osteoarthritis of left knee     Past Surgical History:   Procedure Laterality Date    ABDOMEN SURGERY  2015    Fixed belly button    ANGIOGRAM  2003    Coronary Angiogram- negative    ARTHROSCOPY KNEE Left 9/5/2019    Procedure: LEFT KNEE ARTHROSCOPY WITH MENISCAL AND CHONDRAL DEBRIDEMENT;  Surgeon: Damon Rosas MD;  Location:  [facility]    COLONOSCOPY  2/2/2013    COLONOSCOPY WITH CO2 INSUFFLATION N/A 8/17/2017    Procedure: COLONOSCOPY WITH CO2 INSUFFLATION;  COLON SCREEN/ GEE;  Surgeon: Varun Bond MD;  Location: [facility]    ENT SURGERY  2/2/2008    Hearing aids    EYE SURGERY  April/2012    Cataracts    HC REMOVAL TESTIS,SIMPLE  1978    Right undecended    HERNIA REPAIR, INGUINAL RT/LT  1963, 1978    Right Hernia    HERNIORRHAPHY UMBILICAL  4/2013    [place]       Social History     Tobacco Use    Smoking status: Never    Smokeless tobacco: Never   Substance Use Topics    Alcohol use: No     Comment: Quit since 2003.      Family History   Problem Relation Age of Onset    Cerebrovascular Disease Mother         d 2015    C.A.D. Mother         CABG 75    Arthritis Mother     Eye Disorder Mother     Heart Disease Mother     Cardiovascular Father         Rheumatic Heart Disease    Cancer Maternal Grandmother         Thyroid CA    Other Cancer Maternal Grandmother         Goiter    Thyroid Disease Maternal Grandfather         d. 1937    Thyroid Disease Paternal Grandmother     Cancer Paternal Grandfather         Throat CA    Hypertension Brother     Lipids Brother     C.A.D. Brother         CABG 46    Arthritis Brother     Heart Disease Brother         CABG x5    Obesity Brother     Coronary Artery Disease Brother     Hyperlipidemia Brother     Hypertension Brother         d.May 30, 2022    Lipids Brother     Thyroid Disease Brother         d.May 30, 2022    Alcohol/Drug Brother     Heart Disease Brother         CABG    Coronary Artery Disease Brother         Syd is dead now    Hyperlipidemia Brother         dead    Obesity Brother         dead    Gastrointestinal Disease Brother         Crohn's Disease-Ostomy    Cerebrovascular Disease Brother         dead    Coronary Artery Disease Brother         Usama is dead now    Hyperlipidemia Brother         dead    Cancer Sister         Thyroid CA    Hypertension Sister     Obesity  Sister     Thyroid Disease Sister     Hemochromatosis Sister     Cerebrovascular Disease Sister         birth defect    Hyperlipidemia Sister     Other Cancer Sister         Goiter    Arrhythmia Sister     Depression Son     Diabetes Son     Depression Son     Depression Daughter     Depression Daughter          Current Outpatient Medications   Medication Sig Dispense Refill    albuterol (PROAIR HFA/PROVENTIL HFA/VENTOLIN HFA) 108 (90 Base) MCG/ACT inhaler Inhale 2 puffs into the lungs every 6 hours as needed for shortness of breath / dyspnea 8.5 g 0    allopurinol (ZYLOPRIM) 100 MG tablet TAKE 2 TABLETS BY MOUTH EVERY  tablet 3    amLODIPine (NORVASC) 10 MG tablet TAKE 1 TABLET BY MOUTH ONCE DAILY WITH SUPPER 90 tablet 1    aspirin 81 MG tablet Take 1 tablet (81 mg) by mouth daily 30 tablet     bisacodyl (DULCOLAX) 5 MG EC tablet Two days prior to procedure take two (2) tablets at 10am. One day prior to procedure take two (2) tablets at 10am 4 tablet 0    carbidopa-levodopa (SINEMET)  MG tablet Take 1.5 tablets by mouth 3 times daily 405 tablet 3    carvedilol (COREG) 12.5 MG tablet TAKE 1 TABLET BY MOUTH TWICE A DAY WITH FOOD 60 tablet 0    chlorthalidone (HYGROTON) 25 MG tablet Take 1 tablet (25 mg) by mouth daily 90 tablet 0    Cholecalciferol (VITAMIN D) 2000 UNITS tablet Take 2,000 Units by mouth daily 90 tablet 3    Cyanocobalamin (VITAMIN  B-12) 2500 MCG tablet Place 2,500 mcg under the tongue daily (Patient taking differently: Place 2,500 mcg under the tongue 2 times a week) 90 tablet 3    cyclobenzaprine (FLEXERIL) 5 MG tablet Take 1 tablet (5 mg) by mouth nightly as needed for muscle spasms 20 tablet 0    diclofenac (VOLTAREN) 1 % topical gel Apply 4 g topically 4 times daily 150 g 4    divalproex sodium delayed-release (DEPAKOTE) 500 MG DR tablet Take 1 tablet (500 mg) by mouth 2 times daily 180 tablet 3    ferrous sulfate (IRON) 325 (65 FE) MG tablet Take 1 tablet (325 mg) by mouth 2 times  daily 60 tablet 2    FLUoxetine (PROZAC) 40 MG capsule TAKE 1 CAPSULE BY MOUTH ONCE DAILY 30 capsule 0    furosemide (LASIX) 20 MG tablet Take 2 tablets (40 mg) by mouth daily 60 tablet 0    gabapentin (NEURONTIN) 300 MG capsule Take 2 capsules (600 mg) by mouth 3 times daily 180 capsule 0    hydrALAZINE (APRESOLINE) 25 MG tablet Take 1 tablet (25 mg) by mouth 2 times daily 180 tablet 1    hydroxychloroquine (PLAQUENIL) 200 MG tablet TAKE 1 TABLET BY MOUTH TWICE A DAY 60 tablet 5    levETIRAcetam (KEPPRA) 1000 MG tablet TAKE 1 TABLET BY MOUTH TWICE DAILY IN THE MORNING AND AT BEDTIME 180 tablet 3    LORazepam (ATIVAN) 0.5 MG tablet Take 1-2 tabs one hour prior to your mri 2 tablet 0    losartan (COZAAR) 100 MG tablet Take 1 tablet (100 mg) by mouth daily 30 tablet 0    meloxicam (MOBIC) 15 MG tablet Take 1 tablet (15 mg) by mouth daily 90 tablet 0    Menthol, Topical Analgesic, (BIOFREEZE) 4 % GEL Externally apply topically 4 times daily as needed (as needed) 118 mL 0    Multiple Vitamin (MULTI VITAMIN MENS PO) Take  by mouth.      omeprazole (PRILOSEC) 40 MG DR capsule Take 1 capsule by mouth once daily 90 capsule 3    ondansetron (ZOFRAN ODT) 4 MG ODT tab Take 1 tablet (4 mg) by mouth every 8 hours as needed for nausea 60 tablet 2    oxyBUTYnin (DITROPAN) 5 MG tablet Take 1 tablet (5 mg) by mouth 2 times daily 180 tablet 0    polyethylene glycol (GOLYTELY) 236 g suspension One day prior to procedure at 3 pm fill container with water. Cover and shake until mixed well. Drink an 8 oz. glass of mixture every 10-15 minutes until the 1st half of the jug is gone. Place the remainder of the Golytely in the refrigerator. At 8 pm, drink the 2nd half of the jug of Golytely bowel prep. Drink an 8 oz. glass of Golytely every 10-15 minutes until the container of Golytely is gone. 4000 mL 0    polyethylene glycol (MIRALAX) 17 GM/Dose powder Take 1 capful (17g) of Miralax mixed with 8 oz of a clear liquid twice daily for 7 days  "prior to your scheduled procedure. 238 g 0    potassium chloride albina ER (KLOR-CON M20) 20 MEQ CR tablet TAKE 1 TABLET BY MOUTH THREE TIMES A  tablet 0    Semaglutide-Weight Management (WEGOVY) 0.25 MG/0.5ML pen Inject 0.25 mg Subcutaneous once a week 2 mL 0    sildenafil (REVATIO) 20 MG tablet Take 3-5 tabs 1/2 to 4 hours prior to relations 30 tablet 11    simvastatin (ZOCOR) 20 MG tablet TAKE 2 TABLETS BY MOUTH IN THE EVENING AT BEDTIME 180 tablet 1    testosterone cypionate (DEPOTESTOSTERONE) 200 MG/ML injection Inject 1 mL (200 mg) into the muscle every 14 days 2 mL 5    traMADol (ULTRAM) 50 MG tablet Take 1 tablet (50 mg) by mouth every 6 hours as needed for severe pain 108 tablet 1    traZODone (DESYREL) 100 MG tablet Take 100 mg by mouth At Bedtime      triamcinolone (KENALOG) 0.1 % external cream APPLY TO AFFECTED AREA TWICE A DAY 90 g 1    vitamin C (ASCORBIC ACID) 1000 MG TABS       ORDER FOR DME CPAP daily      syringe/needle, disp, 25G X 1\" 3 ML MISC Use for testosterone injections 12 each 3     Allergies   Allergen Reactions    Accupril [Ace Inhibitors] Difficulty breathing     accupril causes SOB    Aptiom [Eslicarbazepine] Difficulty breathing    Atorvastatin Calcium      Myalgias from Lipitor    Contrast Dye Hives     3/11/2024 Pt here for MRI brain with contrast. In our chart 2002 states allergy to contrast, hives, not indication of what contrast. I see at [facility] in 2014 pt had an MRI brain with contrast and nothing under allergies and pt states they were fine for that. Dr. Coffey ok 'd contrast today and pt did well. TA    Lactose GI Disturbance    Lisinopril Difficulty breathing     SOB    Nitroglycerin      Headache    Paroxetine Hives    Tetracycline [Tetracyclines & Related]      \"splitting headaches\"    Vimpat [Lacosamide] Difficulty breathing    Zolpidem     Adhesive Tape Rash     Without itching- EKG pads     Recent Labs   Lab Test 02/09/24  0659 01/16/24  1530 05/04/23  0954 " 01/20/23  1020 12/20/22  1032 04/04/22  1402 09/28/21  1049 03/23/21  1015 02/08/21  1432 12/08/20  0935 07/03/20  0802   A1C  --   --  5.6  --   --   --   --   --   --   --   --    LDL 61  --   --   --  67  --   --   --   --   --  69   HDL 55  --   --   --  30*  --   --   --   --   --  53   TRIG 101  --   --   --  162*  --   --   --   --   --  124   ALT  --   --  6* 7  --  16   < > 9 12  --   --    CR  --   --  1.05 0.87 0.96 1.05   < > 1.08 0.97   < > 1.07   GFRESTIMATED  --   --  75 >90 84 76   < > 69 78   < > 70   GFRESTBLACK  --   --   --   --   --   --   --  80 >90   < > 81   POTASSIUM  --   --  3.7 4.1 4.6 3.1*   < > 3.3* 3.7   < > 3.7   TSH 1.67 0.60  --   --   --   --    < >  --  0.49  --   --     < > = values in this interval not displayed.        Current providers sharing in care for this patient include:  Patient Care Team:  Matt Swan PA-C as PCP - General (Physician Assistant)  Dimas Qureshi MD as MD (Neurology)  Matt Swan PA-C as Assigned PCP  Matt Swan PA-C as Referring Physician (Family Medicine)  Thomas Hinton MD as MD (Dermatology)  Matt Swan PA-C as Assigned Pain Medication Provider  Michel Zavala MD as MD (Ophthalmology)  Thomas Hinton MD as Referring Physician (Dermatology)  Brando Davis MD as Assigned Neuroscience Provider  Odell Morgan MD as Assigned Musculoskeletal Provider  Alba Hernandez PA-C as Physician Assistant (Gastroenterology)  Anaya Kerns MD as MD (Endocrinology, Diabetes, and Metabolism)  Александр Su DPM as Assigned Surgical Provider    The following health maintenance items are reviewed in Epic and correct as of today:  Health Maintenance   Topic Date Due    HEPATITIS A IMMUNIZATION (1 of 2 - Risk 2-dose series) Never done    HEPATITIS B IMMUNIZATION (1 of 3 - Risk 3-dose series) Never done    COLORECTAL CANCER SCREENING  08/17/2022    ASTHMA ACTION PLAN  12/20/2023    COVID-19 Vaccine (7  "- 2023-24 season) 02/13/2024    BMP  05/04/2024    MICROALBUMIN  05/04/2024    ANNUAL REVIEW OF HM ORDERS  07/25/2024    MEDICARE ANNUAL WELLNESS VISIT  07/25/2024    INFLUENZA VACCINE (1) 09/01/2024    URINE DRUG SCREEN  01/16/2025    CONTROLLED SUBSTANCE AGREEMENT FOR CHRONIC PAIN MANAGEMENT  01/16/2025    ASTHMA CONTROL TEST  01/29/2025    LIPID  02/09/2025    SHREYA ASSESSMENT  07/29/2025    PHQ-9  07/29/2025    FALL RISK ASSESSMENT  08/05/2025    GLUCOSE  05/04/2026    DEXA  06/13/2027    DTAP/TDAP/TD IMMUNIZATION (3 - Td or Tdap) 10/05/2028    ADVANCE CARE PLANNING  07/29/2029    HEPATITIS C SCREENING  Completed    DEPRESSION ACTION PLAN  Completed    MIGRAINE ACTION PLAN  Completed    Pneumococcal Vaccine: 65+ Years  Completed    URINALYSIS  Completed    ZOSTER IMMUNIZATION  Completed    RSV VACCINE (Pregnancy & 60+)  Completed    IPV IMMUNIZATION  Aged Out    HPV IMMUNIZATION  Aged Out    MENINGITIS IMMUNIZATION  Aged Out    RSV MONOCLONAL ANTIBODY  Aged Out       Appropriate preventive services were discussed with this patient, including applicable screening as appropriate for fall prevention, nutrition, physical activity, Tobacco-use cessation, weight loss and cognition.  Checklist reviewing preventive services available has been given to the patient.           7/29/2024   Mini Cog   Clock Draw Score 2 Normal    2 Normal   3 Item Recall 2 objects recalled    2 objects recalled   Mini Cog Total Score 4    4       Multiple values from one day are sorted in reverse-chronological order             7/29/2024   Vision Screen   Reason Vision Screen Not Completed Patient had exam in last 12 months            Review of Systems  Constitutional, HEENT, cardiovascular, pulmonary, GI, , musculoskeletal, neuro, skin, endocrine and psych systems are negative, except as otherwise noted.      Objective    /68   Pulse 68   Temp 98.1  F (36.7  C) (Oral)   Resp 20   Ht 1.759 m (5' 9.25\")   Wt 136.3 kg (300 lb 6.4 " oz)   SpO2 95%   BMI 44.04 kg/m    Body mass index is 44.04 kg/m .  Physical Exam   GENERAL: alert and no distress  EYES: Eyes grossly normal to inspection, PERRL and conjunctivae and sclerae normal  HENT: ear canals and TM's normal, nose and mouth without ulcers or lesions  NECK: no adenopathy, no asymmetry, masses, or scars  RESP: lungs clear to auscultation - no rales, rhonchi or wheezes  CV: regular rate and rhythm, normal S1 S2, no S3 or S4, no murmur, click or rub, no peripheral edema  ABDOMEN: soft, nontender, no hepatosplenomegaly, no masses and bowel sounds normal  MS: no gross musculoskeletal defects noted, no edema  NEURO: Normal strength and tone, mentation intact and speech normal  PSYCH: mentation appears normal, affect normal/bright  Skin: red slightly tender papules on cheeks and phan area.     Demian was seen today for derm problem and wellness visit.    Diagnoses and all orders for this visit:    Encounter for annual wellness visit (AWV) in Medicare patient    CKD (chronic kidney disease) stage 2, GFR 60-89 ml/min  -     BASIC METABOLIC PANEL; Future  -     Albumin Random Urine Quantitative with Creat Ratio; Future    Current moderate episode of major depressive disorder without prior episode (H)    Benign essential hypertension  -     BASIC METABOLIC PANEL; Future  -     Albumin Random Urine Quantitative with Creat Ratio; Future    Folliculitis barbae  -     amoxicillin (AMOXIL) 875 MG tablet; Take 1 tablet (875 mg) by mouth 2 times daily for 10 days    Other orders  -     REVIEW OF HEALTH MAINTENANCE PROTOCOL ORDERS      Continue all current meds.  Lower fat, higher fiber diet and consistent exercise.  Lower calorie diet.         Signed Electronically by: Matt Swan PA-C

## 2024-07-30 LAB
ANION GAP SERPL CALCULATED.3IONS-SCNC: 9 MMOL/L (ref 7–15)
BUN SERPL-MCNC: 13.6 MG/DL (ref 8–23)
CALCIUM SERPL-MCNC: 9.4 MG/DL (ref 8.8–10.4)
CHLORIDE SERPL-SCNC: 94 MMOL/L (ref 98–107)
CREAT SERPL-MCNC: 1.05 MG/DL (ref 0.67–1.17)
CREAT UR-MCNC: 33.6 MG/DL
EGFRCR SERPLBLD CKD-EPI 2021: 75 ML/MIN/1.73M2
GLUCOSE SERPL-MCNC: 92 MG/DL (ref 70–99)
HCO3 SERPL-SCNC: 33 MMOL/L (ref 22–29)
MICROALBUMIN UR-MCNC: <12 MG/L
MICROALBUMIN/CREAT UR: NORMAL MG/G{CREAT}
POTASSIUM SERPL-SCNC: 3.6 MMOL/L (ref 3.4–5.3)
SODIUM SERPL-SCNC: 136 MMOL/L (ref 135–145)

## 2024-07-30 NOTE — TELEPHONE ENCOUNTER
[facility] Call Center    Phone Message    May a detailed message be left on voicemail: yes     Reason for Call: Patient is calling to speak to LUNA Connelly regarding a missed call from clinic    Action Taken: Message routed to:  Adult Clinics: Neurology p [phone]    Travel Screening: Not Applicable     Date of Service:

## 2024-07-30 NOTE — TELEPHONE ENCOUNTER
"\"    [facility] CLINICAL DOCUMENTATION    Form Documentation Form or Letter Request    Type or form/letter needing completion: Disability Paperwork  Provider:  Dr Davis   Has provider seen patient for office visit related to reason for form request? Yes  Date form needed: ASAP  Once completed: Fax form to: [phone] and call pt at [phone] to inform him it has be faxed.      Pt came to Desk C and asking that Disability paperwork be filled out by Dr. Davis and faxed when completed. Pt stated he needs the form done as soon as possible as it was due last week. Paperwork given to neuro desk 7/30/24 @9:30am.  Anaya Birch  "

## 2024-07-30 NOTE — TELEPHONE ENCOUNTER
Called and spoke with patient. Let him know that the forms have been signed. Asked if he would like a copy, and he declined. Faxed forms to Elena at [phone]. Fax receipt verified via rightfax.

## 2024-08-09 ENCOUNTER — TELEPHONE (OUTPATIENT)
Dept: ENDOCRINOLOGY | Facility: CLINIC | Age: 74
End: 2024-08-09
Payer: MEDICARE

## 2024-08-09 NOTE — TELEPHONE ENCOUNTER
Patient confirmed scheduled appointment:  Date: 11/18   Time: 11:30   Visit type: return diabetes   Provider: Saumya   Location: [facility] [place], [place]  Testing/imaging: NA   Additional notes: Spoke to pt spouse and re-zully appt on 10/29 to due to changes in the providers schedule to a date and time that worked best for pt     Anaya Ponce on 8/9/2024 at 3:17 PM

## 2024-08-13 ENCOUNTER — OFFICE VISIT (OUTPATIENT)
Dept: FAMILY MEDICINE | Facility: CLINIC | Age: 74
End: 2024-08-13
Payer: MEDICARE

## 2024-08-13 VITALS
OXYGEN SATURATION: 96 % | WEIGHT: 295.2 LBS | BODY MASS INDEX: 42.26 KG/M2 | DIASTOLIC BLOOD PRESSURE: 64 MMHG | TEMPERATURE: 97.3 F | HEART RATE: 67 BPM | HEIGHT: 70 IN | SYSTOLIC BLOOD PRESSURE: 130 MMHG | RESPIRATION RATE: 20 BRPM

## 2024-08-13 DIAGNOSIS — L98.9 SKIN LESION: Primary | ICD-10-CM

## 2024-08-13 PROCEDURE — 88305 TISSUE EXAM BY PATHOLOGIST: CPT | Performed by: PATHOLOGY

## 2024-08-13 PROCEDURE — 11421 EXC H-F-NK-SP B9+MARG 0.6-1: CPT | Performed by: PHYSICIAN ASSISTANT

## 2024-08-13 ASSESSMENT — PAIN SCALES - GENERAL: PAINLEVEL: NO PAIN (1)

## 2024-08-13 NOTE — PROGRESS NOTES
"    Subjective   Demian is a 73 year old, presenting for the following health issues:  Biopsy        8/13/2024     1:21 PM   Additional Questions   Roomed by Inez   Accompanied by n/a     History of Present Illness       Reason for visit:  Have a cyst biopsyed    He eats 0-1 servings of fruits and vegetables daily.He consumes 0 sweetened beverage(s) daily.He exercises with enough effort to increase his heart rate 9 or less minutes per day.  He exercises with enough effort to increase his heart rate 3 or less days per week.   He is taking medications regularly.           Review of Systems  Constitutional, HEENT, cardiovascular, pulmonary, GI, , musculoskeletal, neuro, skin, endocrine and psych systems are negative, except as otherwise noted.      Objective    /64   Pulse 67   Temp 97.3  F (36.3  C) (Temporal)   Resp 20   Ht 1.774 m (5' 9.84\")   Wt 133.9 kg (295 lb 3.2 oz)   SpO2 96%   BMI 42.55 kg/m    Body mass index is 42.55 kg/m .  Physical Exam   Subjective: Prashant Keyes a 73 year old male who presents today for lesion removal. The lesion is located on the scalp,  and measures 0.9cm.  He denies other significant symptoms on ROS. Medications reviewed.    Objective: The area was prepped and appropriately anesthetized. Using the usual technique, shave excision was performed. Hemostasis achieved via hyfrecation  An appropriate  dressing was applied.  The procedure was well tolerated and without complications. Specimen was sent.    Assessment: Demian was seen today for biopsy.    Diagnoses and all orders for this visit:    Skin lesion  -     Surgical Pathology Exam  -     Cancel: EXC BENIGN SKIN LESION SCLP/NCK/HNDS/FEET/GEN 0.6-1.0 CM  -     EXC BENIGN SKIN LESION SCLP/NCK/HNDS/FEET/GEN 0.6-1.0 CM          Plan: Follow up: The specimen is labelled and sent to pathology for evaluation. Wound care instructions provided.  Patient was instructed to be alert for any signs of cutaneous " infection.    Signed Electronically by: Matt Swan PA-C

## 2024-08-15 LAB
PATH REPORT.COMMENTS IMP SPEC: NORMAL
PATH REPORT.COMMENTS IMP SPEC: NORMAL
PATH REPORT.FINAL DX SPEC: NORMAL
PATH REPORT.GROSS SPEC: NORMAL
PATH REPORT.MICROSCOPIC SPEC OTHER STN: NORMAL
PATH REPORT.RELEVANT HX SPEC: NORMAL
PHOTO IMAGE: NORMAL

## 2024-08-19 ENCOUNTER — TELEPHONE (OUTPATIENT)
Dept: FAMILY MEDICINE | Facility: CLINIC | Age: 74
End: 2024-08-19

## 2024-08-19 NOTE — TELEPHONE ENCOUNTER
Prior Authorization Retail Medication Request    Medication/Dose: Semaglutide-Weight Management (WEGOVY) 0.25 MG/0.5ML pen  Diagnosis and ICD code (if different than what is on RX):  E66.01   New/renewal/insurance change PA/secondary ins. PA:  Previously Tried and Failed:  na  Rationale:  na    Insurance   Primary:  MEDICARE  Insurance ID:  [id number]     Secondary (if applicable): FOR LIFE  Insurance ID:  [id number]     Pharmacy Information (if different than what is on RX)  Name:  [facility]  Phone:  [phone]  Fax:     [phone]

## 2024-08-20 NOTE — TELEPHONE ENCOUNTER
PA Initiation    Medication: WEGOVY 0.25 MG/0.5ML SC SOAJ  Insurance Company:  - Phone [phone] Fax [phone]  Pharmacy Filling the Rx: [id number] - [place], [place] - [address] [address] [place]  Filling Pharmacy Phone: [phone]  Filling Pharmacy Fax:    Start Date: 8/20/2024

## 2024-08-21 NOTE — TELEPHONE ENCOUNTER
PRIOR AUTHORIZATION DENIED    Medication: WEGOVY 0.25 MG/0.5ML SC SOAJ  Insurance Company:  - Phone [phone] Fax [phone]  Denial Date: 8/21/2024  Denial Reason(s): Have no data to support a 4% weight loss over 16 weeks.    Appeal Information:     Patient Notified: No

## 2024-08-23 ENCOUNTER — MYC MEDICAL ADVICE (OUTPATIENT)
Dept: FAMILY MEDICINE | Facility: CLINIC | Age: 74
End: 2024-08-23
Payer: MEDICARE

## 2024-08-23 DIAGNOSIS — E66.01 MORBID OBESITY (H): Primary | ICD-10-CM

## 2024-08-23 NOTE — TELEPHONE ENCOUNTER
Appears denied in other encounter 8/19/24. Please see denial.     Are you wanting to appeal? Please advise.     Sonya Morales RN on 8/23/2024 at 1:04 PM

## 2024-08-26 NOTE — TELEPHONE ENCOUNTER
"I see Zepbound Rx was sent, still \"waiting for payer response\" in electronic PA system.    Routed response to patient advising of the delay.    Yolis CHAHAL RN  [facility] Triage    "

## 2024-09-06 DIAGNOSIS — R60.0 PERIPHERAL EDEMA: ICD-10-CM

## 2024-09-09 RX ORDER — FUROSEMIDE 20 MG
40 TABLET ORAL DAILY
Qty: 60 TABLET | Refills: 0 | Status: SHIPPED | OUTPATIENT
Start: 2024-09-09 | End: 2024-10-07

## 2024-09-10 DIAGNOSIS — L92.0 GRANULOMA ANNULARE: ICD-10-CM

## 2024-09-10 RX ORDER — HYDROXYCHLOROQUINE SULFATE 200 MG/1
200 TABLET, FILM COATED ORAL 2 TIMES DAILY
Qty: 60 TABLET | Refills: 5 | OUTPATIENT
Start: 2024-09-10

## 2024-09-17 ENCOUNTER — TELEPHONE (OUTPATIENT)
Dept: FAMILY MEDICINE | Facility: CLINIC | Age: 74
End: 2024-09-17
Payer: MEDICARE

## 2024-09-17 DIAGNOSIS — E66.01 MORBID OBESITY (H): Primary | ICD-10-CM

## 2024-09-17 NOTE — TELEPHONE ENCOUNTER
Prior Authorization Retail Medication Request    Medication/Dose: liraglutide - Weight Management (SAXENDA) 18 MG/3ML pen  Diagnosis and ICD code (if different than what is on RX):  E66.01   New/renewal/insurance change PA/secondary ins. PA:  Previously Tried and Failed:  na  Rationale:  na    Insurance   Primary: MEDICARE  Insurance ID:  [id number]     Secondary (if applicable): FOR LIFE  Insurance ID: [id number]      Pharmacy Information (if different than what is on RX)  Name:  [facility]  Phone:  [phone]  Fax:     [phone]

## 2024-09-20 NOTE — TELEPHONE ENCOUNTER
Retail Pharmacy Prior Authorization Team   Phone: [phone]    PRIOR AUTHORIZATION DENIED    Medication: SAXENDA 18 MG/3ML SC SOPN  Insurance Company: [employer] Non-Specialty PA's - Phone [phone] Fax [phone]  Denial Date: 9/20/2024  Denial Reason(s): PATIENT MUST TRY AND FAIL ALL PREFERRED ALTERNATIVES: PHENTERMINE,QYSMIA, CONTRAVE, WEGOVY, ZEPBOUND.    Appeal Information:

## 2024-09-20 NOTE — TELEPHONE ENCOUNTER
Retail Pharmacy Prior Authorization Team   Phone: [phone]    PA Initiation    Medication: SAXENDA 18 MG/3ML SC SOPN  Insurance Company: [employer] Non-Specialty PA's - Phone [phone] Fax [phone]  Pharmacy Filling the Rx: [id number] - [place], [place] - [address] [address]  Filling Pharmacy Phone: [phone]  Filling Pharmacy Fax:    Start Date: 9/20/2024

## 2024-10-07 DIAGNOSIS — I10 BENIGN ESSENTIAL HYPERTENSION: ICD-10-CM

## 2024-10-08 RX ORDER — AMLODIPINE BESYLATE 10 MG/1
TABLET ORAL
Qty: 90 TABLET | Refills: 0 | Status: SHIPPED | OUTPATIENT
Start: 2024-10-08

## 2024-10-17 ENCOUNTER — TELEPHONE (OUTPATIENT)
Dept: FAMILY MEDICINE | Facility: CLINIC | Age: 74
End: 2024-10-17
Payer: MEDICARE

## 2024-10-17 NOTE — TELEPHONE ENCOUNTER
Retail Pharmacy Prior Authorization Team   Phone: [phone]        PA Initiation    Medication: ZEPBOUND 2.5 MG/0.5ML SC SOAJ  Insurance Company: [employer] - Phone [phone] Fax [phone]  Pharmacy Filling the Rx: [id number] - [place], [place] - [address] [address]  Filling Pharmacy Phone: [phone]  Filling Pharmacy Fax: [phone]  Start Date: 10/17/2024

## 2024-10-18 NOTE — TELEPHONE ENCOUNTER
Patient has been informed of approval.      Mariana Delgadillo   Lead    [facility] Kaiden Wise

## 2024-11-09 DIAGNOSIS — I10 BENIGN ESSENTIAL HYPERTENSION: ICD-10-CM

## 2024-11-11 ENCOUNTER — OFFICE VISIT (OUTPATIENT)
Dept: FAMILY MEDICINE | Facility: CLINIC | Age: 74
End: 2024-11-11
Payer: MEDICARE

## 2024-11-11 VITALS
SYSTOLIC BLOOD PRESSURE: 114 MMHG | HEIGHT: 70 IN | DIASTOLIC BLOOD PRESSURE: 64 MMHG | TEMPERATURE: 97.6 F | OXYGEN SATURATION: 94 % | RESPIRATION RATE: 24 BRPM | WEIGHT: 285.6 LBS | BODY MASS INDEX: 40.89 KG/M2 | HEART RATE: 73 BPM

## 2024-11-11 DIAGNOSIS — T88.7XXA MEDICATION SIDE EFFECTS: Primary | ICD-10-CM

## 2024-11-11 DIAGNOSIS — E66.01 MORBID OBESITY (H): ICD-10-CM

## 2024-11-11 DIAGNOSIS — F32.1 CURRENT MODERATE EPISODE OF MAJOR DEPRESSIVE DISORDER WITHOUT PRIOR EPISODE (H): ICD-10-CM

## 2024-11-11 DIAGNOSIS — E78.00 PURE HYPERCHOLESTEROLEMIA: ICD-10-CM

## 2024-11-11 PROCEDURE — 99213 OFFICE O/P EST LOW 20 MIN: CPT | Performed by: PHYSICIAN ASSISTANT

## 2024-11-11 RX ORDER — TRAMADOL HYDROCHLORIDE 50 MG/1
50 TABLET ORAL EVERY 6 HOURS PRN
Qty: 108 TABLET | Refills: 0 | Status: CANCELLED | OUTPATIENT
Start: 2024-11-11

## 2024-11-11 RX ORDER — SIMVASTATIN 20 MG
TABLET ORAL
Qty: 180 TABLET | Refills: 1 | Status: SHIPPED | OUTPATIENT
Start: 2024-11-11

## 2024-11-11 RX ORDER — CARVEDILOL 12.5 MG/1
TABLET ORAL
Qty: 180 TABLET | Refills: 2 | Status: SHIPPED | OUTPATIENT
Start: 2024-11-11

## 2024-11-11 NOTE — PROGRESS NOTES
"    Subjective   Demian is a 73 year old, presenting for the following health issues:  Forms (Forms for employer) and Recheck Medication (Discuss zepbound, diarrhea)        11/11/2024     9:58 AM   Additional Questions   Roomed by Alta Schmitt CMA   Accompanied by None         11/11/2024     9:58 AM   Patient Reported Additional Medications   Patient reports taking the following new medications none     History of Present Illness       Reason for visit:  Fill out a form   He is taking medications regularly.     Discuss medication  - Zepbound - diarrhea. Abdominal cramping.  A lot of side effects the past several weeks since starting the zepbound. Advised to discontinue zepbound . Patient to f/up with medical weight loss clinic next week.  Has missed a lot of work over the past several weeks.     Mood is stable. Could be a positive affect of his glp 1.     Review of Systems  Constitutional, HEENT, cardiovascular, pulmonary, GI, , musculoskeletal, neuro, skin, endocrine and psych systems are negative, except as otherwise noted.      Objective    /64   Pulse 73   Temp 97.6  F (36.4  C) (Oral)   Resp 24   Ht 1.774 m (5' 9.84\")   Wt 129.5 kg (285 lb 9.6 oz)   SpO2 94%   BMI 41.16 kg/m    Body mass index is 41.16 kg/m .  Physical Exam   Eye exam - right eye normal lid, conjunctiva, cornea, pupil and fundus, left eye normal lid, conjunctiva, cornea, pupil and fundus.  Thyroid not palpable, not enlarged, no nodules detected.  CHEST:chest clear to IPPA, no tachypnea, retractions or cyanosis, and S1, S2 normal, no murmur, no gallop, rate regular.  The abdomen is soft without tenderness, guarding, mass, rebound or organomegaly. Bowel sounds are normal. No CVA tenderness or inguinal adenopathy noted.  Demian was seen today for forms and recheck medication.    Diagnoses and all orders for this visit:    Medication side effects    Pure Hypercholesterolemia goal ldl <130  -     simvastatin (ZOCOR) 20 MG tablet; TAKE " 2 TABLETS BY MOUTH IN THE EVENING AT BEDTIME    Morbid obesity (H)    Current moderate episode of major depressive disorder without prior episode (H)      Discontinue zepbound  F/up with medical weight loss.  Immodium for the next week or two.  Fmla paperwork completed.  Continue all other meds.        Signed Electronically by: Matt Swan PA-C

## 2024-11-11 NOTE — LETTER
My Asthma Action Plan    Name: Prashant Keyes   YOB: 1950  Date: 11/11/2024   My doctor: Matt Swan PA-C   My clinic: [facility] [place]        My Rescue Medicine:   Albuterol inhaler (Proair/Ventolin/Proventil HFA)  2-4 puffs EVERY 4 HOURS as needed. Use a spacer if recommended by your provider.   My Asthma Severity:   Intermittent / Exercise Induced  Know your asthma triggers:              GREEN ZONE   Good Control  I feel good  No cough or wheeze  Can work, sleep and play without asthma symptoms       Take your asthma control medicine every day.     If exercise triggers your asthma, take your rescue medication  15 minutes before exercise or sports, and  During exercise if you have asthma symptoms  Spacer to use with inhaler: If you have a spacer, make sure to use it with your inhaler             YELLOW ZONE Getting Worse  I have ANY of these:  I do not feel good  Cough or wheeze  Chest feels tight  Wake up at night   Keep taking your Green Zone medications  Start taking your rescue medicine:  every 20 minutes for up to 1 hour. Then every 4 hours for 24-48 hours.  If you stay in the Yellow Zone for more than 12-24 hours, contact your doctor.  If you do not return to the Green Zone in 12-24 hours or you get worse, start taking your oral steroid medicine if prescribed by your provider.           RED ZONE Medical Alert - Get Help  I have ANY of these:  I feel awful  Medicine is not helping  Breathing getting harder  Trouble walking or talking  Nose opens wide to breathe       Take your rescue medicine NOW  If your provider has prescribed an oral steroid medicine, start taking it NOW  Call your doctor NOW  If you are still in the Red Zone after 20 minutes and you have not reached your doctor:  Take your rescue medicine again and  Call 911 or go to the emergency room right away    See your regular doctor within 2 weeks of an Emergency Room or Urgent Care visit for follow-up  treatment.          Annual Reminders:  Meet with Asthma Educator,  Flu Shot in the Fall, consider Pneumonia Vaccination for patients with asthma (aged 19 and older).    Pharmacy:    [facility] #[id number] - [place], [place] - [address] [address] [facility] [address] [place] [id number] - [place], [place] - [address] [facility] #[id number] - [place], [place] - [address] [address] [place]    Electronically signed by Matt Swan PA-C   Date: 11/11/24                    Asthma Triggers  How To Control Things That Make Your Asthma Worse    Triggers are things that make your asthma worse.  Look at the list below to help you find your triggers and   what you can do about them. You can help prevent asthma flare-ups by staying away from your triggers.      Trigger                                                          What you can do   Cigarette Smoke  Tobacco smoke can make asthma worse. Do not allow smoking in your home, car or around you.  Be sure no one smokes at a child s day care or school.  If you smoke, ask your health care provider for ways to help you quit.  Ask family members to quit too.  Ask your health care provider for a referral to Quit Plan to help you quit smoking, or call 3-584-573-PLAN.     Colds, Flu, Bronchitis  These are common triggers of asthma. Wash your hands often.  Don t touch your eyes, nose or mouth.  Get a flu shot every year.     Dust Mites  These are tiny bugs that live in cloth or carpet. They are too small to see. Wash sheets and blankets in hot water every week.   Encase pillows and mattress in dust mite proof covers.  Avoid having carpet if you can. If you have carpet, vacuum weekly.   Use a dust mask and HEPA vacuum.   Pollen and Outdoor Mold  Some people are allergic to trees, grass, or weed pollen, or molds. Try to keep your windows closed.  Limit time out doors when pollen count is high.   Ask you health care provider about taking medicine during allergy season.     Animal  Dander  Some people are allergic to skin flakes, urine or saliva from pets with fur or feathers. Keep pets with fur or feathers out of your home.    If you can t keep the pet outdoors, then keep the pet out of your bedroom.  Keep the bedroom door closed.  Keep pets off cloth furniture and away from stuffed toys.     Mice, Rats, and Cockroaches  Some people are allergic to the waste from these pests.   Cover food and garbage.  Clean up spills and food crumbs.  Store grease in the refrigerator.   Keep food out of the bedroom.   Indoor Mold  This can be a trigger if your home has high moisture. Fix leaking faucets, pipes, or other sources of water.   Clean moldy surfaces.  Dehumidify basement if it is damp and smelly.   Smoke, Strong Odors, and Sprays  These can reduce air quality. Stay away from strong odors and sprays, such as perfume, powder, hair spray, paints, smoke incense, paint, cleaning products, candles and new carpet.   Exercise or Sports  Some people with asthma have this trigger. Be active!  Ask your doctor about taking medicine before sports or exercise to prevent symptoms.    Warm up for 5-10 minutes before and after sports or exercise.     Other Triggers of Asthma  Cold air:  Cover your nose and mouth with a scarf.  Sometimes laughing or crying can be a trigger.  Some medicines and food can trigger asthma.

## 2024-11-13 ENCOUNTER — MYC MEDICAL ADVICE (OUTPATIENT)
Dept: FAMILY MEDICINE | Facility: CLINIC | Age: 74
End: 2024-11-13
Payer: MEDICARE

## 2024-11-14 ENCOUNTER — LAB (OUTPATIENT)
Dept: LAB | Facility: CLINIC | Age: 74
End: 2024-11-14
Payer: MEDICARE

## 2024-11-14 DIAGNOSIS — R94.8 ABNORMAL RESULTS OF FUNCTION STUDIES OF OTHER ORGANS AND SYSTEMS: ICD-10-CM

## 2024-11-14 DIAGNOSIS — E29.1 HYPOGONADISM MALE: ICD-10-CM

## 2024-11-14 DIAGNOSIS — Z79.890 LONG-TERM CURRENT USE OF TESTOSTERONE REPLACEMENT THERAPY: ICD-10-CM

## 2024-11-14 LAB — HGB BLD-MCNC: 12.9 G/DL (ref 13.3–17.7)

## 2024-11-14 PROCEDURE — 85018 HEMOGLOBIN: CPT

## 2024-11-14 PROCEDURE — 84153 ASSAY OF PSA TOTAL: CPT

## 2024-11-14 PROCEDURE — 36415 COLL VENOUS BLD VENIPUNCTURE: CPT

## 2024-11-15 LAB — PSA SERPL DL<=0.01 NG/ML-MCNC: 1.91 NG/ML (ref 0–6.5)

## 2024-11-17 LAB — TESTOST SERPL-MCNC: 550 NG/DL (ref 240–950)

## 2024-11-18 ENCOUNTER — VIRTUAL VISIT (OUTPATIENT)
Dept: ENDOCRINOLOGY | Facility: CLINIC | Age: 74
End: 2024-11-18
Payer: MEDICARE

## 2024-11-18 DIAGNOSIS — N42.9 DISORDER OF PROSTATE, UNSPECIFIED: ICD-10-CM

## 2024-11-18 DIAGNOSIS — R94.8 ABNORMAL RESULTS OF FUNCTION STUDIES OF OTHER ORGANS AND SYSTEMS: ICD-10-CM

## 2024-11-18 DIAGNOSIS — Z51.81 ENCOUNTER FOR MONITORING TESTOSTERONE REPLACEMENT THERAPY: ICD-10-CM

## 2024-11-18 DIAGNOSIS — Z12.5 SCREENING FOR PROSTATE CANCER: ICD-10-CM

## 2024-11-18 DIAGNOSIS — Z12.5 SPECIAL SCREENING FOR MALIGNANT NEOPLASM OF PROSTATE: ICD-10-CM

## 2024-11-18 DIAGNOSIS — Z79.890 ENCOUNTER FOR MONITORING TESTOSTERONE REPLACEMENT THERAPY: ICD-10-CM

## 2024-11-18 DIAGNOSIS — E29.1 HYPOGONADISM MALE: ICD-10-CM

## 2024-11-18 DIAGNOSIS — E34.9 HYPOTESTOSTERONEMIA: ICD-10-CM

## 2024-11-18 DIAGNOSIS — Z79.890 LONG-TERM CURRENT USE OF TESTOSTERONE REPLACEMENT THERAPY: Primary | ICD-10-CM

## 2024-11-18 RX ORDER — TESTOSTERONE CYPIONATE 200 MG/ML
200 INJECTION, SOLUTION INTRAMUSCULAR
Qty: 2 ML | Refills: 5 | Status: SHIPPED | OUTPATIENT
Start: 2024-11-18

## 2024-11-18 ASSESSMENT — PAIN SCALES - GENERAL: PAINLEVEL_OUTOF10: MILD PAIN (3)

## 2024-11-18 NOTE — LETTER
11/18/2024       RE: Prashant Keyes  [address]  [place] [address]     Dear Colleague,    Thank you for referring your patient, Prashant Keyes, to the [facility] [place] [facility]. Please see a copy of my visit note below.    This 73-year-old man was seen in follow-up for primary hypogonadism.  I first saw him in February 2024 when he was referred to me by his primary care provider because of a low testosterone.  The evaluation that was done indicated he has primary hypogonadism.  He had had an orchiectomy done many years ago because of an undescended testes.  He did fathered children but then had an episode of epididymitis as an adult.  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  He has been taking testosterone 200 mg every 14 days for the last couple of months.  He had the labs listed below done midway between his dosing interval.  Today he reports he continues to feel much improved.  He is sleeping better, his mood is better, his energy levels is better.  He actually thinks he is getting some axillary hair growth.  Overall he feels much brighter.  He is taking his testosterone 200 mg by injection every 2 weeks.      His primary doctor prescribed tirazepitide for him but he developed GI side effects.  He did lose 20 pounds on the drug and hopes he can keep that off.    He is scheduled for colonoscopy in a few weeks.    Current Outpatient Medications   Medication Sig Dispense Refill     albuterol (PROAIR HFA/PROVENTIL HFA/VENTOLIN HFA) 108 (90 Base) MCG/ACT inhaler Inhale 2 puffs into the lungs every 6 hours as needed for shortness of breath / dyspnea 8.5 g 0     allopurinol (ZYLOPRIM) 100 MG tablet TAKE 2 TABLETS BY MOUTH EVERY  tablet 3     amLODIPine (NORVASC) 10 MG tablet TAKE 1 TABLET BY MOUTH WITH EVENING MEAL 90 tablet 0     aspirin 81 MG tablet Take 1 tablet (81 mg) by mouth  daily 30 tablet      bisacodyl (DULCOLAX) 5 MG EC tablet Two days prior to procedure take two (2) tablets at 10am. One day prior to procedure take two (2) tablets at 10am 4 tablet 0     carbidopa-levodopa (SINEMET)  MG tablet Take 1.5 tablets by mouth 3 times daily 405 tablet 3     carvedilol (COREG) 12.5 MG tablet TAKE 1 TABLET BY MOUTH TWICE A DAY WITH FOOD 180 tablet 2     chlorthalidone (HYGROTON) 25 MG tablet TAKE 1 TABLET BY MOUTH EVERY DAY 90 tablet 0     Cholecalciferol (VITAMIN D) 2000 UNITS tablet Take 2,000 Units by mouth daily 90 tablet 3     divalproex sodium delayed-release (DEPAKOTE) 500 MG DR tablet Take 1 tablet (500 mg) by mouth 2 times daily. 180 tablet 3     ferrous sulfate (IRON) 325 (65 FE) MG tablet Take 1 tablet (325 mg) by mouth 2 times daily 60 tablet 2     FLUoxetine (PROZAC) 40 MG capsule TAKE 1 CAPSULE BY MOUTH ONCE DAILY 30 capsule 0     furosemide (LASIX) 20 MG tablet Take 2 tablets (40 mg) by mouth daily 60 tablet 0     gabapentin (NEURONTIN) 300 MG capsule TAKE 2 CAPSULES BY MOUTH THREE TIME A  capsule 0     hydrALAZINE (APRESOLINE) 25 MG tablet Take 1 tablet (25 mg) by mouth 2 times daily 180 tablet 1     hydroxychloroquine (PLAQUENIL) 200 MG tablet Take 1 tablet (200 mg) by mouth 2 times daily. 60 tablet 5     levETIRAcetam (KEPPRA) 1000 MG tablet TAKE 1 TABLET BY MOUTH TWICE DAILY IN THE MORNING AND AT BEDTIME 180 tablet 3     LORazepam (ATIVAN) 0.5 MG tablet Take 1-2 tabs one hour prior to your mri 2 tablet 0     losartan (COZAAR) 100 MG tablet Take 1 tablet (100 mg) by mouth daily 30 tablet 0     meloxicam (MOBIC) 15 MG tablet Take 1 tablet (15 mg) by mouth daily 90 tablet 0     Multiple Vitamin (MULTI VITAMIN MENS PO) Take  by mouth.       omeprazole (PRILOSEC) 40 MG DR capsule Take 1 capsule by mouth once daily 90 capsule 3     ORDER FOR DME CPAP daily       oxyBUTYnin (DITROPAN) 5 MG tablet Take 1 tablet (5 mg) by mouth 2 times daily 180 tablet 0     polyethylene  "glycol (GOLYTELY) 236 g suspension One day prior to procedure at 3 pm fill container with water. Cover and shake until mixed well. Drink an 8 oz. glass of mixture every 10-15 minutes until the 1st half of the jug is gone. Place the remainder of the Golytely in the refrigerator. At 8 pm, drink the 2nd half of the jug of Golytely bowel prep. Drink an 8 oz. glass of Golytely every 10-15 minutes until the container of Golytely is gone. 4000 mL 0     polyethylene glycol (MIRALAX) 17 GM/Dose powder Take 1 capful (17g) of Miralax mixed with 8 oz of a clear liquid twice daily for 7 days prior to your scheduled procedure. 238 g 0     potassium chloride albina ER (KLOR-CON M20) 20 MEQ CR tablet TAKE 1 TABLET BY MOUTH THREE TIMES A  tablet 0     sildenafil (REVATIO) 20 MG tablet Take 3-5 tabs 1/2 to 4 hours prior to relations 30 tablet 11     simvastatin (ZOCOR) 20 MG tablet TAKE 2 TABLETS BY MOUTH IN THE EVENING AT BEDTIME 180 tablet 1     syringe/needle, disp, 25G X 1\" 3 ML MISC Use for testosterone injections 12 each 3     testosterone cypionate (DEPOTESTOSTERONE) 200 MG/ML injection Inject 1 mL (200 mg) into the muscle every 14 days 2 mL 5     traMADol (ULTRAM) 50 MG tablet Take 1 tablet (50 mg) by mouth every 6 hours as needed for severe pain. 108 tablet 0     traZODone (DESYREL) 100 MG tablet Take 100 mg by mouth At Bedtime       vitamin C (ASCORBIC ACID) 1000 MG TABS        Current Facility-Administered Medications   Medication Dose Route Frequency Provider Last Rate Last Admin     hylan (SYNVISC ONE) injection 48 mg  48 mg      48 mg at 06/25/24 1113                  97.6  F (36.4  C) -- 97.6  F (36.4  C)  as of 11/11/2024     Pulse: 73 -- 73  as of 11/11/2024    BP: 114/64 -- 114/64  as of 11/11/2024    Resp: 24 -- 24  as of 11/11/2024    SpO2: 94 % -- 94%  as of 11/11/2024    Body Mass Index:   None    Systolic (Patient Repo...: -- -- 115  as of 3/9/2022    Diastolic (Patient Rep...: -- -- 65  as of 3/9/2022  " "  Height: 1.774 m (5' 9.84\") -- 1.774 m (5' 9.84\")  as of 11/11/2024      On exam he is in no acute distress.  Mood is upbeat.  Lab on 11/14/2024   Component Date Value Ref Range Status     Testosterone Total 11/14/2024 550  240 - 950 ng/dL Final     PSA Tumor Marker 11/14/2024 1.91  0.00 - 6.50 ng/mL Final     Hemoglobin 11/14/2024 12.9 (L)  13.3 - 17.7 g/dL Final     his last visit with me was on 8/9/2024.  Assessment and plan:    This 73-year-old man is doing very well on testosterone replacement therapy.  His mood and sense of wellbeing have increased.  His testosterone level is appropriate for a mid dosing interval level.  Will continue on this dose.  His PSA and hemoglobin have remained stable.  Recent blood pressures have been fine.  He does have anemia that is longstanding.  His primary doctor is sending him for colonoscopy soon.    I will plan to see him back in 1 year.  Sooner if there are problems.    I spent 10 minutes with the patient on the video visit (start time 11: 3 0 and end time 11: 4 0).  On the same day of visit I spent an additional 10 minutes reviewing his chart, reviewing and interpreting labs, ordering tests, and doing documentation.  Anaya Kerns MD      Again, thank you for allowing me to participate in the care of your patient.      Sincerely,    Anaya Kerns MD    "

## 2024-11-18 NOTE — PATIENT INSTRUCTIONS
Have your lab test done before our next visit.Please contact us to schedule at any of our [place] lab locations  Call [phone]), select option [phone]

## 2024-11-18 NOTE — NURSING NOTE
Is the patient currently in the state of [place]? YES    Visit mode:VIDEO    If the visit is dropped, the patient can be reconnected by: VIDEO VISIT: Send to e-mail at: [email or web address]    Will anyone else be joining the visit? NO  (If patient encounters technical issues they should call [phone]    How would you like to obtain your AVS? MyChart    Are changes needed to the allergy or medication list? No    Are refills needed on medications prescribed by this physician? NO    Reason for visit: Follow Up    Ana Paula SORTO

## 2024-11-18 NOTE — PROGRESS NOTES
This 73-year-old man was seen in follow-up for primary hypogonadism.  I first saw him in February 2024 when he was referred to me by his primary care provider because of a low testosterone.  The evaluation that was done indicated he has primary hypogonadism.  He had had an orchiectomy done many years ago because of an undescended testes.  He did fathered children but then had an episode of epididymitis as an adult.  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  He has been taking testosterone 200 mg every 14 days for the last couple of months.  He had the labs listed below done midway between his dosing interval.  Today he reports he continues to feel much improved.  He is sleeping better, his mood is better, his energy levels is better.  He actually thinks he is getting some axillary hair growth.  Overall he feels much brighter.  He is taking his testosterone 200 mg by injection every 2 weeks.      His primary doctor prescribed tirazepitide for him but he developed GI side effects.  He did lose 20 pounds on the drug and hopes he can keep that off.    He is scheduled for colonoscopy in a few weeks.    Current Outpatient Medications   Medication Sig Dispense Refill    albuterol (PROAIR HFA/PROVENTIL HFA/VENTOLIN HFA) 108 (90 Base) MCG/ACT inhaler Inhale 2 puffs into the lungs every 6 hours as needed for shortness of breath / dyspnea 8.5 g 0    allopurinol (ZYLOPRIM) 100 MG tablet TAKE 2 TABLETS BY MOUTH EVERY  tablet 3    amLODIPine (NORVASC) 10 MG tablet TAKE 1 TABLET BY MOUTH WITH EVENING MEAL 90 tablet 0    aspirin 81 MG tablet Take 1 tablet (81 mg) by mouth daily 30 tablet     bisacodyl (DULCOLAX) 5 MG EC tablet Two days prior to procedure take two (2) tablets at 10am. One day prior to procedure take two (2) tablets at 10am 4 tablet 0    carbidopa-levodopa (SINEMET)  MG tablet Take 1.5 tablets by mouth 3 times daily 405 tablet 3    carvedilol (COREG) 12.5 MG tablet TAKE 1  TABLET BY MOUTH TWICE A DAY WITH FOOD 180 tablet 2    chlorthalidone (HYGROTON) 25 MG tablet TAKE 1 TABLET BY MOUTH EVERY DAY 90 tablet 0    Cholecalciferol (VITAMIN D) 2000 UNITS tablet Take 2,000 Units by mouth daily 90 tablet 3    divalproex sodium delayed-release (DEPAKOTE) 500 MG DR tablet Take 1 tablet (500 mg) by mouth 2 times daily. 180 tablet 3    ferrous sulfate (IRON) 325 (65 FE) MG tablet Take 1 tablet (325 mg) by mouth 2 times daily 60 tablet 2    FLUoxetine (PROZAC) 40 MG capsule TAKE 1 CAPSULE BY MOUTH ONCE DAILY 30 capsule 0    furosemide (LASIX) 20 MG tablet Take 2 tablets (40 mg) by mouth daily 60 tablet 0    gabapentin (NEURONTIN) 300 MG capsule TAKE 2 CAPSULES BY MOUTH THREE TIME A  capsule 0    hydrALAZINE (APRESOLINE) 25 MG tablet Take 1 tablet (25 mg) by mouth 2 times daily 180 tablet 1    hydroxychloroquine (PLAQUENIL) 200 MG tablet Take 1 tablet (200 mg) by mouth 2 times daily. 60 tablet 5    levETIRAcetam (KEPPRA) 1000 MG tablet TAKE 1 TABLET BY MOUTH TWICE DAILY IN THE MORNING AND AT BEDTIME 180 tablet 3    LORazepam (ATIVAN) 0.5 MG tablet Take 1-2 tabs one hour prior to your mri 2 tablet 0    losartan (COZAAR) 100 MG tablet Take 1 tablet (100 mg) by mouth daily 30 tablet 0    meloxicam (MOBIC) 15 MG tablet Take 1 tablet (15 mg) by mouth daily 90 tablet 0    Multiple Vitamin (MULTI VITAMIN MENS PO) Take  by mouth.      omeprazole (PRILOSEC) 40 MG DR capsule Take 1 capsule by mouth once daily 90 capsule 3    ORDER FOR DME CPAP daily      oxyBUTYnin (DITROPAN) 5 MG tablet Take 1 tablet (5 mg) by mouth 2 times daily 180 tablet 0    polyethylene glycol (GOLYTELY) 236 g suspension One day prior to procedure at 3 pm fill container with water. Cover and shake until mixed well. Drink an 8 oz. glass of mixture every 10-15 minutes until the 1st half of the jug is gone. Place the remainder of the Golytely in the refrigerator. At 8 pm, drink the 2nd half of the jug of Golytely bowel prep. Drink  "an 8 oz. glass of Golytely every 10-15 minutes until the container of Golytely is gone. 4000 mL 0    polyethylene glycol (MIRALAX) 17 GM/Dose powder Take 1 capful (17g) of Miralax mixed with 8 oz of a clear liquid twice daily for 7 days prior to your scheduled procedure. 238 g 0    potassium chloride albina ER (KLOR-CON M20) 20 MEQ CR tablet TAKE 1 TABLET BY MOUTH THREE TIMES A  tablet 0    sildenafil (REVATIO) 20 MG tablet Take 3-5 tabs 1/2 to 4 hours prior to relations 30 tablet 11    simvastatin (ZOCOR) 20 MG tablet TAKE 2 TABLETS BY MOUTH IN THE EVENING AT BEDTIME 180 tablet 1    syringe/needle, disp, 25G X 1\" 3 ML MISC Use for testosterone injections 12 each 3    testosterone cypionate (DEPOTESTOSTERONE) 200 MG/ML injection Inject 1 mL (200 mg) into the muscle every 14 days 2 mL 5    traMADol (ULTRAM) 50 MG tablet Take 1 tablet (50 mg) by mouth every 6 hours as needed for severe pain. 108 tablet 0    traZODone (DESYREL) 100 MG tablet Take 100 mg by mouth At Bedtime      vitamin C (ASCORBIC ACID) 1000 MG TABS        Current Facility-Administered Medications   Medication Dose Route Frequency Provider Last Rate Last Admin    hylan (SYNVISC ONE) injection 48 mg  48 mg      48 mg at 06/25/24 1113                  97.6  F (36.4  C) -- 97.6  F (36.4  C)  as of 11/11/2024     Pulse: 73 -- 73  as of 11/11/2024    BP: 114/64 -- 114/64  as of 11/11/2024    Resp: 24 -- 24  as of 11/11/2024    SpO2: 94 % -- 94%  as of 11/11/2024    Body Mass Index:   None    Systolic (Patient Repo...: -- -- 115  as of 3/9/2022    Diastolic (Patient Rep...: -- -- 65  as of 3/9/2022    Height: 1.774 m (5' 9.84\") -- 1.774 m (5' 9.84\")  as of 11/11/2024      On exam he is in no acute distress.  Mood is upbeat.  Lab on 11/14/2024   Component Date Value Ref Range Status    Testosterone Total 11/14/2024 550  240 - 950 ng/dL Final    PSA Tumor Marker 11/14/2024 1.91  0.00 - 6.50 ng/mL Final    Hemoglobin 11/14/2024 12.9 (L)  13.3 - 17.7 g/dL " Final     his last visit with me was on 8/9/2024.  Assessment and plan:    This 73-year-old man is doing very well on testosterone replacement therapy.  His mood and sense of wellbeing have increased.  His testosterone level is appropriate for a mid dosing interval level.  Will continue on this dose.  His PSA and hemoglobin have remained stable.  Recent blood pressures have been fine.  He does have anemia that is longstanding.  His primary doctor is sending him for colonoscopy soon.    I will plan to see him back in 1 year.  Sooner if there are problems.    I spent 10 minutes with the patient on the video visit (start time 11: 3 0 and end time 11: 4 0).  On the same day of visit I spent an additional 10 minutes reviewing his chart, reviewing and interpreting labs, ordering tests, and doing documentation.  Anaya Kerns MD

## 2024-11-18 NOTE — PROGRESS NOTES
"Virtual Visit Details    Type of service:  Video Visit   Video Start Time: {video visit start/end time for provider to select:822193}  Video End Time:{video visit start/end time for provider to select:809245}    Originating Location (pt. Location): {video visit patient location:401248::\"Home\"}  {PROVIDER LOCATION On-site should be selected for visits conducted from your clinic location or adjoining [facility] hospital, academic office, or other nearby [facility] building. Off-site should be selected for all other provider locations, including home:217668}  Distant Location (provider location):  {virtual location provider:931427}  Platform used for Video Visit: {Virtual Visit Platforms:025729::\"Infomous\"}    "

## 2024-11-18 NOTE — TELEPHONE ENCOUNTER
2:07 pm  Patient left a v/m stating he was returning the call and has blood pressure readings to review.  Please call patient.  Gladys Rascon RN    
Left message for pt to call clinic back to discuss BP's since medication change and to schedule K lab draw in 2 weeks.    Randi Fernandez RN    
Pt's recent K draw was 3.9. Pt to keep Chlorthalidone dose at 1 tablet and watch dizziness. If it worsens, to call clinic. Pt verbalized understanding.    Randi Fernandez RN    
Reviewed with Dr. Quintanilla, and pt to continue on same dose of Chlorthalidone for now, watch dizziness and let us know if that increases and we will f/u with him after his K draw.    Pt verbalized understanding.    Randi Fernandez RN    
Spoke to pt about BP's since decreasing his Chlorthalidone to 1 tablet only (from the 1.5 tablets) due to low potassium. All BP's were taken at noon and again at 6pm. Pt is getting his K re-drawn on 12-21. Pt also states that he is now down 70 pounds of weight loss total.    11/30: 134/70, 65 and 153,72, 69  12/1: 145/67, 68 and 139/78, 65  12/2: 134/69, 81, and 140/70, 75  12/3: 138/72, 69 and 136/63, 69  12/4: 110/70, 68 and 138/70, 69  12/5: 115/61, 68 (dizzy) and 133/71, 69  12/6: 123/69, 64 and 140/62, 63  12/7: 122/60, 80 (dizzy) and 134/71, 77  12/8: 128/65, 75 and 133/62, 71  12/9: 134/68, 75 and 124/68, 75  12/10: 109/61, 81 (dizzy) 112/62, 71 (dizzy)  12/11: 130/63, 72, and 119/62, 74 (dizzy)   12/12: 121/62, 65 and 136/68, 69  12/13: 122/58, 64 and 132/64, 68  12/14: 132/64, 75 and 125/60, 66 (very dizzy)    Message sent to Dr. Quintanilla to review and advise.    Randi Fernandez RN      
DISPLAY PLAN FREE TEXT
DISPLAY PLAN FREE TEXT

## 2024-12-02 ENCOUNTER — PATIENT OUTREACH (OUTPATIENT)
Dept: CARE COORDINATION | Facility: CLINIC | Age: 74
End: 2024-12-02
Payer: MEDICARE

## 2024-12-02 ENCOUNTER — TELEPHONE (OUTPATIENT)
Dept: FAMILY MEDICINE | Facility: CLINIC | Age: 74
End: 2024-12-02
Payer: MEDICARE

## 2024-12-02 NOTE — TELEPHONE ENCOUNTER
Prior Authorization Retail Medication Request    Medication/Dose: semaglutide-weight management (WEGOVY) 0.25 MG/0.5ML pen  Diagnosis and ICD code (if different than what is on RX):  Morbid obesity (H) [E66.01]   New/renewal/insurance change PA/secondary ins. PA:  Previously Tried and Failed:    Rationale:      Insurance   Primary: [employer]  Insurance ID:  [id number]    Secondary (if applicable):  Insurance ID:      Pharmacy Information (if different than what is on RX)  Name:    Phone:    Fax:    Clinic Information  Preferred routing pool for dept communication:

## 2024-12-04 NOTE — TELEPHONE ENCOUNTER
Prior Authorization Approval    Authorization Effective Date: 11/4/2024  Authorization Expiration Date: 12/4/2025  Medication: semaglutide-weight management (WEGOVY) 0.25 MG/0.5ML pen  Approved Dose/Quantity:    Reference #:     Insurance Company:  - Phone [phone] Fax [phone]  Expected CoPay:       CoPay Card Available:      Foundation Assistance Needed:    Which Pharmacy is filling the prescription (Not needed for infusion/clinic administered): [id number] - [place], [place] - [address]  Pharmacy Notified:  Yes  Patient Notified:  **Instructed pharmacy to notify patient when script is ready to /ship.**

## 2024-12-04 NOTE — TELEPHONE ENCOUNTER
Central Prior Authorization Team   Phone: [phone]    PA Initiation    Medication: semaglutide-weight management (WEGOVY) 0.25 MG/0.5ML pen  Insurance Company:  - Phone [phone] Fax [phone]  Pharmacy Filling the Rx: [id number] - [place], [place] - [address]  Filling Pharmacy Phone: [phone]  Filling Pharmacy Fax:    Start Date: 12/4/2024

## 2024-12-07 DIAGNOSIS — F32.1 CURRENT MODERATE EPISODE OF MAJOR DEPRESSIVE DISORDER WITHOUT PRIOR EPISODE (H): ICD-10-CM

## 2024-12-09 RX ORDER — FLUOXETINE 40 MG/1
CAPSULE ORAL
Qty: 30 CAPSULE | Refills: 0 | Status: SHIPPED | OUTPATIENT
Start: 2024-12-09

## 2024-12-31 ENCOUNTER — TELEPHONE (OUTPATIENT)
Dept: ENDOCRINOLOGY | Facility: CLINIC | Age: 74
End: 2024-12-31
Payer: MEDICARE

## 2024-12-31 NOTE — TELEPHONE ENCOUNTER
Left Voicemail (1st Attempt) and Sent Mychart (1st Attempt) for the patient to call back and schedule the following:    Appointment type: RETURN DIABETES  Provider: Anaya Kerns MD  Return date: 11/2025  Specialty phone number: [phone]  Additional appointment(s) needed:   Additonal Notes:Letter sent to pt's home address via mail.

## 2024-12-31 NOTE — LETTER
"12/31/2024    Anaya Thakur MD has requested a follow-up visit on or near 11/2025. You may have orders for other visits and tests as well.    Please call [phone] to schedule these visits.      (If you prefer, some clinics allow you to schedule at [email or web address]. Click the \"Visits\" tab, then choose \"Schedule an Appointment.\")      Sincerely, [facility]   "

## 2025-01-04 ENCOUNTER — TELEPHONE (OUTPATIENT)
Dept: FAMILY MEDICINE | Facility: CLINIC | Age: 75
End: 2025-01-04
Payer: MEDICARE

## 2025-01-04 DIAGNOSIS — I10 BENIGN ESSENTIAL HYPERTENSION: ICD-10-CM

## 2025-01-04 DIAGNOSIS — N32.81 OVERACTIVE BLADDER: ICD-10-CM

## 2025-01-04 DIAGNOSIS — R60.0 PERIPHERAL EDEMA: ICD-10-CM

## 2025-01-06 RX ORDER — OXYBUTYNIN CHLORIDE 5 MG/1
5 TABLET ORAL 2 TIMES DAILY
Qty: 180 TABLET | Refills: 0 | Status: SHIPPED | OUTPATIENT
Start: 2025-01-06

## 2025-01-06 RX ORDER — FUROSEMIDE 20 MG/1
40 TABLET ORAL DAILY
Qty: 180 TABLET | Refills: 0 | Status: SHIPPED | OUTPATIENT
Start: 2025-01-06

## 2025-01-06 RX ORDER — AMLODIPINE BESYLATE 10 MG/1
TABLET ORAL
Qty: 90 TABLET | Refills: 0 | Status: SHIPPED | OUTPATIENT
Start: 2025-01-06

## 2025-01-07 ENCOUNTER — TELEPHONE (OUTPATIENT)
Dept: GASTROENTEROLOGY | Facility: CLINIC | Age: 75
End: 2025-01-07
Payer: MEDICARE

## 2025-01-07 NOTE — TELEPHONE ENCOUNTER
Caller: Prashant Keyes      Reason for Reschedule/Cancellation   (please be detailed, any staff messages or encounters to note?): Dusty released, Patient declined rescheduling, states he will call back, case moved into depo      Prior to reschedule please review:  Ordering Provider: Matt Swan PA-C in [facility]  Sedation Determined: MAC  Does patient have any ASC Exclusions, please identify?: YES, SEVERE KALEB      Notes on Cancelled Procedure:  Procedure: Lower Endoscopy [Colonoscopy]   Date: 1/23  Location: [facility]; [address], [place] [address]   Surgeon: DUSTY      Rescheduled: No, patient will call to reschedule       Did you cancel or rescheduled an EUS procedure? No.

## 2025-01-09 ENCOUNTER — TELEPHONE (OUTPATIENT)
Dept: ENDOCRINOLOGY | Facility: CLINIC | Age: 75
End: 2025-01-09
Payer: MEDICARE

## 2025-01-09 NOTE — TELEPHONE ENCOUNTER
2nd attempt to reschedule, patient declined and requested for a call back tomorrow morning to reschedule.

## 2025-01-09 NOTE — TELEPHONE ENCOUNTER
Left Voicemail (1st Attempt) and Sent Mychart (1st Attempt) for the patient to call back and reschedule the following:    Appointment type: Return Med WT MGMT Nutrition  Appointment mode: Virtual Visit  Previously scheduled with: Cat Barrientos  Reschedule with: Hilaria Greer or Yesica Dickens  Date: 2/11/25  Specialty phone number: [phone]    Additonal Notes:

## 2025-01-13 DIAGNOSIS — E87.6 HYPOKALEMIA: ICD-10-CM

## 2025-01-13 RX ORDER — POTASSIUM CHLORIDE 1500 MG/1
TABLET, EXTENDED RELEASE ORAL
Qty: 270 TABLET | Refills: 0 | Status: SHIPPED | OUTPATIENT
Start: 2025-01-13

## 2025-01-18 DIAGNOSIS — I10 BENIGN ESSENTIAL HYPERTENSION: ICD-10-CM

## 2025-01-19 RX ORDER — CHLORTHALIDONE 25 MG/1
25 TABLET ORAL DAILY
Qty: 90 TABLET | Refills: 1 | Status: SHIPPED | OUTPATIENT
Start: 2025-01-19

## 2025-02-03 ENCOUNTER — TELEPHONE (OUTPATIENT)
Dept: ENDOCRINOLOGY | Facility: CLINIC | Age: 75
End: 2025-02-03
Payer: MEDICARE

## 2025-02-03 NOTE — TELEPHONE ENCOUNTER
Called to reschedule an appointment with Yesica Dickens RD.  Patient related he had been denied coverage for the Nutritionist visits from his insurance plan.    He asked to cancel BOTH the PA and the RD visits, due to this insurance issue.  Caller asked for confirmation on cancelling both appointments twice.    He indicated he was happy getting his weight loss medication from his PCP.    Cancelled appointments were     Date: 2/11/25  Time: 11am  Visit type: New Weight Management  Visit mode: Virtual Visit  Provider:  Ana Paula Joseph PA-C  Location: [facility]      Date: 2/12/25  Time: 11:30 Am  Visit type: New Med WT MGMT Nutrition  Visit mode: Virtual Visit  Provider:  Yesica Dickens  Location: [facility]    Additional Notes:        conducted a detailed discussion... I had a detailed discussion with the patient and/or guardian regarding the historical points, exam findings, and any diagnostic results supporting the discharge/admit diagnosis.

## 2025-02-04 ENCOUNTER — TELEPHONE (OUTPATIENT)
Dept: FAMILY MEDICINE | Facility: CLINIC | Age: 75
End: 2025-02-04

## 2025-02-04 ENCOUNTER — OFFICE VISIT (OUTPATIENT)
Dept: NEUROLOGY | Facility: CLINIC | Age: 75
End: 2025-02-04
Payer: MEDICARE

## 2025-02-04 VITALS
WEIGHT: 271.6 LBS | HEART RATE: 73 BPM | SYSTOLIC BLOOD PRESSURE: 115 MMHG | DIASTOLIC BLOOD PRESSURE: 71 MMHG | BODY MASS INDEX: 39.15 KG/M2

## 2025-02-04 DIAGNOSIS — G20.C PARKINSONISM, UNSPECIFIED PARKINSONISM TYPE (H): ICD-10-CM

## 2025-02-04 DIAGNOSIS — G40.A09 NONINTRACTABLE ABSENCE EPILEPSY WITHOUT STATUS EPILEPTICUS (H): ICD-10-CM

## 2025-02-04 DIAGNOSIS — R20.2 NUMBNESS AND TINGLING IN LEFT HAND: ICD-10-CM

## 2025-02-04 DIAGNOSIS — R20.0 NUMBNESS AND TINGLING IN LEFT HAND: ICD-10-CM

## 2025-02-04 DIAGNOSIS — R56.9 CONVULSIONS, UNSPECIFIED CONVULSION TYPE (H): Primary | ICD-10-CM

## 2025-02-04 PROCEDURE — 99214 OFFICE O/P EST MOD 30 MIN: CPT | Performed by: INTERNAL MEDICINE

## 2025-02-04 PROCEDURE — G2211 COMPLEX E/M VISIT ADD ON: HCPCS | Performed by: INTERNAL MEDICINE

## 2025-02-04 NOTE — TELEPHONE ENCOUNTER
Shannan with [facility] calling in regards to script for patients wegovy.     She states last script for injeciton they have is Zepbound in October and note the dose of Wegovy sent in was 0.5 mg not the starter dose of .25 mg or they do not have it on file at their pharmacy so patient must of filled .25 mg dose at another pharmacy.     RN sees .25 mg dose on previous med list with note of transferring to [facility] from [facility]     semaglutide-weight management (WEGOVY) 0.25 MG/0.5ML pen (Discontinued) 2 mL 0 11/30/2024 1/3/2025 --     E-Prescribing Status: Receipt confirmed by pharmacy (11/30/2024  7:52 AM CST)   E-Cancel Status: Request denied by pharmacy (1/3/2025 12:30 PM CST)       E-Cancel Status Note: Prescription filled 0 times; transferred to [facility] (24 hours) [place]       Also see mychart encounter 1/30/25 where patient and Matt discuss increase.    Matt Swan PA-C to Demian HICKS      2/4/25  5:47 AM  I am increasing your dose of wegovy to 0.5 mg weekly.       RN relayed this to [facility] Pharmacist and she stated that should clarify their question and will work on getting 0.5 mg dose filled,     Bijal Chan, RN on 2/4/2025 at 2:31 PM

## 2025-02-04 NOTE — PROGRESS NOTES
[facility] Neurology Follow Up Visit    Prashant Keyes MRN# [id number]   Age: 74 year old YOB: 1950     Brief history of symptoms: The patient was initially seen in neurologic consultation on 8/29/23 for evaluation of parkinson disease and epilepsy. Please see the comprehensive neurologic consultation note from that date in the Epic records for details.          Assessment and Plan:   Assessment:  Prashant Keyes is a 74 year old male who presents today for evaluation of parkinson disease and epilepsy. Seizures began in the mid 1990s. He hasn't had a seizure in many years. They are controlled on Keppra and VPA and we discussed continuing same dosage. CBC/CMP ordered for medication monitoring.    Parkinson disease was reportedly diagnosed around 2017. He would like to continue same dosage of Sinemet today. Balance has worsened with time which is likely multifactorial related to Parkinson disease, polyneuropathy, and arthritis / orthopedic issues. He is not interested in PT referral at this time.     He reports left hand numbness/tingling, which may be carpal tunnel syndrome related. He wants to monitor this and try wrist bracing. EMG could be considered.      Plan:  - Continue Keppra 1000 mg BID and  mg BID  - Continue Sinemet (25/100) 1.5 tablets TID  - Trial of nightly wrist bracing    Follow up in Neurology clinic in 1 year or earlier as needed should new concerns arise.    Brando Davis MD   of Neurology  [facility]  -------------------------------------------------------------------------------------------------------------------------  Interval history:   Balance is worse compared to last visit. He has fallen done twice.     He has numbness in the left hand. It comes and goes, but has been constant in the last 2 days.      He occasionally has tremors. Sinemet works well. He denies significant wearing out.     He denies any seizures.       Past Medical History:      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)    Class 1 obesity with serious comorbidity and body mass index (BMI) of 31.0 to 31.9 in adult, unspecified obesity type    Obesity (BMI 35.0-39.9) with comorbidity (H)    Parkinsonism (H)    Current moderate episode of major depressive disorder without prior episode (H)    Chronic, continuous use of opioids    Primary osteoarthritis of left knee     Past Medical History:   Diagnosis Date    Arthritis 1/1/2012    Calculus of kidney ~1975    Carpal tunnel syndrome 11/94    Concussion, unspecified 12/95    Depressive disorder 2/2/2015    Eczema 11/16/2011    Epileptic seizure (H) 1995    diagnosed 1995, controlled with Depakote and Keppra    Fibromyalgia syndrome ~2000    per pt    Hypertension     Left testicle cyst     Photosensitive contact dermatitis     Prostatitis, unspecified     Seizures (H)     Diagnosed 1995, controlled with Depakote and Keppra    Umbilical hernia 11/26/2012    Uncomplicated asthma     Unspecified asthma(493.90)         Past Surgical History:     Past Surgical History:   Procedure Laterality Date    ABDOMEN SURGERY  2015    Fixed belly button    ANGIOGRAM  2003    Coronary Angiogram- negative    ARTHROSCOPY KNEE Left 9/5/2019    Procedure: LEFT KNEE ARTHROSCOPY WITH MENISCAL AND CHONDRAL DEBRIDEMENT;  Surgeon: Damon Rosas MD;  Location: [facility] OR    COLONOSCOPY  2/2/2013    COLONOSCOPY WITH CO2 INSUFFLATION N/A 8/17/2017    Procedure: COLONOSCOPY WITH CO2 INSUFFLATION;  COLON SCREEN/ GEE;  Surgeon: Varun Bond MD;  Location: [facility] OR    ENT SURGERY   2/2/2008    Hearing aids    EYE SURGERY  April/2012    Cataracts    HC REMOVAL TESTIS,SIMPLE  1978    Right undecended    HERNIA REPAIR, INGUINAL RT/LT  1963, 1978    Right Hernia    HERNIORRHAPHY UMBILICAL  4/2013    [facility]        Social History:     Social History     Tobacco Use    Smoking status: Never    Smokeless tobacco: Never   Vaping Use    Vaping status: Never Used   Substance Use Topics    Alcohol use: No     Comment: Quit since 2003.     Drug use: No        Family History:     Family History   Problem Relation Age of Onset    Cerebrovascular Disease Mother         d 2015    C.A.D. Mother         CABG 75    Arthritis Mother     Eye Disorder Mother     Heart Disease Mother     Cardiovascular Father         Rheumatic Heart Disease    Cancer Maternal Grandmother         Thyroid CA    Other Cancer Maternal Grandmother         Goiter    Thyroid Disease Maternal Grandfather         d. 1937    Thyroid Disease Paternal Grandmother     Cancer Paternal Grandfather         Throat CA    Hypertension Brother     Lipids Brother     C.A.D. Brother         CABG 46    Arthritis Brother     Heart Disease Brother         CABG x5    Obesity Brother     Coronary Artery Disease Brother     Hyperlipidemia Brother     Hypertension Brother         d.May 30, 2022    Lipids Brother     Thyroid Disease Brother         d.May 30, 2022    Alcohol/Drug Brother     Heart Disease Brother         CABG    Coronary Artery Disease Brother         Syd is dead now    Hyperlipidemia Brother         dead    Obesity Brother         dead    Gastrointestinal Disease Brother         Crohn's Disease-Ostomy    Cerebrovascular Disease Brother         dead    Coronary Artery Disease Brother         Usama is dead now    Hyperlipidemia Brother         dead    Cancer Sister         Thyroid CA    Hypertension Sister     Obesity Sister     Thyroid Disease Sister     Hemochromatosis Sister     Cerebrovascular Disease Sister         birth defect     Hyperlipidemia Sister     Other Cancer Sister         Goiter    Arrhythmia Sister     Depression Son     Diabetes Son     Depression Son     Depression Daughter     Depression Daughter         Medications:     Current Outpatient Medications   Medication Sig Dispense Refill    albuterol (PROAIR HFA/PROVENTIL HFA/VENTOLIN HFA) 108 (90 Base) MCG/ACT inhaler Inhale 2 puffs into the lungs every 6 hours as needed for shortness of breath / dyspnea 8.5 g 0    allopurinol (ZYLOPRIM) 100 MG tablet TAKE 2 TABLETS BY MOUTH EVERY  tablet 3    amLODIPine (NORVASC) 10 MG tablet TAKE 1 TABLET BY MOUTH WITH EVENING MEAL 90 tablet 0    aspirin 81 MG tablet Take 1 tablet (81 mg) by mouth daily 30 tablet     bisacodyl (DULCOLAX) 5 MG EC tablet Two days prior to procedure take two (2) tablets at 10am. One day prior to procedure take two (2) tablets at 10am 4 tablet 0    carbidopa-levodopa (SINEMET)  MG tablet Take 1.5 tablets by mouth 3 times daily 405 tablet 3    carvedilol (COREG) 12.5 MG tablet TAKE 1 TABLET BY MOUTH TWICE A DAY WITH FOOD 180 tablet 2    chlorthalidone (HYGROTON) 25 MG tablet Take 1 tablet (25 mg) by mouth daily 90 tablet 1    Cholecalciferol (VITAMIN D) 2000 UNITS tablet Take 2,000 Units by mouth daily 90 tablet 3    divalproex sodium delayed-release (DEPAKOTE) 500 MG DR tablet Take 1 tablet (500 mg) by mouth 2 times daily. 180 tablet 3    ferrous sulfate (IRON) 325 (65 FE) MG tablet Take 1 tablet (325 mg) by mouth 2 times daily 60 tablet 2    FLUoxetine (PROZAC) 40 MG capsule TAKE 1 CAPSULE BY MOUTH ONCE DAILY 90 capsule 0    furosemide (LASIX) 20 MG tablet Take 2 tablets (40 mg) by mouth daily 180 tablet 0    gabapentin (NEURONTIN) 300 MG capsule TAKE 2 CAPSULES BY MOUTH THREE TIME A  capsule 0    hydrALAZINE (APRESOLINE) 25 MG tablet Take 1 tablet (25 mg) by mouth 2 times daily 180 tablet 1    hydroxychloroquine (PLAQUENIL) 200 MG tablet Take 1 tablet (200 mg) by mouth 2 times daily. 60 tablet  "5    levETIRAcetam (KEPPRA) 1000 MG tablet TAKE 1 TABLET BY MOUTH TWICE DAILY IN THE MORNING AND AT BEDTIME 180 tablet 3    LORazepam (ATIVAN) 0.5 MG tablet Take 1-2 tabs one hour prior to your mri 2 tablet 0    losartan (COZAAR) 100 MG tablet Take 1 tablet (100 mg) by mouth daily 30 tablet 0    meloxicam (MOBIC) 15 MG tablet Take 1 tablet (15 mg) by mouth daily 90 tablet 0    Multiple Vitamin (MULTI VITAMIN MENS PO) Take  by mouth.      omeprazole (PRILOSEC) 40 MG DR capsule Take 1 capsule by mouth once daily 90 capsule 3    ORDER FOR DME CPAP daily      oxyBUTYnin (DITROPAN) 5 MG tablet Take 1 tablet (5 mg) by mouth 2 times daily 180 tablet 0    polyethylene glycol (GOLYTELY) 236 g suspension One day prior to procedure at 3 pm fill container with water. Cover and shake until mixed well. Drink an 8 oz. glass of mixture every 10-15 minutes until the 1st half of the jug is gone. Place the remainder of the Golytely in the refrigerator. At 8 pm, drink the 2nd half of the jug of Golytely bowel prep. Drink an 8 oz. glass of Golytely every 10-15 minutes until the container of Golytely is gone. 4000 mL 0    polyethylene glycol (MIRALAX) 17 GM/Dose powder Take 1 capful (17g) of Miralax mixed with 8 oz of a clear liquid twice daily for 7 days prior to your scheduled procedure. 238 g 0    potassium chloride albina ER (KLOR-CON M20) 20 MEQ CR tablet TAKE 1 TABLET BY MOUTH THREE TIMES A  tablet 0    Semaglutide-Weight Management (WEGOVY) 0.5 MG/0.5ML pen Inject 0.5 mg subcutaneously once a week. 2 mL 0    sildenafil (REVATIO) 20 MG tablet Take 3-5 tabs 1/2 to 4 hours prior to relations 30 tablet 11    simvastatin (ZOCOR) 20 MG tablet TAKE 2 TABLETS BY MOUTH IN THE EVENING AT BEDTIME 180 tablet 1    syringe/needle, disp, 25G X 1\" 3 ML MISC Use for testosterone injections 12 each 3    testosterone cypionate (DEPOTESTOSTERONE) 200 MG/ML injection Inject 1 mL (200 mg) into the muscle every 14 days. 2 mL 5    traMADol (ULTRAM) " "50 MG tablet Take 1 tablet (50 mg) by mouth every 6 hours as needed for severe pain. 108 tablet 0    traZODone (DESYREL) 100 MG tablet Take 100 mg by mouth At Bedtime      vitamin C (ASCORBIC ACID) 1000 MG TABS        No current facility-administered medications for this visit.        Allergies:     Allergies   Allergen Reactions    Accupril [Ace Inhibitors] Difficulty breathing     accupril causes SOB    Aptiom [Eslicarbazepine] Difficulty breathing    Atorvastatin Calcium      Myalgias from Lipitor    Contrast Dye Hives     3/11/2024 Pt here for MRI brain with contrast. In our chart 2002 states allergy to contrast, hives, not indication of what contrast. I see at [facility] in 2014 pt had an MRI brain with contrast and nothing under allergies and pt states they were fine for that. Dr. Coffey ok 'd contrast today and pt did well. TA    Lactose GI Disturbance    Lisinopril Difficulty breathing     SOB    Nitroglycerin      Headache    Paroxetine Hives    Tetracycline [Tetracyclines & Related]      \"splitting headaches\"    Vimpat [Lacosamide] Difficulty breathing    Zolpidem     Adhesive Tape Rash     Without itching- EKG pads        Review of Systems:   As above     Physical Exam:   Vitals: /71 (BP Location: Right arm, Patient Position: Sitting, Cuff Size: Adult Large)   Pulse 73   Wt 123.2 kg (271 lb 9.6 oz)   BMI 39.15 kg/m     General: Seated comfortably in no acute distress.  Lungs: breathing comfortably  Neurologic:     Mental Status: Fully alert, attentive. Language normal, speech clear and fluent, no paraphasic errors     Cranial Nerves: No dysarthria. EOMI. Visual fields full. Face symmetric. Facial sensation intact.     Motor: No definitive resting tremor seen. Very mild action tremor in bilateral upper extremities. Muscle tone grossly normal throughout. Bradykinesia/decrement in bilateral rapid finger tapping, left > right. Strength 5/5 throughout upper and lower extremities.     Coordination: " Finger-nose-finger and heel-shin intact without dysmetria.   Sensory: light touch is intact throughout with exception of all 5 finger tips on the left hand.      Gait: Mildly wide based and slowed cautious gait. Mildly decreased arm swing. Moderate to severe issues with tandem gait, toe, and heel gait.      Data reviewed on previous visits    Imaging:  MRI brain 3/2022       Procedures:  EEG  2015  IMPRESSION: Abnormal. There is some left temporal slowing indicating focal cerebral dysfunction in this area. Epileptiform discharges or seizures were not seen.     Laboratory:  A1c 5.6 (5/2023)  B12 > 2000, normal ammonia, Keppra 36, VPA level 48, TSH/MMA normal (1/2022)  ELP without monoclonal protein  VPA 38, normal SSA, SSB, immunofixation    Pertinent Investigations since last visit:   None    The longitudinal plan of care for the diagnosis(es)/condition(s) as documented were addressed during this visit. Due to the added complexity in care, I will continue to support Bill in the subsequent management and with ongoing continuity of care.

## 2025-02-04 NOTE — LETTER
2/4/2025      Prashant Keyes  [address]   [place] [address]      Dear Colleague,    Thank you for referring your patient, Prashant Keyes, to the [facility] [place]. Please see a copy of my visit note below.    [facility] Neurology Follow Up Visit    Prashant Keyes MRN# [id number]   Age: 74 year old YOB: 1950     Brief history of symptoms: The patient was initially seen in neurologic consultation on 8/29/23 for evaluation of parkinson disease and epilepsy. Please see the comprehensive neurologic consultation note from that date in the Epic records for details.          Assessment and Plan:   Assessment:  Prashant Keyes is a 74 year old male who presents today for evaluation of parkinson disease and epilepsy. Seizures began in the mid 1990s. He hasn't had a seizure in many years. They are controlled on Keppra and VPA and we discussed continuing same dosage. CBC/CMP ordered for medication monitoring.    Parkinson disease was reportedly diagnosed around 2017. He would like to continue same dosage of Sinemet today. Balance has worsened with time which is likely multifactorial related to Parkinson disease, polyneuropathy, and arthritis / orthopedic issues. He is not interested in PT referral at this time.     He reports left hand numbness/tingling, which may be carpal tunnel syndrome related. He wants to monitor this and try wrist bracing. EMG could be considered.      Plan:  - Continue Keppra 1000 mg BID and  mg BID  - Continue Sinemet (25/100) 1.5 tablets TID  - Trial of nightly wrist bracing    Follow up in Neurology clinic in 1 year or earlier as needed should new concerns arise.    Brando Davis MD   of Neurology  [facility]  -------------------------------------------------------------------------------------------------------------------------  Interval history:   Balance is worse compared to last visit. He has fallen  done twice.     He has numbness in the left hand. It comes and goes, but has been constant in the last 2 days.      He occasionally has tremors. Sinemet works well. He denies significant wearing out.     He denies any seizures.       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)     Class 1 obesity with serious comorbidity and body mass index (BMI) of 31.0 to 31.9 in adult, unspecified obesity type     Obesity (BMI 35.0-39.9) with comorbidity (H)     Parkinsonism (H)     Current moderate episode of major depressive disorder without prior episode (H)     Chronic, continuous use of opioids     Primary osteoarthritis of left knee     Past Medical History:   Diagnosis Date     Arthritis 1/1/2012     Calculus of kidney ~1975     Carpal tunnel syndrome 11/94     Concussion, unspecified 12/95     Depressive disorder 2/2/2015     Eczema 11/16/2011     Epileptic seizure (H) 1995    diagnosed 1995, controlled with Depakote and Keppra     Fibromyalgia syndrome ~2000    per pt     Hypertension      Left testicle cyst      Photosensitive contact dermatitis      Prostatitis, unspecified      Seizures (H)     Diagnosed 1995, controlled with Depakote and Keppra     Umbilical hernia 11/26/2012     Uncomplicated asthma      Unspecified asthma(493.90)         Past Surgical History:     Past Surgical History:   Procedure Laterality Date     ABDOMEN SURGERY  2015    Fixed belly button     ANGIOGRAM  2003    Coronary Angiogram- negative     ARTHROSCOPY KNEE Left 9/5/2019    Procedure: LEFT KNEE ARTHROSCOPY WITH MENISCAL  AND CHONDRAL DEBRIDEMENT;  Surgeon: Damon Rosas MD;  Location: [facility] OR     COLONOSCOPY  2/2/2013     COLONOSCOPY WITH CO2 INSUFFLATION N/A 8/17/2017    Procedure: COLONOSCOPY WITH CO2 INSUFFLATION;  COLON SCREEN/ FLYNN;  Surgeon: Varun Bond MD;  Location: [facility] OR     ENT SURGERY  2/2/2008    Hearing aids     EYE SURGERY  April/2012    Cataracts     HC REMOVAL TESTIS,SIMPLE  1978    Right undecended     HERNIA REPAIR, INGUINAL RT/LT  1963, 1978    Right Hernia     HERNIORRHAPHY UMBILICAL  4/2013    [place]        Social History:     Social History     Tobacco Use     Smoking status: Never     Smokeless tobacco: Never   Vaping Use     Vaping status: Never Used   Substance Use Topics     Alcohol use: No     Comment: Quit since 2003.      Drug use: No        Family History:     Family History   Problem Relation Age of Onset     Cerebrovascular Disease Mother         d 2015     C.A.D. Mother         CABG 75     Arthritis Mother      Eye Disorder Mother      Heart Disease Mother      Cardiovascular Father         Rheumatic Heart Disease     Cancer Maternal Grandmother         Thyroid CA     Other Cancer Maternal Grandmother         Goiter     Thyroid Disease Maternal Grandfather         d. 1937     Thyroid Disease Paternal Grandmother      Cancer Paternal Grandfather         Throat CA     Hypertension Brother      Lipids Brother      C.A.D. Brother         CABG 46     Arthritis Brother      Heart Disease Brother         CABG x5     Obesity Brother      Coronary Artery Disease Brother      Hyperlipidemia Brother      Hypertension Brother         d.May 30, 2022     Lipids Brother      Thyroid Disease Brother         d.May 30, 2022     Alcohol/Drug Brother      Heart Disease Brother         CABG     Coronary Artery Disease Brother         Syd is dead now     Hyperlipidemia Brother         dead     Obesity Brother         dead     Gastrointestinal Disease Brother         Crohn's Disease-Ostomy      Cerebrovascular Disease Brother         dead     Coronary Artery Disease Brother         Usama is dead now     Hyperlipidemia Brother         dead     Cancer Sister         Thyroid CA     Hypertension Sister      Obesity Sister      Thyroid Disease Sister      Hemochromatosis Sister      Cerebrovascular Disease Sister         birth defect     Hyperlipidemia Sister      Other Cancer Sister         Goiter     Arrhythmia Sister      Depression Son      Diabetes Son      Depression Son      Depression Daughter      Depression Daughter         Medications:     Current Outpatient Medications   Medication Sig Dispense Refill     albuterol (PROAIR HFA/PROVENTIL HFA/VENTOLIN HFA) 108 (90 Base) MCG/ACT inhaler Inhale 2 puffs into the lungs every 6 hours as needed for shortness of breath / dyspnea 8.5 g 0     allopurinol (ZYLOPRIM) 100 MG tablet TAKE 2 TABLETS BY MOUTH EVERY  tablet 3     amLODIPine (NORVASC) 10 MG tablet TAKE 1 TABLET BY MOUTH WITH EVENING MEAL 90 tablet 0     aspirin 81 MG tablet Take 1 tablet (81 mg) by mouth daily 30 tablet      bisacodyl (DULCOLAX) 5 MG EC tablet Two days prior to procedure take two (2) tablets at 10am. One day prior to procedure take two (2) tablets at 10am 4 tablet 0     carbidopa-levodopa (SINEMET)  MG tablet Take 1.5 tablets by mouth 3 times daily 405 tablet 3     carvedilol (COREG) 12.5 MG tablet TAKE 1 TABLET BY MOUTH TWICE A DAY WITH FOOD 180 tablet 2     chlorthalidone (HYGROTON) 25 MG tablet Take 1 tablet (25 mg) by mouth daily 90 tablet 1     Cholecalciferol (VITAMIN D) 2000 UNITS tablet Take 2,000 Units by mouth daily 90 tablet 3     divalproex sodium delayed-release (DEPAKOTE) 500 MG DR tablet Take 1 tablet (500 mg) by mouth 2 times daily. 180 tablet 3     ferrous sulfate (IRON) 325 (65 FE) MG tablet Take 1 tablet (325 mg) by mouth 2 times daily 60 tablet 2     FLUoxetine (PROZAC) 40 MG capsule TAKE 1 CAPSULE BY MOUTH ONCE DAILY 90 capsule 0     furosemide (LASIX)  20 MG tablet Take 2 tablets (40 mg) by mouth daily 180 tablet 0     gabapentin (NEURONTIN) 300 MG capsule TAKE 2 CAPSULES BY MOUTH THREE TIME A  capsule 0     hydrALAZINE (APRESOLINE) 25 MG tablet Take 1 tablet (25 mg) by mouth 2 times daily 180 tablet 1     hydroxychloroquine (PLAQUENIL) 200 MG tablet Take 1 tablet (200 mg) by mouth 2 times daily. 60 tablet 5     levETIRAcetam (KEPPRA) 1000 MG tablet TAKE 1 TABLET BY MOUTH TWICE DAILY IN THE MORNING AND AT BEDTIME 180 tablet 3     LORazepam (ATIVAN) 0.5 MG tablet Take 1-2 tabs one hour prior to your mri 2 tablet 0     losartan (COZAAR) 100 MG tablet Take 1 tablet (100 mg) by mouth daily 30 tablet 0     meloxicam (MOBIC) 15 MG tablet Take 1 tablet (15 mg) by mouth daily 90 tablet 0     Multiple Vitamin (MULTI VITAMIN MENS PO) Take  by mouth.       omeprazole (PRILOSEC) 40 MG DR capsule Take 1 capsule by mouth once daily 90 capsule 3     ORDER FOR DME CPAP daily       oxyBUTYnin (DITROPAN) 5 MG tablet Take 1 tablet (5 mg) by mouth 2 times daily 180 tablet 0     polyethylene glycol (GOLYTELY) 236 g suspension One day prior to procedure at 3 pm fill container with water. Cover and shake until mixed well. Drink an 8 oz. glass of mixture every 10-15 minutes until the 1st half of the jug is gone. Place the remainder of the Golytely in the refrigerator. At 8 pm, drink the 2nd half of the jug of Golytely bowel prep. Drink an 8 oz. glass of Golytely every 10-15 minutes until the container of Golytely is gone. 4000 mL 0     polyethylene glycol (MIRALAX) 17 GM/Dose powder Take 1 capful (17g) of Miralax mixed with 8 oz of a clear liquid twice daily for 7 days prior to your scheduled procedure. 238 g 0     potassium chloride albina ER (KLOR-CON M20) 20 MEQ CR tablet TAKE 1 TABLET BY MOUTH THREE TIMES A  tablet 0     Semaglutide-Weight Management (WEGOVY) 0.5 MG/0.5ML pen Inject 0.5 mg subcutaneously once a week. 2 mL 0     sildenafil (REVATIO) 20 MG tablet Take 3-5  "tabs 1/2 to 4 hours prior to relations 30 tablet 11     simvastatin (ZOCOR) 20 MG tablet TAKE 2 TABLETS BY MOUTH IN THE EVENING AT BEDTIME 180 tablet 1     syringe/needle, disp, 25G X 1\" 3 ML MISC Use for testosterone injections 12 each 3     testosterone cypionate (DEPOTESTOSTERONE) 200 MG/ML injection Inject 1 mL (200 mg) into the muscle every 14 days. 2 mL 5     traMADol (ULTRAM) 50 MG tablet Take 1 tablet (50 mg) by mouth every 6 hours as needed for severe pain. 108 tablet 0     traZODone (DESYREL) 100 MG tablet Take 100 mg by mouth At Bedtime       vitamin C (ASCORBIC ACID) 1000 MG TABS        No current facility-administered medications for this visit.        Allergies:     Allergies   Allergen Reactions     Accupril [Ace Inhibitors] Difficulty breathing     accupril causes SOB     Aptiom [Eslicarbazepine] Difficulty breathing     Atorvastatin Calcium      Myalgias from Lipitor     Contrast Dye Hives     3/11/2024 Pt here for MRI brain with contrast. In our chart 2002 states allergy to contrast, hives, not indication of what contrast. I see at [facility] in 2014 pt had an MRI brain with contrast and nothing under allergies and pt states they were fine for that. Dr. Coffey ok 'd contrast today and pt did well. TA     Lactose GI Disturbance     Lisinopril Difficulty breathing     SOB     Nitroglycerin      Headache     Paroxetine Hives     Tetracycline [Tetracyclines & Related]      \"splitting headaches\"     Vimpat [Lacosamide] Difficulty breathing     Zolpidem      Adhesive Tape Rash     Without itching- EKG pads        Review of Systems:   As above     Physical Exam:   Vitals: /71 (BP Location: Right arm, Patient Position: Sitting, Cuff Size: Adult Large)   Pulse 73   Wt 123.2 kg (271 lb 9.6 oz)   BMI 39.15 kg/m     General: Seated comfortably in no acute distress.  Lungs: breathing comfortably  Neurologic:     Mental Status: Fully alert, attentive. Language normal, speech clear and fluent, no paraphasic " errors     Cranial Nerves: No dysarthria. EOMI. Visual fields full. Face symmetric. Facial sensation intact.     Motor: No definitive resting tremor seen. Very mild action tremor in bilateral upper extremities. Muscle tone grossly normal throughout. Bradykinesia/decrement in bilateral rapid finger tapping, left > right. Strength 5/5 throughout upper and lower extremities.     Coordination: Finger-nose-finger and heel-shin intact without dysmetria.   Sensory: light touch is intact throughout with exception of all 5 finger tips on the left hand.      Gait: Mildly wide based and slowed cautious gait. Mildly decreased arm swing. Moderate to severe issues with tandem gait, toe, and heel gait.      Data reviewed on previous visits    Imaging:  MRI brain 3/2022       Procedures:  EEG  2015  IMPRESSION: Abnormal. There is some left temporal slowing indicating focal cerebral dysfunction in this area. Epileptiform discharges or seizures were not seen.     Laboratory:  A1c 5.6 (5/2023)  B12 > 2000, normal ammonia, Keppra 36, VPA level 48, TSH/MMA normal (1/2022)  ELP without monoclonal protein  VPA 38, normal SSA, SSB, immunofixation    Pertinent Investigations since last visit:   None    The longitudinal plan of care for the diagnosis(es)/condition(s) as documented were addressed during this visit. Due to the added complexity in care, I will continue to support Bill in the subsequent management and with ongoing continuity of care.      Again, thank you for allowing me to participate in the care of your patient.        Sincerely,    Brando Davis MD    Electronically signed

## 2025-02-12 DIAGNOSIS — M25.562 CHRONIC PAIN OF LEFT KNEE: ICD-10-CM

## 2025-02-12 DIAGNOSIS — G89.29 CHRONIC PAIN OF LEFT KNEE: ICD-10-CM

## 2025-02-14 ENCOUNTER — NURSE TRIAGE (OUTPATIENT)
Dept: FAMILY MEDICINE | Facility: CLINIC | Age: 75
End: 2025-02-14
Payer: MEDICARE

## 2025-02-14 RX ORDER — TRAMADOL HYDROCHLORIDE 50 MG/1
50 TABLET ORAL EVERY 6 HOURS PRN
Qty: 108 TABLET | Refills: 0 | Status: SHIPPED | OUTPATIENT
Start: 2025-02-14

## 2025-02-14 NOTE — TELEPHONE ENCOUNTER
Patient calling in reporting lower abdominal pain 7/10. States lost 5 lbs within 1 day has lost 50 lbs in 2 months but has been taking zepbound and now switched to wegovy. Patient feels sx may have started with dose increase but unsure.     States he is taking peptobismol regularly to try and help with sx. Patient reports irregular BM's 1x/week.     Has no appetite, did not eat last night or this morning. Patient states he is not able to control body temperature and is sweating profusely. He states he is very uncomfortable and sx are worrisome.     RN recommended per protocol to be seen in ED. Patient was agreeable to this and has a ride that will take him to the ED now.     RN also educated on 24 hour nurse line.     Bijal Chan RN on 2/14/2025 at 5:04 PM          Reason for Disposition   MILD TO MODERATE constant pain lasting > 2 hours    Additional Information   Negative: Passed out (e.g., fainted, lost consciousness, blacked out and was not responding)   Negative: Shock suspected (e.g., cold/pale/clammy skin, too weak to stand, low BP, rapid pulse)   Negative: Sounds like a life-threatening emergency to the triager   Negative: Followed an abdomen (stomach) injury   Negative: Chest pain   Negative: Pain is mainly in upper abdomen (if needed ask: 'is it mainly above the belly button?')   Negative: Abdomen bloating or swelling are main symptoms   Negative: SEVERE abdominal pain (e.g., excruciating)   Negative: Vomiting red blood or black (coffee ground) material   Negative: Blood in bowel movements  (Exception: Blood on surface of BM with constipation.)   Negative: Black or tarry bowel movements  (Exception: Chronic-unchanged black-grey BMs AND is taking iron pills or Pepto-Bismol.)     Taking Peptobismol so stool have been black due to this   Negative: Unable to urinate (or only a few drops) and bladder feels very full   Negative: Pain in scrotum persists > 1 hour    Answer Assessment - Initial Assessment  "Questions  1. LOCATION: \"Where does it hurt?\"       Lower abdomen   2. RADIATION: \"Does the pain shoot anywhere else?\" (e.g., chest, back)      Denies   3. ONSET: \"When did the pain begin?\" (Minutes, hours or days ago)       Approx 1 week ago, about same time increase of wegovy doses   4. SUDDEN: \"Gradual or sudden onset?\"      Gradual   5. PATTERN \"Does the pain come and go, or is it constant?\"      Constant   6. SEVERITY: \"How bad is the pain?\"  (e.g., Scale 1-10; mild, moderate, or severe)      7/10   7. RECURRENT SYMPTOM: \"Have you ever had this type of stomach pain before?\" If Yes, ask: \"When was the last time?\" and \"What happened that time?\"       Patient denies   8. CAUSE: \"What do you think is causing the stomach pain?\"      Wegovy dose increase   9. RELIEVING/AGGRAVATING FACTORS: \"What makes it better or worse?\" (e.g., antacids, bending or twisting motion, bowel movement)      Taking peptobismol regularly which seems to help   10. OTHER SYMPTOMS: \"Do you have any other symptoms?\" (e.g., back pain, diarrhea, fever, urination pain, vomiting)        Sweating, no fever, decreased appetite, weight loss 50 lbs in 2 months, states approx 5 lbs in 1 day    Protocols used: Abdominal Pain - Male-A-OH    "

## 2025-02-15 ASSESSMENT — ASTHMA QUESTIONNAIRES
QUESTION_3 LAST FOUR WEEKS HOW OFTEN DID YOUR ASTHMA SYMPTOMS (WHEEZING, COUGHING, SHORTNESS OF BREATH, CHEST TIGHTNESS OR PAIN) WAKE YOU UP AT NIGHT OR EARLIER THAN USUAL IN THE MORNING: NOT AT ALL
ACT_TOTALSCORE: 25
QUESTION_1 LAST FOUR WEEKS HOW MUCH OF THE TIME DID YOUR ASTHMA KEEP YOU FROM GETTING AS MUCH DONE AT WORK, SCHOOL OR AT HOME: NONE OF THE TIME
QUESTION_4 LAST FOUR WEEKS HOW OFTEN HAVE YOU USED YOUR RESCUE INHALER OR NEBULIZER MEDICATION (SUCH AS ALBUTEROL): NOT AT ALL
QUESTION_2 LAST FOUR WEEKS HOW OFTEN HAVE YOU HAD SHORTNESS OF BREATH: NOT AT ALL
QUESTION_5 LAST FOUR WEEKS HOW WOULD YOU RATE YOUR ASTHMA CONTROL: COMPLETELY CONTROLLED
ACT_TOTALSCORE: 25
EMERGENCY_ROOM_LAST_YEAR_TOTAL: ONE

## 2025-02-19 ENCOUNTER — APPOINTMENT (OUTPATIENT)
Dept: LAB | Facility: CLINIC | Age: 75
End: 2025-02-19
Payer: MEDICARE

## 2025-02-19 ENCOUNTER — OFFICE VISIT (OUTPATIENT)
Dept: FAMILY MEDICINE | Facility: CLINIC | Age: 75
End: 2025-02-19
Payer: MEDICARE

## 2025-02-19 VITALS
OXYGEN SATURATION: 99 % | HEIGHT: 69 IN | DIASTOLIC BLOOD PRESSURE: 64 MMHG | HEART RATE: 78 BPM | RESPIRATION RATE: 20 BRPM | TEMPERATURE: 97.4 F | SYSTOLIC BLOOD PRESSURE: 130 MMHG | WEIGHT: 259 LBS | BODY MASS INDEX: 38.36 KG/M2

## 2025-02-19 DIAGNOSIS — R56.9 CONVULSIONS, UNSPECIFIED CONVULSION TYPE (H): ICD-10-CM

## 2025-02-19 DIAGNOSIS — E29.1 HYPOGONADISM MALE: ICD-10-CM

## 2025-02-19 DIAGNOSIS — Z79.899 CONTROLLED SUBSTANCE AGREEMENT SIGNED: ICD-10-CM

## 2025-02-19 DIAGNOSIS — Z12.5 SCREENING FOR PROSTATE CANCER: ICD-10-CM

## 2025-02-19 DIAGNOSIS — Z09 HOSPITAL DISCHARGE FOLLOW-UP: Primary | ICD-10-CM

## 2025-02-19 DIAGNOSIS — I10 BENIGN ESSENTIAL HYPERTENSION: ICD-10-CM

## 2025-02-19 DIAGNOSIS — N42.9 DISORDER OF PROSTATE, UNSPECIFIED: ICD-10-CM

## 2025-02-19 DIAGNOSIS — K59.09 OTHER CONSTIPATION: ICD-10-CM

## 2025-02-19 LAB
ERYTHROCYTE [DISTWIDTH] IN BLOOD BY AUTOMATED COUNT: 12.1 % (ref 10–15)
HCT VFR BLD AUTO: 39.5 % (ref 40–53)
HGB BLD-MCNC: 13.8 G/DL (ref 13.3–17.7)
MCH RBC QN AUTO: 32.3 PG (ref 26.5–33)
MCHC RBC AUTO-ENTMCNC: 34.9 G/DL (ref 31.5–36.5)
MCV RBC AUTO: 93 FL (ref 78–100)
PLATELET # BLD AUTO: 270 10E3/UL (ref 150–450)
RBC # BLD AUTO: 4.27 10E6/UL (ref 4.4–5.9)
WBC # BLD AUTO: 9.9 10E3/UL (ref 4–11)

## 2025-02-19 ASSESSMENT — PATIENT HEALTH QUESTIONNAIRE - PHQ9
SUM OF ALL RESPONSES TO PHQ QUESTIONS 1-9: 3
SUM OF ALL RESPONSES TO PHQ QUESTIONS 1-9: 3
10. IF YOU CHECKED OFF ANY PROBLEMS, HOW DIFFICULT HAVE THESE PROBLEMS MADE IT FOR YOU TO DO YOUR WORK, TAKE CARE OF THINGS AT HOME, OR GET ALONG WITH OTHER PEOPLE: NOT DIFFICULT AT ALL

## 2025-02-19 ASSESSMENT — PAIN SCALES - GENERAL: PAINLEVEL_OUTOF10: MILD PAIN (1)

## 2025-02-19 NOTE — LETTER

## 2025-02-20 LAB
ALBUMIN SERPL BCG-MCNC: 4.3 G/DL (ref 3.5–5.2)
ALP SERPL-CCNC: 83 U/L (ref 40–150)
ALT SERPL W P-5'-P-CCNC: 6 U/L (ref 0–70)
ANION GAP SERPL CALCULATED.3IONS-SCNC: 14 MMOL/L (ref 7–15)
AST SERPL W P-5'-P-CCNC: 24 U/L (ref 0–45)
BILIRUB SERPL-MCNC: 0.3 MG/DL
BUN SERPL-MCNC: 13.6 MG/DL (ref 8–23)
CALCIUM SERPL-MCNC: 9.7 MG/DL (ref 8.8–10.4)
CHLORIDE SERPL-SCNC: 87 MMOL/L (ref 98–107)
CREAT SERPL-MCNC: 1.03 MG/DL (ref 0.67–1.17)
CREAT UR-MCNC: 35 MG/DL
EGFRCR SERPLBLD CKD-EPI 2021: 76 ML/MIN/1.73M2
GLUCOSE SERPL-MCNC: 92 MG/DL (ref 70–99)
HCO3 SERPL-SCNC: 30 MMOL/L (ref 22–29)
POTASSIUM SERPL-SCNC: 3.6 MMOL/L (ref 3.4–5.3)
PROT SERPL-MCNC: 7.1 G/DL (ref 6.4–8.3)
PSA SERPL DL<=0.01 NG/ML-MCNC: 1.96 NG/ML (ref 0–6.5)
SODIUM SERPL-SCNC: 131 MMOL/L (ref 135–145)
TSH SERPL DL<=0.005 MIU/L-ACNC: 0.6 UIU/ML (ref 0.3–4.2)

## 2025-03-03 DIAGNOSIS — E29.1 HYPOGONADISM MALE: ICD-10-CM

## 2025-03-03 RX ORDER — TESTOSTERONE CYPIONATE 200 MG/ML
200 INJECTION, SOLUTION INTRAMUSCULAR
Qty: 2 ML | Refills: 5 | Status: SHIPPED | OUTPATIENT
Start: 2025-03-03

## 2025-03-06 ENCOUNTER — TELEPHONE (OUTPATIENT)
Dept: ENDOCRINOLOGY | Facility: CLINIC | Age: 75
End: 2025-03-06
Payer: MEDICARE

## 2025-03-06 NOTE — TELEPHONE ENCOUNTER
I routed to Dr Kerns to fill at [facility] which hasn't been done yet. Its a controlled substance so takes time Obdulia Anna RN on 3/6/2025 at 2:45 PM

## 2025-03-06 NOTE — TELEPHONE ENCOUNTER
[facility] Call Center    Phone Message    May a detailed message be left on voicemail: yes     Reason for Call: Medication Question or concern regarding medication   Prescription Clarification  Name of Medication:   testosterone cypionate (DEPOTESTOSTERONE) 200 MG/ML injection   Prescribing Provider: Dr. Kerns   Pharmacy:   [id number] - [place], [place] - [address] [place]      What on the order needs clarification? Pt needing this medication sent to the [facility] pharmacy pt stated it was sent to the wrong pharmacy and then it was cancelled

## 2025-03-10 ENCOUNTER — OFFICE VISIT (OUTPATIENT)
Dept: FAMILY MEDICINE | Facility: CLINIC | Age: 75
End: 2025-03-10
Payer: MEDICARE

## 2025-03-10 VITALS
HEART RATE: 73 BPM | DIASTOLIC BLOOD PRESSURE: 64 MMHG | HEIGHT: 69 IN | WEIGHT: 259.8 LBS | OXYGEN SATURATION: 98 % | RESPIRATION RATE: 20 BRPM | TEMPERATURE: 98.3 F | BODY MASS INDEX: 38.48 KG/M2 | SYSTOLIC BLOOD PRESSURE: 112 MMHG

## 2025-03-10 DIAGNOSIS — F51.01 PRIMARY INSOMNIA: Primary | ICD-10-CM

## 2025-03-10 DIAGNOSIS — R10.84 ABDOMINAL PAIN, GENERALIZED: ICD-10-CM

## 2025-03-10 DIAGNOSIS — K59.09 OTHER CONSTIPATION: ICD-10-CM

## 2025-03-10 PROCEDURE — 3078F DIAST BP <80 MM HG: CPT | Performed by: PHYSICIAN ASSISTANT

## 2025-03-10 PROCEDURE — 99213 OFFICE O/P EST LOW 20 MIN: CPT | Performed by: PHYSICIAN ASSISTANT

## 2025-03-10 PROCEDURE — 3074F SYST BP LT 130 MM HG: CPT | Performed by: PHYSICIAN ASSISTANT

## 2025-03-10 NOTE — PROGRESS NOTES
"  Assessment & Plan   Problem List Items Addressed This Visit    None  Visit Diagnoses       Primary insomnia    -  Primary    Relevant Medications    Suvorexant (BELSOMRA) 10 MG tablet    Abdominal pain, generalized        Other constipation                      BMI  Estimated body mass index is 38.37 kg/m  as calculated from the following:    Height as of this encounter: 1.753 m (5' 9\").    Weight as of this encounter: 117.8 kg (259 lb 12.8 oz).   Weight management plan: Discussed healthy diet and exercise guidelines    Work on weight loss  Regular exercise      Kevon Arciniega is a 74 year old, presenting for the following health issues:  Forms (Work related), Insomnia (Ambien doesn't seem to help), and Health Maintenance (Patient is not fasting)        3/10/2025     9:16 AM   Additional Questions   Roomed by Alta Schmitt CMA   Accompanied by None         3/10/2025     9:16 AM   Patient Reported Additional Medications   Patient reports taking the following new medications none     History of Present Illness       Reason for visit:  Follow-up and Paperwork  Symptom onset:  3-7 days ago  Symptoms include:  None currently  Symptom progression:  Improving  Had these symptoms before:  Yes  Has tried/received treatment for these symptoms:  Yes  What makes it worse:  Milk  What makes it better:  Mylanta   He is taking medications regularly.        Napping during the day and staying awake at noc.    Worse on days off.    Ambien did not help     Review of Systems  Constitutional, HEENT, cardiovascular, pulmonary, GI, , musculoskeletal, neuro, skin, endocrine and psych systems are negative, except as otherwise noted.      Objective    /64   Pulse 73   Temp 98.3  F (36.8  C) (Oral)   Resp 20   Ht 1.753 m (5' 9\")   Wt 117.8 kg (259 lb 12.8 oz)   SpO2 98%   BMI 38.37 kg/m    Body mass index is 38.37 kg/m .  Physical Exam   Eye exam - right eye normal lid, conjunctiva, cornea, pupil and fundus, left eye " normal lid, conjunctiva, cornea, pupil and fundus.  Thyroid not palpable, not enlarged, no nodules detected.  CHEST:chest clear to IPPA, no tachypnea, retractions or cyanosis, and S1, S2 normal, no murmur, no gallop, rate regular.    Demian was seen today for forms, insomnia and health maintenance.    Diagnoses and all orders for this visit:    Primary insomnia  -     Suvorexant (BELSOMRA) 10 MG tablet; Take 1 tablet (10 mg) by mouth nightly as needed for sleep.    Abdominal pain, generalized    Other constipation    Other orders  -     Cancel: Hemoglobin      Paperwork completed.   Trial with belsomra   Signed Electronically by: Matt Swan PA-C

## 2025-03-13 ENCOUNTER — TELEPHONE (OUTPATIENT)
Dept: FAMILY MEDICINE | Facility: CLINIC | Age: 75
End: 2025-03-13

## 2025-03-13 DIAGNOSIS — M25.562 CHRONIC PAIN OF LEFT KNEE: ICD-10-CM

## 2025-03-13 DIAGNOSIS — G89.29 CHRONIC PAIN OF LEFT KNEE: ICD-10-CM

## 2025-03-13 DIAGNOSIS — F51.01 PRIMARY INSOMNIA: ICD-10-CM

## 2025-03-13 RX ORDER — TRAMADOL HYDROCHLORIDE 50 MG/1
50 TABLET ORAL EVERY 6 HOURS PRN
Qty: 108 TABLET | Refills: 0 | Status: SHIPPED | OUTPATIENT
Start: 2025-03-13

## 2025-03-13 NOTE — TELEPHONE ENCOUNTER
Pharmacist calling from the [facility] pharmacy in [place], needing clarification about the Sovorxant Rx.     Pharmacist wanted Providers okay to dispense even though pt had pickup a prescription for zolpidem on March 1st from a different provider for a 30 day prescription. Pharmacist unsure if provider aware of the medication and wanted to make sure that provider is okay for pt to take both of these medications or if we should hold off on the Sovorxant  Rx.     See TE encounter from 3/10, also. Pt has been reaching out to clinic and pharmacy about this medication since the 10th.     Please advise.

## 2025-03-14 RX ORDER — TRAZODONE HYDROCHLORIDE 100 MG/1
100 TABLET ORAL AT BEDTIME
Status: CANCELLED | OUTPATIENT
Start: 2025-03-14

## 2025-03-14 NOTE — TELEPHONE ENCOUNTER
RN read providers note to Peb[place], Pharmacist at [facility].    Matt Swan PA-C Physician Assistant - C Signed5:47 AM       Yes I would like his belsomra filled. Ambien hasn't been working for him.           RN called the patient to remind him that he needs to stop the Ambien. He stated he has already stopped the Ambien.    Can he take Belsorma with Trazodone?         Sonya Morales RN on 3/14/2025 at 9:29 AM

## 2025-03-14 NOTE — TELEPHONE ENCOUNTER
Attempted to call patient with number on file to relay provider's message below with no answer, left voicemail to call clinic back at [phone].    Mariana Greene, RN on 3/14/2025 at 12:52 PM

## 2025-03-16 NOTE — TELEPHONE ENCOUNTER
Patient was advised to try belsomra first (hold trazodone). He was advised to let me know how the belsomra is working. I will then discuss future med adjustments/changes.

## 2025-03-17 NOTE — TELEPHONE ENCOUNTER
RN called and relayed providers message. Patient verbalized understanding.    Patient states Belsomra is working well as of now he picked up and started 3/14/25. He states he is able to fall right asleep and feels great.       Do you want patient to schedule follow up with you to discuss medication or send mychart message after certain amount of time to update how it is still working? RN does not see follow up instructions from OV notes 3/10/25       Bijal Chan RN on 3/17/2025 at 10:57 AM

## 2025-03-21 NOTE — TELEPHONE ENCOUNTER
Spoke with pt's partner Usama (consent on file) Usama stated he will have pt return the call to clinic at [phone] as pt is currently working.     Mariana Greene, RN on 3/21/2025 at 2:52 PM

## 2025-03-24 ENCOUNTER — MYC MEDICAL ADVICE (OUTPATIENT)
Dept: FAMILY MEDICINE | Facility: CLINIC | Age: 75
End: 2025-03-24
Payer: MEDICARE

## 2025-03-24 ENCOUNTER — TELEPHONE (OUTPATIENT)
Dept: ENDOCRINOLOGY | Facility: CLINIC | Age: 75
End: 2025-03-24
Payer: MEDICARE

## 2025-03-24 DIAGNOSIS — R32 URINARY INCONTINENCE, UNSPECIFIED TYPE: Primary | ICD-10-CM

## 2025-03-24 NOTE — TELEPHONE ENCOUNTER
Prior Authorization Retail Medication Request    Medication/Dose: Testosterone cypionate  200 mg/ ml  1 ml or 200 mg every 14 days !M   Diagnosis and ICD code (if different than what is on RX):  E29.1 Hypogonadism male   New/renewal/insurance change PA/secondary ins. PA:  Previously Tried and Failed:  only IM injections   Rationale:  Post therapy lab testosterone total  2/19/1925  663 shows effective dose   Range 240-950   Pre treatment  1/16/24 158  2/9/24 168     Insurance   Primary: Medicare   Insurance ID:  [id number]      Pharmacy Information (if different than what is on RX)  Name:  [facility]  Phone:  [phone]  Fax:[phone]    Clinic Information  Preferred routing pool for dept communication: patient call   Only one shot left Obdulia Anna RN on 3/24/2025 at 5:39 PM

## 2025-03-25 NOTE — TELEPHONE ENCOUNTER
PA Initiation    Medication: TESTOSTERONE CYPIONATE 200 MG/ML IM SOLN  Insurance Company:  - Phone [phone] Fax [phone]  Pharmacy Filling the Rx: [id number] - [place] - [address]. [place]  Filling Pharmacy Phone: [phone]  Filling Pharmacy Fax: [phone]  Start Date: 3/24/2025

## 2025-03-25 NOTE — TELEPHONE ENCOUNTER
Prior Authorization Approval    Medication: TESTOSTERONE CYPIONATE 200 MG/ML IM SOLN  Authorization Effective Date: 2/22/2025  Authorization Expiration Date: 12/31/2099  Approved Dose/Quantity:   Reference #:     Insurance Company:  - Phone [phone] Fax [phone]  Expected CoPay: $    CoPay Card Available:      Financial Assistance Needed:   Which Pharmacy is filling the prescription: [id number] - [place], [place] - [address]  Pharmacy Notified: YES  Patient Notified: **Instructed pharmacy to notify patient when script is ready to /ship.**

## 2025-03-27 ENCOUNTER — VIRTUAL VISIT (OUTPATIENT)
Dept: FAMILY MEDICINE | Facility: CLINIC | Age: 75
End: 2025-03-27
Payer: MEDICARE

## 2025-03-27 DIAGNOSIS — J04.0 LARYNGITIS: Primary | ICD-10-CM

## 2025-03-27 NOTE — PROGRESS NOTES
Demian is a 74 year old who is being evaluated via a billable telephone visit.    What phone number would you like to be contacted at? [phone]   How would you like to obtain your AVS? "[employer]  Originating Location (pt. Location): Home    Distant Location (provider location):  On-site  Telephone visit completed due to the patient did not consent to a video visit.      Subjective   Demian is a 74 year old, presenting for the following health issues:  Forms      3/27/2025     7:36 AM   Additional Questions   Roomed by Inez (questions completed via "[employer]   Accompanied by n/a     History of Present Illness       Reason for visit:  To talk about paperwork from insurance   He is taking medications regularly.        Illness earlier this month. Due to symptoms , missed 3/7 and 3/8.   Treatment was administered and symptoms have resolved.   Needs [facility] paperwork completed.    Review of Systems  Constitutional, HEENT, cardiovascular, pulmonary, GI, , musculoskeletal, neuro, skin, endocrine and psych systems are negative, except as otherwise noted.      Objective           Vitals:  No vitals were obtained today due to virtual visit.    Physical Exam   General: Alert and no distress //Respiratory: No audible wheeze, cough, or shortness of breath // Psychiatric:  Appropriate affect, tone, and pace of words      Demian was seen today for forms.    Diagnoses and all orders for this visit:    Laryngitis      Condition has resolved.   [facility] paperwork completed       Phone call duration: 6 minutes  Signed Electronically by: Matt Swan PA-C

## 2025-03-31 DIAGNOSIS — E29.1 HYPOGONADISM MALE: ICD-10-CM

## 2025-04-03 ENCOUNTER — PATIENT OUTREACH (OUTPATIENT)
Dept: GASTROENTEROLOGY | Facility: CLINIC | Age: 75
End: 2025-04-03
Payer: MEDICARE

## 2025-04-03 DIAGNOSIS — Z12.11 SPECIAL SCREENING FOR MALIGNANT NEOPLASMS, COLON: Primary | ICD-10-CM

## 2025-04-03 NOTE — PROGRESS NOTES
"Hx adenomatous polyps with 1yr recall recommended on last colonoscopy performed in 2017    [facility] Screening Colonoscopy Referral Review    Patient meets the inclusion criteria for screening colonoscopy standing order.    Ordering/Referring Provider:  Matt Swan PA-C        BMI: Estimated body mass index is 38.25 kg/m  as calculated from the following:    Height as of 3/10/25: 1.753 m (5' 9\").    Weight as of 3/14/25: 117.5 kg (259 lb).     Sedation:  Does patient have any of the following conditions affecting sedation?  Chronic or scheduled pain/narcotic medication use: MAC sedation recommended    Previous Scopes:  Any previous recommendations or follow up needs based on previous scope?  Location recommendations: Hospital d/t severe [facility]AL[facility]    Medical Concerns to Postpone Order:  Does patient have any of the following medical concerns that should postpone/delay colonoscopy referral?  No medical conditions affecting colonoscopy referral.    Final Referral Details:  Based on patient's medical history patient is appropriate for referral order with MAC/deep sedation.   BMI<= 45 45 < BMI <= 48 48 < BMI < = 50  BMI > 50   No Restrictions No [facility]  No [facility]  [facility] with exceptions Hospital Only OR Only     "

## 2025-04-09 DIAGNOSIS — N32.81 OVERACTIVE BLADDER: ICD-10-CM

## 2025-04-09 RX ORDER — OXYBUTYNIN CHLORIDE 5 MG/1
5 TABLET ORAL 2 TIMES DAILY
Qty: 180 TABLET | Refills: 2 | Status: SHIPPED | OUTPATIENT
Start: 2025-04-09

## 2025-04-10 DIAGNOSIS — G40.A09 NONINTRACTABLE ABSENCE EPILEPSY WITHOUT STATUS EPILEPTICUS (H): ICD-10-CM

## 2025-04-10 DIAGNOSIS — R56.9 CONVULSIONS, UNSPECIFIED CONVULSION TYPE (H): Chronic | ICD-10-CM

## 2025-04-10 RX ORDER — LEVETIRACETAM 1000 MG/1
TABLET ORAL
Qty: 180 TABLET | Refills: 3 | Status: SHIPPED | OUTPATIENT
Start: 2025-04-10

## 2025-04-10 NOTE — TELEPHONE ENCOUNTER
Pending Prescriptions:                       Disp   Refills    levETIRAcetam (KEPPRA) 1000 MG tablet     180 ta*3            Sig: TAKE 1 TABLET BY MOUTH TWICE DAILY IN THE MORNING           AND AT BEDTIME      [facility]    Pt's last office visit: 2/4/25  Next scheduled office visit: 9/17/25      Per the RN/LPN medication refill protocol, writer is unable to refill this request.

## 2025-04-15 ENCOUNTER — ANCILLARY PROCEDURE (OUTPATIENT)
Dept: CT IMAGING | Facility: CLINIC | Age: 75
End: 2025-04-15
Attending: NURSE PRACTITIONER
Payer: MEDICARE

## 2025-04-15 ENCOUNTER — OFFICE VISIT (OUTPATIENT)
Dept: URGENT CARE | Facility: URGENT CARE | Age: 75
End: 2025-04-15
Payer: MEDICARE

## 2025-04-15 ENCOUNTER — OFFICE VISIT (OUTPATIENT)
Dept: PEDIATRICS | Facility: CLINIC | Age: 75
End: 2025-04-15
Payer: MEDICARE

## 2025-04-15 VITALS
DIASTOLIC BLOOD PRESSURE: 69 MMHG | TEMPERATURE: 97 F | OXYGEN SATURATION: 97 % | SYSTOLIC BLOOD PRESSURE: 121 MMHG | RESPIRATION RATE: 16 BRPM | HEART RATE: 94 BPM

## 2025-04-15 VITALS
RESPIRATION RATE: 16 BRPM | HEIGHT: 70 IN | TEMPERATURE: 97 F | HEART RATE: 73 BPM | SYSTOLIC BLOOD PRESSURE: 129 MMHG | OXYGEN SATURATION: 97 % | BODY MASS INDEX: 37.8 KG/M2 | DIASTOLIC BLOOD PRESSURE: 70 MMHG | WEIGHT: 264 LBS

## 2025-04-15 DIAGNOSIS — R40.20 LOSS OF CONSCIOUSNESS (H): Primary | ICD-10-CM
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
## 2025-04-15 DIAGNOSIS — R51.9 ACUTE NONINTRACTABLE HEADACHE, UNSPECIFIED HEADACHE TYPE: ICD-10-CM

## 2025-04-15 PROCEDURE — 1125F AMNT PAIN NOTED PAIN PRSNT: CPT | Performed by: NURSE PRACTITIONER

## 2025-04-15 PROCEDURE — 70450 CT HEAD/BRAIN W/O DYE: CPT | Performed by: RADIOLOGY

## 2025-04-15 PROCEDURE — 99214 OFFICE O/P EST MOD 30 MIN: CPT | Performed by: NURSE PRACTITIONER

## 2025-04-15 PROCEDURE — 3078F DIAST BP <80 MM HG: CPT | Performed by: NURSE PRACTITIONER

## 2025-04-15 PROCEDURE — 3078F DIAST BP <80 MM HG: CPT | Performed by: INTERNAL MEDICINE

## 2025-04-15 PROCEDURE — 3074F SYST BP LT 130 MM HG: CPT | Performed by: INTERNAL MEDICINE

## 2025-04-15 PROCEDURE — 3074F SYST BP LT 130 MM HG: CPT | Performed by: NURSE PRACTITIONER

## 2025-04-15 PROCEDURE — 99207 REFERRAL TO ACUTE AND DIAGNOSTIC SERVICES: CPT | Performed by: INTERNAL MEDICINE

## 2025-04-15 PROCEDURE — 1125F AMNT PAIN NOTED PAIN PRSNT: CPT | Performed by: INTERNAL MEDICINE

## 2025-04-15 ASSESSMENT — PAIN SCALES - GENERAL
PAINLEVEL_OUTOF10: MODERATE PAIN (5)
PAINLEVEL_OUTOF10: SEVERE PAIN (8)

## 2025-04-15 NOTE — PROGRESS NOTES
[facility] Visit    Assessment & Plan   Problem List Items Addressed This Visit    None  Visit Diagnoses       Fall, sequela    -  Primary    Relevant Orders    CT Head w/o Contrast        Reviewed results with patient which are negative patient symptom relief is encouraged follow-up with neurology as scheduled.  All patient's questions addressed he verbalized understanding and agree with plan             No follow-ups on file.      Kevon Arciniega is a 74 year old, presenting for the following health issues:  Head Injury (Sunday - Fell off toilet onto Left side of skull - Bruise and bump)    HPI      Concern - Head Injury   Onset: Sunday   Description: Fell off of toilet at work, doesn't remember the fall, woke up on floor of bathroom - pt states the whole episode lasted 15 minutes.  Left side of skull is bruised.  Slight headache on left side.   Intensity: mild  Progression of Symptoms:  same  Accompanying Signs & Symptoms: Hearing in left ear seems to be going in and out.   Previous history of similar problem: hx seizures   Precipitating factors:        Worsened by: none   Alleviating factors:        Improved by: none   Therapies tried and outcome: Tylenol         Review of Systems  Constitutional, HEENT, cardiovascular, pulmonary, GI, , musculoskeletal, neuro, skin, endocrine and psych systems are negative, except as otherwise noted.      Objective    /69 (BP Location: Right arm, Patient Position: Sitting, Cuff Size: Adult Large)   Pulse 94   Temp 97  F (36.1  C) (Oral)   Resp 16   SpO2 97%   There is no height or weight on file to calculate BMI.  Physical Exam   GENERAL: alert and no distress  EYES: Eyes grossly normal to inspection, PERRL and conjunctivae and sclerae normal  RESP: Respirations regular not  MS: no gross musculoskeletal defects noted, no edema  SKIN: no suspicious lesions or rashes  NEURO: Normal strength and tone, sensory exam grossly normal, mentation  intact, oriented times 3, and cranial nerves 2-12 intact  PSYCH: mentation appears normal, affect normal/bright    Results for orders placed or performed in visit on 04/15/25   CT Head w/o Contrast     Status: None    Narrative    EXAM: CT HEAD W/O CONTRAST  LOCATION: [facility]  DATE: 4/15/2025    INDICATION:  Fall, sequela.  COMPARISON: Brain MR 3/11/2024.  TECHNIQUE: Routine CT Head without IV contrast. Multiplanar reformats. Dose reduction techniques were used.    FINDINGS:  INTRACRANIAL CONTENTS: No intracranial hemorrhage, extraaxial collection, or mass effect.  No CT evidence of acute infarct. Normal parenchymal attenuation. Normal ventricles and sulci.     VISUALIZED ORBITS/SINUSES/MASTOIDS: No intraorbital abnormality. No paranasal sinus mucosal disease. No middle ear or mastoid effusion.    BONES/SOFT TISSUES: There is a tiny high posterior midline scalp hematoma.      Impression    IMPRESSION:  1.  Tiny high posterior midline scalp hematoma.  2.  Otherwise, normal head CT.             Current Outpatient Medications:     albuterol (PROAIR HFA/PROVENTIL HFA/VENTOLIN HFA) 108 (90 Base) MCG/ACT inhaler, Inhale 2 puffs into the lungs every 6 hours as needed for shortness of breath / dyspnea, Disp: 8.5 g, Rfl: 0    allopurinol (ZYLOPRIM) 100 MG tablet, TAKE 2 TABLETS BY MOUTH EVERY DAY, Disp: 180 tablet, Rfl: 3    amLODIPine (NORVASC) 10 MG tablet, TAKE 1 TABLET BY MOUTH ONCE DAILY WITH EVENING MEAL, Disp: 90 tablet, Rfl: 0    aspirin 81 MG tablet, Take 1 tablet (81 mg) by mouth daily, Disp: 30 tablet, Rfl:     carbidopa-levodopa (SINEMET)  MG tablet, Take 1.5 tablets by mouth 3 times daily, Disp: 405 tablet, Rfl: 3    carvedilol (COREG) 12.5 MG tablet, TAKE 1 TABLET BY MOUTH TWICE A DAY WITH FOOD, Disp: 180 tablet, Rfl: 2    chlorthalidone (HYGROTON) 25 MG tablet, Take 1 tablet (25 mg) by mouth daily, Disp: 90 tablet, Rfl: 1    Cholecalciferol (VITAMIN D) 2000 UNITS tablet, Take  2,000 Units by mouth daily, Disp: 90 tablet, Rfl: 3    divalproex sodium delayed-release (DEPAKOTE) 500 MG DR tablet, Take 1 tablet (500 mg) by mouth 2 times daily., Disp: 180 tablet, Rfl: 3    ferrous sulfate (IRON) 325 (65 FE) MG tablet, Take 1 tablet (325 mg) by mouth 2 times daily, Disp: 60 tablet, Rfl: 2    FLUoxetine (PROZAC) 40 MG capsule, TAKE 1 CAPSULE BY MOUTH ONCE DAILY, Disp: 90 capsule, Rfl: 0    furosemide (LASIX) 20 MG tablet, Take 2 tablets (40 mg) by mouth daily, Disp: 180 tablet, Rfl: 0    gabapentin (NEURONTIN) 300 MG capsule, Take 2 capsules (600 mg) by mouth 3 times daily., Disp: 180 capsule, Rfl: 0    hydrALAZINE (APRESOLINE) 25 MG tablet, Take 1 tablet (25 mg) by mouth 2 times daily, Disp: 180 tablet, Rfl: 1    hydroxychloroquine (PLAQUENIL) 200 MG tablet, TAKE ONE TABLET BY MOUTH TWICE DAILY, Disp: 60 tablet, Rfl: 1    levETIRAcetam (KEPPRA) 1000 MG tablet, TAKE 1 TABLET BY MOUTH TWICE DAILY IN THE MORNING AND AT BEDTIME, Disp: 180 tablet, Rfl: 3    losartan (COZAAR) 100 MG tablet, Take 1 tablet (100 mg) by mouth daily, Disp: 30 tablet, Rfl: 0    meloxicam (MOBIC) 15 MG tablet, Take 1 tablet (15 mg) by mouth daily, Disp: 90 tablet, Rfl: 0    Multiple Vitamin (MULTI VITAMIN MENS PO), Take  by mouth., Disp: , Rfl:     omeprazole (PRILOSEC) 40 MG DR capsule, Take 1 capsule by mouth once daily, Disp: 90 capsule, Rfl: 3    ORDER FOR DME, CPAP daily, Disp: , Rfl:     oxyBUTYnin (DITROPAN) 5 MG tablet, TAKE ONE TABLET BY MOUTH TWICE DAILY, Disp: 180 tablet, Rfl: 2    potassium chloride albina ER (KLOR-CON M20) 20 MEQ CR tablet, TAKE 1 TABLET BY MOUTH THREE TIMES A DAY, Disp: 270 tablet, Rfl: 0    sildenafil (REVATIO) 20 MG tablet, Take 3-5 tabs 1/2 to 4 hours prior to relations, Disp: 30 tablet, Rfl: 11    simvastatin (ZOCOR) 20 MG tablet, TAKE 2 TABLETS BY MOUTH IN THE EVENING AT BEDTIME, Disp: 180 tablet, Rfl: 1    Suvorexant (BELSOMRA) 10 MG tablet, Take 1 tablet (10 mg) by mouth nightly as needed  "for sleep., Disp: 30 tablet, Rfl: 1    syringe/needle, disp, 25G X 1\" 3 ML MISC, Use for testosterone injections, Disp: 12 each, Rfl: 3    testosterone cypionate (DEPOTESTOSTERONE) 200 MG/ML injection, Inject 1 mL (200 mg) into the muscle every 14 days., Disp: 2 mL, Rfl: 5    traMADol (ULTRAM) 50 MG tablet, Take 1 tablet (50 mg) by mouth every 6 hours as needed for severe pain., Disp: 108 tablet, Rfl: 0    traZODone (DESYREL) 100 MG tablet, Take 100 mg by mouth At Bedtime, Disp: , Rfl:     vitamin C (ASCORBIC ACID) 1000 MG TABS, , Disp: , Rfl:     WEGOVY 0.25 MG/0.5ML pen, ADMINISTER 0.25 MG UNDER THE SKIN 1 TIME A WEEK, Disp: 2 mL, Rfl: 0    benzonatate (TESSALON) 100 MG capsule, Take 1 capsule (100 mg) by mouth 3 times daily as needed for cough., Disp: 24 capsule, Rfl: 0    bisacodyl (DULCOLAX) 5 MG EC tablet, Two days prior to procedure take two (2) tablets at 10am. One day prior to procedure take two (2) tablets at 10am, Disp: 4 tablet, Rfl: 0    LORazepam (ATIVAN) 0.5 MG tablet, Take 1-2 tabs one hour prior to your mri, Disp: 2 tablet, Rfl: 0    polyethylene glycol (GOLYTELY) 236 g suspension, One day prior to procedure at 3 pm fill container with water. Cover and shake until mixed well. Drink an 8 oz. glass of mixture every 10-15 minutes until the 1st half of the jug is gone. Place the remainder of the Golytely in the refrigerator. At 8 pm, drink the 2nd half of the jug of Golytely bowel prep. Drink an 8 oz. glass of Golytely every 10-15 minutes until the container of Golytely is gone., Disp: 4000 mL, Rfl: 0    polyethylene glycol (MIRALAX) 17 GM/Dose powder, Take 1 capful (17g) of Miralax mixed with 8 oz of a clear liquid twice daily for 7 days prior to your scheduled procedure., Disp: 238 g, Rfl: 0    zolpidem (AMBIEN) 10 MG tablet, Take 10 mg by mouth at bedtime., Disp: , Rfl:       Signed Electronically by: Catalina Rose NP    "

## 2025-04-15 NOTE — PROGRESS NOTES
SUBJECTIVE:  Prashant Keyes is an 74 year old male who presents for fall at work 2 days ago.  Hit his head he thinks but not sure exactly what happened.  Has hx of seizures but says it wasn't a seizure.  Has parkinsons and so may have lost his balance.  Was sitting on toilet in bathroom at work and next thing he knew he was waking up on floor and assumed he fell.  Didn't really feel tired after he fell.  Felt a little shaky and spacy after work up. Has had an ongoing headache on left side of head since then.  No new numbness or weakness.  No recent cough or runny nose but was treated for bronchitis about a month ago.  No n/v/d.  Hasn't missed any doses of his meds recently.  No new lightheadedness or dizziness, but some chronic  sxs.  Contacted his neurology office and was advised to be seen in UC or ED, so he came in to .  Later his neurologist mentioned getting a CT of his head and concern that he could have had a seizure, but pt has not yet received a call regarding that from the neurologist.    PMH:   has a past medical history of Arthritis (1/1/2012), Calculus of kidney (~1975), Carpal tunnel syndrome (11/94), Concussion, unspecified (12/95), Depressive disorder (2/2/2015), Eczema (11/16/2011), Epileptic seizure (H) (1995), Fibromyalgia syndrome (~2000), Hypertension, Left testicle cyst, Photosensitive contact dermatitis, Prostatitis, unspecified, Seizures (H), Umbilical hernia (11/26/2012), Uncomplicated asthma, and Unspecified asthma(493.90).  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)    Class 1 obesity with serious  comorbidity and body mass index (BMI) of 31.0 to 31.9 in adult, unspecified obesity type    Obesity (BMI 35.0-39.9) with comorbidity (H)    Parkinsonism (H)    Current moderate episode of major depressive disorder without prior episode (H)    Chronic, continuous use of opioids    Primary osteoarthritis of left knee     Social History     Socioeconomic History    Marital status:      Spouse name: None    Number of children: 4    Years of education: 20+    Highest education level: None   Occupational History    Occupation: Hospital Administration     Employer: RETIRED     Comment: 1998   Tobacco Use    Smoking status: Never     Passive exposure: Never    Smokeless tobacco: Never   Vaping Use    Vaping status: Never Used   Substance and Sexual Activity    Alcohol use: No     Comment: Quit since 2003.     Drug use: No    Sexual activity: Yes     Partners: Male     Birth control/protection: None   Other Topics Concern    Caffeine Concern Yes     Comment: 3-4 cups    Parent/sibling w/ CABG, MI or angioplasty before 65F 55M? Yes   Social History Narrative    Partner Usama Schmitt     Social Drivers of Health     Financial Resource Strain: Low Risk  (1/16/2024)    Financial Resource Strain     Within the past 12 months, have you or your family members you live with been unable to get utilities (heat, electricity) when it was really needed?: No   Food Insecurity: Low Risk  (1/16/2024)    Food Insecurity     Within the past 12 months, did you worry that your food would run out before you got money to buy more?: No     Within the past 12 months, did the food you bought just not last and you didn t have money to get more?: No   Transportation Needs: Low Risk  (1/16/2024)    Transportation Needs     Within the past 12 months, has lack of transportation kept you from medical appointments, getting your medicines, non-medical meetings or appointments, work, or from getting things that you need?: No   Physical Activity:  Inactive (7/29/2024)    Exercise Vital Sign     Days of Exercise per Week: 0 days     Minutes of Exercise per Session: 0 min   Stress: No Stress Concern Present (7/29/2024)    [employer] [place] [employer] - Occupational Stress Questionnaire     Feeling of Stress : Not at all   Social Connections: Unknown (7/29/2024)    Social Connection and Isolation Panel [NHANES]     Frequency of Social Gatherings with Friends and Family: Never   Interpersonal Safety: Low Risk  (2/19/2025)    Interpersonal Safety     Do you feel physically and emotionally safe where you currently live?: Yes     Within the past 12 months, have you been hit, slapped, kicked or otherwise physically hurt by someone?: No     Within the past 12 months, have you been humiliated or emotionally abused in other ways by your partner or ex-partner?: No   Housing Stability: Low Risk  (1/16/2024)    Housing Stability     Do you have housing? : Yes     Are you worried about losing your housing?: No     Family History   Problem Relation Age of Onset    Cerebrovascular Disease Mother         d 2015    C.A.D. Mother         CABG 75    Arthritis Mother     Eye Disorder Mother     Heart Disease Mother     Cardiovascular Father         Rheumatic Heart Disease    Cancer Maternal Grandmother         Thyroid CA    Other Cancer Maternal Grandmother         Goiter    Thyroid Disease Maternal Grandfather         d. 1937    Thyroid Disease Paternal Grandmother     Cancer Paternal Grandfather         Throat CA    Hypertension Brother     Lipids Brother     C.A.D. Brother         CABG 46    Arthritis Brother     Heart Disease Brother         CABG x5    Obesity Brother     Coronary Artery Disease Brother     Hyperlipidemia Brother     Hypertension Brother         d.May 30, 2022    Lipids Brother     Thyroid Disease Brother         d.May 30, 2022    Alcohol/Drug Brother     Heart Disease Brother         CABG    Coronary Artery Disease Brother         Syd is dead now     Hyperlipidemia Brother         dead    Obesity Brother         dead    Gastrointestinal Disease Brother         Crohn's Disease-Ostomy    Cerebrovascular Disease Brother         dead    Coronary Artery Disease Brother         Usama is dead now    Hyperlipidemia Brother         dead    Cancer Sister         Thyroid CA    Hypertension Sister     Obesity Sister     Thyroid Disease Sister     Hemochromatosis Sister     Cerebrovascular Disease Sister         birth defect    Hyperlipidemia Sister     Other Cancer Sister         Goiter    Arrhythmia Sister     Depression Son     Diabetes Son     Depression Son     Depression Daughter     Depression Daughter        ALLERGIES:  Accupril [ace inhibitors], Aptiom [eslicarbazepine], Atorvastatin calcium, Contrast dye, Lactose, Lisinopril, Nitroglycerin, Paroxetine, Tetracycline [tetracyclines & related], Valproic acid, Vimpat [lacosamide], Zolpidem, and Adhesive tape    Current Outpatient Medications   Medication Sig Dispense Refill    albuterol (PROAIR HFA/PROVENTIL HFA/VENTOLIN HFA) 108 (90 Base) MCG/ACT inhaler Inhale 2 puffs into the lungs every 6 hours as needed for shortness of breath / dyspnea 8.5 g 0    allopurinol (ZYLOPRIM) 100 MG tablet TAKE 2 TABLETS BY MOUTH EVERY  tablet 3    amLODIPine (NORVASC) 10 MG tablet TAKE 1 TABLET BY MOUTH ONCE DAILY WITH EVENING MEAL 90 tablet 0    aspirin 81 MG tablet Take 1 tablet (81 mg) by mouth daily 30 tablet     benzonatate (TESSALON) 100 MG capsule Take 1 capsule (100 mg) by mouth 3 times daily as needed for cough. 24 capsule 0    bisacodyl (DULCOLAX) 5 MG EC tablet Two days prior to procedure take two (2) tablets at 10am. One day prior to procedure take two (2) tablets at 10am 4 tablet 0    carbidopa-levodopa (SINEMET)  MG tablet Take 1.5 tablets by mouth 3 times daily 405 tablet 3    carvedilol (COREG) 12.5 MG tablet TAKE 1 TABLET BY MOUTH TWICE A DAY WITH FOOD 180 tablet 2    chlorthalidone (HYGROTON) 25 MG  tablet Take 1 tablet (25 mg) by mouth daily 90 tablet 1    Cholecalciferol (VITAMIN D) 2000 UNITS tablet Take 2,000 Units by mouth daily 90 tablet 3    divalproex sodium delayed-release (DEPAKOTE) 500 MG DR tablet Take 1 tablet (500 mg) by mouth 2 times daily. 180 tablet 3    ferrous sulfate (IRON) 325 (65 FE) MG tablet Take 1 tablet (325 mg) by mouth 2 times daily 60 tablet 2    FLUoxetine (PROZAC) 40 MG capsule TAKE 1 CAPSULE BY MOUTH ONCE DAILY 90 capsule 0    furosemide (LASIX) 20 MG tablet Take 2 tablets (40 mg) by mouth daily 180 tablet 0    gabapentin (NEURONTIN) 300 MG capsule Take 2 capsules (600 mg) by mouth 3 times daily. 180 capsule 0    hydrALAZINE (APRESOLINE) 25 MG tablet Take 1 tablet (25 mg) by mouth 2 times daily 180 tablet 1    hydroxychloroquine (PLAQUENIL) 200 MG tablet TAKE ONE TABLET BY MOUTH TWICE DAILY 60 tablet 1    levETIRAcetam (KEPPRA) 1000 MG tablet TAKE 1 TABLET BY MOUTH TWICE DAILY IN THE MORNING AND AT BEDTIME 180 tablet 3    LORazepam (ATIVAN) 0.5 MG tablet Take 1-2 tabs one hour prior to your mri 2 tablet 0    losartan (COZAAR) 100 MG tablet Take 1 tablet (100 mg) by mouth daily 30 tablet 0    meloxicam (MOBIC) 15 MG tablet Take 1 tablet (15 mg) by mouth daily 90 tablet 0    Multiple Vitamin (MULTI VITAMIN MENS PO) Take  by mouth.      omeprazole (PRILOSEC) 40 MG DR capsule Take 1 capsule by mouth once daily 90 capsule 3    ORDER FOR DME CPAP daily      oxyBUTYnin (DITROPAN) 5 MG tablet TAKE ONE TABLET BY MOUTH TWICE DAILY 180 tablet 2    polyethylene glycol (GOLYTELY) 236 g suspension One day prior to procedure at 3 pm fill container with water. Cover and shake until mixed well. Drink an 8 oz. glass of mixture every 10-15 minutes until the 1st half of the jug is gone. Place the remainder of the Golytely in the refrigerator. At 8 pm, drink the 2nd half of the jug of Golytely bowel prep. Drink an 8 oz. glass of Golytely every 10-15 minutes until the container of Golytely is gone.  "4000 mL 0    polyethylene glycol (MIRALAX) 17 GM/Dose powder Take 1 capful (17g) of Miralax mixed with 8 oz of a clear liquid twice daily for 7 days prior to your scheduled procedure. 238 g 0    potassium chloride albina ER (KLOR-CON M20) 20 MEQ CR tablet TAKE 1 TABLET BY MOUTH THREE TIMES A  tablet 0    sildenafil (REVATIO) 20 MG tablet Take 3-5 tabs 1/2 to 4 hours prior to relations 30 tablet 11    simvastatin (ZOCOR) 20 MG tablet TAKE 2 TABLETS BY MOUTH IN THE EVENING AT BEDTIME 180 tablet 1    Suvorexant (BELSOMRA) 10 MG tablet Take 1 tablet (10 mg) by mouth nightly as needed for sleep. 30 tablet 1    syringe/needle, disp, 25G X 1\" 3 ML MISC Use for testosterone injections 12 each 3    testosterone cypionate (DEPOTESTOSTERONE) 200 MG/ML injection Inject 1 mL (200 mg) into the muscle every 14 days. 2 mL 5    traMADol (ULTRAM) 50 MG tablet Take 1 tablet (50 mg) by mouth every 6 hours as needed for severe pain. 108 tablet 0    traZODone (DESYREL) 100 MG tablet Take 100 mg by mouth At Bedtime      vitamin C (ASCORBIC ACID) 1000 MG TABS       WEGOVY 0.25 MG/0.5ML pen ADMINISTER 0.25 MG UNDER THE SKIN 1 TIME A WEEK 2 mL 0    zolpidem (AMBIEN) 10 MG tablet Take 10 mg by mouth at bedtime.       No current facility-administered medications for this visit.         ROS:  ROS is done and is negative for general/constitutional, eye, ENT, Respiratory, cardiovascular, GI, , Skin, musculoskeletal except as noted elsewhere.  All other review of systems negative except as noted elsewhere.      OBJECTIVE:  /70   Pulse 73   Temp 97  F (36.1  C) (Tympanic)   Resp 16   Ht 1.778 m (5' 10\")   Wt 119.7 kg (264 lb)   SpO2 97%   BMI 37.88 kg/m    GENERAL APPEARANCE: Alert, in no acute distress  EYES: normal  NOSE:normal  OROPHARYNX:normal  NECK:No adenopathy,masses or thyromegaly  RESP: normal and clear to auscultation  CV:regular rate and rhythm and no murmurs, clicks, or gallops  ABDOMEN: Abdomen soft, non-tender. BS " normal. No masses, organomegaly  SKIN: no ulcers, lesions or rash  MUSCULOSKELETAL:Musculoskeletal normal  NEURO: CN 2-12 grossly intact.  Mild tremor in hands.  Strength 4/5 and symmetric in bilateral upper and lower extremities.  DTRs 1+ and symmetric in bilateral upper and lower extremities.  Sensation to light touch grossly intact in bilateral upper and lower extremities.    RESULTS  No results found for any visits on 04/15/25..  No results found for this or any previous visit (from the past 48 hours).    ASSESSMENT/PLAN:    ASSESSMENT / PLAN:  (R40.20) Loss of consciousness (H)  (primary encounter diagnosis)  (R51.9) Acute nonintractable headache, unspecified headache type  Comment: had loc of unknown origin.  Seizure is a possibility but pt indicates he didn't feel like he'd had a seizure.  He's not sure how hard he hit his head but has had ongoing headache.  Neurology recommended a CT of head  Plan: Referral to Acute and Diagnostic Services (Day         of diagnostic / First order acute)        Will send pt to [facility] for further evaluation of his loc and likely CT as advised by neurology.  Contacted [facility] about pt.      See [facility] for orders, medications, letters, patient instructions    Jody Sanchez M.D.

## 2025-04-17 DIAGNOSIS — E87.6 HYPOKALEMIA: ICD-10-CM

## 2025-04-17 RX ORDER — POTASSIUM CHLORIDE 1500 MG/1
20 TABLET, EXTENDED RELEASE ORAL
Qty: 270 TABLET | Refills: 0 | Status: SHIPPED | OUTPATIENT
Start: 2025-04-17

## 2025-04-19 ENCOUNTER — OFFICE VISIT (OUTPATIENT)
Dept: URGENT CARE | Facility: URGENT CARE | Age: 75
End: 2025-04-19
Payer: MEDICARE

## 2025-04-19 VITALS
WEIGHT: 268 LBS | BODY MASS INDEX: 38.45 KG/M2 | RESPIRATION RATE: 22 BRPM | OXYGEN SATURATION: 98 % | SYSTOLIC BLOOD PRESSURE: 110 MMHG | DIASTOLIC BLOOD PRESSURE: 65 MMHG | TEMPERATURE: 97.7 F | HEART RATE: 78 BPM

## 2025-04-19 DIAGNOSIS — M54.50 ACUTE LEFT-SIDED LOW BACK PAIN WITHOUT SCIATICA: Primary | ICD-10-CM

## 2025-04-19 PROCEDURE — 3074F SYST BP LT 130 MM HG: CPT | Performed by: STUDENT IN AN ORGANIZED HEALTH CARE EDUCATION/TRAINING PROGRAM

## 2025-04-19 PROCEDURE — 1125F AMNT PAIN NOTED PAIN PRSNT: CPT | Performed by: STUDENT IN AN ORGANIZED HEALTH CARE EDUCATION/TRAINING PROGRAM

## 2025-04-19 PROCEDURE — 3078F DIAST BP <80 MM HG: CPT | Performed by: STUDENT IN AN ORGANIZED HEALTH CARE EDUCATION/TRAINING PROGRAM

## 2025-04-19 PROCEDURE — 99213 OFFICE O/P EST LOW 20 MIN: CPT | Performed by: STUDENT IN AN ORGANIZED HEALTH CARE EDUCATION/TRAINING PROGRAM

## 2025-04-19 RX ORDER — CYCLOBENZAPRINE HCL 10 MG
10 TABLET ORAL 3 TIMES DAILY PRN
Qty: 20 TABLET | Refills: 0 | Status: SHIPPED | OUTPATIENT
Start: 2025-04-19

## 2025-04-19 ASSESSMENT — PAIN SCALES - GENERAL: PAINLEVEL_OUTOF10: SEVERE PAIN (9)

## 2025-04-19 NOTE — PROGRESS NOTES
Urgent Care Clinic Visit    Chief Complaint   Patient presents with    Urgent Care     Left lower back pain/injury from fall last Sunday.     Letter for School/Work     Request work note.                4/19/2025    12:21 PM   Additional Questions   Roomed by MISBAH Hamilton   Accompanied by Self         4/19/2025   Forms   Any forms needing to be completed Yes

## 2025-04-19 NOTE — PROGRESS NOTES
Assessment & Plan     Acute left-sided low back pain without sciatica  Fell at work but patient states he is not reporting it as work comp. The left low back strain has worsened this morning and he has a large knot in the muscle. Advised ice, heat, massage, flexeril as needed and not to drive after taking it and discussed can increase risk for falls with use over age of 65 and he understands that, offered physical therapy but patient declined. Work note given to patient. Follow up if symptoms persist or worsen.    - cyclobenzaprine (FLEXERIL) 10 MG tablet  Dispense: 20 tablet; Refill: 0         No follow-ups on file.    Anaya Vinson, GENESIS CNP  [facility] [place]    Kevon Arciniega is a 74 year old male who presents to clinic today for the following health issues:  Chief Complaint   Patient presents with    Urgent Care     Left lower back pain/injury from fall last Sunday.  Fell at work. Knot in back.    Letter for School/Work     Request work note.          4/19/2025    12:21 PM   Additional Questions   Roomed by MISBAH Hamilton   Accompanied by Self         4/19/2025   Forms   Any forms needing to be completed Yes     HPI      Review of Systems  Constitutional, HEENT, cardiovascular, pulmonary, GI, , musculoskeletal, neuro, skin, endocrine and psych systems are negative, except as otherwise noted.      Objective    /65 (BP Location: Right arm, Patient Position: Sitting, Cuff Size: Adult Large)   Pulse 78   Temp 97.7  F (36.5  C) (Tympanic)   Resp 22   Wt 121.6 kg (268 lb)   SpO2 98%   BMI 38.45 kg/m    Physical Exam   GENERAL: alert and no distress  MS: large knot in the left lateral lumbar region that is tender to palpation   SKIN: no suspicious lesions or rashes  NEURO: Normal strength and tone, mentation intact and speech normal

## 2025-04-19 NOTE — LETTER
April 19, 2025      Prashant Keyes  [address] [facility] [address]        To Whom It May Concern:    Prashant Keyes  was seen on 4/19/25.  Please excuse him from work 4/19 and 4/20 due to injury.        Sincerely,        GENESIS Ramírez CNP    Electronically signed

## 2025-04-21 ENCOUNTER — OFFICE VISIT (OUTPATIENT)
Dept: UROLOGY | Facility: CLINIC | Age: 75
End: 2025-04-21
Attending: PHYSICIAN ASSISTANT
Payer: MEDICARE

## 2025-04-21 VITALS
BODY MASS INDEX: 38.65 KG/M2 | OXYGEN SATURATION: 99 % | HEART RATE: 66 BPM | HEIGHT: 70 IN | WEIGHT: 270 LBS | DIASTOLIC BLOOD PRESSURE: 73 MMHG | RESPIRATION RATE: 16 BRPM | SYSTOLIC BLOOD PRESSURE: 127 MMHG

## 2025-04-21 DIAGNOSIS — N40.1 BENIGN PROSTATIC HYPERPLASIA WITH NOCTURIA: ICD-10-CM

## 2025-04-21 DIAGNOSIS — R35.1 BENIGN PROSTATIC HYPERPLASIA WITH NOCTURIA: ICD-10-CM

## 2025-04-21 DIAGNOSIS — R32 URINARY INCONTINENCE, UNSPECIFIED TYPE: Primary | ICD-10-CM

## 2025-04-21 LAB
ALBUMIN UR-MCNC: NEGATIVE MG/DL
APPEARANCE UR: CLEAR
BILIRUB UR QL STRIP: NEGATIVE
COLOR UR AUTO: YELLOW
GLUCOSE UR STRIP-MCNC: NEGATIVE MG/DL
HGB UR QL STRIP: NEGATIVE
KETONES UR STRIP-MCNC: NEGATIVE MG/DL
LEUKOCYTE ESTERASE UR QL STRIP: NEGATIVE
NITRATE UR QL: NEGATIVE
PH UR STRIP: 6 [PH] (ref 5–7)
SP GR UR STRIP: 1.01 (ref 1–1.03)
UROBILINOGEN UR STRIP-ACNC: 0.2 E.U./DL

## 2025-04-21 ASSESSMENT — PAIN SCALES - GENERAL: PAINLEVEL_OUTOF10: NO PAIN (0)

## 2025-04-21 NOTE — PATIENT INSTRUCTIONS
"Javi Keyes, it was nice to meet you!    Thank you for allowing us the privilege of caring for you. We hope we provided you with the excellent service you deserve.   Please let us know if there is anything else we can do for you.  We want you to be completely satisfied with your care experience.    To ensure the quality of our services, you may be receiving a patient satisfaction survey from an independent patient satisfaction monitoring company.    The greatest compliment you can give is a \"Likely to Recommend.\"    Your visit was with GENESIS Ramirez CNP today.    Instructions per today's visit:     Please let me know if symptoms worsen or return.     ___________________________________________________________________________  Important contact and scheduling information:  Please call our contact center at [phone] to schedule your next appointments or to reach our nurse triage line.  Please call during clinic hours Monday through Friday 8:00a - 4:30p if you have questions.  You can contact us anytime via [employer] and we will reply during clinic hours.    Lab results will be communicated through My Chart or letter (if My Chart not used). Please call the clinic if you have not received communication after 1 week or if you have any questions.?  __________________________________________________________________________    If labs were ordered today:    Please make an appointment to have them drawn at your convenience.     To schedule the Lab Appointment using [employer]:  Select \"Schedule an Appointment\"  Select \"Lab Only\"  Answer simple questions about where you would like to be seen and your type of insurance  For \"Which locations work for you?, select the location and set up the appointment.      "

## 2025-04-21 NOTE — PROGRESS NOTES
"S: Prashant Keyes is a pleasant  74 year old male with history of seizure, parkinson's gout and who was requested to be seen by  Matt Swan for a consult with regard to patient's urinary complaints.  Patient complains of incontinence, nocturia maybe once. He wears a pad and changes it daily.  He has no history of elevated PSA.  Symptoms have been on going for  several week(s).  Seems to be be improving over time.  Currently using Oxybutynin 5 mg po BID, with good results. Testosterone 200 mg q 2 weeks, for 'low testosterone\" unable to voice indication of use.       His recent PSA was found to be   Lab Results   Component Value Date    PSA 1.96 02/19/2025    PSA 1.91 11/14/2024    PSA 1.29 03/18/2024      His AUA Symptom Score:  3.  His QOL score:  2.    Current Outpatient Medications   Medication Sig Dispense Refill    albuterol (PROAIR HFA/PROVENTIL HFA/VENTOLIN HFA) 108 (90 Base) MCG/ACT inhaler Inhale 2 puffs into the lungs every 6 hours as needed for shortness of breath / dyspnea 8.5 g 0    allopurinol (ZYLOPRIM) 100 MG tablet TAKE 2 TABLETS BY MOUTH EVERY  tablet 3    amLODIPine (NORVASC) 10 MG tablet TAKE 1 TABLET BY MOUTH ONCE DAILY WITH EVENING MEAL 90 tablet 0    aspirin 81 MG tablet Take 1 tablet (81 mg) by mouth daily 30 tablet     carbidopa-levodopa (SINEMET)  MG tablet Take 1.5 tablets by mouth 3 times daily 405 tablet 3    carvedilol (COREG) 12.5 MG tablet TAKE 1 TABLET BY MOUTH TWICE A DAY WITH FOOD 180 tablet 2    chlorthalidone (HYGROTON) 25 MG tablet Take 1 tablet (25 mg) by mouth daily 90 tablet 1    Cholecalciferol (VITAMIN D) 2000 UNITS tablet Take 2,000 Units by mouth daily 90 tablet 3    cyclobenzaprine (FLEXERIL) 10 MG tablet Take 1 tablet (10 mg) by mouth 3 times daily as needed for muscle spasms. 20 tablet 0    divalproex sodium delayed-release (DEPAKOTE) 500 MG DR tablet Take 1 tablet (500 mg) by mouth 2 times daily. 180 tablet 3    ferrous sulfate (IRON) 325 (65 FE) " "MG tablet Take 1 tablet (325 mg) by mouth 2 times daily 60 tablet 2    FLUoxetine (PROZAC) 40 MG capsule TAKE ONE CAPSULE BY MOUTH ONE TIME DAILY 90 capsule 0    furosemide (LASIX) 20 MG tablet Take 2 tablets (40 mg) by mouth daily 180 tablet 0    gabapentin (NEURONTIN) 300 MG capsule Take 2 capsules (600 mg) by mouth 3 times daily. 180 capsule 0    hydrALAZINE (APRESOLINE) 25 MG tablet Take 1 tablet (25 mg) by mouth 2 times daily 180 tablet 1    hydroxychloroquine (PLAQUENIL) 200 MG tablet TAKE ONE TABLET BY MOUTH TWICE DAILY 60 tablet 1    KLOR-CON M20 20 MEQ CR tablet TAKE 1 TABLET BY MOUTH THREE TIMES A  tablet 0    levETIRAcetam (KEPPRA) 1000 MG tablet TAKE 1 TABLET BY MOUTH TWICE DAILY IN THE MORNING AND AT BEDTIME 180 tablet 3    losartan (COZAAR) 100 MG tablet Take 1 tablet (100 mg) by mouth daily 30 tablet 0    meloxicam (MOBIC) 15 MG tablet Take 1 tablet (15 mg) by mouth daily 90 tablet 0    Multiple Vitamin (MULTI VITAMIN MENS PO) Take  by mouth.      omeprazole (PRILOSEC) 40 MG DR capsule Take 1 capsule by mouth once daily 90 capsule 3    ORDER FOR DME CPAP daily      oxyBUTYnin (DITROPAN) 5 MG tablet TAKE ONE TABLET BY MOUTH TWICE DAILY 180 tablet 2    sildenafil (REVATIO) 20 MG tablet Take 3-5 tabs 1/2 to 4 hours prior to relations 30 tablet 11    simvastatin (ZOCOR) 20 MG tablet TAKE 2 TABLETS BY MOUTH IN THE EVENING AT BEDTIME 180 tablet 1    Suvorexant (BELSOMRA) 10 MG tablet Take 1 tablet (10 mg) by mouth nightly as needed for sleep. 30 tablet 1    syringe/needle, disp, 25G X 1\" 3 ML MISC Use for testosterone injections 12 each 3    testosterone cypionate (DEPOTESTOSTERONE) 200 MG/ML injection Inject 1 mL (200 mg) into the muscle every 14 days. 2 mL 5    traMADol (ULTRAM) 50 MG tablet Take 1 tablet (50 mg) by mouth every 6 hours as needed for severe pain. 108 tablet 0    traZODone (DESYREL) 100 MG tablet Take 100 mg by mouth At Bedtime      vitamin C (ASCORBIC ACID) 1000 MG TABS       WEGOVY " "0.25 MG/0.5ML pen ADMINISTER 0.25 MG UNDER THE SKIN 1 TIME A WEEK 2 mL 0     Allergies   Allergen Reactions    Accupril [Ace Inhibitors] Difficulty breathing     accupril causes SOB    Aptiom [Eslicarbazepine] Difficulty breathing    Atorvastatin Calcium      Myalgias from Lipitor    Contrast Dye Hives     3/11/2024 Pt here for MRI brain with contrast. In our chart 2002 states allergy to contrast, hives, not indication of what contrast. I see at [facility] in 2014 pt had an MRI brain with contrast and nothing under allergies and pt states they were fine for that. Dr. Coffey ok 'd contrast today and pt did well. TA    Lactose GI Disturbance    Lisinopril Difficulty breathing     SOB    Nitroglycerin      Headache    Paroxetine Hives    Tetracycline [Tetracyclines & Related]      \"splitting headaches\"    Valproic Acid Hives    Vimpat [Lacosamide] Difficulty breathing    Zolpidem     Adhesive Tape Rash     Without itching- EKG pads     Past Medical History:   Diagnosis Date    Arthritis 1/1/2012    Calculus of kidney ~1975    Carpal tunnel syndrome 11/94    Concussion, unspecified 12/95    Depressive disorder 2/2/2015    Eczema 11/16/2011    Epileptic seizure (H) 1995    diagnosed 1995, controlled with Depakote and Keppra    Fibromyalgia syndrome ~2000    per pt    Hypertension     Left testicle cyst     Photosensitive contact dermatitis     Prostatitis, unspecified     Seizures (H)     Diagnosed 1995, controlled with Depakote and Keppra    Umbilical hernia 11/26/2012    Uncomplicated asthma     Unspecified asthma(493.90)      Past Surgical History:   Procedure Laterality Date    ABDOMEN SURGERY  2015    Fixed belly button    ANGIOGRAM  2003    Coronary Angiogram- negative    ARTHROSCOPY KNEE Left 9/5/2019    Procedure: LEFT KNEE ARTHROSCOPY WITH MENISCAL AND CHONDRAL DEBRIDEMENT;  Surgeon: Damon Rosas MD;  Location: [facility] OR    COLONOSCOPY  2/2/2013    COLONOSCOPY WITH CO2 INSUFFLATION N/A 8/17/2017    Procedure: " COLONOSCOPY WITH CO2 INSUFFLATION;  COLON SCREEN/ GEE;  Surgeon: Varun Bond MD;  Location: [facility] OR    ENT SURGERY  2/2/2008    Hearing aids    EYE SURGERY  April/2012    Cataracts    HC REMOVAL TESTIS,SIMPLE  1978    Right undecended    HERNIA REPAIR, INGUINAL RT/LT  1963, 1978    Right Hernia    HERNIORRHAPHY UMBILICAL  4/2013    [place]      Family History   Problem Relation Age of Onset    Cerebrovascular Disease Mother         d 2015    C.A.D. Mother         CABG 75    Arthritis Mother     Eye Disorder Mother     Heart Disease Mother     Cardiovascular Father         Rheumatic Heart Disease    Cancer Maternal Grandmother         Thyroid CA    Other Cancer Maternal Grandmother         Goiter    Thyroid Disease Maternal Grandfather         d. 1937    Thyroid Disease Paternal Grandmother     Cancer Paternal Grandfather         Throat CA    Hypertension Brother     Lipids Brother     C.A.D. Brother         CABG 46    Arthritis Brother     Heart Disease Brother         CABG x5    Obesity Brother     Coronary Artery Disease Brother     Hyperlipidemia Brother     Hypertension Brother         d.May 30, 2022    Lipids Brother     Thyroid Disease Brother         d.May 30, 2022    Alcohol/Drug Brother     Heart Disease Brother         CABG    Coronary Artery Disease Brother         Syd is dead now    Hyperlipidemia Brother         dead    Obesity Brother         dead    Gastrointestinal Disease Brother         Crohn's Disease-Ostomy    Cerebrovascular Disease Brother         dead    Coronary Artery Disease Brother         Usama is dead now    Hyperlipidemia Brother         dead    Cancer Sister         Thyroid CA    Hypertension Sister     Obesity Sister     Thyroid Disease Sister     Hemochromatosis Sister     Cerebrovascular Disease Sister         birth defect    Hyperlipidemia Sister     Other Cancer Sister         Goiter    Arrhythmia Sister     Depression Son     Diabetes Son     Depression Son      Depression Daughter     Depression Daughter      He does not have a family history of prostate cancer.  Social History     Socioeconomic History    Marital status:     Number of children: 4    Years of education: 20+   Occupational History    Occupation: Hospital Administration     Employer: RETIRED     Comment: 1998   Tobacco Use    Smoking status: Never     Passive exposure: Never    Smokeless tobacco: Never   Vaping Use    Vaping status: Never Used   Substance and Sexual Activity    Alcohol use: No     Comment: Quit since 2003.     Drug use: No    Sexual activity: Yes     Partners: Male     Birth control/protection: None   Other Topics Concern    Caffeine Concern Yes     Comment: 3-4 cups    Parent/sibling w/ CABG, MI or angioplasty before 65F 55M? Yes   Social History Narrative    Partner Usama Schmitt     Social Drivers of Health     Financial Resource Strain: Low Risk  (1/16/2024)    Financial Resource Strain     Within the past 12 months, have you or your family members you live with been unable to get utilities (heat, electricity) when it was really needed?: No   Food Insecurity: Low Risk  (1/16/2024)    Food Insecurity     Within the past 12 months, did you worry that your food would run out before you got money to buy more?: No     Within the past 12 months, did the food you bought just not last and you didn t have money to get more?: No   Transportation Needs: Low Risk  (1/16/2024)    Transportation Needs     Within the past 12 months, has lack of transportation kept you from medical appointments, getting your medicines, non-medical meetings or appointments, work, or from getting things that you need?: No   Physical Activity: Inactive (7/29/2024)    Exercise Vital Sign     Days of Exercise per Week: 0 days     Minutes of Exercise per Session: 0 min   Stress: No Stress Concern Present (7/29/2024)    [place] [place] [employer] - Occupational Stress Questionnaire     Feeling of Stress :  Not at all   Social Connections: Unknown (7/29/2024)    Social Connection and Isolation Panel [NHANES]     Frequency of Social Gatherings with Friends and Family: Never   Interpersonal Safety: Low Risk  (2/19/2025)    Interpersonal Safety     Do you feel physically and emotionally safe where you currently live?: Yes     Within the past 12 months, have you been hit, slapped, kicked or otherwise physically hurt by someone?: No     Within the past 12 months, have you been humiliated or emotionally abused in other ways by your partner or ex-partner?: No   Housing Stability: Low Risk  (1/16/2024)    Housing Stability     Do you have housing? : Yes     Are you worried about losing your housing?: No        REVIEW OF SYSTEMS  =================  C: NEGATIVE for fever, chills, change in weight  I: NEGATIVE for worrisome rashes, moles or lesions  E/M: NEGATIVE for ear, mouth and throat problems  R: NEGATIVE for significant cough or SHORTNESS OF BREATH  CV:  NEGATIVE for chest pain, palpitations or peripheral edema  GI: NEGATIVE for nausea, abdominal pain, heartburn, or change in bowel habits  NEURO: NEGATIVE numbness/weakness  : see HPI  PSYCH: NEGATIVE depression/anxiety  LYmph: no new enlarged lymph nodes  Ortho: no new trauma/movements    Results for orders placed or performed in visit on 04/21/25   UA Macroscopic with reflex to Microscopic and Culture     Status: Normal    Specimen: Urine, Midstream   Result Value Ref Range    Color Urine Yellow Colorless, Straw, Light Yellow, Yellow    Appearance Urine Clear Clear    Glucose Urine Negative Negative mg/dL    Bilirubin Urine Negative Negative    Ketones Urine Negative Negative mg/dL    Specific Gravity Urine 1.015 1.003 - 1.035    Blood Urine Negative Negative    pH Urine 6.0 5.0 - 7.0    Protein Albumin Urine Negative Negative mg/dL    Urobilinogen Urine 0.2 0.2, 1.0 E.U./dL    Nitrite Urine Negative Negative    Leukocyte Esterase Urine Negative Negative    Narrative     "Microscopic not indicated            O: Exam: /73   Pulse 66   Resp 16   Ht 1.778 m (5' 10\")   Wt 122.5 kg (270 lb)   SpO2 99%   BMI 38.74 kg/m      Constitutional: healthy, alert and no distress  Cardiovascular: negative, PMI normal.   Respiratory: negative, no evidence of respiratory distress  Gastrointestinal: Abdomen soft, non-tender. BS normal. No masses, organomegaly  :      Normal penis, no penile plaques or lesions.      Orthotopic location of the urethral meatus.     Scrotum normal.  WOOD:     Normal rectal tone, small amount of stool in the rectal vault.     40 g + sized prostate, No tenderness, mass or asymmetry.  Musculoskeletal: extremities normal- no gross deformities noted, gait normal and normal muscle tone  Skin: no suspicious lesions or rashes  Neurologic: Alert and oriented  Psychiatric: mentation appears normal. and affect normal/bright  Hematologic/Lymphatic/Immunologic: normal ant/post cervical, axillary, supraclavicular and inguinal nodes  PVR: 60 ml    Assessment/Plan:   1. Urinary incontinence, unspecified type (Primary)  Improving over the last several weeks.   Patient would not like to change anything at the moment.   Continue with oxybutynin.     2. Benign prostatic hyperplasia with nocturia    It was my pleasure to meet with Mr. Keyes in clinic today to discuss his lower urinary tract symptoms. I explained that his lower urinary tract symptoms are likely secondary to benign prostatic hyperplasia (BPH). We reviewed the natural history of BPH, its prevalence, and management options. We noted that progression of BPH and associated symptoms is unpredictable and many individuals will have stable lower urinary tract symptoms for years, while others may note worsening of their symptoms with time. We discussed that, in general, treatment of BPH is based on the extent of bother caused by lower urinary tract symptoms. We then reviewed options for ongoing management including " observation/watchful waiting, lifestyle modifications, medical management. Pros and cons of these options were discussed in detail. Multiple insightful questions were addressed to the best of my ability.    Following our discussion, Mr. Keyes expressed an interest in proceeding with:   - Observation could consider trial of Tamsulosin 0.4 mg q day if symptoms return/worsen.     Signed by:  GENESIS Ramirez CNP

## 2025-04-21 NOTE — Clinical Note
Thank you for the referral! I have enjoyed participating in the medical care of this very pleasant patient.  Please don't hesitate to contact me with any questions or concerns.  GENESIS Ramirez  [phone]

## 2025-05-06 ENCOUNTER — TELEPHONE (OUTPATIENT)
Dept: GASTROENTEROLOGY | Facility: CLINIC | Age: 75
End: 2025-05-06
Payer: MEDICARE

## 2025-05-06 ENCOUNTER — HOSPITAL ENCOUNTER (OUTPATIENT)
Facility: CLINIC | Age: 75
End: 2025-05-06
Attending: SURGERY | Admitting: SURGERY
Payer: MEDICARE

## 2025-05-06 NOTE — TELEPHONE ENCOUNTER
Pre Assessment RN Review    Focused Assessments    Chart review needed for pt pulmonary status.  Pt stated has pulmonary issues and has been treated for bronchitis is March 2025.      No reasons found to delay, as long as pt bronchitis symptoms resolved and back to normal.  Pt should be MAC d/t tramadol and Hospital for severe KALEB      Scheduling Status & Recommendations    Sedation: MAC/Deep Sedation - Per order.  Location Type: Hospital - Per order.    Renetta Pantoja RN  Endoscopy Procedure Pre Assessment RN

## 2025-05-06 NOTE — TELEPHONE ENCOUNTER
"Patient request to schedule at [facility], provided number and transferred patient.    Endoscopy Scheduling Screen    Caller: patient    Have you had any respiratory illness or flu-like symptoms in the last 10 days?  No    What is your communication preference for Instructions and/or Bowel Prep?   MyChart    What insurance is in the chart?  Other:  MEDICARE/CHAMP     Ordering/Referring Provider: Matt Swan PA-C in [facility]     (If ordering provider performs procedure, schedule with ordering provider unless otherwise instructed. )    BMI: Estimated body mass index is 38.74 kg/m  as calculated from the following:    Height as of 4/21/25: 1.778 m (5' 10\").    Weight as of 4/21/25: 122.5 kg (270 lb).     Sedation Ordered  MAC/deep sedation.   BMI<= 45 45 < BMI <= 48 48 < BMI < = 50  BMI > 50   No Restrictions No [facility]  No [facility]  [facility] with exceptions Hospital Only OR Only       Do you have a history of malignant hyperthermia?  No    (Females) Are you currently pregnant?   No     Have you been diagnosed or told you have pulmonary hypertension?   Yes MAC required in hospital setting only. PAC evaluation required if scheduled at [facility].    Do you have an LVAD?  No    Have you been told you have moderate to severe sleep apnea?  Yes. Do you use a CPAP? Yes Where is the patient located?. (RN Review required for scheduling unless scheduling in Hospital.)     Have you been told you have COPD, asthma, or any other lung disease?  Yes     What breathing problems do you have?  Asthma     Do you use home oxygen?  No    Have your breathing problems required an ED visit or hospitalization in the last year?  Yes. MARCH 2025 BRONCHITIS (RN Review required for scheduling unless scheduling in Hospital.)    Has your doctor ordered any cardiac tests like echo, angiogram, stress test, ablation, or EKG, that you have not completed yet?  No    Do you  have a history of any heart conditions?  No     Have you ever " "had or are you waiting for an organ transplant?  No. Continue scheduling, no site restrictions.    Have you had a stroke or transient ischemic attack (TIA aka \"mini stroke\") in the last 2 years?   No.    Have you been diagnosed with or been told you have cirrhosis of the liver?   No.    Are you currently on dialysis?   No    Do you need assistance transferring?   No    BMI: Estimated body mass index is 38.74 kg/m  as calculated from the following:    Height as of 4/21/25: 1.778 m (5' 10\").    Weight as of 4/21/25: 122.5 kg (270 lb).     Is patients BMI > 40 and scheduling location [facility]?  No    Do you take an injectable or oral medication for weight loss or diabetes (excluding insulin)?  Yes, hold time can be up to 7 days. Please consult with you prescribing provider to discuss endoscopy recommendations. (Please schedule at least 7 days out.)    Do you take the medication Naltrexone?  No    Do you take blood thinners?  No       Prep   Are you currently on dialysis or do you have chronic kidney disease?  Yes ([facility] Prep)    Do you have a diagnosis of diabetes?  No    Do you have a diagnosis of cystic fibrosis (CF)?  No    On a regular basis do you go 3 -5 days between bowel movements?  No    BMI > 40?  No    Preferred Pharmacy:    [id number] - [place] - [address] [address] [place]  [address] [address] [place] [address]  Phone: [phone] Fax: [phone]    Final Scheduling Details     Procedure scheduled  Colonoscopy    Surgeon:  [id number]     Date of procedure:  TBD     Pre-OP / PAC:   D    Location  HOSPITAL - Per RN assessment.    Sedation   MAC/Deep Sedation - Per order.      Patient Reminders:   You will receive a call from a Nurse to review instructions and health history.  This assessment must be completed prior to your procedure.  Failure to complete the Nurse assessment may result in the procedure being cancelled.      On the day of your procedure, please designate an adult(s) who can drive you " home stay with you for the next 24 hours. The medicines used in the exam will make you sleepy. You will not be able to drive.      You cannot take public transportation, ride share services, or non-medical taxi service without a responsible caregiver.  Medical transport services are allowed with the requirement that a responsible caregiver will receive you at your destination.  We require that drivers and caregivers are confirmed prior to your procedure.

## 2025-05-06 NOTE — TELEPHONE ENCOUNTER
Endoscopy Scheduling Screen      What insurance is in the chart?  Other:   and Medicare    Ordering/Referring Provider: Matt Swan   (If ordering provider performs procedure, schedule with ordering provider unless otherwise instructed. )    BMI: There is no height or weight on file to calculate BMI.  38.74    Sedation Ordered  general anesthesia.   BMI<= 45 45 < BMI <= 48 48 < BMI < = 50  BMI > 50   No Restrictions No [facility]  No [facility]  [facility] with exceptions Hospital Only OR Only       Do you have a history of malignant hyperthermia?  NO    (Females) Are you currently pregnant?   NO     Are you currently on dialysis?   NO    Do you need assistance transferring?   NO    BMI: There is no height or weight on file to calculate BMI.     Is patients BMI > 50?  NO    BMI > 40?  NO    Do you have a diagnosis of diabetes?  NO    Do you take an Oral or Injectable medication for weight loss or diabetes (excluding insulin)?  Yes, hold time can be up to 7 days. Please consult with you prescribing provider to discuss endoscopy recommendations. (Please schedule at least 7 days out.) Wegovy    Do you take the medication Naltrexone?  NO    Do you take blood thinners?  NO    Prep   Are you currently have chronic kidney disease?  NO    Do you have a diagnosis of cystic fibrosis (CF)?  NO    On a regular basis do you go 3 -5 days between each bowel movements?  NO    Preferred Pharmacy:    [facility][id number] - [place] - [address] [address] [place]  [address] [address] [place] [address]  Phone: [phone] Fax: [phone]    [facility][id number] - [place] - [address]  [address] [address]  [place] [address]  Phone: [phone] Fax: [phone]      Final Scheduling Details     Procedure scheduled  Colonoscopy    Surgeon:  Rosmery     Date of procedure:  6-18-25     Location  [place] - Patient preference.    What is your communication  preference for Instructions and/or Bowel Prep?   [facility]    Patient Reminders:    You will receive a call from a Nurse to review instructions and health history.  This assessment must be completed prior to your procedure.  Failure to complete the Nurse assessment may result in the procedure being cancelled.       On the day of your procedure, please designate an adult(s) who can drive you home stay with you for the next 24 hours. The medicines used in the exam will make you sleepy. You will not be able to drive.       You cannot take public transportation, ride share services, or non-medical taxi service without a responsible caregiver.  Medical transport services are allowed with the requirement that a responsible caregiver will receive you at your destination.  We require that drivers and caregivers are confirmed prior to your procedure.

## 2025-05-23 DIAGNOSIS — I10 HYPERTENSION GOAL BP (BLOOD PRESSURE) < 140/90: ICD-10-CM

## 2025-05-26 RX ORDER — HYDRALAZINE HYDROCHLORIDE 25 MG/1
25 TABLET, FILM COATED ORAL 2 TIMES DAILY
Qty: 180 TABLET | Refills: 0 | Status: SHIPPED | OUTPATIENT
Start: 2025-05-26

## 2025-06-04 ENCOUNTER — MYC MEDICAL ADVICE (OUTPATIENT)
Dept: FAMILY MEDICINE | Facility: CLINIC | Age: 75
End: 2025-06-04
Payer: MEDICARE

## 2025-06-04 DIAGNOSIS — E66.01 MORBID OBESITY (H): Primary | ICD-10-CM

## 2025-06-17 ENCOUNTER — ANESTHESIA EVENT (OUTPATIENT)
Dept: GASTROENTEROLOGY | Facility: CLINIC | Age: 75
End: 2025-06-17
Payer: MEDICARE

## 2025-06-17 RX ORDER — NALOXONE HYDROCHLORIDE 0.4 MG/ML
0.1 INJECTION, SOLUTION INTRAMUSCULAR; INTRAVENOUS; SUBCUTANEOUS
Status: CANCELLED | OUTPATIENT
Start: 2025-06-17

## 2025-06-17 RX ORDER — ONDANSETRON 4 MG/1
4 TABLET, ORALLY DISINTEGRATING ORAL EVERY 30 MIN PRN
Status: CANCELLED | OUTPATIENT
Start: 2025-06-17

## 2025-06-17 RX ORDER — SODIUM CHLORIDE, SODIUM LACTATE, POTASSIUM CHLORIDE, CALCIUM CHLORIDE 600; 310; 30; 20 MG/100ML; MG/100ML; MG/100ML; MG/100ML
INJECTION, SOLUTION INTRAVENOUS CONTINUOUS
Status: CANCELLED | OUTPATIENT
Start: 2025-06-17

## 2025-06-17 RX ORDER — OXYCODONE HYDROCHLORIDE 5 MG/1
10 TABLET ORAL
Refills: 0 | Status: CANCELLED | OUTPATIENT
Start: 2025-06-17

## 2025-06-17 RX ORDER — ONDANSETRON 2 MG/ML
4 INJECTION INTRAMUSCULAR; INTRAVENOUS EVERY 30 MIN PRN
Status: CANCELLED | OUTPATIENT
Start: 2025-06-17

## 2025-06-17 RX ORDER — LIDOCAINE 40 MG/G
CREAM TOPICAL
Status: CANCELLED | OUTPATIENT
Start: 2025-06-17

## 2025-06-17 RX ORDER — DEXAMETHASONE SODIUM PHOSPHATE 4 MG/ML
4 INJECTION, SOLUTION INTRA-ARTICULAR; INTRALESIONAL; INTRAMUSCULAR; INTRAVENOUS; SOFT TISSUE
Status: CANCELLED | OUTPATIENT
Start: 2025-06-17

## 2025-06-17 RX ORDER — OXYCODONE HYDROCHLORIDE 5 MG/1
5 TABLET ORAL
Refills: 0 | Status: CANCELLED | OUTPATIENT
Start: 2025-06-17

## 2025-06-17 NOTE — ANESTHESIA PREPROCEDURE EVALUATION
Anesthesia Pre-Procedure Evaluation    Patient: Prashant Keyes   MRN: [id number] : 1950          Procedure : Procedure(s):  Colonoscopy, Screening         Past Medical History:   Diagnosis Date    Arthritis 2012    Calculus of kidney ~    Carpal tunnel syndrome     Concussion, unspecified     Depressive disorder 2015    Eczema 2011    Epileptic seizure (H)     diagnosed , controlled with Depakote and Keppra    Fibromyalgia syndrome ~2000    per pt    Hypertension     Left testicle cyst     Photosensitive contact dermatitis     Prostatitis, unspecified     Seizures (H)     Diagnosed , controlled with Depakote and Keppra    Umbilical hernia 2012    Uncomplicated asthma     Unspecified asthma(493.90)       Past Surgical History:   Procedure Laterality Date    ABDOMEN SURGERY      Fixed belly button    ANGIOGRAM      Coronary Angiogram- negative    ARTHROSCOPY KNEE Left 2019    Procedure: LEFT KNEE ARTHROSCOPY WITH MENISCAL AND CHONDRAL DEBRIDEMENT;  Surgeon: Damon Rosas MD;  Location: [facility] OR    COLONOSCOPY  2013    COLONOSCOPY WITH CO2 INSUFFLATION N/A 2017    Procedure: COLONOSCOPY WITH CO2 INSUFFLATION;  COLON SCREEN/ FLYNN;  Surgeon: Varun Bond MD;  Location: [facility] OR    ENT SURGERY  2008    Hearing aids    EYE SURGERY  2012    Cataracts    HC REMOVAL TESTIS,SIMPLE      Right undecended    HERNIA REPAIR, INGUINAL RT/LT  1978    Right Hernia    HERNIORRHAPHY UMBILICAL  2013    [place]      Allergies   Allergen Reactions    Accupril [Ace Inhibitors] Difficulty breathing     accupril causes SOB    Aptiom [Eslicarbazepine] Difficulty breathing    Atorvastatin Calcium      Myalgias from Lipitor    Contrast Dye Hives     3/11/2024 Pt here for MRI brain with contrast. In our chart  states allergy to contrast, hives, not indication of what contrast. I see at [facility] in  pt had an MRI brain with  "contrast and nothing under allergies and pt states they were fine for that. Dr. Coffey ok 'd contrast today and pt did well. TA    Lactose GI Disturbance    Lisinopril Difficulty breathing     SOB    Nitroglycerin      Headache    Paroxetine Hives    Tetracycline [Tetracyclines & Related]      \"splitting headaches\"    Valproic Acid Hives    Vimpat [Lacosamide] Difficulty breathing    Zolpidem     Adhesive Tape Rash     Without itching- EKG pads      Social History     Tobacco Use    Smoking status: Never     Passive exposure: Never    Smokeless tobacco: Never   Substance Use Topics    Alcohol use: No     Comment: Quit since 2003.       Wt Readings from Last 1 Encounters:   04/21/25 122.5 kg (270 lb)        Anesthesia Evaluation            ROS/MED HX  ENT/Pulmonary:     (+) sleep apnea,                     asthma                  Neurologic: Comment: 2023 -  CONCLUSION:  1.  On the right there is a no significant atheromatous plaque.  Peak  systolic velocities in the ICA are 95 cm/s which correspond to the  <50% stenosis range based on NASCET criteria.  2.  On the left there is a mild amount of atheromatous plaque.  Peak  systolic velocities in the ICA are 86 cm/s which correspond to the <  50% stenosis range based on NASCET criteria.  3.  Antegrade flow within the vertebral arteries bilaterally.        Cardiovascular:     (+) Dyslipidemia hypertension- -   -  - -                                      METS/Exercise Tolerance:     Hematologic:       Musculoskeletal:   (+)  arthritis,             GI/Hepatic:     (+) GERD,                   Renal/Genitourinary:     (+) renal disease,             Endo:     (+)               Obesity,       Psychiatric/Substance Use:     (+)    H/O chronic opioid use .     Infectious Disease:       Malignancy:       Other:              Physical Exam    OUTSIDE LABS:  CBC:   Lab Results   Component Value Date    WBC 9.9 02/19/2025    WBC 9.0 05/04/2023    HGB 13.8 02/19/2025    HGB 12.9 (L) " "11/14/2024    HCT 39.5 (L) 02/19/2025    HCT 37.9 (L) 05/04/2023     02/19/2025     05/04/2023     BMP:   Lab Results   Component Value Date     (L) 02/19/2025     07/29/2024    POTASSIUM 3.6 02/19/2025    POTASSIUM 3.6 07/29/2024    CHLORIDE 87 (L) 02/19/2025    CHLORIDE 94 (L) 07/29/2024    CO2 30 (H) 02/19/2025    CO2 33 (H) 07/29/2024    BUN 13.6 02/19/2025    BUN 13.6 07/29/2024    CR 1.03 02/19/2025    CR 1.05 07/29/2024    GLC 92 02/19/2025    GLC 92 07/29/2024     COAGS: No results found for: \"PTT\", \"INR\", \"FIBR\"  POC: No results found for: \"BGM\", \"HCG\", \"HCGS\"  HEPATIC:   Lab Results   Component Value Date    ALBUMIN 4.3 02/19/2025    PROTTOTAL 7.1 02/19/2025    ALT 6 02/19/2025    AST 24 02/19/2025    ALKPHOS 83 02/19/2025    BILITOTAL 0.3 02/19/2025    MONALISA 45 01/12/2022     OTHER:   Lab Results   Component Value Date    A1C 5.6 05/04/2023    SANGEETHA 9.7 02/19/2025    PHOS 2.5 07/27/2010    MAG 2.0 03/31/2008    LIPASE 119 05/02/2016    AMYLASE 57 02/03/2003    TSH 0.60 02/19/2025    T4 1.18 02/09/2024    CRP 9.4 (H) 02/08/2021    SED 18 02/08/2021       Anesthesia Plan        GENESIS Wong CRNA    I have reviewed the pertinent notes and labs in the chart from the past 30 days and (re)examined the patient.  Any updates or changes from those notes are reflected in this note.    Clinically Significant Risk Factors Present on Admission                   # Hypertension: Noted on problem list                            "

## 2025-06-18 ENCOUNTER — ANESTHESIA (OUTPATIENT)
Dept: GASTROENTEROLOGY | Facility: CLINIC | Age: 75
End: 2025-06-18
Payer: MEDICARE

## 2025-06-25 DIAGNOSIS — M10.9 ACUTE GOUTY ARTHRITIS: ICD-10-CM

## 2025-06-25 DIAGNOSIS — L92.0 GRANULOMA ANNULARE: ICD-10-CM

## 2025-06-25 RX ORDER — COLCHICINE 0.6 MG/1
TABLET ORAL
Qty: 30 TABLET | Refills: 0 | Status: SHIPPED | OUTPATIENT
Start: 2025-06-25 | End: 2025-07-03

## 2025-06-25 RX ORDER — HYDROXYCHLOROQUINE SULFATE 200 MG/1
200 TABLET, FILM COATED ORAL 2 TIMES DAILY
Qty: 60 TABLET | Refills: 0 | Status: SHIPPED | OUTPATIENT
Start: 2025-06-25

## 2025-06-30 ENCOUNTER — PATIENT OUTREACH (OUTPATIENT)
Dept: CARE COORDINATION | Facility: CLINIC | Age: 75
End: 2025-06-30
Payer: MEDICARE

## 2025-07-01 ENCOUNTER — TELEPHONE (OUTPATIENT)
Dept: FAMILY MEDICINE | Facility: CLINIC | Age: 75
End: 2025-07-01
Payer: MEDICARE

## 2025-07-01 DIAGNOSIS — M10.9 ACUTE GOUTY ARTHRITIS: ICD-10-CM

## 2025-07-01 NOTE — TELEPHONE ENCOUNTER
Pharmacy would like clarity on Colchicine directions: Take 2 tabs on first day. Take at first sign of gout flair. Take for max of 1 week per flair     Old prescription stated: TAKE 2 TABS BY MOUTH ON 1ST DAY.TAKE 1 TAB DAILY AS NEEDED FOR GOUT FLAIR.MAX OF 1 WEEK PER FLAIR     Pharmacy also asking if you would want to have it state: take 2 tabs right away and an hour later 1 tablet. 1 tab daily as needed. Max of 1 week per flair    Celina Santiago RN on 7/1/2025 at 11:27 AM

## 2025-07-02 DIAGNOSIS — E66.01 MORBID OBESITY (H): Chronic | ICD-10-CM

## 2025-07-02 RX ORDER — SEMAGLUTIDE 0.25 MG/.5ML
0.25 INJECTION, SOLUTION SUBCUTANEOUS
Qty: 2 ML | Refills: 0 | Status: SHIPPED | OUTPATIENT
Start: 2025-07-02

## 2025-07-03 RX ORDER — COLCHICINE 0.6 MG/1
TABLET ORAL
Qty: 30 TABLET | Refills: 0 | Status: SHIPPED | OUTPATIENT
Start: 2025-07-03

## 2025-07-03 NOTE — TELEPHONE ENCOUNTER
Called Pharmacy regarding clarification on Colchicine prescription. Pharmacist confirmed instructions and stated that the did receive an updated prescription request today.    Mary Ellen Soler, BSN/RN  [facility]

## 2025-07-03 NOTE — TELEPHONE ENCOUNTER
I would prefer his colchicine rx sig state this:  take 2 tabs right away and an hour later 1 tablet. 1 tab daily as needed. Max of 1 week per flair

## 2025-07-06 DIAGNOSIS — M10.071 ACUTE IDIOPATHIC GOUT OF RIGHT ANKLE: Chronic | ICD-10-CM

## 2025-07-07 RX ORDER — ALLOPURINOL 100 MG/1
200 TABLET ORAL DAILY
Qty: 180 TABLET | Refills: 0 | Status: SHIPPED | OUTPATIENT
Start: 2025-07-07

## 2025-07-07 NOTE — TELEPHONE ENCOUNTER
Patient called in regarding Wegovy refill. States that he was on 1mg dose and most recent refill was sent for 0.25mg. Patient wants to know if this was intentional or if he should be continuing at the 1mg dose.    Mary Ellen Soler, BSN/RN  [facility]

## 2025-07-10 NOTE — TELEPHONE ENCOUNTER
Called and advised of the message below.    Patient stated understanding and agreeable with the plan of care.     Renetta MERRITT RN  Triage Nurse  [facility]

## 2025-07-14 ENCOUNTER — PATIENT OUTREACH (OUTPATIENT)
Dept: CARE COORDINATION | Facility: CLINIC | Age: 75
End: 2025-07-14
Payer: MEDICARE

## 2025-07-14 DIAGNOSIS — M25.562 CHRONIC PAIN OF LEFT KNEE: ICD-10-CM

## 2025-07-14 DIAGNOSIS — G89.29 CHRONIC PAIN OF LEFT KNEE: ICD-10-CM

## 2025-07-15 DIAGNOSIS — M79.7 FIBROMYALGIA: ICD-10-CM

## 2025-07-15 DIAGNOSIS — F32.1 CURRENT MODERATE EPISODE OF MAJOR DEPRESSIVE DISORDER WITHOUT PRIOR EPISODE (H): ICD-10-CM

## 2025-07-15 RX ORDER — FLUOXETINE HYDROCHLORIDE 40 MG/1
CAPSULE ORAL
Qty: 90 CAPSULE | Refills: 0 | Status: SHIPPED | OUTPATIENT
Start: 2025-07-15

## 2025-07-16 DIAGNOSIS — E87.6 HYPOKALEMIA: ICD-10-CM

## 2025-07-16 DIAGNOSIS — I10 BENIGN ESSENTIAL HYPERTENSION: ICD-10-CM

## 2025-07-16 RX ORDER — POTASSIUM CHLORIDE 1500 MG/1
20 TABLET, EXTENDED RELEASE ORAL
Qty: 270 TABLET | Refills: 0 | Status: SHIPPED | OUTPATIENT
Start: 2025-07-16

## 2025-07-16 RX ORDER — TRAMADOL HYDROCHLORIDE 50 MG/1
50 TABLET ORAL EVERY 6 HOURS PRN
Qty: 108 TABLET | Refills: 0 | Status: SHIPPED | OUTPATIENT
Start: 2025-07-16

## 2025-07-16 RX ORDER — GABAPENTIN 300 MG/1
600 CAPSULE ORAL 3 TIMES DAILY
Qty: 180 CAPSULE | Refills: 5 | Status: SHIPPED | OUTPATIENT
Start: 2025-07-16

## 2025-07-17 RX ORDER — CHLORTHALIDONE 25 MG/1
25 TABLET ORAL DAILY
Qty: 90 TABLET | Refills: 0 | Status: SHIPPED | OUTPATIENT
Start: 2025-07-17

## 2025-07-19 DIAGNOSIS — F32.1 CURRENT MODERATE EPISODE OF MAJOR DEPRESSIVE DISORDER WITHOUT PRIOR EPISODE (H): ICD-10-CM

## 2025-07-21 RX ORDER — FLUOXETINE HYDROCHLORIDE 40 MG/1
40 CAPSULE ORAL DAILY
Qty: 90 CAPSULE | Refills: 0 | Status: SHIPPED | OUTPATIENT
Start: 2025-07-21

## 2025-07-24 ENCOUNTER — NURSE TRIAGE (OUTPATIENT)
Dept: FAMILY MEDICINE | Facility: CLINIC | Age: 75
End: 2025-07-24
Payer: MEDICARE

## 2025-07-24 NOTE — TELEPHONE ENCOUNTER
Noted, no need to call, patient scheduled and plans to come in tomorrow as scheduled.    Yolis CHAHAL RN  [facility] Triage

## 2025-07-24 NOTE — TELEPHONE ENCOUNTER
"See patient's mychart message, low BP and symptoms.    He is scheduled for his annual visit 8/4/25.    BP Readings from Last 3 Encounters:   04/21/25 127/73   04/19/25 110/65   04/15/25 121/69     I called and spoke to patient, he says he was at a wedding this weekend and was feeling so weak and tired that he was having to sit and rest.  He is feeling better today but is still generally fatigued.   See triage below, the most likely explanation is that his BP meds need to be adjusted after he has had intended loss of weight, is on Wegovy.    See triage below:    SEE IN OFFICE OR VIDEO VISIT TODAY:  * You need to be examined today or have a video telemedicine visit.  * PCP OFFICE VISIT: Let me give you an appointment.  No openings today, should be seen in person as he does NOT check BP at home.   Scheduled with alternate provider tomorrow in a \"same day\" hold (that provider is not available to ask).    Routed to Matt Swan to review and advise if that is okay to use that hold with Dr. Jewell tomorrow or if he has other advice or plan for patient.    I advised patient to use his cane, change positions slowly.   To ER/call 911 if acutely worse.  Patient verbalized understanding of and agreement with plan.    Yolis CHAHAL RN  [facility] Triage      Reason for Disposition   Taking a medicine that could cause weakness (e.g., blood pressure medications, diuretics)    Additional Information   Negative: SEVERE difficulty breathing (e.g., struggling for each breath, speaks in single words)   Negative: Shock suspected (e.g., cold/pale/clammy skin, too weak to stand, low BP, rapid pulse)   Negative: Difficult to awaken or acting confused (e.g., disoriented, slurred speech)   Negative: Fainted > 15 minutes ago and still feels too weak or dizzy to stand   Negative: SEVERE weakness (e.g., unable to walk or barely able to walk, requires support) and new-onset or getting worse   Negative: Sounds like a life-threatening " "emergency to the triager   Negative: Weakness of the face, arm or leg on one side of the body   Negative: Has diabetes mellitus and weakness from low blood sugar (i.e., < 60 mg/dL or 3.5 mmol/L)   Negative: Recent heat exposure, suspected cause of weakness   Negative: Vomiting is main symptom   Negative: Diarrhea is main symptom   Negative: Difficulty breathing   Negative: Heart beating < 50 beats per minute OR > 140 beats per minute   Negative: Extra heartbeats, irregular heart beating, or heart is beating very fast (i.e., 'palpitations')   Negative: Follows large amount of bleeding (e.g., from vomiting, rectum, vagina) (Exception: Small transient weakness from sight of a small amount blood.)   Negative: Black or tarry bowel movements   Negative: MODERATE weakness or fatigue from poor fluid intake with no improvement after 2 hours of rest and fluids   Negative: Drinking very little and dehydration suspected (e.g., no urine > 12 hours, very dry mouth, very lightheaded)   Negative: Patient sounds very sick or weak to the triager   Negative: MODERATE weakness (e.g., interferes with work, school, normal activities) and cause unknown (Exceptions: Weakness from acute minor illness or from poor fluid intake; weakness is chronic and not worse.)   Negative: Fever > 103 F  (39.4 C) and not able to get the Fever down using CARE ADVICE   Negative: Fever > 100 F (37.8 C) and bedridden (e.g., CVA, chronic illness, recovering from surgery)   Negative: Fever > 101 F (38.3 C) and over 60 years of age   Negative: Fever > 100 F (37.8 C) and diabetes mellitus or weak immune system (e.g., HIV positive, cancer chemo, splenectomy, organ transplant, chronic steroids)   Negative: Pale skin (pallor)    Answer Assessment - Initial Assessment Questions  1. DESCRIPTION: \"Describe how you are feeling.\"      Generally tired  2. SEVERITY: \"How bad is it?\"  \"Can you stand and walk?\"      yes  3. ONSET: \"When did these symptoms begin?\" (e.g., " "hours, days, weeks, months)      2 weeks ago  4. CAUSE: \"What do you think is causing the weakness or fatigue?\" (e.g., not drinking enough fluids, medical problem, trouble sleeping)      BP was low at the dentist recently, around 90/60, does not know what the pulse was, denies feelings of rapid heart rate  5. NEW MEDICINES:  \"Have you started on any new medicines recently?\" (e.g., opioid pain medicines, benzodiazepines, muscle relaxants, antidepressants, antihistamines, neuroleptics, beta blockers)      No new meds, is on Wegovy has 60 lbs since starting, BP meds have not been addressed since then  6. OTHER SYMPTOMS: \"Do you have any other symptoms?\" (e.g., chest pain, fever, cough, SOB, vomiting, diarrhea, bleeding, other areas of pain)      no  7. PREGNANCY: \"Is there any chance you are pregnant?\" \"When was your last menstrual period?\"      N/a    Protocols used: Weakness (Generalized) and Fatigue-A-OH    "

## 2025-07-25 ENCOUNTER — OFFICE VISIT (OUTPATIENT)
Dept: FAMILY MEDICINE | Facility: CLINIC | Age: 75
End: 2025-07-25
Payer: MEDICARE

## 2025-07-25 VITALS
OXYGEN SATURATION: 99 % | RESPIRATION RATE: 22 BRPM | HEIGHT: 70 IN | SYSTOLIC BLOOD PRESSURE: 122 MMHG | BODY MASS INDEX: 35.76 KG/M2 | TEMPERATURE: 97.3 F | DIASTOLIC BLOOD PRESSURE: 58 MMHG | HEART RATE: 72 BPM | WEIGHT: 249.8 LBS

## 2025-07-25 DIAGNOSIS — E66.01 MORBID OBESITY (H): ICD-10-CM

## 2025-07-25 DIAGNOSIS — R42 DIZZINESS: ICD-10-CM

## 2025-07-25 DIAGNOSIS — N18.2 CKD (CHRONIC KIDNEY DISEASE) STAGE 2, GFR 60-89 ML/MIN: ICD-10-CM

## 2025-07-25 DIAGNOSIS — I10 BENIGN ESSENTIAL HYPERTENSION: ICD-10-CM

## 2025-07-25 DIAGNOSIS — F32.1 CURRENT MODERATE EPISODE OF MAJOR DEPRESSIVE DISORDER WITHOUT PRIOR EPISODE (H): ICD-10-CM

## 2025-07-25 DIAGNOSIS — R60.0 PERIPHERAL EDEMA: ICD-10-CM

## 2025-07-25 DIAGNOSIS — E78.5 HYPERLIPIDEMIA LDL GOAL <130: Primary | ICD-10-CM

## 2025-07-25 LAB
ANION GAP SERPL CALCULATED.3IONS-SCNC: 7 MMOL/L (ref 7–15)
BUN SERPL-MCNC: 10.6 MG/DL (ref 8–23)
CALCIUM SERPL-MCNC: 9.6 MG/DL (ref 8.8–10.4)
CHLORIDE SERPL-SCNC: 92 MMOL/L (ref 98–107)
CREAT SERPL-MCNC: 1.07 MG/DL (ref 0.67–1.17)
CREAT UR-MCNC: 57.5 MG/DL
EGFRCR SERPLBLD CKD-EPI 2021: 73 ML/MIN/1.73M2
ERYTHROCYTE [DISTWIDTH] IN BLOOD BY AUTOMATED COUNT: 12.8 % (ref 10–15)
GLUCOSE SERPL-MCNC: 90 MG/DL (ref 70–99)
HCO3 SERPL-SCNC: 34 MMOL/L (ref 22–29)
HCT VFR BLD AUTO: 36.9 % (ref 40–53)
HGB BLD-MCNC: 12.5 G/DL (ref 13.3–17.7)
MAGNESIUM SERPL-MCNC: 1.7 MG/DL (ref 1.7–2.3)
MCH RBC QN AUTO: 32 PG (ref 26.5–33)
MCHC RBC AUTO-ENTMCNC: 33.9 G/DL (ref 31.5–36.5)
MCV RBC AUTO: 94 FL (ref 78–100)
MICROALBUMIN UR-MCNC: <12 MG/L
MICROALBUMIN/CREAT UR: NORMAL MG/G{CREAT}
PLATELET # BLD AUTO: 248 10E3/UL (ref 150–450)
POTASSIUM SERPL-SCNC: 4 MMOL/L (ref 3.4–5.3)
RBC # BLD AUTO: 3.91 10E6/UL (ref 4.4–5.9)
SODIUM SERPL-SCNC: 133 MMOL/L (ref 135–145)
VIT D+METAB SERPL-MCNC: 60 NG/ML (ref 20–50)
WBC # BLD AUTO: 8 10E3/UL (ref 4–11)

## 2025-07-25 PROCEDURE — 1126F AMNT PAIN NOTED NONE PRSNT: CPT | Performed by: STUDENT IN AN ORGANIZED HEALTH CARE EDUCATION/TRAINING PROGRAM

## 2025-07-25 PROCEDURE — 36415 COLL VENOUS BLD VENIPUNCTURE: CPT | Performed by: STUDENT IN AN ORGANIZED HEALTH CARE EDUCATION/TRAINING PROGRAM

## 2025-07-25 PROCEDURE — 85027 COMPLETE CBC AUTOMATED: CPT | Performed by: STUDENT IN AN ORGANIZED HEALTH CARE EDUCATION/TRAINING PROGRAM

## 2025-07-25 PROCEDURE — 80048 BASIC METABOLIC PNL TOTAL CA: CPT | Performed by: STUDENT IN AN ORGANIZED HEALTH CARE EDUCATION/TRAINING PROGRAM

## 2025-07-25 PROCEDURE — G2211 COMPLEX E/M VISIT ADD ON: HCPCS | Performed by: STUDENT IN AN ORGANIZED HEALTH CARE EDUCATION/TRAINING PROGRAM

## 2025-07-25 PROCEDURE — 93000 ELECTROCARDIOGRAM COMPLETE: CPT | Performed by: STUDENT IN AN ORGANIZED HEALTH CARE EDUCATION/TRAINING PROGRAM

## 2025-07-25 PROCEDURE — 99215 OFFICE O/P EST HI 40 MIN: CPT | Performed by: STUDENT IN AN ORGANIZED HEALTH CARE EDUCATION/TRAINING PROGRAM

## 2025-07-25 PROCEDURE — 82570 ASSAY OF URINE CREATININE: CPT | Performed by: STUDENT IN AN ORGANIZED HEALTH CARE EDUCATION/TRAINING PROGRAM

## 2025-07-25 PROCEDURE — 83735 ASSAY OF MAGNESIUM: CPT | Performed by: STUDENT IN AN ORGANIZED HEALTH CARE EDUCATION/TRAINING PROGRAM

## 2025-07-25 PROCEDURE — 82043 UR ALBUMIN QUANTITATIVE: CPT | Performed by: STUDENT IN AN ORGANIZED HEALTH CARE EDUCATION/TRAINING PROGRAM

## 2025-07-25 PROCEDURE — 82306 VITAMIN D 25 HYDROXY: CPT | Performed by: STUDENT IN AN ORGANIZED HEALTH CARE EDUCATION/TRAINING PROGRAM

## 2025-07-25 PROCEDURE — 3078F DIAST BP <80 MM HG: CPT | Performed by: STUDENT IN AN ORGANIZED HEALTH CARE EDUCATION/TRAINING PROGRAM

## 2025-07-25 PROCEDURE — 3074F SYST BP LT 130 MM HG: CPT | Performed by: STUDENT IN AN ORGANIZED HEALTH CARE EDUCATION/TRAINING PROGRAM

## 2025-07-25 PROCEDURE — 96127 BRIEF EMOTIONAL/BEHAV ASSMT: CPT | Performed by: STUDENT IN AN ORGANIZED HEALTH CARE EDUCATION/TRAINING PROGRAM

## 2025-07-25 ASSESSMENT — PAIN SCALES - GENERAL: PAINLEVEL_OUTOF10: NO PAIN (0)

## 2025-07-25 ASSESSMENT — ANXIETY QUESTIONNAIRES
GAD7 TOTAL SCORE: 0
7. FEELING AFRAID AS IF SOMETHING AWFUL MIGHT HAPPEN: NOT AT ALL
GAD7 TOTAL SCORE: 0
5. BEING SO RESTLESS THAT IT IS HARD TO SIT STILL: NOT AT ALL
4. TROUBLE RELAXING: NOT AT ALL
7. FEELING AFRAID AS IF SOMETHING AWFUL MIGHT HAPPEN: NOT AT ALL
3. WORRYING TOO MUCH ABOUT DIFFERENT THINGS: NOT AT ALL
8. IF YOU CHECKED OFF ANY PROBLEMS, HOW DIFFICULT HAVE THESE MADE IT FOR YOU TO DO YOUR WORK, TAKE CARE OF THINGS AT HOME, OR GET ALONG WITH OTHER PEOPLE?: NOT DIFFICULT AT ALL
6. BECOMING EASILY ANNOYED OR IRRITABLE: NOT AT ALL
GAD7 TOTAL SCORE: 0
1. FEELING NERVOUS, ANXIOUS, OR ON EDGE: NOT AT ALL
IF YOU CHECKED OFF ANY PROBLEMS ON THIS QUESTIONNAIRE, HOW DIFFICULT HAVE THESE PROBLEMS MADE IT FOR YOU TO DO YOUR WORK, TAKE CARE OF THINGS AT HOME, OR GET ALONG WITH OTHER PEOPLE: NOT DIFFICULT AT ALL
2. NOT BEING ABLE TO STOP OR CONTROL WORRYING: NOT AT ALL

## 2025-07-25 NOTE — PATIENT INSTRUCTIONS
Javi Arciniega,    Thank you for allowing [facility] to manage your care.    I ordered some blood work, please go to the laboratory to get your laboratory studies.      Stop your amlodipine and decrease your lasix from 40 mg to 10 mg.  If there is no improvement in 1 week, Kindly call [phone] to schedule MRI of the brain and heart test.    Monitor your bp morning and night and send reading via Allozynehart in 1 wk    For your convenience, test results are released as soon as they are available  Please allow 1-2 business days for me to send you a comment about your results.  If not done so, I encourage you to login into [facility] ([email or web address] to review your results in real time.     If you have any questions or concerns, please feel free to call us at [phone].    Sincerely,    Dr. Perales    Did you know?      You can schedule a video visit for follow-up appointments as well as future appointments for certain conditions.  Please see the below link.     [email or web address]    If you have not already done so,  I encourage you to sign up for [employer] ([email or web address]).  This will allow you to review your results, securely communicate with a provider, and schedule virtual visits as well.

## 2025-07-25 NOTE — PROGRESS NOTES
Assessment & Plan     Dizziness  The patient presents with complaints of dizziness while taking multiple antihypertensive medications. At this time. The differential diagnosis includes, but is not limited to, benign paroxysmal positional vertigo (BPPV), a neurologic cause, or a cardiac etiology.    Orthostatic blood pressure measurements revealed a drop in both systolic and diastolic pressure when moving from supine to standing, though the decrease did not meet the formal threshold for a diagnosis of orthostatic hypotension. An EKG was performed and was unremarkable except for nonspecific intraventricular delay(slight prolonged ORS)    Given the current symptoms and findings, medication adjustments were made. Amlodipine has been discontinued due to its potential contribution to the patient's hypotensive symptoms. Additionally, the dose of Lasix has been reduced from 40 mg to 20 mg, as the patient s edema has resolved following weight loss.    The patient has been instructed to check blood pressure twice daily and to send the readings via ExpertFile in one week for review. Laboratory tests will be obtained today to evaluate for possible contributing factors, including electrolyte abnormalities or renal dysfunction.    The patient was advised to monitor symptoms closely and report any worsening. If symptoms persist or fail to improve, further evaluation with a brain MRI and echocardiogram stress test will be pursued.  - Basic metabolic panel  (Ca, Cl, CO2, Creat, Gluc, K, Na, BUN); Future  - Magnesium; Future  - EKG 12-lead complete w/read - Clinics  - CBC with platelets; Future  - MR Brain w/o Contrast; Future  - Basic metabolic panel  (Ca, Cl, CO2, Creat, Gluc, K, Na, BUN)  - Magnesium  - CBC with platelets  Benign essential hypertension  Controlled.continue medications  - Echocardiogram Complete; Future  Hyperlipidemia LDL goal <130  Stable, continue statin    CKD (chronic kidney disease) stage 2, GFR 60-89  ml/min  Stable, avoid nephrotoxins  - Albumin Random Urine Quantitative with Creat Ratio; Future  - Albumin Random Urine Quantitative with Creat Ratio    Morbid obesity (H)  Improving. Continue Wegovy  - Vitamin D Deficiency; Future  - Vitamin D Deficiency    Current moderate episode of major depressive disorder without prior episode (H)  Stable, continue Prozac    Peripheral edema  Edema has resolved since losing weight  Non smoker    40 minutes spent by me on the date of the encounter doing chart review, patient visit, and documentation   The longitudinal plan of care for the diagnosis(es)/condition(s) as documented were addressed during this visit. Due to the added complexity in care, I will continue to support Demian in the subsequent management and with ongoing continuity of care.      Kevon Arciniega is a 74 year old, presenting for the following health issues:  Dizziness (Ongoing x2 weeks, 60 lbs weight loss (on wegovy), on Losartan, amlodipine)        7/25/2025     9:43 AM   Additional Questions   Roomed by Gil Swan CMA   Accompanied by N/A         7/25/2025     9:43 AM   Patient Reported Additional Medications   Patient reports taking the following new medications No new medications.     History of Present Illness       Reason for visit:  Dizziness  Symptom onset:  1-2 weeks ago   He is taking medications regularly.      Patient had a spout of severe dizziness on Tuesday, felt as though he could not make it back to the room from walking half a block. Feels very weak currently.      Demian Keyes, 74-year-old male  - Lost 60 lbs intentionally between Jan and Feb this year. Also with significant weight loss from legs  - History of morbid obesity, benign essential hypertension, hyperlipidemia (LDL goal <130), CKD stage 2 (GFR 60-89 ml/min), major depressive disorder, and peripheral edema  - Difficulty controlling blood pressure in the past, with medication adjustments around January 2024 and January 2023.  "Blood pressure has been controlled since then. He does not check bp at home.  - Currently taking amlodipine, carvedilol, carbidopa levodopa, colchicine, Depakote, Prozac, Lasix, hydrochlorothiazide and hydralazine; no new medications added recently  - Dizziness ongoing for past two weeks, sometimes worsened with position changes, but no sensation of room spinning  - No chest pounding or palpitations with dizziness  - No abdominal pain, no burning or frequent urination or fever  - No swelling reported  Orthostatic Vitals:    Supine, right arm: 122/76  Standing, right arm: 106/69        Review of Systems  Constitutional, neuro, ENT, endocrine, pulmonary, cardiac, gastrointestinal, genitourinary, musculoskeletal, integument and psychiatric systems are negative, except as otherwise noted.      Objective    /69 (BP Location: Right arm, Patient Position: Standing)   Pulse 72   Temp 97.3  F (36.3  C) (Temporal)   Resp 22   Ht 1.778 m (5' 10\")   Wt 113.3 kg (249 lb 12.8 oz)   SpO2 99%   BMI 35.84 kg/m    Body mass index is 35.84 kg/m .  Physical Exam   GENERAL: alert and no distress  HENT: ear canals and TM's normal, nose and mouth without ulcers or lesions  RESP: lungs clear to auscultation - no rales, rhonchi or wheezes  CV: regular rate and rhythm, normal S1 S2, no S3 or S4, no murmur, click or rub, no peripheral edema  ABDOMEN: soft, nontender, no hepatosplenomegaly, no masses and bowel sounds normal  MS: no gross musculoskeletal defects noted, no edema  NEURO: Normal strength and tone, sensory exam grossly normal, mentation intact, and cranial nerves 2-12 intact      Signed Electronically by: Tammi Perales MD    "

## 2025-07-30 RX ORDER — BISACODYL 5 MG
TABLET, DELAYED RELEASE (ENTERIC COATED) ORAL
Qty: 4 TABLET | Refills: 0 | Status: SHIPPED | OUTPATIENT
Start: 2025-07-30 | End: 2025-08-13

## 2025-08-01 ENCOUNTER — ANESTHESIA EVENT (OUTPATIENT)
Dept: GASTROENTEROLOGY | Facility: CLINIC | Age: 75
End: 2025-08-01
Payer: MEDICARE

## 2025-08-02 SDOH — HEALTH STABILITY: PHYSICAL HEALTH: ON AVERAGE, HOW MANY DAYS PER WEEK DO YOU ENGAGE IN MODERATE TO STRENUOUS EXERCISE (LIKE A BRISK WALK)?: 0 DAYS

## 2025-08-02 SDOH — HEALTH STABILITY: PHYSICAL HEALTH: ON AVERAGE, HOW MANY MINUTES DO YOU ENGAGE IN EXERCISE AT THIS LEVEL?: 0 MIN

## 2025-08-02 ASSESSMENT — SOCIAL DETERMINANTS OF HEALTH (SDOH): HOW OFTEN DO YOU GET TOGETHER WITH FRIENDS OR RELATIVES?: PATIENT DECLINED

## 2025-08-04 ENCOUNTER — OFFICE VISIT (OUTPATIENT)
Dept: FAMILY MEDICINE | Facility: CLINIC | Age: 75
End: 2025-08-04
Payer: MEDICARE

## 2025-08-04 VITALS
TEMPERATURE: 98 F | RESPIRATION RATE: 20 BRPM | HEART RATE: 73 BPM | SYSTOLIC BLOOD PRESSURE: 134 MMHG | HEIGHT: 69 IN | DIASTOLIC BLOOD PRESSURE: 72 MMHG | OXYGEN SATURATION: 99 % | WEIGHT: 249.2 LBS | BODY MASS INDEX: 36.91 KG/M2

## 2025-08-04 DIAGNOSIS — R60.0 PERIPHERAL EDEMA: ICD-10-CM

## 2025-08-04 DIAGNOSIS — Z12.11 SCREEN FOR COLON CANCER: ICD-10-CM

## 2025-08-04 DIAGNOSIS — E66.01 MORBID OBESITY (H): ICD-10-CM

## 2025-08-04 DIAGNOSIS — Z00.00 ENCOUNTER FOR ADULT WELLNESS VISIT: Primary | ICD-10-CM

## 2025-08-04 DIAGNOSIS — I10 BENIGN ESSENTIAL HYPERTENSION: ICD-10-CM

## 2025-08-04 PROCEDURE — 99214 OFFICE O/P EST MOD 30 MIN: CPT | Mod: 25 | Performed by: PHYSICIAN ASSISTANT

## 2025-08-04 PROCEDURE — 3075F SYST BP GE 130 - 139MM HG: CPT | Performed by: PHYSICIAN ASSISTANT

## 2025-08-04 PROCEDURE — 91320 SARSCV2 VAC 30MCG TRS-SUC IM: CPT | Performed by: PHYSICIAN ASSISTANT

## 2025-08-04 PROCEDURE — G0439 PPPS, SUBSEQ VISIT: HCPCS | Performed by: PHYSICIAN ASSISTANT

## 2025-08-04 PROCEDURE — 3078F DIAST BP <80 MM HG: CPT | Performed by: PHYSICIAN ASSISTANT

## 2025-08-04 PROCEDURE — 90480 ADMN SARSCOV2 VAC 1/ONLY CMP: CPT | Performed by: PHYSICIAN ASSISTANT

## 2025-08-04 PROCEDURE — G2211 COMPLEX E/M VISIT ADD ON: HCPCS | Performed by: PHYSICIAN ASSISTANT

## 2025-08-04 RX ORDER — FUROSEMIDE 20 MG/1
20 TABLET ORAL DAILY
Qty: 90 TABLET | Refills: 1 | Status: SHIPPED | OUTPATIENT
Start: 2025-08-04

## 2025-08-04 RX ORDER — LOSARTAN POTASSIUM 100 MG/1
100 TABLET ORAL DAILY
Qty: 90 TABLET | Refills: 1 | Status: SHIPPED | OUTPATIENT
Start: 2025-08-04

## 2025-08-04 ASSESSMENT — PATIENT HEALTH QUESTIONNAIRE - PHQ9
10. IF YOU CHECKED OFF ANY PROBLEMS, HOW DIFFICULT HAVE THESE PROBLEMS MADE IT FOR YOU TO DO YOUR WORK, TAKE CARE OF THINGS AT HOME, OR GET ALONG WITH OTHER PEOPLE: NOT DIFFICULT AT ALL
SUM OF ALL RESPONSES TO PHQ QUESTIONS 1-9: 2
SUM OF ALL RESPONSES TO PHQ QUESTIONS 1-9: 2

## 2025-08-13 ENCOUNTER — HOSPITAL ENCOUNTER (OUTPATIENT)
Facility: CLINIC | Age: 75
Discharge: HOME | End: 2025-08-13
Attending: SURGERY | Admitting: SURGERY
Payer: MEDICARE

## 2025-08-13 ENCOUNTER — ANESTHESIA (OUTPATIENT)
Dept: GASTROENTEROLOGY | Facility: CLINIC | Age: 75
End: 2025-08-13
Payer: MEDICARE

## 2025-08-13 VITALS
OXYGEN SATURATION: 98 % | SYSTOLIC BLOOD PRESSURE: 130 MMHG | TEMPERATURE: 98.1 F | HEIGHT: 69 IN | WEIGHT: 249 LBS | HEART RATE: 74 BPM | BODY MASS INDEX: 36.88 KG/M2 | DIASTOLIC BLOOD PRESSURE: 64 MMHG | RESPIRATION RATE: 17 BRPM

## 2025-08-13 DIAGNOSIS — Z12.11 SPECIAL SCREENING FOR MALIGNANT NEOPLASMS, COLON: Primary | ICD-10-CM

## 2025-08-13 LAB — COLONOSCOPY: NORMAL

## 2025-08-13 PROCEDURE — 250N000011 HC RX IP 250 OP 636: Performed by: NURSE ANESTHETIST, CERTIFIED REGISTERED

## 2025-08-13 PROCEDURE — 250N000009 HC RX 250: Performed by: NURSE ANESTHETIST, CERTIFIED REGISTERED

## 2025-08-13 PROCEDURE — 370N000017 HC ANESTHESIA TECHNICAL FEE, PER MIN: Performed by: SURGERY

## 2025-08-13 PROCEDURE — 45380 COLONOSCOPY AND BIOPSY: CPT | Performed by: SURGERY

## 2025-08-13 PROCEDURE — 258N000003 HC RX IP 258 OP 636: Performed by: SURGERY

## 2025-08-13 PROCEDURE — 88305 TISSUE EXAM BY PATHOLOGIST: CPT | Mod: TC | Performed by: SURGERY

## 2025-08-13 RX ORDER — SODIUM CHLORIDE, SODIUM LACTATE, POTASSIUM CHLORIDE, CALCIUM CHLORIDE 600; 310; 30; 20 MG/100ML; MG/100ML; MG/100ML; MG/100ML
INJECTION, SOLUTION INTRAVENOUS CONTINUOUS
Status: DISCONTINUED | OUTPATIENT
Start: 2025-08-13 | End: 2025-08-13 | Stop reason: HOSPADM

## 2025-08-13 RX ORDER — NALOXONE HYDROCHLORIDE 0.4 MG/ML
0.2 INJECTION, SOLUTION INTRAMUSCULAR; INTRAVENOUS; SUBCUTANEOUS
Status: DISCONTINUED | OUTPATIENT
Start: 2025-08-13 | End: 2025-08-13 | Stop reason: HOSPADM

## 2025-08-13 RX ORDER — LIDOCAINE 40 MG/G
CREAM TOPICAL
Status: DISCONTINUED | OUTPATIENT
Start: 2025-08-13 | End: 2025-08-13 | Stop reason: HOSPADM

## 2025-08-13 RX ORDER — NALOXONE HYDROCHLORIDE 0.4 MG/ML
0.4 INJECTION, SOLUTION INTRAMUSCULAR; INTRAVENOUS; SUBCUTANEOUS
Status: DISCONTINUED | OUTPATIENT
Start: 2025-08-13 | End: 2025-08-13 | Stop reason: HOSPADM

## 2025-08-13 RX ORDER — LIDOCAINE HYDROCHLORIDE 20 MG/ML
INJECTION, SOLUTION INFILTRATION; PERINEURAL PRN
Status: DISCONTINUED | OUTPATIENT
Start: 2025-08-13 | End: 2025-08-13

## 2025-08-13 RX ORDER — ONDANSETRON 2 MG/ML
4 INJECTION INTRAMUSCULAR; INTRAVENOUS
Status: DISCONTINUED | OUTPATIENT
Start: 2025-08-13 | End: 2025-08-13 | Stop reason: HOSPADM

## 2025-08-13 RX ORDER — FLUMAZENIL 0.1 MG/ML
0.2 INJECTION, SOLUTION INTRAVENOUS
Status: DISCONTINUED | OUTPATIENT
Start: 2025-08-13 | End: 2025-08-13 | Stop reason: HOSPADM

## 2025-08-13 RX ORDER — PROPOFOL 10 MG/ML
INJECTION, EMULSION INTRAVENOUS CONTINUOUS PRN
Status: DISCONTINUED | OUTPATIENT
Start: 2025-08-13 | End: 2025-08-13

## 2025-08-13 RX ADMIN — LIDOCAINE HYDROCHLORIDE 100 MG: 20 INJECTION, SOLUTION INFILTRATION; PERINEURAL at 09:19

## 2025-08-13 RX ADMIN — PROPOFOL 150 MCG/KG/MIN: 10 INJECTION, EMULSION INTRAVENOUS at 09:19

## 2025-08-13 RX ADMIN — SODIUM CHLORIDE, POTASSIUM CHLORIDE, SODIUM LACTATE AND CALCIUM CHLORIDE: 600; 310; 30; 20 INJECTION, SOLUTION INTRAVENOUS at 08:43

## 2025-08-13 ASSESSMENT — ASTHMA QUESTIONNAIRES
QUESTION_2 LAST FOUR WEEKS HOW OFTEN HAVE YOU HAD SHORTNESS OF BREATH: NOT AT ALL
QUESTION_5 LAST FOUR WEEKS HOW WOULD YOU RATE YOUR ASTHMA CONTROL: COMPLETELY CONTROLLED
QUESTION_4 LAST FOUR WEEKS HOW OFTEN HAVE YOU USED YOUR RESCUE INHALER OR NEBULIZER MEDICATION (SUCH AS ALBUTEROL): NOT AT ALL
ACT_TOTALSCORE: 25
QUESTION_1 LAST FOUR WEEKS HOW MUCH OF THE TIME DID YOUR ASTHMA KEEP YOU FROM GETTING AS MUCH DONE AT WORK, SCHOOL OR AT HOME: NONE OF THE TIME
QUESTION_3 LAST FOUR WEEKS HOW OFTEN DID YOUR ASTHMA SYMPTOMS (WHEEZING, COUGHING, SHORTNESS OF BREATH, CHEST TIGHTNESS OR PAIN) WAKE YOU UP AT NIGHT OR EARLIER THAN USUAL IN THE MORNING: NOT AT ALL

## 2025-08-13 ASSESSMENT — ACTIVITIES OF DAILY LIVING (ADL)
ADLS_ACUITY_SCORE: 41

## 2025-08-14 ENCOUNTER — VIRTUAL VISIT (OUTPATIENT)
Dept: FAMILY MEDICINE | Facility: CLINIC | Age: 75
End: 2025-08-14
Payer: MEDICARE

## 2025-08-14 DIAGNOSIS — E66.01 MORBID OBESITY (H): Primary | ICD-10-CM

## 2025-08-14 PROCEDURE — 88305 TISSUE EXAM BY PATHOLOGIST: CPT | Mod: 26 | Performed by: PATHOLOGY

## 2025-08-16 ENCOUNTER — RESULTS FOLLOW-UP (OUTPATIENT)
Dept: SURGERY | Facility: CLINIC | Age: 75
End: 2025-08-16
Payer: MEDICARE

## 2025-08-27 DIAGNOSIS — E29.1 HYPOGONADISM MALE: Primary | ICD-10-CM

## 2025-08-27 DIAGNOSIS — Z79.890 LONG-TERM CURRENT USE OF TESTOSTERONE REPLACEMENT THERAPY: ICD-10-CM

## 2025-08-27 DIAGNOSIS — Z51.81 ENCOUNTER FOR MONITORING TESTOSTERONE REPLACEMENT THERAPY: ICD-10-CM

## 2025-08-27 DIAGNOSIS — Z79.890 ENCOUNTER FOR MONITORING TESTOSTERONE REPLACEMENT THERAPY: ICD-10-CM

## 2025-08-28 ENCOUNTER — TELEPHONE (OUTPATIENT)
Dept: FAMILY MEDICINE | Facility: CLINIC | Age: 75
End: 2025-08-28
Payer: MEDICARE

## 2025-08-28 DIAGNOSIS — I10 BENIGN ESSENTIAL HYPERTENSION: ICD-10-CM

## 2025-08-28 RX ORDER — LOSARTAN POTASSIUM 100 MG/1
100 TABLET ORAL DAILY
Qty: 90 TABLET | Refills: 1 | Status: SHIPPED | OUTPATIENT
Start: 2025-08-28

## (undated) DEVICE — DRAPE SHEET 3/4 78X60"

## (undated) DEVICE — BLADE DYONICS INCISOR PLUS ELITE 3.5MM 72200095

## (undated) DEVICE — SOL WATER IRRIG 1000ML BOTTLE 07139-09

## (undated) DEVICE — SOL NACL 0.9% IRRIG 3000ML BAG 07972-08

## (undated) DEVICE — SU PROLENE 3-0 PS-2 18" 8687H

## (undated) DEVICE — GLOVE PROTEXIS W/NEU-THERA 8.0  2D73TE80

## (undated) DEVICE — DRSG STERI STRIP 1/2X4" R1547

## (undated) DEVICE — GLOVE PROTEXIS W/NEU-THERA 7.5  2D73TE75

## (undated) DEVICE — BNDG ELASTIC 6"X5YDS UNSTERILE 6611-60

## (undated) DEVICE — BNDG ESMARK 6" STERILE

## (undated) DEVICE — PACK ARTHROSCOPY KNEE SOP15AKFSM

## (undated) DEVICE — GLOVE PROTEXIS POWDER FREE 8.0 ORTHOPEDIC 2D73ET80

## (undated) DEVICE — GLOVE PROTEXIS BLUE W/NEU-THERA 7.5  2D73EB75

## (undated) DEVICE — DRAPE STERI U 1015

## (undated) DEVICE — Device

## (undated) DEVICE — ENDO FORCEP ENDOJAW BIOPSY 3.7MMX230CM FB-222U

## (undated) DEVICE — PREP CHLORAPREP 26ML TINTED ORANGE  260815

## (undated) RX ORDER — PROPOFOL 10 MG/ML
INJECTION, EMULSION INTRAVENOUS
Status: DISPENSED
Start: 2019-09-05

## (undated) RX ORDER — GABAPENTIN 300 MG/1
CAPSULE ORAL
Status: DISPENSED
Start: 2019-09-05

## (undated) RX ORDER — ACETAMINOPHEN 325 MG/1
TABLET ORAL
Status: DISPENSED
Start: 2019-09-05

## (undated) RX ORDER — KETOROLAC TROMETHAMINE 30 MG/ML
INJECTION, SOLUTION INTRAMUSCULAR; INTRAVENOUS
Status: DISPENSED
Start: 2019-09-05

## (undated) RX ORDER — CEFAZOLIN SODIUM 1 G/3ML
INJECTION, POWDER, FOR SOLUTION INTRAMUSCULAR; INTRAVENOUS
Status: DISPENSED
Start: 2019-09-05

## (undated) RX ORDER — HYDROMORPHONE HYDROCHLORIDE 2 MG/ML
INJECTION, SOLUTION INTRAMUSCULAR; INTRAVENOUS; SUBCUTANEOUS
Status: DISPENSED
Start: 2019-09-05

## (undated) RX ORDER — ONDANSETRON 2 MG/ML
INJECTION INTRAMUSCULAR; INTRAVENOUS
Status: DISPENSED
Start: 2019-09-05